# Patient Record
Sex: FEMALE | Race: WHITE | NOT HISPANIC OR LATINO | Employment: OTHER | URBAN - METROPOLITAN AREA
[De-identification: names, ages, dates, MRNs, and addresses within clinical notes are randomized per-mention and may not be internally consistent; named-entity substitution may affect disease eponyms.]

---

## 2017-04-17 ENCOUNTER — GENERIC CONVERSION - ENCOUNTER (OUTPATIENT)
Dept: OTHER | Facility: OTHER | Age: 72
End: 2017-04-17

## 2017-05-04 ENCOUNTER — HOSPITAL ENCOUNTER (OUTPATIENT)
Dept: MAMMOGRAPHY | Facility: CLINIC | Age: 72
Discharge: HOME | End: 2017-05-04
Payer: MEDICARE

## 2017-05-04 ENCOUNTER — ALLSCRIPTS OFFICE VISIT (OUTPATIENT)
Dept: OTHER | Facility: OTHER | Age: 72
End: 2017-05-04

## 2017-05-04 DIAGNOSIS — Z76.89 REFERRAL OF PATIENT WITHOUT EXAMINATION OR TREATMENT: ICD-10-CM

## 2017-05-23 ENCOUNTER — ALLSCRIPTS OFFICE VISIT (OUTPATIENT)
Dept: OTHER | Facility: OTHER | Age: 72
End: 2017-05-23

## 2017-06-26 ENCOUNTER — GENERIC CONVERSION - ENCOUNTER (OUTPATIENT)
Dept: OTHER | Facility: OTHER | Age: 72
End: 2017-06-26

## 2017-06-27 ENCOUNTER — ALLSCRIPTS OFFICE VISIT (OUTPATIENT)
Dept: OTHER | Facility: OTHER | Age: 72
End: 2017-06-27

## 2017-06-27 DIAGNOSIS — L98.9 DISORDER OF SKIN OR SUBCUTANEOUS TISSUE: ICD-10-CM

## 2017-07-23 DIAGNOSIS — I10 ESSENTIAL (PRIMARY) HYPERTENSION: ICD-10-CM

## 2017-07-23 DIAGNOSIS — E03.9 HYPOTHYROIDISM: ICD-10-CM

## 2017-07-23 DIAGNOSIS — C44.319 BASAL CELL CARCINOMA OF SKIN OF OTHER PARTS OF FACE: ICD-10-CM

## 2017-08-21 ENCOUNTER — ALLSCRIPTS OFFICE VISIT (OUTPATIENT)
Dept: OTHER | Facility: OTHER | Age: 72
End: 2017-08-21

## 2017-08-28 ENCOUNTER — ALLSCRIPTS OFFICE VISIT (OUTPATIENT)
Dept: OTHER | Facility: OTHER | Age: 72
End: 2017-08-28

## 2017-10-02 ENCOUNTER — GENERIC CONVERSION - ENCOUNTER (OUTPATIENT)
Dept: OTHER | Facility: OTHER | Age: 72
End: 2017-10-02

## 2017-11-28 ENCOUNTER — ALLSCRIPTS OFFICE VISIT (OUTPATIENT)
Dept: OTHER | Facility: OTHER | Age: 72
End: 2017-11-28

## 2017-11-29 NOTE — PROGRESS NOTES
Assessment    1  Hypertension (401 9) (I10)   2  Encounter for preventive health examination (V70 0) (Z00 00)   3  Hypothyroidism (244 9) (E03 9)   4  Subclinical hypothyroidism (244 8) (E03 9)    Plan  Hypertension    · DilTIAZem HCl  MG Oral Capsule Extended Release 24 Hour; Onecapsule daily as directed   · (1) CBC/PLT/DIFF; Status:Active - Retrospective Authorization; Requested for:28May2018;   · (1) COMPREHENSIVE METABOLIC PANEL; Status:Active - Retrospective Authorization; Requested for:[id number];    · (1) LIPID PANEL, FASTING; Status:Active - Retrospective Authorization; Requestedfor:28May2018;   Hypertension, Hypothyroidism    · (1) TSH; Status:Active - Retrospective Authorization; Requested for:28May2018;   Need for vaccination with 13-polyvalent pneumococcal conjugate vaccine    · Prevnar 13 Intramuscular Suspension  Prevnar  Blood work and recheck 6 months  Discussion/Summary    1  Hypertension-controlledSubclinical hypothyroidism-asymptomatic  Stable  Health maintenance-Prevnar today  1   Hypertension-controlled      History of Present Illness  [facility] Kylee says she is doing well  BP's have been good  saw her GI regarding her constipation  Stopped Miralax  Will be trying milk of magnesia  says her energy level is good  Active Problems  1  Allergic rhinitis (477 9) (J30 9)   2  Alopecia (704 00) (L65 9)   3  Arthritis (716 90) (M19 90)   4  Basal cell carcinoma of right temple region (173 31) (C44 319)   5  Changing skin lesion (709 9) (L98 9)   6  Classic migraine with aura (346 00) (G43 109)   7  Coccydynia (724 79) (M53 3)   8  Constipation (564 00) (K59 00)   9  Dry eyes, bilateral (375 15) (H04 123)   10  Esophageal reflux (530 81) (K21 9)   11  Food intolerance (579 8) (K90 49)   12  Fungal infection (117 9) (B49)   13  Hypertension (401 9) (I10)   14  Hypothyroidism (244 9) (E03 9)   15  Irritable bowel syndrome (564 1) (K58 9)   16  Lactase deficiency (271 3) (E73 9)   17   Mastodynia (611 71) (N64 4)   18  Microscopic hematuria (599 72) (R31 29)   19  Neck pain (723 1) (M54 2)   20  Need for vaccination with 13-polyvalent pneumococcal conjugate vaccine (V03 82) (Z23)   21  Osteoporosis (733 00) (M81 0)   22  Otalgia, unspecified laterality (388 70) (H92 09)   23  Pigmented purpura (709 09) (D69 2)   24  Screening for skin condition (V82 0) (Z13 89)   25  Seborrheic keratosis (702 19) (L82 1)   26  Shoulder joint pain, unspecified laterality   27  Tachycardia (785 0) (R00 0)   28  Vaginal dryness (625 8) (N89 8)   29  Vertigo (780 4) (R42)   30  Weight loss, non-intentional (783 21) (R63 4)    Past Medical History  1  History of Abnormal electrocardiogram (794 31) (R94 31)   2  History of Acute upper respiratory infection (465 9) (J06 9)   3  History of Breast cancer screening (V76 10) (Z12 39)   4  History of Complete tear of right rotator cuff (727 61) (M75 121)   5  History of Dry mouth (527 7) (R68 2)   6  History of Flu vaccine need (V04 81) (Z23)   7  H/O nonmelanoma skin cancer (V10 83) (Z85 828)   8  History of acne (V13 3) (Z87 2)   9  History of acute sinusitis (V12 69) (Z87 09)   10  History of athlete's foot (V12 09) (Z86 19)   11  History of benign neoplasm of skin (V13 3) (Z87 2)   12  History of earache (V12 49) (Z86 69)   13  History of edema (V13 89) (Z87 898)   14  History of gastroenteritis (V12 79) (Z87 19)   15  History of telogen effluvium (V13 89) (Z87 898)   16  History of temporomandibular joint disorder (V13 59) (Z87 39)   17  History of viral warts (V12 09) (Z86 19)   18  History of Inflamed seborrheic keratosis (702 11) (L82 0)   19  History of Lipid screening (V77 91) (Z13 220)   20  History of Malignant Neoplasm Of Skin Of Face (173 30)   21  History of Malignant Skin Neoplasm (V10 83)   22  History of Need for pneumococcal vaccination (V03 82) (Z23)   23  History of Need for pneumococcal vaccine (V03 82) (Z23)   24  History of Need for zoster vaccination (V04 89) (Z23)   25  History of Pre-operative cardiovascular examination (V72 81) (Z01 810)   26  History of Skin condition screening (V82 0) (Z13 89)    Surgical History  1  History of Appendectomy   2  History of Cholecystectomy   3  History of Colonoscopy (Fiberoptic)   4  History of Diagnostic Esophagogastroduodenoscopy   5  History of Excision Of Lesion Face Malignant   6  History of Hysterectomy   7  History of Rotator Cuff Repair    Family History  Mother    1  Family history of Hypertension (V17 49)   2  Family history of Osteoporosis (V17 81)   3  Family history of Skin Cancer (V16 8)  Father    4  Family history of Hypertension (V17 49)   5  Family history of Skin Cancer (V16 8)  Maternal Grandmother    6  Family history of malignant neoplasm of uterus (V16 49) (Z80 49)  Paternal Grandmother    7  Family history of malignant neoplasm of uterus (V16 49) (Z80 49)  Paternal Aunt    6  Family history of malignant neoplasm of uterus (V16 49) (Z80 49)  Family History    9  Family history of Family Health Status Of Mother - Alive   8  Family history of Father  At Age 80    Social History     · Marital History - Currently    · Never a smoker   · Never Drank Alcohol    Current Meds   1  Beano TABS; as needed; Therapy: (Recorded:2017) to Recorded   2  Centrum Silver Oral Tablet; TAKE 1 TABLET DAILY; Therapy: (Recorded:83Yea9065) to Recorded   3  DilTIAZem HCl  MG Oral Capsule Extended Release 24 Hour; One capsule daily as directed; Therapy: 76Qzd7809 to (Yuval Lees)  Requested for: 38WLQ4599; Last Rx:2017 Ordered   4  Fluticasone Propionate 50 MCG/ACT Nasal Suspension; USE 2 SPRAYS IN EACH NOSTRIL ONCE DAILY; Therapy: 00YRY5825 to (Evaluate:99Rus4904)  Requested for: 33Ycn2367; Last Rx:23Eht5582 Ordered   5  Gaviscon CHEW; TAKE AS DIRECTED; Therapy: (Recorded:2017) to Recorded   6  Lactaid TABS; as needed; Therapy: (Recorded:2017) to Recorded   7   Meclizine HCl - 25 MG Oral Tablet; TAKE 1 TABLET 3 TIMES DAILY AS NEEDED; Therapy: 28Apr2016 to (Evaluate:18May2016)  Requested for: 28Apr2016; Last Rx:28Apr2016 Ordered   8  Nizatidine 150 MG Oral Capsule; TAKE 1 CAPSULE EVERY 12 HOURS DAILY; Therapy: (Recorded:07Aug2014) to Recorded   9  Premarin 0 625 MG/GM Vaginal Cream; WEEKLY USE; Therapy: 68OVJ5069 to (Evaluate:06Sep2014) Recorded   10  Prevnar 13 Intramuscular Suspension; INJECT 0 5  ML Intramuscular; Therapy: 79ZGS9332 to (Last Rx:23May2017) Ordered   11  Rogaine Womens FOAM; USE AS DIRECTED; Therapy: (Recorded:04May2017) to Recorded   12  Systane 0 4-0 3 % Ophthalmic Solution; as needed; Therapy: 07Aug2014 to Recorded   13  Vitamin C 500 MG Oral Capsule; Therapy: (Recorded:04May2017) to Recorded   14  Zicam Allergy Relief Nasal Gel; Therapy: (Recorded:04May2017) to Recorded    Allergies  1  Depo-Medrol (PF) SUSP   2  Penicillins   3  Atacand TABS   4  Avapro TABS   5  Azithromycin SUSR   6  Bactrim TABS   7  Benicar TABS   8  Biaxin TABS   9  Bisphosphonates   10  Cardizem TABS   11  Cozaar TABS   12  Erythromycin TABS   13  Inderal TABS   14  Levaquin TABS   15  Lopressor TABS   16  Monopril TABS   17  Norvasc TABS   18  Sectral CAPS   19  Sular TB24   20  Tenormin TABS   21  Timolol Maleate TABS    Vitals  Vital Signs    Recorded: 51SYY1185 11:08AM   Heart Rate 70   Respiration 16   Systolic 671   Diastolic 60   Height 5 ft 4 in   Weight 120 lb 2 oz   BMI Calculated 20 62   BSA Calculated 1 57       Physical Exam   Constitutional  General appearance: No acute distress, well appearing and well nourished  Neck  Neck: Supple, symmetric, trachea midline, no masses  Thyroid: Normal, no thyromegaly  Pulmonary  Auscultation of lungs: Clear to auscultation  Cardiovascular  Auscultation of heart: Normal rate and rhythm, normal S1 and S2, no murmurs  Carotid pulses: 2+ bilaterally  Abdominal aorta: Normal    Femoral pulses: 2+ bilaterally  Pedal pulses: 2+ bilaterally     Peripheral vascular exam: Normal    Lymphatic  Palpation of lymph nodes in neck: No lymphadenopathy  Results/Data  BW 8/17:  TSh 4 89  BMP WNl except creatinine 1 09      Future Appointments    Date/Time Provider Specialty Site   06/06/2018 11:00 AM CORTNEY Seymour  [address] [place] [address]   02/23/2018 10:35 AM CORTNEY Dash  Dermatology [facility]   07/10/2018 09:55 AM CORTNEY Dash   [facility]       Signatures   Electronically signed by : CORTNEY Pereira ; Nov 28 2017  9:55PM EST                       (Author)

## 2017-12-04 ENCOUNTER — GENERIC CONVERSION - ENCOUNTER (OUTPATIENT)
Dept: FAMILY MEDICINE CLINIC | Facility: MEDICAL CENTER | Age: 72
End: 2017-12-04

## 2017-12-11 ENCOUNTER — TRANSCRIBE ORDERS (OUTPATIENT)
Dept: ADMINISTRATIVE | Facility: HOSPITAL | Age: 72
End: 2017-12-11

## 2017-12-11 ENCOUNTER — GENERIC CONVERSION - ENCOUNTER (OUTPATIENT)
Dept: OTHER | Facility: OTHER | Age: 72
End: 2017-12-11

## 2017-12-11 DIAGNOSIS — I10 ESSENTIAL (PRIMARY) HYPERTENSION: ICD-10-CM

## 2017-12-11 DIAGNOSIS — H93.299 OTHER ABNORMAL AUDITORY PERCEPTIONS, UNSPECIFIED EAR: ICD-10-CM

## 2017-12-11 DIAGNOSIS — H93.299 IMPAIRMENT OF AUDITORY DISCRIMINATION, UNSPECIFIED LATERALITY: Primary | ICD-10-CM

## 2017-12-12 ENCOUNTER — LAB (OUTPATIENT)
Dept: LAB | Facility: MEDICAL CENTER | Age: 72
End: 2017-12-12
Payer: MEDICARE

## 2017-12-12 DIAGNOSIS — I10 ESSENTIAL (PRIMARY) HYPERTENSION: ICD-10-CM

## 2017-12-12 LAB
BUN SERPL-MCNC: 20 MG/DL (ref 5–25)
CREAT SERPL-MCNC: 0.92 MG/DL (ref 0.6–1.3)
GFR SERPL CREATININE-BSD FRML MDRD: 62 ML/MIN/1.73SQ M

## 2017-12-12 PROCEDURE — 36415 COLL VENOUS BLD VENIPUNCTURE: CPT

## 2017-12-12 PROCEDURE — 84520 ASSAY OF UREA NITROGEN: CPT

## 2017-12-12 PROCEDURE — 82565 ASSAY OF CREATININE: CPT

## 2017-12-15 ENCOUNTER — GENERIC CONVERSION - ENCOUNTER (OUTPATIENT)
Dept: OTHER | Facility: OTHER | Age: 72
End: 2017-12-15

## 2017-12-15 ENCOUNTER — HOSPITAL ENCOUNTER (OUTPATIENT)
Dept: MRI IMAGING | Facility: HOSPITAL | Age: 72
Discharge: HOME | End: 2017-12-15
Payer: MEDICARE

## 2017-12-15 DIAGNOSIS — H93.299 OTHER ABNORMAL AUDITORY PERCEPTIONS, UNSPECIFIED EAR: ICD-10-CM

## 2017-12-15 PROCEDURE — A9585 GADOBUTROL INJECTION: HCPCS | Performed by: FAMILY MEDICINE

## 2017-12-15 PROCEDURE — 70544 MR ANGIOGRAPHY HEAD W/O DYE: CPT

## 2017-12-15 PROCEDURE — 70553 MRI BRAIN STEM W/O & W/DYE: CPT

## 2017-12-15 RX ADMIN — GADOBUTROL 5 ML: 604.72 INJECTION INTRAVENOUS at 11:26

## 2017-12-18 ENCOUNTER — GENERIC CONVERSION - ENCOUNTER (OUTPATIENT)
Dept: OTHER | Facility: OTHER | Age: 72
End: 2017-12-18

## 2017-12-18 ENCOUNTER — HOSPITAL ENCOUNTER (OUTPATIENT)
Dept: NEUROLOGY | Facility: AMBULATORY SURGERY CENTER | Age: 72
Discharge: HOME | End: 2017-12-21
Payer: MEDICARE

## 2017-12-18 DIAGNOSIS — H93.299 IMPAIRMENT OF AUDITORY DISCRIMINATION, UNSPECIFIED LATERALITY: ICD-10-CM

## 2017-12-18 PROCEDURE — 95816 EEG AWAKE AND DROWSY: CPT

## 2017-12-18 NOTE — PROCEDURES
ELECTROENCEPHALOGRAM (EEG)      Patient Name:  Josué Treviño  MRN: [id number]   :  1945 File #: [id number] - 26   Age: 74 y o  Encounter #: [id number]   Date performed: 2017            Report date: 2017          Study type: Routine EEG    ICD 10 diagnosis: Spells/Fit NOS R56 9    Start time: 14:49 End time: 15:14     -------------------------------------------------------------------------------------------------------------------   Patient History: This recording was observed in a 74 y o  female to determine whether spells of inability to speak are seizures  Medications include: no AEDs    -------------------------------------------------------------------------------------------------------------------   Description of Procedure:  · 32 channel digital recording with electrodes placed according to the International 10-20 system with additional T1/T2 electrodes, EOG, EKG, and simultaneous video  The recording was technically satisfactory  -------------------------------------------------------------------------------------------------------------------   EEG Description:    The recording was performed with the patient awake, drowsy, and asleep  She was fully oriented  During wakefulness, there were long runs of well regulated, low amplitude, posteriorly dominant, symmetric 9 cps alpha rhythm that attenuated with eye opening  There were symmetric low amplitude, frontally dominant beta activities  With drowsiness, alpha activity attenuated and was replaced by diffusely distributed theta activities  With sleep, symmetric vertex sharp waves and sleep spindles were present      -------------------------------------------------------------------------------------------------------------------   Activation Procedures:  Hyperventilation was performed for 3-4 minutes and did not produce any changes       Stepped photic stimulation between 1-20 cps was performed and induced symmetric photic driving  Other findings: The single lead ECG demonstrated regular rhythm    -------------------------------------------------------------------------------------------------------------------   EEG Interpretation:   This Routine EEG recorded during wakefulness, drowsiness, and sleep is normal     Shen Hernandez MD   [address] [facility]

## 2017-12-22 ENCOUNTER — HOSPITAL ENCOUNTER (OUTPATIENT)
Dept: RADIOLOGY | Facility: MEDICAL CENTER | Age: 72
Discharge: HOME | End: 2017-12-22
Payer: MEDICARE

## 2017-12-22 ENCOUNTER — GENERIC CONVERSION - ENCOUNTER (OUTPATIENT)
Dept: OTHER | Facility: OTHER | Age: 72
End: 2017-12-22

## 2017-12-22 DIAGNOSIS — H93.299 OTHER ABNORMAL AUDITORY PERCEPTIONS, UNSPECIFIED EAR: ICD-10-CM

## 2017-12-22 PROCEDURE — 93880 EXTRACRANIAL BILAT STUDY: CPT

## 2017-12-28 ENCOUNTER — GENERIC CONVERSION - ENCOUNTER (OUTPATIENT)
Dept: OTHER | Facility: OTHER | Age: 72
End: 2017-12-28

## 2018-01-12 VITALS
HEIGHT: 64 IN | RESPIRATION RATE: 16 BRPM | WEIGHT: 119.13 LBS | HEART RATE: 64 BPM | SYSTOLIC BLOOD PRESSURE: 146 MMHG | DIASTOLIC BLOOD PRESSURE: 72 MMHG | BODY MASS INDEX: 20.34 KG/M2

## 2018-01-13 VITALS
HEIGHT: 64 IN | DIASTOLIC BLOOD PRESSURE: 60 MMHG | RESPIRATION RATE: 16 BRPM | SYSTOLIC BLOOD PRESSURE: 126 MMHG | HEART RATE: 70 BPM | BODY MASS INDEX: 20.51 KG/M2 | WEIGHT: 120.13 LBS

## 2018-01-14 VITALS
HEART RATE: 63 BPM | DIASTOLIC BLOOD PRESSURE: 80 MMHG | RESPIRATION RATE: 16 BRPM | WEIGHT: 116.38 LBS | HEIGHT: 64 IN | TEMPERATURE: 97.9 F | BODY MASS INDEX: 19.87 KG/M2 | SYSTOLIC BLOOD PRESSURE: 142 MMHG

## 2018-01-23 NOTE — PROCEDURES
Procedures by Chris Lara MD at 2017   5:02 PM      Author:  Chris Lara MD Service:  Neurology Author Type:  Physician    Filed:  2017  5:09 PM Date of Service:  2017  5:02 PM Status:  Signed    :  Chris Lara MD (Physician)        Procedure Orders:       1  EEG Routine and awake [[id number]] ordered by  at 17 1629                  ELECTROENCEPHALOGRAM (EEG)      Patient Name:  Pavithra Barksdale  MRN: [id number]   :  1945 File #: [id number]   Age: 74 y o  Encounter #: [id number]   Date performed: 2017            Report date: 2017          Study type: Routine EEG    ICD 10 diagnosis: Spells/Fit NOS R56 9    Start time: 14:49 End time: 15:14     -------------------------------------------------------------------------------------------------------------------   Patient History: This recording was observed in a 74 y o  female to determine whether spells  of inability to speak are seizures  Medications include: no AEDs    -------------------------------------------------------------------------------------------------------------------   Description of Procedure:  ·  32 channel digital recording with electrodes placed according to the International 10-20 system with additional  T1/T2 electrodes, EOG, EKG, and simultaneous  video  The recording was technically satisfactory  -------------------------------------------------------------------------------------------------------------------   EEG Description:    The recording was performed with the patient awake, drowsy, and asleep  She was fully oriented  During wakefulness, there were long runs of well regulated, low amplitude, posteriorly  dominant, symmetric 9 cps alpha rhythm that attenuated with eye opening  There were symmetric low amplitude, frontally dominant beta activities  With drowsiness, alpha activity attenuated and was replaced by diffusely distributed theta activities   With sleep, symmetric vertex sharp waves and sleep spindles were present      -------------------------------------------------------------------------------------------------------------------   Activation Procedures:  Hyperventilation was performed for 3-4  minutes and did not produce any changes  Stepped photic stimulation between 1-20 cps was performed and induced symmetric  photic driving  Other findings: The single lead ECG demonstrated regular rhythm    -------------------------------------------------------------------------------------------------------------------   EEG Interpretation: This Routine EEG recorded during wakefulness, drowsiness, and sleep is normal     Pina Patel MD   [address] [facility]               Received for:Clif PHAM    Dec 18 2017  5:09PM [facility] Standard Time

## 2018-01-24 VITALS — DIASTOLIC BLOOD PRESSURE: 78 MMHG | SYSTOLIC BLOOD PRESSURE: 138 MMHG

## 2018-01-24 VITALS
BODY MASS INDEX: 21.17 KG/M2 | HEART RATE: 64 BPM | SYSTOLIC BLOOD PRESSURE: 158 MMHG | DIASTOLIC BLOOD PRESSURE: 86 MMHG | WEIGHT: 119.5 LBS | RESPIRATION RATE: 16 BRPM

## 2018-01-24 VITALS
SYSTOLIC BLOOD PRESSURE: 158 MMHG | DIASTOLIC BLOOD PRESSURE: 80 MMHG | HEART RATE: 70 BPM | HEIGHT: 64 IN | WEIGHT: 119.25 LBS | BODY MASS INDEX: 20.36 KG/M2 | RESPIRATION RATE: 16 BRPM

## 2018-02-23 ENCOUNTER — OFFICE VISIT (OUTPATIENT)
Dept: DERMATOLOGY | Facility: CLINIC | Age: 73
End: 2018-02-23
Payer: MEDICARE

## 2018-02-23 DIAGNOSIS — L98.9 CHANGING SKIN LESION: Primary | ICD-10-CM

## 2018-02-23 DIAGNOSIS — Z13.89 SCREENING FOR SKIN CONDITION: ICD-10-CM

## 2018-02-23 DIAGNOSIS — L82.1 SEBORRHEIC KERATOSIS: ICD-10-CM

## 2018-02-23 DIAGNOSIS — Z85.828 HISTORY OF SKIN CANCER: ICD-10-CM

## 2018-02-23 PROCEDURE — 11100 PR BIOPSY OF SKIN LESION: CPT | Performed by: DERMATOLOGY

## 2018-02-23 PROCEDURE — 99213 OFFICE O/P EST LOW 20 MIN: CPT | Performed by: DERMATOLOGY

## 2018-02-23 RX ORDER — CHOLECALCIFEROL (VITAMIN D3) 125 MCG
CAPSULE ORAL
COMMUNITY

## 2018-02-23 RX ORDER — MULTIVIT-MIN/IRON/FOLIC ACID/K 18-600-40
CAPSULE ORAL
COMMUNITY
End: 2019-05-08 | Stop reason: SDUPTHER

## 2018-02-23 RX ORDER — NIZATIDINE 150 MG/1
1 CAPSULE ORAL EVERY 12 HOURS
COMMUNITY
End: 2020-07-27

## 2018-02-23 RX ORDER — DILTIAZEM HYDROCHLORIDE 240 MG/1
CAPSULE, EXTENDED RELEASE ORAL
COMMUNITY
Start: 2014-08-07 | End: 2018-08-17 | Stop reason: ALTCHOICE

## 2018-02-23 RX ORDER — ALPHA-D-GALACTOSIDASE
TABLET ORAL
COMMUNITY

## 2018-02-23 RX ORDER — FLUTICASONE PROPIONATE 50 MCG
2 SPRAY, SUSPENSION (ML) NASAL DAILY
COMMUNITY
Start: 2012-03-05 | End: 2018-08-17 | Stop reason: SDUPTHER

## 2018-02-23 RX ORDER — MECLIZINE HYDROCHLORIDE 25 MG/1
1 TABLET ORAL 3 TIMES DAILY PRN
COMMUNITY
Start: 2016-04-28 | End: 2018-03-12 | Stop reason: DRUGHIGH

## 2018-02-23 NOTE — PATIENT INSTRUCTIONS
Wound care instructions given to patientChanging skin lesion possible basal cell carcinoma await results of biopsy would require excision  Seborrheic keratosis patient reassured these are normal growths we acquire with age no treatment needed  History of skin cancer in no recurrence nothing else atypical sunblock recommended follow-up in 6 months  Screening for dermatologic disorders nothing else of concern noted on complete exam follow-up in 6 months no distinct rash noted at this time a chest wall could be transient acantholytic dermatitis advised use hydrocortisone cream at this time follow-up at the [facility]

## 2018-02-23 NOTE — PROGRESS NOTES
[address] [facility] [address] [facility] DERMATOLOGY  [address] [place] [address]     MRN: [id number] : 1945  Encounter: [id number]  Patient Information: Bridgette Romberg  Chief complaint:6 month check up    History of present illness:  43-year-old female presents for overall skin check previous history of skin cancer complaining of lesion on her right temple that is been irritated for several months also concerned regarding a rash on the chest wall that is been bothersome for the past several months  During the winter  Nothing else of concern  Past Medical History:   Diagnosis Date    Hypertension     Tachycardia     Vertigo      Past Surgical History:   Procedure Laterality Date    APPENDECTOMY      CHOLECYSTECTOMY      HYSTERECTOMY      ROTATOR CUFF REPAIR       Social History   History   Alcohol use Not on file     History   Drug use: Unknown     History   Smoking Status    Never Smoker   Smokeless Tobacco    Never Used     Family History   Problem Relation Age of Onset    Hypertension Mother     Osteoporosis Mother     Skin cancer Mother     Hypertension Father     Skin cancer Father      Meds/Allergies   Allergies   Allergen Reactions    Acebutolol     Amlodipine     Atenolol     Azithromycin     Bisphosphonates      Annotation - [id number]: iriitis    Candesartan     Clarithromycin     Diltiazem     Erythromycin     Fosinopril     Irbesartan     Levofloxacin     Losartan     Methylprednisolone Hypertension and Tachycardia    Metoprolol     Nisoldipine     Olmesartan     Propranolol     Sulfamethoxazole-Trimethoprim     Timolol     Penicillins Rash       Meds:  Prior to Admission medications    Medication Sig Start Date End Date Taking?  Authorizing Provider   diltiazem (DILACOR XR) 240 MG 24 hr capsule Take by mouth 14  Yes Historical Provider, MD   fluticasone (FLONASE) 50 mcg/act nasal spray 2 sprays into each nostril daily 3/5/12  Yes Historical Provider, MD   meclizine (ANTIVERT) 25 mg tablet Take 1 tablet by mouth 3 (three) times a day as needed 4/28/16  Yes Historical Provider, MD   polyethylene glycol-propylene glycol (SYSTANE) 0 4-0 3 % Apply to eye 8/7/14  Yes Historical Provider, MD   Alpha-D-Galactosidase Anthony Lav) TABS Take by mouth    Historical Provider, MD   Alum Hydroxide-Mag Trisilicate (GAVISCON) 83-21 8 MG CHEW Chew    Historical Provider, MD   Ascorbic Acid (VITAMIN C) 500 MG CAPS Take by mouth    Historical Provider, MD   Homeopathic Products ([address] NA) into each nostril    Historical Provider, MD   lactase (LACTAID) 3,000 units tablet Take by mouth    Historical Provider, MD   Minoxidil (ROGAINE WOMENS) 5 % FOAM Apply topically    Historical Provider, MD   Multiple Vitamins-Minerals (CENTRUM SILVER PO) Take 1 tablet by mouth daily    Historical Provider, MD   nizatidine (AXID) 150 MG capsule Take 1 capsule by mouth every 12 (twelve) hours    Historical Provider, MD       Subjective:     Review of Systems:    General: negative for - chills, fatigue, fever,  weight gain or weight loss  Psychological: negative for - anxiety, behavioral disorder, concentration difficulties, decreased libido, depression, irritability, memory difficulties, mood swings, sleep disturbances or suicidal ideation  ENT: negative for - hearing difficulties , nasal congestion, nasal discharge, oral lesions, sinus pain, sneezing, sore throat  Allergy and Immunology: negative for - hives, insect bite sensitivity,  Hematological and Lymphatic: negative for - bleeding problems, blood clots,bruising, swollen lymph nodes  Endocrine: negative for - hair pattern changes, hot flashes, malaise/lethargy, mood swings, palpitations, polydipsia/polyuria, skin changes, temperature intolerance or unexpected weight change  Respiratory: negative for - cough, hemoptysis, orthopnea, shortness of breath, or wheezing  Cardiovascular: negative for - chest pain, dyspnea on exertion, edema,  Gastrointestinal: negative for - abdominal pain, nausea/vomiting  Genito-Urinary: negative for - dysuria, incontinence, irregular/heavy menses or urinary frequency/urgency  Musculoskeletal: negative for - gait disturbance, joint pain, joint stiffness, joint swelling, muscle pain, muscular weakness  Dermatological:  As in HPI  Neurological: negative for confusion, dizziness, headaches, impaired coordination/balance, memory loss, numbness/tingling, seizures, speech problems, tremors or weakness       Objective: There were no vitals taken for this visit  Physical Exam:    General Appearance:    Alert, cooperative, no distress   Head:    Normocephalic, without obvious abnormality, atraumatic           Skin:   A full skin exam was performed including scalp, head scalp, eyes, ears, nose, lips, neck, chest, axilla, abdomen, back, buttocks, bilateral upper extremities, bilateral lower extremities, hands, feet, fingers, toes, fingernails, and toenails 4mm pink crusted area R templePrevious sites of skin cancer well healed without recurrence normal keratotic papules with greasy stuck on appearance no distinct rash really noted on the chest previous sites of skin cancer well healed without recurrence     Assessment:     1  Changing skin lesion     2  Seborrheic keratosis     3  Screening for skin condition     4   History of skin cancer           Plan:    Wound care instructions given to patientChanging skin lesion possible basal cell carcinoma await results of biopsy would require excision  Seborrheic keratosis patient reassured these are normal growths we acquire with age no treatment needed  History of skin cancer in no recurrence nothing else atypical sunblock recommended follow-up in 6 months  Screening for dermatologic disorders nothing else of concern noted on complete exam follow-up in 6 months no distinct rash noted at this time a chest wall could be transient acantholytic dermatitis advised use hydrocortisone cream at this time follow[facility]up at the [facility]    Lianet Orozco MD  2/23/2018,11:19 AM    Portions of the record may have been created with voice recognition software   Occasional wrong word or "sound a like" substitutions may have occurred due to the inherent limitations of voice recognition software   Read the chart carefully and recognize, using context, where substitutions have occurred

## 2018-02-26 PROBLEM — I10 ESSENTIAL HYPERTENSION: Status: ACTIVE | Noted: 2018-02-26

## 2018-02-26 PROBLEM — R55 NEAR SYNCOPE: Status: ACTIVE | Noted: 2018-02-26

## 2018-02-26 NOTE — PROGRESS NOTES
Consultation - Cardiology     Bo Bedolla 74 y o  female MRN: [id number]    Assessment/Plan:    1  Near syncope/episodes of inability to speak  Not truly syncope, as she does not feel like she is going to pass out, but rather has neurologic symptoms that may be related to transient episodes of decreased perfusion to brain  Will order echo to ensure normal LV function, no significant valvular abnormalities, or cardiac source of possible emboli (no prior infarcts seen on MRI brain, however)  Neurology evaluation is pending  Since she has had no further symptoms since 12/17, arrhythmia monitoring will likely be of low yield, but will order screening 24 hour Holter monitor to see if any signs of significant conduction disease or arrhythmia that could possibly be contributing  In the long term, if she continues to have recurrent events, could consider placement of loop recorder if recommended by Neurology  2  HTN  She is on Diltiazem 240 mg daily for HTN  BP controlled  HPI: Bo Bedolla is a 74y o  year old female with a history of HTN who is referred by Dr Nargis Dickson for evaluation of episodes of near syncope/inability to speak  First episode occurred in 7/17, she was making supper, she could not concentrate enough to read directions  She was trying to tell her  she couldn't read directions, but had difficulty speaking words, kept saying, I can't, I can't  She felt warmth starting in belly and radiating up into chest  Difficulty speaking resolved within 15 seconds, but didn't feel back to normal until about 20 minutes in terms of her concentration  Second episode occurred 11/25/17  She was in basement putting clothes in washer  She couldn't talk, felt like she was stuttering, lasted 15-30 seconds, felt funny in head, stomach was throbbing  Third episode was 12/3/17, had a funny sensation, again stuttering when talking, wave of heat started in abdomen  Felt a hiccupping sensation   Lasted 15-30 seconds  Last episode was on December 11, 2017, she was reading paper sitting at table, she felt like she could still concentrate, but still felt warmth starting in stomach, and speech difficulty was brief also, about 5 seconds  Carotid ultrasound, MRI brain with MRA 12/17 was negative for significant carotid or vertebral artery stenosis  EEG 12/17 was normal      Has not had any further episodes since 12/17  Has not felt like she was going to pass out with any of these episodes  She reports she has a history of vasovagal episodes in 1994 with irritable bowel syndrome (would get similar warmth at that time, as well as difficulty speaking)  No chest pain  No shortness of breath  Very mild LE edema  No orthopnea, uses 1 pillow to sleep  No PND  Occasional palpitations  Review of Systems:    Review of Systems   Constitution: Negative for chills, decreased appetite, diaphoresis, fever, weakness, malaise/fatigue, night sweats, weight gain and weight loss  HENT: Negative for ear pain, hearing loss, hoarse voice, nosebleeds, sore throat and tinnitus  Eyes: Negative for blurred vision and pain  Cardiovascular: Positive for leg swelling and palpitations  Negative for chest pain, claudication, cyanosis, dyspnea on exertion, irregular heartbeat, near-syncope, orthopnea, paroxysmal nocturnal dyspnea and syncope  Respiratory: Negative for cough, hemoptysis, shortness of breath, sleep disturbances due to breathing, snoring, sputum production and wheezing  Hematologic/Lymphatic: Negative for adenopathy and bleeding problem  Does not bruise/bleed easily  Skin: Negative for color change, dry skin, flushing, itching, poor wound healing and rash  Musculoskeletal: Positive for arthritis  Negative for back pain, falls, joint pain, muscle cramps, muscle weakness, myalgias and neck pain     Gastrointestinal: Negative for abdominal pain, constipation, diarrhea, dysphagia, heartburn, hematemesis, hematochezia, melena, nausea and vomiting  Genitourinary: Negative for dysuria, frequency, hematuria, hesitancy, non-menstrual bleeding and urgency  Neurological: Negative for excessive daytime sleepiness, dizziness, focal weakness, headaches, light-headedness, loss of balance, numbness, paresthesias, tremors and vertigo  Psychiatric/Behavioral: Negative for altered mental status, depression and memory loss  The patient does not have insomnia and is not nervous/anxious  Allergic/Immunologic: Negative for environmental allergies and persistent infections       Active Problems:     Patient Active Problem List   Diagnosis    Seborrheic keratosis    Changing skin lesion    Screening for skin condition    History of skin cancer    Essential hypertension    Near syncope       Historical Information   Past Medical History:   Diagnosis Date    Hypertension     Tachycardia     Vertigo      Past Surgical History:   Procedure Laterality Date    APPENDECTOMY      CHOLECYSTECTOMY      HYSTERECTOMY      ROTATOR CUFF REPAIR       History   Alcohol use Not on file     History   Drug use: Unknown     History   Smoking Status    Never Smoker   Smokeless Tobacco    Never Used       Family History:   Family History   Problem Relation Age of Onset    Hypertension Mother     Osteoporosis Mother     Skin cancer Mother     Hypertension Father     Skin cancer Father        Allergies   Allergen Reactions    Acebutolol     Amlodipine     Atenolol     Azithromycin     Bisphosphonates      Annotation - [id number]: iriitis    Candesartan     Clarithromycin     Diltiazem     Erythromycin     Fosinopril     Irbesartan     Levofloxacin     Losartan     Methylprednisolone Hypertension and Tachycardia    Metoprolol     Nisoldipine     Olmesartan     Propranolol     Sulfamethoxazole-Trimethoprim     Timolol     Penicillins Rash         Current Outpatient Prescriptions:     Alpha-D-Galactosidase (BEANO) TABS, Take by mouth, Disp: , Rfl:     Alum Hydroxide-Mag Trisilicate (GAVISCON) 30-09 6 MG CHEW, Chew, Disp: , Rfl:     Ascorbic Acid (VITAMIN C) 500 MG CAPS, Take by mouth, Disp: , Rfl:     diltiazem (DILACOR XR) 240 MG 24 hr capsule, Take by mouth, Disp: , Rfl:     fluticasone (FLONASE) 50 mcg/act nasal spray, 2 sprays into each nostril daily, Disp: , Rfl:     Homeopathic Products (ZICAM ALLERGY RELIEF NA), into each nostril, Disp: , Rfl:     lactase (LACTAID) 3,000 units tablet, Take by mouth, Disp: , Rfl:     meclizine (ANTIVERT) 25 mg tablet, Take 1 tablet by mouth 3 (three) times a day as needed, Disp: , Rfl:     Minoxidil (ROGAINE WOMENS) 5 % FOAM, Apply topically, Disp: , Rfl:     Multiple Vitamins-Minerals (CENTRUM SILVER PO), Take 1 tablet by mouth daily, Disp: , Rfl:     nizatidine (AXID) 150 MG capsule, Take 1 capsule by mouth every 12 (twelve) hours, Disp: , Rfl:     polyethylene glycol-propylene glycol (SYSTANE) 0 4-0 3 %, Apply to eye, Disp: , Rfl:     Objective   Vitals:   Vitals:    02/27/18 1308   BP: 132/74   BP Location: Left arm   Patient Position: Sitting   Cuff Size: Adult   Pulse: 66   Weight: 53 9 kg (118 lb 12 8 oz)   Height: 5' 4" (1 626 m)       Physical Exam:     GEN: Alert and oriented x 3, in no acute distress  Well appearing and well nourished  HEENT: Sclera anicteric, conjunctivae pink, mucous membranes moist  Oropharynx clear  NECK: Supple, no carotid bruits, no significant JVD  Trachea midline, no thyromegaly  HEART: Regular rhythm, normal S1 and S2, no murmurs, clicks, gallops or rubs  PMI nondisplaced, no thrills  LUNGS: Clear to auscultation bilaterally; no wheezes, rales, or rhonchi  No increased work of breathing or signs of respiratory distress  ABDOMEN: Soft, nontender, nondistended, normoactive bowel sounds  EXTREMITIES: Skin warm and well perfused, no clubbing, cyanosis, or edema  NEURO: No focal findings  Normal gait  Normal speech   Mood and affect normal    SKIN: Normal without suspicious lesions on exposed skin      Lab Results:     No results found for: HGBA1C  No results found for: CHOL  No results found for: HDL  No results found for: LDLCALC  No results found for: TRIG  No components found for: CHOLHDL

## 2018-02-27 ENCOUNTER — OFFICE VISIT (OUTPATIENT)
Dept: CARDIOLOGY CLINIC | Facility: MEDICAL CENTER | Age: 73
End: 2018-02-27
Payer: MEDICARE

## 2018-02-27 VITALS
BODY MASS INDEX: 20.28 KG/M2 | WEIGHT: 118.8 LBS | HEART RATE: 66 BPM | HEIGHT: 64 IN | DIASTOLIC BLOOD PRESSURE: 74 MMHG | SYSTOLIC BLOOD PRESSURE: 132 MMHG

## 2018-02-27 DIAGNOSIS — I10 ESSENTIAL HYPERTENSION: Primary | ICD-10-CM

## 2018-02-27 DIAGNOSIS — R55 SYNCOPE AND COLLAPSE: Primary | ICD-10-CM

## 2018-02-27 DIAGNOSIS — R55 NEAR SYNCOPE: ICD-10-CM

## 2018-02-27 PROCEDURE — 99204 OFFICE O/P NEW MOD 45 MIN: CPT | Performed by: INTERNAL MEDICINE

## 2018-02-27 PROCEDURE — 93000 ELECTROCARDIOGRAM COMPLETE: CPT | Performed by: INTERNAL MEDICINE

## 2018-03-05 ENCOUNTER — HOSPITAL ENCOUNTER (OUTPATIENT)
Dept: NON INVASIVE DIAGNOSTICS | Facility: CLINIC | Age: 73
Discharge: HOME | End: 2018-03-05
Payer: MEDICARE

## 2018-03-05 ENCOUNTER — PROCEDURE VISIT (OUTPATIENT)
Dept: DERMATOLOGY | Facility: CLINIC | Age: 73
End: 2018-03-05
Payer: MEDICARE

## 2018-03-05 DIAGNOSIS — C44.319 BASAL CELL CARCINOMA OF RIGHT TEMPLE REGION: Primary | ICD-10-CM

## 2018-03-05 DIAGNOSIS — R55 NEAR SYNCOPE: ICD-10-CM

## 2018-03-05 PROCEDURE — 93225 XTRNL ECG REC<48 HRS REC: CPT

## 2018-03-05 PROCEDURE — 93226 XTRNL ECG REC<48 HR SCAN A/R: CPT

## 2018-03-05 PROCEDURE — 11642 EXC F/E/E/N/L MAL+MRG 1.1-2: CPT | Performed by: DERMATOLOGY

## 2018-03-05 PROCEDURE — 93224 XTRNL ECG REC UP TO 48 HRS: CPT | Performed by: INTERNAL MEDICINE

## 2018-03-05 PROCEDURE — 12052 INTMD RPR FACE/MM 2.6-5.0 CM: CPT | Performed by: DERMATOLOGY

## 2018-03-05 NOTE — PROGRESS NOTES
[address] [address] [facility] DERMATOLOGY  [address] [facility] [address]     MRN: [id number] : 1945  Encounter: [id number]  Patient Information: Jak Lombardi    Subjective:      49-year-old female presents for previously biopsied basal cell carcinoma right temple with planned excision     Objective: There were no vitals taken for this visit  Physical Exam:    General Appearance:    Alert, cooperative, no distress   Skin:   Previous noted area noted in the right  [facility]    Procedure name: Excision with intermediate layered closure        Location:  Right temple  Diagnosis: Basal cell carcinoma    Size of lesion: 4 mm    Margins: 4 mm    Size + Margins: 12 mm    I explained the diagnosis to the patient and we recommend an excision of the lesion for diagnosis and/or treatment  Potential complications include, but are not limited to: scarring, bleeding, infection, incomplete removal, recurrence, and nerve damage  The risks, benefits, and alternatives were discussed with the patient in detail  The location of the tumor was identified, circled, and confirmed by the patient  The correct patient, site, and procedure were confirmed  Anesthesia: 1% xylocaine with 1:100,000 epinephrine 5 cc  The patient was brought into the room, prepped and draped sterilely in the usual manner and anesthesia was administered by local infiltration  A fusiform shape was drawn around the lesion, and the margins were incised to the level of the subcutaneous fat with a number 15cc Bard-Jai blade  The tissue was removed with sharp and blunt dissection  The lateral margins of the resulting defect were undermined with sharp and blunt dissection  Hemostasis was achieved with electrocautery  The deeper layers of the defect, including subcutaneous fat and fascia, had to be approximated to reduce tension on the suture line  Layered wound closure was performed    The wound was cleaned with saline, dried off, petroleum applied, and the wound was covered with a pressure dressing  The patient was given detailed verbal and written instructions on postoperative care  Suture for adipose/fascial/dermal layer closure: 5-0  vicryl 3 sutures interrupted    Suture used to approximate epidermis: 6-0  nylon 6 sutures interrupted    Final wound length: 3 2 cm    Follow-up in: 7 days  Patient tolerated procedure well without complications  Assessment:     1  Basal cell carcinoma of right temple region           Plan:   Wound care instructions given to patient        Prior to Admission medications    Medication Sig Start Date End Date Taking?  Authorizing Provider   Alpha-D-Galactosidase Dolphus Christina) TABS Take by mouth   Yes Historical Provider, MD   Alum Hydroxide-Mag Trisilicate (GAVISCON) 39-71 3 MG CHEW Chew   Yes Historical Provider, MD   Ascorbic Acid (VITAMIN C) 500 MG CAPS Take by mouth   Yes Historical Provider, MD   diltiazem (DILACOR XR) 240 MG 24 hr capsule Take by mouth 8/7/14  Yes Historical Provider, MD   fluticasone (FLONASE) 50 mcg/act nasal spray 2 sprays into each nostril daily 3/5/12  Yes Historical Provider, MD   Homeopathic Products (ZICAM ALLERGY RELIEF NA) into each nostril   Yes Historical Provider, MD   lactase (LACTAID) 3,000 units tablet Take by mouth   Yes Historical Provider, MD   meclizine (ANTIVERT) 25 mg tablet Take 1 tablet by mouth 3 (three) times a day as needed 4/28/16  Yes Historical Provider, MD   Minoxidil (ROGAINE WOMENS) 5 % FOAM Apply topically   Yes Historical Provider, MD   Multiple Vitamins-Minerals (CENTRUM SILVER PO) Take 1 tablet by mouth daily   Yes Historical Provider, MD   nizatidine (AXID) 150 MG capsule Take 1 capsule by mouth every 12 (twelve) hours   Yes Historical Provider, MD   polyethylene glycol-propylene glycol (SYSTANE) 0 4-0 3 % Apply to eye 8/7/14  Yes Historical Provider, MD     Allergies   Allergen Reactions    Acebutolol     Amlodipine     Atenolol     Azithromycin     Bisphosphonates      Annotation - [id number]: iriitis    Candesartan     Clarithromycin     Diltiazem     Erythromycin     Fosinopril     Irbesartan     Levofloxacin     Losartan     Methylprednisolone Hypertension and Tachycardia    Metoprolol     Nisoldipine     Olmesartan     Propranolol     Sulfamethoxazole-Trimethoprim     Timolol     Penicillins Rash       Lianet Orozco MD  3/5/2018,2:24 PM    Portions of the record may have been created with voice recognition software   Occasional wrong word or "sound a like" substitutions may have occurred due to the inherent limitations of voice recognition software   Read the chart carefully and recognize, using context, where substitutions have occurred

## 2018-03-12 ENCOUNTER — CLINICAL SUPPORT (OUTPATIENT)
Dept: DERMATOLOGY | Facility: CLINIC | Age: 73
End: 2018-03-12

## 2018-03-12 ENCOUNTER — OFFICE VISIT (OUTPATIENT)
Dept: NEUROLOGY | Facility: CLINIC | Age: 73
End: 2018-03-12
Payer: MEDICARE

## 2018-03-12 VITALS
HEART RATE: 75 BPM | WEIGHT: 118 LBS | RESPIRATION RATE: 14 BRPM | HEIGHT: 64 IN | SYSTOLIC BLOOD PRESSURE: 130 MMHG | DIASTOLIC BLOOD PRESSURE: 80 MMHG | BODY MASS INDEX: 20.14 KG/M2

## 2018-03-12 DIAGNOSIS — R41.0 EPISODIC CONFUSION: Primary | ICD-10-CM

## 2018-03-12 DIAGNOSIS — K58.2 IRRITABLE BOWEL SYNDROME WITH BOTH CONSTIPATION AND DIARRHEA: ICD-10-CM

## 2018-03-12 DIAGNOSIS — C44.319 BASAL CELL CARCINOMA OF RIGHT TEMPLE REGION: Primary | ICD-10-CM

## 2018-03-12 PROCEDURE — 99204 OFFICE O/P NEW MOD 45 MIN: CPT | Performed by: PSYCHIATRY & NEUROLOGY

## 2018-03-12 PROCEDURE — 99024 POSTOP FOLLOW-UP VISIT: CPT | Performed by: DERMATOLOGY

## 2018-03-12 NOTE — PATIENT INSTRUCTIONS
STERI-STRIPS (BUTTERFLY) POST SURGERY        Steri-Strips have been placed over your incision site to give added support  Please continue to bathe the area as usual     Eventually the Steri-Strips will begin to peel off on the ends  Snip the raised ends off with scissors and let the rest fall off on their own  If you have any questions, please call our office   [phone]

## 2018-03-12 NOTE — PROGRESS NOTES
Patient ID: Bridgette Romberg is a 74 y o  female  Assessment/Plan:     Diagnoses and all orders for this visit:    Episodic confusion        - Episodes concerning for complex partial seizures especially with the description of rising warmth from her abdomen which is a typical aura for CPS- however these occur infrequency and her routine EEG is normal along with her MRI brain and patient with a non focal exam overall  - We discussed ambulatory EEG for better sensitivity and she defers this at this time  - She tells me she will call again if it occurs and at that time I can certainly place a 48 hour ambulatory EEG order  - Ddx would be TIA- I discussed with her to take a daily ASA 81mg  Her Holter monitoring recently did not show any significant abnormalities  ECHO is pending  MRA with no significant stenosis  Her HTN is well controlled on medication per her- she is following with cardiology  - She tells me this has never occurred while driving and no LOC with this- discussed with her if she has a rising sensation of warmth in her stomach while driving to pull over and not drive- she agrees (states would have enough time to do this)  - If any focal persistent deficits including facial droop, speech that does not improve in a few seconds, one sided paresis call 911 as these could be signs of stroke  - Will obtain below work up for episodic confusion  -     Vitamin B12; Future  -     Folate; Future  -     Vitamin B1, whole blood; Future  -     Vitamin D 25 hydroxy; Future    Irritable bowel syndrome with both constipation and diarrhea     Follow up with Mundo Hart PA-C in 6 months time  Subjective:    HPI    Ms Elsy Pedersen is a pleasant 73 yo female presenting in consultation for various episodes starting July 2017 with no inciting etiology  States first one July 2017  Second event Nov 25th 2017  States two other events 12/3 and 12/11  States each of these events she had a warm sensation (not pain) rising in her stomach and then could not talk properly- was repeating the same word again and again for 15-20 seconds and was aware of this  She states she could not comprehend simple direction on the supper she was making during the first event, and other events felt "funny" could not think properly  She reports she felt as if she could not move due to the sensation that she may fall over  States mild light headedness but no cardiac awareness or chest pain  States after 20-30 minutes felt back to normal   Denies lethargy afterward  States this occurred last in 1994- states she was very warm in her stomach followed by slowness of speech- associated hypertension- had near syncopal sensation and was subsequently diagnosed with vasovagal near syncope and then was diagnosed with IBS  States in 1994- she had the speech disturbance occur only once but had near syncopal sensations almost daily for a period of few months until her IBS became better controlled  Tells me her IBS recently is better than what it was in the past    Reports no new medications prior to these events starting, or sleep deprivation or increased stress  She states she also has migraine variants with zigzag lines but no headaches  Denies headaches with any of the other events  States BP stable  No hx CA  Allergic to PCN, bisphosphonates  She denies history stroke, TIA, concussions/other head injuries, CNS infection, fevers, blood clots, chills, malaise  Denies family history of neurologic disorders    The following portions of the patient's history were reviewed and updated as appropriate: allergies, current medications, past family history, past medical history, past social history, past surgical history and problem list        Objective:    Blood pressure 130/80, pulse 75, resp  rate 14, height 5' 4" (1 626 m), weight 53 5 kg (118 lb)  Physical Exam   Constitutional: She is oriented to person, place, and time  She appears well-developed and well-nourished  HENT:   Head: Normocephalic and atraumatic  Eyes: EOM are normal  Pupils are equal, round, and reactive to light  Neck: Normal range of motion  Cardiovascular: Normal rate and regular rhythm  Pulmonary/Chest: Effort normal    Abdominal: Soft  Neurological: She is alert and oriented to person, place, and time  She has normal strength and normal reflexes  Gait and coordination normal    Nursing note and vitals reviewed  Neurological Exam    Mental Status  The patient is alert and oriented to person, place, time, and situation  Her recent and remote memory are normal  She has no dysarthria  She is able to name object, read and repeat  She has normal attention span and concentration  She follows multi-step commands  She has a normal fund of knowledge  Cranial Nerves    CN II: The patient's visual acuity and visual fields are normal   CN III, IV, VI: The patient's pupils are equally round and reactive to light and ocular movements are normal   CN V: The patient has normal facial sensation  CN VII:  The patient has symmetric facial movement  CN VIII:  The patient's hearing is normal   CN IX, X: The patient has symmetric palate movement and normal gag reflex  CN XI: The patient's shoulder shrug strength is normal   CN XII: The patient's tongue is midline without atrophy or fasciculations  Motor  The patient has normal muscle bulk throughout  Her overall muscle tone is normal throughout  Her strength is 5/5 throughout all four extremities  Sensory  The patient's sensation is normal in all four extremities to light touch, temperature and vibration  She has no right-sided and no left-sided hemispatial neglect  Reflexes  Deep tendon reflexes are 2+ and symmetric in all four extremities with downgoing toes bilaterally  Gait and Coordination  The patient has normal gait and station  She has normal coordination bilaterally  ROS:    Review of Systems   Constitutional: Positive for fatigue  Hoarseness   HENT:        Ringing in the ears   Eyes: Negative  Dry eyes   Respiratory: Negative  Cardiovascular: Negative  Gastrointestinal: Negative  Endocrine: Negative  Genitourinary:        Hair loss   Musculoskeletal: Negative  Skin: Negative  Allergic/Immunologic: Negative  Neurological: Negative  Hematological: Negative  Psychiatric/Behavioral: Negative

## 2018-03-12 NOTE — PROGRESS NOTES
[address] [address] [facility] DERMATOLOGY  [address] [address]     MRN: [id number] : 1945  Encounter: [id number]  Patient Information: Anshu Diehl    Subjective:     53-year-old female presents for follow-up for previously excised basal cell carcinoma right temple     Objective: There were no vitals taken for this visit  Physical Exam:    General Appearance:    Alert, cooperative, no distress   Skin:    Previous site of excision healing well  Procedure:  Suture removal  Site of excision: right temple  Wound was cleansed with saline  Wound is intact  Sutures removed  Steri-Strips applied  Biopsy revealed  Basal cell carcinoma margins     Assessment:     1  Basal cell carcinoma of right temple region           Plan:    wound care instructions given to patient follow-up in 6 months      Prior to Admission medications    Medication Sig Start Date End Date Taking?  Authorizing Provider   Alpha-D-Galactosidase Lizeth Carp) TABS Take by mouth    Historical Provider, MD   Alum Hydroxide-Mag Trisilicate (GAVISCON) 68-09 8 MG CHEW Chew    Historical Provider, MD   Ascorbic Acid (VITAMIN C) 500 MG CAPS Take by mouth    Historical Provider, MD   diltiazem (DILACOR XR) 240 MG 24 hr capsule Take by mouth 14   Historical Provider, MD   fluticasone (FLONASE) 50 mcg/act nasal spray 2 sprays into each nostril daily 3/5/12   Historical Provider, MD   Homeopathic Products [facility] ALLERGY RELIEF NA) into each nostril    Historical Provider, MD   lactase (LACTAID) 3,000 units tablet Take by mouth    Historical Provider, MD   Minoxidil (ROGAINE WOMENS) 5 % FOAM Apply topically    Historical Provider, MD   Multiple Vitamins-Minerals (CENTRUM SILVER PO) Take 1 tablet by mouth daily    Historical Provider, MD   nizatidine (AXID) 150 MG capsule Take 1 capsule by mouth every 12 (twelve) hours    Historical Provider, MD   polyethylene glycol-propylene glycol (SYSTANE) 0 4-0 3 % Apply to eye 8/7/14   Historical Provider, MD   meclizine (ANTIVERT) 25 mg tablet Take 1 tablet by mouth 3 (three) times a day as needed 4/28/16 3/12/18  Historical Provider, MD     Allergies   Allergen Reactions    Acebutolol     Amlodipine     Atenolol     Azithromycin     Bisphosphonates      Annotation - [id number]: iriitis    Candesartan     Clarithromycin     Diltiazem     Erythromycin     Fosinopril     Irbesartan     Levofloxacin     Losartan     Methylprednisolone Hypertension and Tachycardia    Metoprolol     Nisoldipine     Olmesartan     Propranolol     Sulfamethoxazole-Trimethoprim     Timolol     Penicillins Rash       Bobbi Anand MD  9/24/6644,5:72 PM    Portions of the record may have been created with voice recognition software   Occasional wrong word or "sound a like" substitutions may have occurred due to the inherent limitations of voice recognition software   Read the chart carefully and recognize, using context, where substitutions have occurred

## 2018-03-12 NOTE — PATIENT INSTRUCTIONS
Neurology    Please let me know if there is increased frequency of these events- will obtain an ambulatory EEG  Recommend staying hydrated, not skipping meals, avoiding bright flashing lights, sleep deprivation- obtain at least 6-8 hours of sleep nightly

## 2018-03-15 ENCOUNTER — HOSPITAL ENCOUNTER (OUTPATIENT)
Dept: NON INVASIVE DIAGNOSTICS | Facility: CLINIC | Age: 73
Discharge: HOME | End: 2018-03-15
Payer: MEDICARE

## 2018-03-15 DIAGNOSIS — R55 NEAR SYNCOPE: ICD-10-CM

## 2018-03-15 PROCEDURE — 93306 TTE W/DOPPLER COMPLETE: CPT | Performed by: INTERNAL MEDICINE

## 2018-03-15 PROCEDURE — 93306 TTE W/DOPPLER COMPLETE: CPT

## 2018-04-25 ENCOUNTER — CLINICAL SUPPORT (OUTPATIENT)
Dept: DERMATOLOGY | Facility: CLINIC | Age: 73
End: 2018-04-25

## 2018-04-25 DIAGNOSIS — C44.319 BASAL CELL CARCINOMA OF RIGHT TEMPLE REGION: Primary | ICD-10-CM

## 2018-04-25 PROCEDURE — 99024 POSTOP FOLLOW-UP VISIT: CPT | Performed by: DERMATOLOGY

## 2018-04-25 NOTE — PROGRESS NOTES
[address] [facility] [address] [facility] DERMATOLOGY  [address] [place] [address]     MRN: [id number] : 1945  Encounter: [id number]  Patient Information: Denisa Watt    Subjective:     38-year-old female concerned regarding the previously excised skin cancer right temple     Objective: There were no vitals taken for this visit  Physical Exam:    General Appearance:    Alert, cooperative, no distress   Skin:    Small Vicryl sutures emanating from the previous surgical site with some inflammation which were removed with forceps     Assessment:     1  Basal cell carcinoma of right temple region           Plan:    patient reassured no further treatment      Prior to Admission medications    Medication Sig Start Date End Date Taking?  Authorizing Provider   Alpha-D-Galactosidase Green Michael) TABS Take by mouth   Yes Historical Provider, MD   Alum Hydroxide-Mag Trisilicate (GAVISCON) 84-06 4 MG CHEW Chew   Yes Historical Provider, MD   Ascorbic Acid (VITAMIN C) 500 MG CAPS Take by mouth   Yes Historical Provider, MD   diltiazem (DILACOR XR) 240 MG 24 hr capsule Take by mouth 14  Yes Historical Provider, MD   fluticasone (FLONASE) 50 mcg/act nasal spray 2 sprays into each nostril daily 3/5/12  Yes Historical Provider, MD   Homeopathic Products ([address] NA) into each nostril   Yes Historical Provider, MD   lactase (LACTAID) 3,000 units tablet Take by mouth   Yes Historical Provider, MD   Minoxidil (ROGAINE WOMENS) 5 % FOAM Apply topically   Yes Historical Provider, MD   Multiple Vitamins-Minerals (CENTRUM SILVER PO) Take 1 tablet by mouth daily   Yes Historical Provider, MD   nizatidine (AXID) 150 MG capsule Take 1 capsule by mouth every 12 (twelve) hours   Yes Historical Provider, MD   polyethylene glycol-propylene glycol (SYSTANE) 0 4-0 3 % Apply to eye 14  Yes Historical Provider, MD     Allergies   Allergen Reactions    Acebutolol     Amlodipine     Atenolol     Azithromycin     [facility] - [id number]: iriitis    Candesartan     Clarithromycin     Diltiazem     Erythromycin     Fosinopril     Irbesartan     Levofloxacin     Losartan     Methylprednisolone Hypertension and Tachycardia    Metoprolol     Nisoldipine     Olmesartan     Propranolol     Sulfamethoxazole-Trimethoprim     Timolol     Penicillins Rash       Cheri Mascorro MD  4/25/2018,10:37 AM    Portions of the record may have been created with voice recognition software   Occasional wrong word or "sound a like" substitutions may have occurred due to the inherent limitations of voice recognition software   Read the chart carefully and recognize, using context, where substitutions have occurred

## 2018-05-14 DIAGNOSIS — R42 VERTIGO: Primary | ICD-10-CM

## 2018-05-14 RX ORDER — MECLIZINE HYDROCHLORIDE 25 MG/1
25 TABLET ORAL 3 TIMES DAILY PRN
Qty: 30 TABLET | Refills: 0 | Status: SHIPPED | OUTPATIENT
Start: 2018-05-14 | End: 2019-03-06 | Stop reason: ALTCHOICE

## 2018-05-28 DIAGNOSIS — E03.9 HYPOTHYROIDISM: ICD-10-CM

## 2018-05-28 DIAGNOSIS — I10 ESSENTIAL (PRIMARY) HYPERTENSION: ICD-10-CM

## 2018-06-06 ENCOUNTER — OFFICE VISIT (OUTPATIENT)
Dept: FAMILY MEDICINE CLINIC | Facility: MEDICAL CENTER | Age: 73
End: 2018-06-06
Payer: MEDICARE

## 2018-06-06 VITALS
RESPIRATION RATE: 16 BRPM | DIASTOLIC BLOOD PRESSURE: 68 MMHG | HEART RATE: 76 BPM | WEIGHT: 119.5 LBS | SYSTOLIC BLOOD PRESSURE: 126 MMHG | HEIGHT: 64 IN | BODY MASS INDEX: 20.4 KG/M2

## 2018-06-06 DIAGNOSIS — I10 ESSENTIAL HYPERTENSION: ICD-10-CM

## 2018-06-06 DIAGNOSIS — E03.9 HYPOTHYROIDISM, UNSPECIFIED TYPE: Primary | ICD-10-CM

## 2018-06-06 DIAGNOSIS — Z13.6 SCREENING FOR AAA (ABDOMINAL AORTIC ANEURYSM): ICD-10-CM

## 2018-06-06 DIAGNOSIS — R55 NEAR SYNCOPE: ICD-10-CM

## 2018-06-06 PROCEDURE — 99214 OFFICE O/P EST MOD 30 MIN: CPT | Performed by: FAMILY MEDICINE

## 2018-06-06 RX ORDER — DILTIAZEM HYDROCHLORIDE 240 MG/1
240 CAPSULE, EXTENDED RELEASE ORAL DAILY
Qty: 90 CAPSULE | Refills: 1 | Status: SHIPPED | OUTPATIENT
Start: 2018-06-06 | End: 2018-12-11 | Stop reason: SDUPTHER

## 2018-06-06 NOTE — PROGRESS NOTES
Jennifer Motley says she is doing well overall  She had her evaluation for her near syncopal episodes  She saw Neurology and Cardiology  Neurology thought that she may have partial complex seizures  However her EEG was normal   Should she have recurrent symptoms she is advised to call and she will get a 48 hr ambulatory EEG  She has had no further episodes in the episodes that she does get are very sporadic  She has  nasal congestion and hoarseness , postnasal drip  Has a sore throat  No fever or chills  No facial pressure  Used plain Mucinex  Saw ENT who felt she should treat her subclinical hypothyroidism  He thinks that it might help her throat symptoms  She still does not want to do this  Her TSH has been around 4 for the past 4 years  Her blood pressures been good  O: /68   Pulse 76   Resp 16   Ht 5' 4" (1 626 m)   Wt 54 2 kg (119 lb 8 oz)   BMI 20 51 kg/m²   Physical Exam   Constitutional: She appears well-developed and well-nourished  No distress  Neck: Carotid bruit is not present  No thyromegaly present  Cardiovascular: Normal rate, regular rhythm, normal heart sounds and intact distal pulses  Pulmonary/Chest: Effort normal and breath sounds normal    Abdominal: Soft  Bowel sounds are normal  She exhibits no mass  There is no hepatomegaly  There is no tenderness  Musculoskeletal: She exhibits no edema  Lymphadenopathy:     She has no cervical adenopathy  Assessment  1  Hypertension-controlled with diltiazem  2   Near syncopal episodes-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

## 2018-06-13 ENCOUNTER — TELEPHONE (OUTPATIENT)
Dept: CARDIOLOGY CLINIC | Facility: CLINIC | Age: 73
End: 2018-06-13

## 2018-06-13 DIAGNOSIS — I10 ESSENTIAL HYPERTENSION: Primary | ICD-10-CM

## 2018-06-13 NOTE — TELEPHONE ENCOUNTER
Pt reviewed diagnostic testing w/PCP  It was recommended on Echo to pursue JULISA to evaluate mitral regurgitation  Pt asking for your thoughts   Please advise

## 2018-06-18 NOTE — TELEPHONE ENCOUNTER
Would not recommend JULISA, as she is not having any symptoms to suggest that moderate MR is causing her any symptoms, would just repeat TTE in 1 year from prior echo

## 2018-06-19 ENCOUNTER — HOSPITAL ENCOUNTER (OUTPATIENT)
Dept: RADIOLOGY | Facility: MEDICAL CENTER | Age: 73
Discharge: HOME | End: 2018-06-19
Payer: MEDICARE

## 2018-06-19 DIAGNOSIS — Z13.6 SCREENING FOR AAA (ABDOMINAL AORTIC ANEURYSM): ICD-10-CM

## 2018-06-19 PROCEDURE — 76706 US ABDL AORTA SCREEN AAA: CPT

## 2018-08-15 ENCOUNTER — OFFICE VISIT (OUTPATIENT)
Dept: DERMATOLOGY | Facility: CLINIC | Age: 73
End: 2018-08-15
Payer: MEDICARE

## 2018-08-15 DIAGNOSIS — Z13.89 SCREENING FOR SKIN CONDITION: ICD-10-CM

## 2018-08-15 DIAGNOSIS — L82.1 SEBORRHEIC KERATOSIS: Primary | ICD-10-CM

## 2018-08-15 DIAGNOSIS — Z85.828 HISTORY OF SKIN CANCER: ICD-10-CM

## 2018-08-15 PROCEDURE — 99213 OFFICE O/P EST LOW 20 MIN: CPT | Performed by: DERMATOLOGY

## 2018-08-15 NOTE — PROGRESS NOTES
[address]  [address]  [facility] [address]  [phone]     MRN: [id number] : 1945  Encounter: [id number]  Patient Information: Anirudh Francis  Chief complaint:Six-month checkup    History of present illness:  57-year-old female with previous history of skin cancer presents for overall checkup no specific complaints noted  Past Medical History:   Diagnosis Date    Acne     Benign neoplasm of skin     Hypertension     Inflamed seborrheic keratosis     Malignant neoplasm of skin of face     Nonmelanoma skin cancer     Last Assessed:17    Tachycardia     Telogen effluvium     Temporomandibular joint disorder     Vertigo      Past Surgical History:   Procedure Laterality Date    APPENDECTOMY      CHOLECYSTECTOMY      COLONOSCOPY  2009    ESOPHAGOGASTRODUODENOSCOPY  2009    HYSTERECTOMY      MALIGNANT SKIN LESION EXCISION      Excision of Lesion Face Malignant-2004 BCC Forehead    ROTATOR CUFF REPAIR       Social History   History   Alcohol Use No     History   Drug Use No     History   Smoking Status    Never Smoker   Smokeless Tobacco    Never Used     Family History   Problem Relation Age of Onset    Hypertension Mother     Osteoporosis Mother     Skin cancer Mother     Hypertension Father     Skin cancer Father     Cancer Maternal Grandmother     Uterine cancer Maternal Grandmother     Cancer Paternal Grandmother     Uterine cancer Paternal Grandmother     Cancer Paternal Aunt     Uterine cancer Paternal Aunt      Meds/Allergies   Allergies   Allergen Reactions    Acebutolol     Amlodipine     Atenolol     Azithromycin     Bisphosphonates      Annotation - [id number]: iriitis    Candesartan     Clarithromycin     Diltiazem     Erythromycin     Fosinopril     Irbesartan     Levofloxacin     Losartan     Methylprednisolone Hypertension and Tachycardia    Metoprolol     Nisoldipine  Olmesartan     Propranolol     Sulfamethoxazole-Trimethoprim     Timolol     Penicillins Rash       Meds:  Prior to Admission medications    Medication Sig Start Date End Date Taking?  Authorizing Provider   Alpha-D-Galactosidase Lauralyn Keto) TABS Take by mouth    Historical Provider, MD   Alum Hydroxide-Mag Trisilicate (GAVISCON) 63-00 6 MG CHEW Chew    Historical Provider, MD   Ascorbic Acid (VITAMIN C) 500 MG CAPS Take by mouth    Historical Provider, MD   diltiazem (DILACOR XR) 240 MG 24 hr capsule Take by mouth 8/7/14   Historical Provider, MD   diltiazem (DILACOR XR) 240 MG 24 hr capsule Take 1 capsule (240 mg total) by mouth daily 6/6/18   Donte Blanca MD   fluticasone [facility]) 50 mcg/act nasal spray 2 sprays into each nostril daily 3/5/12   Historical Provider, MD   Homeopathic Products ([address] NA) into each nostril    Historical Provider, MD   lactase (LACTAID) 3,000 units tablet Take by mouth    Historical Provider, MD   meclizine (ANTIVERT) 25 mg tablet Take 1 tablet (25 mg total) by mouth 3 (three) times a day as needed for dizziness 5/14/18   Dnote Blanca MD   Minoxidil (ROGAINE WOMENS) 5 % FOAM Apply topically    Historical Provider, MD   Multiple Vitamins-Minerals (CENTRUM SILVER PO) Take 1 tablet by mouth daily    Historical Provider, MD   nizatidine (AXID) 150 MG capsule Take 1 capsule by mouth every 12 (twelve) hours    Historical Provider, MD   polyethylene glycol-propylene glycol (SYSTANE) 0 4-0 3 % Apply to eye 8/7/14   Historical Provider, MD       Subjective:     Review of Systems:    General: negative for - chills, fatigue, fever,  weight gain or weight loss  Psychological: negative for - anxiety, behavioral disorder, concentration difficulties, decreased libido, depression, irritability, memory difficulties, mood swings, sleep disturbances or suicidal ideation  ENT: negative for - hearing difficulties , nasal congestion, nasal discharge, oral lesions, sinus pain, sneezing, sore throat  Allergy and Immunology: negative for - hives, insect bite sensitivity,  Hematological and Lymphatic: negative for - bleeding problems, blood clots,bruising, swollen lymph nodes  Endocrine: negative for - hair pattern changes, hot flashes, malaise/lethargy, mood swings, palpitations, polydipsia/polyuria, skin changes, temperature intolerance or unexpected weight change  Respiratory: negative for - cough, hemoptysis, orthopnea, shortness of breath, or wheezing  Cardiovascular: negative for - chest pain, dyspnea on exertion, edema,  Gastrointestinal: negative for - abdominal pain, nausea/vomiting  Genito-Urinary: negative for - dysuria, incontinence, irregular/heavy menses or urinary frequency/urgency  Musculoskeletal: negative for - gait disturbance, joint pain, joint stiffness, joint swelling, muscle pain, muscular weakness  Dermatological:  As in HPI  Neurological: negative for confusion, dizziness, headaches, impaired coordination/balance, memory loss, numbness/tingling, seizures, speech problems, tremors or weakness       Objective: There were no vitals taken for this visit  Physical Exam:    General Appearance:    Alert, cooperative, no distress   Head:    Normocephalic, without obvious abnormality, atraumatic           Skin:   A full skin exam was performed including scalp, head scalp, eyes, ears, nose, lips, neck, chest, axilla, abdomen, back, buttocks, bilateral upper extremities, bilateral lower extremities, hands, feet, fingers, toes, fingernails, and toenails  Normal keratotic papules with greasy stuck on appearance previous sites of skin cancer well healed without recurrence nothing else atypical noted on complete exam     Assessment:     1  Seborrheic keratosis     2  Screening for skin condition     3   History of skin cancer           Plan:   Seborrheic keratosis patient reassured these are normal growths we acquire with age no treatment needed  History of skin cancer in no recurrence nothing else atypical sunblock recommended follow-up in 6 months  Screening for dermatologic disorders nothing else of concern noted on complete exam follow-up in 6 months    Kamlesh Kumar MD  8/15/2018,10:53 AM    Portions of the record may have been created with voice recognition software   Occasional wrong word or "sound a like" substitutions may have occurred due to the inherent limitations of voice recognition software   Read the chart carefully and recognize, using context, where substitutions have occurred

## 2018-08-17 ENCOUNTER — OFFICE VISIT (OUTPATIENT)
Dept: FAMILY MEDICINE CLINIC | Facility: MEDICAL CENTER | Age: 73
End: 2018-08-17
Payer: MEDICARE

## 2018-08-17 VITALS
TEMPERATURE: 98.5 F | DIASTOLIC BLOOD PRESSURE: 72 MMHG | SYSTOLIC BLOOD PRESSURE: 138 MMHG | RESPIRATION RATE: 16 BRPM | WEIGHT: 117.2 LBS | BODY MASS INDEX: 20.12 KG/M2

## 2018-08-17 DIAGNOSIS — H10.9 CONJUNCTIVITIS, BACTERIAL: Primary | ICD-10-CM

## 2018-08-17 DIAGNOSIS — J30.9 ALLERGIC RHINITIS, UNSPECIFIED SEASONALITY, UNSPECIFIED TRIGGER: Primary | ICD-10-CM

## 2018-08-17 PROCEDURE — 99213 OFFICE O/P EST LOW 20 MIN: CPT | Performed by: FAMILY MEDICINE

## 2018-08-17 RX ORDER — CONJUGATED ESTROGENS 0.62 MG/G
CREAM VAGINAL
COMMUNITY
Start: 2018-08-15 | End: 2021-04-26

## 2018-08-17 RX ORDER — SULFACETAMIDE SODIUM 100 MG/ML
2 SOLUTION/ DROPS OPHTHALMIC
Qty: 15 ML | Refills: 0 | Status: SHIPPED | OUTPATIENT
Start: 2018-08-17 | End: 2018-09-10

## 2018-08-20 RX ORDER — FLUTICASONE PROPIONATE 50 MCG
2 SPRAY, SUSPENSION (ML) NASAL DAILY
Qty: 16 G | Refills: 3 | Status: SHIPPED | OUTPATIENT
Start: 2018-08-20

## 2018-08-20 NOTE — PROGRESS NOTES
Patient complains of red itchy eye on the right  No photophobia  She has no sensation of a foreign body  There are no visual changes  /72   Temp 98 5 °F (36 9 °C)   Resp 16   Wt 53 2 kg (117 lb 3 2 oz)   BMI 20 12 kg/m²     Pupils are equal and round and responsive to light  Extraocular motions are intact  There is no photophobia  There is some conjunctival injection in the right eye with scant discharge    Will prescribe Bleph 10  Pinkeye precautions    Recheck if no improvement

## 2018-09-10 ENCOUNTER — OFFICE VISIT (OUTPATIENT)
Dept: OBGYN CLINIC | Facility: MEDICAL CENTER | Age: 73
End: 2018-09-10
Payer: MEDICARE

## 2018-09-10 VITALS
BODY MASS INDEX: 20.38 KG/M2 | HEIGHT: 63 IN | SYSTOLIC BLOOD PRESSURE: 123 MMHG | WEIGHT: 115 LBS | DIASTOLIC BLOOD PRESSURE: 78 MMHG | HEART RATE: 62 BPM

## 2018-09-10 DIAGNOSIS — M65.312 TRIGGER FINGER OF LEFT THUMB: Primary | ICD-10-CM

## 2018-09-10 PROCEDURE — 20550 NJX 1 TENDON SHEATH/LIGAMENT: CPT | Performed by: ORTHOPAEDIC SURGERY

## 2018-09-10 PROCEDURE — 99203 OFFICE O/P NEW LOW 30 MIN: CPT | Performed by: ORTHOPAEDIC SURGERY

## 2018-09-10 RX ORDER — LIDOCAINE HYDROCHLORIDE 10 MG/ML
0.5 INJECTION, SOLUTION INFILTRATION; PERINEURAL
Status: COMPLETED | OUTPATIENT
Start: 2018-09-10 | End: 2018-09-10

## 2018-09-10 RX ORDER — TRIAMCINOLONE ACETONIDE 40 MG/ML
40 INJECTION, SUSPENSION INTRA-ARTICULAR; INTRAMUSCULAR
Status: COMPLETED | OUTPATIENT
Start: 2018-09-10 | End: 2018-09-10

## 2018-09-10 RX ADMIN — LIDOCAINE HYDROCHLORIDE 0.5 ML: 10 INJECTION, SOLUTION INFILTRATION; PERINEURAL at 11:54

## 2018-09-10 RX ADMIN — TRIAMCINOLONE ACETONIDE 40 MG: 40 INJECTION, SUSPENSION INTRA-ARTICULAR; INTRAMUSCULAR at 11:54

## 2018-09-10 NOTE — PROGRESS NOTES
CHIEF COMPLAINT:  Chief Complaint   Patient presents with    Left Thumb - Pain       SUBJECTIVE:  Valli Hammans is a 74y o  year old RHD female who presents to the office today for left thumb pain ongoing since may  The patient states that her pain has decreased since the initial injury in may, which is when she was pulling covers over her granddaughter and her thumb got caught in the blanket  The patient states her pain is worse in the morning and notes her thumb being stiff  The stiffness and pain gets better through out the day with use of her left hand  The patient notes a clicking sensation with movement  The patient is not taking anything for pain  The patient denies associated numbness and tingling to her LUE          PAST MEDICAL HISTORY:  Past Medical History:   Diagnosis Date    Acne     Benign neoplasm of skin     Hypertension     Inflamed seborrheic keratosis     Malignant neoplasm of skin of face     Nonmelanoma skin cancer     Last Assessed:6/27/17    Tachycardia     Telogen effluvium     Temporomandibular joint disorder     Vertigo        PAST SURGICAL HISTORY:  Past Surgical History:   Procedure Laterality Date    APPENDECTOMY      CHOLECYSTECTOMY      COLONOSCOPY  2009    ESOPHAGOGASTRODUODENOSCOPY  2009    HYSTERECTOMY      MALIGNANT SKIN LESION EXCISION      Excision of Lesion Face Malignant-9/14/2004 BCC Forehead    ROTATOR CUFF REPAIR         FAMILY HISTORY:  Family History   Problem Relation Age of Onset    Hypertension Mother     Osteoporosis Mother     Skin cancer Mother     Hypertension Father     Skin cancer Father     Cancer Maternal Grandmother     Uterine cancer Maternal Grandmother     Cancer Paternal Grandmother     Uterine cancer Paternal Grandmother     Cancer Paternal Aunt     Uterine cancer Paternal Aunt        SOCIAL HISTORY:  Social History   Substance Use Topics    Smoking status: Never Smoker    Smokeless tobacco: Never Used    Alcohol use No MEDICATIONS:    Current Outpatient Prescriptions:     Alpha-D-Galactosidase (BEANO) TABS, Take by mouth, Disp: , Rfl:     Alum Hydroxide-Mag Trisilicate (GAVISCON) 21-79 9 MG CHEW, Chew, Disp: , Rfl:     Ascorbic Acid (VITAMIN C) 500 MG CAPS, Take by mouth, Disp: , Rfl:     ciclopirox (PENLAC) 8 % solution, , Disp: , Rfl:     diltiazem (DILACOR XR) 240 MG 24 hr capsule, Take 1 capsule (240 mg total) by mouth daily, Disp: 90 capsule, Rfl: 1    fluticasone (FLONASE) 50 mcg/act nasal spray, 2 sprays into each nostril daily, Disp: 16 g, Rfl: 3    Homeopathic Products (ZICAM ALLERGY RELIEF NA), into each nostril, Disp: , Rfl:     lactase (LACTAID) 3,000 units tablet, Take by mouth, Disp: , Rfl:     meclizine (ANTIVERT) 25 mg tablet, Take 1 tablet (25 mg total) by mouth 3 (three) times a day as needed for dizziness, Disp: 30 tablet, Rfl: 0    Minoxidil (ROGAINE WOMENS) 5 % FOAM, Apply topically, Disp: , Rfl:     Multiple Vitamins-Minerals (CENTRUM SILVER PO), Take 1 tablet by mouth daily, Disp: , Rfl:     nizatidine (AXID) 150 MG capsule, Take 1 capsule by mouth every 12 (twelve) hours, Disp: , Rfl:     polyethylene glycol-propylene glycol (SYSTANE) 0 4-0 3 %, Apply to eye, Disp: , Rfl:     PREMARIN vaginal cream, , Disp: , Rfl:     ALLERGIES:  Allergies   Allergen Reactions    Acebutolol     Amlodipine     Atenolol     Azithromycin     Bisphosphonates      Annotation - [id number]: iriitis    Candesartan     Clarithromycin     Erythromycin     Fosinopril     Irbesartan     Levofloxacin     Losartan     Methylprednisolone Hypertension and Tachycardia    Metoprolol     Nisoldipine     Olmesartan     Propranolol     Sulfamethoxazole-Trimethoprim Nausea Only    Timolol     Penicillins Rash       REVIEW OF SYSTEMS:  Review of Systems   Constitutional: Negative for chills, fever and unexpected weight change  HENT: Negative for hearing loss, nosebleeds and sore throat      Eyes: Negative for pain, redness and visual disturbance  Respiratory: Negative for cough, shortness of breath and wheezing  Cardiovascular: Negative for chest pain, palpitations and leg swelling  Gastrointestinal: Negative for abdominal pain, nausea and vomiting  Endocrine: Negative for polydipsia and polyuria  Genitourinary: Negative for difficulty urinating and hematuria  Musculoskeletal: Negative for arthralgias, joint swelling and myalgias  Skin: Negative for rash and wound  Neurological: Negative for dizziness, numbness and headaches  Psychiatric/Behavioral: Negative for decreased concentration, dysphoric mood and suicidal ideas  The patient is not nervous/anxious  VITALS:  Vitals:    09/10/18 1119   BP: 123/78   Pulse: 62       LABS:  HgA1c: No results found for: HGBA1C  BMP:   Lab Results   Component Value Date    BUN 20 12/12/2017    CREATININE 0 92 12/12/2017       _____________________________________________________  PHYSICAL EXAMINATION:  General: well developed and well nourished, alert, oriented times 3 and appears comfortable  Psychiatric: Normal  HEENT: Trachea Midline, No torticollis  Pulmonary: No audible wheezing or respiratory distress   Skin: No masses, erthema, lacerations, fluctation, ulcerations  Neurovascular: Sensation Intact to the Median, Ulnar, Radial Nerve, Motor Intact to the Median, Ulnar, Radial Nerve and Pulses Intact    MUSCULOSKELETAL EXAMINATION:  left Thumb finger:  Positive palpable nodule over the A1 pulley  Positive tenderness to palpation over A1 pulley  Positive catching  Positive clicking       ___________________________________________________  STUDIES REVIEWED:  No studies reviewed         PROCEDURES PERFORMED:  Hand/upper extremity injection  Date/Time: 9/10/2018 11:54 AM  Consent given by: patient  Site marked: site marked  Timeout: Immediately prior to procedure a time out was called to verify the correct patient, procedure, equipment, support staff and site/side marked as required   Supporting Documentation  Indications: pain and tendon swelling   Procedure Details  Condition:trigger finger Location: thumb - L thumb A1   Preparation: Patient was prepped and draped in the usual sterile fashion  Needle size: 25 G  Ultrasound guidance: no  Medications administered: 0 5 mL lidocaine 1 %; 40 mg triamcinolone acetonide 40 mg/mL  Patient tolerance: patient tolerated the procedure well with no immediate complications  Dressing:  Sterile dressing applied            _____________________________________________________  ASSESSMENT/PLAN:    Left thumb trigger finger   * CSI performed today into left thumb, instructed the patient to keep an eye on her heart rate due to having increased heart rate when she had a CSI into her shoulder    * Apply ice as needed for the next 1-2 days   * Take NSAID'S as needed for pain control   * Follow up in 2 weeks       Follow Up:  Return in about 2 weeks (around 9/24/2018) for Recheck  To Do Next Visit:  Re-evaluation of current issue    General Discussions:  Trigger Finger: The anatomy and physiology of trigger finger was discussed with the patient today in the office  Edema and increased contact pressure within the flexor tendons at the A1 pulley can cause pain, crepitation, and triggering or locking of the digit resulting in limitation of function  Symptoms can occur at anytime but are typically worse in the morning or after a brief rest from repetitive activity  Treatment options include resting/nighttime MP blocking splints to decrease edema, oral anti-inflammatory medications, home or formal therapy exercises, up to 2 steroid injections within the tendon sheath, or surgical release  While majority of patients do respond to conservative treatment, up to 20% may require surgical release           Scribe Attestation    I,:   Reynold Mckeon am acting as a scribe while in the presence of the attending physician :        I,:   Frances Wang Syeda Hammond MD personally performed the services described in this documentation    as scribed in my presence :

## 2018-09-12 ENCOUNTER — TELEPHONE (OUTPATIENT)
Dept: FAMILY MEDICINE CLINIC | Facility: MEDICAL CENTER | Age: 73
End: 2018-09-12

## 2018-09-12 DIAGNOSIS — R79.89 ABNORMAL THYROID BLOOD TEST: Primary | ICD-10-CM

## 2018-09-12 NOTE — TELEPHONE ENCOUNTER
----- Message from Michael Priest MD sent at 9/11/2018 10:49 PM EDT -----  Regarding: FW: Non-Urgent Medical Question  Contact: [phone]  Please see me regarding this message  I never got this blood work  ----- Message -----  From: Vicki Callahan MA  Sent: 9/11/2018   7:42 AM  To: Michael Priest MD  Subject: FW: Non-Urgent Medical Question                      ----- Message -----  From: Ekaterina Pham  Sent: 9/10/2018   6:51 PM  To: [facility]  Subject: Non-Urgent Medical Question                      The blood work that I had done at [facility] on 7/11/18 shows 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 We had discussed that I would like to see an endocrinologist   Fabrizio Goel you have any recommendations or should I just go to [facility] page & pick one? Also, on my Ultrasound of abdominal aorta, what does slight proximal abdominal aortic ectasia mean? Thank you

## 2018-09-12 NOTE — TELEPHONE ENCOUNTER
I did let Jaquan Elliott know why we had not called, that we did not get the results  She was understanding  You can view them in [place], let us know the findings so we can inform her

## 2018-09-13 NOTE — TELEPHONE ENCOUNTER
Let Ulises Dao now that the blood work is all good  I am glad that she called since it did not go to my in basket  Her thyroid antibodies are positive which she would be consistent with hypothyroidism  It does not not matter what the level is  I did them to confirm that this is hypothyrodism which she has,  although she has told me she declines treatment  And would like to follow  Her ultrasound not show any aneurysm  Ectasias can indicate a narrowing but it is not a significant finding

## 2018-09-14 NOTE — TELEPHONE ENCOUNTER
S/w Jessy Avers   She does want to see an Endocrinologist now as discussed at the last visit  , her ENT advises it also  Who do you recommend  Ok to wait longer for one who is recommended rather then another one just for a sooner apt

## 2018-09-17 NOTE — TELEPHONE ENCOUNTER
Would recommend [address] [place] [address] endocrinology  If not recommend Dr Wilda Diaz at     If she decides to see Erika Haque let me know so I can send a note

## 2018-09-24 ENCOUNTER — OFFICE VISIT (OUTPATIENT)
Dept: OBGYN CLINIC | Facility: MEDICAL CENTER | Age: 73
End: 2018-09-24
Payer: MEDICARE

## 2018-09-24 VITALS — HEIGHT: 63 IN | BODY MASS INDEX: 20.38 KG/M2 | WEIGHT: 115 LBS

## 2018-09-24 DIAGNOSIS — M65.312 TRIGGER FINGER OF LEFT THUMB: Primary | ICD-10-CM

## 2018-09-24 PROCEDURE — 99213 OFFICE O/P EST LOW 20 MIN: CPT | Performed by: ORTHOPAEDIC SURGERY

## 2018-09-24 NOTE — PROGRESS NOTES
CHIEF COMPLAINT:  No chief complaint on file  SUBJECTIVE:  Darryl Wilson is a 74y o  year old HD female who presents to the office 2 weeks S/P left trigger thumb injection with full relief of clicking locking and pain          PAST MEDICAL HISTORY:  Past Medical History:   Diagnosis Date    Acne     Benign neoplasm of skin     Hypertension     Inflamed seborrheic keratosis     Malignant neoplasm of skin of face     Nonmelanoma skin cancer     Last Assessed:6/27/17    Tachycardia     Telogen effluvium     Temporomandibular joint disorder     Vertigo        PAST SURGICAL HISTORY:  Past Surgical History:   Procedure Laterality Date    APPENDECTOMY      CHOLECYSTECTOMY      COLONOSCOPY  2009    ESOPHAGOGASTRODUODENOSCOPY  2009    HYSTERECTOMY      MALIGNANT SKIN LESION EXCISION      Excision of Lesion Face Malignant-9/14/2004 BCC Forehead    ROTATOR CUFF REPAIR         FAMILY HISTORY:  Family History   Problem Relation Age of Onset    Hypertension Mother     Osteoporosis Mother     Skin cancer Mother     Hypertension Father     Skin cancer Father     Cancer Maternal Grandmother     Uterine cancer Maternal Grandmother     Cancer Paternal Grandmother     Uterine cancer Paternal Grandmother     Cancer Paternal Aunt     Uterine cancer Paternal Aunt        SOCIAL HISTORY:  Social History   Substance Use Topics    Smoking status: Never Smoker    Smokeless tobacco: Never Used    Alcohol use No       MEDICATIONS:    Current Outpatient Prescriptions:     Alpha-D-Galactosidase (BEANO) TABS, Take by mouth, Disp: , Rfl:     Alum Hydroxide-Mag Trisilicate (GAVISCON) 17-34 5 MG CHEW, Chew, Disp: , Rfl:     Ascorbic Acid (VITAMIN C) 500 MG CAPS, Take by mouth, Disp: , Rfl:     ciclopirox (PENLAC) 8 % solution, , Disp: , Rfl:     diltiazem (DILACOR XR) 240 MG 24 hr capsule, Take 1 capsule (240 mg total) by mouth daily, Disp: 90 capsule, Rfl: 1    fluticasone (FLONASE) 50 mcg/act nasal spray, 2 sprays into each nostril daily, Disp: 16 g, Rfl: 3    Homeopathic Products (ZICAM ALLERGY RELIEF NA), into each nostril, Disp: , Rfl:     lactase (LACTAID) 3,000 units tablet, Take by mouth, Disp: , Rfl:     meclizine (ANTIVERT) 25 mg tablet, Take 1 tablet (25 mg total) by mouth 3 (three) times a day as needed for dizziness, Disp: 30 tablet, Rfl: 0    Minoxidil (ROGAINE WOMENS) 5 % FOAM, Apply topically, Disp: , Rfl:     Multiple Vitamins-Minerals (CENTRUM SILVER PO), Take 1 tablet by mouth daily, Disp: , Rfl:     nizatidine (AXID) 150 MG capsule, Take 1 capsule by mouth every 12 (twelve) hours, Disp: , Rfl:     polyethylene glycol-propylene glycol (SYSTANE) 0 4-0 3 %, Apply to eye, Disp: , Rfl:     PREMARIN vaginal cream, , Disp: , Rfl:     ALLERGIES:  Allergies   Allergen Reactions    Cortisone Hypertension, Other (See Comments), Shortness Of Breath and Tachycardia    Acebutolol     Amlodipine     Atenolol     Azithromycin     Bisphosphonates      Annotation - [id number]: iriitis    Candesartan     Clarithromycin     Erythromycin     Fosinopril     Irbesartan     Levofloxacin     Losartan     Methylprednisolone Hypertension and Tachycardia    Metoprolol     Nisoldipine     Olmesartan     Propranolol     Sulfamethoxazole-Trimethoprim Nausea Only    Timolol     Penicillins Rash       REVIEW OF SYSTEMS:  Review of Systems   Constitutional: Negative for chills, fever and unexpected weight change  HENT: Negative for hearing loss, nosebleeds and sore throat  Eyes: Negative for pain, redness and visual disturbance  Respiratory: Negative for cough, shortness of breath and wheezing  Cardiovascular: Negative for chest pain, palpitations and leg swelling  Gastrointestinal: Negative for abdominal pain, nausea and vomiting  Endocrine: Negative for polydipsia and polyuria  Genitourinary: Negative for dysuria and hematuria     Musculoskeletal: Negative for arthralgias, joint swelling and myalgias  Skin: Negative for rash and wound  Neurological: Negative for dizziness, numbness and headaches  Psychiatric/Behavioral: Negative for decreased concentration, dysphoric mood and suicidal ideas  The patient is not nervous/anxious  VITALS:  Vitals:       LABS:  HgA1c: No results found for: HGBA1C  BMP:   Lab Results   Component Value Date    BUN 20 12/12/2017    CREATININE 0 92 12/12/2017       _____________________________________________________  PHYSICAL EXAMINATION:  General: well developed and well nourished, alert, oriented times 3 and appears comfortable  Psychiatric: Normal  HEENT: Trachea Midline, No torticollis  Pulmonary: No audible wheezing or respiratory distress   Skin: No masses, erthema, lacerations, fluctation, ulcerations  Neurovascular: Sensation Intact to the Median, Ulnar, Radial Nerve, Motor Intact to the Median, Ulnar, Radial Nerve and Pulses Intact    MUSCULOSKELETAL EXAMINATION:    left thumb:  Positive palpable nodule over the A1 pulley  Negative tenderness to palpation over A1 pulley  Negative catching  Negative clicking         ___________________________________________________  STUDIES REVIEWED:  No studies reviewed  PROCEDURES PERFORMED:  Procedures  No Procedures performed today    _____________________________________________________  ASSESSMENT/PLAN:    S/P left thumb trigger finger CSI w/ full resolution of symptoms   * Pt will apply heat and stretch if she has any return of pain or stiffness   * Pt will call the office if she has return of symptoms great enough to be reevaluated for CSI      Follow Up:  Return if symptoms worsen or fail to improve        To Do Next Visit:  Re-evaluation of current issue      Scribe Attestation    I,:   Carri Vazquez am acting as a scribe while in the presence of the attending physician :        I,:   Jelani Mcfarlaen MD personally performed the services described in this documentation    as scribed in my presence :

## 2018-10-25 ENCOUNTER — OFFICE VISIT (OUTPATIENT)
Dept: ENDOCRINOLOGY | Facility: CLINIC | Age: 73
End: 2018-10-25
Payer: MEDICARE

## 2018-10-25 VITALS
HEART RATE: 80 BPM | DIASTOLIC BLOOD PRESSURE: 82 MMHG | WEIGHT: 120.8 LBS | BODY MASS INDEX: 21.4 KG/M2 | HEIGHT: 63 IN | SYSTOLIC BLOOD PRESSURE: 130 MMHG

## 2018-10-25 DIAGNOSIS — Z86.39 HISTORY OF VITAMIN D DEFICIENCY: ICD-10-CM

## 2018-10-25 DIAGNOSIS — I10 ESSENTIAL HYPERTENSION: ICD-10-CM

## 2018-10-25 DIAGNOSIS — E06.3 HASHIMOTO'S DISEASE: Primary | ICD-10-CM

## 2018-10-25 PROCEDURE — 99204 OFFICE O/P NEW MOD 45 MIN: CPT | Performed by: INTERNAL MEDICINE

## 2018-10-25 NOTE — PROGRESS NOTES
Consultation - Michael Hearing 74 y o  female MRN: [id number]    Unit/Bed#:  Encounter: [id number]    Assessment and Plan: This is a 74y o -year-old female with Hashimoto's disease  Plan:  1  Recent thyroid function test in July 2018 were within normal limits  She does have TPO antibodies are positive which are consistent with Hashimoto's disease  However she does not require levothyroxine supplement currently  I discussed with her that given her elderly age her TSH can be slightly on the higher side of normal range  We will require frequent monitoring of thyroid function tests and if her TSH is above 8 0 and free T4 is low is when we will start levothyroxine replacement  2  Check vitamin-D level  3  Hypertension-currently controlled plan per primary care  Diagnoses and all orders for this visit:    Hashimoto's disease  -     TSH, 3rd generation- Lab Collect; Future  -     T4, free Lab Collect; Future    History of vitamin D deficiency  -     Vitamin D 25 hydroxy Lab Collect; Future    Essential hypertension  -     Echo complete with contrast if indicated    Other orders  -     Magnesium Hydroxide (MILK OF MAGNESIA PO); Take by mouth 3 (three) times a week        CC: thyroid problem    History of Present Illness  :   Luc Ramirez is a 74y o  year old female with above mentioned   Chief Complaint   Patient presents with    Thyroid Problem    for 12 years  Patient denies any previous exisiting thyroid disorders prior to that  Patient reports associated symptoms of Negative weight gain, Positive cold intolerance, Positive constipation, Positive dry skin, Positive fatigue and Positive decrease in changes in energy level  Patients reports symptoms have gradually worsened  Currently treated with none for this problem   She was treated with levothyroxine (for 3 weeks upon initial diagnosis of Hashimoto's) but she was switched to levoxyl (25mcg) as she is lactose-intolerant but she reports her blood pressure increased and had fluid retention so she stopped taking it in 4846-2260  She denies history of head/neck/chest radiation in the past  She denies any family history of thyroid problems  she denies exposure to iodine, weight loss or herbal supplements        Patient Active Problem List   Diagnosis    Seborrheic keratosis    Changing skin lesion    Screening for skin condition    History of skin cancer    Essential hypertension    Near syncope    Basal cell carcinoma of right temple region    Episodic confusion    Allergic rhinitis    Hypertension    Hypothyroidism      Past Medical History:   Diagnosis Date    Acne     Benign neoplasm of skin     Hypertension     Inflamed seborrheic keratosis     Malignant neoplasm of skin of face     Nonmelanoma skin cancer     Last Assessed:6/27/17    Tachycardia     Telogen effluvium     Temporomandibular joint disorder     Vertigo       Past Surgical History:   Procedure Laterality Date    APPENDECTOMY      CHOLECYSTECTOMY      COLONOSCOPY  2009    ESOPHAGOGASTRODUODENOSCOPY  2009    HYSTERECTOMY      MALIGNANT SKIN LESION EXCISION      Excision of Lesion Face Malignant-9/14/2004 BCC Forehead    ROTATOR CUFF REPAIR        Family History   Problem Relation Age of Onset    Hypertension Mother     Osteoporosis Mother     Skin cancer Mother     Hypertension Father     Skin cancer Father     Cancer Maternal Grandmother     Uterine cancer Maternal Grandmother     Cancer Paternal Grandmother     Uterine cancer Paternal Grandmother     Cancer Paternal Aunt     Uterine cancer Paternal Aunt      Social History   Substance Use Topics    Smoking status: Never Smoker    Smokeless tobacco: Never Used    Alcohol use No     Allergies   Allergen Reactions    Cortisone Hypertension, Other (See Comments), Shortness Of Breath and Tachycardia    Acebutolol     Amlodipine     Atenolol     Azithromycin     Bisphosphonates      Annotation - [id number]: iriitis    Candesartan     Clarithromycin     Erythromycin     Fosinopril     Irbesartan     Levofloxacin     Losartan     Methylprednisolone Hypertension and Tachycardia    Metoprolol     Nisoldipine     Olmesartan     Propranolol     Sulfamethoxazole-Trimethoprim Nausea Only    Timolol     Penicillins Rash       Current Outpatient Prescriptions:     Alpha-D-Galactosidase (BEANO) TABS, Take by mouth, Disp: , Rfl:     Alum Hydroxide-Mag Trisilicate (GAVISCON) 15-27 5 MG CHEW, Chew, Disp: , Rfl:     Ascorbic Acid (VITAMIN C) 500 MG CAPS, Take by mouth, Disp: , Rfl:     ciclopirox (PENLAC) 8 % solution, , Disp: , Rfl:     diltiazem (DILACOR XR) 240 MG 24 hr capsule, Take 1 capsule (240 mg total) by mouth daily, Disp: 90 capsule, Rfl: 1    fluticasone (FLONASE) 50 mcg/act nasal spray, 2 sprays into each nostril daily, Disp: 16 g, Rfl: 3    Homeopathic Products (ZICAM ALLERGY RELIEF NA), into each nostril, Disp: , Rfl:     lactase (LACTAID) 3,000 units tablet, Take by mouth, Disp: , Rfl:     Magnesium Hydroxide (MILK OF MAGNESIA PO), Take by mouth 3 (three) times a week, Disp: , Rfl:     meclizine (ANTIVERT) 25 mg tablet, Take 1 tablet (25 mg total) by mouth 3 (three) times a day as needed for dizziness, Disp: 30 tablet, Rfl: 0    Minoxidil (ROGAINE WOMENS) 5 % FOAM, Apply topically, Disp: , Rfl:     Multiple Vitamins-Minerals (CENTRUM SILVER PO), Take 1 tablet by mouth daily, Disp: , Rfl:     nizatidine (AXID) 150 MG capsule, Take 1 capsule by mouth every 12 (twelve) hours, Disp: , Rfl:     polyethylene glycol-propylene glycol (SYSTANE) 0 4-0 3 %, Apply to eye, Disp: , Rfl:     PREMARIN vaginal cream, , Disp: , Rfl:     Review of Systems   Constitutional: Positive for fatigue  Negative for unexpected weight change  Weight loss 20 lbs over a year   HENT: Negative for trouble swallowing  Eyes:        Dry eyes,    Respiratory: Negative for shortness of breath      Cardiovascular: Negative for chest pain  Gastrointestinal: Positive for constipation  Endocrine: Positive for cold intolerance  Negative for polydipsia and polyuria  Musculoskeletal: Negative for neck pain  Knee pain   Neurological: Negative for headaches  Psychiatric/Behavioral: Negative for confusion  Physical Exam:  Body mass index is 21 4 kg/m²  /82   Pulse 80   Ht 5' 3" (1 6 m)   Wt 54 8 kg (120 lb 12 8 oz)   BMI 21 40 kg/m²    Wt Readings from Last 3 Encounters:   10/25/18 54 8 kg (120 lb 12 8 oz)   09/24/18 52 2 kg (115 lb)   09/10/18 52 2 kg (115 lb)       Physical Exam   Constitutional: She is oriented to person, place, and time  She appears well-developed and well-nourished  HENT:   Head: Normocephalic and atraumatic  Nose: Nose normal    Mouth/Throat: Oropharynx is clear and moist    Eyes: Pupils are equal, round, and reactive to light  EOM are normal     sclera non injected; no lid lag or stare   Neck: Neck supple  No thyromegaly present  moves well with swallow, no nodularity    Cardiovascular: Normal rate, regular rhythm, normal heart sounds and intact distal pulses  No murmur heard  Pulmonary/Chest: Effort normal and breath sounds normal    Abdominal: Soft  Bowel sounds are normal  She exhibits no distension  There is no tenderness  Musculoskeletal: Normal range of motion  She exhibits no edema  no ulcers,    Lymphadenopathy:     She has no cervical adenopathy  Neurological: She is alert and oriented to person, place, and time  normal DTRs UE and LE, no tremor   Skin: Skin is warm  No rash noted  No erythema  Psychiatric: She has a normal mood and affect   Her behavior is normal        Labs:       Lab Results   Component Value Date    CREATININE 0 92 12/12/2017    BUN 20 12/12/2017     eGFR   Date Value Ref Range Status   12/12/2017 62 ml/min/1 73sq m Final       No results found for: CHOL, HDL, TRIG    No results found for: ALT, AST, GGT, ALKPHOS, BILITOT    No results found for: FREET4, TSI      Impression & Plan:    Problem List Items Addressed This Visit     Essential hypertension          No orders of the defined types were placed in this encounter  There are no Patient Instructions on file for this visit  Discussed with the patient and all questioned fully answered  She will call me if any problems arise  Follow-up appointment as needed       Counseled patient on diagnostic results, prognosis, risk and benefit of treatment options, instruction for management, importance of treatment compliance, Risk  factor reduction and impressions      Emma Gilmore MD

## 2018-12-11 ENCOUNTER — OFFICE VISIT (OUTPATIENT)
Dept: FAMILY MEDICINE CLINIC | Facility: MEDICAL CENTER | Age: 73
End: 2018-12-11
Payer: MEDICARE

## 2018-12-11 VITALS
DIASTOLIC BLOOD PRESSURE: 78 MMHG | SYSTOLIC BLOOD PRESSURE: 130 MMHG | HEIGHT: 63 IN | RESPIRATION RATE: 14 BRPM | HEART RATE: 90 BPM | BODY MASS INDEX: 20.58 KG/M2 | WEIGHT: 116.13 LBS

## 2018-12-11 DIAGNOSIS — Z23 NEED FOR SHINGLES VACCINE: Primary | ICD-10-CM

## 2018-12-11 DIAGNOSIS — I83.93 VARICOSE VEINS OF BOTH LOWER EXTREMITIES, UNSPECIFIED WHETHER COMPLICATED: ICD-10-CM

## 2018-12-11 DIAGNOSIS — E03.8 SUBCLINICAL HYPOTHYROIDISM: ICD-10-CM

## 2018-12-11 DIAGNOSIS — I10 ESSENTIAL HYPERTENSION: ICD-10-CM

## 2018-12-11 DIAGNOSIS — L98.9 CHANGING SKIN LESION: ICD-10-CM

## 2018-12-11 PROCEDURE — 99214 OFFICE O/P EST MOD 30 MIN: CPT | Performed by: FAMILY MEDICINE

## 2018-12-11 RX ORDER — DILTIAZEM HYDROCHLORIDE 240 MG/1
240 CAPSULE, EXTENDED RELEASE ORAL DAILY
Qty: 30 CAPSULE | Refills: 5 | Status: SHIPPED | OUTPATIENT
Start: 2018-12-11 | End: 2019-06-25 | Stop reason: SDUPTHER

## 2018-12-11 NOTE — PROGRESS NOTES
Judy Chavarria is here for 6 month followup  She still gets that heavy feeling in her legs  We discussed varicose veins her last visit  Last week she had a few episodes of shortness of breath  Was associated with lower abdominal gas  Gas got better and and symptoms resolved  She saw cardiology  Has mitral regurgitation   Gets f/u ECHO in March  No exertional dyspnea or chest pain  She went to endocrinologist  Dr Pravin Anthony  Has subclinical hypothyrodism  Advised to continue to monitor  She complains of a lump on her left thumb  Discussed with Dermatology in told it might be related to arthritis  Not painful  She sees Jose Carlos Barahona for trigger finger of her thumb    O:/78   Pulse 90   Resp 14   Ht 5' 3" (1 6 m)   Wt 52 7 kg (116 lb 2 oz)   BMI 20 57 kg/m²    Physical Exam   Constitutional: She appears well-developed and well-nourished  No distress  Neck: Carotid bruit is not present  No thyromegaly present  Cardiovascular: Normal rate, regular rhythm and intact distal pulses  Murmur heard  Mid systolic click is noted at the apex  Pulmonary/Chest: Effort normal and breath sounds normal    Abdominal: Soft  Bowel sounds are normal  She exhibits no mass  There is no hepatomegaly  There is no tenderness  Musculoskeletal: She exhibits no edema  Mild varicosities especially spider veins are noted in the lower extremities   Lymphadenopathy:     She has no cervical adenopathy  Blood work 07/11/2018 within normal limits    Assessment     1  Hypertension-controlled  2  Mild venous varicosities-advised compression stockings for comfort  3  Subclinical hypothyroidism-continue to monitor as per Endocrinology  4  Lesion left thumb- papulo pustular appearing lesion  She will follow up with Dermatology  She will consider discussing with her hand orthopedist   5   Health maintenance-immunizations up-to-date  Discussed Shingrix

## 2019-02-07 ENCOUNTER — TELEPHONE (OUTPATIENT)
Dept: ENDOCRINOLOGY | Facility: CLINIC | Age: 74
End: 2019-02-07

## 2019-02-07 NOTE — TELEPHONE ENCOUNTER
----- Message from Terrell Cornelius PA-C sent at 2/7/2019  3:28 PM EST -----  Thyroid function and Vitamin D level normal

## 2019-03-05 ENCOUNTER — OFFICE VISIT (OUTPATIENT)
Dept: DERMATOLOGY | Facility: CLINIC | Age: 74
End: 2019-03-05
Payer: MEDICARE

## 2019-03-05 DIAGNOSIS — Z13.89 SCREENING FOR SKIN CONDITION: ICD-10-CM

## 2019-03-05 DIAGNOSIS — L82.1 SEBORRHEIC KERATOSIS: Primary | ICD-10-CM

## 2019-03-05 DIAGNOSIS — Z85.828 HISTORY OF SKIN CANCER: ICD-10-CM

## 2019-03-05 PROCEDURE — 99213 OFFICE O/P EST LOW 20 MIN: CPT | Performed by: DERMATOLOGY

## 2019-03-05 NOTE — PATIENT INSTRUCTIONS
Seborrheic keratosis patient reassured these are normal growths we acquire with age no treatment needed  History of skin cancer in no recurrence nothing else atypical sunblock recommended follow-up in 6 months  Screening for dermatologic disorders nothing else of concern noted on complete exam follow-up in 6 months Skin lesion left shin appears benign if any changes would consider biopsy

## 2019-03-05 NOTE — PROGRESS NOTES
[address]  [address] [facility] [address]  [phone]  [phone]     MRN: [id number] : 1945  Encounter: [id number]  Patient Information: Jak Lombardi  Chief complaint:  Six-month check up    History of present illness:  38-year-old female presents for overall skin check previous history of skin cancer last skin cancer was a year ago no specific concerns noted  Past Medical History:   Diagnosis Date    Acne     Benign neoplasm of skin     Hypertension     Inflamed seborrheic keratosis     Irritable bowel syndrome     Malignant neoplasm of skin of face     Nonmelanoma skin cancer     Last Assessed:17    Tachycardia     Telogen effluvium     Temporomandibular joint disorder     Vertigo      Past Surgical History:   Procedure Laterality Date    APPENDECTOMY      CHOLECYSTECTOMY      COLONOSCOPY  2009    ESOPHAGOGASTRODUODENOSCOPY  2009    HYSTERECTOMY      MALIGNANT SKIN LESION EXCISION      Excision of Lesion Face Malignant-2004 BCC Forehead    ROTATOR CUFF REPAIR       Social History   Social History     Substance and Sexual Activity   Alcohol Use No     Social History     Substance and Sexual Activity   Drug Use No     Social History     Tobacco Use   Smoking Status Never Smoker   Smokeless Tobacco Never Used     Family History   Problem Relation Age of Onset    Hypertension Mother     Osteoporosis Mother     Skin cancer Mother     Hypertension Father     Skin cancer Father     Cancer Maternal Grandmother     Uterine cancer Maternal Grandmother     Cancer Paternal Grandmother     Uterine cancer Paternal Grandmother     Cancer Paternal Aunt     Uterine cancer Paternal Aunt     Diabetes type II Maternal Grandfather      Meds/Allergies   Allergies   Allergen Reactions    Cortisone Hypertension, Other (See Comments), Shortness Of Breath and Tachycardia    Acebutolol     Amlodipine     Atenolol     Azithromycin     Bisphosphonates      [facility] - [id number]: iriitis    Candesartan     Clarithromycin     Erythromycin     Fosinopril     Irbesartan     Levofloxacin     Losartan     Methylprednisolone Hypertension and Tachycardia    Metoprolol     Nisoldipine     Olmesartan     Propranolol     Sulfamethoxazole-Trimethoprim Nausea Only    Timolol     Penicillins Rash       Meds:  Prior to Admission medications    Medication Sig Start Date End Date Taking?  Authorizing Provider   Alpha-D-Galactosidase Sherry Navas) TABS Take by mouth   Yes Historical Provider, MD   Alum Hydroxide-Mag Trisilicate (GAVISCON) 36-73 9 MG CHEW Chew   Yes Historical Provider, MD   Ascorbic Acid (VITAMIN C) 500 MG CAPS Take by mouth   Yes Historical Provider, MD   ciclopirox (PENLAC) 8 % solution  7/6/18  Yes Historical Provider, MD   diltiazem (DILACOR XR) 240 MG 24 hr capsule Take 1 capsule (240 mg total) by mouth daily 12/11/18  Yes Dimitrios Pradhan MD   fluticasone Timothy Rodolfo) 50 mcg/act nasal spray 2 sprays into each nostril daily 8/20/18  Yes Dimitrios Pradhan MD   Homeopathic Products [facility] ALLERGY RELIEF NA) into each nostril   Yes Historical Provider, MD   lactase (LACTAID) 3,000 units tablet Take by mouth   Yes Historical Provider, MD   Magnesium Hydroxide (MILK OF MAGNESIA PO) Take by mouth 3 (three) times a week   Yes Historical Provider, MD   Minoxidil (ROGAINE WOMENS) 5 % FOAM Apply topically   Yes Historical Provider, MD   Multiple Vitamins-Minerals (CENTRUM SILVER PO) Take 1 tablet by mouth daily   Yes Historical Provider, MD   nizatidine (AXID) 150 MG capsule Take 1 capsule by mouth every 12 (twelve) hours   Yes Historical Provider, MD   polyethylene glycol-propylene glycol (SYSTANE) 0 4-0 3 % Apply to eye 8/7/14  Yes Historical Provider, MD   PREMARIN vaginal cream  8/15/18  Yes Historical Provider, MD   meclizine (ANTIVERT) 25 mg tablet Take 1 tablet (25 mg total) by mouth 3 (three) times a day as needed for dizziness  Patient not taking: Reported on 3/5/2019 5/14/18   Delaney Chen MD       Subjective:     Review of Systems:    General: negative for - chills, fatigue, fever,  weight gain or weight loss  Psychological: negative for - anxiety, behavioral disorder, concentration difficulties, decreased libido, depression, irritability, memory difficulties, mood swings, sleep disturbances or suicidal ideation  ENT: negative for - hearing difficulties , nasal congestion, nasal discharge, oral lesions, sinus pain, sneezing, sore throat  Allergy and Immunology: negative for - hives, insect bite sensitivity,  Hematological and Lymphatic: negative for - bleeding problems, blood clots,bruising, swollen lymph nodes  Endocrine: negative for - hair pattern changes, hot flashes, malaise/lethargy, mood swings, palpitations, polydipsia/polyuria, skin changes, temperature intolerance or unexpected weight change  Respiratory: negative for - cough, hemoptysis, orthopnea, shortness of breath, or wheezing  Cardiovascular: negative for - chest pain, dyspnea on exertion, edema,  Gastrointestinal: negative for - abdominal pain, nausea/vomiting  Genito-Urinary: negative for - dysuria, incontinence, irregular/heavy menses or urinary frequency/urgency  Musculoskeletal: negative for - gait disturbance, joint pain, joint stiffness, joint swelling, muscle pain, muscular weakness  Dermatological:  As in HPI  Neurological: negative for confusion, dizziness, headaches, impaired coordination/balance, memory loss, numbness/tingling, seizures, speech problems, tremors or weakness       Objective: There were no vitals taken for this visit      Physical Exam:    General Appearance:    Alert, cooperative, no distress   Head:    Normocephalic, without obvious abnormality, atraumatic           Skin:   A full skin exam was performed including scalp, head scalp, eyes, ears, nose, lips, neck, chest, axilla, abdomen, back, buttocks, bilateral upper extremities, bilateral lower extremities, hands, feet, fingers, toes, fingernails, and toenails 3 mm pink macule noted on the left shin normal keratotic papules greasy stuck on appearance nothing else remarkable noted on complete exam previous sites of skin cancer well healed without recurrence     Assessment:     1  Seborrheic keratosis     2  Screening for skin condition     3  History of skin cancer           Plan:   Seborrheic keratosis patient reassured these are normal growths we acquire with age no treatment needed  History of skin cancer in no recurrence nothing else atypical sunblock recommended follow-up in 6 months  Screening for dermatologic disorders nothing else of concern noted on complete exam follow-up in 6 months Skin lesion left shin appears benign if any changes would consider biopsy    Gwenette Gosselin, MD  3/5/2019,10:49 AM    Portions of the record may have been created with voice recognition software   Occasional wrong word or "sound a like" substitutions may have occurred due to the inherent limitations of voice recognition software   Read the chart carefully and recognize, using context, where substitutions have occurred

## 2019-03-06 ENCOUNTER — OFFICE VISIT (OUTPATIENT)
Dept: FAMILY MEDICINE CLINIC | Facility: MEDICAL CENTER | Age: 74
End: 2019-03-06
Payer: MEDICARE

## 2019-03-06 VITALS
HEART RATE: 68 BPM | TEMPERATURE: 97.9 F | WEIGHT: 118 LBS | SYSTOLIC BLOOD PRESSURE: 160 MMHG | BODY MASS INDEX: 20.9 KG/M2 | DIASTOLIC BLOOD PRESSURE: 90 MMHG

## 2019-03-06 DIAGNOSIS — J06.9 VIRAL UPPER RESPIRATORY TRACT INFECTION: Primary | ICD-10-CM

## 2019-03-06 PROCEDURE — 99213 OFFICE O/P EST LOW 20 MIN: CPT | Performed by: FAMILY MEDICINE

## 2019-03-06 NOTE — PROGRESS NOTES
Joao Ana Rosa started with nasal congestion for the past few days  Sore throat, Slight ear ache on the right  No headache or facial pain  Slight cough  Mostly  post nasal drainage  No fever or chills  Took Mucinex  O: /90 (BP Location: Left arm, Patient Position: Sitting, Cuff Size: Adult)   Pulse 68   Temp 97 9 °F (36 6 °C)   Wt 53 5 kg (118 lb)   BMI 20 90 kg/m²   HEENT-TMs normal   Pharynx with posterior mucous  Sinuses nontender  Eyes clear  Neck no adenopathy  Chest clear    Assessment  URI    Plan  Afrin nasal spray as needed continue Mucinex  Call if no better

## 2019-04-23 DIAGNOSIS — R55 SYNCOPE AND COLLAPSE: ICD-10-CM

## 2019-04-23 DIAGNOSIS — I10 ESSENTIAL HYPERTENSION: Primary | ICD-10-CM

## 2019-04-23 DIAGNOSIS — I05.9 MITRAL VALVE DISEASE: ICD-10-CM

## 2019-05-08 ENCOUNTER — TELEPHONE (OUTPATIENT)
Dept: OBGYN CLINIC | Facility: CLINIC | Age: 74
End: 2019-05-08

## 2019-05-08 ENCOUNTER — ANNUAL EXAM (OUTPATIENT)
Dept: OBGYN CLINIC | Facility: CLINIC | Age: 74
End: 2019-05-08
Payer: MEDICARE

## 2019-05-08 VITALS
WEIGHT: 110 LBS | HEIGHT: 63 IN | SYSTOLIC BLOOD PRESSURE: 142 MMHG | DIASTOLIC BLOOD PRESSURE: 80 MMHG | BODY MASS INDEX: 19.49 KG/M2

## 2019-05-08 DIAGNOSIS — N95.2 VAGINAL ATROPHY: ICD-10-CM

## 2019-05-08 DIAGNOSIS — Z01.419 ENCOUNTER FOR ANNUAL ROUTINE GYNECOLOGICAL EXAMINATION: ICD-10-CM

## 2019-05-08 DIAGNOSIS — Z12.39 BREAST SCREENING, UNSPECIFIED: Primary | ICD-10-CM

## 2019-05-08 PROCEDURE — G0101 CA SCREEN;PELVIC/BREAST EXAM: HCPCS | Performed by: OBSTETRICS & GYNECOLOGY

## 2019-05-08 RX ORDER — DILTIAZEM HYDROCHLORIDE 240 MG/1
CAPSULE, COATED, EXTENDED RELEASE ORAL
Refills: 0 | COMMUNITY
Start: 2019-04-08 | End: 2019-05-08 | Stop reason: SDUPTHER

## 2019-05-08 RX ORDER — ESTRADIOL 0.1 MG/G
1 CREAM VAGINAL WEEKLY
Qty: 45 G | Refills: 3 | Status: SHIPPED | OUTPATIENT
Start: 2019-05-08 | End: 2021-04-26

## 2019-05-08 RX ORDER — ASCORBIC ACID 500 MG
TABLET ORAL
COMMUNITY

## 2019-06-06 ENCOUNTER — OFFICE VISIT (OUTPATIENT)
Dept: FAMILY MEDICINE CLINIC | Facility: MEDICAL CENTER | Age: 74
End: 2019-06-06
Payer: MEDICARE

## 2019-06-06 ENCOUNTER — TELEPHONE (OUTPATIENT)
Dept: FAMILY MEDICINE CLINIC | Facility: MEDICAL CENTER | Age: 74
End: 2019-06-06

## 2019-06-06 VITALS
SYSTOLIC BLOOD PRESSURE: 140 MMHG | DIASTOLIC BLOOD PRESSURE: 60 MMHG | BODY MASS INDEX: 20.76 KG/M2 | WEIGHT: 117.2 LBS | RESPIRATION RATE: 16 BRPM | TEMPERATURE: 98.8 F

## 2019-06-06 DIAGNOSIS — R10.30 LOWER ABDOMINAL PAIN: Primary | ICD-10-CM

## 2019-06-06 LAB
SL AMB  POCT GLUCOSE, UA: ABNORMAL
SL AMB LEUKOCYTE ESTERASE,UA: ABNORMAL
SL AMB POCT BILIRUBIN,UA: ABNORMAL
SL AMB POCT BLOOD,UA: ABNORMAL
SL AMB POCT KETONES,UA: ABNORMAL
SL AMB POCT NITRITE,UA: ABNORMAL
SL AMB POCT PH,UA: 5
SL AMB POCT SPECIFIC GRAVITY,UA: 1.01
SL AMB POCT URINE PROTEIN: ABNORMAL
SL AMB POCT UROBILINOGEN: ABNORMAL

## 2019-06-06 PROCEDURE — 81002 URINALYSIS NONAUTO W/O SCOPE: CPT | Performed by: FAMILY MEDICINE

## 2019-06-06 PROCEDURE — 99213 OFFICE O/P EST LOW 20 MIN: CPT | Performed by: FAMILY MEDICINE

## 2019-06-07 ENCOUNTER — TELEPHONE (OUTPATIENT)
Dept: FAMILY MEDICINE CLINIC | Facility: MEDICAL CENTER | Age: 74
End: 2019-06-07

## 2019-06-07 PROCEDURE — 87086 URINE CULTURE/COLONY COUNT: CPT | Performed by: FAMILY MEDICINE

## 2019-06-08 LAB — BACTERIA UR CULT: NORMAL

## 2019-06-10 ENCOUNTER — TELEPHONE (OUTPATIENT)
Dept: FAMILY MEDICINE CLINIC | Facility: MEDICAL CENTER | Age: 74
End: 2019-06-10

## 2019-06-13 ENCOUNTER — HOSPITAL ENCOUNTER (OUTPATIENT)
Dept: NON INVASIVE DIAGNOSTICS | Facility: MEDICAL CENTER | Age: 74
Discharge: HOME | End: 2019-06-13
Payer: MEDICARE

## 2019-06-13 DIAGNOSIS — R55 SYNCOPE AND COLLAPSE: ICD-10-CM

## 2019-06-13 DIAGNOSIS — I05.9 MITRAL VALVE DISEASE: ICD-10-CM

## 2019-06-13 DIAGNOSIS — I10 ESSENTIAL HYPERTENSION: ICD-10-CM

## 2019-06-13 PROCEDURE — 93306 TTE W/DOPPLER COMPLETE: CPT

## 2019-06-13 PROCEDURE — 93306 TTE W/DOPPLER COMPLETE: CPT | Performed by: INTERNAL MEDICINE

## 2019-06-21 ENCOUNTER — TRANSCRIBE ORDERS (OUTPATIENT)
Dept: ADMINISTRATIVE | Facility: HOSPITAL | Age: 74
End: 2019-06-21

## 2019-06-21 DIAGNOSIS — R10.30 INGUINAL PAIN, UNSPECIFIED LATERALITY: Primary | ICD-10-CM

## 2019-06-24 PROBLEM — H61.23 BILATERAL IMPACTED CERUMEN: Status: ACTIVE | Noted: 2019-06-24

## 2019-06-24 PROBLEM — H90.3 SENSORINEURAL HEARING LOSS (SNHL) OF BOTH EARS: Status: ACTIVE | Noted: 2019-06-24

## 2019-06-25 DIAGNOSIS — I10 ESSENTIAL HYPERTENSION: ICD-10-CM

## 2019-06-27 RX ORDER — DILTIAZEM HYDROCHLORIDE 240 MG/1
240 CAPSULE, EXTENDED RELEASE ORAL DAILY
Qty: 30 CAPSULE | Refills: 5 | Status: SHIPPED | OUTPATIENT
Start: 2019-06-27 | End: 2019-12-04 | Stop reason: SDUPTHER

## 2019-07-08 ENCOUNTER — OFFICE VISIT (OUTPATIENT)
Dept: FAMILY MEDICINE CLINIC | Facility: MEDICAL CENTER | Age: 74
End: 2019-07-08
Payer: MEDICARE

## 2019-07-08 VITALS
RESPIRATION RATE: 16 BRPM | HEART RATE: 76 BPM | DIASTOLIC BLOOD PRESSURE: 66 MMHG | WEIGHT: 114.6 LBS | SYSTOLIC BLOOD PRESSURE: 114 MMHG | HEIGHT: 62 IN | BODY MASS INDEX: 21.09 KG/M2

## 2019-07-08 DIAGNOSIS — Z00.00 MEDICARE ANNUAL WELLNESS VISIT, SUBSEQUENT: ICD-10-CM

## 2019-07-08 DIAGNOSIS — Z86.39 HISTORY OF VITAMIN D DEFICIENCY: ICD-10-CM

## 2019-07-08 DIAGNOSIS — E03.8 SUBCLINICAL HYPOTHYROIDISM: ICD-10-CM

## 2019-07-08 DIAGNOSIS — I10 ESSENTIAL HYPERTENSION: Primary | ICD-10-CM

## 2019-07-08 DIAGNOSIS — Z88.0 HISTORY OF PENICILLIN ALLERGY: ICD-10-CM

## 2019-07-08 PROCEDURE — G0439 PPPS, SUBSEQ VISIT: HCPCS | Performed by: FAMILY MEDICINE

## 2019-07-08 PROCEDURE — 99214 OFFICE O/P EST MOD 30 MIN: CPT | Performed by: FAMILY MEDICINE

## 2019-07-08 NOTE — PROGRESS NOTES
Assessment and Plan:     Problem List Items Addressed This Visit     None         History of Present Illness:     Patient presents for Medicare Annual Wellness visit    Patient Care Team:  Sukhjinder Almonte MD as PCP - Nazario Abraham MD as PCP - Endocrinology (Endocrinology)  MD Elena Hampton MD Doyce Aas, MD Maggie Earing, MD     Problem List:     Patient Active Problem List   Diagnosis    Seborrheic keratosis    Changing skin lesion    Screening for skin condition    History of skin cancer    Essential hypertension    Near syncope    Basal cell carcinoma of right temple region    Episodic confusion    Allergic rhinitis    Hypertension    Hypothyroidism    Hashimoto's disease    History of vitamin D deficiency    Subclinical hypothyroidism    Sensorineural hearing loss (SNHL) of both ears    Bilateral impacted cerumen      Past Medical and Surgical History:     Past Medical History:   Diagnosis Date    Acne     Benign neoplasm of skin     Hypertension     Inflamed seborrheic keratosis     Irritable bowel syndrome     Malignant neoplasm of skin of face     Nonmelanoma skin cancer     Last Assessed:6/27/17    Tachycardia     Telogen effluvium     Temporomandibular joint disorder     Vertigo      Past Surgical History:   Procedure Laterality Date    APPENDECTOMY      CHOLECYSTECTOMY      COLONOSCOPY  05/03/2019    ESOPHAGOGASTRODUODENOSCOPY  2009    HYSTERECTOMY      MALIGNANT SKIN LESION EXCISION      Excision of Lesion Face Malignant-9/14/2004 BCC Forehead    ROTATOR CUFF REPAIR        Family History:     Family History   Problem Relation Age of Onset    Hypertension Mother     Osteoporosis Mother     Skin cancer Mother     Hypertension Father     Skin cancer Father     Cancer Maternal Grandmother     Uterine cancer Maternal Grandmother     Cancer Paternal Grandmother     Uterine cancer Paternal Grandmother     Cancer Paternal Aunt     Uterine cancer Paternal Aunt     Diabetes type II Maternal Grandfather       Social History:     Social History     Tobacco Use   Smoking Status Never Smoker   Smokeless Tobacco Never Used     Social History     Substance and Sexual Activity   Alcohol Use No     Social History     Substance and Sexual Activity   Drug Use No      Medications and Allergies:     Current Outpatient Medications   Medication Sig Dispense Refill    Alpha-D-Galactosidase (BEANO) TABS Take by mouth      Alum Hydroxide-Mag Trisilicate (GAVISCON) 90-84 0 MG CHEW Chew      ascorbic acid (VITAMIN C) 500 mg tablet Take by mouth      Cholecalciferol 400 units TABS Take by mouth      ciclopirox (PENLAC) 8 % solution Apply topically      diltiazem (DILACOR XR) 240 MG 24 hr capsule Take 1 capsule (240 mg total) by mouth daily 30 capsule 5    estradiol (ESTRACE) 0 1 mg/g vaginal cream Insert 1 g into the vagina once a week 45 g 3    fluticasone (FLONASE) 50 mcg/act nasal spray 2 sprays into each nostril daily 16 g 3    Homeopathic Products (ZICAM ALLERGY RELIEF NA) into each nostril      lactase (LACTAID) 3,000 units tablet Take by mouth      Magnesium Hydroxide (MILK OF MAGNESIA PO) Take by mouth 3 (three) times a week      Minoxidil (ROGAINE WOMENS) 5 % FOAM Apply topically      Multiple Vitamins-Minerals (CENTRUM SILVER ULTRA WOMENS PO) Take by mouth      nizatidine (AXID) 150 MG capsule Take 1 capsule by mouth every 12 (twelve) hours      polyethylene glycol-propylene glycol (SYSTANE) 0 4-0 3 %       PREMARIN vaginal cream        No current facility-administered medications for this visit        Allergies   Allergen Reactions    Cortisone Hypertension, Other (See Comments), Shortness Of Breath and Tachycardia    Acebutolol     Amlodipine     Atenolol     Azithromycin     Banana GI Intolerance    [facility]      Annotation - [id number]: iriitis    Candesartan     Clarithromycin     Erythromycin     Fosinopril     Irbesartan  Levofloxacin     Losartan     Methylprednisolone Hypertension and Tachycardia    Metoprolol     Nisoldipine     Olmesartan     Propranolol     Sulfamethoxazole-Trimethoprim Nausea Only    Timolol     Penicillins Rash      Immunizations:     Immunization History   Administered Date(s) Administered    INFLUENZA 10/12/2018    Influenza Split High Dose Preservative Free IM 10/16/2013, 10/22/2014, 11/03/2015, 10/18/2017    Pneumococcal Conjugate 13-Valent 11/28/2017    Pneumococcal Polysaccharide PPV23 07/27/2015    Zoster 03/18/2016      Medicare Screening Tests and Risk Assessments:     Sonya Kramer is here for her Subsequent Wellness visit  Health Risk Assessment:  Patient rates overall health as good  Patient feels that their physical health rating is Same  Eyesight was rated as Same  Hearing was rated as Same  Patient feels that their emotional and mental health rating is Same  Pain experienced by patient in the last 7 days has been Some  Patient's pain rating has been 4/10  Patient states that she has experienced no weight loss or gain in last 6 months  Emotional/Mental Health:  Patient has not been feeling nervous/anxious  PHQ-9 Depression Screening:    Frequency of the following problems over the past two weeks:      1  Little interest or pleasure in doing things: 0 - not at all      2  Feeling down, depressed, or hopeless: 0 - not at all  PHQ-2 Score: 0          Broken Bones/Falls: Fall Risk Assessment:    In the past year, patient has experienced: No history of falling in past year          Bladder/Bowel:  Patient has not leaked urine accidently in the last six months  Patient reports loss of bowel control  (Additional Comments: Fecal incontinence)    Immunizations:  Patient has had a flu vaccination within the last year  Patient has received a pneumonia shot  Patient has received a shingles shot  Patient has not received tetanus/diphtheria shot       Home Safety:  Patient does not have trouble with stairs inside or outside of their home  Patient currently reports that there are no safety hazards present in home, working smoke alarms, working carbon monoxide detectors  Preventative Screenings:   Breast cancer screening performed, colon cancer screen completed, cholesterol screen completed, glaucoma eye exam completed,     Nutrition:  Current diet: No Added Salt, Regular and Limited junk food with servings of the following:  (Additional Comments: Eats fruits and vegetables  Can't eat beans, corn  Whole grains needs to limit  )    Medications:  Patient is currently taking over-the-counter supplements  List of OTC medications includes: aspirin, vitamins  Patient is able to manage medications  Lifestyle Choices:  Patient reports no tobacco use  Patient has not smoked or used tobacco in the past   Patient reports alcohol use  Patient drives a vehicle  Patient wears seat belt  Current level of exercise of physical activity described by patient as: stretching and strengthening daily   Activities of Daily Living:  Can get out of bed by his or her self, able to dress self, able to make own meals, able to do own shopping, able to bathe self, can do own laundry/housekeeping, can manage own money, pay bills and track expenses    Previous Hospitalizations:  No hospitalization or ED visit in past 12 months        Advanced Directives:  Patient has decided on a power of   Patient has completed advanced directive    Additional Comments:  Kell Adams is POA     Preventative Screening/Counseling:      Cardiovascular:      Counseling: Healthy Diet and Healthy Weight     Due for Labs/Analytes/Optional EKG: Lipid Panel          Diabetes:      General: Risks and Benefits Discussed      Due for labs: Blood Glucose          Colorectal Cancer:      General: Screening Current          Breast Cancer:      General: Screening Current          Cervical Cancer:      General: Screening Not Indicated          Osteoporosis:      General: Screening Not Indicated      Counseling: Calcium and Vitamin D Intake and Regular Weightbearing Exercise          AAA:      General: Screening Not Indicated          Glaucoma:      General: Screening Current          HIV:      General: Screening Not Indicated          Hepatitis C:      General: Screening Not Indicated        Advanced Directives:   Patient has living will for healthcare, has durable POA for healthcare, patient has an advanced directive  Information on ACP and/or AD provided  No 5 wishes given  End of life assessment reviewed with patient  Provider agrees with end of life decisions   Immunizations:      Influenza: Influenza UTD This Year      Pneumococcal: Lifetime Vaccine Completed      Shingrix: Risks & Benefits Discussed      Hepatitis B (Low risk patients): Series Not Indicated      Zostavax: Zostavax Vaccine UTD      TD: Risks & Benefits Discussed      TDAP: Risks & Benefits Discussed        O: /66 (Cuff Size: Standard)   Pulse 76   Resp 16   Ht 5' 2" (1 575 m)   Wt 52 kg (114 lb 9 6 oz)   LMP  (LMP Unknown)   BMI 20 96 kg/m²   Physical Exam   Constitutional: She is oriented to person, place, and time  She appears well-developed and well-nourished  HENT:   Head: Normocephalic  Right Ear: External ear normal    Left Ear: External ear normal    Nose: Nose normal    Mouth/Throat: Oropharynx is clear and moist    Eyes: Pupils are equal, round, and reactive to light  Conjunctivae and EOM are normal  No scleral icterus  Neck: Normal range of motion  Neck supple  Carotid bruit is not present  No thyroid mass and no thyromegaly present  Cardiovascular: Normal rate, regular rhythm, normal heart sounds and intact distal pulses  Pulses:       Femoral pulses are 2+ on the right side, and 2+ on the left side  Popliteal pulses are 2+ on the right side, and 2+ on the left side          Dorsalis pedis pulses are 2+ on the right side, and 2+ on the left side  Posterior tibial pulses are 2+ on the right side, and 2+ on the left side  Pulmonary/Chest: Effort normal and breath sounds normal  No respiratory distress  She has no wheezes  She has no rales  Abdominal: Soft  Normal aorta and bowel sounds are normal  She exhibits no distension and no mass  There is no hepatosplenomegaly  There is no tenderness  Musculoskeletal: Normal range of motion  She exhibits no edema  Compression stockings in place   Lymphadenopathy:        Head (right side): No submandibular and no occipital adenopathy present  Head (left side): No submandibular and no occipital adenopathy present  She has no cervical adenopathy  Right: No inguinal and no supraclavicular adenopathy present  Left: No inguinal and no supraclavicular adenopathy present  Neurological: She is oriented to person, place, and time  Skin: No lesion and no rash noted  Psychiatric: She has a normal mood and affect  Her behavior is normal      Assessment  Annual Medicare Wellness    Plan  Check BW  Recheck 6 months

## 2019-07-09 ENCOUNTER — APPOINTMENT (OUTPATIENT)
Dept: LAB | Facility: MEDICAL CENTER | Age: 74
End: 2019-07-09
Payer: MEDICARE

## 2019-07-09 ENCOUNTER — TRANSCRIBE ORDERS (OUTPATIENT)
Dept: ADMINISTRATIVE | Facility: HOSPITAL | Age: 74
End: 2019-07-09

## 2019-07-09 DIAGNOSIS — R10.30 ABDOMINAL PAIN, LOWER: Primary | ICD-10-CM

## 2019-07-09 LAB
BUN SERPL-MCNC: 23 MG/DL (ref 5–25)
CREAT SERPL-MCNC: 1.03 MG/DL (ref 0.6–1.3)
GFR SERPL CREATININE-BSD FRML MDRD: 53 ML/MIN/1.73SQ M

## 2019-07-09 PROCEDURE — 82565 ASSAY OF CREATININE: CPT | Performed by: NURSE PRACTITIONER

## 2019-07-09 PROCEDURE — 36415 COLL VENOUS BLD VENIPUNCTURE: CPT | Performed by: NURSE PRACTITIONER

## 2019-07-09 PROCEDURE — 84520 ASSAY OF UREA NITROGEN: CPT | Performed by: NURSE PRACTITIONER

## 2019-07-09 NOTE — PROGRESS NOTES
Quoc Mckeon is here for Medicare wellness  See other note  She has seen her gastroenterologist for the abdominal pain she described in the visit several weeks ago  She is getting a CT scan done of the abdomen and pelvis soon  Symptoms really have not changed much  She saw Cardiology for follow-up of her valvular heart disease  She had an echo done  Has not heard results yet  She would like to for her explore her history of penicillin allergy  She has a remote history  She has been wearing her compression stockings although she says are very tight and wonders if she can have less compression  O: /66 (Cuff Size: Standard)   Pulse 76   Resp 16   Ht 5' 2" (1 575 m)   Wt 52 kg (114 lb 9 6 oz)   LMP  (LMP Unknown)   BMI 20 96 kg/m²   Physical Exam   Constitutional: She is oriented to person, place, and time  She appears well-developed and well-nourished  HENT:   Head: Normocephalic  Right Ear: External ear normal    Left Ear: External ear normal    Nose: Nose normal    Mouth/Throat: Oropharynx is clear and moist    Eyes: Pupils are equal, round, and reactive to light  Conjunctivae and EOM are normal  No scleral icterus  Neck: Normal range of motion  Neck supple  Carotid bruit is not present  No thyroid mass and no thyromegaly present  Cardiovascular: Normal rate, regular rhythm, normal heart sounds and intact distal pulses  Pulses:       Femoral pulses are 2+ on the right side, and 2+ on the left side  Popliteal pulses are 2+ on the right side, and 2+ on the left side  Dorsalis pedis pulses are 2+ on the right side, and 2+ on the left side  Posterior tibial pulses are 2+ on the right side, and 2+ on the left side  Pulmonary/Chest: Effort normal and breath sounds normal  No respiratory distress  She has no wheezes  She has no rales  Abdominal: Soft  Normal aorta and bowel sounds are normal  She exhibits no distension and no mass  There is no hepatosplenomegaly   There is no tenderness  Musculoskeletal: Normal range of motion  She exhibits no edema  Lymphadenopathy:        Head (right side): No submandibular and no occipital adenopathy present  Head (left side): No submandibular and no occipital adenopathy present  She has no cervical adenopathy  Right: No inguinal and no supraclavicular adenopathy present  Left: No inguinal and no supraclavicular adenopathy present  Neurological: She is oriented to person, place, and time  Skin: No lesion and no rash noted  Psychiatric: She has a normal mood and affect  Her behavior is normal      Assessment  1  Hypertension-controlled  2  Abdominal pain-workup per Gastroenterology  3  Remote history of penicillin allergy-requests testing  4  Valvular heart disease-advised follow-up with Cardiology  5  Venous stasis-reorder her compression stockings but will lower the pressure    Plan  Referral to allergist   As above  Recheck 6 months

## 2019-07-16 ENCOUNTER — TRANSCRIBE ORDERS (OUTPATIENT)
Dept: ADMINISTRATIVE | Facility: HOSPITAL | Age: 74
End: 2019-07-16

## 2019-07-16 ENCOUNTER — HOSPITAL ENCOUNTER (OUTPATIENT)
Dept: CT IMAGING | Facility: HOSPITAL | Age: 74
Discharge: HOME | End: 2019-07-16
Payer: MEDICARE

## 2019-07-16 DIAGNOSIS — R10.30 INGUINAL PAIN, UNSPECIFIED LATERALITY: ICD-10-CM

## 2019-07-16 PROCEDURE — 74177 CT ABD & PELVIS W/CONTRAST: CPT

## 2019-07-16 RX ADMIN — IOHEXOL 100 ML: 350 INJECTION, SOLUTION INTRAVENOUS at 15:38

## 2019-07-23 ENCOUNTER — TRANSCRIBE ORDERS (OUTPATIENT)
Dept: ADMINISTRATIVE | Facility: HOSPITAL | Age: 74
End: 2019-07-23

## 2019-07-23 DIAGNOSIS — R93.89 ABNORMAL CT SCAN: Primary | ICD-10-CM

## 2019-07-26 ENCOUNTER — HOSPITAL ENCOUNTER (OUTPATIENT)
Dept: RADIOLOGY | Facility: MEDICAL CENTER | Age: 74
Discharge: HOME | End: 2019-07-26
Payer: MEDICARE

## 2019-07-26 DIAGNOSIS — R93.89 ABNORMAL CT SCAN: ICD-10-CM

## 2019-07-26 PROCEDURE — 76705 ECHO EXAM OF ABDOMEN: CPT

## 2019-09-03 ENCOUNTER — OFFICE VISIT (OUTPATIENT)
Dept: OBGYN CLINIC | Facility: CLINIC | Age: 74
End: 2019-09-03
Payer: MEDICARE

## 2019-09-03 ENCOUNTER — APPOINTMENT (OUTPATIENT)
Dept: RADIOLOGY | Facility: AMBULARY SURGERY CENTER | Age: 74
End: 2019-09-03
Attending: ORTHOPAEDIC SURGERY
Payer: MEDICARE

## 2019-09-03 ENCOUNTER — TELEPHONE (OUTPATIENT)
Dept: OBGYN CLINIC | Facility: HOSPITAL | Age: 74
End: 2019-09-03

## 2019-09-03 VITALS
HEIGHT: 62 IN | DIASTOLIC BLOOD PRESSURE: 76 MMHG | HEART RATE: 81 BPM | BODY MASS INDEX: 20.98 KG/M2 | WEIGHT: 114 LBS | SYSTOLIC BLOOD PRESSURE: 178 MMHG

## 2019-09-03 DIAGNOSIS — S93.492A SPRAIN OF ANTERIOR TALOFIBULAR LIGAMENT OF LEFT ANKLE, INITIAL ENCOUNTER: Primary | ICD-10-CM

## 2019-09-03 DIAGNOSIS — S93.491A SPRAIN OF ANTERIOR TALOFIBULAR LIGAMENT OF RIGHT ANKLE, INITIAL ENCOUNTER: ICD-10-CM

## 2019-09-03 DIAGNOSIS — M25.572 PAIN, JOINT, ANKLE AND FOOT, LEFT: ICD-10-CM

## 2019-09-03 PROCEDURE — 73610 X-RAY EXAM OF ANKLE: CPT

## 2019-09-03 PROCEDURE — 99213 OFFICE O/P EST LOW 20 MIN: CPT | Performed by: ORTHOPAEDIC SURGERY

## 2019-09-03 NOTE — TELEPHONE ENCOUNTER
Patient saw Dr Karlos Enamorado today  Patient received a script for PT but the order states its the right ankle and she was seen for the left  She is asking if this can be changed and faxed to [facility]?  Fax #[phone]

## 2019-09-03 NOTE — PROGRESS NOTES
CORTNEY Ramirez  Attending, Orthopaedic Surgery  [facility] and [address] [facility] [address] [facility]      ORTHOPAEDIC FOOT AND ANKLE CLINIC VISIT     Assessment:     Encounter Diagnoses   Name Primary?  Pain, joint, ankle and foot, left     Sprain of anterior talofibular ligament of right ankle, initial encounter Yes    Sprain of anterior talofibular ligament of left ankle, initial encounter             Plan:   · The patient verbalized understanding of exam findings and treatment plan  We engaged in the shared decision-making process and treatment options were discussed at length with the patient  Surgical and conservative management discussed today along with risks and benefits  · She has residual pain after a lateral ligament ankle sprain that occurred 9 months ago  She did not have any therapy after this injury  · Prescribed physical therapy today with focus on proprioception, balance and gait training, ROM, and strengthening  · If she has no relief after 6 weeks of PT, we will see her back and order an MRI of the left ankle to rule out peroneal tendon tear  · Recommend NSAIDs for pain relief as needed   Return in about 7 weeks (around 10/22/2019)  for reevaluation of left lateral ankle pain        History of Present Illness:   Chief Complaint:   Chief Complaint   Patient presents with    Left Ankle - Pain     Darryl Wilson is a 66 y o  female who is being seen for left ankle pain  Patient reports she had an inversion ankle injury 9 months ago  She was following with her podiatrist for lateral ankle pain as well as pain on the ball of her foot  She wore an ankle support sleeve but was not prescribed PT after her ankle sprain  She still has difficulty with stairs and walking on uneven ground  Pain is localized at lateral ankle with minimal radiating and described as sharp and severe  The pain on the ball of her foot is resolved  Patient denies numbness, tingling or radicular pain    Denies history of neuropathy  Patient does not smoke, does not have diabetes and does not take blood thinners  Patient denies family history of anesthesia complications and has not had any complications with anesthesia  Pain/symptom timing:  Worse during the day when active  Pain/symptom context:  Worse with activites and work  Pain/symptom modifying factors:  Rest makes better, activities make worse  Pain/symptom associated signs/symptoms: none    Prior treatment   · NSAIDsYes   · Injections No   · Bracing/Orthotics No    · Physical Therapy No     Orthopedic Surgical History:   See below     Past Medical, Surgical and Social History:  Past Medical History:  has a past medical history of Acne, Benign neoplasm of skin, Hypertension, Inflamed seborrheic keratosis, Irritable bowel syndrome, Malignant neoplasm of skin of face, Nonmelanoma skin cancer, Tachycardia, Telogen effluvium, Temporomandibular joint disorder, and Vertigo  Problem List: does not have any pertinent problems on file  Past Surgical History:  has a past surgical history that includes Appendectomy; Cholecystectomy; Hysterectomy; Rotator cuff repair; Colonoscopy (05/03/2019); Esophagogastroduodenoscopy (2009); and Malignant skin lesion excision  Family History: family history includes Cancer in her maternal grandmother, paternal aunt, and paternal grandmother; Diabetes type II in her maternal grandfather; Hypertension in her father and mother; Osteoporosis in her mother; Skin cancer in her father and mother; Uterine cancer in her maternal grandmother, paternal aunt, and paternal grandmother  Social History:  reports that she has never smoked  She has never used smokeless tobacco  She reports that she does not drink alcohol or use drugs    Current Medications: has a current medication list which includes the following prescription(s): beano, alum hydroxide-mag trisilicate, ascorbic acid, cholecalciferol, ciclopirox, diltiazem, estradiol, fluticasone, homeopathic products, lactase, magnesium hydroxide, minoxidil, multiple vitamins-minerals, nizatidine, polyethylene glycol-propylene glycol, and premarin  Allergies: is allergic to cortisone; acebutolol; amlodipine; atenolol; azithromycin; banana; bisphosphonates; candesartan; clarithromycin; erythromycin; fosinopril; irbesartan; levofloxacin; losartan; methylprednisolone; metoprolol; nisoldipine; olmesartan; propranolol; sulfamethoxazole-trimethoprim; timolol; and penicillins  Review of Systems:  General- denies fever/chills  HEENT- denies hearing loss or sore throat  Eyes- denies eye pain or visual disturbances, denies red eyes  Respiratory- denies cough or SOB  Cardio- denies chest pain or palpitations  GI- denies abdominal pain  Endocrine- denies urinary frequency  Urinary- denies pain with urination  Musculoskeletal- Negative except noted above  Skin- denies rashes or wounds  Neurological- denies dizziness or headache  Psychiatric- denies anxiety or difficulty concentrating    Physical Exam:   BP (!) 178/76 (BP Location: Left arm, Patient Position: Sitting, Cuff Size: Adult)   Pulse 81   Ht 5' 2" (1 575 m)   Wt 51 7 kg (114 lb)   LMP  (LMP Unknown)   BMI 20 85 kg/m²   General/Constitutional: No apparent distress: well-nourished and well developed  Eyes: normal ocular motion  Lymphatic: No appreciable lymphadenopathy  Respiratory: Non-labored breathing  Vascular: No edema, swelling or tenderness, except as noted in detailed exam   Integumentary: No impressive skin lesions present, except as noted in detailed exam   Neuro: No ataxia or tremors noted  Psych: Normal mood and affect, oriented to person, place and time  Appropriate affect  Musculoskeletal: Normal, except as noted in detailed exam and in HPI      Examination    Left    Gait Normal   Musculoskeletal Tender to palpation at ATFL and area along the peroneal tendons    Skin Normal       Nails Normal    Range of Motion  10 degrees dorsiflexion, 50 degrees plantarflexion  Subtalar motion: full    Stability Stable    Muscle Strength 5/5 tibialis anterior  5/5 gastrocnemius-soleus  5/5 posterior tibialis  5/5 peroneal/eversion strength  No pain with resisted eversion of the peroneus brevis and longus  5/5 EHL  5/5 FHL    Neurologic Normal    Sensation Intact to light touch throughout sural, saphenous, superficial peroneal, deep peroneal and medial/lateral plantar nerve distributions  [place] 5 07 filament (10g) testing deferred  Cardiovascular Brisk capillary refill < 2 seconds,intact DP and PT pulses    Special Tests Anterior drawer test is equal to the contralateral side  Definite end point with anterior  30 degrees of plantar flexion (CFL intact)       Imaging Studies:   3 views of the right ankle were taken, reviewed and interpreted independently that demonstrate no evidence of fracture, dislocation, arthritic changes or any other bony abnormalities     Scribe Attestation    I,:   Aayush Guerrero PA-C am acting as a scribe while in the presence of the attending physician :        I,:   Nik Strickland MD personally performed the services described in this documentation    as scribed in my presence :              Melda Fulling Lachman, MD  Foot & Ankle Surgery   Department of [address] [place] [address]      I personally performed the service  Melda Fulling Lachman, MD

## 2019-09-05 DIAGNOSIS — S93.492A SPRAIN OF ANTERIOR TALOFIBULAR LIGAMENT OF LEFT ANKLE, INITIAL ENCOUNTER: Primary | ICD-10-CM

## 2019-09-05 NOTE — TELEPHONE ENCOUNTER
[facility] has not received the corrected physical therapy script for Brigid's left ankle not right  Please fix and fax to [phone]    Thank you

## 2019-09-10 ENCOUNTER — OFFICE VISIT (OUTPATIENT)
Dept: DERMATOLOGY | Facility: CLINIC | Age: 74
End: 2019-09-10
Payer: MEDICARE

## 2019-09-10 DIAGNOSIS — L82.0 INFLAMED SEBORRHEIC KERATOSIS: Primary | ICD-10-CM

## 2019-09-10 DIAGNOSIS — Z85.828 HISTORY OF SKIN CANCER: ICD-10-CM

## 2019-09-10 DIAGNOSIS — L82.1 SEBORRHEIC KERATOSIS: ICD-10-CM

## 2019-09-10 DIAGNOSIS — Z13.89 SCREENING FOR SKIN CONDITION: ICD-10-CM

## 2019-09-10 PROCEDURE — 99213 OFFICE O/P EST LOW 20 MIN: CPT | Performed by: DERMATOLOGY

## 2019-09-10 PROCEDURE — 17110 DESTRUCTION B9 LES UP TO 14: CPT | Performed by: DERMATOLOGY

## 2019-09-10 NOTE — PROGRESS NOTES
[address]  [address] [facility] [address]  [phone]  [phone]     MRN: [id number] : 1945  Encounter: [id number]  Patient Information: Dorothea Ojeda  Chief complaint:  Six-month checkup    History of present illness:  66-year-old female with previous history of skin cancer presents for overall checkup concerned regarding growth that is bothersome on the forehead that is irritated also 1 left temple no other concerns noted  Past Medical History:   Diagnosis Date    Acne     Benign neoplasm of skin     Hypertension     Inflamed seborrheic keratosis     Irritable bowel syndrome     Malignant neoplasm of skin of face     Nonmelanoma skin cancer     Last Assessed:17    Tachycardia     Telogen effluvium     Temporomandibular joint disorder     Vertigo      Past Surgical History:   Procedure Laterality Date    APPENDECTOMY      CHOLECYSTECTOMY      COLONOSCOPY  2019    ESOPHAGOGASTRODUODENOSCOPY  2009    HYSTERECTOMY      MALIGNANT SKIN LESION EXCISION      Excision of Lesion Face Malignant-2004 [facility] Forehead    ROTATOR CUFF REPAIR       Social History   Social History     Substance and Sexual Activity   Alcohol Use No     Social History     Substance and Sexual Activity   Drug Use No     Social History     Tobacco Use   Smoking Status Never Smoker   Smokeless Tobacco Never Used     Family History   Problem Relation Age of Onset    Hypertension Mother     Osteoporosis Mother     Skin cancer Mother     Hypertension Father     Skin cancer Father     Cancer Maternal Grandmother     Uterine cancer Maternal Grandmother     Cancer Paternal Grandmother     Uterine cancer Paternal Grandmother     Cancer Paternal Aunt     Uterine cancer Paternal Aunt     Diabetes type II Maternal Grandfather      Meds/Allergies   Allergies   Allergen Reactions    Cortisone Hypertension, Other (See Comments), Shortness Of Breath and Tachycardia    Acebutolol     Amlodipine     Atenolol     Azithromycin     Banana GI Intolerance    Bisphosphonates      Annotation - [id number]: iriitis    Candesartan     Clarithromycin     Erythromycin     Fosinopril     Irbesartan     Levofloxacin     Losartan     Methylprednisolone Hypertension and Tachycardia    Metoprolol     Nisoldipine     Olmesartan     Propranolol     Sulfamethoxazole-Trimethoprim Nausea Only    Timolol     Penicillins Rash       Meds:  Prior to Admission medications    Medication Sig Start Date End Date Taking?  Authorizing Provider   Alpha-D-Galactosidase Lauralyn Keto) TABS Take by mouth   Yes Historical Provider, MD   Alum Hydroxide-Mag Trisilicate (GAVISCON) 40-00 0 MG CHEW Chew   Yes Historical Provider, MD   ascorbic acid (VITAMIN C) 500 mg tablet Take by mouth   Yes Historical Provider, MD   Cholecalciferol 400 units TABS Take by mouth   Yes Historical Provider, MD   ciclopirox (PENLAC) 8 % solution Apply topically   Yes Historical Provider, MD   diltiazem (DILACOR XR) 240 MG 24 hr capsule Take 1 capsule (240 mg total) by mouth daily 6/27/19  Yes Donte Blanca MD   estradiol (ESTRACE) 0 1 mg/g vaginal cream Insert 1 g into the vagina once a week 5/8/19  Yes Sandy Herr MD   fluticasone Reynaldo Jane) 50 mcg/act nasal spray 2 sprays into each nostril daily 8/20/18  Yes Donte Blanca MD   Homeopathic Products [facility] ALLERGY RELIEF NA) into each nostril   Yes Historical Provider, MD   lactase (LACTAID) 3,000 units tablet Take by mouth   Yes Historical Provider, MD   Magnesium Hydroxide (MILK OF MAGNESIA PO) Take by mouth 3 (three) times a week   Yes Historical Provider, MD   Minoxidil (ROGAINE WOMENS) 5 % FOAM Apply topically   Yes Historical Provider, MD   Multiple Vitamins-Minerals (CENTRUM SILVER ULTRA WOMENS PO) Take by mouth   Yes Historical Provider, MD   nizatidine (AXID) 150 MG capsule Take 1 capsule by mouth every 12 (twelve) hours   Yes Historical Provider, MD polyethylene glycol-propylene glycol (SYSTANE) 0 4-0 3 %    Yes Historical Provider, MD   PREMARIN vaginal cream  8/15/18  Yes Historical Provider, MD       Subjective:     Review of Systems:    General: negative for - chills, fatigue, fever,  weight gain or weight loss  Psychological: negative for - anxiety, behavioral disorder, concentration difficulties, decreased libido, depression, irritability, memory difficulties, mood swings, sleep disturbances or suicidal ideation  ENT: negative for - hearing difficulties , nasal congestion, nasal discharge, oral lesions, sinus pain, sneezing, sore throat  Allergy and Immunology: negative for - hives, insect bite sensitivity,  Hematological and Lymphatic: negative for - bleeding problems, blood clots,bruising, swollen lymph nodes  Endocrine: negative for - hair pattern changes, hot flashes, malaise/lethargy, mood swings, palpitations, polydipsia/polyuria, skin changes, temperature intolerance or unexpected weight change  Respiratory: negative for - cough, hemoptysis, orthopnea, shortness of breath, or wheezing  Cardiovascular: negative for - chest pain, dyspnea on exertion, edema,  Gastrointestinal: negative for - abdominal pain, nausea/vomiting  Genito-Urinary: negative for - dysuria, incontinence, irregular/heavy menses or urinary frequency/urgency  Musculoskeletal: negative for - gait disturbance, joint pain, joint stiffness, joint swelling, muscle pain, muscular weakness  Dermatological:  As in HPI  Neurological: negative for confusion, dizziness, headaches, impaired coordination/balance, memory loss, numbness/tingling, seizures, speech problems, tremors or weakness       Objective:   LMP  (LMP Unknown)     Physical Exam:    General Appearance:    Alert, cooperative, no distress   Head:    Normocephalic, without obvious abnormality, atraumatic           Skin:   A full skin exam was performed including scalp, head scalp, eyes, ears, nose, lips, neck, chest, axilla, abdomen, back, buttocks, bilateral upper extremities, bilateral lower extremities, hands, feet, fingers, toes, fingernails, and toenails inflamed keratotic papule noted on left forehead and left temple normal keratotic papules greasy stuck on appearance previous sites of skin cancer well healed without recurrence nothing else remarkable noted on exam  Cryotherapy Procedure Note    Pre-operative Diagnosis: inflamed seborrheic keratosis    Plan:  1  Instructed to keep the area dry and clean thereafter  Apply petrolatum if area gets crusty  2  Recommended that the patient use acetaminophen  as needed for pain  Locations:  Left forehead left temple  Indications: Destruction of inflamed keratoses x2    Patient informed of risks (permanent scarring, infection, light or dark discoloration, bleeding, infection, weakness, numbness and recurrence of the lesion) and benefits of the procedure and verbal informed consent obtained  The areas are treated with liquid nitrogen therapy, frozen until ice ball extended 2 mm beyond lesion, allowed to thaw, and treated again  The patient tolerated procedure well  The patient was instructed on post-op care, warned that there may be blister formation, redness and pain  Recommend OTC analgesia as needed for pain  Condition:  Stable    Complications:  none  Assessment:     1  Inflamed seborrheic keratosis     2  Seborrheic keratosis     3  Screening for skin condition     4   History of skin cancer           Plan:   Inflamed keratosis lesion treated because the patient concern and irritation  Seborrheic keratosis patient reassured these are normal growths we acquire with age no treatment needed  History of skin cancer in no recurrence nothing else atypical sunblock recommended follow-up in 6 months  Screening for dermatologic disorders nothing else of concern noted on complete exam follow-up in 6 months    Anabelle Saavedar MD  9/10/2019,11:06 AM    Portions of the record may have been created with voice recognition software   Occasional wrong word or "sound a like" substitutions may have occurred due to the inherent limitations of voice recognition software   Read the chart carefully and recognize, using context, where substitutions have occurred

## 2019-09-10 NOTE — PATIENT INSTRUCTIONS
Inflamed keratosis lesion treated because the patient concern and irritation  Seborrheic keratosis patient reassured these are normal growths we acquire with age no treatment needed  History of skin cancer in no recurrence nothing else atypical sunblock recommended follow-up in 6 months  Screening for dermatologic disorders nothing else of concern noted on complete exam follow-up in 6 months  Treatment with Cryotherapy    The doctor has treated your skin with nitrogen, which is 320 degrees Fahrenheit below zero  He has given the treated area "frostbite "    Stinging should subside within a few hours  You can take Tylenol for pain, if needed  Over the next few days, it is normal if the area becomes reddened, a blood blister, or swollen with fluid  If the lesion treated was near the eye - you could get a swollen eye over the next few days  Do not panic! This is all temporary, and will resolve with time  There is no special treatment - just keep the area clean  Makeup and BandAids can be used, if preferred  When the area starts to dry up and peel off, using Vaseline can help healing  It usually takes up to a month for it to heal   Some lesions are recurrent and may require repeat treatments  If a lesion has not healed in one month, please don't hesitate to contact us  If you have any further questions that are not answered here, please call us  [phone]    Thank you for allowing us to care for you

## 2019-09-12 DIAGNOSIS — J30.9 ALLERGIC RHINITIS, UNSPECIFIED SEASONALITY, UNSPECIFIED TRIGGER: ICD-10-CM

## 2019-09-12 RX ORDER — FLUTICASONE PROPIONATE 50 MCG
SPRAY, SUSPENSION (ML) NASAL
Qty: 16 ML | Refills: 3 | OUTPATIENT
Start: 2019-09-12

## 2019-09-16 ENCOUNTER — TRANSCRIBE ORDERS (OUTPATIENT)
Dept: ADMINISTRATIVE | Facility: HOSPITAL | Age: 74
End: 2019-09-16

## 2019-09-16 DIAGNOSIS — Z12.39 BREAST SCREENING: ICD-10-CM

## 2019-09-16 DIAGNOSIS — Z12.39 BREAST SCREENING, UNSPECIFIED: Primary | ICD-10-CM

## 2019-10-23 ENCOUNTER — HOSPITAL ENCOUNTER (OUTPATIENT)
Dept: RADIOLOGY | Facility: MEDICAL CENTER | Age: 74
Discharge: HOME | End: 2019-10-23
Payer: MEDICARE

## 2019-10-23 VITALS — BODY MASS INDEX: 20.98 KG/M2 | WEIGHT: 114 LBS | HEIGHT: 62 IN

## 2019-10-23 DIAGNOSIS — Z12.39 BREAST SCREENING: ICD-10-CM

## 2019-10-23 PROCEDURE — 77067 SCR MAMMO BI INCL CAD: CPT

## 2019-10-23 PROCEDURE — 77063 BREAST TOMOSYNTHESIS BI: CPT

## 2019-10-29 ENCOUNTER — OFFICE VISIT (OUTPATIENT)
Dept: OBGYN CLINIC | Facility: CLINIC | Age: 74
End: 2019-10-29
Payer: MEDICARE

## 2019-10-29 VITALS
SYSTOLIC BLOOD PRESSURE: 162 MMHG | BODY MASS INDEX: 21.53 KG/M2 | DIASTOLIC BLOOD PRESSURE: 73 MMHG | HEIGHT: 62 IN | HEART RATE: 78 BPM | WEIGHT: 117 LBS

## 2019-10-29 DIAGNOSIS — S93.492D SPRAIN OF ANTERIOR TALOFIBULAR LIGAMENT OF LEFT ANKLE, SUBSEQUENT ENCOUNTER: Primary | ICD-10-CM

## 2019-10-29 PROCEDURE — 99213 OFFICE O/P EST LOW 20 MIN: CPT | Performed by: ORTHOPAEDIC SURGERY

## 2019-10-29 RX ORDER — DILTIAZEM HYDROCHLORIDE 240 MG/1
CAPSULE, COATED, EXTENDED RELEASE ORAL
Refills: 0 | COMMUNITY
Start: 2019-10-10 | End: 2020-01-28 | Stop reason: SDUPTHER

## 2019-10-29 NOTE — PROGRESS NOTES
CORTNEY Pan  Attending, Orthopaedic Surgery  [facility] [address] [facility]      ORTHOPAEDIC FOOT AND ANKLE CLINIC VISIT     Assessment:     Encounter Diagnosis   Name Primary?  Sprain of anterior talofibular ligament of left ankle, subsequent encounter Yes            Plan:   · The patient verbalized understanding of exam findings and treatment plan  We engaged in the shared decision-making process and treatment options were discussed at length with the patient  Surgical and conservative management discussed today along with risks and benefits  · She has made a complete recover with PT   · She has returned to all of her activities without issue  · She should continue PT and advance to an HEP  · See back on an as needed basis  History of Present Illness:   Chief Complaint:   Chief Complaint   Patient presents with    Left Ankle - Follow-up     Imer Galeano is a 66 y o  female who is being seen in follow-up for left ankle sprain  When we last saw she we recommended PT and WBAT  Pain has completely improved  No residual pain     Pain/symptom timing:  Worse during the day when active  Pain/symptom context:  Worse with activites and work  Pain/symptom modifying factors:  Rest makes better, activities make worse  Pain/symptom associated signs/symptoms: none    Prior treatment   · NSAIDsYes   · Injections No   · Bracing/Orthotics Yes    · Physical Therapy Yes     Orthopedic Surgical History:   See above    Past Medical, Surgical and Social History:  Past Medical History:  has a past medical history of Acne, Benign neoplasm of skin, Hypertension, Inflamed seborrheic keratosis, Irritable bowel syndrome, Malignant neoplasm of skin of face, Nonmelanoma skin cancer, Tachycardia, Telogen effluvium, Temporomandibular joint disorder, and Vertigo  Problem List: does not have any pertinent problems on file    Past Surgical History:  has a past surgical history that includes Appendectomy; Cholecystectomy; Rotator cuff repair; Colonoscopy (05/03/2019); Esophagogastroduodenoscopy (2009); Malignant skin lesion excision; Hysterectomy (1987); and Oophorectomy (Bilateral, 1987)  Family History: family history includes Cancer in her maternal grandmother, paternal aunt, and paternal grandmother; Diabetes type II in her maternal grandfather; Hypertension in her father and mother; No Known Problems in her daughter, maternal aunt, paternal aunt, and paternal aunt; Osteoporosis in her mother; Skin cancer in her father and mother; Skin cancer (age of onset: 68) in her sister; Uterine cancer (age of onset: 34) in her maternal grandmother; Uterine cancer (age of onset: 54) in her paternal aunt; Uterine cancer (age of onset: 72) in her paternal grandmother  Social History:  reports that she has never smoked  She has never used smokeless tobacco  She reports that she does not drink alcohol or use drugs  Current Medications: has a current medication list which includes the following prescription(s): beano, alum hydroxide-mag trisilicate, ascorbic acid, cholecalciferol, ciclopirox, diltiazem, diltiazem, estradiol, fluticasone, homeopathic products, lactase, magnesium hydroxide, minoxidil, multiple vitamins-minerals, nizatidine, polyethylene glycol-propylene glycol, and premarin  Allergies: is allergic to cortisone; acebutolol; amlodipine; atenolol; azithromycin; banana; bisphosphonates; candesartan; clarithromycin; erythromycin; fosinopril; irbesartan; levofloxacin; losartan; methylprednisolone; metoprolol; nisoldipine; olmesartan; propranolol; sulfamethoxazole-trimethoprim; timolol; and penicillins       Review of Systems:  General- denies fever/chills  HEENT- denies hearing loss or sore throat  Eyes- denies eye pain or visual disturbances, denies red eyes  Respiratory- denies cough or SOB  Cardio- denies chest pain or palpitations  GI- denies abdominal pain  Endocrine- denies urinary frequency  Urinary- denies pain with urination  Musculoskeletal- Negative except noted above  Skin- denies rashes or wounds  Neurological- denies dizziness or headache  Psychiatric- denies anxiety or difficulty concentrating    Physical Exam:   /73 (BP Location: Left arm, Patient Position: Sitting, Cuff Size: Adult)   Pulse 78   Ht 5' 2" (1 575 m)   Wt 53 1 kg (117 lb)   LMP  (LMP Unknown)   BMI 21 40 kg/m²   General/Constitutional: No apparent distress: well-nourished and well developed  Eyes: normal ocular motion  Lymphatic: No appreciable lymphadenopathy  Respiratory: Non-labored breathing  Vascular: No edema, swelling or tenderness, except as noted in detailed exam   Integumentary: No impressive skin lesions present, except as noted in detailed exam   Neuro: No ataxia or tremors noted  Psych: Normal mood and affect, oriented to person, place and time  Appropriate affect  Musculoskeletal: Normal, except as noted in detailed exam and in HPI  Examination    Left    Gait Normal   Musculoskeletal Tender to palpation at ATFL though slight    Skin Normal    Nails Normal    Range of Motion  20 degrees dorsiflexion, 30 degrees plantarflexion  Subtalar motion: normal    Stability Stable    Muscle Strength 5/5 tibialis anterior  5/5 gastrocnemius-soleus  5/5 posterior tibialis  5/5 peroneal/eversion strength  5/5 EHL  5/5 FHL    Neurologic Normal    Sensation Intact to light touch throughout sural, saphenous, superficial peroneal, deep peroneal and medial/lateral plantar nerve distributions  [place] 5 07 filament (10g) testing deferred  Cardiovascular Brisk capillary refill < 2 seconds,intact DP and PT pulses    Special Tests None      Imaging Studies:   No new imaging        James R Lachman, MD  Foot & Ankle Surgery   Department of [address] [place] [address]      I personally performed the service  Wendelyn Punches Lachman, MD

## 2019-11-13 ENCOUNTER — TRANSCRIBE ORDERS (OUTPATIENT)
Dept: ADMINISTRATIVE | Facility: HOSPITAL | Age: 74
End: 2019-11-13

## 2019-11-13 DIAGNOSIS — R93.5 ABNORMAL ABDOMINAL ULTRASOUND: Primary | ICD-10-CM

## 2019-12-04 DIAGNOSIS — I10 ESSENTIAL HYPERTENSION: ICD-10-CM

## 2019-12-05 DIAGNOSIS — I10 ESSENTIAL HYPERTENSION: ICD-10-CM

## 2019-12-05 RX ORDER — DILTIAZEM HYDROCHLORIDE 240 MG/1
240 CAPSULE, EXTENDED RELEASE ORAL DAILY
Qty: 30 CAPSULE | Refills: 5 | Status: SHIPPED | OUTPATIENT
Start: 2019-12-05 | End: 2019-12-17 | Stop reason: SDUPTHER

## 2019-12-05 RX ORDER — DILTIAZEM HYDROCHLORIDE 240 MG/1
240 CAPSULE, EXTENDED RELEASE ORAL DAILY
Qty: 30 CAPSULE | Refills: 5 | Status: SHIPPED | OUTPATIENT
Start: 2019-12-05 | End: 2019-12-05 | Stop reason: SDUPTHER

## 2019-12-05 NOTE — TELEPHONE ENCOUNTER
Dr Naveed Crawley  Can you please reorder it to print   It was thrown away this morning , the task was not read and assumed it was printed in error

## 2019-12-09 ENCOUNTER — HOSPITAL ENCOUNTER (OUTPATIENT)
Dept: MRI IMAGING | Facility: HOSPITAL | Age: 74
Discharge: HOME | End: 2019-12-09
Payer: MEDICARE

## 2019-12-09 DIAGNOSIS — R93.5 ABNORMAL ABDOMINAL ULTRASOUND: ICD-10-CM

## 2019-12-09 PROCEDURE — 74183 MRI ABD W/O CNTR FLWD CNTR: CPT

## 2019-12-09 PROCEDURE — A9585 GADOBUTROL INJECTION: HCPCS | Performed by: RADIOLOGY

## 2019-12-09 RX ADMIN — GADOBUTROL 5 ML: 604.72 INJECTION INTRAVENOUS at 12:51

## 2019-12-17 DIAGNOSIS — I10 ESSENTIAL HYPERTENSION: ICD-10-CM

## 2019-12-17 NOTE — TELEPHONE ENCOUNTER
We printed one for pt and mailed it over a week ago, and pt has not rec'd yet  Can we please send to [facility]

## 2019-12-20 RX ORDER — DILTIAZEM HYDROCHLORIDE 240 MG/1
240 CAPSULE, EXTENDED RELEASE ORAL DAILY
Qty: 30 CAPSULE | Refills: 5 | Status: SHIPPED | OUTPATIENT
Start: 2019-12-20 | End: 2020-05-19 | Stop reason: SDUPTHER

## 2019-12-24 ENCOUNTER — TELEPHONE (OUTPATIENT)
Dept: OTHER | Facility: OTHER | Age: 74
End: 2019-12-24

## 2019-12-27 ENCOUNTER — OFFICE VISIT (OUTPATIENT)
Dept: URGENT CARE | Facility: MEDICAL CENTER | Age: 74
End: 2019-12-27
Payer: MEDICARE

## 2019-12-27 ENCOUNTER — TELEPHONE (OUTPATIENT)
Dept: FAMILY MEDICINE CLINIC | Facility: MEDICAL CENTER | Age: 74
End: 2019-12-27

## 2019-12-27 VITALS
WEIGHT: 113 LBS | RESPIRATION RATE: 16 BRPM | HEART RATE: 96 BPM | HEIGHT: 62 IN | BODY MASS INDEX: 20.8 KG/M2 | SYSTOLIC BLOOD PRESSURE: 138 MMHG | TEMPERATURE: 97.7 F | OXYGEN SATURATION: 100 % | DIASTOLIC BLOOD PRESSURE: 70 MMHG

## 2019-12-27 DIAGNOSIS — J02.0 STREP PHARYNGITIS: Primary | ICD-10-CM

## 2019-12-27 DIAGNOSIS — J02.9 ACUTE VIRAL PHARYNGITIS: ICD-10-CM

## 2019-12-27 LAB — S PYO AG THROAT QL: POSITIVE

## 2019-12-27 PROCEDURE — 99213 OFFICE O/P EST LOW 20 MIN: CPT | Performed by: PHYSICIAN ASSISTANT

## 2019-12-27 PROCEDURE — 87880 STREP A ASSAY W/OPTIC: CPT | Performed by: PHYSICIAN ASSISTANT

## 2019-12-27 PROCEDURE — G0463 HOSPITAL OUTPT CLINIC VISIT: HCPCS | Performed by: PHYSICIAN ASSISTANT

## 2019-12-27 RX ORDER — AZITHROMYCIN 250 MG/1
TABLET, FILM COATED ORAL
Qty: 6 TABLET | Refills: 0 | Status: SHIPPED | OUTPATIENT
Start: 2019-12-27 | End: 2019-12-31

## 2019-12-27 NOTE — PATIENT INSTRUCTIONS
Strep pharyngitis  Patient states she has taken zithromax with no side effects in the past  z pack as directed  Follow up with PCP in 3-5 days  Proceed to  ER if symptoms worsen  Pharyngitis   WHAT YOU NEED TO KNOW:   Pharyngitis, or sore throat, is inflammation of the tissues and structures in your pharynx (throat)  Pharyngitis is most often caused by bacteria  It may also be caused by a cold or flu virus  Other causes include smoking, allergies, or acid reflux  DISCHARGE INSTRUCTIONS:   Call 911 for any of the following:   · You have trouble breathing or swallowing because your throat is swollen or sore  Return to the emergency department if:   · You are drooling because it hurts too much to swallow  · Your fever is higher than 102? F (39?C) or lasts longer than 3 days  · You are confused  · You taste blood in your throat  Contact your healthcare provider if:   · Your throat pain gets worse  · You have a painful lump in your throat that does not go away after 5 days  · Your symptoms do not improve after 5 days  · You have questions or concerns about your condition or care  Medicines:  Viral pharyngitis will go away on its own without treatment  Your sore throat should start to feel better in 3 to 5 days for both viral and bacterial infections  You may need any of the following:  · Antibiotics  treat a bacterial infection  · NSAIDs , such as ibuprofen, help decrease swelling, pain, and fever  NSAIDs can cause stomach bleeding or kidney problems in certain people  If you take blood thinner medicine, always ask your healthcare provider if NSAIDs are safe for you  Always read the medicine label and follow directions  · Acetaminophen  decreases pain and fever  It is available without a doctor's order  Ask how much to take and how often to take it  Follow directions  Acetaminophen can cause liver damage if not taken correctly  · Take your medicine as directed    Contact your healthcare provider if you think your medicine is not helping or if you have side effects  Tell him or her if you are allergic to any medicine  Keep a list of the medicines, vitamins, and herbs you take  Include the amounts, and when and why you take them  Bring the list or the pill bottles to follow-up visits  Carry your medicine list with you in case of an emergency  Manage your symptoms:   · Gargle salt water  Mix ¼ teaspoon salt in an 8 ounce glass of warm water and gargle  This may help decrease swelling in your throat  · Drink liquids as directed  You may need to drink more liquids than usual  Liquids may help soothe your throat and prevent dehydration  Ask how much liquid to drink each day and which liquids are best for you  · Use a cool-steam humidifier  to help moisten the air in your room and calm your cough  · Soothe your throat  with cough drops, ice, soft foods, or popsicles  Prevent the spread of pharyngitis:  Cover your mouth and nose when you cough or sneeze  Do not share food or drinks  Wash your hands often  Use soap and water  If soap and water are unavailable, use an alcohol based hand   Follow up with your healthcare provider as directed:  Write down your questions so you remember to ask them during your visits  © 2017 [employer] Information is for End User's use only and may not be sold, redistributed or otherwise used for commercial purposes  All illustrations and images included in CareNotes® are the copyrighted property of [employer]  or Keshav Samson  The above information is an  only  It is not intended as medical advice for individual conditions or treatments  Talk to your doctor, nurse or pharmacist before following any medical regimen to see if it is safe and effective for you

## 2019-12-27 NOTE — PROGRESS NOTES
[address]        NAME: Arely Guthrie is a 66 y o  female  : 1945    MRN: [id number]  DATE: 2019  TIME: 12:07 PM    Assessment and Plan   Strep pharyngitis [J02 0]  1  Strep pharyngitis  POCT rapid strepA   2  Acute viral pharyngitis           Patient Instructions     Strep pharyngitis  Patient states she has taken zithromax with no side effects in the past  z pack as directed  Follow up with PCP in 3-5 days  Proceed to  ER if symptoms worsen  Chief Complaint     Chief Complaint   Patient presents with    Sore Throat     x 1 week +         History of Present Illness       65 y/o female presents c/o sore throat x 1 week  Denies fever, chills, nausea, vomiting  Patient states that she has taken zithromax in the past with no allergy      Review of Systems   Review of Systems   Constitutional: Negative for activity change, appetite change, chills, diaphoresis, fatigue and fever  HENT: Positive for ear pain, rhinorrhea and sore throat  Negative for congestion, ear discharge, facial swelling, hearing loss, mouth sores, nosebleeds, postnasal drip, sinus pressure, sinus pain, sneezing and voice change  Respiratory: Negative for apnea, cough, choking, chest tightness, shortness of breath, wheezing and stridor  Cardiovascular: Negative            Current Medications       Current Outpatient Medications:     ascorbic acid (VITAMIN C) 500 mg tablet, Take by mouth, Disp: , Rfl:     Cholecalciferol 400 units TABS, Take by mouth, Disp: , Rfl:     ciclopirox (PENLAC) 8 % solution, Apply topically, Disp: , Rfl:     diltiazem (CARDIZEM CD) 240 mg 24 hr capsule, , Disp: , Rfl: 0    estradiol (ESTRACE) 0 1 mg/g vaginal cream, Insert 1 g into the vagina once a week, Disp: 45 g, Rfl: 3    fluticasone (FLONASE) 50 mcg/act nasal spray, 2 sprays into each nostril daily, Disp: 16 g, Rfl: 3    Multiple Vitamins-Minerals (CENTRUM SILVER ULTRA WOMENS PO), Take by mouth, Disp: , Rfl:    nizatidine (AXID) 150 MG capsule, Take 1 capsule by mouth every 12 (twelve) hours, Disp: , Rfl:     PREMARIN vaginal cream, , Disp: , Rfl:     Alpha-D-Galactosidase (BEANO) TABS, Take by mouth, Disp: , Rfl:     Alum Hydroxide-Mag Trisilicate (GAVISCON) 45-60 3 MG CHEW, Chew, Disp: , Rfl:     diltiazem (DILACOR XR) 240 MG 24 hr capsule, Take 1 capsule (240 mg total) by mouth daily, Disp: 30 capsule, Rfl: 5    Homeopathic Products (ZICAM ALLERGY RELIEF NA), into each nostril, Disp: , Rfl:     lactase (LACTAID) 3,000 units tablet, Take by mouth, Disp: , Rfl:     Minoxidil (ROGAINE WOMENS) 5 % FOAM, Apply topically, Disp: , Rfl:     polyethylene glycol-propylene glycol (SYSTANE) 0 4-0 3 %, , Disp: , Rfl:     Current Allergies     Allergies as of 12/27/2019 - Reviewed 12/27/2019   Allergen Reaction Noted    Cortisone Hypertension, Other (See Comments), Shortness Of Breath, and Tachycardia     Acebutolol  01/29/2014    Amlodipine  01/29/2014    Atenolol  01/29/2014    Azithromycin  01/29/2014    Banana GI Intolerance 05/03/2019    Bisphosphonates  05/17/2012    Candesartan  01/29/2014    Clarithromycin  01/29/2014    Erythromycin  01/29/2014    Fosinopril  01/29/2014    Irbesartan  01/29/2014    Levofloxacin  01/29/2014    Losartan  01/29/2014    Methylprednisolone Hypertension and Tachycardia 04/03/2014    Metoprolol  01/29/2014    Nisoldipine  01/29/2014    Olmesartan  01/29/2014    Propranolol  01/29/2014    Sulfamethoxazole-trimethoprim Nausea Only 01/29/2014    Timolol  01/29/2014    Penicillins Rash 05/17/2012            The following portions of the patient's history were reviewed and updated as appropriate: allergies, current medications, past family history, past medical history, past social history, past surgical history and problem list      Past Medical History:   Diagnosis Date    Acne     Benign neoplasm of skin     Hypertension     Inflamed seborrheic keratosis     Irritable bowel syndrome     Malignant neoplasm of skin of face     Nonmelanoma skin cancer     Last Assessed:6/27/17    Tachycardia     Telogen effluvium     Temporomandibular joint disorder     Vertigo        Past Surgical History:   Procedure Laterality Date    APPENDECTOMY      CHOLECYSTECTOMY      COLONOSCOPY  05/03/2019    ESOPHAGOGASTRODUODENOSCOPY  2009    HYSTERECTOMY  1987    MALIGNANT SKIN LESION EXCISION      Excision of Lesion Face Malignant-9/14/2004 BCC Forehead    OOPHORECTOMY Bilateral 1987    ROTATOR CUFF REPAIR         Family History   Problem Relation Age of Onset    Hypertension Mother     Osteoporosis Mother     Skin cancer Mother     Hypertension Father     Skin cancer Father     Cancer Maternal Grandmother     Uterine cancer Maternal Grandmother 34    Cancer Paternal Grandmother     Uterine cancer Paternal Grandmother 72    Cancer Paternal Aunt     Uterine cancer Paternal Aunt 53    Diabetes type II Maternal Grandfather     Skin cancer Sister 68    No Known Problems Daughter     No Known Problems Paternal Aunt     No Known Problems Paternal Aunt     No Known Problems Maternal Aunt          Medications have been verified  Objective   /70   Pulse 96   Temp 97 7 °F (36 5 °C)   Resp 16   Ht 5' 2" (1 575 m)   Wt 51 3 kg (113 lb)   LMP  (LMP Unknown)   SpO2 100%   BMI 20 67 kg/m²        Physical Exam     Physical Exam   Constitutional: She appears well-developed and well-nourished  No distress  HENT:   Head: Normocephalic and atraumatic  Right Ear: Hearing, tympanic membrane, external ear and ear canal normal    Left Ear: Hearing, tympanic membrane, external ear and ear canal normal    Mouth/Throat: Uvula is midline and mucous membranes are normal  No trismus in the jaw  No uvula swelling  Posterior oropharyngeal erythema present  No oropharyngeal exudate or tonsillar abscesses  Neck: Normal range of motion  Neck supple     Cardiovascular: Normal rate, regular rhythm, normal heart sounds and intact distal pulses  Pulmonary/Chest: Effort normal and breath sounds normal  No stridor  No respiratory distress  She has no wheezes  She has no rhonchi  She has no rales  She exhibits no tenderness  Lymphadenopathy:     She has cervical adenopathy  Skin: She is not diaphoretic

## 2019-12-27 NOTE — TELEPHONE ENCOUNTER
C/o sore throat for a week, last couple days getting worse  [address] daughter Dx with strep last week   Advised patient she should be seen however we do not have any appt openings today   We have one physician in the [facility] and the schedule is booked

## 2020-01-28 ENCOUNTER — OFFICE VISIT (OUTPATIENT)
Dept: FAMILY MEDICINE CLINIC | Facility: MEDICAL CENTER | Age: 75
End: 2020-01-28
Payer: MEDICARE

## 2020-01-28 VITALS
HEART RATE: 78 BPM | WEIGHT: 115.13 LBS | BODY MASS INDEX: 21.19 KG/M2 | HEIGHT: 62 IN | SYSTOLIC BLOOD PRESSURE: 168 MMHG | DIASTOLIC BLOOD PRESSURE: 82 MMHG | RESPIRATION RATE: 16 BRPM

## 2020-01-28 DIAGNOSIS — I10 ESSENTIAL HYPERTENSION: Primary | ICD-10-CM

## 2020-01-28 DIAGNOSIS — R63.4 WEIGHT LOSS: ICD-10-CM

## 2020-01-28 DIAGNOSIS — E03.8 SUBCLINICAL HYPOTHYROIDISM: ICD-10-CM

## 2020-01-28 PROCEDURE — 99214 OFFICE O/P EST MOD 30 MIN: CPT | Performed by: FAMILY MEDICINE

## 2020-01-28 NOTE — PROGRESS NOTES
Sergio Hunt is here for 6 month checkup  She is seeing a chiropractor for her neck pain  Thinks it started after curgonzález terence hit her head  Sees Dr Narendra Banerjeetigpatrice better  She had urinary frequency over [place]  Saw urology with no abnormalities  Told related to stress  No further symptoms  Had strep throat  Treated with Zithromax  Taking BP at home  Has been normal   She is concerned about her inability to gain weight  She has discussed this with GI Dr Marc  She had colonoscopy, EGD, CT scan of the abdomen and abdominal MRI  Only abnormality is a he liver hemangioma  She does not have any difficulty swallowing, cough, chest pain shortness of breath or loss of appetite  She has very restricted by her food intolerances  She is consulted to nutritionist in the past   She gets worried because if she gets sick she feels she has no room to spare  She is otherwise doing well    O: /82   Pulse 78   Resp 16   Ht 5' 2" (1 575 m)   Wt 52 2 kg (115 lb 2 oz)   LMP  (LMP Unknown)   BMI 21 06 kg/m²   BP by me 142/72 right arm  Neck no adenopathy thyromegaly bruits  Chest clear with good breath sounds  Cardiac regular rate without murmur  Abdomen benign  Extremities no edema    Assessment  1  Hypertension-controlled with diltiazem  2  Weight loss-her weight is stabilized and is unchanged from 6 months ago  We did discuss addition of high-calorie foods which are nutritious  However I reassured her about her stable weight  3  Subclinical hypothyroidism-continue to follow   4    Health maintenance-discussed Shingrix; had flu shot    Plan  Blood work and recheck 6 months

## 2020-01-29 ENCOUNTER — TELEPHONE (OUTPATIENT)
Dept: FAMILY MEDICINE CLINIC | Facility: MEDICAL CENTER | Age: 75
End: 2020-01-29

## 2020-01-29 DIAGNOSIS — I10 ESSENTIAL HYPERTENSION: ICD-10-CM

## 2020-01-29 DIAGNOSIS — E03.9 ACQUIRED HYPOTHYROIDISM: Primary | ICD-10-CM

## 2020-01-29 NOTE — TELEPHONE ENCOUNTER
----- Message from Jannette Morley MA sent at 1/29/2020  8:17 AM EST -----      ----- Message -----  From: Acosta Lynn MD  Sent: 1/28/2020  10:49 PM EST  [facility]    Need to said blood work order for July  CBC CMP lipid profile TSH    Diagnosis hypertension and hypothyroidism

## 2020-03-09 ENCOUNTER — OFFICE VISIT (OUTPATIENT)
Dept: DERMATOLOGY | Facility: CLINIC | Age: 75
End: 2020-03-09
Payer: MEDICARE

## 2020-03-09 DIAGNOSIS — L98.9 UNKNOWN SKIN LESION: Primary | ICD-10-CM

## 2020-03-09 DIAGNOSIS — Z85.828 HISTORY OF SKIN CANCER: ICD-10-CM

## 2020-03-09 DIAGNOSIS — L82.1 SEBORRHEIC KERATOSIS: ICD-10-CM

## 2020-03-09 DIAGNOSIS — Z13.89 SCREENING FOR SKIN CONDITION: ICD-10-CM

## 2020-03-09 PROCEDURE — 88305 TISSUE EXAM BY PATHOLOGIST: CPT | Performed by: STUDENT IN AN ORGANIZED HEALTH CARE EDUCATION/TRAINING PROGRAM

## 2020-03-09 PROCEDURE — 11102 TANGNTL BX SKIN SINGLE LES: CPT | Performed by: DERMATOLOGY

## 2020-03-09 PROCEDURE — 4040F PNEUMOC VAC/ADMIN/RCVD: CPT | Performed by: DERMATOLOGY

## 2020-03-09 PROCEDURE — 1036F TOBACCO NON-USER: CPT | Performed by: DERMATOLOGY

## 2020-03-09 PROCEDURE — 99213 OFFICE O/P EST LOW 20 MIN: CPT | Performed by: DERMATOLOGY

## 2020-03-09 PROCEDURE — 3079F DIAST BP 80-89 MM HG: CPT | Performed by: DERMATOLOGY

## 2020-03-09 PROCEDURE — 1160F RVW MEDS BY RX/DR IN RCRD: CPT | Performed by: DERMATOLOGY

## 2020-03-09 PROCEDURE — 3077F SYST BP >= 140 MM HG: CPT | Performed by: DERMATOLOGY

## 2020-03-09 RX ORDER — TOBRAMYCIN AND DEXAMETHASONE 3; 1 MG/ML; MG/ML
SUSPENSION/ DROPS OPHTHALMIC
COMMUNITY
Start: 2020-03-03 | End: 2020-11-06 | Stop reason: ALTCHOICE

## 2020-03-09 NOTE — PATIENT INSTRUCTIONS
Skin lesion possible basal cell carcinoma await results of biopsy would require excision  Seborrheic keratosis patient reassured these are normal growths we acquire with age no treatment needed  History of skin cancer in no recurrence nothing else atypical sunblock recommended follow-up in 6 months  Screening for dermatologic disorders nothing else of concern noted on complete exam follow-up in 6 months  Wound care instructions given to patient

## 2020-03-09 NOTE — PROGRESS NOTES
[facility]  [facility]  [address] [id number]  [phone]  [phone]     MRN: [id number] : 1945  Encounter: [id number]  Patient Information: Arely Guthrie  Chief complaint:  Six-month checkup    History of present illness:  75-year-old female with previous history of multiple skin cancers last skin cancer was 2 years ago presents for overall checkup no specific concerns noted  Past Medical History:   Diagnosis Date    Acne     Benign neoplasm of skin     Hypertension     Inflamed seborrheic keratosis     Irritable bowel syndrome     Malignant neoplasm of skin of face     Nonmelanoma skin cancer     Last Assessed:17    Tachycardia     Telogen effluvium     Temporomandibular joint disorder     Vertigo      Past Surgical History:   Procedure Laterality Date    APPENDECTOMY      CHOLECYSTECTOMY      COLONOSCOPY  2019    ESOPHAGOGASTRODUODENOSCOPY  2009    HYSTERECTOMY  1987    MALIGNANT SKIN LESION EXCISION      Excision of Lesion Face Malignant-2004 BCC Forehead    OOPHORECTOMY Bilateral     ROTATOR CUFF REPAIR       Social History   Social History     Substance and Sexual Activity   Alcohol Use No     Social History     Substance and Sexual Activity   Drug Use No     Social History     Tobacco Use   Smoking Status Never Smoker   Smokeless Tobacco Never Used     Family History   Problem Relation Age of Onset    Hypertension Mother     Osteoporosis Mother     Skin cancer Mother     Hypertension Father     Skin cancer Father     Cancer Maternal Grandmother     Uterine cancer Maternal Grandmother 34    Cancer Paternal Grandmother     Uterine cancer Paternal Grandmother 72    Cancer Paternal Aunt     Uterine cancer Paternal Aunt 54    Diabetes type II Maternal Grandfather     Skin cancer Sister 68    No Known Problems Daughter     No Known Problems Paternal Aunt     No Known Problems Paternal Aunt  No Known Problems Maternal Aunt      Meds/Allergies   Allergies   Allergen Reactions    Cortisone Hypertension, Other (See Comments), Shortness Of Breath and Tachycardia    Acebutolol     Amlodipine     Atenolol     Azithromycin     Banana GI Intolerance    Bisphosphonates      Annotation - [id number]: iriitis    Candesartan     Clarithromycin     Erythromycin     Fosinopril     Irbesartan     Levofloxacin     Losartan     Methylprednisolone Hypertension and Tachycardia    Metoprolol     Nisoldipine     Olmesartan     Propranolol     Sulfamethoxazole-Trimethoprim Nausea Only    Timolol     Penicillins Rash       Meds:  Prior to Admission medications    Medication Sig Start Date End Date Taking?  Authorizing Provider   Alpha-D-Galactosidase Tyler Carterot) TABS Take by mouth   Yes Historical Provider, MD   Alum Hydroxide-Mag Trisilicate (GAVISCON) 71-95 1 MG CHEW Chew   Yes Historical Provider, MD   ascorbic acid (VITAMIN C) 500 mg tablet Take by mouth   Yes Historical Provider, MD   Cholecalciferol 400 units TABS Take by mouth   Yes Historical Provider, MD   ciclopirox (PENLAC) 8 % solution Apply topically   Yes Historical Provider, MD   diltiazem (DILACOR XR) 240 MG 24 hr capsule Take 1 capsule (240 mg total) by mouth daily 12/20/19  Yes Nydia Hawk MD   estradiol (ESTRACE) 0 1 mg/g vaginal cream Insert 1 g into the vagina once a week 5/8/19  Yes Lakeisha Osman MD   fluticasone [facility]) 50 mcg/act nasal spray 2 sprays into each nostril daily 8/20/18  Yes Nydia Hawk MD   Homeopathic Products ([address] NA) into each nostril   Yes Historical Provider, MD   lactase (LACTAID) 3,000 units tablet Take by mouth   Yes Historical Provider, MD   Minoxidil (ROGAINE WOMENS) 5 % FOAM Apply topically   Yes Historical Provider, MD   Multiple Vitamins-Minerals (CENTRUM SILVER ULTRA WOMENS PO) Take by mouth   Yes Historical Provider, MD   polyethylene glycol-propylene glycol (SYSTANE) 0 4-0 3 % Yes Historical Provider, MD   PREMARIN vaginal cream  8/15/18  Yes Historical Provider, MD   nizatidine (AXID) 150 MG capsule Take 1 capsule by mouth every 12 (twelve) hours    Historical Provider, MD   tobramycin-dexamethasone Ardath Heidi) ophthalmic suspension  3/3/20   Historical Provider, MD       Subjective:     Review of Systems:    General: negative for - chills, fatigue, fever,  weight gain or weight loss  Psychological: negative for - anxiety, behavioral disorder, concentration difficulties, decreased libido, depression, irritability, memory difficulties, mood swings, sleep disturbances or suicidal ideation  ENT: negative for - hearing difficulties , nasal congestion, nasal discharge, oral lesions, sinus pain, sneezing, sore throat  Allergy and Immunology: negative for - hives, insect bite sensitivity,  Hematological and Lymphatic: negative for - bleeding problems, blood clots,bruising, swollen lymph nodes  Endocrine: negative for - hair pattern changes, hot flashes, malaise/lethargy, mood swings, palpitations, polydipsia/polyuria, skin changes, temperature intolerance or unexpected weight change  Respiratory: negative for - cough, hemoptysis, orthopnea, shortness of breath, or wheezing  Cardiovascular: negative for - chest pain, dyspnea on exertion, edema,  Gastrointestinal: negative for - abdominal pain, nausea/vomiting  Genito-Urinary: negative for - dysuria, incontinence, irregular/heavy menses or urinary frequency/urgency  Musculoskeletal: negative for - gait disturbance, joint pain, joint stiffness, joint swelling, muscle pain, muscular weakness  Dermatological:  As in HPI  Neurological: negative for confusion, dizziness, headaches, impaired coordination/balance, memory loss, numbness/tingling, seizures, speech problems, tremors or weakness       Objective:   LMP  (LMP Unknown)     Physical Exam:    General Appearance:    Alert, cooperative, no distress   Head:    Normocephalic, without obvious abnormality, atraumatic           Skin:   A full skin exam was performed including scalp, head scalp, eyes, ears, nose, lips, neck, chest, axilla, abdomen, back, buttocks, bilateral upper extremities, bilateral lower extremities, hands, feet, fingers, toes, fingernails, and toenails 3 mm erythematous slightly pearly area little bit lateral to the previous excision site on the forehead normal keratotic papules greasy stuck on appearance previous sites of skin cancer well healed without recurrence nothing else atypical noted on exam      Shave Biopsy Procedure Note    Pre-operative Diagnosis:  Rule out basal cell carcinoma    Plan:  1  Instructed to keep the wound dry and covered for 24 and clean thereafter  2  Warning signs of infection were reviewed  3  Recommended that the patient use OTC acetaminophen as needed for pain  4  Return  Pending results of biopsy(ies)    Locations:Mid forehead    Indications:  Suspicious lesion    Anesthesia: Lidocaine 1% with epinephrine without added sodium bicarbonate    Procedure Details     Patient informed of the risks (including bleeding and infection) and benefits of the   procedure and Verbal informed consent obtained  The lesion and surrounding area were given a sterile prep using alcohol and draped in the usual sterile fashion  A Blue blade razor was used to obtain a specimen  Hemostasis achieved with aluminum chloride  Petrolatum and a sterile dressing applied  The specimen was sent for pathologic examination  The patient tolerated the procedure(s) well  Complications:  none  Assessment:     1  Unknown skin lesion     2  Seborrheic keratosis     3  Screening for skin condition     4   History of skin cancer           Plan:   Skin lesion possible basal cell carcinoma await results of biopsy would require excision  Seborrheic keratosis patient reassured these are normal growths we acquire with age no treatment needed  History of skin cancer in no recurrence nothing else atypical sunblock recommended follow-up in 6 months  Screening for dermatologic disorders nothing else of concern noted on complete exam follow-up in 6 months    Scarlett Villa MD  3/9/2020,12:47 PM    Portions of the record may have been created with voice recognition software   Occasional wrong word or "sound a like" substitutions may have occurred due to the inherent limitations of voice recognition software   Read the chart carefully and recognize, using context, where substitutions have occurred

## 2020-05-19 DIAGNOSIS — I10 ESSENTIAL HYPERTENSION: Primary | ICD-10-CM

## 2020-05-19 RX ORDER — DILTIAZEM HYDROCHLORIDE 240 MG/1
240 CAPSULE, COATED, EXTENDED RELEASE ORAL DAILY
COMMUNITY
Start: 2020-05-04 | End: 2020-05-19 | Stop reason: SDUPTHER

## 2020-05-20 RX ORDER — DILTIAZEM HYDROCHLORIDE 240 MG/1
240 CAPSULE, COATED, EXTENDED RELEASE ORAL DAILY
Qty: 90 CAPSULE | Refills: 1 | Status: SHIPPED | OUTPATIENT
Start: 2020-05-20 | End: 2020-10-21 | Stop reason: SDUPTHER

## 2020-05-28 ENCOUNTER — OFFICE VISIT (OUTPATIENT)
Dept: OBGYN CLINIC | Facility: CLINIC | Age: 75
End: 2020-05-28
Payer: MEDICARE

## 2020-05-28 VITALS
DIASTOLIC BLOOD PRESSURE: 80 MMHG | BODY MASS INDEX: 21.16 KG/M2 | HEIGHT: 62 IN | WEIGHT: 115 LBS | SYSTOLIC BLOOD PRESSURE: 150 MMHG

## 2020-05-28 DIAGNOSIS — Z12.31 ENCOUNTER FOR SCREENING MAMMOGRAM FOR BREAST CANCER: Primary | ICD-10-CM

## 2020-05-28 DIAGNOSIS — N95.2 VAGINAL ATROPHY: ICD-10-CM

## 2020-05-28 PROCEDURE — 99213 OFFICE O/P EST LOW 20 MIN: CPT | Performed by: OBSTETRICS & GYNECOLOGY

## 2020-05-28 RX ORDER — CONJUGATED ESTROGENS 0.62 MG/G
CREAM VAGINAL
Status: CANCELLED | OUTPATIENT
Start: 2020-05-28

## 2020-06-05 ENCOUNTER — TELEPHONE (OUTPATIENT)
Dept: DERMATOLOGY | Facility: CLINIC | Age: 75
End: 2020-06-05

## 2020-06-05 PROBLEM — H93.13 TINNITUS OF BOTH EARS: Status: ACTIVE | Noted: 2020-06-05

## 2020-06-08 ENCOUNTER — PROCEDURE VISIT (OUTPATIENT)
Dept: DERMATOLOGY | Facility: CLINIC | Age: 75
End: 2020-06-08
Payer: MEDICARE

## 2020-06-08 VITALS — TEMPERATURE: 97.6 F

## 2020-06-08 DIAGNOSIS — L98.9 UNKNOWN SKIN LESION: ICD-10-CM

## 2020-06-08 DIAGNOSIS — C44.319 BASAL CELL CARCINOMA (BCC) OF FOREHEAD: Primary | ICD-10-CM

## 2020-06-08 PROCEDURE — 88305 TISSUE EXAM BY PATHOLOGIST: CPT | Performed by: STUDENT IN AN ORGANIZED HEALTH CARE EDUCATION/TRAINING PROGRAM

## 2020-06-08 PROCEDURE — 11102 TANGNTL BX SKIN SINGLE LES: CPT | Performed by: DERMATOLOGY

## 2020-06-08 PROCEDURE — 11103 TANGNTL BX SKIN EA SEP/ADDL: CPT | Performed by: DERMATOLOGY

## 2020-06-11 ENCOUNTER — TELEPHONE (OUTPATIENT)
Dept: DERMATOLOGY | Facility: CLINIC | Age: 75
End: 2020-06-11

## 2020-06-12 ENCOUNTER — TELEPHONE (OUTPATIENT)
Dept: DERMATOLOGY | Facility: CLINIC | Age: 75
End: 2020-06-12

## 2020-06-17 ENCOUNTER — TELEPHONE (OUTPATIENT)
Dept: DERMATOLOGY | Facility: CLINIC | Age: 75
End: 2020-06-17

## 2020-06-23 ENCOUNTER — CONSULT (OUTPATIENT)
Dept: PLASTIC SURGERY | Facility: CLINIC | Age: 75
End: 2020-06-23
Payer: MEDICARE

## 2020-06-23 VITALS — TEMPERATURE: 99 F

## 2020-06-23 DIAGNOSIS — C44.329 SQUAMOUS CELL CARCINOMA OF FOREHEAD: ICD-10-CM

## 2020-06-23 DIAGNOSIS — C44.319 BASAL CELL CARCINOMA, FOREHEAD: Primary | ICD-10-CM

## 2020-06-23 PROCEDURE — 99204 OFFICE O/P NEW MOD 45 MIN: CPT | Performed by: PHYSICIAN ASSISTANT

## 2020-06-24 PROCEDURE — 1123F ACP DISCUSS/DSCN MKR DOCD: CPT | Performed by: SURGERY

## 2020-07-15 ENCOUNTER — ANESTHESIA EVENT (OUTPATIENT)
Dept: PERIOP | Facility: AMBULARY SURGERY CENTER | Age: 75
End: 2020-07-15
Payer: MEDICARE

## 2020-07-23 PROCEDURE — 99024 POSTOP FOLLOW-UP VISIT: CPT | Performed by: PHYSICIAN ASSISTANT

## 2020-07-23 NOTE — H&P
Assessment/Plan:  Colletta Minor is a 29-year-old female presents in consultation for reconstruction of a Mohs defect of the mid upper forehead  She is referred to us by Dr Ángel Chan  Please see HPI  These are actually 3 small lesions  She was seen with Dr Bulmaro Hernandez as well  We discussed with her reconstruction of a Mohs defect of the mid upper forehead with possible complex closure, flap and most likely full-thickness skin graft  She understood and agreed  We discussed with the patient the options, benefits, and risks of surgery such as anesthesia, bleeding, infection, scarring and the need for additional procedures  Consent was obtained and all questions answered to her satisfaction  We will plan for surgery at her earliest convenience       Diagnoses and all orders for this visit:     Basal cell carcinoma, forehead     Squamous cell carcinoma of forehead            Subjective:       Patient ID: Sage Álvarez is a 66 y o  female      HPI   She reports a history for basal cell carcinoma  She noticed a lesion on her forehead over the last 6 months  She had this area biopsied and was found to be basal cell carcinoma  This was put off due to COVID  Since then, she developed 2 additional areas in that same region  They were both biopsied and found to be basal cell and squamous cell  She does report a longstanding history sun exposure  She does also have a family history 1st skin cancers      The following portions of the patient's history were reviewed and updated as appropriate: She  has a past medical history of Acne, Benign neoplasm of skin, Hypertension, Inflamed seborrheic keratosis, Irritable bowel syndrome, Malignant neoplasm of skin of face, Nonmelanoma skin cancer, Tachycardia, Telogen effluvium, Temporomandibular joint disorder, and Vertigo  She  has a past surgical history that includes Appendectomy; Cholecystectomy; Rotator cuff repair; Colonoscopy (05/03/2019);  Esophagogastroduodenoscopy (2009); Malignant skin lesion excision; Hysterectomy (1987); and Oophorectomy (Bilateral, 1987)  Her family history includes Cancer in her maternal grandmother, paternal aunt, and paternal grandmother; Diabetes type II in her maternal grandfather; Hypertension in her father and mother; No Known Problems in her daughter, maternal aunt, paternal aunt, and paternal aunt; Osteoporosis in her mother; Skin cancer in her father and mother; Skin cancer (age of onset: 68) in her sister; Uterine cancer (age of onset: 34) in her maternal grandmother; Uterine cancer (age of onset: 54) in her paternal aunt; Uterine cancer (age of onset: 72) in her paternal grandmother  She  reports that she has never smoked  She has never used smokeless tobacco  She reports that she does not drink alcohol or use drugs        Review of Systems   HENT: Negative for hearing loss  Eyes: Negative for visual disturbance  Wears eyeglasses  Respiratory: Negative for shortness of breath  Cardiovascular: Negative for chest pain  Gastrointestinal: Negative for abdominal pain, blood in stool, constipation, diarrhea, nausea and vomiting  Genitourinary: Negative for hematuria  Musculoskeletal: Negative for gait problem  Skin:        As per HPI  Neurological: Negative for seizures and headaches  Hematological: Does not bruise/bleed easily  Psychiatric/Behavioral: The patient is not nervous/anxious            Objective:        Temp 99 °F (37 2 °C)   LMP  (LMP Unknown)             Physical Exam   Constitutional: She is oriented to person, place, and time  She appears well-developed and well-nourished  No distress  HENT:   Head: Normocephalic and atraumatic  Eyes: Pupils are equal, round, and reactive to light  EOM are normal  No scleral icterus  Neck: Neck supple  No tracheal deviation present  No thyromegaly present  Cardiovascular: Normal rate and regular rhythm  Exam reveals no gallop and no friction rub     No murmur heard   Pulmonary/Chest: Effort normal and breath sounds normal  She has no wheezes  She has no rales  Abdominal: Soft  Bowel sounds are normal  She exhibits no distension  There is no tenderness  There is no rebound and no guarding  Musculoskeletal: Normal range of motion  Lymphadenopathy:     She has no cervical adenopathy  Neurological: She is alert and oriented to person, place, and time  No cranial nerve deficit  Skin:   Mid upper forehead biopsy sites noted  There is a vertical scar noted centrally  The squamous cell carcinoma biopsy site is located to the left of the scar and the 2 basal cell carcinomas are located to the right of the scar  These are all approximately 5-6 mm in size  Please see photo  Psychiatric: She has a normal mood and affect

## 2020-07-27 ENCOUNTER — TRANSCRIBE ORDERS (OUTPATIENT)
Dept: LAB | Facility: CLINIC | Age: 75
End: 2020-07-27

## 2020-07-27 ENCOUNTER — PROCEDURE VISIT (OUTPATIENT)
Dept: DERMATOLOGY | Facility: CLINIC | Age: 75
End: 2020-07-27
Payer: MEDICARE

## 2020-07-27 ENCOUNTER — LAB (OUTPATIENT)
Dept: LAB | Facility: CLINIC | Age: 75
End: 2020-07-27
Payer: MEDICARE

## 2020-07-27 ENCOUNTER — APPOINTMENT (OUTPATIENT)
Dept: LAB | Facility: CLINIC | Age: 75
End: 2020-07-27
Payer: MEDICARE

## 2020-07-27 VITALS
RESPIRATION RATE: 18 BRPM | BODY MASS INDEX: 21.16 KG/M2 | HEIGHT: 62 IN | DIASTOLIC BLOOD PRESSURE: 62 MMHG | SYSTOLIC BLOOD PRESSURE: 118 MMHG | TEMPERATURE: 98 F | WEIGHT: 115 LBS | HEART RATE: 72 BPM

## 2020-07-27 DIAGNOSIS — C44.319 BASAL CELL CARCINOMA OF FOREHEAD: ICD-10-CM

## 2020-07-27 DIAGNOSIS — C44.319 BASAL CELL CARCINOMA, FOREHEAD: ICD-10-CM

## 2020-07-27 DIAGNOSIS — D04.39 SQUAMOUS CELL CARCINOMA IN SITU OF SKIN OF FOREHEAD: Primary | ICD-10-CM

## 2020-07-27 DIAGNOSIS — C44.319 BASAL CELL CARCINOMA OF RIGHT FOREHEAD: ICD-10-CM

## 2020-07-27 LAB
ANION GAP SERPL CALCULATED.3IONS-SCNC: 7 MMOL/L (ref 4–13)
BASOPHILS # BLD AUTO: 0.08 THOUSANDS/ΜL (ref 0–0.1)
BASOPHILS NFR BLD AUTO: 1 % (ref 0–1)
BUN SERPL-MCNC: 17 MG/DL (ref 5–25)
CALCIUM SERPL-MCNC: 8.8 MG/DL (ref 8.3–10.1)
CHLORIDE SERPL-SCNC: 105 MMOL/L (ref 100–108)
CO2 SERPL-SCNC: 30 MMOL/L (ref 21–32)
CREAT SERPL-MCNC: 0.91 MG/DL (ref 0.6–1.3)
EOSINOPHIL # BLD AUTO: 0.04 THOUSAND/ΜL (ref 0–0.61)
EOSINOPHIL NFR BLD AUTO: 1 % (ref 0–6)
ERYTHROCYTE [DISTWIDTH] IN BLOOD BY AUTOMATED COUNT: 13.5 % (ref 11.6–15.1)
GFR SERPL CREATININE-BSD FRML MDRD: 61 ML/MIN/1.73SQ M
GLUCOSE SERPL-MCNC: 97 MG/DL (ref 65–140)
HCT VFR BLD AUTO: 41.8 % (ref 34.8–46.1)
HGB BLD-MCNC: 13 G/DL (ref 11.5–15.4)
IMM GRANULOCYTES # BLD AUTO: 0.01 THOUSAND/UL (ref 0–0.2)
IMM GRANULOCYTES NFR BLD AUTO: 0 % (ref 0–2)
LYMPHOCYTES # BLD AUTO: 1.4 THOUSANDS/ΜL (ref 0.6–4.47)
LYMPHOCYTES NFR BLD AUTO: 23 % (ref 14–44)
MCH RBC QN AUTO: 28.4 PG (ref 26.8–34.3)
MCHC RBC AUTO-ENTMCNC: 31.1 G/DL (ref 31.4–37.4)
MCV RBC AUTO: 92 FL (ref 82–98)
MONOCYTES # BLD AUTO: 0.37 THOUSAND/ΜL (ref 0.17–1.22)
MONOCYTES NFR BLD AUTO: 6 % (ref 4–12)
NEUTROPHILS # BLD AUTO: 4.18 THOUSANDS/ΜL (ref 1.85–7.62)
NEUTS SEG NFR BLD AUTO: 69 % (ref 43–75)
NRBC BLD AUTO-RTO: 0 /100 WBCS
PLATELET # BLD AUTO: 247 THOUSANDS/UL (ref 149–390)
PMV BLD AUTO: 9.8 FL (ref 8.9–12.7)
POTASSIUM SERPL-SCNC: 3.7 MMOL/L (ref 3.5–5.3)
RBC # BLD AUTO: 4.57 MILLION/UL (ref 3.81–5.12)
SODIUM SERPL-SCNC: 142 MMOL/L (ref 136–145)
WBC # BLD AUTO: 6.08 THOUSAND/UL (ref 4.31–10.16)

## 2020-07-27 PROCEDURE — 17312 MOHS ADDL STAGE: CPT | Performed by: DERMATOLOGY

## 2020-07-27 PROCEDURE — U0003 INFECTIOUS AGENT DETECTION BY NUCLEIC ACID (DNA OR RNA); SEVERE ACUTE RESPIRATORY SYNDROME CORONAVIRUS 2 (SARS-COV-2) (CORONAVIRUS DISEASE [COVID-19]), AMPLIFIED PROBE TECHNIQUE, MAKING USE OF HIGH THROUGHPUT TECHNOLOGIES AS DESCRIBED BY CMS-2020-01-R: HCPCS

## 2020-07-27 PROCEDURE — 36415 COLL VENOUS BLD VENIPUNCTURE: CPT

## 2020-07-27 PROCEDURE — 17311 MOHS 1 STAGE H/N/HF/G: CPT | Performed by: DERMATOLOGY

## 2020-07-27 PROCEDURE — 17315 MOHS SURG ADDL BLOCK: CPT | Performed by: DERMATOLOGY

## 2020-07-27 PROCEDURE — 80048 BASIC METABOLIC PNL TOTAL CA: CPT

## 2020-07-27 PROCEDURE — 85025 COMPLETE CBC W/AUTO DIFF WBC: CPT

## 2020-07-27 PROCEDURE — 93005 ELECTROCARDIOGRAM TRACING: CPT

## 2020-07-27 NOTE — PROGRESS NOTES
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 lower forehead    Past Medical History:   Diagnosis Date    Acne     Basal cell carcinoma 2020    right lower forehead    BCC (basal cell carcinoma of skin) 2020    mid forehead    Benign neoplasm of skin     Hypertension     Inflamed seborrheic keratosis     Irritable bowel syndrome     Malignant neoplasm of skin of face     Nonmelanoma skin cancer     Last Assessed:17    Squamous cell skin cancer 2020    In situ, left lower forehead    Tachycardia     Telogen effluvium     Temporomandibular joint disorder     Vertigo        Past Surgical History:   Procedure Laterality Date    APPENDECTOMY      CHOLECYSTECTOMY      COLONOSCOPY  2019    ESOPHAGOGASTRODUODENOSCOPY  2009    HYSTERECTOMY  1987    MALIGNANT SKIN LESION EXCISION      Excision of Lesion Face Malignant-2004 BCC Forehead    MOHS SURGERY  2020    Right &left lower forehead, mid forehead    OOPHORECTOMY Bilateral     ROTATOR CUFF REPAIR      SKIN BIOPSY           Current Outpatient Medications:     Alpha-D-Galactosidase (BEANO) TABS, Take by mouth, Disp: , Rfl:     Alum Hydroxide-Mag Trisilicate (GAVISCON) 90-80 4 MG CHEW, Chew, Disp: , Rfl:     ascorbic acid (VITAMIN C) 500 mg tablet, Take by mouth, Disp: , Rfl:     Cholecalciferol 400 units TABS, Take by mouth, Disp: , Rfl:     ciclopirox (PENLAC) 8 % solution, Apply topically, Disp: , Rfl:     cimetidine (TAGAMET) 200 mg tablet, Take 200 mg by mouth daily, Disp: , Rfl:     diltiazem (CARDIZEM CD) 240 mg 24 hr capsule, Take 1 capsule (240 mg total) by mouth daily, Disp: 90 capsule, Rfl: 1    estradiol (ESTRACE) 0 1 mg/g vaginal cream, Insert 1 g into the vagina once a week, Disp: 45 g, Rfl: 3    fluticasone (FLONASE) 50 mcg/act nasal spray, 2 sprays into each nostril daily, Disp: 16 g, Rfl: 3    Homeopathic Products (ZICAM ALLERGY RELIEF NA), into each nostril, Disp: , Rfl:     lactase (LACTAID) 3,000 units tablet, Take by mouth, Disp: , Rfl:     Minoxidil (ROGAINE WOMENS) 5 % FOAM, Apply topically, Disp: , Rfl:     Multiple Vitamins-Minerals (CENTRUM SILVER ULTRA WOMENS PO), Take by mouth, Disp: , Rfl:     polyethylene glycol-propylene glycol (SYSTANE) 0 4-0 3 %, , Disp: , Rfl:     PREMARIN vaginal cream, , Disp: , Rfl:     nizatidine (AXID) 150 MG capsule, Take 1 capsule by mouth every 12 (twelve) hours, Disp: , Rfl:     tobramycin-dexamethasone (TOBRADEX) ophthalmic suspension, , Disp: , Rfl:     Allergies   Allergen Reactions    Cortisone Hypertension, Other (See Comments), Shortness Of Breath and Tachycardia    Acebutolol     Amlodipine     Atenolol     Azithromycin     Banana GI Intolerance    Bisphosphonates      Annotation - [id number]: iriitis    Candesartan     Clarithromycin     Erythromycin     Fosinopril     Irbesartan     Levofloxacin     Losartan     Methylprednisolone Hypertension and Tachycardia    Metoprolol     Nisoldipine     Olmesartan     Propranolol     Sulfamethoxazole-Trimethoprim Nausea Only    Timolol     Penicillins Rash       Physical Exam:   Vitals:    07/27/20 0842   BP: 118/62   Pulse: 72   Resp: 18   Temp: 98 °F (36 7 °C)     General: Awake, Alert, Oriented x 3, Mood and affect appropriate  Respiratory: Respirations even and unlabored  Cardiovascular: Peripheral pulses intact; no edema  Musculoskeletal Exam: N/A    Assessment:  Squamous cell carcinoma in situ     Plan:  Mohs with plastic repair    MOHS Procedure Timeout      Most Recent Value   Timeout:  4066   Patient Identity Verified:  Yes   Correct Site Verified:  Yes   Correct Procedure Verified:  Yes          MOHS Diagnosis/Indication/Location/ID      Most Recent Value   Pathology Type  Basal cell carcinoma   Anatomic Site  right forehead [lower/ mid forehead]   Indications for MOHS  tumor location, porrly defined tumor margins   MOHS ID  [id number]          MOHS Site/Accession/Pre-Post      Most Recent Value   Original Site Identified (as submitted by referring clinician)  Referral   Biopsy Accession/Specimen # (as submitted by referring clincian)  [id number]   Pre-MOHS Size Length (cm)  0 7   Pre-MOHS Size Width (cm)  0 7   Post-MOHS Size-Length (cm)  2 8   Post MOHS Size-Width (cm)  1 5   Repair Type  Patient referred to another physician Petra Magdaleno with Dr Bhavana Ruiz   Anesthetic Used  1% Lidocaine with epinephrine [with Bicarb]          MOHS Tumor Stage 1 Information      Most Recent Value   Tissue Sections (blocks)  2   Microscopic Exam Section 1:  Cords of basophilic cells with peripheral palisading and retraction artifact consistent with basal cell carcinoma were noted on microscopic analysis  Microscopic Exam Section 2:  Cords of basophilic cells with peripheral palisading and retraction artifact consistent with basal cell carcinoma were noted on microscopic analysis  Tumor Clear After Stage I? No          MOHS Tumor Stage 2 Information      Most Recent Value   Tissue Sections (blocks)  2   Microscopic Exam Section 1:  No tumor identified  Microscopic Exam Section 2:  Cords of basophilic cells with peripheral palisading and retraction artifact consistent with basal cell carcinoma were noted on microscopic analysis  Tumor Clear After Stage II? No          MOHS Tumor Stage 3 Information      Most Recent Value   Tissue Sections (blocks)  1   Microscopic Exam Section 1:  Cords of basophilic cells with peripheral palisading and retraction artifact consistent with basal cell carcinoma were noted on microscopic analysis  Tumor Clear After Stage III?   Yes          MOHS Tumor Stage 4 Information      Most Recent Value   Tissue Section (blocks)  1 [0]   Microscopic Exam Section 1:  No tumor identified in section  Tumor Clear After Stage IV? Yes        MOHS Procedure 2 Note    Patient: Fabrizio Rader  : 1945  MRN: [id number]  Date: 2020    History of Present Illness:  The patient is a 66 y o  female who presents with complaints of Basal cell carcinoma x 2    Past Medical History:   Diagnosis Date    Acne     Basal cell carcinoma 2020    right lower forehead    BCC (basal cell carcinoma of skin) 2020    mid forehead    Benign neoplasm of skin     Hypertension     Inflamed seborrheic keratosis     Irritable bowel syndrome     Malignant neoplasm of skin of face     Nonmelanoma skin cancer     Last Assessed:17    Squamous cell skin cancer 2020    In situ, left lower forehead    Tachycardia     Telogen effluvium     Temporomandibular joint disorder     Vertigo        Past Surgical History:   Procedure Laterality Date    APPENDECTOMY      CHOLECYSTECTOMY      COLONOSCOPY  2019    ESOPHAGOGASTRODUODENOSCOPY  2009    HYSTERECTOMY  1987    MALIGNANT SKIN LESION EXCISION      Excision of Lesion Face Malignant-2004 BCC Forehead    MOHS SURGERY  2020    Right &left lower forehead, mid forehead    OOPHORECTOMY Bilateral     ROTATOR CUFF REPAIR      SKIN BIOPSY           Current Outpatient Medications:     Alpha-D-Galactosidase (BEANO) TABS, Take by mouth, Disp: , Rfl:     Alum Hydroxide-Mag Trisilicate (GAVISCON) 22-87 1 MG CHEW, Chew, Disp: , Rfl:     ascorbic acid (VITAMIN C) 500 mg tablet, Take by mouth, Disp: , Rfl:     Cholecalciferol 400 units TABS, Take by mouth, Disp: , Rfl:     ciclopirox (PENLAC) 8 % solution, Apply topically, Disp: , Rfl:     cimetidine (TAGAMET) 200 mg tablet, Take 200 mg by mouth daily, Disp: , Rfl:     diltiazem (CARDIZEM CD) 240 mg 24 hr capsule, Take 1 capsule (240 mg total) by mouth daily, Disp: 90 capsule, Rfl: 1    estradiol (ESTRACE) 0 1 mg/g vaginal cream, Insert 1 g into the vagina once a week, Disp: 45 g, Rfl: 3    fluticasone (FLONASE) 50 mcg/act nasal spray, 2 sprays into each nostril daily, Disp: 16 g, Rfl: 3    Homeopathic Products (ZICAM ALLERGY RELIEF NA), into each nostril, Disp: , Rfl:     lactase (LACTAID) 3,000 units tablet, Take by mouth, Disp: , Rfl:     Minoxidil (ROGAINE WOMENS) 5 % FOAM, Apply topically, Disp: , Rfl:     Multiple Vitamins-Minerals (CENTRUM SILVER ULTRA WOMENS PO), Take by mouth, Disp: , Rfl:     polyethylene glycol-propylene glycol (SYSTANE) 0 4-0 3 %, , Disp: , Rfl:     PREMARIN vaginal cream, , Disp: , Rfl:     nizatidine (AXID) 150 MG capsule, Take 1 capsule by mouth every 12 (twelve) hours, Disp: , Rfl:     tobramycin-dexamethasone (TOBRADEX) ophthalmic suspension, , Disp: , Rfl:     Allergies   Allergen Reactions    Cortisone Hypertension, Other (See Comments), Shortness Of Breath and Tachycardia    Acebutolol     Amlodipine     Atenolol     Azithromycin     Banana GI Intolerance    Bisphosphonates      Annotation - [id number]: iriitis    Candesartan     Clarithromycin     Erythromycin     Fosinopril     Irbesartan     Levofloxacin     Losartan     Methylprednisolone Hypertension and Tachycardia    Metoprolol     Nisoldipine     Olmesartan     Propranolol     Sulfamethoxazole-Trimethoprim Nausea Only    Timolol     Penicillins Rash       Physical Exam:   Vitals:    07/27/20 0842   BP: 118/62   Pulse: 72   Resp: 18   Temp: 98 °F (36 7 °C)     General: Awake, Alert, Oriented x 3, Mood and affect appropriate  Respiratory: Respirations even and unlabored  Cardiovascular: Peripheral pulses intact; no edema  Musculoskeletal Exam: N/A    Assessment: Basal cell carcinoma    Plan: Mohs    MOHS Procedure Timeout      Most Recent Value   Timeout:  0476   Patient Identity Verified:  Yes   Correct Site Verified:  Yes   Correct Procedure Verified:   Yes MOHS Diagnosis/Indication/Location/ID      Most Recent Value   Pathology Type  Squamous cell carcinoma   Anatomic Site  -- [Right lower forehead/Mid forehead]   Indications for MOHS  tumor location, porrly defined tumor margins   MOHS ID  [id number]          MOHS Site/Accession/Pre-Post      Most Recent Value   Original Site Identified (as submitted by referring clinician)  Referral   Biopsy Accession/Specimen # (as submitted by referring clincian)  [id number]   Pre-MOHS Size Length (cm)  1 9   Pre-MOHS Size Width (cm)  1   Anesthetic Used  1% Lidocaine with epinephrine [with Bicarb]          MOHS Tumor Stage 1 Information      Most Recent Value   Tissue Sections (blocks)  2   Microscopic Exam Section 2:  Areas of full thickness epidermal keratinocyte atypia consistent with squamous cell carcinoma in situ were noted on frozen section analysis  Tumor Clear After Stage I? No          MOHS Tumor Stage 2 Information      Most Recent Value   Tissue Sections (blocks)  1   Microscopic Exam Section 1:  Areas of full thickness epidermal keratinocyte atypia consistent with squamous cell carcinoma in situ were noted on frozen section analysis  Tumor Clear After Stage II? No          MOHS Tumor Stage 3 Information      Most Recent Value   Tissue Sections (blocks)  1   Microscopic Exam Section 1:  No tumor identified in section  Mohs Surgery     The rationale for Mohs was explained to the patient and consent was obtained  The risks, benefits and alternative to therapy was discussed in detail  Specifically, the risks of infection, scarring, bleeding, prolonged wound healing, incomplete removal, allergy to anesthesia, nerve injury and recurrence were addressed   Prior to the procedure, the treatment site was clearly identified and confirmed by the patient  The tumor qualifies for Mohs based on AUC criteria   Dr Tameka Barron served as the surgeon and pathologist during the procedure  Patient identified, procedure verified, site identified and verified  Time out completed  Surgical removal of the lesion discussed with the patient (risks and benefits, including possibility of scarring, infection, recurrence or potential for further treatment)  I have specifically identified the site with the patient  I have discussed the fact that the patient will have a scar after the procedure regardless of granulation or repair with sutures  I have discussed that the repair options can range from granulation in some cases to linear or curvilinear closures to larger flaps or grafts  There are sometimes flaps or grafts used that require multiples stages of surgery and will not be completed today, rather be completed over a series of appointments  I have discussed that occasionally due to location, size or depth of the lesion I may recommend consultation with and transfer of care for further removal or the reconstruction to another provider such as ophthalmology surgery, plastic surgery, ENT surgery, or surgical oncology  There are cases in which other testing such as imaging with MRI or CT scan or testing of lymph nodes is recommended because of the nature/depth/location of tumor seen during the removal  There is a risk of injury to nerves causing temporary or permanent numbness or the inability to move muscles full such as the inability to lift eyebrows  Questions answered and verbal and written consent was obtained  BCC on mid forehead/right lower forehead cleared with 4 stages of mohs and SCCIS on left lower forehead cleared with 3 stages of mohs  Well tolerated  COVID precautions taken  Given two sites and patient preference  Plastics closure scheduled  Patient identified, procedure verified, site identified and verified  Time out completed   Surgical removal of the lesion discussed with the patient (risks and benefits, including possibility of scarring, infection, recurrence or potential for further treatment)  I have specifically identified the site with the patient  I have discussed the fact that the patient will have a scar after the procedure regardless of granulation or repair with sutures  I have discussed that the repair options can range from granulation in some cases to linear or curvilinear closures to larger flaps or grafts  There are sometimes flaps or grafts used that require multiples stages of surgery and will not be completed today, rather be completed over a series of appointments  I have discussed that occasionally due to location, size or depth of the lesion I may recommend consultation with and transfer of care for further removal or the reconstruction to another provider such as ophthalmology surgery, plastic surgery, ENT surgery, or surgical oncology  There are cases in which other testing such as imaging with MRI or CT scan or testing of lymph nodes is recommended because of the nature/depth/location of tumor seen during the removal  There is a risk of injury to nerves causing temporary or permanent numbness or the inability to move muscles full such as the inability to lift eyebrows  Questions answered and verbal and written consent was obtained  Postoperative care: No would care should be necessary prior to the next visit but I urged the patient to call us if any problems or question should arise prior to that time  If circumstances should change, we will contact the patient to make other arrangements       Scribe Attestation    I,:   Edith Corea MA am acting as a scribe while in the presence of the attending physician :        I,:   Mazin Mandujano MD personally performed the services described in this documentation    as scribed in my presence :

## 2020-07-27 NOTE — PRE-PROCEDURE INSTRUCTIONS
Pre-Surgery Instructions:   Medication Instructions    Alpha-D-Galactosidase (BEANO) TABS Instructed patient per Anesthesia Guidelines   Alum Hydroxide-Mag Trisilicate (GAVISCON) 55-89 4 MG CHEW Instructed patient per Anesthesia Guidelines   ascorbic acid (VITAMIN C) 500 mg tablet Instructed patient per Anesthesia Guidelines   Cholecalciferol 400 units TABS Instructed patient per Anesthesia Guidelines   cimetidine (TAGAMET) 200 mg tablet Instructed patient per Anesthesia Guidelines   diltiazem (CARDIZEM CD) 240 mg 24 hr capsule Instructed patient per Anesthesia Guidelines   estradiol (ESTRACE) 0 1 mg/g vaginal cream Instructed patient per Anesthesia Guidelines   fluticasone (FLONASE) 50 mcg/act nasal spray Instructed patient per Anesthesia Guidelines   Homeopathic Products (ZICAM ALLERGY RELIEF NA) Instructed patient per Anesthesia Guidelines   lactase (LACTAID) 3,000 units tablet Instructed patient per Anesthesia Guidelines   Minoxidil (ROGAINE WOMENS) 5 % FOAM Instructed patient per Anesthesia Guidelines   Multiple Vitamins-Minerals (CENTRUM SILVER ULTRA WOMENS PO) Instructed patient per Anesthesia Guidelines   polyethylene glycol-propylene glycol (SYSTANE) 0 4-0 3 % Instructed patient per Anesthesia Guidelines   PREMARIN vaginal cream Instructed patient per Anesthesia Guidelines   tobramycin-dexamethasone (TOBRADEX) ophthalmic suspension Instructed patient per Anesthesia Guidelines      Pre-op medication, and showering instructions with antibacteral soap reviewed

## 2020-07-27 NOTE — LETTER
July 29, 2020     Kaitlyn Eubanks MD  [address] [facility] [address]    Patient: Cameron Cabrera   YOB: 1945   Date of Visit: 7/27/2020       Dear Dr Pedraza Screen: Thank you for referring Yobani Mcnair to me for evaluation  Below are the relevant portions of my assessment and plan of care  If you have questions, please do not hesitate to call me  I look forward to following Akankshatammi Suarez along with you           Sincerely,        Derek Emery MD        CC: Cheli Mejia MD

## 2020-07-27 NOTE — ANESTHESIA PREPROCEDURE EVALUATION
Review of Systems/Medical History  Patient summary reviewed    No history of anesthetic complications     Cardiovascular  Exercise tolerance (METS): >4,  Hypertension , Valvular heart disease , mitral regurgitation, No angina ,    Pulmonary  Negative pulmonary ROS Not a smoker , No asthma , No shortness of breath,        GI/Hepatic    GERD well controlled,        Negative  ROS        Endo/Other  Negative endo/other ROS      GYN       Hematology   Musculoskeletal  Negative musculoskeletal ROS        Neurology  No seizures ,  No CVA ,    Psychology       TTE 6/2019:  LEFT VENTRICLE:  Systolic function was normal  Ejection fraction was estimated to be 60 %  There were no regional wall motion abnormalities      LEFT ATRIUM:  The atrium was mildly dilated      MITRAL VALVE:  There was moderate regurgitation      AORTIC VALVE:  There was mild regurgitation      TRICUSPID VALVE:  There was mild regurgitation  Pulmonary artery systolic pressure was within the normal range  Physical Exam    Airway    Mallampati score: II  TM Distance: <3 FB  Neck ROM: full     Dental   upper dentures,     Cardiovascular      Pulmonary      Other Findings        Anesthesia Plan  ASA Score- 2     Anesthesia Type- general with ASA Monitors  Additional Monitors:   Airway Plan: LMA  Plan Factors-    Induction- intravenous  Postoperative Plan-     Informed Consent- Anesthetic plan and risks discussed with patient  I personally reviewed this patient with the CRNA  Discussed and agreed on the Anesthesia Plan with the CRNA Gerhardt Sep

## 2020-07-27 NOTE — PATIENT INSTRUCTIONS
Mohs Microscopic Surgery After Care    What You Will Need to Do After the Procedure  1  Keep the area clean and dry the first day  Try NOT to remove the bandage for the first day  2  Gently clean the area with soap and water and apply Vaseline ointment (this is over the counter and not a prescription) to the excision site for up to 2 weeks  3  Apply a clean appropriately sized bandage to area  Gauze and paper tape are recommended for sensitive skin  4  Return for suture removal as instructed (generally 1 week for the face, 2 weeks for the body)  5  Take Acetaminophen (Tylenol) for discomfort, if no contraindications  Do NOT take Ibuprofen or aspirin unless specifically told to do so by your Dermatologist because these medications can make bleeding worse  6  Call our office immediately for signs of infection: fever, chills, increased redness, warmth, tenderness, discomfort/pain, or pus or foul smell coming from the wound  If bleeding is noticed, place a clean cloth over the area and apply firm pressure for thirty minutes  Check the wound ONLY after 30 minutes of direct pressure; do not cheat and sneak a peak, as that does not count  If bleeding persists after 30 minutes of legitimate direct pressure, then try one more round of direct pressure for an additional 10 minutes to the area  Should the bleeding become heavier or not stop after the second attempt, call [facility] directly at [phone] (SKIN) or, if after hours, go to your nearest Emergency Room or Urgent Care

## 2020-07-28 ENCOUNTER — ANESTHESIA (OUTPATIENT)
Dept: PERIOP | Facility: AMBULARY SURGERY CENTER | Age: 75
End: 2020-07-28
Payer: MEDICARE

## 2020-07-28 ENCOUNTER — HOSPITAL ENCOUNTER (OUTPATIENT)
Facility: AMBULARY SURGERY CENTER | Age: 75
Setting detail: OUTPATIENT SURGERY
Discharge: HOME | End: 2020-07-28
Attending: SURGERY | Admitting: SURGERY
Payer: MEDICARE

## 2020-07-28 VITALS
SYSTOLIC BLOOD PRESSURE: 132 MMHG | HEIGHT: 62 IN | BODY MASS INDEX: 21.16 KG/M2 | TEMPERATURE: 98 F | RESPIRATION RATE: 16 BRPM | WEIGHT: 115 LBS | HEART RATE: 70 BPM | DIASTOLIC BLOOD PRESSURE: 46 MMHG | OXYGEN SATURATION: 98 %

## 2020-07-28 DIAGNOSIS — C44.329 SQUAMOUS CELL CARCINOMA OF FOREHEAD: Primary | ICD-10-CM

## 2020-07-28 LAB
ATRIAL RATE: 67 BPM
P AXIS: 78 DEGREES
PR INTERVAL: 150 MS
QRS AXIS: -52 DEGREES
QRSD INTERVAL: 92 MS
QT INTERVAL: 442 MS
QTC INTERVAL: 467 MS
SARS-COV-2 RNA SPEC QL NAA+PROBE: NOT DETECTED
T WAVE AXIS: 50 DEGREES
VENTRICULAR RATE: 67 BPM

## 2020-07-28 PROCEDURE — 93010 ELECTROCARDIOGRAM REPORT: CPT | Performed by: INTERNAL MEDICINE

## 2020-07-28 PROCEDURE — 14041 TIS TRNFR F/C/C/M/N/A/G/H/F: CPT | Performed by: SURGERY

## 2020-07-28 PROCEDURE — 14040 TIS TRNFR F/C/C/M/N/A/G/H/F: CPT | Performed by: SURGERY

## 2020-07-28 RX ORDER — ONDANSETRON 2 MG/ML
INJECTION INTRAMUSCULAR; INTRAVENOUS AS NEEDED
Status: DISCONTINUED | OUTPATIENT
Start: 2020-07-28 | End: 2020-07-28 | Stop reason: SURG

## 2020-07-28 RX ORDER — PROPOFOL 10 MG/ML
INJECTION, EMULSION INTRAVENOUS AS NEEDED
Status: DISCONTINUED | OUTPATIENT
Start: 2020-07-28 | End: 2020-07-28 | Stop reason: SURG

## 2020-07-28 RX ORDER — BACITRACIN 500 [USP'U]/G
OINTMENT OPHTHALMIC AS NEEDED
Status: DISCONTINUED | OUTPATIENT
Start: 2020-07-28 | End: 2020-07-28 | Stop reason: HOSPADM

## 2020-07-28 RX ORDER — CLINDAMYCIN PHOSPHATE 900 MG/50ML
900 INJECTION INTRAVENOUS ONCE
Status: COMPLETED | OUTPATIENT
Start: 2020-07-28 | End: 2020-07-28

## 2020-07-28 RX ORDER — ACETAMINOPHEN AND CODEINE PHOSPHATE 300; 30 MG/1; MG/1
1 TABLET ORAL EVERY 4 HOURS PRN
Qty: 6 TABLET | Refills: 0 | Status: SHIPPED | OUTPATIENT
Start: 2020-07-28 | End: 2020-10-21 | Stop reason: ALTCHOICE

## 2020-07-28 RX ORDER — SODIUM CHLORIDE, SODIUM LACTATE, POTASSIUM CHLORIDE, CALCIUM CHLORIDE 600; 310; 30; 20 MG/100ML; MG/100ML; MG/100ML; MG/100ML
125 INJECTION, SOLUTION INTRAVENOUS CONTINUOUS
Status: DISCONTINUED | OUTPATIENT
Start: 2020-07-28 | End: 2020-07-28 | Stop reason: HOSPADM

## 2020-07-28 RX ORDER — FENTANYL CITRATE/PF 50 MCG/ML
25 SYRINGE (ML) INJECTION
Status: DISCONTINUED | OUTPATIENT
Start: 2020-07-28 | End: 2020-07-28 | Stop reason: HOSPADM

## 2020-07-28 RX ORDER — LIDOCAINE HYDROCHLORIDE AND EPINEPHRINE 10; 10 MG/ML; UG/ML
INJECTION, SOLUTION INFILTRATION; PERINEURAL AS NEEDED
Status: DISCONTINUED | OUTPATIENT
Start: 2020-07-28 | End: 2020-07-28 | Stop reason: HOSPADM

## 2020-07-28 RX ORDER — ONDANSETRON 2 MG/ML
4 INJECTION INTRAMUSCULAR; INTRAVENOUS ONCE AS NEEDED
Status: DISCONTINUED | OUTPATIENT
Start: 2020-07-28 | End: 2020-07-28 | Stop reason: HOSPADM

## 2020-07-28 RX ORDER — MAGNESIUM HYDROXIDE 1200 MG/15ML
LIQUID ORAL AS NEEDED
Status: DISCONTINUED | OUTPATIENT
Start: 2020-07-28 | End: 2020-07-28 | Stop reason: HOSPADM

## 2020-07-28 RX ORDER — SODIUM CHLORIDE, SODIUM LACTATE, POTASSIUM CHLORIDE, CALCIUM CHLORIDE 600; 310; 30; 20 MG/100ML; MG/100ML; MG/100ML; MG/100ML
125 INJECTION, SOLUTION INTRAVENOUS CONTINUOUS
Status: DISCONTINUED | OUTPATIENT
Start: 2020-07-28 | End: 2020-07-28 | Stop reason: SDUPTHER

## 2020-07-28 RX ORDER — LIDOCAINE HYDROCHLORIDE 10 MG/ML
INJECTION, SOLUTION EPIDURAL; INFILTRATION; INTRACAUDAL; PERINEURAL AS NEEDED
Status: DISCONTINUED | OUTPATIENT
Start: 2020-07-28 | End: 2020-07-28 | Stop reason: SURG

## 2020-07-28 RX ADMIN — PHENYLEPHRINE HYDROCHLORIDE 100 MCG: 10 INJECTION INTRAVENOUS at 12:46

## 2020-07-28 RX ADMIN — SODIUM CHLORIDE, SODIUM LACTATE, POTASSIUM CHLORIDE, AND CALCIUM CHLORIDE: .6; .31; .03; .02 INJECTION, SOLUTION INTRAVENOUS at 12:21

## 2020-07-28 RX ADMIN — PROPOFOL 120 MG: 10 INJECTION, EMULSION INTRAVENOUS at 12:24

## 2020-07-28 RX ADMIN — LIDOCAINE HYDROCHLORIDE 50 MG: 10 INJECTION, SOLUTION EPIDURAL; INFILTRATION; INTRACAUDAL; PERINEURAL at 12:24

## 2020-07-28 RX ADMIN — ONDANSETRON 4 MG: 2 INJECTION INTRAMUSCULAR; INTRAVENOUS at 12:30

## 2020-07-28 RX ADMIN — PHENYLEPHRINE HYDROCHLORIDE 30 MCG/MIN: 10 INJECTION INTRAVENOUS at 12:47

## 2020-07-28 RX ADMIN — CLINDAMYCIN PHOSPHATE 900 MG: 18 INJECTION, SOLUTION INTRAMUSCULAR; INTRAVENOUS at 12:15

## 2020-07-28 NOTE — DISCHARGE INSTRUCTIONS
[facility]  Dr Johanne Corcoran   [address] [facility], [address]  Phone: [phone]     Postoperative Instructions for Outpatient Surgery     These instructions are being provided by your doctor to give you basic guidelines during your post-op recovery  Please let our office know if your contact information has changed       Please call the office today for an appointment in 6-8 days for suture removal      Dressings: Remove yellow xeroform gauze in 24 hours  Then apply bacitracin ointment 3 times daily for 3 days       Activity Restrictions: Nothing strenuous for 48 hours       Bathing:  May shower tomorrow after dressing removal  Pat incision dry afterwards       Medications:    Resume pre-op medications  You may take tylenol, aleve, or ibuprofen for pain control             Tylenol #3 as needed for pain  Other:  Elevate head of bed at night to sleep over the next 48 hours  May apply a cold compress at 10 minutes intervals over the next 48 hours while awake

## 2020-07-28 NOTE — OP NOTE
OPERATIVE REPORT  PATIENT NAME: Dionicio Meckel    :  1945  MRN: [id number]  Pt Location: [facility]    SURGERY DATE: 2020    Surgeon(s) and Role:     * Shannan Lama MD - Primary     * Fco Gamboa PA-C - Assisting     * Truman Rivera MD - Assisting    Preop Diagnosis:  Basal cell carcinoma, forehead [C44 319]    Post-Op Diagnosis Codes: * Basal cell carcinoma, forehead [C44 319]    Procedure:  1  Preparation Mohs defect/recipient site right superior forehead to prepare for reconstruction by excision of wound margins () 2  Adjacent tissue transfer/forehead, scalp flap reconstruction Mohs defect right superior forehead 6 cm x 4 cm 3  Preparation Mohs defect central forehead by excision of wound margins to prepare for reconstruction () 2  Adjacent tissue transfer/local flap reconstruction Mohs defect of central forehead 2 8 cm x 2 cm   Specimen(s):  * No specimens in log *    Estimated Blood Loss:   Minimal    Drains:  * No LDAs found *    Anesthesia Type:   General/LMA    Operative Indications  Mohs defects of forehead x2    Operative Findings: Mohs defect of forehead/2, right superior forehead and central forehead    Complications:   None    Procedure and Technique:  Haylee Jacobs seen preoperatively in the holding area, the surgical sites were marked with her participation and we reviewed the planned procedure, the risks, complications, and limitations  She was taken to the operating room and underwent induction of anesthesia by the anesthesia personnel  The operative field was prepped and draped in sterile fashion a proper time-out was performed 2 5 loupe magnification was used to aid in visualization  Circumferential defect were infiltrated with lidocaine epinephrine margins incised sharply with a 15 blade in order to remove the cautery artifact and to prepare wounds for reconstruction    The larger wound, right superior forehead, then planned for reconstruction with a scalp/forehead flap  Flap margins were marked out sterile surgical marking pen the margin infiltrated with lidocaine epinephrine  The skin incision was then created, carried out parallel to the hair follicles and through the dermis  Dissection then proceeded through the subcutaneous tissue utilizing a fine tip Bovie cautery  Flap was undermined deep to the galea utilizing a spreading technique with tenotomy scissors  This allowed adequate rotation of the flap to fill the defect  The leading edge of the flap was minimally trimmed to fit, hemostasis assured with the Bovie and the wound was irrigated  The flap was then inset into the defect utilizing 4-0 and 5 0 PDS buried at the level of the deep dermis  This was followed by a few 5 Vicryl used tidy up the wound margins  The skin edges were then reapproximated with 5 O Prolene suture  The remaining defect, the central forehead, was reconstructed with adjacent tissue transfer techniques utilizing local planned flap  The skin margins were incised with 15 blade, undermining performed deep to the galea and the flap advanced to fill wound was thoroughly irrigated hemostasis assured with the Bovie cautery  Closure was then a 5 O Monocryl buried at the level of dermis and this was followed by interrupted 6 0 Prolene suture  The flaps appeared to be fully viable and bacitracin and Xeroform were applied  Patient was transferred to the recovery room     I was present for the entire procedure    Patient Disposition:  PACU     SIGNATURE: Donavan Vargas MD  DATE: July 28, 2020  TIME: 1:29 PM

## 2020-07-28 NOTE — ANESTHESIA POSTPROCEDURE EVALUATION
Post-Op Assessment Note    CV Status:  Stable  Pain Score: 0    Pain management: adequate     Mental Status:  Alert and awake   Hydration Status:  Euvolemic   PONV Controlled:  Controlled   Airway Patency:  Patent   Post Op Vitals Reviewed: Yes      Staff: Anesthesiologist, CRNA           BP  138/63   Temp   96 9   Pulse  78   Resp   16   SpO2   98

## 2020-07-28 NOTE — INTERVAL H&P NOTE
H&P reviewed  After examining the patient I find no changes in the patients condition since the H&P had been written      Vitals:    07/28/20 1157   BP: (!) 185/79   Pulse: 86   Resp: 20   Temp: 98 5 °F (36 9 °C)   SpO2: 99%

## 2020-08-03 ENCOUNTER — OFFICE VISIT (OUTPATIENT)
Dept: PLASTIC SURGERY | Facility: CLINIC | Age: 75
End: 2020-08-03

## 2020-08-03 VITALS — TEMPERATURE: 98.4 F

## 2020-08-03 DIAGNOSIS — C44.329 SQUAMOUS CELL CARCINOMA OF FOREHEAD: ICD-10-CM

## 2020-08-03 DIAGNOSIS — C44.319 BASAL CELL CARCINOMA, FOREHEAD: Primary | ICD-10-CM

## 2020-08-03 PROCEDURE — 3075F SYST BP GE 130 - 139MM HG: CPT | Performed by: PHYSICIAN ASSISTANT

## 2020-08-03 PROCEDURE — 1160F RVW MEDS BY RX/DR IN RCRD: CPT | Performed by: PHYSICIAN ASSISTANT

## 2020-08-03 PROCEDURE — 4040F PNEUMOC VAC/ADMIN/RCVD: CPT | Performed by: PHYSICIAN ASSISTANT

## 2020-08-03 PROCEDURE — 99024 POSTOP FOLLOW-UP VISIT: CPT | Performed by: PHYSICIAN ASSISTANT

## 2020-08-03 PROCEDURE — 3078F DIAST BP <80 MM HG: CPT | Performed by: PHYSICIAN ASSISTANT

## 2020-08-03 NOTE — LETTER
August 3, 2020     Daniela Cadena MD  [facility] [address]    Patient: Kem Austin   YOB: 1945   Date of Visit: 8/3/2020       Dear Dr Yanelis Cruz: Thank you for referring Dianatrevor Occitan to me for evaluation  Below are my notes for this consultation  If you have questions, please do not hesitate to call me  I look forward to following your patient along with you  Sincerely,        Dayton Green PA-C        CC: MD Dayton Lew PA-C  8/3/2020  2:42 PM  Sign when Signing Visit  Assessment/Plan:   Alyssia Reese is a 58-year-old female who is 1 week status post reconstruction of a Mohs defect of the forehead x2 with adjacent tissue transfer/flap  Please see HPI  I have given her instructions on how to use silicone scar gel and to avoid excessive sun exposure  We will see her back in 4-6 weeks or sooner with any concerns  Diagnoses and all orders for this visit:    Basal cell carcinoma, forehead    Squamous cell carcinoma of forehead          Subjective:     Patient ID: Kem Austin is a 66 y o  female  HPI   She denies any complaints regarding her incision areas  Review of Systems   Skin:        As per HPI  Objective:     Physical Exam   Skin:   Incisions are all clean, dry and intact  Flaps are viable  Please see photo

## 2020-08-03 NOTE — PROGRESS NOTES
Assessment/Plan:   Mariella Jacome is a 68-year-old female who is 1 week status post reconstruction of a Mohs defect of the forehead x2 with adjacent tissue transfer/flap  Please see HPI  I have given her instructions on how to use silicone scar gel and to avoid excessive sun exposure  We will see her back in 4-6 weeks or sooner with any concerns  Diagnoses and all orders for this visit:    Basal cell carcinoma, forehead    Squamous cell carcinoma of forehead          Subjective:     Patient ID: Candie Lazo is a 66 y o  female  HPI   She denies any complaints regarding her incision areas  Review of Systems   Skin:        As per HPI  Objective:     Physical Exam   Skin:   Incisions are all clean, dry and intact  Flaps are viable  Please see photo

## 2020-08-17 ENCOUNTER — TELEPHONE (OUTPATIENT)
Dept: FAMILY MEDICINE CLINIC | Facility: MEDICAL CENTER | Age: 75
End: 2020-08-17

## 2020-08-17 NOTE — TELEPHONE ENCOUNTER
----- Message from Sanju Rodriguez sent at 8/15/2020  7:25 PM EDT -----  Regarding: Non-Urgent Medical Question  Contact: [phone]  I had to get pre-surgery blood work on 7/27  I had MOHS surgery on 7/26 & plastic surgery on 7/28/20  Some of the bloodwork you ordered for me was the same & insurance won't pay for it  I got CBC with differential and basic metabolic panel  Your order was for comprehensive metabolic panel, didn't know if that was the same  Please let me know about that one  I will get the TSH, 3rd generation and the lipid panel only, unless the metabolic panel was different than what I had gotten     Thank you,  Morris Leventhal

## 2020-08-20 NOTE — TELEPHONE ENCOUNTER
Pt aware, she does want to have some type of bw that will cover her liver functions  Just let pt know if we are ordering any additional bw

## 2020-08-24 ENCOUNTER — TELEPHONE (OUTPATIENT)
Dept: FAMILY MEDICINE CLINIC | Facility: MEDICAL CENTER | Age: 75
End: 2020-08-24

## 2020-08-24 NOTE — TELEPHONE ENCOUNTER
Pt called stating she injured her ribs when she leaned over the arm rest on her outdoor chairs and the arm rest jabbed her in the ribs/stomach area  Pt has also had diarrhea since 8/14/2020 and has been going 2-3 times daily and it's very yellow  appt in office or virtual? Pt has iphone

## 2020-08-24 NOTE — TELEPHONE ENCOUNTER
Called Adrian Height- No she didn't    She will reach out to the office in the morning and call us back- depending on what he says will determine appt here or VV for the rib strain

## 2020-08-25 ENCOUNTER — TELEPHONE (OUTPATIENT)
Dept: FAMILY MEDICINE CLINIC | Facility: MEDICAL CENTER | Age: 75
End: 2020-08-25

## 2020-08-25 NOTE — TELEPHONE ENCOUNTER
Pt called to let Monae Mendez know that she saw her gastro doctor yesterday  They put her on Questrin  Also wanted her to know that her ribs are still bothering her      Routed to [facility] and [facility]

## 2020-08-26 ENCOUNTER — APPOINTMENT (OUTPATIENT)
Dept: RADIOLOGY | Facility: MEDICAL CENTER | Age: 75
End: 2020-08-26
Payer: MEDICARE

## 2020-08-26 ENCOUNTER — OFFICE VISIT (OUTPATIENT)
Dept: FAMILY MEDICINE CLINIC | Facility: MEDICAL CENTER | Age: 75
End: 2020-08-26
Payer: MEDICARE

## 2020-08-26 VITALS
DIASTOLIC BLOOD PRESSURE: 80 MMHG | HEART RATE: 76 BPM | WEIGHT: 111.2 LBS | RESPIRATION RATE: 16 BRPM | HEIGHT: 62 IN | SYSTOLIC BLOOD PRESSURE: 140 MMHG | BODY MASS INDEX: 20.46 KG/M2 | TEMPERATURE: 97.5 F

## 2020-08-26 DIAGNOSIS — R07.81 RIB PAIN ON LEFT SIDE: ICD-10-CM

## 2020-08-26 DIAGNOSIS — R07.81 RIB PAIN ON RIGHT SIDE: Primary | ICD-10-CM

## 2020-08-26 PROCEDURE — 1036F TOBACCO NON-USER: CPT | Performed by: FAMILY MEDICINE

## 2020-08-26 PROCEDURE — 4040F PNEUMOC VAC/ADMIN/RCVD: CPT | Performed by: FAMILY MEDICINE

## 2020-08-26 PROCEDURE — 99213 OFFICE O/P EST LOW 20 MIN: CPT | Performed by: FAMILY MEDICINE

## 2020-08-26 PROCEDURE — 3077F SYST BP >= 140 MM HG: CPT | Performed by: FAMILY MEDICINE

## 2020-08-26 PROCEDURE — 71101 X-RAY EXAM UNILAT RIBS/CHEST: CPT

## 2020-08-26 PROCEDURE — 3079F DIAST BP 80-89 MM HG: CPT | Performed by: FAMILY MEDICINE

## 2020-08-26 PROCEDURE — 1160F RVW MEDS BY RX/DR IN RCRD: CPT | Performed by: FAMILY MEDICINE

## 2020-08-26 PROCEDURE — 3008F BODY MASS INDEX DOCD: CPT | Performed by: FAMILY MEDICINE

## 2020-08-26 RX ORDER — CHOLESTYRAMINE 4 G/9G
1 POWDER, FOR SUSPENSION ORAL
COMMUNITY
End: 2020-10-21 | Stop reason: ALTCHOICE

## 2020-08-26 NOTE — PROGRESS NOTES
Julissa Hatfield is here for rib pain  Pain started after gettign out of a chair quickly and hit the arm of her cahair  FNot pluritic  [id number] by activity  Not from sleep  Neda Benito the first day but did not help so she did not try again  She has had a few episodes this week where she suddenly felt weak  Was very brief  Lasted 10 minutes or so  There was no associated headache palpitations chest pain shortness of breath dizziness  No precipitating factors  O: /80 (Cuff Size: Standard)   Pulse 76   Temp 97 5 °F (36 4 °C)   Resp 16   Ht 5' 2" (1 575 m)   Wt 50 4 kg (111 lb 3 2 oz)   LMP  (LMP Unknown)   BMI 20 34 kg/m²    Neck no adenopathy thyromegaly bruits  Chest clear with good breath sounds  Cardiac regular rate without murmur  Abdomen benign  Lower left costal margin shows tenderness and possible swelling  Assessment  1  Rib injury-suspect costal injury  2  Episodes of weakness-no obvious etiology  I advised her to discontinue to stay hydrated  She will call me if persists  Plan  Check left rib films  As above

## 2020-08-28 ENCOUNTER — TELEPHONE (OUTPATIENT)
Dept: FAMILY MEDICINE CLINIC | Facility: MEDICAL CENTER | Age: 75
End: 2020-08-28

## 2020-08-28 NOTE — TELEPHONE ENCOUNTER
Let her know her x-ray shows no fractures  It is the costochondral junction as we discussed    I do not see any problem with her taking the Questran

## 2020-08-28 NOTE — TELEPHONE ENCOUNTER
Calling for x ray results and wants to know if she should continue her Questran since she has subclinical hypothyroidism

## 2020-09-11 ENCOUNTER — APPOINTMENT (OUTPATIENT)
Dept: LAB | Facility: MEDICAL CENTER | Age: 75
End: 2020-09-11
Payer: MEDICARE

## 2020-09-11 DIAGNOSIS — I10 ESSENTIAL HYPERTENSION: ICD-10-CM

## 2020-09-11 DIAGNOSIS — E03.9 ACQUIRED HYPOTHYROIDISM: ICD-10-CM

## 2020-09-11 LAB
ALBUMIN SERPL BCP-MCNC: 3.6 G/DL (ref 3.5–5)
ALP SERPL-CCNC: 70 U/L (ref 46–116)
ALT SERPL W P-5'-P-CCNC: 21 U/L (ref 12–78)
ANION GAP SERPL CALCULATED.3IONS-SCNC: 7 MMOL/L (ref 4–13)
AST SERPL W P-5'-P-CCNC: 18 U/L (ref 5–45)
BASOPHILS # BLD AUTO: 0.08 THOUSANDS/ΜL (ref 0–0.1)
BASOPHILS NFR BLD AUTO: 2 % (ref 0–1)
BILIRUB SERPL-MCNC: 0.38 MG/DL (ref 0.2–1)
BUN SERPL-MCNC: 17 MG/DL (ref 5–25)
CALCIUM SERPL-MCNC: 8.9 MG/DL (ref 8.3–10.1)
CHLORIDE SERPL-SCNC: 107 MMOL/L (ref 100–108)
CHOLEST SERPL-MCNC: 174 MG/DL (ref 50–200)
CO2 SERPL-SCNC: 28 MMOL/L (ref 21–32)
CREAT SERPL-MCNC: 0.96 MG/DL (ref 0.6–1.3)
EOSINOPHIL # BLD AUTO: 0.38 THOUSAND/ΜL (ref 0–0.61)
EOSINOPHIL NFR BLD AUTO: 7 % (ref 0–6)
ERYTHROCYTE [DISTWIDTH] IN BLOOD BY AUTOMATED COUNT: 13.7 % (ref 11.6–15.1)
GFR SERPL CREATININE-BSD FRML MDRD: 57 ML/MIN/1.73SQ M
GLUCOSE P FAST SERPL-MCNC: 85 MG/DL (ref 65–99)
HCT VFR BLD AUTO: 40.7 % (ref 34.8–46.1)
HDLC SERPL-MCNC: 101 MG/DL
HGB BLD-MCNC: 12.7 G/DL (ref 11.5–15.4)
IMM GRANULOCYTES # BLD AUTO: 0.01 THOUSAND/UL (ref 0–0.2)
IMM GRANULOCYTES NFR BLD AUTO: 0 % (ref 0–2)
LDLC SERPL CALC-MCNC: 63 MG/DL (ref 0–100)
LYMPHOCYTES # BLD AUTO: 1.49 THOUSANDS/ΜL (ref 0.6–4.47)
LYMPHOCYTES NFR BLD AUTO: 29 % (ref 14–44)
MCH RBC QN AUTO: 28.9 PG (ref 26.8–34.3)
MCHC RBC AUTO-ENTMCNC: 31.2 G/DL (ref 31.4–37.4)
MCV RBC AUTO: 93 FL (ref 82–98)
MONOCYTES # BLD AUTO: 0.55 THOUSAND/ΜL (ref 0.17–1.22)
MONOCYTES NFR BLD AUTO: 11 % (ref 4–12)
NEUTROPHILS # BLD AUTO: 2.63 THOUSANDS/ΜL (ref 1.85–7.62)
NEUTS SEG NFR BLD AUTO: 51 % (ref 43–75)
NONHDLC SERPL-MCNC: 73 MG/DL
NRBC BLD AUTO-RTO: 0 /100 WBCS
PLATELET # BLD AUTO: 260 THOUSANDS/UL (ref 149–390)
PMV BLD AUTO: 10.3 FL (ref 8.9–12.7)
POTASSIUM SERPL-SCNC: 4.3 MMOL/L (ref 3.5–5.3)
PROT SERPL-MCNC: 7.3 G/DL (ref 6.4–8.2)
RBC # BLD AUTO: 4.4 MILLION/UL (ref 3.81–5.12)
SODIUM SERPL-SCNC: 142 MMOL/L (ref 136–145)
TRIGL SERPL-MCNC: 51 MG/DL
TSH SERPL DL<=0.05 MIU/L-ACNC: 3.65 UIU/ML (ref 0.36–3.74)
WBC # BLD AUTO: 5.14 THOUSAND/UL (ref 4.31–10.16)

## 2020-09-11 PROCEDURE — 85025 COMPLETE CBC W/AUTO DIFF WBC: CPT

## 2020-09-11 PROCEDURE — 36415 COLL VENOUS BLD VENIPUNCTURE: CPT

## 2020-09-11 PROCEDURE — 80061 LIPID PANEL: CPT

## 2020-09-11 PROCEDURE — 84443 ASSAY THYROID STIM HORMONE: CPT

## 2020-09-11 PROCEDURE — 80053 COMPREHEN METABOLIC PANEL: CPT

## 2020-09-14 ENCOUNTER — OFFICE VISIT (OUTPATIENT)
Dept: DERMATOLOGY | Facility: CLINIC | Age: 75
End: 2020-09-14
Payer: MEDICARE

## 2020-09-14 ENCOUNTER — TELEPHONE (OUTPATIENT)
Dept: FAMILY MEDICINE CLINIC | Facility: MEDICAL CENTER | Age: 75
End: 2020-09-14

## 2020-09-14 VITALS — TEMPERATURE: 96.3 F

## 2020-09-14 DIAGNOSIS — L82.1 SEBORRHEIC KERATOSIS: ICD-10-CM

## 2020-09-14 DIAGNOSIS — Z85.828 HISTORY OF SKIN CANCER: ICD-10-CM

## 2020-09-14 DIAGNOSIS — Z13.89 SCREENING FOR SKIN CONDITION: ICD-10-CM

## 2020-09-14 DIAGNOSIS — L98.9 UNKNOWN SKIN LESION: Primary | ICD-10-CM

## 2020-09-14 PROCEDURE — 88305 TISSUE EXAM BY PATHOLOGIST: CPT | Performed by: STUDENT IN AN ORGANIZED HEALTH CARE EDUCATION/TRAINING PROGRAM

## 2020-09-14 PROCEDURE — 11102 TANGNTL BX SKIN SINGLE LES: CPT | Performed by: DERMATOLOGY

## 2020-09-14 PROCEDURE — 99213 OFFICE O/P EST LOW 20 MIN: CPT | Performed by: DERMATOLOGY

## 2020-09-14 PROCEDURE — 11103 TANGNTL BX SKIN EA SEP/ADDL: CPT | Performed by: DERMATOLOGY

## 2020-09-14 NOTE — PATIENT INSTRUCTIONS
Skin lesions await results of biopsy both lesions would be amenable to curettage  Seborrheic keratosis patient reassured these are normal growths we acquire with age no treatment needed  History of skin cancer in no recurrence nothing else atypical sunblock recommended follow-up in 6 months  Screening for dermatologic disorders nothing else of concern noted on complete exam follow-up in 6 months  Wound care instructions given to patient

## 2020-09-14 NOTE — PROGRESS NOTES
[facility]  [address] [place] [id number]  [phone]  [phone]     MRN: [id number] : 1945  Encounter: [id number]  Patient Information: Rosemary Lora  Chief complaint:  Six-month checkup    History of present illness:  78-year-old female with previous history of Mohs surgery and multiple skin cancers on her forehead presents for her checkup patient with both basal cell carcinoma and squamous cell carcinoma in situ on the forehead no specific concerns noted  Past Medical History:   Diagnosis Date    Acne     Basal cell carcinoma 2020    right lower forehead    BCC (basal cell carcinoma of skin) 2020    mid forehead    Benign neoplasm of skin     GERD (gastroesophageal reflux disease)     Hypertension     Inflamed seborrheic keratosis     Irritable bowel syndrome     Malignant neoplasm of skin of face     Nonmelanoma skin cancer     Last Assessed:17    Squamous cell skin cancer 2020    In situ, left lower forehead    Tachycardia     Telogen effluvium     Temporomandibular joint disorder     Vertigo      Past Surgical History:   Procedure Laterality Date    APPENDECTOMY      CHOLECYSTECTOMY      COLONOSCOPY  2019    COMPLEX WOUND CLOSURE TO EXTREMITY N/A 2020    Procedure: COMPLEX CLOSURE MID FOREHEAD;  Surgeon: Luisa Briones MD;  [facility];  Service: Plastics    ESOPHAGOGASTRODUODENOSCOPY  2009    FLAP LOCAL HEAD / NECK N/A 2020    Procedure: FLAP MID FOREHEAD;  Surgeon: Luisa Briones MD;  [facility];  Service: Plastics    HYSTERECTOMY  1987    MALIGNANT SKIN LESION EXCISION      Excision of Lesion Face Malignant-[facility] Forehead    MOHS RECONSTRUCTION N/A 2020    Procedure: RECONSTRUCTION MOHS DEFECT MID FOREHEAD;  Surgeon: Luisa Briones MD;  [facility];  Service: Plastics    MOHS SURGERY  2020    Right &left lower forehead, mid forehead    OOPHORECTOMY Bilateral 1987    ROTATOR CUFF REPAIR      SKIN BIOPSY       Social History   Social History     Substance and Sexual Activity   Alcohol Use No     Social History     Substance and Sexual Activity   Drug Use No     Social History     Tobacco Use   Smoking Status Never Smoker   Smokeless Tobacco Never Used     Family History   Problem Relation Age of Onset    Hypertension Mother     Osteoporosis Mother     Skin cancer Mother     Hypertension Father     Skin cancer Father     Cancer Maternal Grandmother     Uterine cancer Maternal Grandmother 34    Cancer Paternal Grandmother     Uterine cancer Paternal Grandmother 72    Cancer Paternal Aunt     Uterine cancer Paternal Aunt 54    Diabetes type II Maternal Grandfather     Skin cancer Sister 68    No Known Problems Daughter     No Known Problems Paternal Aunt     No Known Problems Paternal Aunt     No Known Problems Maternal Aunt      Meds/Allergies   Allergies   Allergen Reactions    Cortisone Hypertension, Other (See Comments), Shortness Of Breath and Tachycardia    Acebutolol     Amlodipine     Atenolol     Azithromycin     Banana GI Intolerance    Bisphosphonates      [facility] - [id number]: iriitis    Candesartan     Clarithromycin     Erythromycin     Fosinopril     Irbesartan     Levofloxacin     Losartan     Methylprednisolone Hypertension and Tachycardia    Metoprolol     Nisoldipine     Olmesartan     Propranolol     Sulfamethoxazole-Trimethoprim Nausea Only    Timolol     Penicillins Rash       Meds:  Prior to Admission medications    Medication Sig Start Date End Date Taking?  Authorizing Provider   Alpha-D-Galactosidase Nafisa Simpson) TABS Take by mouth   Yes Historical Provider, MD   Alum Hydroxide-Mag Trisilicate (GAVISCON) 33-78 0 MG CHEW Chew   Yes Historical Provider, MD   ascorbic acid (VITAMIN C) 500 mg tablet Take by mouth   Yes Historical Provider, MD   Cholecalciferol 400 units TABS Take by mouth   Yes Historical Provider, MD   ciclopirox (PENLAC) 8 % solution Apply topically   Yes Historical Provider, MD   cimetidine (TAGAMET) 200 mg tablet Take 200 mg by mouth daily   Yes Historical Provider, MD   diltiazem (CARDIZEM CD) 240 mg 24 hr capsule Take 1 capsule (240 mg total) by mouth daily 5/20/20  Yes Alyssa Medley MD   estradiol (ESTRACE) 0 1 mg/g vaginal cream Insert 1 g into the vagina once a week 5/8/19  Yes Hank Kirby MD   fluticasone Roetta Henao) 50 mcg/act nasal spray 2 sprays into each nostril daily 8/20/18  Yes Alyssa Medley MD   Homeopathic Products ([address] NA) into each nostril   Yes Historical Provider, MD   lactase (LACTAID) 3,000 units tablet Take by mouth   Yes Historical Provider, MD   Minoxidil (ROGAINE WOMENS) 5 % FOAM Apply topically   Yes Historical Provider, MD   Multiple Vitamins-Minerals (CENTRUM SILVER ULTRA WOMENS PO) Take by mouth   Yes Historical Provider, MD   polyethylene glycol-propylene glycol (SYSTANE) 0 4-0 3 %    Yes Historical Provider, MD   PREMARIN vaginal cream  8/15/18  Yes Historical Provider, MD   acetaminophen-codeine (TYLENOL #3) 300-30 mg per tablet Take 1 tablet by mouth every 4 (four) hours as needed for moderate pain for up to 6 doses  Patient not taking: Reported on 9/14/2020 7/28/20   Lissy eMra PA-C   cholestyramine Abbie Number) 4 g packet Take 1 packet by mouth 3 (three) times a day with meals    Historical Provider, MD   tobramycin-dexamethasone Wilhemena Poag) ophthalmic suspension  3/3/20   Historical Provider, MD       Subjective:     Review of Systems:    General: negative for - chills, fatigue, fever,  weight gain or weight loss  Psychological: negative for - anxiety, behavioral disorder, concentration difficulties, decreased libido, depression, irritability, memory difficulties, mood swings, sleep disturbances or suicidal ideation  ENT: negative for - hearing difficulties , nasal congestion, nasal discharge, oral lesions, sinus pain, sneezing, sore throat  Allergy and Immunology: negative for - hives, insect bite sensitivity,  Hematological and Lymphatic: negative for - bleeding problems, blood clots,bruising, swollen lymph nodes  Endocrine: negative for - hair pattern changes, hot flashes, malaise/lethargy, mood swings, palpitations, polydipsia/polyuria, skin changes, temperature intolerance or unexpected weight change  Respiratory: negative for - cough, hemoptysis, orthopnea, shortness of breath, or wheezing  Cardiovascular: negative for - chest pain, dyspnea on exertion, edema,  Gastrointestinal: negative for - abdominal pain, nausea/vomiting  Genito-Urinary: negative for - dysuria, incontinence, irregular/heavy menses or urinary frequency/urgency  Musculoskeletal: negative for - gait disturbance, joint pain, joint stiffness, joint swelling, muscle pain, muscular weakness  Dermatological:  As in HPI  Neurological: negative for confusion, dizziness, headaches, impaired coordination/balance, memory loss, numbness/tingling, seizures, speech problems, tremors or weakness       Objective:   Temp (!) 96 3 °F (35 7 °C)   LMP  (LMP Unknown)     Physical Exam:    General Appearance:    Alert, cooperative, no distress   Head:    Normocephalic, without obvious abnormality, atraumatic           Skin:   A full skin exam was performed including scalp, head scalp, eyes, ears, nose, lips, neck, chest, axilla, abdomen, back, buttocks, bilateral upper extremities, bilateral lower extremities, hands, feet, fingers, toes, fingernails, and toenails previous site of excision and repair healing well there is a 3 mm pearly macule noted on the left upper back as well as on the left shin previous sites of skin skin cancers otherwise well-healed without recurrence normal keratotic papules with greasy stuck on appearance          Shave Biopsy Procedure Note    Pre-operative Diagnosis:  Rule out basal cell carcinoma    Plan:  1   Instructed to keep the wound dry and covered for 24 and clean thereafter  2  Warning signs of infection were reviewed  3  Recommended that the patient use OTC acetaminophen as needed for pain  4  Return  Pending results of biopsy(ies)    Locations:  Left upper back and left shin    Indications:  Suspicious lesions    Anesthesia: Lidocaine 1% with epinephrine without added sodium bicarbonate    Procedure Details     Patient informed of the risks (including bleeding and infection) and benefits of the   procedure and Verbal informed consent obtained  The lesion and surrounding area were given a sterile prep using alcohol and draped in the usual sterile fashion  A Blue blade razor was used to obtain a specimen  Hemostasis achieved with aluminum chloride  Petrolatum and a sterile dressing applied  The specimen was sent for pathologic examination  The patient tolerated the procedure(s) well  Complications:  none  Assessment:     1  Unknown skin lesion     2  Seborrheic keratosis     3  Screening for skin condition     4  History of skin cancer           Plan:   Skin lesions await results of biopsy both lesions would be amenable to curettage  Seborrheic keratosis patient reassured these are normal growths we acquire with age no treatment needed  History of skin cancer in no recurrence nothing else atypical sunblock recommended follow-up in 6 months  Screening for dermatologic disorders nothing else of concern noted on complete exam follow-up in 6 months    Joanna Graham MD  9/14/2020,11:23 AM    Portions of the record may have been created with voice recognition software   Occasional wrong word or "sound a like" substitutions may have occurred due to the inherent limitations of voice recognition software   Read the chart carefully and recognize, using context, where substitutions have occurred

## 2020-09-14 NOTE — TELEPHONE ENCOUNTER
----- Message from Guzman Romero MD sent at 9/12/2020  9:05 PM EDT -----  Notify blood work okay; will review at  appointment

## 2020-09-22 ENCOUNTER — OFFICE VISIT (OUTPATIENT)
Dept: DERMATOLOGY | Facility: CLINIC | Age: 75
End: 2020-09-22
Payer: MEDICARE

## 2020-09-22 VITALS — TEMPERATURE: 97.9 F

## 2020-09-22 DIAGNOSIS — C44.719 BASAL CELL CARCINOMA (BCC) OF LEFT LOWER EXTREMITY: ICD-10-CM

## 2020-09-22 DIAGNOSIS — C44.519 BASAL CELL CARCINOMA (BCC) OF UPPER BACK: Primary | ICD-10-CM

## 2020-09-22 PROCEDURE — 17260 DSTRJ MAL LES T/A/L 0.5 CM/<: CPT | Performed by: DERMATOLOGY

## 2020-09-22 NOTE — PROGRESS NOTES
[facility] DERMATOLOGY  [address] [place], [place] [address]marty Mckoy [facility] [address]  [phone]  [phone]     MRN: [id number] : 1945  Encounter: [id number]  Patient Information: Paige Padilla    Subjective:     64-year-old female presents for planned removal of previously biopsied basal cell carcinoma left lower leg and left upper     Objective:   Temp 97 9 °F (36 6 °C)   LMP  (LMP Unknown)     Physical Exam:    General Appearance:    Alert, cooperative, no distress   Skin:   Previous sites of biopsy noted  Procedure: Curettage & Electrodessication basal cell carcinoma  The reasons for the procedure were explained to the patient  The benefits and risks of the procedure were explained to the patient, including bleeding, infection, incomplete removal, prolonged anesthesia (weeks to months) and rarely nerve damage  The patient is aware that a scar will result from the procedure  The consent for the procedure was obtained verbally and in writing  Lesion Site:  Left upper back Curetted Area Size (mm):3                        Left lower                                                   3    After alcohol prep 1% lidocaine with epinephrine anesthesia  Using a sterile curette, the appropriate area was curetted  The area was curetted and electrodesiccated x3  Final defect size: 4mm/4mm respectively    The wound was left to heal by secondary intention  The wound was cleansed then covered with a dressing  Wound care instructions were verbally given and in writing  I performed the entire procedure  Patient tolerated procedure well  Assessment:     1  Basal cell carcinoma (BCC) of upper back     2  Basal cell carcinoma (BCC) of left lower extremity           Plan:   Wound care instructions given to patient        Prior to Admission medications    Medication Sig Start Date End Date Taking?  Authorizing Provider   acetaminophen-codeine (TYLENOL #3) 300-30 mg per tablet Take 1 tablet by mouth every 4 (four) hours as needed for moderate pain for up to 6 doses  Patient not taking: Reported on 9/14/2020 7/28/20   Johnny Chester PA-C   Alpha-D-Galactosidase [facility]) TABS Take by mouth    Historical Provider, MD   Alum Hydroxide-Mag Trisilicate (GAVISCON) 47-42 6 MG CHEW Chew    Historical Provider, MD   ascorbic acid (VITAMIN C) 500 mg tablet Take by mouth    Historical Provider, MD   Cholecalciferol 400 units TABS Take by mouth    Historical Provider, MD   cholestyramine (QUESTRAN) 4 g packet Take 1 packet by mouth 3 (three) times a day with meals    Historical Provider, MD   ciclopirox (PENLAC) 8 % solution Apply topically    Historical Provider, MD   cimetidine (TAGAMET) 200 mg tablet Take 200 mg by mouth daily    Historical Provider, MD   diltiazem (CARDIZEM CD) 240 mg 24 hr capsule Take 1 capsule (240 mg total) by mouth daily 5/20/20   Aly Carney MD   estradiol (ESTRACE) 0 1 mg/g vaginal cream Insert 1 g into the vagina once a week 5/8/19   David Peters MD   fluticasone Dendionicio Schwalbe) 50 mcg/act nasal spray 2 sprays into each nostril daily 8/20/18   Aly Carney MD   Homeopathic Products ([address] NA) into each nostril    Historical Provider, MD   lactase (LACTAID) 3,000 units tablet Take by mouth    Historical Provider, MD   Minoxidil (ROGAINE WOMENS) 5 % FOAM Apply topically    Historical Provider, MD   Multiple Vitamins-Minerals (CENTRUM SILVER ULTRA WOMENS PO) Take by mouth    Historical Provider, MD   polyethylene glycol-propylene glycol (SYSTANE) 0 4-0 3 %     Historical Provider, MD   PREMARIN vaginal cream  8/15/18   Historical Provider, MD   tobramycin-dexamethasone Golden Bevels) ophthalmic suspension  3/3/20   Historical Provider, MD     Allergies   Allergen Reactions    Cortisone Hypertension, Other (See Comments), Shortness Of Breath and Tachycardia    Acebutolol     Amlodipine     Atenolol     Azithromycin     Banana GI Intolerance    Bisphosphonates Annotation - [id number]: iriitis    Candesartan     Clarithromycin     Erythromycin     Fosinopril     Irbesartan     Levofloxacin     Losartan     Methylprednisolone Hypertension and Tachycardia    Metoprolol     Nisoldipine     Olmesartan     Propranolol     Sulfamethoxazole-Trimethoprim Nausea Only    Timolol     Penicillins Rash       Zuhair Cheney MD  9/22/2020,1:19 PM    Portions of the record may have been created with voice recognition software   Occasional wrong word or "sound a like" substitutions may have occurred due to the inherent limitations of voice recognition software   Read the chart carefully and recognize, using context, where substitutions have occurred

## 2020-09-25 ENCOUNTER — OFFICE VISIT (OUTPATIENT)
Dept: PLASTIC SURGERY | Facility: CLINIC | Age: 75
End: 2020-09-25

## 2020-09-25 VITALS — TEMPERATURE: 97.5 F

## 2020-09-25 DIAGNOSIS — C44.319 BASAL CELL CARCINOMA, FOREHEAD: Primary | ICD-10-CM

## 2020-09-25 DIAGNOSIS — C44.329 SQUAMOUS CELL CARCINOMA OF FOREHEAD: ICD-10-CM

## 2020-09-25 PROCEDURE — 99024 POSTOP FOLLOW-UP VISIT: CPT | Performed by: PHYSICIAN ASSISTANT

## 2020-09-25 NOTE — PROGRESS NOTES
Assessment/Plan:   Christianne Mccarthy is a 68-year-old female who is 2 months status post reconstruction of a Mohs defect of the forehead x2 with adjacent tissue transfer/flap done in conjunction with Dr Alessandra Mccormick  Please see HPI    she had 2 spitting sutures which were trimmed  She will continue with silicone scar gel and avoid excessive sun exposure  We will see her back in 2-3 months or sooner with any concerns  Diagnoses and all orders for this visit:    Basal cell carcinoma, forehead    Squamous cell carcinoma of forehead          Subjective:     Patient ID: Anshu Diehl is a 66 y o  female  HPI   She reports couple sore areas over her scar  She thinks maybe there sutures working their way out  Review of Systems   Skin:        As per HPI  Objective:     Physical Exam  Skin:     Comments: Forehead scar is healing well  No hypertrophy or pigment changes noted  There are 2 spitting sutures which were trimmed  Please see photo

## 2020-09-25 NOTE — LETTER
September 25, 2020     Guzman Romero MD  [facility] [address]    Patient: Rosemary Lora   YOB: 1945   Date of Visit: 9/25/2020       Dear Dr Lilia Hernandez: Thank you for referring Eva Houston to me for evaluation  Below are my notes for this consultation  If you have questions, please do not hesitate to call me  I look forward to following your patient along with you  Sincerely,        Derek Cortes PA-C        CC: MD Derek Hayes PA-C  9/25/2020 10:37 AM  Sign when Signing Visit  Assessment/Plan:   Ahsan Friday is a 75-year-old female who is 2 months status post reconstruction of a Mohs defect of the forehead x2 with adjacent tissue transfer/flap done in conjunction with Dr Dwaine Aldana  Please see HPI    she had 2 spitting sutures which were trimmed  She will continue with silicone scar gel and avoid excessive sun exposure  We will see her back in 2-3 months or sooner with any concerns  Diagnoses and all orders for this visit:    Basal cell carcinoma, forehead    Squamous cell carcinoma of forehead          Subjective:     Patient ID: Rosemary Lora is a 66 y o  female  HPI   She reports couple sore areas over her scar  She thinks maybe there sutures working their way out  Review of Systems   Skin:        As per HPI  Objective:     Physical Exam  Skin:     Comments: Forehead scar is healing well  No hypertrophy or pigment changes noted  There are 2 spitting sutures which were trimmed  Please see photo

## 2020-10-19 ENCOUNTER — APPOINTMENT (OUTPATIENT)
Dept: LAB | Facility: MEDICAL CENTER | Age: 75
End: 2020-10-19
Payer: MEDICARE

## 2020-10-19 ENCOUNTER — TRANSCRIBE ORDERS (OUTPATIENT)
Dept: ADMINISTRATIVE | Facility: HOSPITAL | Age: 75
End: 2020-10-19

## 2020-10-19 DIAGNOSIS — K58.9 IRRITABLE BOWEL SYNDROME, UNSPECIFIED TYPE: ICD-10-CM

## 2020-10-19 DIAGNOSIS — K58.9 IRRITABLE BOWEL SYNDROME, UNSPECIFIED TYPE: Primary | ICD-10-CM

## 2020-10-19 PROCEDURE — 82656 EL-1 FECAL QUAL/SEMIQ: CPT

## 2020-10-21 ENCOUNTER — OFFICE VISIT (OUTPATIENT)
Dept: FAMILY MEDICINE CLINIC | Facility: MEDICAL CENTER | Age: 75
End: 2020-10-21
Payer: MEDICARE

## 2020-10-21 VITALS
DIASTOLIC BLOOD PRESSURE: 78 MMHG | HEART RATE: 60 BPM | RESPIRATION RATE: 16 BRPM | SYSTOLIC BLOOD PRESSURE: 146 MMHG | HEIGHT: 62 IN | TEMPERATURE: 97.4 F | WEIGHT: 112.8 LBS | BODY MASS INDEX: 20.76 KG/M2

## 2020-10-21 DIAGNOSIS — Z23 NEED FOR SHINGLES VACCINE: ICD-10-CM

## 2020-10-21 DIAGNOSIS — I10 ESSENTIAL HYPERTENSION: ICD-10-CM

## 2020-10-21 DIAGNOSIS — E03.8 SUBCLINICAL HYPOTHYROIDISM: ICD-10-CM

## 2020-10-21 DIAGNOSIS — R10.9 FLANK PAIN: Primary | ICD-10-CM

## 2020-10-21 LAB
SL AMB  POCT GLUCOSE, UA: NORMAL
SL AMB LEUKOCYTE ESTERASE,UA: NORMAL
SL AMB POCT BILIRUBIN,UA: NORMAL
SL AMB POCT BLOOD,UA: NORMAL
SL AMB POCT KETONES,UA: NORMAL
SL AMB POCT NITRITE,UA: NORMAL
SL AMB POCT PH,UA: 6
SL AMB POCT SPECIFIC GRAVITY,UA: 1.01
SL AMB POCT URINE PROTEIN: NORMAL
SL AMB POCT UROBILINOGEN: 3.5

## 2020-10-21 PROCEDURE — 81002 URINALYSIS NONAUTO W/O SCOPE: CPT | Performed by: FAMILY MEDICINE

## 2020-10-21 PROCEDURE — 99214 OFFICE O/P EST MOD 30 MIN: CPT | Performed by: FAMILY MEDICINE

## 2020-10-21 RX ORDER — DILTIAZEM HYDROCHLORIDE 240 MG/1
240 CAPSULE, COATED, EXTENDED RELEASE ORAL DAILY
Qty: 90 CAPSULE | Refills: 1 | Status: SHIPPED | OUTPATIENT
Start: 2020-10-21 | End: 2020-12-02 | Stop reason: SDUPTHER

## 2020-10-21 RX ORDER — A/SINGAPORE/GP1908/2015 IVR-180 (AN A/MICHIGAN/45/2015 (H1N1)PDM09-LIKE VIRUS, A/HONG KONG/4801/2014, NYMC X-263B (H3N2) (AN A/HONG KONG/4801/2014-LIKE VIRUS), AND B/BRISBANE/60/2008, WILD TYPE (A B/BRISBANE/60/2008-LIKE VIRUS) 15; 15; 15 UG/.5ML; UG/.5ML; UG/.5ML
INJECTION, SUSPENSION INTRAMUSCULAR
COMMUNITY
Start: 2020-10-13 | End: 2020-11-11 | Stop reason: ALTCHOICE

## 2020-10-22 DIAGNOSIS — I10 ESSENTIAL HYPERTENSION: ICD-10-CM

## 2020-10-23 RX ORDER — DILTIAZEM HYDROCHLORIDE 240 MG/1
240 CAPSULE, COATED, EXTENDED RELEASE ORAL DAILY
Qty: 90 CAPSULE | Refills: 1 | OUTPATIENT
Start: 2020-10-23

## 2020-10-26 ENCOUNTER — HOSPITAL ENCOUNTER (OUTPATIENT)
Dept: RADIOLOGY | Facility: MEDICAL CENTER | Age: 75
Discharge: HOME | End: 2020-10-26
Payer: MEDICARE

## 2020-10-26 VITALS — HEIGHT: 62 IN | BODY MASS INDEX: 20.61 KG/M2 | WEIGHT: 112 LBS

## 2020-10-26 DIAGNOSIS — Z12.31 VISIT FOR SCREENING MAMMOGRAM: ICD-10-CM

## 2020-10-26 DIAGNOSIS — Z12.31 ENCOUNTER FOR SCREENING MAMMOGRAM FOR BREAST CANCER: ICD-10-CM

## 2020-10-26 PROCEDURE — 77067 SCR MAMMO BI INCL CAD: CPT

## 2020-10-26 PROCEDURE — 77063 BREAST TOMOSYNTHESIS BI: CPT

## 2020-10-27 LAB — ELASTASE PANC STL-MCNT: 274 UG ELAST./G

## 2020-10-28 ENCOUNTER — OFFICE VISIT (OUTPATIENT)
Dept: DERMATOLOGY | Facility: CLINIC | Age: 75
End: 2020-10-28
Payer: MEDICARE

## 2020-10-28 VITALS — TEMPERATURE: 97.5 F

## 2020-10-28 DIAGNOSIS — C44.519 BASAL CELL CARCINOMA (BCC) OF UPPER BACK: Primary | ICD-10-CM

## 2020-10-28 DIAGNOSIS — C44.719 BASAL CELL CARCINOMA (BCC) OF LEFT LOWER EXTREMITY: ICD-10-CM

## 2020-10-28 PROCEDURE — 99212 OFFICE O/P EST SF 10 MIN: CPT | Performed by: DERMATOLOGY

## 2020-11-04 ENCOUNTER — TELEPHONE (OUTPATIENT)
Dept: FAMILY MEDICINE CLINIC | Facility: MEDICAL CENTER | Age: 75
End: 2020-11-04

## 2020-11-05 ENCOUNTER — TELEPHONE (OUTPATIENT)
Dept: FAMILY MEDICINE CLINIC | Facility: MEDICAL CENTER | Age: 75
End: 2020-11-05

## 2020-11-06 ENCOUNTER — OFFICE VISIT (OUTPATIENT)
Dept: FAMILY MEDICINE CLINIC | Facility: MEDICAL CENTER | Age: 75
End: 2020-11-06
Payer: MEDICARE

## 2020-11-06 VITALS
OXYGEN SATURATION: 98 % | BODY MASS INDEX: 20.65 KG/M2 | HEART RATE: 79 BPM | WEIGHT: 112.2 LBS | TEMPERATURE: 97.8 F | DIASTOLIC BLOOD PRESSURE: 80 MMHG | SYSTOLIC BLOOD PRESSURE: 150 MMHG | HEIGHT: 62 IN

## 2020-11-06 DIAGNOSIS — N39.0 URINARY TRACT INFECTION WITHOUT HEMATURIA, SITE UNSPECIFIED: ICD-10-CM

## 2020-11-06 DIAGNOSIS — R39.9 URINARY SYMPTOM OR SIGN: Primary | ICD-10-CM

## 2020-11-06 DIAGNOSIS — N39.0 URINARY TRACT INFECTION IN FEMALE: ICD-10-CM

## 2020-11-06 LAB
BACTERIA UR QL AUTO: NORMAL /HPF
BILIRUB UR QL STRIP: NEGATIVE
CLARITY UR: CLEAR
COLOR UR: YELLOW
GLUCOSE UR STRIP-MCNC: NEGATIVE MG/DL
HGB UR QL STRIP.AUTO: NEGATIVE
HYALINE CASTS #/AREA URNS LPF: NORMAL /LPF
KETONES UR STRIP-MCNC: NEGATIVE MG/DL
LEUKOCYTE ESTERASE UR QL STRIP: ABNORMAL
NITRITE UR QL STRIP: NEGATIVE
NON-SQ EPI CELLS URNS QL MICRO: NORMAL /HPF
PH UR STRIP.AUTO: 6 [PH]
PROT UR STRIP-MCNC: NEGATIVE MG/DL
RBC #/AREA URNS AUTO: NORMAL /HPF
SL AMB  POCT GLUCOSE, UA: ABNORMAL
SL AMB LEUKOCYTE ESTERASE,UA: ABNORMAL
SL AMB POCT BILIRUBIN,UA: ABNORMAL
SL AMB POCT BLOOD,UA: ABNORMAL
SL AMB POCT KETONES,UA: ABNORMAL
SL AMB POCT NITRITE,UA: ABNORMAL
SL AMB POCT PH,UA: 6
SL AMB POCT SPECIFIC GRAVITY,UA: 1
SL AMB POCT URINE PROTEIN: ABNORMAL
SL AMB POCT UROBILINOGEN: 3.5
SP GR UR STRIP.AUTO: 1.01 (ref 1–1.03)
UROBILINOGEN UR QL STRIP.AUTO: 0.2 E.U./DL
WBC #/AREA URNS AUTO: NORMAL /HPF

## 2020-11-06 PROCEDURE — 81001 URINALYSIS AUTO W/SCOPE: CPT | Performed by: NURSE PRACTITIONER

## 2020-11-06 PROCEDURE — 99213 OFFICE O/P EST LOW 20 MIN: CPT | Performed by: NURSE PRACTITIONER

## 2020-11-06 PROCEDURE — 81002 URINALYSIS NONAUTO W/O SCOPE: CPT | Performed by: NURSE PRACTITIONER

## 2020-11-06 RX ORDER — NITROFURANTOIN 25; 75 MG/1; MG/1
100 CAPSULE ORAL 2 TIMES DAILY
Qty: 10 CAPSULE | Refills: 0 | Status: SHIPPED | OUTPATIENT
Start: 2020-11-06 | End: 2021-08-11

## 2020-11-10 ENCOUNTER — TELEPHONE (OUTPATIENT)
Dept: FAMILY MEDICINE CLINIC | Facility: MEDICAL CENTER | Age: 75
End: 2020-11-10

## 2020-11-11 ENCOUNTER — OFFICE VISIT (OUTPATIENT)
Dept: FAMILY MEDICINE CLINIC | Facility: MEDICAL CENTER | Age: 75
End: 2020-11-11
Payer: MEDICARE

## 2020-11-11 VITALS
HEIGHT: 62 IN | BODY MASS INDEX: 20.5 KG/M2 | SYSTOLIC BLOOD PRESSURE: 122 MMHG | WEIGHT: 111.4 LBS | TEMPERATURE: 96.9 F | HEART RATE: 60 BPM | DIASTOLIC BLOOD PRESSURE: 80 MMHG

## 2020-11-11 DIAGNOSIS — R39.9 URINARY SYMPTOM OR SIGN: Primary | ICD-10-CM

## 2020-11-11 LAB
SL AMB  POCT GLUCOSE, UA: ABNORMAL
SL AMB LEUKOCYTE ESTERASE,UA: ABNORMAL
SL AMB POCT BILIRUBIN,UA: ABNORMAL
SL AMB POCT BLOOD,UA: ABNORMAL
SL AMB POCT KETONES,UA: ABNORMAL
SL AMB POCT NITRITE,UA: ABNORMAL
SL AMB POCT PH,UA: 6
SL AMB POCT SPECIFIC GRAVITY,UA: 1.01
SL AMB POCT URINE PROTEIN: ABNORMAL
SL AMB POCT UROBILINOGEN: 3.5

## 2020-11-11 PROCEDURE — 87086 URINE CULTURE/COLONY COUNT: CPT | Performed by: FAMILY MEDICINE

## 2020-11-11 PROCEDURE — 99213 OFFICE O/P EST LOW 20 MIN: CPT | Performed by: FAMILY MEDICINE

## 2020-11-11 PROCEDURE — 81002 URINALYSIS NONAUTO W/O SCOPE: CPT | Performed by: FAMILY MEDICINE

## 2020-11-11 RX ORDER — CIPROFLOXACIN 250 MG/1
250 TABLET, FILM COATED ORAL EVERY 12 HOURS SCHEDULED
Qty: 10 TABLET | Refills: 0 | Status: SHIPPED | OUTPATIENT
Start: 2020-11-11 | End: 2020-11-16

## 2020-11-13 LAB — BACTERIA UR CULT: NORMAL

## 2020-11-16 ENCOUNTER — TELEPHONE (OUTPATIENT)
Dept: FAMILY MEDICINE CLINIC | Facility: MEDICAL CENTER | Age: 75
End: 2020-11-16

## 2020-12-02 DIAGNOSIS — I10 ESSENTIAL HYPERTENSION: ICD-10-CM

## 2020-12-02 RX ORDER — DILTIAZEM HYDROCHLORIDE 240 MG/1
240 CAPSULE, COATED, EXTENDED RELEASE ORAL DAILY
Qty: 90 CAPSULE | Refills: 1 | Status: SHIPPED | OUTPATIENT
Start: 2020-12-02 | End: 2021-05-19 | Stop reason: SDUPTHER

## 2020-12-02 RX ORDER — DILTIAZEM HYDROCHLORIDE 240 MG/1
240 CAPSULE, COATED, EXTENDED RELEASE ORAL DAILY
Qty: 90 CAPSULE | Refills: 1 | Status: SHIPPED | OUTPATIENT
Start: 2020-12-02 | End: 2020-12-02 | Stop reason: SDUPTHER

## 2020-12-07 ENCOUNTER — OFFICE VISIT (OUTPATIENT)
Dept: PLASTIC SURGERY | Facility: CLINIC | Age: 75
End: 2020-12-07
Payer: MEDICARE

## 2020-12-07 DIAGNOSIS — C44.319 BASAL CELL CARCINOMA, FOREHEAD: Primary | ICD-10-CM

## 2020-12-07 PROCEDURE — 99212 OFFICE O/P EST SF 10 MIN: CPT | Performed by: PHYSICIAN ASSISTANT

## 2020-12-09 DIAGNOSIS — N95.2 VAGINAL ATROPHY: Primary | ICD-10-CM

## 2020-12-09 RX ORDER — CONJUGATED ESTROGENS 0.62 MG/G
1 CREAM VAGINAL WEEKLY
Qty: 30 G | Refills: 1 | Status: SHIPPED | OUTPATIENT
Start: 2020-12-09 | End: 2022-01-12 | Stop reason: SDUPTHER

## 2021-01-16 ENCOUNTER — APPOINTMENT (EMERGENCY)
Dept: CT IMAGING | Facility: HOSPITAL | Age: 76
End: 2021-01-16
Payer: MEDICARE

## 2021-01-16 ENCOUNTER — APPOINTMENT (EMERGENCY)
Dept: RADIOLOGY | Facility: HOSPITAL | Age: 76
End: 2021-01-16
Payer: MEDICARE

## 2021-01-16 ENCOUNTER — HOSPITAL ENCOUNTER (OUTPATIENT)
Facility: HOSPITAL | Age: 76
Setting detail: OBSERVATION
Discharge: HOME | End: 2021-01-17
Attending: EMERGENCY MEDICINE | Admitting: INTERNAL MEDICINE
Payer: MEDICARE

## 2021-01-16 DIAGNOSIS — G45.9 TIA (TRANSIENT ISCHEMIC ATTACK): Primary | ICD-10-CM

## 2021-01-16 DIAGNOSIS — R09.89 SYMPTOMS OF CEREBROVASCULAR ACCIDENT (CVA): ICD-10-CM

## 2021-01-16 DIAGNOSIS — I10 HTN (HYPERTENSION): ICD-10-CM

## 2021-01-16 PROBLEM — K58.9 IBS (IRRITABLE BOWEL SYNDROME): Status: ACTIVE | Noted: 2021-01-16

## 2021-01-16 PROBLEM — F43.21 GRIEF: Status: ACTIVE | Noted: 2021-01-16

## 2021-01-16 LAB
ANION GAP SERPL CALCULATED.3IONS-SCNC: 7 MMOL/L (ref 4–13)
APTT PPP: 38 SECONDS (ref 23–37)
BACTERIA UR QL AUTO: NORMAL /HPF
BASOPHILS # BLD AUTO: 0.05 THOUSANDS/ΜL (ref 0–0.1)
BASOPHILS NFR BLD AUTO: 1 % (ref 0–1)
BILIRUB UR QL STRIP: NEGATIVE
BUN SERPL-MCNC: 16 MG/DL (ref 5–25)
CALCIUM SERPL-MCNC: 8.9 MG/DL (ref 8.3–10.1)
CHLORIDE SERPL-SCNC: 105 MMOL/L (ref 100–108)
CLARITY UR: CLEAR
CO2 SERPL-SCNC: 31 MMOL/L (ref 21–32)
COLOR UR: YELLOW
CREAT SERPL-MCNC: 0.92 MG/DL (ref 0.6–1.3)
EOSINOPHIL # BLD AUTO: 0.05 THOUSAND/ΜL (ref 0–0.61)
EOSINOPHIL NFR BLD AUTO: 1 % (ref 0–6)
ERYTHROCYTE [DISTWIDTH] IN BLOOD BY AUTOMATED COUNT: 13.5 % (ref 11.6–15.1)
FLUAV RNA RESP QL NAA+PROBE: NEGATIVE
FLUBV RNA RESP QL NAA+PROBE: NEGATIVE
GFR SERPL CREATININE-BSD FRML MDRD: 60 ML/MIN/1.73SQ M
GLUCOSE SERPL-MCNC: 110 MG/DL (ref 65–140)
GLUCOSE UR STRIP-MCNC: NEGATIVE MG/DL
HCT VFR BLD AUTO: 41.2 % (ref 34.8–46.1)
HGB BLD-MCNC: 12.8 G/DL (ref 11.5–15.4)
HGB UR QL STRIP.AUTO: ABNORMAL
IMM GRANULOCYTES # BLD AUTO: 0.01 THOUSAND/UL (ref 0–0.2)
IMM GRANULOCYTES NFR BLD AUTO: 0 % (ref 0–2)
INR PPP: 1.05 (ref 0.84–1.19)
KETONES UR STRIP-MCNC: NEGATIVE MG/DL
LEUKOCYTE ESTERASE UR QL STRIP: NEGATIVE
LYMPHOCYTES # BLD AUTO: 0.78 THOUSANDS/ΜL (ref 0.6–4.47)
LYMPHOCYTES NFR BLD AUTO: 14 % (ref 14–44)
MAGNESIUM SERPL-MCNC: 2.2 MG/DL (ref 1.6–2.6)
MCH RBC QN AUTO: 28.5 PG (ref 26.8–34.3)
MCHC RBC AUTO-ENTMCNC: 31.1 G/DL (ref 31.4–37.4)
MCV RBC AUTO: 92 FL (ref 82–98)
MONOCYTES # BLD AUTO: 0.41 THOUSAND/ΜL (ref 0.17–1.22)
MONOCYTES NFR BLD AUTO: 7 % (ref 4–12)
NEUTROPHILS # BLD AUTO: 4.22 THOUSANDS/ΜL (ref 1.85–7.62)
NEUTS SEG NFR BLD AUTO: 77 % (ref 43–75)
NITRITE UR QL STRIP: NEGATIVE
NON-SQ EPI CELLS URNS QL MICRO: NORMAL /HPF
NRBC BLD AUTO-RTO: 0 /100 WBCS
PH UR STRIP.AUTO: 6.5 [PH] (ref 4.5–8)
PHOSPHATE SERPL-MCNC: 2.6 MG/DL (ref 2.3–4.1)
PLATELET # BLD AUTO: 240 THOUSANDS/UL (ref 149–390)
PMV BLD AUTO: 9.7 FL (ref 8.9–12.7)
POTASSIUM SERPL-SCNC: 3.8 MMOL/L (ref 3.5–5.3)
PROLACTIN SERPL-MCNC: 8.3 NG/ML
PROT UR STRIP-MCNC: NEGATIVE MG/DL
PROTHROMBIN TIME: 13.8 SECONDS (ref 11.6–14.5)
RBC # BLD AUTO: 4.49 MILLION/UL (ref 3.81–5.12)
RBC #/AREA URNS AUTO: NORMAL /HPF
RSV RNA RESP QL NAA+PROBE: NEGATIVE
SARS-COV-2 RNA RESP QL NAA+PROBE: NEGATIVE
SODIUM SERPL-SCNC: 143 MMOL/L (ref 136–145)
SP GR UR STRIP.AUTO: 1.01 (ref 1–1.03)
TROPONIN I SERPL-MCNC: <0.02 NG/ML
TSH SERPL DL<=0.05 MIU/L-ACNC: 3.03 UIU/ML (ref 0.36–3.74)
UROBILINOGEN UR QL STRIP.AUTO: 0.2 E.U./DL
WBC # BLD AUTO: 5.52 THOUSAND/UL (ref 4.31–10.16)
WBC #/AREA URNS AUTO: NORMAL /HPF

## 2021-01-16 PROCEDURE — 99285 EMERGENCY DEPT VISIT HI MDM: CPT

## 2021-01-16 PROCEDURE — 93005 ELECTROCARDIOGRAM TRACING: CPT

## 2021-01-16 PROCEDURE — 96360 HYDRATION IV INFUSION INIT: CPT

## 2021-01-16 PROCEDURE — 85610 PROTHROMBIN TIME: CPT | Performed by: PHYSICIAN ASSISTANT

## 2021-01-16 PROCEDURE — 70498 CT ANGIOGRAPHY NECK: CPT

## 2021-01-16 PROCEDURE — 1123F ACP DISCUSS/DSCN MKR DOCD: CPT | Performed by: PHYSICIAN ASSISTANT

## 2021-01-16 PROCEDURE — 81001 URINALYSIS AUTO W/SCOPE: CPT

## 2021-01-16 PROCEDURE — 96361 HYDRATE IV INFUSION ADD-ON: CPT

## 2021-01-16 PROCEDURE — 84146 ASSAY OF PROLACTIN: CPT | Performed by: PHYSICIAN ASSISTANT

## 2021-01-16 PROCEDURE — 36415 COLL VENOUS BLD VENIPUNCTURE: CPT | Performed by: PHYSICIAN ASSISTANT

## 2021-01-16 PROCEDURE — 80048 BASIC METABOLIC PNL TOTAL CA: CPT | Performed by: PHYSICIAN ASSISTANT

## 2021-01-16 PROCEDURE — 83735 ASSAY OF MAGNESIUM: CPT | Performed by: PHYSICIAN ASSISTANT

## 2021-01-16 PROCEDURE — 70496 CT ANGIOGRAPHY HEAD: CPT

## 2021-01-16 PROCEDURE — 85730 THROMBOPLASTIN TIME PARTIAL: CPT | Performed by: PHYSICIAN ASSISTANT

## 2021-01-16 PROCEDURE — 71045 X-RAY EXAM CHEST 1 VIEW: CPT

## 2021-01-16 PROCEDURE — 84100 ASSAY OF PHOSPHORUS: CPT | Performed by: PHYSICIAN ASSISTANT

## 2021-01-16 PROCEDURE — 84443 ASSAY THYROID STIM HORMONE: CPT | Performed by: PHYSICIAN ASSISTANT

## 2021-01-16 PROCEDURE — 0241U HB NFCT DS VIR RESP RNA 4 TRGT: CPT | Performed by: PHYSICIAN ASSISTANT

## 2021-01-16 PROCEDURE — 99285 EMERGENCY DEPT VISIT HI MDM: CPT | Performed by: PHYSICIAN ASSISTANT

## 2021-01-16 PROCEDURE — 84484 ASSAY OF TROPONIN QUANT: CPT | Performed by: PHYSICIAN ASSISTANT

## 2021-01-16 PROCEDURE — 99220 PR INITIAL OBSERVATION CARE/DAY 70 MINUTES: CPT | Performed by: INTERNAL MEDICINE

## 2021-01-16 PROCEDURE — 85025 COMPLETE CBC W/AUTO DIFF WBC: CPT | Performed by: PHYSICIAN ASSISTANT

## 2021-01-16 RX ORDER — DILTIAZEM HYDROCHLORIDE 240 MG/1
240 CAPSULE, COATED, EXTENDED RELEASE ORAL DAILY
Status: DISCONTINUED | OUTPATIENT
Start: 2021-01-17 | End: 2021-01-17 | Stop reason: HOSPADM

## 2021-01-16 RX ORDER — ASCORBIC ACID 500 MG
500 TABLET ORAL DAILY
Status: DISCONTINUED | OUTPATIENT
Start: 2021-01-17 | End: 2021-01-17 | Stop reason: HOSPADM

## 2021-01-16 RX ORDER — ACETAMINOPHEN 325 MG/1
650 TABLET ORAL EVERY 4 HOURS PRN
Status: DISCONTINUED | OUTPATIENT
Start: 2021-01-16 | End: 2021-01-17 | Stop reason: HOSPADM

## 2021-01-16 RX ORDER — ASPIRIN 81 MG/1
81 TABLET, CHEWABLE ORAL DAILY
Status: DISCONTINUED | OUTPATIENT
Start: 2021-01-17 | End: 2021-01-17 | Stop reason: HOSPADM

## 2021-01-16 RX ORDER — MAGNESIUM HYDROXIDE/ALUMINUM HYDROXICE/SIMETHICONE 120; 1200; 1200 MG/30ML; MG/30ML; MG/30ML
30 SUSPENSION ORAL EVERY 4 HOURS PRN
Status: DISCONTINUED | OUTPATIENT
Start: 2021-01-16 | End: 2021-01-17 | Stop reason: HOSPADM

## 2021-01-16 RX ORDER — SODIUM CHLORIDE 9 MG/ML
125 INJECTION, SOLUTION INTRAVENOUS CONTINUOUS
Status: DISCONTINUED | OUTPATIENT
Start: 2021-01-16 | End: 2021-01-17 | Stop reason: HOSPADM

## 2021-01-16 RX ORDER — ATORVASTATIN CALCIUM 40 MG/1
40 TABLET, FILM COATED ORAL EVERY EVENING
Status: DISCONTINUED | OUTPATIENT
Start: 2021-01-16 | End: 2021-01-17 | Stop reason: HOSPADM

## 2021-01-16 RX ORDER — FAMOTIDINE 20 MG/1
20 TABLET, FILM COATED ORAL
Status: DISCONTINUED | OUTPATIENT
Start: 2021-01-16 | End: 2021-01-17 | Stop reason: HOSPADM

## 2021-01-16 RX ORDER — ASPIRIN 325 MG
325 TABLET ORAL ONCE
Status: DISCONTINUED | OUTPATIENT
Start: 2021-01-16 | End: 2021-01-17 | Stop reason: HOSPADM

## 2021-01-16 RX ORDER — ONDANSETRON 2 MG/ML
4 INJECTION INTRAMUSCULAR; INTRAVENOUS EVERY 6 HOURS PRN
Status: DISCONTINUED | OUTPATIENT
Start: 2021-01-16 | End: 2021-01-17 | Stop reason: HOSPADM

## 2021-01-16 RX ORDER — SIMETHICONE 80 MG
80 TABLET,CHEWABLE ORAL EVERY 6 HOURS PRN
Status: DISCONTINUED | OUTPATIENT
Start: 2021-01-16 | End: 2021-01-17 | Stop reason: HOSPADM

## 2021-01-16 RX ORDER — FLUTICASONE PROPIONATE 50 MCG
2 SPRAY, SUSPENSION (ML) NASAL DAILY
Status: DISCONTINUED | OUTPATIENT
Start: 2021-01-17 | End: 2021-01-17 | Stop reason: HOSPADM

## 2021-01-16 RX ORDER — CHOLECALCIFEROL (VITAMIN D3) 125 MCG
3000 CAPSULE ORAL
Status: DISCONTINUED | OUTPATIENT
Start: 2021-01-16 | End: 2021-01-17 | Stop reason: HOSPADM

## 2021-01-16 RX ADMIN — SODIUM CHLORIDE 125 ML/HR: 0.9 INJECTION, SOLUTION INTRAVENOUS at 12:44

## 2021-01-16 RX ADMIN — IOHEXOL 85 ML: 350 INJECTION, SOLUTION INTRAVENOUS at 12:57

## 2021-01-16 RX ADMIN — Medication 3000 UNITS: at 18:34

## 2021-01-16 RX ADMIN — SODIUM CHLORIDE 125 ML/HR: 0.9 INJECTION, SOLUTION INTRAVENOUS at 17:36

## 2021-01-16 NOTE — H&P
[address] Internal Medicine  H&P- Niurkanancy Godwin 1945, 66 y o  female MRN: [id number]    Unit/Bed#: ED 18 Encounter: [id number]    Primary Care Provider: Genesis Singletary MD   Date and time admitted to hospital: 1/16/2021 10:51 AM        * Symptoms of cerebrovascular accident (CVA)  Assessment & Plan  · Prior to admission, patient had a 2-3 minute episode of inability to speak or move  She was able to remember her symptoms and event and motioned to her  to get her something to write with so she could write "ask me to smile" and her smile was normal  She had no post-ictal symptoms, no urination, no loss of conscious, no unilateral numbness or tingling  Neuro exam non-focal here  CTA negative  She had prior symptoms and was seen by neurology and ordered further work up, but her symptoms never returned so this was cancelled  I suspect that her symptoms are due to stress as she recently lost a friend and her IBS is acting up   · Admit patient to med/surg under observation status  · Stroke pathway  · Check MRI   · FLP, A1c  · ASA, Statin     Grief  Assessment & Plan  · Patient recently suddenly lost friend to [place]  · Supportive care and condolences offered     Essential hypertension  Assessment & Plan  · BP acceptable at this time   · Continue home medical management   · Multiple allergies noted     IBS (irritable bowel syndrome)  Assessment & Plan  · Reports increased symptoms   · Continue regimen       VTE Prophylaxis: Enoxaparin (Lovenox)  / sequential compression device   Code Status: Full Code   POLST: POLST form is not discussed and not completed at this time  Discussion with family: None at bedside     Anticipated Length of Stay:  Patient will be admitted on an Observation basis with an anticipated length of stay of  Less than 2 midnights  Justification for Hospital Stay: As per above assessment and plan     Total Time for Visit, including Counseling / Coordination of Care: 1 hour    Greater than 50% of this total time spent on direct patient counseling and coordination of care  Chief Complaint:   Altered Mental Status     History of Present Illness:    Alison Bhakta is a 66 y o  female with a history of HTN, IBS who presents with altered mental status  She reports that episode happened yesterday  She reports that she was going downstairs to get a load of laundry out of the dryer  She states that she suddenly felt weird from head to toe  She reports that she was unable to move or speak  She states that her  came to her aid and she motioned for him to get something for her to write with so she could write "ask me if I can smile"  He did this and he told her her smile was normal  She states that this episode lasted for 2-3 minutes  During the episode she did not lose consciousness, urinate, or have any convulsions  She denied unilateral symptoms  She denied headache or change in vision  She reports that she did not have any symptoms after  She reports that she had a similar occurrence in 2018 and was seen by a neurologist but no EEGs were done due to her symptoms resolving  She reports a family history of TIA in her mother  Denies smoking or drinking  She denies fevers or chills  Denies urinary symptoms  She denies chest pain or shortness of breath  She does report that she has had some recent stress in her life as she lost a friend to [facility] this week  She also reports that her IBS symptoms have been acting up as they took one of her medications off the market  Review of Systems:    Review of Systems   Constitutional: Negative for appetite change, chills, diaphoresis, fatigue and fever  HENT: Negative for congestion, rhinorrhea and sore throat  Eyes: Negative for visual disturbance  Respiratory: Negative for cough, chest tightness, shortness of breath and wheezing  Cardiovascular: Negative for chest pain, palpitations and leg swelling  Gastrointestinal: Positive for abdominal pain  Negative for constipation, diarrhea, nausea and vomiting  Genitourinary: Negative for dysuria  Musculoskeletal: Negative for arthralgias and myalgias  Neurological: Positive for speech difficulty and weakness  Negative for dizziness, syncope, light-headedness, numbness and headaches  All other systems reviewed and are negative        Past Medical and Surgical History:     Past Medical History:   Diagnosis Date    Acne     Basal cell carcinoma 06/08/2020    right lower forehead    BCC (basal cell carcinoma of skin) 03/09/2020    mid forehead    Benign neoplasm of skin     GERD (gastroesophageal reflux disease)     Hypertension     Inflamed seborrheic keratosis     Irritable bowel syndrome     Malignant neoplasm of skin of face     Nonmelanoma skin cancer     Last Assessed:6/27/17    Squamous cell skin cancer 06/08/2020    In situ, left lower forehead    Tachycardia     Telogen effluvium     Temporomandibular joint disorder     Vertigo        Past Surgical History:   Procedure Laterality Date    APPENDECTOMY      CHOLECYSTECTOMY      COLONOSCOPY  05/03/2019    COMPLEX WOUND CLOSURE TO EXTREMITY N/A 7/28/2020    Procedure: COMPLEX CLOSURE MID FOREHEAD;  Surgeon: Chiki Valladares MD;  [facility];  Service: Plastics    ESOPHAGOGASTRODUODENOSCOPY  2009    FLAP LOCAL HEAD / NECK N/A 7/28/2020    Procedure: FLAP MID FOREHEAD;  Surgeon: Chiki Valladares MD;  [facility];  Service: Plastics    HYSTERECTOMY  1987    MALIGNANT SKIN LESION EXCISION      Excision of Lesion Face Malignant-9/14/2004 [address] Forehead    MOHS RECONSTRUCTION N/A 7/28/2020    Procedure: RECONSTRUCTION MOHS DEFECT MID FOREHEAD;  Surgeon: Chiki Valladares MD;  [facility];  Service: Plastics    MOHS SURGERY  07/27/2020    Right &left lower forehead, mid forehead    OOPHORECTOMY Bilateral 1987    ROTATOR CUFF REPAIR      SKIN BIOPSY         Meds/Allergies:    Prior to Admission medications    Medication Sig Start Date End Date Taking? Authorizing Provider   Alpha-D-Galactosidase Sanjuana Prado) TABS Take by mouth    Historical Provider, MD   Alum Hydroxide-Mag Trisilicate (GAVISCON) 74-36 1 MG CHEW Chew    Historical Provider, MD   ascorbic acid (VITAMIN C) 500 mg tablet Take by mouth    Historical Provider, MD   Cholecalciferol 400 units TABS Take by mouth    Historical Provider, MD   ciclopirox (PENLAC) 8 % solution Apply topically    Historical Provider, MD   cimetidine (TAGAMET) 200 mg tablet Take 200 mg by mouth daily    Historical Provider, MD   diltiazem (CARDIZEM CD) 240 mg 24 hr capsule Take 1 capsule (240 mg total) by mouth daily May give patient  the stock bottle 12/2/20   Tierra Richards MD   estradiol (ESTRACE) 0 1 mg/g vaginal cream Insert 1 g into the vagina once a week 5/8/19   Nasima Begum MD   fluticasone Margreta Hearing) 50 mcg/act nasal spray 2 sprays into each nostril daily 8/20/18   Tierra Richards MD   Homeopathic Products [facility] ALLERGY RELIEF NA) into each nostril    Historical Provider, MD   lactase (LACTAID) 3,000 units tablet Take by mouth    Historical Provider, MD   Minoxidil (ROGAINE WOMENS) 5 % FOAM Apply topically    Historical Provider, MD   Multiple Vitamins-Minerals (CENTRUM SILVER ULTRA WOMENS PO) Take by mouth    Historical Provider, MD   nitrofurantoin (MACROBID) 100 mg capsule Take 1 capsule (100 mg total) by mouth 2 (two) times a day 11/6/20   YULISSA Fernández   polyethylene glycol-propylene glycol (SYSTANE) 0 4-0 3 %     Historical Provider, MD   PREMARIN vaginal cream  8/15/18   Historical Provider, MD   Premarin vaginal cream Insert 1 g into the vagina once a week Brand Necessary 12/9/20   Nasima Begum MD     I have reviewed home medications with patient personally  Allergies:    Allergies   Allergen Reactions    Cortisone Hypertension, Other (See Comments), Shortness Of Breath and Tachycardia    Acebutolol     Amlodipine     Atenolol     Azithromycin  Banana GI Intolerance    [facility]      Annotation - [id number]: iriitis    Candesartan     Clarithromycin     Erythromycin     Fosinopril     Irbesartan     Levofloxacin     Losartan     Methylprednisolone Hypertension and Tachycardia    Metoprolol     Nisoldipine     Olmesartan     Propranolol     Sulfamethoxazole-Trimethoprim Nausea Only    Timolol     Penicillins Rash       Social History:     Marital Status: /Civil Union   Occupation: Noncontributory   Patient Pre-hospital Living Situation: home  Patient Pre-hospital Level of Mobility: Full  Patient Pre-hospital Diet Restrictions: None  Substance Use History:   Social History     Substance and Sexual Activity   Alcohol Use No     Social History     Tobacco Use   Smoking Status Never Smoker   Smokeless Tobacco Never Used     Social History     Substance and Sexual Activity   Drug Use No       Family History:    Family History   Problem Relation Age of Onset    Hypertension Mother     Osteoporosis Mother     Skin cancer Mother     Hypertension Father     Skin cancer Father     Cancer Maternal Grandmother     Uterine cancer Maternal Grandmother 34    Cancer Paternal Grandmother     Uterine cancer Paternal Grandmother 72    Cancer Paternal Aunt     Uterine cancer Paternal Aunt 54    Diabetes type II Maternal Grandfather     Skin cancer Sister 68    No Known Problems Daughter     No Known Problems Paternal Aunt     No Known Problems Paternal Aunt     No Known Problems Maternal Aunt        Physical Exam:     Vitals:   Blood Pressure: 144/66 (01/16/21 1500)  Pulse: 72 (01/16/21 1500)  Temperature: 98 5 °F (36 9 °C) (01/16/21 1033)  Temp Source: Oral (01/16/21 1033)  Respirations: 18 (01/16/21 1500)  Height: 5' 2" (157 5 cm) (01/16/21 1033)  Weight - Scale: 48 5 kg (107 lb) (01/16/21 1033)  SpO2: 95 % (01/16/21 1500)    Physical Exam  Constitutional:       General: She is not in acute distress       Appearance: Normal appearance  She is normal weight  She is not ill-appearing or diaphoretic  HENT:      Head: Normocephalic and atraumatic  Mouth/Throat:      Mouth: Mucous membranes are moist    Eyes:      General: Lids are normal  No scleral icterus  Extraocular Movements:      Right eye: Normal extraocular motion and no nystagmus  Left eye: Normal extraocular motion and no nystagmus  Pupils: Pupils are equal, round, and reactive to light  Pupils are equal       Right eye: Pupil is round and reactive  Left eye: Pupil is round and reactive  Cardiovascular:      Rate and Rhythm: Normal rate and regular rhythm  Pulses: Normal pulses  Heart sounds: Normal heart sounds, S1 normal and S2 normal  No murmur  No systolic murmur  No diastolic murmur  No gallop  No S3 or S4 sounds  Pulmonary:      Effort: Pulmonary effort is normal  No accessory muscle usage or respiratory distress  Breath sounds: Normal breath sounds  No stridor  No decreased breath sounds, wheezing, rhonchi or rales  Chest:      Chest wall: No tenderness  Abdominal:      General: Bowel sounds are normal  There is no distension  Palpations: Abdomen is soft  Tenderness: There is no abdominal tenderness  There is no guarding  Musculoskeletal:      Right lower leg: No edema  Left lower leg: No edema  Skin:     General: Skin is warm and dry  Coloration: Skin is not jaundiced  Neurological:      General: No focal deficit present  Mental Status: She is alert  Mental status is at baseline  Cranial Nerves: Cranial nerves are intact  No cranial nerve deficit, dysarthria or facial asymmetry  Sensory: Sensation is intact  No sensory deficit  Motor: Motor function is intact  No weakness, tremor, abnormal muscle tone, seizure activity or pronator drift  Coordination: Coordination normal    Psychiatric:         Behavior: Behavior is cooperative               Additional Data:     Lab Results: I have personally reviewed pertinent reports  Results from last 7 days   Lab Units 01/16/21  1142   WBC Thousand/uL 5 52   HEMOGLOBIN g/dL 12 8   HEMATOCRIT % 41 2   PLATELETS Thousands/uL 240   NEUTROS PCT % 77*   LYMPHS PCT % 14   MONOS PCT % 7   EOS PCT % 1     Results from last 7 days   Lab Units 01/16/21  1142   SODIUM mmol/L 143   POTASSIUM mmol/L 3 8   CHLORIDE mmol/L 105   CO2 mmol/L 31   BUN mg/dL 16   CREATININE mg/dL 0 92   ANION GAP mmol/L 7   CALCIUM mg/dL 8 9   GLUCOSE RANDOM mg/dL 110     Results from last 7 days   Lab Units 01/16/21  1142   INR  1 05                   Imaging: I have personally reviewed pertinent reports  CTA head and neck with and without contrast   ED Interpretation by Madi Gould PA-C (01/16 1415)   1  No evidence of acute infarct, intracranial  Hemorrhage or mass effect  2  No stenosis, dissection or occlusion of the carotid or vertebral arteries or major vessels of the [place]  Final Result by Georgiana Gilford, MD (01/16 9012)         1  No evidence of acute infarct, intracranial hemorrhage or mass effect  2   No stenosis, dissection or occlusion of the carotid or vertebral arteries or major vessels of the [place]  Workstation performed: [id number]         XR chest 1 view portable   ED Interpretation by Madi Gould PA-C (01/16 1224)   No acute cardiopulmonary disease      Final Result by Brent Benitez DO (01/16 1443)      No acute cardiopulmonary disease  Workstation performed: [id number]             EKG, Pathology, and Other Studies Reviewed on Admission:   · EKG: NSR, 84 BPM  · CXR: No acute pulmonary disease   · CTA Head and Neck: No evidence of acute infarct, intracranial hemorrhage or mass effect  No stenosis, dissection, or occlusion in the carotid, vertebral arteries, or major vessels of [place]       Allscripts / Epic Records Reviewed: Yes     ** Please Note: This note has been constructed using a voice recognition system   **

## 2021-01-16 NOTE — PLAN OF CARE
Problem: PAIN - ADULT  Goal: Verbalizes/displays adequate comfort level or baseline comfort level  Description: Interventions:  - Encourage patient to monitor pain and request assistance  - Assess pain using appropriate pain scale  - Administer analgesics based on type and severity of pain and evaluate response  - Implement non-pharmacological measures as appropriate and evaluate response  - Consider cultural and social influences on pain and pain management  - Notify physician/advanced practitioner if interventions unsuccessful or patient reports new pain  Outcome: Progressing     Problem: INFECTION - ADULT  Goal: Absence or prevention of progression during hospitalization  Description: INTERVENTIONS:  - Assess and monitor for signs and symptoms of infection  - Monitor lab/diagnostic results  - Monitor all insertion sites, i e  indwelling lines, tubes, and drains  - Monitor endotracheal if appropriate and nasal secretions for changes in amount and color  - [place] appropriate cooling/warming therapies per order  - Administer medications as ordered  - Instruct and encourage patient and family to use good hand hygiene technique  - Identify and instruct in appropriate isolation precautions for identified infection/condition  Outcome: Progressing  Goal: Absence of fever/infection during neutropenic period  Description: INTERVENTIONS:  - Monitor WBC    Outcome: Progressing     Problem: SAFETY ADULT  Goal: Patient will remain free of falls  Description: INTERVENTIONS:  - Assess patient frequently for physical needs  -  Identify cognitive and physical deficits and behaviors that affect risk of falls    -  [place] fall precautions as indicated by assessment   - Educate patient/family on patient safety including physical limitations  - Instruct patient to call for assistance with activity based on assessment  - Modify environment to reduce risk of injury  - Consider OT/PT consult to assist with strengthening/mobility  Outcome: Progressing  Goal: Maintain or return to baseline ADL function  Description: INTERVENTIONS:  -  Assess patient's ability to carry out ADLs; assess patient's baseline for ADL function and identify physical deficits which impact ability to perform ADLs (bathing, care of mouth/teeth, toileting, grooming, dressing, etc )  - Assess/evaluate cause of self-care deficits   - Assess range of motion  - Assess patient's mobility; develop plan if impaired  - Assess patient's need for assistive devices and provide as appropriate  - Encourage maximum independence but intervene and supervise when necessary  - Involve family in performance of ADLs  - Assess for home care needs following discharge   - Consider OT consult to assist with ADL evaluation and planning for discharge  - Provide patient education as appropriate  Outcome: Progressing  Goal: Maintain or return mobility status to optimal level  Description: INTERVENTIONS:  - Assess patient's baseline mobility status (ambulation, transfers, stairs, etc )    - Identify cognitive and physical deficits and behaviors that affect mobility  - Identify mobility aids required to assist with transfers and/or ambulation (gait belt, sit-to-stand, lift, walker, cane, etc )  - [place] fall precautions as indicated by assessment  - Record patient progress and toleration of activity level on Mobility SBAR; progress patient to next Phase/Stage  - Instruct patient to call for assistance with activity based on assessment  - Consider rehabilitation consult to assist with strengthening/weightbearing, etc   Outcome: Progressing     Problem: DISCHARGE PLANNING  Goal: Discharge to home or other facility with appropriate resources  Description: INTERVENTIONS:  - Identify barriers to discharge w/patient and caregiver  - Arrange for needed discharge resources and transportation as appropriate  - Identify discharge learning needs (meds, wound care, etc )  - Arrange for interpretive services to assist at discharge as needed  - Refer to Case Management Department for coordinating discharge planning if the patient needs post-hospital services based on physician/advanced practitioner order or complex needs related to functional status, cognitive ability, or social support system  Outcome: Progressing     Problem: Knowledge Deficit  Goal: Patient/family/caregiver demonstrates understanding of disease process, treatment plan, medications, and discharge instructions  Description: Complete learning assessment and assess knowledge base    Interventions:  - Provide teaching at level of understanding  - Provide teaching via preferred learning methods  Outcome: Progressing

## 2021-01-16 NOTE — ED PROVIDER NOTES
History  Chief Complaint   Patient presents with    CVA/TIA-like Symptoms     states she could not move entire body or speak for 2-3 minutes yesterday afternoon     Patient presents emergency room with her  1 day after having an episode while standing at the laundry machine when suddenly her home body was paralyzed and she could move  She states she could not speak for approximately 3 minutes  She states that her  saw her and lowered her to a chair  She had no symptoms prior to the onset or during the episode  She denies any headaches, chest pain, palpitations, double or blurred vision  She had no ringing in her ears  She did not feel like she was going to pass out  She denies any dizziness or lightheadedness  She states she could just not move her arms or legs for 3 minutes     She could move her lips but no noise came oiut  when she spoke  She was not incontinent of urine or stool  She denies any abdominal pain, black tarry stools, bright red blood per rectum  She denies any urinary frequency, urgency, hematuria, dysuria  She denies any upper respiratory symptoms such as coughing, nasal congestion, postnasal drip, sore throat  She has not been exposed anybody with known COVID  States she has had a similar episode 3 years ago where she had 4 episodes over that year and was seen by a neurologist who found nothing to be room  She states that the neurologist told her that if she did it 1 more time she was going to do a test but she is unaware of the test to be ordered  She has a history of hypertension and did not take her high blood pressure medications diltiazem this morning  She has a past medical history of acne, basal cell carcinoma, squamous cell carcinoma, GERD, hypertension, seborrheic dermatitis, irritable bowel syndrome, tachycardia, telogen effluvian, TMJ disorder, vertigo        History provided by:  Patient   used: No    CVA/TIA-like Symptoms  Presenting symptoms: incoordination and language symptoms    Presenting symptoms: no change in level of consciousness, no confusion, no headaches, no loss of balance, no focal sensory loss, no visual change and no weakness    Onset quality:  Sudden  Duration:  3 minutes  Timing:  Intermittent  Similar to previous episodes: yes    Associated symptoms: no chest pain, no trouble swallowing, no dizziness, no facial pain, no fall, no fever, no hearing loss, no bladder incontinence, no nausea, no neck pain, no paresthesias, no seizures, no tinnitus, no vertigo and no vomiting        Prior to Admission Medications   Prescriptions Last Dose Informant Patient Reported? Taking?    Alpha-D-Galactosidase (BEANO) TABS  Self Yes No   Sig: Take by mouth   Alum Hydroxide-Mag Trisilicate (GAVISCON) 64-79 8 MG CHEW  Self Yes No   Sig: Chew   Cholecalciferol 400 units TABS  Self Yes No   Sig: Take by mouth   Homeopathic Products (ZICAM ALLERGY RELIEF NA)  Self Yes No   Sig: into each nostril   Minoxidil (ROGAINE WOMENS) 5 % FOAM  Self Yes No   Sig: Apply topically   Multiple Vitamins-Minerals (CENTRUM SILVER ULTRA WOMENS PO)  Self Yes No   Sig: Take by mouth   PREMARIN vaginal cream  Self Yes No   Premarin vaginal cream   No No   Sig: Insert 1 g into the vagina once a week Brand Necessary   ascorbic acid (VITAMIN C) 500 mg tablet  Self Yes No   Sig: Take by mouth   ciclopirox (PENLAC) 8 % solution  Self Yes No   Sig: Apply topically   cimetidine (TAGAMET) 200 mg tablet  Self Yes No   Sig: Take 200 mg by mouth daily   diltiazem (CARDIZEM CD) 240 mg 24 hr capsule   No No   Sig: Take 1 capsule (240 mg total) by mouth daily May give patient  the stock bottle   estradiol (ESTRACE) 0 1 mg/g vaginal cream  Self No No   Sig: Insert 1 g into the vagina once a week   fluticasone (FLONASE) 50 mcg/act nasal spray  Self No No   Si sprays into each nostril daily   lactase (LACTAID) 3,000 units tablet  Self Yes No   Sig: Take by mouth   nitrofurantoin (MACROBID) 100 mg capsule   No No   Sig: Take 1 capsule (100 mg total) by mouth 2 (two) times a day   polyethylene glycol-propylene glycol (SYSTANE) 0 4-0 3 %  Self Yes No      Facility-Administered Medications: None       Past Medical History:   Diagnosis Date    Acne     Basal cell carcinoma 06/08/2020    right lower forehead    BCC (basal cell carcinoma of skin) 03/09/2020    mid forehead    Benign neoplasm of skin     GERD (gastroesophageal reflux disease)     Hypertension     Inflamed seborrheic keratosis     Irritable bowel syndrome     Malignant neoplasm of skin of face     Nonmelanoma skin cancer     Last Assessed:6/27/17    Squamous cell skin cancer 06/08/2020    In situ, left lower forehead    Tachycardia     Telogen effluvium     Temporomandibular joint disorder     Vertigo        Past Surgical History:   Procedure Laterality Date    APPENDECTOMY      CHOLECYSTECTOMY      COLONOSCOPY  05/03/2019    COMPLEX WOUND CLOSURE TO EXTREMITY N/A 7/28/2020    Procedure: COMPLEX CLOSURE MID FOREHEAD;  Surgeon: London Waldron MD;  Location: [facility];  Service: Plastics    ESOPHAGOGASTRODUODENOSCOPY  2009    FLAP LOCAL HEAD / NECK N/A 7/28/2020    Procedure: FLAP MID FOREHEAD;  Surgeon: London Waldron MD;  [facility]: [facility];  Service: Plastics    HYSTERECTOMY  1987    MALIGNANT SKIN LESION EXCISION      Excision of Lesion Face Malignant-9/14/2004 BCC Forehead    MOHS RECONSTRUCTION N/A 7/28/2020    Procedure: RECONSTRUCTION MOHS DEFECT MID FOREHEAD;  Surgeon: London Waldron MD;  [facility];  Service: Plastics    MOHS SURGERY  07/27/2020    Right &left lower forehead, mid forehead    OOPHORECTOMY Bilateral 1987    ROTATOR CUFF REPAIR      SKIN BIOPSY         Family History   Problem Relation Age of Onset    Hypertension Mother     Osteoporosis Mother     Skin cancer Mother     Hypertension Father     Skin cancer Father     Cancer Maternal Grandmother  Uterine cancer Maternal Grandmother 34    Cancer Paternal Grandmother     Uterine cancer Paternal Grandmother 72    Cancer Paternal Aunt     Uterine cancer Paternal Aunt 53    Diabetes type II Maternal Grandfather     Skin cancer Sister 68    No Known Problems Daughter     No Known Problems Paternal Aunt     No Known Problems Paternal Aunt     No Known Problems Maternal Aunt      I have reviewed and agree with the history as documented  E-Cigarette/Vaping    E-Cigarette Use Never User      E-Cigarette/Vaping Substances     Social History     Tobacco Use    Smoking status: Never Smoker    Smokeless tobacco: Never Used   Substance Use Topics    Alcohol use: No    Drug use: No       Review of Systems   Constitutional: Positive for activity change  Negative for appetite change, chills, diaphoresis, fatigue and fever  HENT: Negative for congestion, drooling, ear pain, hearing loss, mouth sores, postnasal drip, rhinorrhea, sore throat, tinnitus and trouble swallowing  Eyes: Negative for pain, discharge, redness and itching  Respiratory: Negative for chest tightness, shortness of breath and wheezing  Cardiovascular: Negative for chest pain and palpitations  Gastrointestinal: Negative for abdominal pain, diarrhea, nausea and vomiting  Endocrine: Negative for cold intolerance, heat intolerance, polydipsia, polyphagia and polyuria  Genitourinary: Negative for bladder incontinence, dysuria, frequency, hematuria and urgency  Musculoskeletal: Positive for gait problem  Negative for neck pain  Skin: Negative for color change, pallor and rash  Neurological: Positive for speech difficulty and disturbances in coordination  Negative for dizziness, vertigo, seizures, weakness, headaches, paresthesias and loss of balance  Psychiatric/Behavioral: Negative for confusion  All other systems reviewed and are negative  Physical Exam  Physical Exam  Vitals signs and nursing note reviewed  Constitutional:       General: She is not in acute distress  Appearance: Normal appearance  She is normal weight  She is not ill-appearing, toxic-appearing or diaphoretic  HENT:      Head: Normocephalic and atraumatic  Right Ear: Tympanic membrane, ear canal and external ear normal       Left Ear: Tympanic membrane, ear canal and external ear normal       Nose: No congestion or rhinorrhea  Mouth/Throat:      Mouth: Mucous membranes are moist       Pharynx: No oropharyngeal exudate or posterior oropharyngeal erythema  Eyes:      General:         Right eye: No discharge  Left eye: No discharge  Conjunctiva/sclera: Conjunctivae normal    Neck:      Musculoskeletal: Neck supple  No neck rigidity or muscular tenderness  Vascular: No carotid bruit  Cardiovascular:      Rate and Rhythm: Normal rate and regular rhythm  Heart sounds: No murmur  Pulmonary:      Effort: Pulmonary effort is normal       Breath sounds: Normal breath sounds  Abdominal:      General: Abdomen is flat  There is no distension  Tenderness: There is no abdominal tenderness  There is no guarding or rebound  Musculoskeletal: Normal range of motion  Lymphadenopathy:      Cervical: No cervical adenopathy  Skin:     General: Skin is warm  Capillary Refill: Capillary refill takes less than 2 seconds  Findings: No rash  Neurological:      General: No focal deficit present  Mental Status: She is alert and oriented to person, place, and time  Cranial Nerves: No cranial nerve deficit  Sensory: No sensory deficit  Motor: No weakness  Coordination: Coordination normal       Gait: Gait normal       Deep Tendon Reflexes: Reflexes normal       Comments: Heel-to-shin is normal bilaterally  Finger to nose is normal bilaterally  Psychiatric:         Mood and Affect: Mood normal          Behavior: Behavior normal          Thought Content:  Thought content normal  Judgment: Judgment normal          Vital Signs  ED Triage Vitals   Temperature Pulse Respirations Blood Pressure SpO2   01/16/21 1033 01/16/21 1033 01/16/21 1033 01/16/21 1033 01/16/21 1033   98 5 °F (36 9 °C) 92 18 (!) 190/82 99 %      Temp Source Heart Rate Source Patient Position - Orthostatic VS BP Location FiO2 (%)   01/16/21 1033 01/16/21 1033 01/16/21 1709 01/16/21 1709 --   Oral Monitor Sitting Left arm       Pain Score       01/16/21 1730       No Pain           Vitals:    01/16/21 1500 01/16/21 1709 01/16/21 1800 01/16/21 2000   BP: 144/66 152/71 156/74 140/72   Pulse: 72 85 81 88   Patient Position - Orthostatic VS:  Sitting Sitting Sitting         Visual Acuity  Visual Acuity      Most Recent Value   L Pupil Size (mm)  3   R Pupil Size (mm)  3          ED Medications  Medications   sodium chloride 0 9 % infusion (125 mL/hr Intravenous New Bag 1/16/21 1736)   aspirin tablet 325 mg (0 mg Oral Hold 1/16/21 1733)   simethicone (MYLICON) chewable tablet 80 mg (has no administration in time range)   aluminum-magnesium hydroxide-simethicone (MYLANTA) oral suspension 30 mL (has no administration in time range)   ascorbic acid (VITAMIN C) tablet 500 mg (has no administration in time range)   famotidine (PEPCID) tablet 20 mg (has no administration in time range)   diltiazem (CARDIZEM CD) 24 hr capsule 240 mg (has no administration in time range)   fluticasone (FLONASE) 50 mcg/act nasal spray 2 spray (has no administration in time range)   lactase (LACTAID) tablet 3,000 Units (3,000 Units Oral Given 1/16/21 1834)   glycerin-hypromellose- (ARTIFICIAL TEARS) ophthalmic solution 1 drop (has no administration in time range)   acetaminophen (TYLENOL) tablet 650 mg (has no administration in time range)   atorvastatin (LIPITOR) tablet 40 mg (0 mg Oral Hold 1/16/21 1733)   ondansetron (ZOFRAN) injection 4 mg (has no administration in time range)   aspirin chewable tablet 81 mg (has no administration in time range) enoxaparin (LOVENOX) subcutaneous injection 40 mg (has no administration in time range)   iohexol (OMNIPAQUE) 350 MG/ML injection (MULTI-DOSE) 100 mL (85 mL Intravenous Given 1/16/21 1257)       Diagnostic Studies  Results Reviewed     Procedure Component Value Units Date/Time    Prolactin [[id number]]  (Normal) Collected: 01/16/21 1142    Lab Status: Final result Specimen: Blood from Arm, Right Updated: 01/16/21 1537     Prolactin 8 3 ng/mL     Urine Microscopic [[id number]]  (Normal) Collected: 01/16/21 1200    Lab Status: Final result Specimen: Urine, Clean Catch Updated: 01/16/21 1300     RBC, UA 0-1 /hpf      WBC, UA None Seen /hpf      Epithelial Cells Occasional /hpf      Bacteria, UA None Seen /hpf     COVID19, Influenza A/B, RSV PCR, [facility] [[id number]]  (Normal) Collected: 01/16/21 1144    Lab Status: Final result Specimen: Nares from Nasopharyngeal Swab Updated: 01/16/21 1239     SARS-CoV-2 Negative     INFLUENZA A PCR Negative     INFLUENZA B PCR Negative     RSV PCR Negative    Narrative: This test has been authorized by FDA under an EUA (Emergency Use Assay) for use by authorized laboratories  Clinical caution and judgement should be used with the interpretation of these results with consideration of the clinical impression and other laboratory testing  Testing reported as "Positive" or "Negative" has been proven to be accurate according to standard laboratory validation requirements  All testing is performed with control materials showing appropriate reactivity at standard intervals      Basic metabolic panel [[id number]] Collected: 01/16/21 1142    Lab Status: Final result Specimen: Blood from Arm, Right Updated: 01/16/21 1218     Sodium 143 mmol/L      Potassium 3 8 mmol/L      Chloride 105 mmol/L      CO2 31 mmol/L      ANION GAP 7 mmol/L      BUN 16 mg/dL      Creatinine 0 92 mg/dL      Glucose 110 mg/dL      Calcium 8 9 mg/dL      eGFR 60 ml/min/1 73sq m     Narrative:      National Kidney Disease Foundation guidelines for Chronic Kidney Disease (CKD):     Stage 1 with normal or high GFR (GFR > 90 mL/min/1 73 square meters)    Stage 2 Mild CKD (GFR = 60-89 mL/min/1 73 square meters)    Stage 3A Moderate CKD (GFR = 45-59 mL/min/1 73 square meters)    Stage 3B Moderate CKD (GFR = 30-44 mL/min/1 73 square meters)    Stage 4 Severe CKD (GFR = 15-29 mL/min/1 73 square meters)    Stage 5 End Stage CKD (GFR <15 mL/min/1 73 square meters)  Note: GFR calculation is accurate only with a steady state creatinine    TSH [[id number]]  (Normal) Collected: 01/16/21 1142    Lab Status: Final result Specimen: Blood from Arm, Right Updated: 01/16/21 1218     TSH 3RD GENERATON 3 027 uIU/mL     Narrative:      Patients undergoing fluorescein dye angiography may retain small amounts of fluorescein in the body for 48-72 hours post procedure  Samples containing fluorescein can produce falsely depressed TSH values  If the patient had this procedure,a specimen should be resubmitted post fluorescein clearance        Magnesium [[id number]]  (Normal) Collected: 01/16/21 1142    Lab Status: Final result Specimen: Blood from Arm, Right Updated: 01/16/21 1218     Magnesium 2 2 mg/dL     Phosphorus [[id number]]  (Normal) Collected: 01/16/21 1142    Lab Status: Final result Specimen: Blood from Arm, Right Updated: 01/16/21 1218     Phosphorus 2 6 mg/dL     Troponin I [[id number]]  (Normal) Collected: 01/16/21 1142    Lab Status: Final result Specimen: Blood from Arm, Right Updated: 01/16/21 1211     Troponin I <0 02 ng/mL     Protime-INR [[id number]]  (Normal) Collected: 01/16/21 1142    Lab Status: Final result Specimen: Blood from Arm, Right Updated: 01/16/21 1204     Protime 13 8 seconds      INR 1 05    APTT [[id number]]  (Abnormal) Collected: 01/16/21 1142    Lab Status: Final result Specimen: Blood from Arm, Right Updated: 01/16/21 1204     PTT 38 seconds     Urine Macroscopic, POC [[id number]]  (Abnormal) Collected: 01/16/21 1200 Lab Status: Final result Specimen: Urine Updated: 01/16/21 1201     Color, UA Yellow     Clarity, UA Clear     pH, UA 6 5     Leukocytes, UA Negative     Nitrite, UA Negative     Protein, UA Negative mg/dl      Glucose, UA Negative mg/dl      Ketones, UA Negative mg/dl      Urobilinogen, UA 0 2 E U /dl      Bilirubin, UA Negative     Blood, UA Trace     Specific [place], UA 1 010    Narrative:      CLINITEK RESULT    CBC and differential [[id number]]  (Abnormal) Collected: 01/16/21 1142    Lab Status: Final result Specimen: Blood from Arm, Right Updated: 01/16/21 1149     WBC 5 52 Thousand/uL      RBC 4 49 Million/uL      Hemoglobin 12 8 g/dL      Hematocrit 41 2 %      MCV 92 fL      MCH 28 5 pg      MCHC 31 1 g/dL      RDW 13 5 %      MPV 9 7 fL      Platelets 488 Thousands/uL      nRBC 0 /100 WBCs      Neutrophils Relative 77 %      Immat GRANS % 0 %      Lymphocytes Relative 14 %      Monocytes Relative 7 %      Eosinophils Relative 1 %      Basophils Relative 1 %      Neutrophils Absolute 4 22 Thousands/µL      Immature Grans Absolute 0 01 Thousand/uL      Lymphocytes Absolute 0 78 Thousands/µL      Monocytes Absolute 0 41 Thousand/µL      Eosinophils Absolute 0 05 Thousand/µL      Basophils Absolute 0 05 Thousands/µL                  CTA head and neck with and without contrast   ED Interpretation by Paulette Olmedo PA-C (01/16 9278)   1  No evidence of acute infarct, intracranial  Hemorrhage or mass effect  2  No stenosis, dissection or occlusion of the carotid or vertebral arteries or major vessels of the [place]  Final Result by Claribel Harry MD (01/16 7723)         1  No evidence of acute infarct, intracranial hemorrhage or mass effect  2   No stenosis, dissection or occlusion of the carotid or vertebral arteries or major vessels of the [place]                    Workstation performed: [id number]         XR chest 1 view portable   ED Interpretation by Jasen Forte Gutzweiler, PA-C (01/16 122)   No acute cardiopulmonary disease      Final Result by Beatrice Hensley DO (01/16 7539)      No acute cardiopulmonary disease  Workstation performed: [id number]         MRI Inpatient Order    (Results Pending)              Procedures  ECG 12 Lead Documentation Only    Date/Time: 1/16/2021 11:37 AM  Performed by: Ricardo Lerner PA-C  Authorized by: Ricardo Lerner PA-C     Indications / Diagnosis:  Tia  ECG reviewed by me, the ED Provider: yes    Patient location:  ED  Previous ECG:     Previous ECG:  Unavailable    Comparison to cardiac monitor: Yes    Interpretation:     Interpretation: non-specific    Rate:     ECG rate:  89    ECG rate assessment: normal    Rhythm:     Rhythm: sinus rhythm    Ectopy:     Ectopy: none    QRS:     QRS axis:  Left    QRS intervals:  Normal  Conduction:     Conduction: normal    ST segments:     ST segments:  Normal  T waves:     T waves: normal    Comments: There is a lot of artifact on this tracing  Will attempt to repeat  Poor R-wave progression  Independently interpreted by me             ED Course                                           MDM  Number of Diagnoses or Management Options  HTN (hypertension): new and requires workup  TIA (transient ischemic attack): new and requires workup     Amount and/or Complexity of Data Reviewed  Clinical lab tests: ordered and reviewed  Tests in the radiology section of CPT®: ordered and reviewed  Tests in the medicine section of CPT®: ordered and reviewed    Risk of Complications, Morbidity, and/or Mortality  Presenting problems: high  Diagnostic procedures: high  Management options: high  General comments: Patient presents emergency room with complaint hands of stay healing of the washing machine episode where she could move her body and could not speak  Her  lowered her to the chair of the symptoms resolved so she did come in    She then thought about it and thought maybe she should be checked  She has a history of having similar symptoms approximately 3-4 years ago where she was seen by a neurologist with an unknown diagnosis  She was seen and evaluated today in the emergency room  Her EKG did not show any signs of acute ischemia  Laboratory studies were taken and were reviewed  She had a CTA of the head and neck which was negative for any acute process  She was admitted to the [facility] service for 23 hour stay to further evaluate her possible TIA  Patient Progress  Patient progress: stable      Disposition  Final diagnoses:   TIA (transient ischemic attack)   HTN (hypertension)     Time reflects when diagnosis was documented in both MDM as applicable and the Disposition within this note     Time User Action Codes Description Comment    1/16/2021  2:31 PM Noam Chetan Add [G45 9] TIA (transient ischemic attack)     1/16/2021  2:31 PM Mark Kearney Add [I10] HTN (hypertension)     1/16/2021  4:00 PM Dillon Sadler Add [R29 90] Symptoms of cerebrovascular accident (CVA)       ED Disposition     ED Disposition Condition Date/Time Comment    Admit Stable Sat Jan 16, 2021  2:30 PM Case was discussed with Cope Else and the patient's admission status was agreed to be Admission Status: observation status to the service of Dr Calli Benitez           Follow-up Information    None         Current Discharge Medication List      CONTINUE these medications which have NOT CHANGED    Details   Alpha-D-Galactosidase (BEANO) TABS Take by mouth      Alum Hydroxide-Mag Trisilicate (GAVISCON) 24-23 3 MG CHEW Chew      ascorbic acid (VITAMIN C) 500 mg tablet Take by mouth      Cholecalciferol 400 units TABS Take by mouth      ciclopirox (PENLAC) 8 % solution Apply topically      cimetidine (TAGAMET) 200 mg tablet Take 200 mg by mouth daily      diltiazem (CARDIZEM CD) 240 mg 24 hr capsule Take 1 capsule (240 mg total) by mouth daily May give patient  the stock bottle  Qty: 90 capsule, Refills: 1    Associated Diagnoses: Essential hypertension      estradiol (ESTRACE) 0 1 mg/g vaginal cream Insert 1 g into the vagina once a week  Qty: 45 g, Refills: 3    Associated Diagnoses: Vaginal atrophy      fluticasone (FLONASE) 50 mcg/act nasal spray 2 sprays into each nostril daily  Qty: 16 g, Refills: 3    Associated Diagnoses: Allergic rhinitis, unspecified seasonality, unspecified trigger      Homeopathic Products (ZICAM ALLERGY RELIEF NA) into each nostril      lactase (LACTAID) 3,000 units tablet Take by mouth      Minoxidil (ROGAINE WOMENS) 5 % FOAM Apply topically      Multiple Vitamins-Minerals (CENTRUM SILVER ULTRA WOMENS PO) Take by mouth      nitrofurantoin (MACROBID) 100 mg capsule Take 1 capsule (100 mg total) by mouth 2 (two) times a day  Qty: 10 capsule, Refills: 0    Associated Diagnoses: Urinary symptom or sign; Urinary tract infection in female      polyethylene glycol-propylene glycol (SYSTANE) 0 4-0 3 %       !! PREMARIN vaginal cream       !! Premarin vaginal cream Insert 1 g into the vagina once a week Brand Necessary  Qty: 30 g, Refills: 1    Associated Diagnoses: Vaginal atrophy       !! - Potential duplicate medications found  Please discuss with provider  No discharge procedures on file      PDMP Review     None          ED Provider  Electronically Signed by           Francisca Villalba PA-C  01/16/21 2100

## 2021-01-16 NOTE — ASSESSMENT & PLAN NOTE
· Prior to admission, patient had a 2-3 minute episode of inability to speak or move  She was able to remember her symptoms and event and motioned to her  to get her something to write with so she could write "ask me to smile" and her smile was normal  She had no post-ictal symptoms, no urination, no loss of conscious, no unilateral numbness or tingling  Neuro exam non-focal here  CTA negative  She had prior symptoms and was seen by neurology and ordered further work up, but her symptoms never returned so this was cancelled   I suspect that her symptoms are due to stress as she recently lost a friend and her IBS is acting up   · Admit patient to med/surg under observation status  · Stroke pathway  · Check MRI   · FLP, A1c  · ASA, Statin

## 2021-01-17 ENCOUNTER — APPOINTMENT (OUTPATIENT)
Dept: MRI IMAGING | Facility: HOSPITAL | Age: 76
End: 2021-01-17
Payer: MEDICARE

## 2021-01-17 VITALS
HEIGHT: 62 IN | OXYGEN SATURATION: 98 % | RESPIRATION RATE: 16 BRPM | SYSTOLIC BLOOD PRESSURE: 160 MMHG | HEART RATE: 69 BPM | BODY MASS INDEX: 19.69 KG/M2 | DIASTOLIC BLOOD PRESSURE: 72 MMHG | TEMPERATURE: 97.8 F | WEIGHT: 107 LBS

## 2021-01-17 PROBLEM — R40.4 TRANSIENT ALTERATION OF AWARENESS: Status: ACTIVE | Noted: 2021-01-17

## 2021-01-17 LAB
ANION GAP SERPL CALCULATED.3IONS-SCNC: 8 MMOL/L (ref 4–13)
BUN SERPL-MCNC: 12 MG/DL (ref 5–25)
CALCIUM SERPL-MCNC: 8.6 MG/DL (ref 8.3–10.1)
CHLORIDE SERPL-SCNC: 106 MMOL/L (ref 100–108)
CHOLEST SERPL-MCNC: 183 MG/DL (ref 50–200)
CO2 SERPL-SCNC: 28 MMOL/L (ref 21–32)
CREAT SERPL-MCNC: 0.81 MG/DL (ref 0.6–1.3)
ERYTHROCYTE [DISTWIDTH] IN BLOOD BY AUTOMATED COUNT: 13.5 % (ref 11.6–15.1)
EST. AVERAGE GLUCOSE BLD GHB EST-MCNC: 105 MG/DL
GFR SERPL CREATININE-BSD FRML MDRD: 70 ML/MIN/1.73SQ M
GLUCOSE SERPL-MCNC: 86 MG/DL (ref 65–140)
HBA1C MFR BLD: 5.3 %
HCT VFR BLD AUTO: 36 % (ref 34.8–46.1)
HDLC SERPL-MCNC: 111 MG/DL
HGB BLD-MCNC: 11.1 G/DL (ref 11.5–15.4)
LDLC SERPL CALC-MCNC: 62 MG/DL (ref 0–100)
MCH RBC QN AUTO: 28.2 PG (ref 26.8–34.3)
MCHC RBC AUTO-ENTMCNC: 30.8 G/DL (ref 31.4–37.4)
MCV RBC AUTO: 91 FL (ref 82–98)
PLATELET # BLD AUTO: 225 THOUSANDS/UL (ref 149–390)
PMV BLD AUTO: 9.6 FL (ref 8.9–12.7)
POTASSIUM SERPL-SCNC: 3.4 MMOL/L (ref 3.5–5.3)
RBC # BLD AUTO: 3.94 MILLION/UL (ref 3.81–5.12)
SODIUM SERPL-SCNC: 142 MMOL/L (ref 136–145)
TRIGL SERPL-MCNC: 52 MG/DL
WBC # BLD AUTO: 4.74 THOUSAND/UL (ref 4.31–10.16)

## 2021-01-17 PROCEDURE — 80048 BASIC METABOLIC PNL TOTAL CA: CPT | Performed by: PHYSICIAN ASSISTANT

## 2021-01-17 PROCEDURE — 85027 COMPLETE CBC AUTOMATED: CPT | Performed by: PHYSICIAN ASSISTANT

## 2021-01-17 PROCEDURE — 70551 MRI BRAIN STEM W/O DYE: CPT

## 2021-01-17 PROCEDURE — 83036 HEMOGLOBIN GLYCOSYLATED A1C: CPT | Performed by: PHYSICIAN ASSISTANT

## 2021-01-17 PROCEDURE — 80061 LIPID PANEL: CPT | Performed by: PHYSICIAN ASSISTANT

## 2021-01-17 PROCEDURE — 92523 SPEECH SOUND LANG COMPREHEN: CPT

## 2021-01-17 PROCEDURE — G1004 CDSM NDSC: HCPCS

## 2021-01-17 PROCEDURE — 99215 OFFICE O/P EST HI 40 MIN: CPT | Performed by: PSYCHIATRY & NEUROLOGY

## 2021-01-17 RX ADMIN — DILTIAZEM HYDROCHLORIDE 240 MG: 240 CAPSULE, COATED, EXTENDED RELEASE ORAL at 09:15

## 2021-01-17 RX ADMIN — Medication 3000 UNITS: at 09:16

## 2021-01-17 RX ADMIN — OXYCODONE HYDROCHLORIDE AND ACETAMINOPHEN 500 MG: 500 TABLET ORAL at 09:15

## 2021-01-17 RX ADMIN — ASPIRIN 81 MG: 81 TABLET, CHEWABLE ORAL at 09:15

## 2021-01-17 NOTE — UTILIZATION REVIEW
Initial Clinical Review    Admission: Date/Time/Statement:   Admission Orders (From admission, onward)     Ordered        01/16/21 1431  Place in Observation  Once                   Orders Placed This Encounter   Procedures    Place in Observation     Standing Status:   Standing     Number of Occurrences:   1     Order Specific Question:   Level of Care     Answer:   Med Surg [16]     ED Arrival Information     Expected Arrival Acuity Means of Arrival Escorted By Service Admission Type    - 1/16/2021 10:26 Urgent Walk-In Family Member General Medicine Urgent    Arrival Complaint    cva symptoms yesterday/none today        Chief Complaint   Patient presents with    CVA/TIA-like Symptoms     states she could not move entire body or speak for 2-3 minutes yesterday afternoon     Assessment/Plan: Symptoms of cerebrovascular accident (CVA)  Assessment & Plan  · Prior to admission, patient had a 2-3 minute episode of inability to speak or move  She was able to remember her symptoms and event and motioned to her  to get her something to write with so she could write "ask me to smile" and her smile was normal  She had no post-ictal symptoms, no urination, no loss of conscious, no unilateral numbness or tingling  Neuro exam non-focal here  CTA negative  She had prior symptoms and was seen by neurology and ordered further work up, but her symptoms never returned so this was cancelled  I suspect that her symptoms are due to stress as she recently lost a friend and her IBS is acting up   ? Admit patient to med/surg under observation status  ? Stroke pathway  ? Check MRI   ? FLP, A1c  ? ASA, Statin      Grief  Assessment & Plan  · Patient recently suddenly lost friend to [place]  ? Supportive care and condolences offered      Essential hypertension  Assessment & Plan  · BP acceptable at this time   ?  Continue home medical management   § Multiple allergies noted      IBS (irritable bowel syndrome)  Assessment & Plan  · Reports increased symptoms   ? Continue regimen        ED Triage Vitals   Temperature Pulse Respirations Blood Pressure SpO2   01/16/21 1033 01/16/21 1033 01/16/21 1033 01/16/21 1033 01/16/21 1033   98 5 °F (36 9 °C) 92 18 (!) 190/82 99 %      Temp Source Heart Rate Source Patient Position - Orthostatic VS BP Location FiO2 (%)   01/16/21 1033 01/16/21 1033 01/16/21 1709 01/16/21 1709 --   Oral Monitor Sitting Left arm       Pain Score       01/16/21 1730       No Pain          Wt Readings from Last 1 Encounters:   01/16/21 48 5 kg (107 lb)     Additional Vital Signs:   01/17/21 0500  98 1 °F (36 7 °C)  68  18  138/63    97 %  None (Room air)     01/17/21 0248  98 3 °F (36 8 °C)  75  18  135/61    96 %  None (Room air)  Lying   01/17/21 0046  98 6 °F (37 °C)  68  18  146/67    97 %  None (Room air)  Lying   01/16/21 2200  98 9 °F (37 2 °C)  73  18  147/66  95  96 %  None (Room air)  Lying   01/16/21 2000  98 1 °F (36 7 °C)  88  18  140/72  99  96 %  None (Room air)  Sitting   01/16/21 1800  97 9 °F (36 6 °C)  81  18  156/74    95 %  None (Room air)  Sitting   01/16/21 1709  98 6 °F (37 °C)  85  18  152/71  102  95 %  None (Room air)       Date and Time Eye Opening Best Verbal Response Best Motor Response Pollock Coma Scale Score   01/16/21 2000 4 5 6 15   01/16/21 1901 4 5 6 15   01/16/21 1730 4 5 6 15   01/16/21 1529 4 5 6 15   01/16/21 1441 4 5 6 15       Pertinent Labs/Diagnostic Test Results:   1/16/2021 CxR - No acute cardiopulmonary disease    1/16/2021 cta head and neck No evidence of acute infarct, intracranial hemorrhage or mass effect  2   No stenosis, dissection or occlusion of the carotid or vertebral arteries or major vessels of the [place]  1/17/2021 MRI brain No mass effect, acute intracranial hemorrhage or evidence of recent infarction  Mild to moderate scattered chronic microvascular ischemic change, progressed since 12/15/2017    Results from last 7 days   Lab Units 01/16/21  1144   SARS-COV-2  Negative     Results from last 7 days   Lab Units 01/17/21  0525 01/16/21  1142   WBC Thousand/uL 4 74 5 52   HEMOGLOBIN g/dL 11 1* 12 8   HEMATOCRIT % 36 0 41 2   PLATELETS Thousands/uL 225 240   NEUTROS ABS Thousands/µL  --  4 22     Results from last 7 days   Lab Units 01/16/21  1142   SODIUM mmol/L 143   POTASSIUM mmol/L 3 8   CHLORIDE mmol/L 105   CO2 mmol/L 31   ANION GAP mmol/L 7   BUN mg/dL 16   CREATININE mg/dL 0 92   EGFR ml/min/1 73sq m 60   CALCIUM mg/dL 8 9   MAGNESIUM mg/dL 2 2   PHOSPHORUS mg/dL 2 6     Results from last 7 days   Lab Units 01/16/21  1142   GLUCOSE RANDOM mg/dL 110     Results from last 7 days   Lab Units 01/16/21  1142   TROPONIN I ng/mL <0 02     Results from last 7 days   Lab Units 01/16/21  1142   PROTIME seconds 13 8   INR  1 05   PTT seconds 38*     Results from last 7 days   Lab Units 01/16/21  1142   TSH 3RD GENERATON uIU/mL 3 027     Results from last 7 days   Lab Units 01/16/21  1142   PROLACTIN ng/mL 8 3     Results from last 7 days   Lab Units 01/16/21  1200   CLARITY UA  Clear   COLOR UA  Yellow   SPEC GRAV UA  1 010   PH UA  6 5   GLUCOSE UA mg/dl Negative   KETONES UA mg/dl Negative   BLOOD UA  Trace*   PROTEIN UA mg/dl Negative   NITRITE UA  Negative   BILIRUBIN UA  Negative   UROBILINOGEN UA E U /dl 0 2   LEUKOCYTES UA  Negative   WBC UA /hpf None Seen   RBC UA /hpf 0-1   BACTERIA UA /hpf None Seen   EPITHELIAL CELLS WET PREP /hpf Occasional     Results from last 7 days   Lab Units 01/16/21  1144   INFLUENZA A PCR  Negative   INFLUENZA B PCR  Negative   RSV PCR  Negative       ED Treatment:   Medication Administration from 01/16/2021 1026 to 01/16/2021 1707       Date/Time Order Dose Route Action Comments     01/16/2021 1244 sodium chloride 0 9 % infusion 125 mL/hr Intravenous New Bag         Past Medical History:   Diagnosis Date    Acne     Basal cell carcinoma 06/08/2020    right lower forehead    BCC (basal cell carcinoma of skin) 03/09/2020    mid forehead    Benign neoplasm of skin     GERD (gastroesophageal reflux disease)     Hypertension     Inflamed seborrheic keratosis     Irritable bowel syndrome     Malignant neoplasm of skin of face     Nonmelanoma skin cancer     Last Assessed:6/27/17    Squamous cell skin cancer 06/08/2020    In situ, left lower forehead    Tachycardia     Telogen effluvium     Temporomandibular joint disorder     Vertigo      Present on Admission:   Essential hypertension   IBS (irritable bowel syndrome)   Grief      Admitting Diagnosis: TIA (transient ischemic attack) [G45 9]  HTN (hypertension) [I10]  Symptoms of cerebrovascular accident (CVA) [R29 90]  Age/Sex: 66 y o  female  Admission Orders:  Scheduled Medications:  ascorbic acid, 500 mg, Oral, Daily  aspirin, 81 mg, Oral, Daily  aspirin, 325 mg, Oral, Once  atorvastatin, 40 mg, Oral, QPM  diltiazem, 240 mg, Oral, Daily  enoxaparin, 40 mg, Subcutaneous, Daily  famotidine, 20 mg, Oral, HS  fluticasone, 2 spray, Nasal, Daily  lactase, 3,000 Units, Oral, TID With Meals      Continuous IV Infusions:  sodium chloride, 125 mL/hr, Intravenous, Continuous      PRN Meds: not used   acetaminophen, 650 mg, Oral, Q4H PRN  aluminum-magnesium hydroxide-simethicone, 30 mL, Oral, Q4H PRN  glycerin-hypromellose-, 1 drop, Both Eyes, Q3H PRN  ondansetron, 4 mg, Intravenous, Q6H PRN  simethicone, 80 mg, Oral, Q6H PRN    telemetry      Network Utilization Review Department  ATTENTION: Please call with any questions or concerns to [phone] and carefully listen to the prompts so that you are directed to the right person  All voicemails are confidential   Shravan Malloy all requests for admission clinical reviews, approved or denied determinations and any other requests to dedicated fax number below belonging to the campus where the patient is receiving treatment   List of dedicated fax numbers for the Facilities:  FACILITY NAME UR FAX NUMBER   ADMISSION DENIALS (Administrative/Medical Necessity) [phone]   [address] (Maternity/NICU/Pediatrics) 261 [facility],7Th Floor [facility] [address] [place] [address]  [phone]   Alley Mathew 6477 (  Denis Prince "Lacey" 103) [address] [address] [facility] [phone]   [place] [address] [phone]

## 2021-01-17 NOTE — CONSULTS
Consultation - Neurology   Tobias Sun 66 y o  female MRN: [id number]  Unit/Bed#: S -01 Encounter: [id number]      Assessment/Plan     * Transient alteration of awareness  Assessment & Plan  64-year-old female with IBS presents with a transient episode of difficulty speaking or moving  The episode lasted for 2-3 minutes and the patient felt like her normal self afterwards  She had at least 4 similar episodes in the past   Previously seizure was considered, but lower suspicion for this at this time  It would be unusual for the patient to have gone several years without any episodes, while not on AEDs  She did recently lose a close friend due to [place] and has been feeling more isolated due to the precautions regarding seen family and friends  She also reports that her IBS has been bothersome due to not having access to her prior medications, as it has been taken off the market  Her IBS with uncontrolled when her 1st episode occurred, so question if this is contributing  Hypertension may also be contributing  Very low suspicion for vascular event  - Discontinue stroke pathway   - Patient does not need to remain on aspirin or statin from a neurologic standpoint, unless she was taking these prior to admission    - Can consider ambulatory EEG  - Goal normotension  - An outpatient hospital follow up appointment has been requested with the neurology team  The office will call the patient to help set up an appointment  - Medical management and supportive care per primary team  Correction of any metabolic or infectious disturbances  Tobias Sun will need follow up in in 4 weeks with general attending or advance practitioner  She will not require outpatient neurological testing      History of Present Illness     Reason for Consult / Principal Problem:     HPI: Tobias Sun is a 66 y o  female with irritable bowel syndrome, hypertension, and basal cell carcinoma, who presented to the ED for abnormal neurologic symptoms  The patient reports that over the past week, she feels that her IBS has been worse than normal   The medication that she typically takes has been taken off the market, so she has only been using over-the-counter medications as of early January  In addition, the patient has been slightly more stress lately due to isolation from her friends and family during the pandemic  She does live with her   A few days ago, the patient spoke to a close friend, who unfortunately passed away the following day from [facility]  The patient has been very upset regarding this  While the patient was at home, she went downstairs to do her laundry  She denies feeling significantly upset at that time regarding the current circumstances  Her  was downstairs at the time  She reports that she felt of strain sensation come over her that went from head to toe  She cannot specifically describe the sensation  She denies paresthesias, but she reports she felt like something was wrong  She grabbed onto the chair in the laundry room  Her  noted that something was wrong  The patient reports that she went to open her mouth, but could not get words out  She felt like she could not move  She denies any presyncopal symptoms  She denied any focal neurologic symptoms other than the language difficulty  The episode lasted for about 2-3 minutes  She felt like her normal self after the episode  She does report that she did not take her antihypertensives that morning  The patient has had similar episodes in the past   Her 1st episode occurred in 1994, while she was sitting at lunch with her work friends  She states that while they were talking, she suddenly could not speak, and was brought to the hospital   Prior to not being able to speak, the patient noted a warm sensation radiates throughout her body  She does not recall feeling stressed at that time    The etiology of the episode was unclear, but they thought that it may have been related to presyncope in regards to IBS symptoms  She had recurrent episodes in 2017  Each episode was preceded by a warm sensation rising in her stomach and then difficulty talking  With some of the episodes, she was repetitive in the words that she was speaking  With 1 episode, she was trying to cook dinner and could not comprehend the steps of the recipe  The episode was short lived, and within about 20 minutes, the patient was her normal self  She had a routine EEG which was normal in December 2017  She was seen by Dr Sam Moreno in March 2018  She ordered lab work for the episodic confusion  The patient also had a Holter monitor which was unremarkable  An ambulatory EEG was considered, but the patient deferred this at the time due to resolution of the episodes  The patient has not had any episodes since 2017  Inpatient consult to Neurology  Consult performed by: Sanya Musa PA-C  Consult ordered by: Tushar Farr MD        Review of Systems   Gastrointestinal:        Reports that her IBS is acting up- feels more bloated, cramping, gas  All other systems reviewed and are negative        Historical Information   Past Medical History:   Diagnosis Date    Acne     Basal cell carcinoma 06/08/2020    right lower forehead    BCC (basal cell carcinoma of skin) 03/09/2020    mid forehead    Benign neoplasm of skin     GERD (gastroesophageal reflux disease)     Hypertension     Inflamed seborrheic keratosis     Irritable bowel syndrome     Malignant neoplasm of skin of face     Nonmelanoma skin cancer     Last Assessed:6/27/17    Squamous cell skin cancer 06/08/2020    In situ, left lower forehead    Tachycardia     Telogen effluvium     Temporomandibular joint disorder     Vertigo      Past Surgical History:   Procedure Laterality Date    APPENDECTOMY      CHOLECYSTECTOMY      COLONOSCOPY  05/03/2019    COMPLEX WOUND CLOSURE TO EXTREMITY N/A 7/28/2020    Procedure: COMPLEX CLOSURE MID FOREHEAD;  Surgeon: Marta Gilbert MD;  [facility];  Service: Plastics    ESOPHAGOGASTRODUODENOSCOPY  2009    FLAP LOCAL HEAD / NECK N/A 7/28/2020    Procedure: FLAP MID FOREHEAD;  Surgeon: Marta Gilbert MD;  [facility];  Service: Plastics    HYSTERECTOMY  1987    MALIGNANT SKIN LESION EXCISION      Excision of Lesion Face Malignant-9/14/2004 BCC Forehead    MOHS RECONSTRUCTION N/A 7/28/2020    Procedure: RECONSTRUCTION MOHS DEFECT MID FOREHEAD;  Surgeon: Marta Gilbert MD;  [facility];  Service: Plastics    MOHS SURGERY  07/27/2020    Right &left lower forehead, mid forehead    OOPHORECTOMY Bilateral 1987    ROTATOR CUFF REPAIR      SKIN BIOPSY       Social History   Social History     Substance and Sexual Activity   Alcohol Use No     Social History     Substance and Sexual Activity   Drug Use No     E-Cigarette/Vaping    E-Cigarette Use Never User      E-Cigarette/Vaping Substances     Social History     Tobacco Use   Smoking Status Never Smoker   Smokeless Tobacco Never Used     Family History:   Family History   Problem Relation Age of Onset    Hypertension Mother     Osteoporosis Mother     Skin cancer Mother     Hypertension Father     Skin cancer Father     Cancer Maternal Grandmother     Uterine cancer Maternal Grandmother 34    Cancer Paternal Grandmother     Uterine cancer Paternal Grandmother 72    Cancer Paternal Aunt     Uterine cancer Paternal Aunt 54    Diabetes type II Maternal Grandfather     Skin cancer Sister 68    No Known Problems Daughter     No Known Problems Paternal Aunt     No Known Problems Paternal Aunt     No Known Problems Maternal Aunt        Review of previous medical records was completed       Meds/Allergies   all current active meds have been reviewed, current meds:   Current Facility-Administered Medications   Medication Dose Route Frequency    acetaminophen (TYLENOL) tablet 650 mg  650 mg Oral Q4H PRN    aluminum-magnesium hydroxide-simethicone (MYLANTA) oral suspension 30 mL  30 mL Oral Q4H PRN    ascorbic acid (VITAMIN C) tablet 500 mg  500 mg Oral Daily    aspirin chewable tablet 81 mg  81 mg Oral Daily    aspirin tablet 325 mg  325 mg Oral Once    atorvastatin (LIPITOR) tablet 40 mg  40 mg Oral QPM    diltiazem (CARDIZEM CD) 24 hr capsule 240 mg  240 mg Oral Daily    enoxaparin (LOVENOX) subcutaneous injection 40 mg  40 mg Subcutaneous Daily    famotidine (PEPCID) tablet 20 mg  20 mg Oral HS    fluticasone (FLONASE) 50 mcg/act nasal spray 2 spray  2 spray Nasal Daily    glycerin-hypromellose- (ARTIFICIAL TEARS) ophthalmic solution 1 drop  1 drop Both Eyes Q3H PRN    lactase (LACTAID) tablet 3,000 Units  3,000 Units Oral TID With Meals    ondansetron (ZOFRAN) injection 4 mg  4 mg Intravenous Q6H PRN    simethicone (MYLICON) chewable tablet 80 mg  80 mg Oral Q6H PRN    sodium chloride 0 9 % infusion  125 mL/hr Intravenous Continuous    and PTA meds:   Prior to Admission Medications   Prescriptions Last Dose Informant Patient Reported? Taking?    Alpha-D-Galactosidase (BEANO) TABS  Self Yes No   Sig: Take by mouth   Alum Hydroxide-Mag Trisilicate (GAVISCON) 20-41 8 MG CHEW  Self Yes No   Sig: Chew   Cholecalciferol 400 units TABS  Self Yes No   Sig: Take by mouth   Homeopathic Products (ZICAM ALLERGY RELIEF NA)  Self Yes No   Sig: into each nostril   Minoxidil (ROGAINE WOMENS) 5 % FOAM  Self Yes No   Sig: Apply topically   Multiple Vitamins-Minerals (CENTRUM SILVER ULTRA WOMENS PO)  Self Yes No   Sig: Take by mouth   PREMARIN vaginal cream  Self Yes No   Premarin vaginal cream   No No   Sig: Insert 1 g into the vagina once a week Brand Necessary   ascorbic acid (VITAMIN C) 500 mg tablet  Self Yes No   Sig: Take by mouth   ciclopirox (PENLAC) 8 % solution  Self Yes No   Sig: Apply topically   cimetidine (TAGAMET) 200 mg tablet  Self Yes No   Sig: Take 200 mg by mouth daily   diltiazem (CARDIZEM CD) 240 mg 24 hr capsule   No No   Sig: Take 1 capsule (240 mg total) by mouth daily May give patient  the stock bottle   estradiol (ESTRACE) 0 1 mg/g vaginal cream  Self No No   Sig: Insert 1 g into the vagina once a week   fluticasone (FLONASE) 50 mcg/act nasal spray  Self No No   Si sprays into each nostril daily   lactase (LACTAID) 3,000 units tablet  Self Yes No   Sig: Take by mouth   nitrofurantoin (MACROBID) 100 mg capsule   No No   Sig: Take 1 capsule (100 mg total) by mouth 2 (two) times a day   polyethylene glycol-propylene glycol (SYSTANE) 0 4-0 3 %  Self Yes No      Facility-Administered Medications: None       Allergies   Allergen Reactions    Cortisone Hypertension, Other (See Comments), Shortness Of Breath and Tachycardia    Acebutolol     Amlodipine     Atenolol     Azithromycin     Banana GI Intolerance    [facility]      Annotation - [id number]: iriitis    Candesartan     Clarithromycin     Erythromycin     Fosinopril     Irbesartan     Levofloxacin     Losartan     Methylprednisolone Hypertension and Tachycardia    Metoprolol     Nisoldipine     Olmesartan     Propranolol     Sulfamethoxazole-Trimethoprim Nausea Only    Timolol     Penicillins Rash       Objective   Vitals:Blood pressure 161/70, pulse 78, temperature 97 8 °F (36 6 °C), temperature source Oral, resp  rate 18, height 5' 2" (1 575 m), weight 48 5 kg (107 lb), SpO2 98 %, not currently breastfeeding  ,Body mass index is 19 57 kg/m²  Intake/Output Summary (Last 24 hours) at 2021 1453  Last data filed at 2021 0900  Gross per 24 hour   Intake 480 ml   Output    Net 480 ml       Invasive Devices: Invasive Devices     Peripheral Intravenous Line            Peripheral IV 21 Left;Ventral (anterior) Forearm less than 1 day                Physical Exam  Vitals signs and nursing note reviewed     Constitutional: General: She is not in acute distress  Appearance: She is well-developed  She is not ill-appearing or diaphoretic  HENT:      Head: Normocephalic and atraumatic  Right Ear: External ear normal       Left Ear: External ear normal       Nose: Nose normal       Mouth/Throat:      Mouth: Mucous membranes are moist       Pharynx: Oropharynx is clear  Eyes:      General: No scleral icterus  Right eye: No discharge  Left eye: No discharge  Extraocular Movements: EOM normal       Conjunctiva/sclera: Conjunctivae normal       Pupils: Pupils are equal, round, and reactive to light  Comments: No nystagmus  Neck:      Musculoskeletal: Normal range of motion and neck supple  Cardiovascular:      Rate and Rhythm: Normal rate  Heart sounds: Normal heart sounds  No gallop  Pulmonary:      Effort: Pulmonary effort is normal  No respiratory distress  Musculoskeletal: Normal range of motion  General: No tenderness or deformity  Right lower leg: No edema  Left lower leg: No edema  Skin:     General: Skin is warm and dry  Coloration: Skin is not jaundiced or pale  Findings: No erythema or rash  Neurological:      Mental Status: She is alert and oriented to person, place, and time  Cranial Nerves: No cranial nerve deficit  Sensory: No sensory deficit  Motor: No weakness  Coordination: Coordination normal  Finger-Nose-Finger Test and Heel to [facility] Test normal       Deep Tendon Reflexes: Strength normal       Reflex Scores:       Bicep reflexes are 2+ on the right side and 2+ on the left side  Brachioradialis reflexes are 2+ on the right side and 2+ on the left side  Patellar reflexes are 2+ on the right side and 2+ on the left side  Achilles reflexes are 2+ on the right side and 2+ on the left side    Psychiatric:         Mood and Affect: Mood normal          Speech: Speech normal          Behavior: Behavior normal  Thought Content: Thought content normal          Judgment: Judgment normal        Neurologic Exam     Mental Status   Oriented to person, place, and time  Attention: normal  Concentration: normal    Speech: speech is normal   Level of consciousness: alert  Knowledge: good  Able to name object  Able to repeat  Follows all commands  Cranial Nerves     CN II   Visual fields full to confrontation  Visual acuity: (Grossly intact )    CN III, IV, VI   Pupils are equal, round, and reactive to light  Extraocular motions are normal    Right pupil: Shape: regular  Left pupil: Shape: regular  Nystagmus: none   Conjugate gaze: present    CN V   Facial sensation intact  CN VII   Facial expression full, symmetric  CN VIII   Hearing: intact    CN XI   Right sternocleidomastoid strength: normal  Left sternocleidomastoid strength: normal  Right trapezius strength: normal  Left trapezius strength: normal    CN XII   Tongue: not atrophic  Fasciculations: absent  Tongue deviation: none    Motor Exam   Muscle bulk: normal  Overall muscle tone: normal  Right arm pronator drift: absent  Left arm pronator drift: absent    Strength   Strength 5/5 throughout  Sensory Exam   Light touch normal      Gait, Coordination, and Reflexes     Coordination   Finger to nose coordination: normal  Heel to shin coordination: normal    Tremor   Resting tremor: absent  Intention tremor: absent  Action tremor: absent    Reflexes   Right brachioradialis: 2+  Left brachioradialis: 2+  Right biceps: 2+  Left biceps: 2+  Right patellar: 2+  Left patellar: 2+  Right achilles: 2+  Left achilles: 2+      Lab Results: I have personally reviewed pertinent reports  Imaging Studies: I have personally reviewed pertinent reports  and I have personally reviewed pertinent films in PACS [facility], MRI)  EKG, Pathology, and Other Studies: I have personally reviewed pertinent reports      VTE Prophylaxis: Enoxaparin (Lovenox)    Code Status: Level 1 - Full Code

## 2021-01-17 NOTE — OCCUPATIONAL THERAPY NOTE
Occupational Therapy Screen     01/17/21 1100   OT Last Visit   OT Visit Date 01/17/21   Note Type   Note type Screen   Assessment   Assessment orders for OT evaluation received  Pt  in the hospital for observation to r/o CVA as she was c/o transient episode of  inability to speak  Pt reports all symptoms resolved and that she is back to being fully independent  At this time, skilled OT is not indicated   Anticipate DC to home with no OT needs

## 2021-01-17 NOTE — PLAN OF CARE
Problem: PAIN - ADULT  Goal: Verbalizes/displays adequate comfort level or baseline comfort level  Description: Interventions:  - Encourage patient to monitor pain and request assistance  - Assess pain using appropriate pain scale  - Administer analgesics based on type and severity of pain and evaluate response  - Implement non-pharmacological measures as appropriate and evaluate response  - Consider cultural and social influences on pain and pain management  - Notify physician/advanced practitioner if interventions unsuccessful or patient reports new pain  Outcome: Progressing     Problem: INFECTION - ADULT  Goal: Absence or prevention of progression during hospitalization  Description: INTERVENTIONS:  - Assess and monitor for signs and symptoms of infection  - Monitor lab/diagnostic results  - Monitor all insertion sites, i e  indwelling lines, tubes, and drains  - Monitor endotracheal if appropriate and nasal secretions for changes in amount and color  - Helen appropriate cooling/warming therapies per order  - Administer medications as ordered  - Instruct and encourage patient and family to use good hand hygiene technique  - Identify and instruct in appropriate isolation precautions for identified infection/condition  Outcome: Progressing  Goal: Absence of fever/infection during neutropenic period  Description: INTERVENTIONS:  - Monitor WBC    Outcome: Progressing     Problem: SAFETY ADULT  Goal: Patient will remain free of falls  Description: INTERVENTIONS:  - Assess patient frequently for physical needs  -  Identify cognitive and physical deficits and behaviors that affect risk of falls    -  Helen fall precautions as indicated by assessment   - Educate patient/family on patient safety including physical limitations  - Instruct patient to call for assistance with activity based on assessment  - Modify environment to reduce risk of injury  - Consider OT/PT consult to assist with strengthening/mobility  Outcome: Progressing  Goal: Maintain or return to baseline ADL function  Description: INTERVENTIONS:  -  Assess patient's ability to carry out ADLs; assess patient's baseline for ADL function and identify physical deficits which impact ability to perform ADLs (bathing, care of mouth/teeth, toileting, grooming, dressing, etc )  - Assess/evaluate cause of self-care deficits   - Assess range of motion  - Assess patient's mobility; develop plan if impaired  - Assess patient's need for assistive devices and provide as appropriate  - Encourage maximum independence but intervene and supervise when necessary  - Involve family in performance of ADLs  - Assess for home care needs following discharge   - Consider OT consult to assist with ADL evaluation and planning for discharge  - Provide patient education as appropriate  Outcome: Progressing  Goal: Maintain or return mobility status to optimal level  Description: INTERVENTIONS:  - Assess patient's baseline mobility status (ambulation, transfers, stairs, etc )    - Identify cognitive and physical deficits and behaviors that affect mobility  - Identify mobility aids required to assist with transfers and/or ambulation (gait belt, sit-to-stand, lift, walker, cane, etc )  - Tyler fall precautions as indicated by assessment  - Record patient progress and toleration of activity level on Mobility SBAR; progress patient to next Phase/Stage  - Instruct patient to call for assistance with activity based on assessment  - Consider rehabilitation consult to assist with strengthening/weightbearing, etc   Outcome: Progressing     Problem: DISCHARGE PLANNING  Goal: Discharge to home or other facility with appropriate resources  Description: INTERVENTIONS:  - Identify barriers to discharge w/patient and caregiver  - Arrange for needed discharge resources and transportation as appropriate  - Identify discharge learning needs (meds, wound care, etc )  - Arrange for interpretive services to assist at discharge as needed  - Refer to Case Management Department for coordinating discharge planning if the patient needs post-hospital services based on physician/advanced practitioner order or complex needs related to functional status, cognitive ability, or social support system  Outcome: Progressing     Problem: Knowledge Deficit  Goal: Patient/family/caregiver demonstrates understanding of disease process, treatment plan, medications, and discharge instructions  Description: Complete learning assessment and assess knowledge base    Interventions:  - Provide teaching at level of understanding  - Provide teaching via preferred learning methods  Outcome: Progressing

## 2021-01-17 NOTE — SPEECH THERAPY NOTE
Speech Language/Pathology    Speech-Language Evaluation    Impression:  Pt  Reports that all symptoms she was admitted with have resolved  She was cooperative in this assessment  All speech, language, and swallowing skills were judged to be grossly [facility] and age appropriate  Recommendation:  No speech services warranted at this time  Current Medical Status:  Arely Guthrie is a 66 y o  female with a history of HTN, IBS who presents with altered mental status  She reports that episode happened yesterday  She reports that she was going downstairs to get a load of laundry out of the dryer  She states that she suddenly felt weird from head to toe  She reports that she was unable to move or speak  She states that her  came to her aid and she motioned for him to get something for her to write with so she could write "ask me if I can smile"  He did this and he told her her smile was normal  She states that this episode lasted for 2-3 minutes  During the episode she did not lose consciousness, urinate, or have any convulsions  She denied unilateral symptoms  She denied headache or change in vision  She reports that she did not have any symptoms after  She reports that she had a similar occurrence in 2018 and was seen by a neurologist but no EEGs were done due to her symptoms resolving  She reports a family history of TIA in her mother  Denies smoking or drinking  She denies fevers or chills  Denies urinary symptoms  She denies chest pain or shortness of breath       She does report that she has had some recent stress in her life as she lost a friend to [facility] this week  She also reports that her IBS symptoms have been acting up as they took one of her medications off the market  Past Medical History:  See H&P for details      Special Studies:   1/16/21: Chest Xray: No acute cardiopulmonary disease  1/16/21: CTA head neck w wo contrast: 1    No evidence of acute infarct, intracranial hemorrhage or mass effect  2   No stenosis, dissection or occlusion of the carotid or vertebral arteries or major vessels of the [place]  1/17/21: MRI brain wo contrast: No mass effect, acute intracranial hemorrhage or evidence of recent infarction  Mild to moderate scattered chronic microvascular ischemic change, progressed since 12/15/2017  Social/Educational/vocational Hx:   Pt  Reportedly lives with her   She is a   Reported that she is the primary for cooking, cleaning, and laundry  They grocery shop together  Pt  Wears glasses  Denies any dysphagia at home with the exception of taking some larger pills, which she stated that she just "drinks" extra water and they are "fine"  Language Evaluation:    Auditory Comprehension:  R/L discrim: [facility]  Body part ID: [facility]  One step commands:[facility]  Complex or 3 step commands: [facility]  Picture ID: [facility]  Word ID: [facility]  Personal y/n ?'s:L  Basic y/n ?'s:[facility]  Complex y/n ?'s:[facility]      Reading Comprehension:[facility]    Verbal Expression:  Auto Sequences:   MARY:[facility]   Counting to 21:[facility]  Word Repetition:[facility]  Phrase Completion:[facility]  Responsive Naming:[facility]  Picture Naming:[facility]  Object Naming:[facility]  Divergent Naming:[facility]  Picture Description:[facility]  Conversation:[facility]  Able to make basic needs known?  Yes    Written Expression: DNT, denies deficits      Motor Speech:  Dysarthria: none  OM skills: [facility]  Apraxia: none       Cognitive -linguistic skills:  Orientation: [facility]     Problem solving:[facility]    Organizational tasks:[facility]    Memory:[facility]      Radha Burns MS CCC-SLP  Speech Language Pathologist  Available via [facility] License # [id number]  [address] [place] [address] # [id number]

## 2021-01-17 NOTE — PHYSICAL THERAPY NOTE
Physical Therapy Screen Note      Referral received for PT eval and tx  Chart check performed  Gomez Velasquez reports pt is ambulating independently  Pt reports walking w/o difficulty and has no concerns regarding discharge from mobility perspective  Inpatient PT is not indicated due to pt's independent mobility status  Discontinue PT      Tiesha Dao, PT

## 2021-01-17 NOTE — ASSESSMENT & PLAN NOTE
43-year-old female with IBS presents with a transient episode of difficulty speaking or moving  The episode lasted for 2-3 minutes and the patient felt like her normal self afterwards  She had at least 4 similar episodes in the past   Previously seizure was considered, but lower suspicion for this at this time  It would be unusual for the patient to have gone several years without any episodes, while not on AEDs  She did recently lose a close friend due to [place] and has been feeling more isolated due to the precautions regarding seen family and friends  She also reports that her IBS has been bothersome due to not having access to her prior medications, as it has been taken off the market  Her IBS with uncontrolled when her 1st episode occurred, so question if this is contributing  Hypertension may also be contributing  Very low suspicion for vascular event  - Discontinue stroke pathway   - Patient does not need to remain on aspirin or statin from a neurologic standpoint, unless she was taking these prior to admission    - Can consider ambulatory EEG  - Goal normotension  - An outpatient hospital follow up appointment has been requested with the neurology team  The office will call the patient to help set up an appointment  - Medical management and supportive care per primary team  Correction of any metabolic or infectious disturbances

## 2021-01-17 NOTE — DISCHARGE INSTR - AVS FIRST PAGE
Dear Amando Velazco,     It was our pleasure to care for you here at [facility]  It is our hope that we were always able to exceed the expected standards for your care during your stay  You were hospitalized due to transient confusional state  You were cared for on the 2nd floor under the service of Ora Bryant MD with the [facility] who covers for your primary care physician (PCP), Diane Roy MD, while you were hospitalized  If you have any questions or concerns related to this hospitalization, you may contact us at [phone]  For follow up as well as medication refills, we recommend that you follow up with your primary care physician  A registered nurse will reach out to you by phone within a few days after your discharge to answer any additional questions that you may have after going home  However, at this time we provide for you here, the most important instructions / recommendations at discharge:     · Notable Medication Adjustments -   · None  · Testing Required after Discharge -   · None  · Could consider EEG in the outpatient setting which your primary care physician or Neurology can order  · Important follow up information -   · Appear for the symptoms similar to the 1 to had when coming into hospital this time please urgently contact your PCP or come into the emergency department  · Other Instructions -   · Please follow-up closely with Neurology with using the phone number that we have given you You discharge paperwork  · Please follow-up with your PCP or early next week  · Please review this entire after visit summary as additional general instructions including medication list, appointments, activity, diet, any pertinent wound care, and other additional recommendations from your care team that may be provided for you        Sincerely,     Ora Bryant MD

## 2021-01-19 ENCOUNTER — TRANSITIONAL CARE MANAGEMENT (OUTPATIENT)
Dept: FAMILY MEDICINE CLINIC | Facility: MEDICAL CENTER | Age: 76
End: 2021-01-19

## 2021-01-19 LAB
ATRIAL RATE: 87 BPM
P AXIS: 80 DEGREES
PR INTERVAL: 120 MS
QRS AXIS: -70 DEGREES
QRSD INTERVAL: 88 MS
QT INTERVAL: 398 MS
QTC INTERVAL: 484 MS
T WAVE AXIS: 48 DEGREES
VENTRICULAR RATE: 89 BPM

## 2021-01-19 PROCEDURE — 93010 ELECTROCARDIOGRAM REPORT: CPT | Performed by: INTERNAL MEDICINE

## 2021-01-20 ENCOUNTER — OFFICE VISIT (OUTPATIENT)
Dept: FAMILY MEDICINE CLINIC | Facility: MEDICAL CENTER | Age: 76
End: 2021-01-20
Payer: MEDICARE

## 2021-01-20 VITALS
TEMPERATURE: 97.6 F | RESPIRATION RATE: 16 BRPM | SYSTOLIC BLOOD PRESSURE: 152 MMHG | BODY MASS INDEX: 20.56 KG/M2 | DIASTOLIC BLOOD PRESSURE: 80 MMHG | WEIGHT: 112.4 LBS | HEART RATE: 80 BPM

## 2021-01-20 DIAGNOSIS — Z23 ENCOUNTER FOR IMMUNIZATION: ICD-10-CM

## 2021-01-20 DIAGNOSIS — R41.0 EPISODIC CONFUSION: Primary | ICD-10-CM

## 2021-01-20 PROCEDURE — 1111F DSCHRG MED/CURRENT MED MERGE: CPT | Performed by: FAMILY MEDICINE

## 2021-01-20 PROCEDURE — 99496 TRANSJ CARE MGMT HIGH F2F 7D: CPT | Performed by: FAMILY MEDICINE

## 2021-01-21 NOTE — PROGRESS NOTES
Francoise Hunt is here for OPAL  She was hospitalized at [facility] from 1/17 to 1/18 for CVA/TIA like symptoms  She presented with sudden onset of inability to move or speak  Lasted several minutes  No associated neurologic symptoms  Imaging all negative  Patient had similar episodes in the last 3-4 years  She was referred to Neurology by myself in 2017 with a negative workup including EEG  She was seen by Neurology and this was not felt to be a vascular event  She was advised  follow-up with Neurology  She had just received the news of her friends sudden death when this occurred  O: /80 (Cuff Size: Standard)   Pulse 80   Temp 97 6 °F (36 4 °C)   Resp 16   Wt 51 kg (112 lb 6 4 oz)   LMP  (LMP Unknown)   BMI 20 56 kg/m²   She is alert and oriented  neck no adenopathy thyromegaly bruits  Chest clear with good breath sounds  Cardiac regular rate without murmur    Assessment  Episodic confusion-etiology unknown    Plan  Follow-up with Neurology

## 2021-01-22 ENCOUNTER — TELEPHONE (OUTPATIENT)
Dept: NEUROLOGY | Facility: CLINIC | Age: 76
End: 2021-01-22

## 2021-01-22 NOTE — TELEPHONE ENCOUNTER
Sent in basket message to Dr Rick Ceron to see if it would be ok to do a OPAL from Dr Rick Ceron to one of the [facility] dr's  As soon as I hear back from the dr I will reach out to her with the ok  Pt needs HFU appt  SLRA/Transient alteration of awareness/Medicare/AARP    NOTE FROM CHART:  Transient alteration of awareness  Era Floras will need follow up in in 4 weeks with general attending or advance practitioner  She will not require outpatient neurological testing

## 2021-01-24 ENCOUNTER — IMMUNIZATIONS (OUTPATIENT)
Dept: FAMILY MEDICINE CLINIC | Facility: HOSPITAL | Age: 76
End: 2021-01-24

## 2021-01-24 DIAGNOSIS — Z23 ENCOUNTER FOR IMMUNIZATION: Primary | ICD-10-CM

## 2021-01-24 PROCEDURE — 0011A SARS-COV-2 / COVID-19 MRNA VACCINE (MODERNA) 100 MCG: CPT

## 2021-01-24 PROCEDURE — 91301 SARS-COV-2 / COVID-19 MRNA VACCINE (MODERNA) 100 MCG: CPT

## 2021-02-05 NOTE — TELEPHONE ENCOUNTER
[address] Jogg Sprague, DO [place]             Yes totally okay by me    Previous Messages    ----- Message -----   From: Christopher   Sent: 1/22/2021  11:02 AM EST   To: 1000 Jogg Sprague, DO     Good morning Dr Braulio Bond,     This pt has seen you back in 2018, she is requesting to do a OPAL to the [facility] office  She states this will be a better commute for her  Is this OPAL ok to do? Please advise  Thank you  Message sent to Dr. Costa for LAISHAI.

## 2021-02-05 NOTE — TELEPHONE ENCOUNTER
Sched HFU appt 2/15/2021 with Dr Moo Silveira in [place]  Mailed reminder card and directions to pt's home

## 2021-02-15 ENCOUNTER — OFFICE VISIT (OUTPATIENT)
Dept: NEUROLOGY | Facility: CLINIC | Age: 76
End: 2021-02-15
Payer: MEDICARE

## 2021-02-15 VITALS
HEART RATE: 76 BPM | DIASTOLIC BLOOD PRESSURE: 68 MMHG | WEIGHT: 110 LBS | HEIGHT: 63 IN | SYSTOLIC BLOOD PRESSURE: 146 MMHG | BODY MASS INDEX: 19.49 KG/M2

## 2021-02-15 DIAGNOSIS — R41.0 EPISODIC CONFUSION: Primary | ICD-10-CM

## 2021-02-15 PROCEDURE — 99215 OFFICE O/P EST HI 40 MIN: CPT | Performed by: PSYCHIATRY & NEUROLOGY

## 2021-02-15 RX ORDER — CHOLECALCIFEROL (VITAMIN D3) 10(400)/ML
DROPS ORAL
COMMUNITY

## 2021-02-15 NOTE — PROGRESS NOTES
Soham Bertrand is a 66 y o  female   Presents with an episode of speech difficulty    Assessment:  1  Episodic confusion        Plan:  EEG   Follow-up afterwards    Discussion:   Isacc Multani had an unusual episode last month of an odd feeling associated with inability to move her talk  It lasted for a couple of minutes and then resolved  There was no loss of consciousness associated with it  She had a normal CTA of the head and neck and a normal MRI of the brain  She had other unusual symptoms a few years ago associated with an odd sensation in her stomach again with difficulty speaking or reading and at that time had a normal EEG  This episode was different and it was felt possibly related to a stress reaction as opposed to a true physiologic etiology  For completeness take EEG was recommended and will order this  I will see her back in follow-up afterwards  Subjective:    HPI    Isacc Multani is a right-handed woman who presents today accompanied by her  with the above complaints  She states that about a month ago she was in her basement doing some laundry  She states her  was also in the basement at the same time  She states that she had an odd sensation come over her which caused her to put her hand on the drier  She states at that point she was not able to move  Her  saw that there was an issue and came over to assist her  He reports that her mouth was moving but no sound was coming out  She states she was able to shuffle her feet and move and sit down in a chair  There was no focal weakness  There was no loss of consciousness  She states that within a couple of minutes the symptoms passed and she felt back to normal   She states that later that evening after thinking about things they decided to go to the hospital   She was evaluated and had a CTA of the head and neck done as well as an MRI of the brain which were normal   She was seen by Neurology who found a normal examination  With further questioning she had reported that she had just lost a close friend to eventuosity suddenly and it was felt that this might have been just a stress reaction to this  She states she has not had any further symptoms since that time  She was evaluated by Neurology in 2018 for different types of spells  She states that on 3 or 4 occasions she would have an odd sensation of warmth in her abdomen followed by inability to speak or read  This would last for a couple of minutes and then resolved    She had an EEG done at that time which was normal and not had any further symptoms between 2018 in last month      Past Medical History:   Diagnosis Date    Acne     Basal cell carcinoma 06/08/2020    right lower forehead    BCC (basal cell carcinoma of skin) 03/09/2020    mid forehead    Benign neoplasm of skin     GERD (gastroesophageal reflux disease)     Hypertension     Inflamed seborrheic keratosis     Irritable bowel syndrome     Malignant neoplasm of skin of face     Migraines     Nonmelanoma skin cancer     Last Assessed:6/27/17    Squamous cell skin cancer 06/08/2020    In situ, left lower forehead    Tachycardia     Telogen effluvium     Temporomandibular joint disorder     Vertigo        Family History:  Family History   Problem Relation Age of Onset    Hypertension Mother     Osteoporosis Mother     Skin cancer Mother    Bleckley Alex Migraines Mother     Dementia Mother     Hypertension Father     Skin cancer Father     Dementia Father     Uterine cancer Maternal Grandmother 34    Cancer Paternal Grandmother     Uterine cancer Paternal Grandmother 72    Cancer Paternal Aunt     Uterine cancer Paternal Aunt 53    Diabetes type II Maternal Grandfather     Skin cancer Sister 68    Migraines Sister     No Known Problems Daughter     No Known Problems Paternal Aunt     No Known Problems Paternal Aunt     No Known Problems Maternal Aunt        Past Surgical History:  Past Surgical History: Procedure Laterality Date    APPENDECTOMY      CHOLECYSTECTOMY      COLONOSCOPY  05/03/2019    COMPLEX WOUND CLOSURE TO EXTREMITY N/A 7/28/2020    Procedure: COMPLEX CLOSURE MID FOREHEAD;  Surgeon: Chiki Valladares MD;  [facility];  Service: Plastics    ESOPHAGOGASTRODUODENOSCOPY  2009    FLAP LOCAL HEAD / NECK N/A 7/28/2020    Procedure: FLAP MID FOREHEAD;  Surgeon: Chiki Valladares MD;  [facility];  Service: Plastics    HYSTERECTOMY  1987    MALIGNANT SKIN LESION EXCISION      Excision of Lesion Face Malignant-9/14/2004 BCC Forehead    MOHS RECONSTRUCTION N/A 7/28/2020    Procedure: RECONSTRUCTION MOHS DEFECT MID FOREHEAD;  Surgeon: Chiki Valladares MD;  [facility];  Service: Plastics    MOHS SURGERY  07/27/2020    Right &left lower forehead, mid forehead    OOPHORECTOMY Bilateral 1987    ROTATOR CUFF REPAIR Right     SKIN BIOPSY         Social History:   reports that she has never smoked  She has never used smokeless tobacco  She reports that she does not drink alcohol or use drugs      Allergies:  Cortisone, Acebutolol, Amlodipine, Atenolol, Azithromycin, Banana, Bisphosphonates, Candesartan, Clarithromycin, Erythromycin, Fosinopril, Irbesartan, Levofloxacin, Losartan, Methylprednisolone, Metoprolol, Nisoldipine, Olmesartan, Propranolol, Sulfamethoxazole-trimethoprim, Timolol, and Penicillins      Current Outpatient Medications:     Alpha-D-Galactosidase (BEANO) TABS, Take by mouth, Disp: , Rfl:     Alum Hydroxide-Mag Trisilicate (GAVISCON) 92-55 6 MG CHEW, Chew, Disp: , Rfl:     ascorbic acid (VITAMIN C) 500 mg tablet, Take by mouth, Disp: , Rfl:     Cholecalciferol 400 units TABS, Take by mouth, Disp: , Rfl:     ciclopirox (PENLAC) 8 % solution, Apply topically, Disp: , Rfl:     cimetidine (TAGAMET) 200 mg tablet, Take 1 tablet (200 mg total) by mouth 2 (two) times a day Before breakfast and bedtime, Disp: 60 tablet, Rfl: 6    Digestive Enzyme CAPS, Take by mouth, Disp: , Rfl:     diltiazem (CARDIZEM CD) 240 mg 24 hr capsule, Take 1 capsule (240 mg total) by mouth daily May give patient  the stock bottle, Disp: 90 capsule, Rfl: 1    fluticasone (FLONASE) 50 mcg/act nasal spray, 2 sprays into each nostril daily, Disp: 16 g, Rfl: 3    Homeopathic Products (ZICAM ALLERGY RELIEF NA), into each nostril, Disp: , Rfl:     lactase (LACTAID) 3,000 units tablet, Take by mouth, Disp: , Rfl:     Minoxidil (ROGAINE WOMENS) 5 % FOAM, Apply topically, Disp: , Rfl:     Multiple Vitamins-Minerals (CENTRUM SILVER ULTRA WOMENS PO), Take by mouth, Disp: , Rfl:     nitrofurantoin (MACROBID) 100 mg capsule, Take 1 capsule (100 mg total) by mouth 2 (two) times a day, Disp: 10 capsule, Rfl: 0    polyethylene glycol-propylene glycol (SYSTANE) 0 4-0 3 %, , Disp: , Rfl:     Premarin vaginal cream, Insert 1 g into the vagina once a week Brand Necessary, Disp: 30 g, Rfl: 1    estradiol (ESTRACE) 0 1 mg/g vaginal cream, Insert 1 g into the vagina once a week, Disp: 45 g, Rfl: 3    PREMARIN vaginal cream, , Disp: , Rfl:      I have reviewed the past medical, social and family history, current medications, allergies, vitals, review of systems and updated this information as appropriate today     Objective:    Vitals:  Blood pressure 146/68, pulse 76, height 5' 3" (1 6 m), weight 49 9 kg (110 lb), not currently breastfeeding  Physical Exam    Neurological Exam    GENERAL:  Cooperative in no acute distress  Well-developed and well-nourished    HEAD and NECK   Head is atraumatic normocephalic with no lesions or masses  Neck is supple with full range of motion    CARDIOVASCULAR  Carotid Arteries-no carotid bruits  NEUROLOGIC:  Mental Status-the patient is awake alert and oriented without aphasia or apraxia  Cranial Nerves: Visual fields are full to confrontation  Extraocular movements are full without nystagmus  Pupils are 2-1/2 mm and reactive   Face is symmetrical to light touch  Movements of facial expression move symmetrically  Hearing is normal to finger rub bilaterally  Soft palate lifts symmetrically  Shoulder shrug is symmetrical  Tongue is midline without atrophy  Motor: No drift is noted on arm extension  Strength is full in the upper and lower extremities with normal bulk and tone  Sensory: Intact to temperature and vibratory sensation in the upper and lower extremities bilaterally  Cortical function is intact  Coordination: Finger to nose testing is performed accurately  Romberg is negative  Gait reveals a normal base with symmetrical arm swing  Tandem walk is normal   Reflexes: 1+ in the biceps, triceps, brachioradialis and knee jerk regions, 1 at the ankles  Toes are downgoing                  ROS:    Review of Systems   Constitutional: Negative  Negative for appetite change and fever  HENT: Negative  Negative for hearing loss, tinnitus, trouble swallowing and voice change  Eyes: Negative  Negative for photophobia and pain  Respiratory: Negative  Negative for shortness of breath  Cardiovascular: Negative  Negative for palpitations  Gastrointestinal: Positive for diarrhea  Negative for constipation, nausea and vomiting  Fecal incontinence       Endocrine: Negative  Negative for cold intolerance  Genitourinary: Negative  Negative for dysuria, frequency and urgency  Musculoskeletal: Positive for back pain  Negative for gait problem, myalgias and neck pain  Skin: Negative  Negative for rash  Neurological: Negative  Negative for dizziness, tremors, seizures, syncope, facial asymmetry, speech difficulty, weakness, light-headedness, numbness and headaches  Hematological: Negative  Does not bruise/bleed easily  Psychiatric/Behavioral: Negative  Negative for confusion, hallucinations and sleep disturbance  The patient is not nervous/anxious

## 2021-02-21 ENCOUNTER — IMMUNIZATIONS (OUTPATIENT)
Dept: FAMILY MEDICINE CLINIC | Facility: HOSPITAL | Age: 76
End: 2021-02-21

## 2021-02-21 DIAGNOSIS — Z23 ENCOUNTER FOR IMMUNIZATION: Primary | ICD-10-CM

## 2021-02-21 PROCEDURE — 0012A SARS-COV-2 / COVID-19 MRNA VACCINE (MODERNA) 100 MCG: CPT

## 2021-02-21 PROCEDURE — 91301 SARS-COV-2 / COVID-19 MRNA VACCINE (MODERNA) 100 MCG: CPT

## 2021-02-26 PROBLEM — K21.9 GASTROESOPHAGEAL REFLUX DISEASE: Status: ACTIVE | Noted: 2021-02-26

## 2021-02-26 PROBLEM — R49.0 DYSPHONIA: Status: ACTIVE | Noted: 2021-02-26

## 2021-03-16 ENCOUNTER — OFFICE VISIT (OUTPATIENT)
Dept: DERMATOLOGY | Facility: CLINIC | Age: 76
End: 2021-03-16
Payer: MEDICARE

## 2021-03-16 VITALS — TEMPERATURE: 97.8 F

## 2021-03-16 DIAGNOSIS — Z85.828 HISTORY OF SKIN CANCER: ICD-10-CM

## 2021-03-16 DIAGNOSIS — L82.1 SEBORRHEIC KERATOSIS: ICD-10-CM

## 2021-03-16 DIAGNOSIS — Z13.89 SCREENING FOR SKIN CONDITION: ICD-10-CM

## 2021-03-16 DIAGNOSIS — L73.8 SEBACEOUS HYPERPLASIA OF FACE: Primary | ICD-10-CM

## 2021-03-16 PROCEDURE — 99213 OFFICE O/P EST LOW 20 MIN: CPT | Performed by: DERMATOLOGY

## 2021-03-16 RX ORDER — FAMOTIDINE 10 MG
10 TABLET ORAL 2 TIMES DAILY
COMMUNITY

## 2021-03-16 NOTE — PATIENT INSTRUCTIONS
Sebaceous hyperplasia patient advise that this is enlarged oil gland no treatment indicated and removal is really considered cosmetic  Seborrheic keratosis patient reassured these are normal growths we acquire with age no treatment needed  History of skin cancer in no recurrence nothing else atypical sunblock recommended follow-up in 6 months  Screening for dermatologic disorders nothing else of concern noted on complete exam follow-up in 6 months

## 2021-03-16 NOTE — PROGRESS NOTES
[facility] DERMATOLOGY  [address] [place] [id number]  [phone]  [phone]     MRN: [id number] : 1945  Encounter: [id number]  Patient Information: Pilar Kelly  Chief complaint: 6 Month checkup    History of present illness:  39-year-old female with history of multiple skin cancers presents for overall checkup concerned regarding lesion on the forehead no other concerns noted  Past Medical History:   Diagnosis Date    Acne     Basal cell carcinoma 2020    right lower forehead    BCC (basal cell carcinoma of skin) 2020    mid forehead    Benign neoplasm of skin     GERD (gastroesophageal reflux disease)     Hypertension     Inflamed seborrheic keratosis     Irritable bowel syndrome     Malignant neoplasm of skin of face     Migraines     Nonmelanoma skin cancer     Last Assessed:17    Squamous cell skin cancer 2020    In situ, left lower forehead    Tachycardia     Telogen effluvium     Temporomandibular joint disorder     Vertigo      Past Surgical History:   Procedure Laterality Date    APPENDECTOMY      CHOLECYSTECTOMY      COLONOSCOPY  2019    COMPLEX WOUND CLOSURE TO EXTREMITY N/A 2020    Procedure: COMPLEX CLOSURE MID FOREHEAD;  Surgeon: Brionna Edwards MD;  [facility];  Service: Plastics    ESOPHAGOGASTRODUODENOSCOPY  2009    FLAP LOCAL HEAD / NECK N/A 2020    Procedure: FLAP MID FOREHEAD;  Surgeon: Brionna Edwards MD;  [facility];  Service: Plastics    HYSTERECTOMY  1987    MALIGNANT SKIN LESION EXCISION      Excision of Lesion Face Malignant-2004 [address]  [facility] Forehead    MOHS RECONSTRUCTION N/A 2020    Procedure: RECONSTRUCTION MOHS DEFECT MID FOREHEAD;  Surgeon: Brionna Edwards MD;  [facility];  Service: Plastics    MOHS SURGERY  2020    Right &left lower forehead, mid forehead    OOPHORECTOMY Bilateral     ROTATOR CUFF REPAIR Right     SKIN BIOPSY       Social History   Social History     Substance and Sexual Activity   Alcohol Use No     Social History     Substance and Sexual Activity   Drug Use Never     Social History     Tobacco Use   Smoking Status Never Smoker   Smokeless Tobacco Never Used     Family History   Problem Relation Age of Onset    Hypertension Mother     Osteoporosis Mother     Skin cancer Mother    Bertha Mendez Migraines Mother    Bertha Sang Dementia Mother     Hypertension Father     Skin cancer Father     Dementia Father     Uterine cancer Maternal Grandmother 34    Cancer Paternal Grandmother     Uterine cancer Paternal Grandmother 72    Cancer Paternal Aunt     Uterine cancer Paternal Aunt 53    Diabetes type II Maternal Grandfather     Skin cancer Sister 68    Migraines Sister     No Known Problems Daughter     No Known Problems Paternal Aunt     No Known Problems Paternal Aunt     No Known Problems Maternal Aunt      Meds/Allergies   Allergies   Allergen Reactions    Cortisone Hypertension, Other (See Comments), Shortness Of Breath and Tachycardia    Acebutolol     Amlodipine     Atenolol     Azithromycin     Banana GI Intolerance    Bisphosphonates      Annotation - [id number]: iriitis    Candesartan     Clarithromycin     Erythromycin     Fosinopril     Irbesartan     Levofloxacin     Losartan     Methylprednisolone Hypertension and Tachycardia    Metoprolol     Nisoldipine     Olmesartan     Propranolol     Sulfamethoxazole-Trimethoprim Nausea Only    Timolol     Penicillins Rash       Meds:  Prior to Admission medications    Medication Sig Start Date End Date Taking?  Authorizing Provider   Alpha-D-Galactosidase Heydi Zavala) TABS Take by mouth   Yes Historical Provider, MD   Alum Hydroxide-Mag Trisilicate (GAVISCON) 79-55 8 MG CHEW Chew   Yes Historical Provider, MD   ascorbic acid (VITAMIN C) 500 mg tablet Take by mouth   Yes Historical Provider, MD   Cholecalciferol 400 units TABS Take by mouth   Yes Historical Provider, MD   ciclopirox (PENLAC) 8 % solution Apply topically   Yes Historical Provider, MD   Digestive Enzyme CAPS Take by mouth   Yes Historical Provider, MD   diltiazem (CARDIZEM CD) 240 mg 24 hr capsule Take 1 capsule (240 mg total) by mouth daily May give patient  the stock bottle 12/2/20  Yes Matheus Akers MD   famotidine (PEPCID) 10 mg tablet Take 10 mg by mouth 2 (two) times a day   Yes Historical Provider, MD   fluticasone (FLONASE) 50 mcg/act nasal spray 2 sprays into each nostril daily 8/20/18  Yes Matheus Akers MD   Homeopathic Products ([address] NA) into each nostril   Yes Historical Provider, MD   lactase (LACTAID) 3,000 units tablet Take by mouth   Yes Historical Provider, MD   Minoxidil (ROGAINE WOMENS) 5 % FOAM Apply topically   Yes Historical Provider, MD   Multiple Vitamins-Minerals (CENTRUM SILVER ULTRA WOMENS PO) Take by mouth   Yes Historical Provider, MD   polyethylene glycol-propylene glycol (SYSTANE) 0 4-0 3 %    Yes Historical Provider, MD   Premarin vaginal cream Insert 1 g into the vagina once a week Brand Necessary 12/9/20  Yes Marilynn Justice MD   cimetidine (TAGAMET) 200 mg tablet Take 1 tablet (200 mg total) by mouth 2 (two) times a day Before breakfast and bedtime  Patient not taking: Reported on 3/16/2021 1/26/21   Sudeep Clancy MD   estradiol (ESTRACE) 0 1 mg/g vaginal cream Insert 1 g into the vagina once a week 5/8/19   Marilynn Justice MD   nitrofurantoin (MACROBID) 100 mg capsule Take 1 capsule (100 mg total) by mouth 2 (two) times a day  Patient not taking: Reported on 3/16/2021 11/6/20   YULISSA Hutchinson   PREMARIN vaginal cream  8/15/18   Historical Provider, MD       Subjective:     Review of Systems:    General: negative for - chills, fatigue, fever,  weight gain or weight loss  Psychological: negative for - anxiety, behavioral disorder, concentration difficulties, decreased libido, depression, irritability, memory difficulties, mood swings, sleep disturbances or suicidal ideation  ENT: negative for - hearing difficulties , nasal congestion, nasal discharge, oral lesions, sinus pain, sneezing, sore throat  Allergy and Immunology: negative for - hives, insect bite sensitivity,  Hematological and Lymphatic: negative for - bleeding problems, blood clots,bruising, swollen lymph nodes  Endocrine: negative for - hair pattern changes, hot flashes, malaise/lethargy, mood swings, palpitations, polydipsia/polyuria, skin changes, temperature intolerance or unexpected weight change  Respiratory: negative for - cough, hemoptysis, orthopnea, shortness of breath, or wheezing  Cardiovascular: negative for - chest pain, dyspnea on exertion, edema,  Gastrointestinal: negative for - abdominal pain, nausea/vomiting  Genito-Urinary: negative for - dysuria, incontinence, irregular/heavy menses or urinary frequency/urgency  Musculoskeletal: negative for - gait disturbance, joint pain, joint stiffness, joint swelling, muscle pain, muscular weakness  Dermatological:  As in HPI  Neurological: negative for confusion, dizziness, headaches, impaired coordination/balance, memory loss, numbness/tingling, seizures, speech problems, tremors or weakness       Objective:   Temp 97 8 °F (36 6 °C)   LMP  (LMP Unknown)     Physical Exam:    General Appearance:    Alert, cooperative, no distress   Head:    Normocephalic, without obvious abnormality, atraumatic           Skin:   A full skin exam was performed including scalp, head scalp, eyes, ears, nose, lips, neck, chest, axilla, abdomen, back, buttocks, bilateral upper extremities, bilateral lower extremities, hands, feet, fingers, toes, fingernails, and toenails slightly umbilicated yellow papule noted on the mid forehead normal keratotic papules greasy stuck on appearance previous sites skin cancer well healed without recurrence nothing else atypical noted on exam     Assessment:     1  Sebaceous hyperplasia of face     2  Seborrheic keratosis     3  History of skin cancer     4  Screening for skin condition           Plan:   Sebaceous hyperplasia patient advise that this is enlarged oil gland no treatment indicated and removal is really considered cosmetic  Seborrheic keratosis patient reassured these are normal growths we acquire with age no treatment needed  History of skin cancer in no recurrence nothing else atypical sunblock recommended follow-up in 6 months  Screening for dermatologic disorders nothing else of concern noted on complete exam follow-up in 6 months    Dot Glass MD  3/16/2021,11:17 AM    Portions of the record may have been created with voice recognition software   Occasional wrong word or "sound a like" substitutions may have occurred due to the inherent limitations of voice recognition software   Read the chart carefully and recognize, using context, where substitutions have occurred

## 2021-04-26 ENCOUNTER — OFFICE VISIT (OUTPATIENT)
Dept: FAMILY MEDICINE CLINIC | Facility: MEDICAL CENTER | Age: 76
End: 2021-04-26
Payer: MEDICARE

## 2021-04-26 VITALS
RESPIRATION RATE: 16 BRPM | TEMPERATURE: 98.1 F | SYSTOLIC BLOOD PRESSURE: 130 MMHG | WEIGHT: 112 LBS | HEIGHT: 63 IN | DIASTOLIC BLOOD PRESSURE: 62 MMHG | BODY MASS INDEX: 19.84 KG/M2 | HEART RATE: 68 BPM

## 2021-04-26 DIAGNOSIS — F43.9 STRESS: ICD-10-CM

## 2021-04-26 DIAGNOSIS — I10 ESSENTIAL HYPERTENSION: Primary | ICD-10-CM

## 2021-04-26 DIAGNOSIS — R41.0 EPISODIC CONFUSION: ICD-10-CM

## 2021-04-26 PROCEDURE — 99214 OFFICE O/P EST MOD 30 MIN: CPT | Performed by: FAMILY MEDICINE

## 2021-04-26 NOTE — PROGRESS NOTES
Yessi Oates is here for 6 month checkup  See notes a OPAL last visit in January  She was referred to Neurology during her hospitalization for her episodes of  Episodic confusion  She saw Georgia Carson who advised an EEG  She has declined to do so  She has not had any further episodes  seh says she has been under a lot of stress  She feels tense  Aggravated by certain situations  Takes BP which is ok at the time  No associated  shakiness, nausea  She asks about support stockings  She got a compressed nerve in her toe   Will be checking in to total is sore foot with stockings  Otherwise feels well    O: /90 (BP Location: Left arm, Patient Position: Sitting, Cuff Size: Adult)   Pulse 68   Temp 98 1 °F (36 7 °C)   Resp 16   Ht 5' 3" (1 6 m)   Wt 50 8 kg (112 lb)   LMP  (LMP Unknown)   BMI 19 84 kg/m²      BP by me 130/62  Physical Exam  Constitutional:       General: She is not in acute distress  Appearance: She is well-developed  Neck:      Thyroid: No thyromegaly  Vascular: No carotid bruit  Cardiovascular:      Rate and Rhythm: Normal rate and regular rhythm  Heart sounds: Normal heart sounds  Pulmonary:      Effort: Pulmonary effort is normal       Breath sounds: Normal breath sounds  Abdominal:      General: Bowel sounds are normal       Palpations: Abdomen is soft  There is no hepatomegaly or mass  Tenderness: There is no abdominal tenderness  Lymphadenopathy:      Cervical: No cervical adenopathy  Assessment   1  Hypertension -controlled   2  Episodes of confusion -encouraged to get EEG done  Follow-up neurology  3  Stress -discussed relaxation  Techniques  4    Venous insufficiency- she will let me know if she needs prescription for stockings    Plan   Recheck 6 months

## 2021-05-19 DIAGNOSIS — I10 ESSENTIAL HYPERTENSION: ICD-10-CM

## 2021-05-19 NOTE — TELEPHONE ENCOUNTER
----- Message from Christina Wilcox  [facility] sent at 5/19/2021  2:11 PM EDT -----  Regarding: RE: Visit Follow-Up Question  Contact: [phone]  [facility] said they just need 20 - 30 support stockings in the fax  Also, I need a refill from [facility], [place] for my Diltiazem  24 HR ER(CD) 240 MG  Please put on the order to give me the stock bottle and the  is Arabi  They always give me the same one that I need when it is on the order  Thank you

## 2021-05-20 DIAGNOSIS — R60.0 BILATERAL LOWER EXTREMITY EDEMA: Primary | ICD-10-CM

## 2021-05-20 DIAGNOSIS — I83.893 VARICOSE VEINS OF BOTH LEGS WITH EDEMA: ICD-10-CM

## 2021-05-21 RX ORDER — DILTIAZEM HYDROCHLORIDE 240 MG/1
240 CAPSULE, COATED, EXTENDED RELEASE ORAL DAILY
Qty: 90 CAPSULE | Refills: 1 | Status: SHIPPED | OUTPATIENT
Start: 2021-05-21 | End: 2021-12-02 | Stop reason: SDUPTHER

## 2021-06-02 ENCOUNTER — ANNUAL EXAM (OUTPATIENT)
Dept: OBGYN CLINIC | Facility: CLINIC | Age: 76
End: 2021-06-02
Payer: MEDICARE

## 2021-06-02 VITALS
SYSTOLIC BLOOD PRESSURE: 120 MMHG | HEIGHT: 65 IN | WEIGHT: 110 LBS | DIASTOLIC BLOOD PRESSURE: 80 MMHG | BODY MASS INDEX: 18.33 KG/M2

## 2021-06-02 DIAGNOSIS — Z12.31 ENCOUNTER FOR SCREENING MAMMOGRAM FOR BREAST CANCER: Primary | ICD-10-CM

## 2021-06-02 PROCEDURE — 99213 OFFICE O/P EST LOW 20 MIN: CPT | Performed by: OBSTETRICS & GYNECOLOGY

## 2021-06-02 NOTE — PROGRESS NOTES
The patient is here for a yearly  She had a hysterectomy (1987)  No bleeding or cramping  The patient has trouble urinating for six- seven months  She can go but sometimes she has squeeze in order to urinate  No pelvic pressure, cramping or burning  No breast or bowel issues

## 2021-06-02 NOTE — PROGRESS NOTES
Assessment/Plan:  Vaginal atrophy  Normal breast exam   Colonoscopy May of 2019, diverticulosis  History of squamous and basal cell carcinoma  Received COVID-19 vaccine  Occasional ELSIE  Hysterectomy    Plan:  Continue healthy diet exercise  Recommend vitamin-C vitamin-D and zinc   Recommend increasing Premarin vaginal cream to once a week  Continue Kegel exercises  Subjective:      Patient ID: Arlet Cavanaugh is a 64 y o  female presents to the office with no complaints except occasional ELSIE  This occurs once every month or so  Patient has been using her Premarin vaginal cream once every 3 weeks  Patient denies any pelvic pain vaginal bleeding,  breast bowel or bladder issues  No change in family history  Medications reviewed  Review of Systems   Constitutional: Negative  Negative for fatigue, fever and unexpected weight change  HENT: Negative  Eyes: Negative  Respiratory: Negative  Negative for chest tightness, shortness of breath, wheezing and stridor  Cardiovascular: Negative  Negative for chest pain, palpitations and leg swelling  Gastrointestinal: Negative  Negative for abdominal pain, blood in stool, diarrhea, nausea, rectal pain and vomiting  Endocrine: Negative  Genitourinary: Negative for dysuria, frequency, vaginal bleeding, vaginal discharge and vaginal pain  Occasional ELSIE   Musculoskeletal: Negative  Skin: Negative  Allergic/Immunologic: Negative  Neurological: Negative  Hematological: Negative  Psychiatric/Behavioral: Negative  All other systems reviewed and are negative  Objective:      /80   Ht 5' 4 57" (1 64 m)   Wt 49 9 kg (110 lb)   LMP  (LMP Unknown)   BMI 18 55 kg/m²          Physical Exam  Constitutional:       Appearance: She is well-developed  Cardiovascular:      Rate and Rhythm: Normal rate and regular rhythm  Heart sounds: Normal heart sounds     Pulmonary:      Effort: Pulmonary effort is normal  No respiratory distress  Breath sounds: No stridor  No wheezing or rales  Chest:      Chest wall: No tenderness  Breasts: Breasts are symmetrical          Right: No inverted nipple, mass, nipple discharge, skin change or tenderness  Left: No inverted nipple, mass, nipple discharge, skin change or tenderness  Abdominal:      General: Bowel sounds are normal  There is no distension  Palpations: Abdomen is soft  There is no mass  Tenderness: There is no abdominal tenderness  There is no guarding or rebound  Hernia: No hernia is present  There is no hernia in the left inguinal area  Genitourinary:     Labia:         Right: No rash, tenderness, lesion or injury  Left: No rash, tenderness, lesion or injury  Vagina: Normal  No signs of injury and foreign body  No vaginal discharge, erythema, tenderness or bleeding  Adnexa:         Right: No mass, tenderness or fullness  Left: No mass, tenderness or fullness  Rectum: No mass, tenderness, anal fissure, external hemorrhoid or internal hemorrhoid  Normal anal tone  Comments: Urethral meatus normal   Normal Hansford's glands  Vaginal atrophy  Vaginal cuff well supported  No cystocele rectocele  Excellent muscle tone  Cervix and uterus absent  Lymphadenopathy:      Lower Body: No right inguinal adenopathy  No left inguinal adenopathy  Psychiatric:         Behavior: Behavior normal          Thought Content:  Thought content normal          Judgment: Judgment normal

## 2021-06-08 ENCOUNTER — OFFICE VISIT (OUTPATIENT)
Dept: FAMILY MEDICINE CLINIC | Facility: MEDICAL CENTER | Age: 76
End: 2021-06-08
Payer: MEDICARE

## 2021-06-08 ENCOUNTER — APPOINTMENT (OUTPATIENT)
Dept: RADIOLOGY | Facility: MEDICAL CENTER | Age: 76
End: 2021-06-08
Payer: MEDICARE

## 2021-06-08 ENCOUNTER — APPOINTMENT (OUTPATIENT)
Dept: LAB | Facility: MEDICAL CENTER | Age: 76
End: 2021-06-08
Payer: MEDICARE

## 2021-06-08 VITALS
HEART RATE: 77 BPM | TEMPERATURE: 98.8 F | DIASTOLIC BLOOD PRESSURE: 70 MMHG | WEIGHT: 110.4 LBS | OXYGEN SATURATION: 97 % | BODY MASS INDEX: 18.85 KG/M2 | SYSTOLIC BLOOD PRESSURE: 130 MMHG | HEIGHT: 64 IN

## 2021-06-08 DIAGNOSIS — R39.9 URINARY SYMPTOM OR SIGN: ICD-10-CM

## 2021-06-08 DIAGNOSIS — I10 ESSENTIAL HYPERTENSION: ICD-10-CM

## 2021-06-08 DIAGNOSIS — Z86.39 HISTORY OF VITAMIN D DEFICIENCY: ICD-10-CM

## 2021-06-08 DIAGNOSIS — R10.9 RIGHT FLANK PAIN: ICD-10-CM

## 2021-06-08 DIAGNOSIS — R53.83 FATIGUE, UNSPECIFIED TYPE: ICD-10-CM

## 2021-06-08 DIAGNOSIS — Z13.89 SCREENING FOR BLOOD OR PROTEIN IN URINE: ICD-10-CM

## 2021-06-08 DIAGNOSIS — M54.50 PAIN IN RIGHT LUMBAR REGION OF BACK: ICD-10-CM

## 2021-06-08 DIAGNOSIS — M54.50 PAIN IN RIGHT LUMBAR REGION OF BACK: Primary | ICD-10-CM

## 2021-06-08 DIAGNOSIS — M25.551 RIGHT HIP PAIN: ICD-10-CM

## 2021-06-08 LAB
ALBUMIN SERPL BCP-MCNC: 3.9 G/DL (ref 3.5–5)
ALP SERPL-CCNC: 77 U/L (ref 46–116)
ALT SERPL W P-5'-P-CCNC: 28 U/L (ref 12–78)
ANION GAP SERPL CALCULATED.3IONS-SCNC: 3 MMOL/L (ref 4–13)
AST SERPL W P-5'-P-CCNC: 20 U/L (ref 5–45)
BASOPHILS # BLD AUTO: 0.07 THOUSANDS/ΜL (ref 0–0.1)
BASOPHILS NFR BLD AUTO: 1 % (ref 0–1)
BILIRUB SERPL-MCNC: 0.33 MG/DL (ref 0.2–1)
BUN SERPL-MCNC: 20 MG/DL (ref 5–25)
CALCIUM SERPL-MCNC: 9.3 MG/DL (ref 8.3–10.1)
CHLORIDE SERPL-SCNC: 106 MMOL/L (ref 100–108)
CO2 SERPL-SCNC: 31 MMOL/L (ref 21–32)
CREAT SERPL-MCNC: 0.97 MG/DL (ref 0.6–1.3)
EOSINOPHIL # BLD AUTO: 0.08 THOUSAND/ΜL (ref 0–0.61)
EOSINOPHIL NFR BLD AUTO: 1 % (ref 0–6)
ERYTHROCYTE [DISTWIDTH] IN BLOOD BY AUTOMATED COUNT: 13.5 % (ref 11.6–15.1)
GFR SERPL CREATININE-BSD FRML MDRD: 56 ML/MIN/1.73SQ M
GLUCOSE P FAST SERPL-MCNC: 105 MG/DL (ref 65–99)
HCT VFR BLD AUTO: 40.4 % (ref 34.8–46.1)
HGB BLD-MCNC: 12.4 G/DL (ref 11.5–15.4)
IMM GRANULOCYTES # BLD AUTO: 0.02 THOUSAND/UL (ref 0–0.2)
IMM GRANULOCYTES NFR BLD AUTO: 0 % (ref 0–2)
LYMPHOCYTES # BLD AUTO: 1.17 THOUSANDS/ΜL (ref 0.6–4.47)
LYMPHOCYTES NFR BLD AUTO: 20 % (ref 14–44)
MCH RBC QN AUTO: 28.8 PG (ref 26.8–34.3)
MCHC RBC AUTO-ENTMCNC: 30.7 G/DL (ref 31.4–37.4)
MCV RBC AUTO: 94 FL (ref 82–98)
MONOCYTES # BLD AUTO: 0.57 THOUSAND/ΜL (ref 0.17–1.22)
MONOCYTES NFR BLD AUTO: 10 % (ref 4–12)
NEUTROPHILS # BLD AUTO: 3.86 THOUSANDS/ΜL (ref 1.85–7.62)
NEUTS SEG NFR BLD AUTO: 68 % (ref 43–75)
NRBC BLD AUTO-RTO: 0 /100 WBCS
PLATELET # BLD AUTO: 260 THOUSANDS/UL (ref 149–390)
PMV BLD AUTO: 10.2 FL (ref 8.9–12.7)
POTASSIUM SERPL-SCNC: 4.3 MMOL/L (ref 3.5–5.3)
PROT SERPL-MCNC: 7.4 G/DL (ref 6.4–8.2)
RBC # BLD AUTO: 4.31 MILLION/UL (ref 3.81–5.12)
SL AMB  POCT GLUCOSE, UA: NORMAL
SL AMB LEUKOCYTE ESTERASE,UA: NORMAL
SL AMB POCT BILIRUBIN,UA: NORMAL
SL AMB POCT BLOOD,UA: NORMAL
SL AMB POCT CLARITY,UA: NORMAL
SL AMB POCT COLOR,UA: YELLOW
SL AMB POCT KETONES,UA: NORMAL
SL AMB POCT NITRITE,UA: NORMAL
SL AMB POCT PH,UA: 6
SL AMB POCT SPECIFIC GRAVITY,UA: 1.01
SL AMB POCT URINE PROTEIN: NORMAL
SL AMB POCT UROBILINOGEN: NORMAL
SODIUM SERPL-SCNC: 140 MMOL/L (ref 136–145)
WBC # BLD AUTO: 5.77 THOUSAND/UL (ref 4.31–10.16)

## 2021-06-08 PROCEDURE — 73502 X-RAY EXAM HIP UNI 2-3 VIEWS: CPT

## 2021-06-08 PROCEDURE — 99214 OFFICE O/P EST MOD 30 MIN: CPT | Performed by: NURSE PRACTITIONER

## 2021-06-08 PROCEDURE — 36415 COLL VENOUS BLD VENIPUNCTURE: CPT

## 2021-06-08 PROCEDURE — 81002 URINALYSIS NONAUTO W/O SCOPE: CPT | Performed by: NURSE PRACTITIONER

## 2021-06-08 PROCEDURE — 85025 COMPLETE CBC W/AUTO DIFF WBC: CPT

## 2021-06-08 PROCEDURE — 80053 COMPREHEN METABOLIC PANEL: CPT

## 2021-06-08 PROCEDURE — 72110 X-RAY EXAM L-2 SPINE 4/>VWS: CPT

## 2021-06-08 RX ORDER — CYCLOBENZAPRINE HCL 5 MG
5 TABLET ORAL 2 TIMES DAILY PRN
Qty: 6 TABLET | Refills: 0 | Status: SHIPPED | OUTPATIENT
Start: 2021-06-08 | End: 2021-08-11

## 2021-06-08 NOTE — PROGRESS NOTES
BMI Counseling: Body mass index is 18 95 kg/m²  The BMI is above normal  Nutrition recommendations include decreasing portion sizes, encouraging healthy choices of fruits and vegetables, decreasing fast food intake, consuming healthier snacks, limiting drinks that contain sugar, moderation in carbohydrate intake, increasing intake of lean protein, reducing intake of saturated and trans fat and reducing intake of cholesterol  Exercise recommendations include vigorous physical activity 75 minutes/week, exercising 3-5 times per week, obtaining a gym membership and strength training exercises  No pharmacotherapy was ordered  Depression Screening and Follow-up Plan: Clincally patient does not have depression  No treatment is required  Falls Plan of Care: balance, strength, and gait training instructions were provided  Medications that increase falls were reviewed  Vitamin D supplementation was recommended  Home safety education provided  Assessment/Plan:         Problem List Items Addressed This Visit        Cardiovascular and Mediastinum    Essential hypertension    Relevant Orders    Comprehensive metabolic panel    CBC and differential       Other    Screening for blood or protein in urine    Relevant Orders    POCT urine dip    History of vitamin D deficiency     Patient instructed to take vitamin-D supplementation as well as get sunlight  Pain in right lumbar region of back - Primary     Currently patient ordered for lumbar spine x-ray films for evaluation  Point tenderness to right lower portion of her back on the lateral side  Relevant Medications    cyclobenzaprine (FLEXERIL) 5 mg tablet    Other Relevant Orders    XR spine lumbar minimum 4 views non injury    Right flank pain     Mild flank tenderness noted  Current urine dip indicates no hematuria  Urinary symptom or sign     Urine dip within normal limits    No urinary tract infection noted nor hematuria or proteinuria  Relevant Orders    POCT urine dip (Completed)    Right hip pain    Relevant Orders    XR hip/pelv 2-3 vws right if performed    Fatigue            Subjective:      Patient ID: Akhil Guardado is a 64 y o  female  Patient is a 64year old female present for evalaution of lower back pain and fatigue for the last 6 days  No fever, temeprature or chills  Patient reports no particular incident or injury  She indicates she may have twisted the wrong way but is unsure  She just feels very tired recently  Lab work ordered at this time as well as x-ray of the right hip and lumbar spine  A follow-up with her once we receive the results  The following portions of the patient's history were reviewed and updated as appropriate:   Past Medical History:  She has a past medical history of Acne, Basal cell carcinoma (06/08/2020), BCC (basal cell carcinoma of skin) (03/09/2020), Benign neoplasm of skin, Disease of thyroid gland (2006), GERD (gastroesophageal reflux disease), Hypertension, Inflamed seborrheic keratosis, Irritable bowel syndrome, Malignant neoplasm of skin of face, Migraines, Nonmelanoma skin cancer, Squamous cell skin cancer (06/08/2020), Tachycardia, Telogen effluvium, Temporomandibular joint disorder, and Vertigo  ,  _______________________________________________________________________  Medical Problems:  does not have any pertinent problems on file ,  _______________________________________________________________________  Past Surgical History:   has a past surgical history that includes Appendectomy; Cholecystectomy; Rotator cuff repair (Right); Colonoscopy (05/03/2019); Esophagogastroduodenoscopy (2009); Malignant skin lesion excision; Hysterectomy (1987); Oophorectomy (Bilateral, 1987); Skin biopsy; Mohs surgery (07/27/2020); MOHS RECONSTRUCTION (N/A, 7/28/2020);  FLAP LOCAL HEAD / NECK (N/A, 7/28/2020); COMPLEX WOUND CLOSURE TO EXTREMITY (N/A, 7/28/2020); and Tubal ligation (1976? ) ,  _______________________________________________________________________  Family History:  family history includes Cancer in her paternal aunt and paternal grandmother; Dementia in her father and mother; Diabetes type II in her maternal grandfather; Hypertension in her father and mother; Migraines in her mother and sister; No Known Problems in her daughter, maternal aunt, paternal aunt, and paternal aunt; Osteoporosis in her mother; Skin cancer in her father and mother; Skin cancer (age of onset: 68) in her sister; Uterine cancer (age of onset: 34) in her maternal grandmother; Uterine cancer (age of onset: 54) in her paternal aunt; Uterine cancer (age of onset: 72) in her paternal grandmother ,  _______________________________________________________________________  Social History:   reports that she has never smoked  She has never used smokeless tobacco  She reports that she does not drink alcohol or use drugs  ,  _______________________________________________________________________  Allergies:  is allergic to cortisone; acebutolol; amlodipine; atenolol; azithromycin; banana - food allergy; bisphosphonates; candesartan; clarithromycin; erythromycin; fosinopril; irbesartan; levofloxacin; losartan; methylprednisolone; metoprolol; nisoldipine; olmesartan; propranolol; sulfamethoxazole-trimethoprim; timolol; and penicillins     _______________________________________________________________________  Current Outpatient Medications   Medication Sig Dispense Refill    Alpha-D-Galactosidase (BEANO) TABS Take by mouth      Alum Hydroxide-Mag Trisilicate (GAVISCON) 44-30 6 MG CHEW Chew      ascorbic acid (VITAMIN C) 500 mg tablet Take by mouth      Cholecalciferol 400 units TABS Take by mouth      ciclopirox (PENLAC) 8 % solution Apply topically      Digestive Enzyme CAPS Take by mouth      diltiazem (CARDIZEM CD) 240 mg 24 hr capsule Take 1 capsule (240 mg total) by mouth daily May give patient  the stock bottle 90 capsule 1    famotidine (PEPCID) 10 mg tablet Take 10 mg by mouth 2 (two) times a day      fluticasone (FLONASE) 50 mcg/act nasal spray 2 sprays into each nostril daily 16 g 3    Homeopathic Products (ZICAM ALLERGY RELIEF NA) into each nostril      lactase (LACTAID) 3,000 units tablet Take by mouth      Minoxidil (ROGAINE WOMENS) 5 % FOAM Apply topically      Multiple Vitamins-Minerals (CENTRUM SILVER ULTRA WOMENS PO) Take by mouth      polyethylene glycol-propylene glycol (SYSTANE) 0 4-0 3 %       Premarin vaginal cream Insert 1 g into the vagina once a week Brand Necessary 30 g 1    cyclobenzaprine (FLEXERIL) 5 mg tablet Take 1 tablet (5 mg total) by mouth 2 (two) times a day as needed for muscle spasms for up to 3 days 6 tablet 0    nitrofurantoin (MACROBID) 100 mg capsule Take 1 capsule (100 mg total) by mouth 2 (two) times a day (Patient not taking: Reported on 6/2/2021) 10 capsule 0     No current facility-administered medications for this visit       _______________________________________________________________________  Review of Systems   Constitutional: Positive for fatigue  Negative for activity change, appetite change, chills, fever and unexpected weight change  Eats to maintain weight, can not gain weight normally  HENT: Negative for congestion, ear discharge, ear pain, nosebleeds, postnasal drip, rhinorrhea, sinus pressure, sinus pain, sneezing, sore throat and voice change  Eyes: Negative for pain, redness and visual disturbance  Respiratory: Negative for cough and shortness of breath  Cardiovascular: Negative for chest pain and palpitations  Gastrointestinal: Negative for abdominal distention, abdominal pain, constipation, diarrhea, nausea and vomiting  Endocrine: Negative  Genitourinary: Positive for flank pain   Negative for decreased urine volume, difficulty urinating, dysuria, frequency, genital sores, hematuria, menstrual problem, pelvic pain and urgency  Musculoskeletal: Positive for arthralgias, back pain and myalgias  Negative for gait problem, joint swelling, neck pain and neck stiffness  Pain above her hip in the lower portion of her back that started 6 days prior  Does not recall specific incident of injury  Right hip pain that radiates to the right  Mild right lower lumbar pain  Pain scale 4/10  Skin: Negative  Allergic/Immunologic: Negative  Neurological: Negative  Hematological: Negative  Psychiatric/Behavioral: Negative  Objective:  Vitals:    06/08/21 1252   BP: 130/70   BP Location: Left arm   Patient Position: Sitting   Cuff Size: Adult   Pulse: 77   Temp: 98 8 °F (37 1 °C)   SpO2: 97%   Weight: 50 1 kg (110 lb 6 4 oz)   Height: 5' 4" (1 626 m)     Body mass index is 18 95 kg/m²  Physical Exam  Vitals signs and nursing note reviewed  Constitutional:       Appearance: Normal appearance  She is well-developed and normal weight  HENT:      Head: Normocephalic and atraumatic  Right Ear: Tympanic membrane, ear canal and external ear normal       Left Ear: Tympanic membrane, ear canal and external ear normal       Nose: Nose normal       Mouth/Throat:      Mouth: Mucous membranes are moist    Eyes:      Extraocular Movements: Extraocular movements intact  Conjunctiva/sclera: Conjunctivae normal       Pupils: Pupils are equal, round, and reactive to light  Neck:      Musculoskeletal: Normal range of motion and neck supple  Cardiovascular:      Rate and Rhythm: Normal rate and regular rhythm  Pulses: Normal pulses  Heart sounds: Normal heart sounds  No murmur  Pulmonary:      Effort: Pulmonary effort is normal       Breath sounds: Normal breath sounds  Abdominal:      General: Bowel sounds are normal       Palpations: Abdomen is soft  Tenderness: There is right CVA tenderness  There is no left CVA tenderness  Musculoskeletal: Normal range of motion           General: Tenderness present  Comments: Right lower back with mild flank tenderness  Urine dip negative for blood  Right hip pain with palpation above iliac crest that radiates to the left side  Skin:     General: Skin is warm  Neurological:      General: No focal deficit present  Mental Status: She is alert and oriented to person, place, and time     Psychiatric:         Mood and Affect: Mood normal          Behavior: Behavior normal

## 2021-06-08 NOTE — ASSESSMENT & PLAN NOTE
Currently patient ordered for lumbar spine x-ray films for evaluation  Point tenderness to right lower portion of her back on the lateral side

## 2021-06-08 NOTE — PATIENT INSTRUCTIONS
Fatigue   WHAT YOU NEED TO KNOW:   What is fatigue? Fatigue is mental and physical exhaustion that does not get better with rest  Fatigue may make daily activities difficult or cause extreme sleepiness  It is normal to feel tired sometimes, but long-term fatigue may be a sign of serious illness  What increases my risk for fatigue? · Health conditions such as anemia, thyroid problems, diabetes, heart disease, or cancer     · Chemotherapy or radiation therapy    · An infection or a sleep disorder    · Depression, anxiety, or stress     · Medicines such as beta-blockers, antihistamines, or antidepressants    · Lack of proper nutrition    How is the cause of fatigue diagnosed? Your healthcare provider will ask about your sleep habits, appetite, activities, stress level, and exercise  He or she will ask about your medical history and what medicines you take  You may also need any of the following:  · Blood tests  are used to check for infection, diabetes, and other illnesses  These tests also show how well your thyroid, liver, kidneys, and other organs are working  · Urine tests  will show if you are pregnant, or have other health problems  · Sleep studies  may be used to find out if your fatigue is caused by a sleep disorder  How is fatigue treated? Your symptoms may get better without treatment  You will receive treatment for any health conditions that may be causing your fatigue  The following can help you manage fatigue:  · Keep a fatigue diary  Include anything that makes you feel more tired or less tired  Bring the diary with you to follow-up visits with your provider  · Exercise as directed  Exercise can help you feel more alert  Exercise can also help you manage stress or relieve depression  Try to get at least 30 minutes of exercise most days of the week  · Keep a regular sleep schedule  Go to bed and wake up at the same times every day  Limit naps to 1 hour each day   A nap can improve fatigue, but a long nap may make it harder to go to sleep at night  · Plan and limit your activities  Limit the number of activities such as shopping and cleaning you do each day  If possible, try to spread out your trips throughout the week  Plan ahead so you are not rushing to get something done  Only do activities that you have the energy to complete  Take breaks between activities  Ask for help if you need it  Another person may be able to drive you or help with daily activities  · Eat a variety of healthy foods  Healthy foods include fruits, vegetables, whole-grain breads, low-fat dairy products, beans, lean meats, and fish  Good nutrition can help manage fatigue  · Limit caffeine and alcohol  These can make it difficult to fall or stay asleep  Women should limit alcohol to 1 drink a day  Men should limit alcohol to 2 drinks a day  A drink of alcohol is 12 ounces of beer, 5 ounces of wine, or 1½ ounces of liquor  Ask our healthcare provider how much caffeine is safe for you  · Do not smoke  Nicotine and other chemicals in cigarettes and cigars can cause lung damage and increase fatigue  Ask your healthcare provider for information if you currently smoke and need help to quit  E-cigarettes or smokeless tobacco still contain nicotine  Talk to your healthcare provider before you use these products  When should I seek immediate care? · You have chest pain  · You have difficulty breathing  When should I contact my healthcare provider? · You have a cough that gets worse, or does not go away  · You see blood in your urine or bowel movement  · You have numbness or tingling around your mouth or in an arm or leg  · You faint, feel dizzy, or have vision changes  · You have swelling in your lymph nodes  · You are a woman and have vaginal bleeding that is not normal for you, or is not expected  · You lose weight without trying, or you have trouble eating       · You feel weak or have muscle pain  · You have pain or swelling in your joints  · You have questions or concerns about your condition or care  CARE AGREEMENT:   You have the right to help plan your care  Learn about your health condition and how it may be treated  Discuss treatment options with your healthcare providers to decide what care you want to receive  You always have the right to refuse treatment  The above information is an  only  It is not intended as medical advice for individual conditions or treatments  Talk to your doctor, nurse or pharmacist before following any medical regimen to see if it is safe and effective for you  © Copyright [address] Information is for End User's use only and may not be sold, redistributed or otherwise used for commercial purposes  All illustrations and images included in CareNotes® are the copyrighted property of [employer]  or [address] [place] [address]  Flank Pain   AMBULATORY CARE:   Flank pain  is felt in the area below your ribcage and above your hip bones, often in the lower back  Your pain may be dull or so severe that you cannot get comfortable  The pain may stay in one area or radiate to another area  It may worsen and lighten in waves  Flank pain is often a sign of problems with your urinary tract, such as a kidney stone or infection  Seek care immediately if:   · You have a fever  · Your heart is fluttering or jumping  · You see blood in your urine  · Your pain radiates into your lower abdomen and genital area  · You have intense pain in your low back next to your spine  · You are much more tired than usual and have no desire to eat  · You have a headache and your muscles jerk  Contact your healthcare provider if:   · You have an upset stomach and are vomiting  · You have to urinate more often, and with urgency  · Your pain worsens or does not improve, and you cannot get comfortable       · You pass a stone when you urinate  · You have questions or concerns about your condition or care  Treatment for flank pain  may include medicine to decrease pain or treat a bacterial infection  Follow up with your healthcare provider as directed:  Write down your questions so you remember to ask them during your visits  © Copyright [address] Information is for End User's use only and may not be sold, redistributed or otherwise used for commercial purposes  All illustrations and images included in CareNotes® are the copyrighted property of [employer]  or [facility] Armando Goodman   The above information is an  only  It is not intended as medical advice for individual conditions or treatments  Talk to your doctor, nurse or pharmacist before following any medical regimen to see if it is safe and effective for you  Lumbar Radiculopathy   WHAT YOU NEED TO KNOW:   What is lumbar radiculopathy? Lumbar radiculopathy is a painful condition that happens when a nerve in your lumbar spine (lower back) is pinched or irritated  Nerves control feeling and movement in your body  What causes lumbar radiculopathy? You may get a pinched nerve in your lumbar spine if you have disc damage  Discs are natural, spongy cushions between your vertebrae (back bones) that allow your spine to move  Your discs may move out of place and pinch the nerve in your spine  Your risk for a pinched nerve and lumbar radiculopathy increases if:  · You smoke  · You have diabetes, a spinal infection, or a growth in your spine  · You are overweight  · You are male  · You are elderly  What are the signs and symptoms of lumbar radiculopathy? You may have any of the following:  · Pain that moves from your lower back to your buttocks, groin, and the back of your leg  The pain is often felt below your knee  Your pain may worsen when you cough, sneeze, stand, or sit  · Numbness, weakness, or tingling in your back or legs      How is lumbar radiculopathy diagnosed? Your healthcare provider will examine you and ask about your family history of back and leg pain  He may also test you for weakness, numbness, or tingling in your back, buttocks, and legs  Your healthcare provider may ask you to lie on your back and lift your leg to locate your pain  You may have any of the following:  · Blood tests: You may need blood taken to give healthcare providers information about how your body is working  The blood may be taken from your hand, arm, or IV  · Magnetic resonance imaging (MRI): An MRI machine is used to take a picture of your lower back  Your healthcare provider will use this picture to check for problems and changes in your back bones, nerves, and discs  You will need to lie still during this test  The MRI machine contains a very powerful magnet  Never enter the MRI room with any metal objects  This can cause serious injury  Tell your healthcare provider if you have any metal implants in your body  · X-ray: An x-ray is a picture of your lower back  A lumbar x-ray may show signs of infection or other problems with your spine  · An electromyography (EMG)  test measures the electrical activity of your muscles at rest and with movement  · Computed tomography (CT) scan: A special x-ray machine uses a computer to take pictures of your lower back  It may be used to look at your bones, discs, and nerves  You may be given dye in your IV to help improve the pictures  Tell your healthcare provider if you are allergic to shellfish, or have other allergies or medical conditions  People who are allergic to iodine or shellfish (lobster, crab, or shrimp) may be allergic to some dyes  How is lumbar radiculopathy treated? Treatment of lumbar radiculopathy may reduce pain and swelling, and improve your ability to walk and do your normal activities   Ask your healthcare provider for more information about these and other treatments for lumbar radiculopathy:  · Medicines:     ? NSAIDs , such as ibuprofen, help decrease swelling, pain, and fever  This medicine is available with or without a doctor's order  NSAIDs can cause stomach bleeding or kidney problems in certain people  If you take blood thinner medicine, always ask your healthcare provider if NSAIDs are safe for you  Always read the medicine label and follow directions  ? Muscle relaxers  help decrease pain and muscle spasms  ? Opioids: This is a strong medicine given to reduce severe pain  It is also called narcotic pain medicine  Take this medicine exactly as directed by your healthcare provider  ? Oral steroids: Steroids may be given to reduce swelling and pain  ? Steroid injections: Healthcare providers may give you steroid medicine through a needle (shot) into your lumbar spine  This may help decrease your nerve pain and swelling  You may need more than 1 injection if your symptoms do not improve after the first treatment  · Physical therapy: Your healthcare provider may suggest physical therapy  Your physical therapist may teach you certain exercises to improve posture (the way you stand and sit), flexibility, and strength in your lower back  He may also teach you how to remain safely active and avoid further injury  Follow the exercise plan given to you by your physical therapist     · Transcutaneous electrical nerve stimulation: This treatment, called TENS, stimulates your nerves and may decrease your pain  Wires are attached to pads  The pads are attached to your skin  The wires send a mild current through your nerves  · Surgery: You may need surgery to relieve your pinched nerve if your condition has not improved within 4 to 6 weeks  You may also need it if you have lumbar radiculopathy more than once  What are the risks of treatment for lumbar radiculopathy? · Without treatment, your pain may worsen   The pinched and swollen nerve may lead to problems when you walk or move  In severe cases, you may lose control of your urine or bowel movements  Bedrest can make your symptoms worse  · Pain medicines can cause vomiting, upset stomach, constipation (large, hard bowel movements that are difficult to pass), or kidney or liver problems  Opioid medicine may be addictive (hard to quit taking once you start)  Oral steroids and steroid injections may have side effects, such as facial redness, fluid retention (water weight gain), and mood changes  Steroid injections may be painful and can cause severe headaches, infections, allergic reactions, or nerve damage  Surgery risks include delayed problems with healing, spinal or bladder infection, damage to the spinal cord or other nerves, and ongoing pain  In rare cases, you could become paralyzed (not able to move your arms or legs)  How can I care for myself when I have lumbar radiculopathy? · Stay active: It is best to be active when you have lumbar radiculopathy  Your healthcare provider may tell you to take walks to ease yourself back into your daily routine  Avoid long periods of bed rest  Bed rest could worsen your symptoms  Do not move in ways that increase your pain  Ask your healthcare provider for more information about the best ways to stay active  · Use ice or heat packs:  Use ice or heat packs on the sore area of your body to decrease the pain and swelling  Put ice in a plastic bag covered with a towel on your low back  Cover heated items with a towel to avoid burns  Use ice and heat as directed  · Avoid heavy lifting: Your condition may worsen if you lift heavy things  Avoid lifting if possible  · Maintain a healthy weight:  Excess body weight may strain your back  Talk with your healthcare provider about ways to lose excess weight if you are overweight  When should I contact my healthcare provider? · Your pain does not improve within 1 to 3 weeks after treatment       · Your pain and weakness keep you from your normal activities at work, home, or school  · You lose more than 10 pounds in 6 months without trying  · You become depressed or sad because of the pain  · You have questions or concerns about your condition or care  When should I seek immediate care or call 911? · You have a fever greater than 100 4°F for longer than 2 days  · You have new, severe back or leg pain, or your pain spreads to both legs  · You have any new signs of numbness or weakness, especially in your lower back, legs, arms, or genital area  · You have new trouble controlling your urine and bowel movements  · You do not feel like your bladder empties when you urinate  CARE AGREEMENT:   You have the right to help plan your care  Learn about your health condition and how it may be treated  Discuss treatment options with your healthcare providers to decide what care you want to receive  You always have the right to refuse treatment  The above information is an  only  It is not intended as medical advice for individual conditions or treatments  Talk to your doctor, nurse or pharmacist before following any medical regimen to see if it is safe and effective for you  © Copyright [address] [place] [address] Information is for End User's use only and may not be sold, redistributed or otherwise used for commercial purposes  All illustrations and images included in CareNotes® are the copyrighted property of A "[employer]  or [facility] Armando Goodman   R I C E  Treatment   WHAT YOU NEED TO KNOW:   What is R I C E  treatment?  R I C E  treatment is a 4-step process used to decrease swelling and pain caused by an injury  R I C E  stands for rest, ice, compression, and elevation  R I C E  should be done within 24 to 48 hours after an injury  How do I use R I C E  treatment? · Rest  your injured area as directed  You may need to stop using, or keep weight off, the injury for 48 hours or longer   Your healthcare provider may recommend crutches or another device  Return to your usual activities as directed  · Apply ice  on your injured area for 15 to 20 minutes every 4 hours or as directed  Use an ice pack, or put crushed ice in a plastic bag  Cover it with a towel  Ice helps prevent tissue damage and decreases swelling and pain  · Compress , or keep pressure on, the injured area  Compression will help decrease swelling and support the injured area  Use an elastic bandage, air stirrup, splint, or sling as directed  If you use an elastic bandage to wrap your injured area, make sure the bandage is not too tight  · Elevate  the injured area above the level of your heart as often as you can  This will help decrease swelling and pain  Prop the injured area on pillows or blankets to keep it elevated comfortably  When should I seek immediate care? · Your pain is severe  · You have severe swelling or deformity  · You have numbness in the injured area  When should I contact my healthcare provider? · Your pain and swelling does not go away after a few days  · You have questions or concerns about your condition or care  CARE AGREEMENT:   You have the right to help plan your care  Learn about your health condition and how it may be treated  Discuss treatment options with your healthcare providers to decide what care you want to receive  You always have the right to refuse treatment  The above information is an  only  It is not intended as medical advice for individual conditions or treatments  Talk to your doctor, nurse or pharmacist before following any medical regimen to see if it is safe and effective for you  © Copyright [address] Information is for End User's use only and may not be sold, redistributed or otherwise used for commercial purposes   All illustrations and images included in CareNotes® are the copyrighted property of [facility] [employer]  or [address] [place], [place] [address]

## 2021-08-09 ENCOUNTER — TELEPHONE (OUTPATIENT)
Dept: FAMILY MEDICINE CLINIC | Facility: MEDICAL CENTER | Age: 76
End: 2021-08-09

## 2021-08-09 NOTE — TELEPHONE ENCOUNTER
Patient called with complaints of a "scratchy throat" for 2 weeks  She said that it is very mild but she has no other symptoms and it is not getting any better  Ok to put her in for an appt? She has no known exposure and she is fully vaccinated  She denies fever, cough, or cold sx

## 2021-08-11 ENCOUNTER — OFFICE VISIT (OUTPATIENT)
Dept: FAMILY MEDICINE CLINIC | Facility: MEDICAL CENTER | Age: 76
End: 2021-08-11
Payer: MEDICARE

## 2021-08-11 VITALS
OXYGEN SATURATION: 98 % | RESPIRATION RATE: 16 BRPM | DIASTOLIC BLOOD PRESSURE: 76 MMHG | SYSTOLIC BLOOD PRESSURE: 134 MMHG | WEIGHT: 111.4 LBS | BODY MASS INDEX: 19.02 KG/M2 | TEMPERATURE: 98.5 F | HEART RATE: 68 BPM | HEIGHT: 64 IN

## 2021-08-11 DIAGNOSIS — J02.9 SORE THROAT: Primary | ICD-10-CM

## 2021-08-11 PROCEDURE — 99213 OFFICE O/P EST LOW 20 MIN: CPT | Performed by: FAMILY MEDICINE

## 2021-08-11 NOTE — PROGRESS NOTES
Kyrie Zamarripa had sore right jaw and right earache started the end of July  that got better  Now Back of her tongue feels sore  Feels scratchy throat   No nasal congestion or cough  No swollen glands  Elias ehistory fo allergies  No fever or chillls  No dental work  O: /76 (BP Location: Left arm, Patient Position: Sitting, Cuff Size: Adult)   Pulse 68   Temp 98 5 °F (36 9 °C)   Resp 16   Ht 5' 4" (1 626 m)   Wt 50 5 kg (111 lb 6 4 oz)   LMP  (LMP Unknown)   SpO2 98%   BMI 19 12 kg/m²    HEENT-both TM's and canals normal  Pharynx with mild posterior pharyngeal hyperplasia  No abnormalities seen  No jaw tenderness  Neck no adenopathy    Assessment  Sore throat-likely due to postnasal drip; will give trial of Flonase  If no improvement will refer to ENT for better visualization  Plan  Trial Flonase  Call with progress    As above

## 2021-08-26 ENCOUNTER — TELEPHONE (OUTPATIENT)
Dept: FAMILY MEDICINE CLINIC | Facility: MEDICAL CENTER | Age: 76
End: 2021-08-26

## 2021-08-26 DIAGNOSIS — R09.89 SINUS COMPLAINT: Primary | ICD-10-CM

## 2021-08-26 NOTE — TELEPHONE ENCOUNTER
----- Message from Parveen Domingo MD sent at 8/26/2021 10:41 AM EDT -----  Regarding: FW: Visit Follow-Up Question  Contact: [phone]   Would refer her to ENT who she said she has seen previously  ----- Message -----  From: Bernice De León MA  Sent: 8/23/2021   8:38 AM EDT  To: Parveen Domingo MD  Subject: FW: Visit Follow-Up Question                       ----- Message -----  From: Darryl Wilson  Sent: 8/22/2021   2:16 PM EDT  To: [facility]  Subject: Visit Follow-Up Question                         Dr Zara Koyanagi,   You wanted me to let you know if there was improvement of my symptoms after being on Flonase for 7 days  There has been no change  You said you would refer me to an ENT across the road from your building  I am a patient of Dr Lesley Hernández in that practice  I think he is there Monday & Thursday  If he doesn't have an opening, I can see someone else    Thank you,  Gio Cedeno

## 2021-09-01 ENCOUNTER — TELEPHONE (OUTPATIENT)
Dept: FAMILY MEDICINE CLINIC | Facility: MEDICAL CENTER | Age: 76
End: 2021-09-01

## 2021-09-01 NOTE — TELEPHONE ENCOUNTER
----- Message from Jorge Mendosa sent at 9/1/2021  2:38 PM EDT -----  Regarding: Non-Urgent Medical Question  Contact: [phone]  Today I had a Penicillin challenge at Dr Navi Beck office in [facility]  I did not have any reaction so he said I should let you know that I now can take Penicillin  He said he would send you a message, too  This test was set up for last year, but because of the pandemic it was postponed  I was given 750 mg in divided doses at his office

## 2021-09-02 PROBLEM — K14.6 TONGUE PAIN: Status: ACTIVE | Noted: 2021-09-02

## 2021-09-02 PROBLEM — M26.609 TMJ DYSFUNCTION: Status: ACTIVE | Noted: 2021-09-02

## 2021-09-02 PROBLEM — M79.18 MYOFASCIAL PAIN: Status: ACTIVE | Noted: 2021-09-02

## 2021-09-22 ENCOUNTER — OFFICE VISIT (OUTPATIENT)
Dept: DERMATOLOGY | Facility: CLINIC | Age: 76
End: 2021-09-22
Payer: MEDICARE

## 2021-09-22 DIAGNOSIS — L82.1 SEBORRHEIC KERATOSIS: Primary | ICD-10-CM

## 2021-09-22 DIAGNOSIS — Z85.828 HISTORY OF SKIN CANCER: ICD-10-CM

## 2021-09-22 DIAGNOSIS — Z13.89 SCREENING FOR SKIN CONDITION: ICD-10-CM

## 2021-09-22 PROCEDURE — 99213 OFFICE O/P EST LOW 20 MIN: CPT | Performed by: DERMATOLOGY

## 2021-09-22 NOTE — PROGRESS NOTES
[facility] DERMATOLOGY  [address] [place] [id number]  [phone]  [phone]     MRN: [id number] : 1945  Encounter: [id number]  Patient Information: Valli Hammans  Chief complaint:  Six-month checkup    History of present illness:  77-year-old female with history of multiple skin cancers presents for overall checkup no specific concerns or changes except for lesion on right forehead  Past Medical History:   Diagnosis Date    Acne     Basal cell carcinoma 2020    right lower forehead    BCC (basal cell carcinoma of skin) 2020    mid forehead    Benign neoplasm of skin     Disease of thyroid gland 2006    GERD (gastroesophageal reflux disease)     Hypertension     Inflamed seborrheic keratosis     Irritable bowel syndrome     Malignant neoplasm of skin of face     Migraines     Nonmelanoma skin cancer     Last Assessed:17    Squamous cell skin cancer 2020    In situ, left lower forehead    Tachycardia     Telogen effluvium     Temporomandibular joint disorder     Vertigo      Past Surgical History:   Procedure Laterality Date    APPENDECTOMY      CHOLECYSTECTOMY      COLONOSCOPY  2019    COMPLEX WOUND CLOSURE TO EXTREMITY N/A 2020    Procedure: COMPLEX CLOSURE MID FOREHEAD;  Surgeon: Sharon Caldwell MD;  [facility];  Service: Plastics    ESOPHAGOGASTRODUODENOSCOPY  2009    FLAP LOCAL HEAD / NECK N/A 2020    Procedure: FLAP MID FOREHEAD;  Surgeon: Sharon Caldwell MD;  [facility];  Service: Plastics    HYSTERECTOMY  1987    MALIGNANT SKIN LESION EXCISION      Excision of Lesion Face Malignant-2004 BCC Forehead    MOHS RECONSTRUCTION N/A 2020    Procedure: RECONSTRUCTION MOHS DEFECT MID FOREHEAD;  Surgeon: Sharon Caldwell MD;  [facility];  Service: Plastics    MOHS SURGERY  2020    Right &left lower forehead, mid forehead    OOPHORECTOMY Bilateral 1987    ROTATOR CUFF REPAIR Right     SKIN BIOPSY      TUBAL LIGATION  1976? Social History   Social History     Substance and Sexual Activity   Alcohol Use No     Social History     Substance and Sexual Activity   Drug Use No     Social History     Tobacco Use   Smoking Status Never Smoker   Smokeless Tobacco Never Used     Family History   Problem Relation Age of Onset    Hypertension Mother     Osteoporosis Mother     Skin cancer Mother    Shabnam Pardo Migraines Mother    Shabnam Pardo Dementia Mother     Hypertension Father     Skin cancer Father     Dementia Father     Uterine cancer Maternal Grandmother 34    Cancer Paternal Grandmother     Uterine cancer Paternal Grandmother 72    Cancer Paternal Aunt     Uterine cancer Paternal Aunt 53    Diabetes type II Maternal Grandfather     Skin cancer Sister 68    Migraines Sister     No Known Problems Daughter     No Known Problems Paternal Aunt     No Known Problems Paternal Aunt     No Known Problems Maternal Aunt      Meds/Allergies   Allergies   Allergen Reactions    Cortisone Hypertension, Other (See Comments), Shortness Of Breath and Tachycardia    Acebutolol     Amlodipine     Atenolol     Azithromycin     Banana - Food Allergy GI Intolerance    Bisphosphonates      Annotation - [id number]: iriitis    Candesartan     Clarithromycin     Erythromycin     Fosinopril     Irbesartan     Levofloxacin     Losartan     Methylprednisolone Hypertension and Tachycardia    Metoprolol     Nisoldipine     Olmesartan     Propranolol     Sulfamethoxazole-Trimethoprim Nausea Only    Timolol        Meds:  Prior to Admission medications    Medication Sig Start Date End Date Taking?  Authorizing Provider   Alpha-D-Galactosidase Juan Antonio Schrader) TABS Take by mouth   Yes Historical Provider, MD   Alum Hydroxide-Mag Trisilicate (GAVISCON) 75-86 6 MG CHEW Chew   Yes Historical Provider, MD   ascorbic acid (VITAMIN C) 500 mg tablet Take by mouth Yes Historical Provider, MD   Cholecalciferol 400 units TABS Take by mouth   Yes Historical Provider, MD   ciclopirox (PENLAC) 8 % solution Apply topically   Yes Historical Provider, MD   Digestive Enzyme CAPS Take by mouth   Yes Historical Provider, MD   diltiazem (CARDIZEM CD) 240 mg 24 hr capsule Take 1 capsule (240 mg total) by mouth daily May give patient  the stock bottle 5/21/21  Yes Shanita Cortez MD   famotidine (PEPCID) 10 mg tablet Take 10 mg by mouth 2 (two) times a day   Yes Historical Provider, MD   fluticasone (FLONASE) 50 mcg/act nasal spray 2 sprays into each nostril daily 8/20/18  Yes Shanita Cortez MD   Homeopathic Products ([address] NA) into each nostril   Yes Historical Provider, MD   lactase (LACTAID) 3,000 units tablet Take by mouth   Yes Historical Provider, MD   Minoxidil (ROGAINE WOMENS) 5 % FOAM Apply topically   Yes Historical Provider, MD   Multiple Vitamins-Minerals (CENTRUM SILVER ULTRA WOMENS PO) Take by mouth   Yes Historical Provider, MD   polyethylene glycol-propylene glycol (SYSTANE) 0 4-0 3 %    Yes Historical Provider, MD   Premarin vaginal cream Insert 1 g into the vagina once a week Brand Necessary 12/9/20  Yes Kalee Bowman MD       Subjective:     Review of Systems:    General: negative for - chills, fatigue, fever,  weight gain or weight loss  Psychological: negative for - anxiety, behavioral disorder, concentration difficulties, decreased libido, depression, irritability, memory difficulties, mood swings, sleep disturbances or suicidal ideation  ENT: negative for - hearing difficulties , nasal congestion, nasal discharge, oral lesions, sinus pain, sneezing, sore throat  Allergy and Immunology: negative for - hives, insect bite sensitivity,  Hematological and Lymphatic: negative for - bleeding problems, blood clots,bruising, swollen lymph nodes  Endocrine: negative for - hair pattern changes, hot flashes, malaise/lethargy, mood swings, palpitations, polydipsia/polyuria, skin changes, temperature intolerance or unexpected weight change  Respiratory: negative for - cough, hemoptysis, orthopnea, shortness of breath, or wheezing  Cardiovascular: negative for - chest pain, dyspnea on exertion, edema,  Gastrointestinal: negative for - abdominal pain, nausea/vomiting  Genito-Urinary: negative for - dysuria, incontinence, irregular/heavy menses or urinary frequency/urgency  Musculoskeletal: negative for - gait disturbance, joint pain, joint stiffness, joint swelling, muscle pain, muscular weakness  Dermatological:  As in HPI  Neurological: negative for confusion, dizziness, headaches, impaired coordination/balance, memory loss, numbness/tingling, seizures, speech problems, tremors or weakness       Objective:   LMP  (LMP Unknown)     Physical Exam:    General Appearance:    Alert, cooperative, no distress   Head:    Normocephalic, without obvious abnormality, atraumatic           Skin:   A full skin exam was performed including scalp, head scalp, eyes, ears, nose, lips, neck, chest, axilla, abdomen, back, buttocks, bilateral upper extremities, bilateral lower extremities, hands, feet, fingers, toes, fingernails, and toenails normal keratotic papules greasy stuck on appearance previous sites skin cancer well healed without recurrence nothing else remarkable noted on complete     Assessment:     1  Seborrheic keratosis     2  Screening for skin condition     3   History of skin cancer           Plan:   Seborrheic keratosis patient reassured these are normal growths we acquire with age no treatment needed  History of skin cancer in no recurrence nothing else atypical sunblock recommended follow-up in 6 months  Screening for dermatologic disorders nothing else of concern noted on complete exam follow-up in 6 months    Anabelle Saavedra MD  9/22/2021,11:04 AM    Portions of the record may have been created with voice recognition software   Occasional wrong word or "sound a like" substitutions may have occurred due to the inherent limitations of voice recognition software   Read the chart carefully and recognize, using context, where substitutions have occurred

## 2021-10-11 ENCOUNTER — OFFICE VISIT (OUTPATIENT)
Dept: FAMILY MEDICINE CLINIC | Facility: MEDICAL CENTER | Age: 76
End: 2021-10-11
Payer: MEDICARE

## 2021-10-11 VITALS
DIASTOLIC BLOOD PRESSURE: 78 MMHG | HEART RATE: 74 BPM | BODY MASS INDEX: 18.95 KG/M2 | TEMPERATURE: 98.6 F | OXYGEN SATURATION: 98 % | SYSTOLIC BLOOD PRESSURE: 124 MMHG | WEIGHT: 111 LBS | HEIGHT: 64 IN

## 2021-10-11 DIAGNOSIS — Z78.0 POSTMENOPAUSAL: Primary | ICD-10-CM

## 2021-10-11 DIAGNOSIS — K58.9 IRRITABLE BOWEL SYNDROME, UNSPECIFIED TYPE: ICD-10-CM

## 2021-10-11 DIAGNOSIS — I10 ESSENTIAL HYPERTENSION: ICD-10-CM

## 2021-10-11 DIAGNOSIS — Z00.00 MEDICARE ANNUAL WELLNESS VISIT, SUBSEQUENT: ICD-10-CM

## 2021-10-11 DIAGNOSIS — E03.9 ACQUIRED HYPOTHYROIDISM: ICD-10-CM

## 2021-10-11 PROCEDURE — G0438 PPPS, INITIAL VISIT: HCPCS | Performed by: FAMILY MEDICINE

## 2021-10-11 PROCEDURE — 99214 OFFICE O/P EST MOD 30 MIN: CPT | Performed by: FAMILY MEDICINE

## 2021-10-12 ENCOUNTER — TELEPHONE (OUTPATIENT)
Dept: FAMILY MEDICINE CLINIC | Facility: MEDICAL CENTER | Age: 76
End: 2021-10-12

## 2021-11-04 ENCOUNTER — IMMUNIZATIONS (OUTPATIENT)
Dept: FAMILY MEDICINE CLINIC | Facility: MEDICAL CENTER | Age: 76
End: 2021-11-04

## 2021-11-04 DIAGNOSIS — Z23 ENCOUNTER FOR IMMUNIZATION: Primary | ICD-10-CM

## 2021-11-04 PROCEDURE — 91306 PR SARSCOV2 VAC 50MCG/0.25ML IM: CPT

## 2021-11-09 ENCOUNTER — HOSPITAL ENCOUNTER (OUTPATIENT)
Dept: RADIOLOGY | Facility: MEDICAL CENTER | Age: 76
Discharge: HOME | End: 2021-11-09
Payer: MEDICARE

## 2021-11-09 VITALS — HEIGHT: 64 IN | WEIGHT: 111 LBS | BODY MASS INDEX: 18.95 KG/M2

## 2021-11-09 DIAGNOSIS — Z12.31 ENCOUNTER FOR SCREENING MAMMOGRAM FOR BREAST CANCER: ICD-10-CM

## 2021-11-09 PROCEDURE — 77067 SCR MAMMO BI INCL CAD: CPT

## 2021-11-09 PROCEDURE — 77063 BREAST TOMOSYNTHESIS BI: CPT

## 2021-12-02 ENCOUNTER — TELEPHONE (OUTPATIENT)
Dept: FAMILY MEDICINE CLINIC | Facility: MEDICAL CENTER | Age: 76
End: 2021-12-02

## 2021-12-02 DIAGNOSIS — I10 ESSENTIAL HYPERTENSION: ICD-10-CM

## 2021-12-02 RX ORDER — DILTIAZEM HYDROCHLORIDE 240 MG/1
240 CAPSULE, COATED, EXTENDED RELEASE ORAL DAILY
Qty: 90 CAPSULE | Refills: 1 | Status: SHIPPED | OUTPATIENT
Start: 2021-12-02 | End: 2022-04-14 | Stop reason: SDUPTHER

## 2021-12-27 PROBLEM — H61.23 BILATERAL IMPACTED CERUMEN: Status: RESOLVED | Noted: 2019-06-24 | Resolved: 2021-12-27

## 2021-12-27 PROBLEM — K21.9 REFLUX LARYNGITIS: Status: ACTIVE | Noted: 2021-12-27

## 2021-12-27 PROBLEM — J04.0 REFLUX LARYNGITIS: Status: ACTIVE | Noted: 2021-12-27

## 2022-01-12 DIAGNOSIS — N95.2 VAGINAL ATROPHY: ICD-10-CM

## 2022-01-12 RX ORDER — CONJUGATED ESTROGENS 0.62 MG/G
1 CREAM VAGINAL WEEKLY
Qty: 30 G | Refills: 1 | Status: SHIPPED | OUTPATIENT
Start: 2022-01-12

## 2022-02-12 ENCOUNTER — OFFICE VISIT (OUTPATIENT)
Dept: URGENT CARE | Facility: MEDICAL CENTER | Age: 77
End: 2022-02-12
Payer: MEDICARE

## 2022-02-12 VITALS
TEMPERATURE: 97.6 F | DIASTOLIC BLOOD PRESSURE: 68 MMHG | RESPIRATION RATE: 18 BRPM | HEIGHT: 63 IN | OXYGEN SATURATION: 96 % | BODY MASS INDEX: 19.49 KG/M2 | WEIGHT: 110 LBS | SYSTOLIC BLOOD PRESSURE: 152 MMHG | HEART RATE: 86 BPM

## 2022-02-12 DIAGNOSIS — R42 DIZZINESS: Primary | ICD-10-CM

## 2022-02-12 LAB
ATRIAL RATE: 68 BPM
P AXIS: 64 DEGREES
PR INTERVAL: 116 MS
QRS AXIS: -53 DEGREES
QRSD INTERVAL: 88 MS
QT INTERVAL: 426 MS
QTC INTERVAL: 452 MS
T WAVE AXIS: 56 DEGREES
VENTRICULAR RATE: 68 BPM

## 2022-02-12 PROCEDURE — 99213 OFFICE O/P EST LOW 20 MIN: CPT | Performed by: PHYSICIAN ASSISTANT

## 2022-02-12 PROCEDURE — 93010 ELECTROCARDIOGRAM REPORT: CPT | Performed by: INTERNAL MEDICINE

## 2022-02-12 PROCEDURE — 93005 ELECTROCARDIOGRAM TRACING: CPT | Performed by: PHYSICIAN ASSISTANT

## 2022-02-12 PROCEDURE — G0463 HOSPITAL OUTPT CLINIC VISIT: HCPCS | Performed by: PHYSICIAN ASSISTANT

## 2022-02-12 RX ORDER — MECLIZINE HYDROCHLORIDE 25 MG/1
25 TABLET ORAL EVERY 8 HOURS PRN
Qty: 30 TABLET | Refills: 0 | Status: SHIPPED | OUTPATIENT
Start: 2022-02-12

## 2022-02-12 NOTE — PROGRESS NOTES
[facility]        NAME: Shira Davila is a 64 y o  female  : 1945    MRN: [id number]  DATE: 2022  TIME: 11:48 AM    Assessment and Plan   Dizziness [R42]  1  Dizziness           Patient Instructions     Dizziness  Referred to ER  Follow up with PCP in 3-5 days  Proceed to  ER if symptoms worsen  Chief Complaint     Chief Complaint   Patient presents with    Dizziness     Pt  with dizziness at night for the past three nights   Wound Check     Pt  with a cut on her left index finger about a month ago that has not healed properly  History of Present Illness       65 y/o female presents c/o having been dizzy on/ off x 3 days  Patient states the symptoms have been occurring during the night and wake her up  Patient states she had vertigo in the past but this feels different  Denies chest pain, SOB, palpitations, nausea, vomiting, diaphoresis      Review of Systems   Review of Systems   Constitutional: Negative  HENT: Negative  Eyes: Negative  Respiratory: Negative  Negative for cough, chest tightness, shortness of breath, wheezing and stridor  Cardiovascular: Negative  Negative for chest pain, palpitations and leg swelling  Neurological: Positive for dizziness           Current Medications       Current Outpatient Medications:     Alpha-D-Galactosidase (BEANO) TABS, Take by mouth, Disp: , Rfl:     Alum Hydroxide-Mag Trisilicate (GAVISCON) 58-55 1 MG CHEW, Chew, Disp: , Rfl:     ascorbic acid (VITAMIN C) 500 mg tablet, Take by mouth, Disp: , Rfl:     Cholecalciferol 400 units TABS, Take by mouth, Disp: , Rfl:     ciclopirox (PENLAC) 8 % solution, Apply topically, Disp: , Rfl:     Digestive Enzyme CAPS, Take by mouth, Disp: , Rfl:     diltiazem (CARDIZEM CD) 240 mg 24 hr capsule, Take 1 capsule (240 mg total) by mouth daily May give patient  the stock bottle, Disp: 90 capsule, Rfl: 1    famotidine (PEPCID) 10 mg tablet, Take 10 mg by mouth 2 (two) times a day, Disp: , Rfl:     fluticasone (FLONASE) 50 mcg/act nasal spray, 2 sprays into each nostril daily, Disp: 16 g, Rfl: 3    Homeopathic Products (ZICAM ALLERGY RELIEF NA), into each nostril, Disp: , Rfl:     lactase (LACTAID) 3,000 units tablet, Take by mouth, Disp: , Rfl:     Minoxidil (ROGAINE WOMENS) 5 % FOAM, Apply topically, Disp: , Rfl:     Multiple Vitamins-Minerals (CENTRUM SILVER ULTRA WOMENS PO), Take by mouth, Disp: , Rfl:     polyethylene glycol-propylene glycol (SYSTANE) 0 4-0 3 %, , Disp: , Rfl:     Premarin vaginal cream, Insert 1 g into the vagina once a week Brand Necessary, Disp: 30 g, Rfl: 1    Current Allergies     Allergies as of 02/12/2022 - Reviewed 02/12/2022   Allergen Reaction Noted    Cortisone Hypertension, Other (See Comments), Shortness Of Breath, and Tachycardia     Acebutolol  01/29/2014    Amlodipine  01/29/2014    Atenolol  01/29/2014    Azithromycin  01/29/2014    Banana - food allergy GI Intolerance 05/03/2019    Bisphosphonates  05/17/2012    Candesartan  01/29/2014    Clarithromycin  01/29/2014    Erythromycin  01/29/2014    Fosinopril  01/29/2014    Irbesartan  01/29/2014    Levofloxacin  01/29/2014    Losartan  01/29/2014    Methylprednisolone Hypertension and Tachycardia 04/03/2014    Metoprolol  01/29/2014    Nisoldipine  01/29/2014    Olmesartan  01/29/2014    Propranolol  01/29/2014    Sulfamethoxazole-trimethoprim Nausea Only 01/29/2014    Timolol  01/29/2014            The following portions of the patient's history were reviewed and updated as appropriate: allergies, current medications, past family history, past medical history, past social history, past surgical history and problem list      Past Medical History:   Diagnosis Date    Acne     Basal cell carcinoma 06/08/2020    right lower forehead    BCC (basal cell carcinoma of skin) 03/09/2020    mid forehead    Benign neoplasm of skin     Disease of thyroid gland 2006    GERD (gastroesophageal reflux disease)     Hypertension     Inflamed seborrheic keratosis     Irritable bowel syndrome     Malignant neoplasm of skin of face     Migraines     Nonmelanoma skin cancer     Last Assessed:6/27/17    Squamous cell skin cancer 06/08/2020    In situ, left lower forehead    Tachycardia     Telogen effluvium     Temporomandibular joint disorder     Vertigo        Past Surgical History:   Procedure Laterality Date    APPENDECTOMY      CHOLECYSTECTOMY      COLONOSCOPY  05/03/2019    COMPLEX WOUND CLOSURE TO EXTREMITY N/A 7/28/2020    Procedure: COMPLEX CLOSURE MID FOREHEAD;  Surgeon: Edy Germain MD;  [facility];  Service: Plastics    ESOPHAGOGASTRODUODENOSCOPY  2009    FLAP LOCAL HEAD / NECK N/A 7/28/2020    Procedure: FLAP MID FOREHEAD;  Surgeon: Edy Germain MD;  [facility];  Service: Plastics    HYSTERECTOMY  1987    MALIGNANT SKIN LESION EXCISION      Excision of Lesion Face Malignant-9/14/2004 [address] Forehead    MOHS RECONSTRUCTION N/A 7/28/2020    Procedure: RECONSTRUCTION MOHS DEFECT MID FOREHEAD;  Surgeon: Edy Germain MD;  [facility];  Service: Plastics    MOHS SURGERY  07/27/2020    Right &left lower forehead, mid forehead    OOPHORECTOMY Bilateral 1987    ROTATOR CUFF REPAIR Right     SKIN BIOPSY      TUBAL LIGATION  1976?        Family History   Problem Relation Age of Onset    Hypertension Mother     Osteoporosis Mother     Skin cancer Mother    Guero Nilsa Migraines Mother     Dementia Mother     Hypertension Father     Skin cancer Father     Dementia Father     Uterine cancer Maternal Grandmother 34    Cancer Paternal Grandmother     Uterine cancer Paternal Grandmother 72    Cancer Paternal Aunt     Uterine cancer Paternal Aunt 53    Diabetes type II Maternal Grandfather     Skin cancer Sister 68    Migraines Sister     No Known Problems Daughter     No Known Problems Paternal Aunt     No Known Problems Paternal Aunt     No Known Problems Maternal Aunt          Medications have been verified  Objective   /68   Pulse 86   Temp 97 6 °F (36 4 °C)   Resp 18   Ht 5' 3" (1 6 m)   Wt 49 9 kg (110 lb)   LMP  (LMP Unknown)   SpO2 96%   BMI 19 49 kg/m²        Physical Exam     Physical Exam  Constitutional:       Appearance: She is well-developed  HENT:      Head: Normocephalic and atraumatic  Right Ear: External ear normal       Left Ear: External ear normal       Nose: Nose normal       Mouth/Throat:      Pharynx: No oropharyngeal exudate  Cardiovascular:      Rate and Rhythm: Normal rate and regular rhythm  Heart sounds: Normal heart sounds  Pulmonary:      Effort: Pulmonary effort is normal  No respiratory distress  Breath sounds: Normal breath sounds  No wheezing or rales  Chest:      Chest wall: No tenderness  Abdominal:      General: Bowel sounds are normal  There is no distension  Palpations: Abdomen is soft  There is no mass  Tenderness: There is no abdominal tenderness  There is no guarding or rebound  Musculoskeletal:      Cervical back: Normal range of motion and neck supple  Lymphadenopathy:      Cervical: No cervical adenopathy  Neurological:      General: No focal deficit present  Mental Status: She is alert and oriented to person, place, and time  GCS: GCS eye subscore is 4  GCS verbal subscore is 5  GCS motor subscore is 6  Cranial Nerves: Cranial nerves are intact  Sensory: Sensation is intact  Coordination: Coordination is intact  Gait: Gait is intact  Patient Alert and oriented x3  Patient advised of risks of not going to the ER including death   Verbalized understanding and signed AMA

## 2022-02-16 ENCOUNTER — HOSPITAL ENCOUNTER (OUTPATIENT)
Dept: RADIOLOGY | Facility: MEDICAL CENTER | Age: 77
Discharge: HOME | End: 2022-02-16
Payer: MEDICARE

## 2022-02-16 DIAGNOSIS — Z13.820 SCREENING FOR OSTEOPOROSIS: ICD-10-CM

## 2022-02-16 DIAGNOSIS — Z78.0 POSTMENOPAUSAL: ICD-10-CM

## 2022-02-16 PROCEDURE — 77080 DXA BONE DENSITY AXIAL: CPT

## 2022-02-28 ENCOUNTER — APPOINTMENT (EMERGENCY)
Dept: RADIOLOGY | Facility: HOSPITAL | Age: 77
End: 2022-02-28
Payer: MEDICARE

## 2022-02-28 ENCOUNTER — HOSPITAL ENCOUNTER (EMERGENCY)
Facility: HOSPITAL | Age: 77
Discharge: HOME | End: 2022-02-28
Attending: EMERGENCY MEDICINE | Admitting: EMERGENCY MEDICINE
Payer: MEDICARE

## 2022-02-28 VITALS
SYSTOLIC BLOOD PRESSURE: 185 MMHG | RESPIRATION RATE: 20 BRPM | HEART RATE: 99 BPM | TEMPERATURE: 97.7 F | OXYGEN SATURATION: 99 % | DIASTOLIC BLOOD PRESSURE: 81 MMHG

## 2022-02-28 DIAGNOSIS — S83.92XA LEFT KNEE SPRAIN: Primary | ICD-10-CM

## 2022-02-28 PROCEDURE — 73564 X-RAY EXAM KNEE 4 OR MORE: CPT

## 2022-02-28 PROCEDURE — 99283 EMERGENCY DEPT VISIT LOW MDM: CPT

## 2022-02-28 PROCEDURE — 99284 EMERGENCY DEPT VISIT MOD MDM: CPT | Performed by: PHYSICIAN ASSISTANT

## 2022-03-09 ENCOUNTER — APPOINTMENT (OUTPATIENT)
Dept: LAB | Facility: MEDICAL CENTER | Age: 77
End: 2022-03-09
Payer: MEDICARE

## 2022-03-09 ENCOUNTER — TELEPHONE (OUTPATIENT)
Dept: FAMILY MEDICINE CLINIC | Facility: MEDICAL CENTER | Age: 77
End: 2022-03-09

## 2022-03-09 DIAGNOSIS — R35.0 URINARY FREQUENCY: ICD-10-CM

## 2022-03-09 DIAGNOSIS — R35.0 URINARY FREQUENCY: Primary | ICD-10-CM

## 2022-03-09 LAB
BACTERIA UR QL AUTO: ABNORMAL /HPF
BILIRUB UR QL STRIP: NEGATIVE
CLARITY UR: ABNORMAL
COLOR UR: YELLOW
GLUCOSE UR STRIP-MCNC: NEGATIVE MG/DL
HGB UR QL STRIP.AUTO: NEGATIVE
HYALINE CASTS #/AREA URNS LPF: ABNORMAL /LPF
KETONES UR STRIP-MCNC: ABNORMAL MG/DL
LEUKOCYTE ESTERASE UR QL STRIP: ABNORMAL
MUCOUS THREADS UR QL AUTO: ABNORMAL
NITRITE UR QL STRIP: NEGATIVE
NON-SQ EPI CELLS URNS QL MICRO: ABNORMAL /HPF
PH UR STRIP.AUTO: 6 [PH]
PROT UR STRIP-MCNC: ABNORMAL MG/DL
RBC #/AREA URNS AUTO: ABNORMAL /HPF
SP GR UR STRIP.AUTO: 1.02 (ref 1–1.03)
UROBILINOGEN UR STRIP-ACNC: <2 MG/DL
WBC #/AREA URNS AUTO: ABNORMAL /HPF

## 2022-03-09 PROCEDURE — 87086 URINE CULTURE/COLONY COUNT: CPT

## 2022-03-09 PROCEDURE — 81001 URINALYSIS AUTO W/SCOPE: CPT

## 2022-03-09 NOTE — TELEPHONE ENCOUNTER
Tanya toro with [facility]  Please send to [facility] in [place]

## 2022-03-09 NOTE — TELEPHONE ENCOUNTER
Pt thinks she has a UTI- frequency, slight odor, no burning  She wanted to schedule an appt but then mentioned she is having sinus pressure, cough  Pt is vaccinated, no covid infection in last 90 days

## 2022-03-09 NOTE — TELEPHONE ENCOUNTER
Notify urinalysis does look like a urinary tract infection  See if she can take Macrobid; not seen on the allergy list   If okay let me know and will send in Rx  Await UC results

## 2022-03-09 NOTE — TELEPHONE ENCOUNTER
Please advise- would you consider just ordering a Ua and culture? Otherwise we will need to swab her first and then bring her in, may be 24-48 hrs before getting appt  Symptoms are sinus pressure and slight cough , no fever , chills or body aches, no known exposure, vaccinated x 3   Home test negative    Urine symptoms are frequency and slight odor, no burning

## 2022-03-11 LAB — BACTERIA UR CULT: NORMAL

## 2022-03-14 ENCOUNTER — TELEPHONE (OUTPATIENT)
Dept: FAMILY MEDICINE CLINIC | Facility: MEDICAL CENTER | Age: 77
End: 2022-03-14

## 2022-03-14 NOTE — TELEPHONE ENCOUNTER
----- Message from Courtney Gibbs MD sent at 3/13/2022  9:08 PM EDT -----  Urine culture just showed contaminant  How are her symptoms?

## 2022-03-16 NOTE — TELEPHONE ENCOUNTER
If symptoms still persist despite finishing antibiotic will need a re-culture however prefer to see her in the office as well

## 2022-03-21 ENCOUNTER — OFFICE VISIT (OUTPATIENT)
Dept: DERMATOLOGY | Facility: CLINIC | Age: 77
End: 2022-03-21
Payer: MEDICARE

## 2022-03-21 VITALS — HEIGHT: 63 IN | WEIGHT: 110 LBS | TEMPERATURE: 97.8 F | BODY MASS INDEX: 19.49 KG/M2

## 2022-03-21 DIAGNOSIS — Z13.89 SCREENING FOR SKIN CONDITION: ICD-10-CM

## 2022-03-21 DIAGNOSIS — L82.1 SEBORRHEIC KERATOSIS: Primary | ICD-10-CM

## 2022-03-21 DIAGNOSIS — Z85.828 HISTORY OF SKIN CANCER: ICD-10-CM

## 2022-03-21 PROCEDURE — 99213 OFFICE O/P EST LOW 20 MIN: CPT | Performed by: DERMATOLOGY

## 2022-03-21 NOTE — PROGRESS NOTES
[facility] DERMATOLOGY  [address]  [facility] [address]  [phone]  [phone]     MRN: [id number] : 1945  Encounter: [id number]  Patient Information: Jami Santamaria  Chief complaint: 6 month check up  History of present illness:  64-year-old female presents for overall skin check previous history of nonmelanoma skin cancer no specific concerns noted  Past Medical History:   Diagnosis Date    Acne     Basal cell carcinoma 2020    right lower forehead    BCC (basal cell carcinoma of skin) 2020    mid forehead    Benign neoplasm of skin     Disease of thyroid gland 2006    GERD (gastroesophageal reflux disease)     Hypertension     Inflamed seborrheic keratosis     Irritable bowel syndrome     Malignant neoplasm of skin of face     Migraines     Nonmelanoma skin cancer     Last Assessed:17    Squamous cell skin cancer 2020    In situ, left lower forehead    Tachycardia     Telogen effluvium     Temporomandibular joint disorder     Vertigo      Past Surgical History:   Procedure Laterality Date    APPENDECTOMY      CHOLECYSTECTOMY      COLONOSCOPY  2019    COMPLEX WOUND CLOSURE TO EXTREMITY N/A 2020    Procedure: COMPLEX CLOSURE MID FOREHEAD;  Surgeon: Daphne Ramos MD;  [facility];  Service: Plastics    ESOPHAGOGASTRODUODENOSCOPY  2009    FLAP LOCAL HEAD / NECK N/A 2020    Procedure: FLAP MID FOREHEAD;  Surgeon: Daphne Ramos MD;  [facility];  Service: Plastics    HYSTERECTOMY  1987    MALIGNANT SKIN LESION EXCISION      Excision of Lesion Face Malignant-2004 [address] Forehead    MOHS RECONSTRUCTION N/A 2020    Procedure: RECONSTRUCTION MOHS DEFECT MID FOREHEAD;  Surgeon: Daphne Ramos MD;  [facility];  Service: Plastics    MOHS SURGERY  2020    Right &left lower forehead, mid forehead    OOPHORECTOMY Bilateral     ROTATOR CUFF REPAIR Right     SKIN BIOPSY      TUBAL LIGATION  1976? Social History   Social History     Substance and Sexual Activity   Alcohol Use No     Social History     Substance and Sexual Activity   Drug Use No     Social History     Tobacco Use   Smoking Status Never Smoker   Smokeless Tobacco Never Used     Family History   Problem Relation Age of Onset    Hypertension Mother     Osteoporosis Mother     Skin cancer Mother    Gwendolyn Teague Migraines Mother    Gwendolyn Teague Dementia Mother     Hypertension Father     Skin cancer Father     Dementia Father     Uterine cancer Maternal Grandmother 34    Cancer Paternal Grandmother     Uterine cancer Paternal Grandmother 72    Cancer Paternal Aunt     Uterine cancer Paternal Aunt 53    Diabetes type II Maternal Grandfather     Skin cancer Sister 68    Migraines Sister     No Known Problems Daughter     No Known Problems Paternal Aunt     No Known Problems Paternal Aunt     No Known Problems Maternal Aunt      Meds/Allergies   Allergies   Allergen Reactions    Cortisone Hypertension, Other (See Comments), Shortness Of Breath and Tachycardia    Acebutolol     Amlodipine     Atenolol     Azithromycin     Banana - Food Allergy GI Intolerance    Bisphosphonates      Annotation - [id number]: iriitis    Candesartan     Clarithromycin     Erythromycin     Fosinopril     Irbesartan     Levofloxacin     Losartan     Methylprednisolone Hypertension and Tachycardia    Metoprolol     Nisoldipine     Olmesartan     Propranolol     Sulfamethoxazole-Trimethoprim Nausea Only    Timolol        Meds:  Prior to Admission medications    Medication Sig Start Date End Date Taking?  Authorizing Provider   Alpha-D-Galactosidase Travis Honeycutt) TABS Take by mouth   Yes Historical Provider, MD   Alum Hydroxide-Mag Trisilicate (GAVISCON) 30-31 7 MG CHEW Chew   Yes Historical Provider, MD   ascorbic acid (VITAMIN C) 500 mg tablet Take by mouth   Yes Historical Provider, MD Cholecalciferol 400 units TABS Take by mouth   Yes Historical Provider, MD   ciclopirox (PENLAC) 8 % solution Apply topically   Yes Historical Provider, MD   Digestive Enzyme CAPS Take by mouth   Yes Historical Provider, MD   diltiazem (CARDIZEM CD) 240 mg 24 hr capsule Take 1 capsule (240 mg total) by mouth daily May give patient  the stock bottle 12/2/21  Yes Courtney Gibbs MD   famotidine (PEPCID) 10 mg tablet Take 10 mg by mouth 2 (two) times a day   Yes Historical Provider, MD   fluticasone (FLONASE) 50 mcg/act nasal spray 2 sprays into each nostril daily 8/20/18  Yes Courtney Gibbs MD   Homeopathic Products ([address] NA) into each nostril   Yes Historical Provider, MD   lactase (LACTAID) 3,000 units tablet Take by mouth   Yes Historical Provider, MD   meclizine (ANTIVERT) 25 mg tablet Take 1 tablet (25 mg total) by mouth every 8 (eight) hours as needed for dizziness 2/12/22  Yes Shlomo Barakat PA-C   Minoxidil (Josh Fails) 5 % FOAM Apply topically   Yes Historical Provider, MD   Multiple Vitamins-Minerals (CENTRUM SILVER ULTRA WOMENS PO) Take by mouth   Yes Historical Provider, MD   polyethylene glycol-propylene glycol (SYSTANE) 0 4-0 3 %    Yes Historical Provider, MD   Premarin vaginal cream Insert 1 g into the vagina once a week Brand Necessary 1/12/22  Yes Vahid Marmolejo PA-C       Subjective:     Review of Systems:    General: negative for - chills, fatigue, fever,  weight gain or weight loss  Psychological: negative for - anxiety, behavioral disorder, concentration difficulties, decreased libido, depression, irritability, memory difficulties, mood swings, sleep disturbances or suicidal ideation  ENT: negative for - hearing difficulties , nasal congestion, nasal discharge, oral lesions, sinus pain, sneezing, sore throat  Allergy and Immunology: negative for - hives, insect bite sensitivity,  Hematological and Lymphatic: negative for - bleeding problems, blood clots,bruising, swollen lymph nodes  Endocrine: negative for - hair pattern changes, hot flashes, malaise/lethargy, mood swings, palpitations, polydipsia/polyuria, skin changes, temperature intolerance or unexpected weight change  Respiratory: negative for - cough, hemoptysis, orthopnea, shortness of breath, or wheezing  Cardiovascular: negative for - chest pain, dyspnea on exertion, edema,  Gastrointestinal: negative for - abdominal pain, nausea/vomiting  Genito-Urinary: negative for - dysuria, incontinence, irregular/heavy menses or urinary frequency/urgency  Musculoskeletal: negative for - gait disturbance, joint pain, joint stiffness, joint swelling, muscle pain, muscular weakness  Dermatological:  As in HPI  Neurological: negative for confusion, dizziness, headaches, impaired coordination/balance, memory loss, numbness/tingling, seizures, speech problems, tremors or weakness       Objective:   Temp 97 8 °F (36 6 °C) (Temporal)   Ht 5' 3" (1 6 m)   Wt 49 9 kg (110 lb)   LMP  (LMP Unknown)   BMI 19 49 kg/m²     Physical Exam:    General Appearance:    Alert, cooperative, no distress   Head:    Normocephalic, without obvious abnormality, atraumatic           Skin:   A full skin exam was performed including scalp, head scalp, eyes, ears, nose, lips, neck, chest, axilla, abdomen, back, buttocks, bilateral upper extremities, bilateral lower extremities, hands, feet, fingers, toes, fingernails, and toenails excoriated 2 mm papule noted on the left mid forehead normal keratotic papules with greasy stuck on appearance previous sites skin cancer well healed without recurrence nothing else atypical noted on complete exam       Assessment:     1  Seborrheic keratosis     2  History of skin cancer     3   Screening for skin condition           Plan:   Seborrheic keratosis patient reassured these are normal growths we acquire with age no treatment needed  History of skin cancer in no recurrence nothing else atypical sunblock recommended follow-up in 6 months  Screening for dermatologic disorders nothing else of concern noted on complete exam follow-up in 6 months    Arley Pham MD  3/21/2022,10:49 AM    Portions of the record may have been created with voice recognition software   Occasional wrong word or "sound a like" substitutions may have occurred due to the inherent limitations of voice recognition software   Read the chart carefully and recognize, using context, where substitutions have occurred

## 2022-03-24 ENCOUNTER — TELEPHONE (OUTPATIENT)
Dept: FAMILY MEDICINE CLINIC | Facility: MEDICAL CENTER | Age: 77
End: 2022-03-24

## 2022-03-24 DIAGNOSIS — I10 ESSENTIAL HYPERTENSION: ICD-10-CM

## 2022-03-24 DIAGNOSIS — E03.9 ACQUIRED HYPOTHYROIDISM: Primary | ICD-10-CM

## 2022-03-24 DIAGNOSIS — I10 PRIMARY HYPERTENSION: ICD-10-CM

## 2022-03-24 NOTE — TELEPHONE ENCOUNTER
----- Message from Marcial Bradford sent at 3/24/2022  1:42 PM EDT -----  Regarding: Bloodwork  I have my 6 month appointment with Sukhdeep Ny on April 13th  I think I'm due for regular bloodwork and thyroid  If I am, I would need an order for bloodwork    Thank you,  Dorian Chavez

## 2022-03-29 ENCOUNTER — APPOINTMENT (OUTPATIENT)
Dept: LAB | Facility: MEDICAL CENTER | Age: 77
End: 2022-03-29
Payer: MEDICARE

## 2022-03-29 DIAGNOSIS — E03.9 ACQUIRED HYPOTHYROIDISM: ICD-10-CM

## 2022-03-29 DIAGNOSIS — I10 PRIMARY HYPERTENSION: ICD-10-CM

## 2022-03-29 LAB
ALBUMIN SERPL BCP-MCNC: 3.5 G/DL (ref 3.5–5)
ALP SERPL-CCNC: 75 U/L (ref 46–116)
ALT SERPL W P-5'-P-CCNC: 22 U/L (ref 12–78)
ANION GAP SERPL CALCULATED.3IONS-SCNC: 6 MMOL/L (ref 4–13)
AST SERPL W P-5'-P-CCNC: 23 U/L (ref 5–45)
BASOPHILS # BLD AUTO: 0.06 THOUSANDS/ΜL (ref 0–0.1)
BASOPHILS NFR BLD AUTO: 1 % (ref 0–1)
BILIRUB SERPL-MCNC: 0.44 MG/DL (ref 0.2–1)
BUN SERPL-MCNC: 16 MG/DL (ref 5–25)
CALCIUM SERPL-MCNC: 9.1 MG/DL (ref 8.3–10.1)
CHLORIDE SERPL-SCNC: 104 MMOL/L (ref 100–108)
CHOLEST SERPL-MCNC: 165 MG/DL
CO2 SERPL-SCNC: 29 MMOL/L (ref 21–32)
CREAT SERPL-MCNC: 0.92 MG/DL (ref 0.6–1.3)
EOSINOPHIL # BLD AUTO: 0.2 THOUSAND/ΜL (ref 0–0.61)
EOSINOPHIL NFR BLD AUTO: 4 % (ref 0–6)
ERYTHROCYTE [DISTWIDTH] IN BLOOD BY AUTOMATED COUNT: 13.9 % (ref 11.6–15.1)
GFR SERPL CREATININE-BSD FRML MDRD: 59 ML/MIN/1.73SQ M
GLUCOSE P FAST SERPL-MCNC: 84 MG/DL (ref 65–99)
HCT VFR BLD AUTO: 39.4 % (ref 34.8–46.1)
HDLC SERPL-MCNC: 81 MG/DL
HGB BLD-MCNC: 12.2 G/DL (ref 11.5–15.4)
IMM GRANULOCYTES # BLD AUTO: 0.02 THOUSAND/UL (ref 0–0.2)
IMM GRANULOCYTES NFR BLD AUTO: 0 % (ref 0–2)
LDLC SERPL CALC-MCNC: 76 MG/DL (ref 0–100)
LYMPHOCYTES # BLD AUTO: 1.25 THOUSANDS/ΜL (ref 0.6–4.47)
LYMPHOCYTES NFR BLD AUTO: 22 % (ref 14–44)
MCH RBC QN AUTO: 28.3 PG (ref 26.8–34.3)
MCHC RBC AUTO-ENTMCNC: 31 G/DL (ref 31.4–37.4)
MCV RBC AUTO: 91 FL (ref 82–98)
MONOCYTES # BLD AUTO: 0.74 THOUSAND/ΜL (ref 0.17–1.22)
MONOCYTES NFR BLD AUTO: 13 % (ref 4–12)
NEUTROPHILS # BLD AUTO: 3.39 THOUSANDS/ΜL (ref 1.85–7.62)
NEUTS SEG NFR BLD AUTO: 60 % (ref 43–75)
NONHDLC SERPL-MCNC: 84 MG/DL
NRBC BLD AUTO-RTO: 0 /100 WBCS
PLATELET # BLD AUTO: 315 THOUSANDS/UL (ref 149–390)
PMV BLD AUTO: 10.3 FL (ref 8.9–12.7)
POTASSIUM SERPL-SCNC: 3.7 MMOL/L (ref 3.5–5.3)
PROT SERPL-MCNC: 7.4 G/DL (ref 6.4–8.2)
RBC # BLD AUTO: 4.31 MILLION/UL (ref 3.81–5.12)
SODIUM SERPL-SCNC: 139 MMOL/L (ref 136–145)
T4 FREE SERPL-MCNC: 0.97 NG/DL (ref 0.76–1.46)
TRIGL SERPL-MCNC: 39 MG/DL
TSH SERPL DL<=0.05 MIU/L-ACNC: 3.98 UIU/ML (ref 0.36–3.74)
WBC # BLD AUTO: 5.66 THOUSAND/UL (ref 4.31–10.16)

## 2022-03-29 PROCEDURE — 36415 COLL VENOUS BLD VENIPUNCTURE: CPT

## 2022-03-29 PROCEDURE — 84439 ASSAY OF FREE THYROXINE: CPT

## 2022-03-29 PROCEDURE — 84443 ASSAY THYROID STIM HORMONE: CPT

## 2022-03-29 PROCEDURE — 85025 COMPLETE CBC W/AUTO DIFF WBC: CPT

## 2022-03-29 PROCEDURE — 80053 COMPREHEN METABOLIC PANEL: CPT

## 2022-03-29 PROCEDURE — 80061 LIPID PANEL: CPT

## 2022-04-07 PROBLEM — R42 VERTIGO: Status: ACTIVE | Noted: 2022-04-07

## 2022-04-13 ENCOUNTER — OFFICE VISIT (OUTPATIENT)
Dept: FAMILY MEDICINE CLINIC | Facility: MEDICAL CENTER | Age: 77
End: 2022-04-13
Payer: MEDICARE

## 2022-04-13 DIAGNOSIS — I10 ESSENTIAL HYPERTENSION: Primary | ICD-10-CM

## 2022-04-13 DIAGNOSIS — M81.0 AGE-RELATED OSTEOPOROSIS WITHOUT CURRENT PATHOLOGICAL FRACTURE: ICD-10-CM

## 2022-04-13 PROCEDURE — 99214 OFFICE O/P EST MOD 30 MIN: CPT | Performed by: FAMILY MEDICINE

## 2022-04-13 NOTE — PROGRESS NOTES
Zohaib Chauhan is here for 6 month followup  She saw her dentist for her oral pain  Tooth pulled and symptoms resolved  Dr Claudia Kulkarni  She says her right hand is achey  Aggravated by writing orusing her hand  Doesn't wake from sleep  Top of her wrist     She had her DEXA scan done is here to review that as well as her BW     O: /84 (BP Location: Left arm, Patient Position: Sitting, Cuff Size: Adult)   Pulse 70   Resp 16   Ht 5' 3" (1 6 m)   Wt 49 kg (108 lb)   LMP  (LMP Unknown)   BMI 19 13 kg/m²    BP by me 140/72    Blood work 03/29/2022  T4 normal TSH 3 9  Lipid pro CBC CMP within normal limits    Dexa scan  Osteoporosis of the spine -4 5  Left femoral neck -2 7    Assessment  1  Osteoporosiship and spine-will refer Prolia injections  Did have side effects with Reclast    2  Hypertension-controlled with diltiazem  3  Health maintenance-immunizations are up-to-date  Discussed COVID booster and Shingrix  Plan  Endocrinology referral   Recheck 6 months

## 2022-04-14 ENCOUNTER — TELEPHONE (OUTPATIENT)
Dept: FAMILY MEDICINE CLINIC | Facility: MEDICAL CENTER | Age: 77
End: 2022-04-14

## 2022-04-14 DIAGNOSIS — M81.0 OSTEOPOROSIS, UNSPECIFIED OSTEOPOROSIS TYPE, UNSPECIFIED PATHOLOGICAL FRACTURE PRESENCE: Primary | ICD-10-CM

## 2022-04-14 RX ORDER — DILTIAZEM HYDROCHLORIDE 240 MG/1
240 CAPSULE, COATED, EXTENDED RELEASE ORAL DAILY
Qty: 90 CAPSULE | Refills: 1 | Status: SHIPPED | OUTPATIENT
Start: 2022-04-14

## 2022-04-14 NOTE — TELEPHONE ENCOUNTER
----- Message from Michael Hennessy sent at 4/14/2022 10:06 AM EDT -----  Regarding: After visit follow-up question  I saw Dr Marcella Juan yesterday morning  We discussed that I should see either an endocrinologist or rheumatologist after we reviewed my Dexascan  I thought the doctor's names would be on my summary, but they weren't  Can you please send me their names? Also, Dr Marcella Juan usually puts in my blood pressure reading that she gets      Thank you, Claudette Lora

## 2022-04-15 VITALS
RESPIRATION RATE: 16 BRPM | DIASTOLIC BLOOD PRESSURE: 72 MMHG | HEART RATE: 70 BPM | WEIGHT: 108 LBS | SYSTOLIC BLOOD PRESSURE: 140 MMHG | HEIGHT: 63 IN | BODY MASS INDEX: 19.14 KG/M2

## 2022-04-15 NOTE — TELEPHONE ENCOUNTER
Give her referrals for endocrinology and Rheumatology depending on where she wants to go for her osteoporosis  Does not need specific names  Let her know the blood pressure that I took is already in my office visit note  However I added it to the nursing notes

## 2022-04-15 NOTE — TELEPHONE ENCOUNTER
Zohaib Chauhan aware  Orders placed  She is aware any provider is ok but that I linked one just so the phone # would populate   Mailed at patients request

## 2022-05-17 ENCOUNTER — TELEPHONE (OUTPATIENT)
Dept: FAMILY MEDICINE CLINIC | Facility: MEDICAL CENTER | Age: 77
End: 2022-05-17

## 2022-05-17 ENCOUNTER — TELEMEDICINE (OUTPATIENT)
Dept: FAMILY MEDICINE CLINIC | Facility: MEDICAL CENTER | Age: 77
End: 2022-05-17
Payer: MEDICARE

## 2022-05-17 VITALS — WEIGHT: 109 LBS | BODY MASS INDEX: 19.31 KG/M2 | HEIGHT: 63 IN | HEART RATE: 100 BPM

## 2022-05-17 DIAGNOSIS — U07.1 COVID-19: Primary | ICD-10-CM

## 2022-05-17 PROCEDURE — 99213 OFFICE O/P EST LOW 20 MIN: CPT | Performed by: STUDENT IN AN ORGANIZED HEALTH CARE EDUCATION/TRAINING PROGRAM

## 2022-05-17 NOTE — PROGRESS NOTES
COVID-19 Outpatient Progress Note    Assessment/Plan:    Problem List Items Addressed This Visit    None     Visit Diagnoses     COVID-19    -  Primary         Disposition:     Discussed vitamin D, vitamin C, and/or zinc supplementation with patient  Patient in no acute distress over video visit, no conversational dyspnea  Patient appropriate for continued outpatient management, fluids, rest, supportive care  Advised to continue vitamin supplementation with vitamin-D, zinc, and vitamin-C  Advised in future to take supplement holiday from zinc as potential zinc toxicity  Patient advised to not hesitate to reach out if she has any further questions or concerns or needs any medications sent for symptomatic relief, at this time declines  I have spent 15 minutes directly with the patient  Greater than 50% of this time was spent in counseling/coordination of care regarding: risks and benefits of treatment options and impressions  Discussed options for antiviral therapy, Luvox, patient declines at this time due to potential side effects  Encounter provider Ean Fernando DO    Provider located at [address] [place] [id number]    Recent Visits  No visits were found meeting these conditions  Showing recent visits within past 7 days and meeting all other requirements  Today's Visits  Date Type Provider Dept   05/17/22 Telemedicine Ean Fernando DO [facility]   05/17/22 Telephone Red Daniela [facility]   Showing today's visits and meeting all other requirements  Future Appointments  No visits were found meeting these conditions  Showing future appointments within next 150 days and meeting all other requirements     This virtual check-in was done via [employer] and patient was informed that this is a secure, HIPAA-compliant platform  She agrees to proceed      Patient agrees to participate in a virtual check in via telephone or video visit instead of presenting to the office to address urgent/immediate medical needs  Patient is aware this is a billable service  After connecting through [employer], the patient was identified by name and date of birth  Pilar Fletcher was informed that this was a telemedicine visit and that the exam was being conducted confidentially over secure lines  My office door was closed  No one else was in the room  Pilar Fletcher acknowledged consent and understanding of privacy and security of the telemedicine visit  I informed the patient that I have reviewed her record in Epic and presented the opportunity for her to ask any questions regarding the visit today  The patient agreed to participate  Verification of patient location:  Patient is located in the following state in which I hold an active license: [place]    Subjective:   Pilar Fletcher is a 76 y o  female who is concerned about COVID-19  Patient's symptoms include chills ("only once"), fatigue, malaise, rhinorrhea ("occasionally"), cough ("occasionally") and chest tightness ("slight this morning")  Patient denies fever, congestion, sore throat, anosmia, loss of taste, shortness of breath, nausea, vomiting, myalgias and headaches   Abdominal pain: "I have irritable bowel anyhow" Diarrhea: "I have irritable bowel anyhow"     - Date of symptom onset: 5/14/2022      COVID-19 vaccination status: Fully vaccinated with booster    Exposure:   Contact with a person who is under investigation (PUI) for or who is positive for COVID-19 within the last 14 days?: No    Hospitalized recently for fever and/or lower respiratory symptoms?: No      Currently a healthcare worker that is involved in direct patient care?: No      Works in a special setting where the risk of COVID-19 transmission may be high? (this may include long-term care, correctional and skilled nursing facilities; homeless shelters; assisted-living facilities and group homes ): No      Resident in a special setting where the risk of COVID-19 transmission may be high? (this may include long-term care, correctional and alf facilities; homeless shelters; assisted-living facilities and group homes ): No      Lab Results   Component Value Date    SARSCOV2 Negative 04/18/2021     Past Medical History:   Diagnosis Date    Acne     Basal cell carcinoma 06/08/2020    right lower forehead    BCC (basal cell carcinoma of skin) 03/09/2020    mid forehead    Benign neoplasm of skin     Disease of thyroid gland 2006    GERD (gastroesophageal reflux disease)     Hypertension     Inflamed seborrheic keratosis     Irritable bowel syndrome     Malignant neoplasm of skin of face     Migraines     Nonmelanoma skin cancer     Last Assessed:6/27/17    Squamous cell skin cancer 06/08/2020    In situ, left lower forehead    Tachycardia     Telogen effluvium     Temporomandibular joint disorder     Vertigo      Past Surgical History:   Procedure Laterality Date    ADENOIDECTOMY      APPENDECTOMY      CHOLECYSTECTOMY      COLONOSCOPY  05/03/2019    COMPLEX WOUND CLOSURE TO EXTREMITY N/A 7/28/2020    Procedure: COMPLEX CLOSURE MID FOREHEAD;  Surgeon: Junior Salazar MD;  [facility];  Service: Plastics    ESOPHAGOGASTRODUODENOSCOPY  2009    FLAP LOCAL HEAD / NECK N/A 7/28/2020    Procedure: FLAP MID FOREHEAD;  Surgeon: Junior Salazar MD;  [facility];  Service: Plastics    HYSTERECTOMY  1987    MALIGNANT SKIN LESION EXCISION      Excision of Lesion Face Malignant-9/14/2004 [address] Forehead    MOHS RECONSTRUCTION N/A 7/28/2020    Procedure: RECONSTRUCTION MOHS DEFECT MID FOREHEAD;  Surgeon: Junior Salazar MD;  [facility];  Service: Plastics    MOHS SURGERY  07/27/2020    Right &left lower forehead, mid forehead    OOPHORECTOMY Bilateral 1987    ROTATOR CUFF REPAIR Right     SKIN BIOPSY      TONSILLECTOMY      TUBAL LIGATION  1976?      Current Outpatient Medications Medication Sig Dispense Refill    Alpha-D-Galactosidase (BEANO) TABS Take by mouth      Alum Hydroxide-Mag Trisilicate 29-66 0 MG CHEW Chew      ascorbic acid (VITAMIN C) 500 mg tablet Take by mouth      Cholecalciferol 400 units TABS Take by mouth      ciclopirox (PENLAC) 8 % solution Apply topically      Digestive Enzyme CAPS Take by mouth      diltiazem (CARDIZEM CD) 240 mg 24 hr capsule Take 1 capsule (240 mg total) by mouth daily May give patient  the stock bottle 90 capsule 1    famotidine (PEPCID) 10 mg tablet Take 10 mg by mouth 2 (two) times a day      fluticasone (FLONASE) 50 mcg/act nasal spray 2 sprays into each nostril daily 16 g 3    Homeopathic Products (ZICAM ALLERGY RELIEF NA) into each nostril      lactase (LACTAID) 3,000 units tablet Take by mouth      meclizine (ANTIVERT) 25 mg tablet Take 1 tablet (25 mg total) by mouth every 8 (eight) hours as needed for dizziness 30 tablet 0    Minoxidil 5 % FOAM Apply topically      Multiple Vitamins-Minerals (CENTRUM SILVER ULTRA WOMENS PO) Take by mouth      polyethylene glycol-propylene glycol (SYSTANE) 0 4-0 3 %       Premarin vaginal cream Insert 1 g into the vagina once a week Brand Necessary 30 g 1     No current facility-administered medications for this visit  Allergies   Allergen Reactions    Cortisone Hypertension, Other (See Comments), Shortness Of Breath and Tachycardia    Acebutolol     Amlodipine     Atenolol     Azithromycin     Banana - Food Allergy GI Intolerance    Bisphosphonates      Annotation - [id number]: iriitis    Candesartan     Clarithromycin     Erythromycin     Fosinopril     Irbesartan     Levofloxacin     Losartan     Methylprednisolone Hypertension and Tachycardia    Metoprolol     Nisoldipine     Olmesartan     Propranolol     Sulfamethoxazole-Trimethoprim Nausea Only    Timolol        Review of Systems   Constitutional: Positive for chills ("only once") and fatigue   Negative for fever    HENT: Positive for rhinorrhea ("occasionally")  Negative for congestion and sore throat  Respiratory: Positive for cough ("occasionally") and chest tightness ("slight this morning")  Negative for shortness of breath  Gastrointestinal: Negative for nausea and vomiting  Abdominal pain: "I have irritable bowel anyhow" Diarrhea: "I have irritable bowel anyhow"   Musculoskeletal: Negative for myalgias  Neurological: Negative for headaches  Objective:    Vitals:    05/17/22 1027   Pulse: 100   Weight: 49 4 kg (109 lb)   Height: 5' 3" (1 6 m)       Physical Exam  Constitutional:       General: She is not in acute distress  Appearance: She is not toxic-appearing  HENT:      Head: Normocephalic and atraumatic  Mouth/Throat:      Mouth: Mucous membranes are moist       Pharynx: Oropharynx is clear  Eyes:      Conjunctiva/sclera: Conjunctivae normal    Pulmonary:      Effort: Pulmonary effort is normal  No respiratory distress  Neurological:      Mental Status: She is alert and oriented to person, place, and time  Psychiatric:         Mood and Affect: Mood normal          Behavior: Behavior normal          Thought Content: Thought content normal          Judgment: Judgment normal          VIRTUAL VISIT DISCLAIMER    Krystina Sotelo verbally agrees to participate in [employer]  Pt is aware that [employer] could be limited without vital signs or the ability to perform a full hands-on physical Gregorio Reasons understands she or the provider may request at any time to terminate the video visit and request the patient to seek care or treatment in person

## 2022-05-17 NOTE — TELEPHONE ENCOUNTER
Pt c/o heartbeat of 100bpm since Sat since having Covid  Pt said she does not have a fever  Please, triage

## 2022-05-17 NOTE — TELEPHONE ENCOUNTER
Triaged- c/o congestion, cough and fatigue, some dyspnea of exertion  Denies fever, managing symptoms at home  Patient aware she is eligible for MAB or anti virals  Declines at this time   symptoms started Saturday   If she changes her mind she will call by tomorrow knowing there is a time frame for treatment

## 2022-06-08 ENCOUNTER — ANNUAL EXAM (OUTPATIENT)
Dept: OBGYN CLINIC | Facility: CLINIC | Age: 77
End: 2022-06-08
Payer: MEDICARE

## 2022-06-08 VITALS
SYSTOLIC BLOOD PRESSURE: 120 MMHG | HEIGHT: 63 IN | BODY MASS INDEX: 19.31 KG/M2 | DIASTOLIC BLOOD PRESSURE: 80 MMHG | WEIGHT: 109 LBS

## 2022-06-08 DIAGNOSIS — Z12.31 ENCOUNTER FOR SCREENING MAMMOGRAM FOR BREAST CANCER: Primary | ICD-10-CM

## 2022-06-08 DIAGNOSIS — N95.2 VAGINAL ATROPHY: ICD-10-CM

## 2022-06-08 DIAGNOSIS — Z01.419 ENCOUNTER FOR ANNUAL ROUTINE GYNECOLOGICAL EXAMINATION: ICD-10-CM

## 2022-06-08 PROCEDURE — G0101 CA SCREEN;PELVIC/BREAST EXAM: HCPCS | Performed by: OBSTETRICS & GYNECOLOGY

## 2022-06-08 NOTE — PROGRESS NOTES
Assessment/Plan:    Normal breast and gyn exam except for vaginal atrophy  Status post hysterectomy    Normal mammogram 2021  Normal colonoscopy May 2019  COVID infection May 2022  DEXA scan showing osteoporosis  and     Plan:  Continue healthy diet weight-bearing exercise  Rx mammogram     Subjective:      Patient ID: Tanvir Alvarez is a 76 y o  female presents to the office for annual checkup with no complaints  Patient is status post hysterectomy denies any pelvic pain or vaginal bleeding  Denies any breast bowel or bladder issues  Staying extremely active and doing weight-bearing exercises  No change in family history  Medications reviewed  Recent TSH was slightly elevated  Has follow-up blood work from PMD      Review of Systems   Constitutional: Negative  Negative for fatigue, fever and unexpected weight change  HENT: Negative  Eyes: Negative  Respiratory: Negative  Negative for chest tightness, shortness of breath, wheezing and stridor  Cardiovascular: Negative  Negative for chest pain, palpitations and leg swelling  Gastrointestinal: Negative  Negative for abdominal pain, blood in stool, diarrhea, nausea, rectal pain and vomiting  Endocrine: Negative  Genitourinary: Negative for dysuria, frequency, vaginal bleeding, vaginal discharge and vaginal pain  Musculoskeletal: Negative  Skin: Negative  Allergic/Immunologic: Negative  Neurological: Negative  Hematological: Negative  Psychiatric/Behavioral: Negative  All other systems reviewed and are negative  Objective:      /80   Ht 5' 2 5" (1 588 m)   Wt 49 4 kg (109 lb)   LMP  (LMP Unknown)   BMI 19 62 kg/m²          Physical Exam  Constitutional:       Appearance: She is well-developed  Cardiovascular:      Rate and Rhythm: Normal rate and regular rhythm  Heart sounds: Normal heart sounds  Pulmonary:      Effort: Pulmonary effort is normal  No respiratory distress  Breath sounds: No stridor  No wheezing or rales  Chest:      Chest wall: No tenderness  Breasts: Breasts are symmetrical       Right: No inverted nipple, mass, nipple discharge, skin change or tenderness  Left: No inverted nipple, mass, nipple discharge, skin change or tenderness  Abdominal:      General: Bowel sounds are normal  There is no distension  Palpations: Abdomen is soft  There is no mass  Tenderness: There is no abdominal tenderness  There is no guarding or rebound  Hernia: No hernia is present  There is no hernia in the left inguinal area  Genitourinary:     Labia:         Right: No rash, tenderness, lesion or injury  Left: No rash, tenderness, lesion or injury  Vagina: Normal  No signs of injury and foreign body  No vaginal discharge, erythema, tenderness or bleeding  Adnexa:         Right: No mass, tenderness or fullness  Left: No mass, tenderness or fullness  Rectum: No mass, tenderness, anal fissure, external hemorrhoid or internal hemorrhoid  Normal anal tone  Comments: Urethral meatus normal   Cervix and uterus absent  Vaginal cuff well supported  No cystocele or rectocele  Normal [place] glands  Lymphadenopathy:      Lower Body: No right inguinal adenopathy  No left inguinal adenopathy  Psychiatric:         Behavior: Behavior normal          Thought Content:  Thought content normal          Judgment: Judgment normal

## 2022-09-15 ENCOUNTER — OFFICE VISIT (OUTPATIENT)
Dept: FAMILY MEDICINE CLINIC | Facility: MEDICAL CENTER | Age: 77
End: 2022-09-15
Payer: MEDICARE

## 2022-09-15 VITALS
SYSTOLIC BLOOD PRESSURE: 148 MMHG | HEART RATE: 76 BPM | OXYGEN SATURATION: 99 % | DIASTOLIC BLOOD PRESSURE: 70 MMHG | WEIGHT: 109 LBS | HEIGHT: 63 IN | BODY MASS INDEX: 19.31 KG/M2

## 2022-09-15 DIAGNOSIS — N30.00 ACUTE CYSTITIS WITHOUT HEMATURIA: Primary | ICD-10-CM

## 2022-09-15 DIAGNOSIS — R35.0 URINARY FREQUENCY: ICD-10-CM

## 2022-09-15 LAB
SL AMB  POCT GLUCOSE, UA: NORMAL
SL AMB LEUKOCYTE ESTERASE,UA: NORMAL
SL AMB POCT BILIRUBIN,UA: NORMAL
SL AMB POCT BLOOD,UA: NORMAL
SL AMB POCT KETONES,UA: NORMAL
SL AMB POCT NITRITE,UA: NORMAL
SL AMB POCT PH,UA: 6
SL AMB POCT SPECIFIC GRAVITY,UA: 1.01
SL AMB POCT URINE PROTEIN: NORMAL
SL AMB POCT UROBILINOGEN: NORMAL

## 2022-09-15 PROCEDURE — 81002 URINALYSIS NONAUTO W/O SCOPE: CPT

## 2022-09-15 PROCEDURE — 99213 OFFICE O/P EST LOW 20 MIN: CPT

## 2022-09-15 PROCEDURE — 87086 URINE CULTURE/COLONY COUNT: CPT

## 2022-09-15 RX ORDER — CIPROFLOXACIN 250 MG/1
250 TABLET, FILM COATED ORAL EVERY 12 HOURS SCHEDULED
Qty: 6 TABLET | Refills: 0 | Status: SHIPPED | OUTPATIENT
Start: 2022-09-15 | End: 2022-09-18

## 2022-09-15 NOTE — PATIENT INSTRUCTIONS
Stay well hydrated  Take antibiotic until complete  Add probiotic daily  Call if your symptoms persist or worsen  Urine culture will be sent to the lab

## 2022-09-15 NOTE — PROGRESS NOTES
Assessment/Plan:    Ciprofloxacin prescribed  Patient reports she tolerates Ciprofloxin well despite her allergy to levofloxacin  Advised to add daily probiotic  Stay well hydrated  Call with worsening or persistent symptoms  1  Acute cystitis without hematuria  Take antibiotic until course is complete  Add probiotic daily while on antibiotic and 1-2 days after completion of antibiotic  Stay well hydrated  Call with worsening or persistent symptoms  - ciprofloxacin (CIPRO) 250 mg tablet; Take 1 tablet (250 mg total) by mouth every 12 (twelve) hours for 3 days Patient reports she is able to take Cipro, has taken it in the past without reaction  Dispense: 6 tablet; Refill: 0    2  Urinary frequency  POCT urine dip unremarkable  Will treat patient based on symptoms  Send urine for culture  - POCT urine dip  Component      Latest Ref Rng & Units 9/15/2022             Leukocytes, UA       neg   Nitrite, UA       neg   SL AMB POCT UROBILINOGEN       neg   POCT URINE PROTEIN       neg   pH, UA       6 0   Blood, UA       neg   SL AMB SPECIFIC GRAVITY-URINE       1 010   Ketones, UA       neg   BILIRUBIN,UA       neg   Glucose, UA       neg   - Urine culture; Future  - Urine culture      Subjective:      Patient ID: Fabrizio Rader is a 76 y o  female  49-year-old female presents with complaints of urinary frequency, urgency, and burning x 1 week  She was last treated for a UTI in March with [facility] [place]  She does report increased HR with use of Macrobid  She reports Cipro works well for her  She does have an allergy to levofloxacin in her chart however she does report tolerating Cipro well without reaction  She has an allergy to Bactrim  Denies fever, chills, abdominal pain, flank pain, hematuria         The following portions of the patient's history were reviewed and updated as appropriate: allergies, current medications, past medical history, past social history, past surgical history and problem list     Review of Systems   Constitutional: Negative  HENT: Negative  Eyes: Negative  Respiratory: Negative  Cardiovascular: Negative  Gastrointestinal: Negative  Endocrine: Negative  Genitourinary: Positive for dysuria, frequency and urgency  Musculoskeletal: Negative  Skin: Negative  Allergic/Immunologic: Negative  Neurological: Negative  Hematological: Negative  Psychiatric/Behavioral: Negative  Objective:      /70 (BP Location: Left arm, Patient Position: Sitting, Cuff Size: Adult)   Pulse 76   Ht 5' 2 5" (1 588 m)   Wt 49 4 kg (109 lb)   LMP  (LMP Unknown)   SpO2 99%   BMI 19 62 kg/m²          Physical Exam  Vitals and nursing note reviewed  Constitutional:       General: She is not in acute distress  Appearance: Normal appearance  She is normal weight  She is not ill-appearing  HENT:      Head: Normocephalic and atraumatic  Eyes:      Conjunctiva/sclera: Conjunctivae normal       Pupils: Pupils are equal, round, and reactive to light  Cardiovascular:      Rate and Rhythm: Normal rate and regular rhythm  Pulses: Normal pulses  Heart sounds: Normal heart sounds  Pulmonary:      Effort: Pulmonary effort is normal  No respiratory distress  Breath sounds: Normal breath sounds  Abdominal:      General: Abdomen is flat  Bowel sounds are normal       Palpations: Abdomen is soft  Tenderness: There is no abdominal tenderness  There is no right CVA tenderness or left CVA tenderness  Musculoskeletal:         General: Normal range of motion  Cervical back: Normal range of motion and neck supple  Skin:     General: Skin is warm and dry  Neurological:      General: No focal deficit present  Mental Status: She is alert and oriented to person, place, and time  Mental status is at baseline  Psychiatric:         Mood and Affect: Mood normal          Behavior: Behavior normal          Thought Content:  Thought content normal                     YULISSA Aviles

## 2022-09-17 LAB
BACTERIA UR CULT: ABNORMAL
BACTERIA UR CULT: ABNORMAL

## 2022-10-06 ENCOUNTER — OFFICE VISIT (OUTPATIENT)
Dept: DERMATOLOGY | Facility: CLINIC | Age: 77
End: 2022-10-06
Payer: MEDICARE

## 2022-10-06 VITALS — WEIGHT: 106 LBS | HEIGHT: 63 IN | BODY MASS INDEX: 18.78 KG/M2

## 2022-10-06 DIAGNOSIS — Z85.828 HISTORY OF SKIN CANCER: ICD-10-CM

## 2022-10-06 DIAGNOSIS — Z13.89 SCREENING FOR SKIN CONDITION: ICD-10-CM

## 2022-10-06 DIAGNOSIS — L82.1 SEBORRHEIC KERATOSIS: Primary | ICD-10-CM

## 2022-10-06 PROCEDURE — 99213 OFFICE O/P EST LOW 20 MIN: CPT | Performed by: DERMATOLOGY

## 2022-10-06 NOTE — PROGRESS NOTES
[facility] DERMATOLOGY  [address] [place] [id number]  [phone]  [phone]     MRN: [id number] : 1945  Encounter: [id number]  Patient Information: Saima Tavarez  Chief complaint: 6 Month checkup    History of present illness:  75-year-old female presents for overall skin check previous history of nonmelanoma skin cancer concerned regarding several growths on his skin significantly changed  Past Medical History:   Diagnosis Date    Acne     Basal cell carcinoma 2020    right lower forehead    BCC (basal cell carcinoma of skin) 2020    mid forehead    Benign neoplasm of skin     Disease of thyroid gland 2006    GERD (gastroesophageal reflux disease)     Hypertension     Inflamed seborrheic keratosis     Irritable bowel syndrome     Malignant neoplasm of skin of face     Migraines     Nonmelanoma skin cancer     Last Assessed:17    Squamous cell skin cancer 2020    In situ, left lower forehead    Tachycardia     Telogen effluvium     Temporomandibular joint disorder     Vertigo      Past Surgical History:   Procedure Laterality Date    ADENOIDECTOMY      APPENDECTOMY      CHOLECYSTECTOMY      COLONOSCOPY  2019    COMPLEX WOUND CLOSURE TO EXTREMITY N/A 2020    Procedure: COMPLEX CLOSURE MID FOREHEAD;  Surgeon: Kathy Carr MD;  [facility];  Service: Plastics    ESOPHAGOGASTRODUODENOSCOPY  2009    FLAP LOCAL HEAD / NECK N/A 2020    Procedure: FLAP MID FOREHEAD;  Surgeon: Kathy Carr MD;  [facility];  Service: Plastics    HYSTERECTOMY  1987    MALIGNANT SKIN LESION EXCISION      Excision of Lesion Face Malignant-2004 [address]  [facility] Forehead    MOHS RECONSTRUCTION N/A 2020    Procedure: RECONSTRUCTION MOHS DEFECT MID FOREHEAD;  Surgeon: Kathy Carr MD;  [facility];  Service: Plastics    MOHS SURGERY  2020    Right &left lower forehead, mid forehead    OOPHORECTOMY Bilateral 1987    ROTATOR CUFF REPAIR Right     SKIN BIOPSY      TONSILLECTOMY      TUBAL LIGATION  1976? Social History   Social History     Substance and Sexual Activity   Alcohol Use No     Social History     Substance and Sexual Activity   Drug Use No     Social History     Tobacco Use   Smoking Status Never Smoker   Smokeless Tobacco Never Used     Family History   Problem Relation Age of Onset    Hypertension Mother     Osteoporosis Mother     Skin cancer Mother    Wash Caller Migraines Mother    Wash Caller Dementia Mother     Hypertension Father     Skin cancer Father     Dementia Father     Skin cancer Sister 68    Migraines Sister     No Known Problems Daughter     Uterine cancer Maternal Grandmother 34    Diabetes type II Maternal Grandfather     Cancer Paternal Grandmother     Uterine cancer Paternal Grandmother 72    No Known Problems Maternal Aunt     Cancer Paternal Aunt         Breast    Uterine cancer Paternal Aunt 54    No Known Problems Paternal Aunt     No Known Problems Paternal Aunt      Meds/Allergies   Allergies   Allergen Reactions    Cortisone Hypertension, Other (See Comments), Shortness Of Breath and Tachycardia    Acebutolol     Acetaminophen Other (See Comments)    Amlodipine     Atenolol     Azithromycin     Banana - Food Allergy GI Intolerance    Bisphosphonates      Annotation - [id number]: iriitis    Candesartan     Clarithromycin     Erythromycin     Fosinopril     Irbesartan     Levofloxacin     Losartan     Methylprednisolone Hypertension and Tachycardia    Metoprolol     Nisoldipine     Olmesartan     Propranolol     Sulfamethoxazole-Trimethoprim Nausea Only    Timolol        Meds:  Prior to Admission medications    Medication Sig Start Date End Date Taking?  Authorizing Provider   Alpha-D-Galactosidase Sherry Navas) TABS Take by mouth   Yes Historical Provider, MD   Alum Hydroxide-Mag Trisilicate 71-96 1 MG CHEW Chew Yes Historical Provider, MD   ascorbic acid (VITAMIN C) 500 mg tablet Take by mouth   Yes Historical Provider, MD   Cholecalciferol 400 units TABS Take by mouth   Yes Historical Provider, MD   ciclopirox (PENLAC) 8 % solution Apply topically   Yes Historical Provider, MD   Digestive Enzyme CAPS Take by mouth   Yes Historical Provider, MD   diltiazem (CARDIZEM CD) 240 mg 24 hr capsule Take 1 capsule (240 mg total) by mouth daily May give patient  the stock bottle 4/14/22  Yes Joao Haro MD   famotidine (PEPCID) 10 mg tablet Take 10 mg by mouth 2 (two) times a day   Yes Historical Provider, MD   lactase (LACTAID) 3,000 units tablet Take by mouth   Yes Historical Provider, MD   Minoxidil 5 % FOAM Apply topically   Yes Historical Provider, MD   Multiple Vitamins-Minerals (CENTRUM SILVER ULTRA WOMENS PO) Take by mouth   Yes Historical Provider, MD   polyethylene glycol-propylene glycol (SYSTANE) 0 4-0 3 %    Yes Historical Provider, MD   Premarin vaginal cream Insert 1 g into the vagina once a week Brand Necessary 1/12/22  Yes Alyssa Dandy, PA-C   fluticasone [facility]) 50 mcg/act nasal spray 2 sprays into each nostril daily 8/20/18   Joao Haro MD   Homeopathic Products ([address] NA) into each nostril    Historical Provider, MD   meclizine (ANTIVERT) 25 mg tablet Take 1 tablet (25 mg total) by mouth every 8 (eight) hours as needed for dizziness 2/12/22   Shlomo Barakat PA-C       Subjective:     Review of Systems:    General: negative for - chills, fatigue, fever,  weight gain or weight loss  Psychological: negative for - anxiety, behavioral disorder, concentration difficulties, decreased libido, depression, irritability, memory difficulties, mood swings, sleep disturbances or suicidal ideation  ENT: negative for - hearing difficulties , nasal congestion, nasal discharge, oral lesions, sinus pain, sneezing, sore throat  Allergy and Immunology: negative for - hives, insect bite sensitivity,  Hematological and Lymphatic: negative for - bleeding problems, blood clots,bruising, swollen lymph nodes  Endocrine: negative for - hair pattern changes, hot flashes, malaise/lethargy, mood swings, palpitations, polydipsia/polyuria, skin changes, temperature intolerance or unexpected weight change  Respiratory: negative for - cough, hemoptysis, orthopnea, shortness of breath, or wheezing  Cardiovascular: negative for - chest pain, dyspnea on exertion, edema,  Gastrointestinal: negative for - abdominal pain, nausea/vomiting  Genito-Urinary: negative for - dysuria, incontinence, irregular/heavy menses or urinary frequency/urgency  Musculoskeletal: negative for - gait disturbance, joint pain, joint stiffness, joint swelling, muscle pain, muscular weakness  Dermatological:  As in HPI  Neurological: negative for confusion, dizziness, headaches, impaired coordination/balance, memory loss, numbness/tingling, seizures, speech problems, tremors or weakness       Objective:   Ht 5' 2 5" (1 588 m)   Wt 48 1 kg (106 lb)   LMP  (LMP Unknown)   BMI 19 08 kg/m²     Physical Exam:    General Appearance:    Alert, cooperative, no distress   Head:    Normocephalic, without obvious abnormality, atraumatic           Skin:   A full skin exam was performed including scalp, head scalp, eyes, ears, nose, lips, neck, chest, axilla, abdomen, back, buttocks, bilateral upper extremities, bilateral lower extremities, hands, feet, fingers, toes, fingernails, and toenails normal keratotic papules greasy stuck on appearance previous sites skin cancers well healed recurrence nothing else remarkable noted on complete     Assessment:     1  Seborrheic keratosis     2  Screening for skin condition     3   History of skin cancer           Plan:   Seborrheic keratosis patient reassured these are normal growths we acquire with age no treatment needed  History of skin cancer in no recurrence nothing else atypical sunblock recommended follow-up in 6 months  Screening for dermatologic disorders nothing else of concern noted on complete exam follow-up in 6 months    Steven Collier  10/6/2022,10:35 AM    Portions of the record may have been created with voice recognition software   Occasional wrong word or "sound a like" substitutions may have occurred due to the inherent limitations of voice recognition software   Read the chart carefully and recognize, using context, where substitutions have occurred

## 2022-11-03 ENCOUNTER — OFFICE VISIT (OUTPATIENT)
Dept: FAMILY MEDICINE CLINIC | Facility: MEDICAL CENTER | Age: 77
End: 2022-11-03

## 2022-11-03 VITALS
BODY MASS INDEX: 19.42 KG/M2 | OXYGEN SATURATION: 98 % | WEIGHT: 109.6 LBS | SYSTOLIC BLOOD PRESSURE: 138 MMHG | HEART RATE: 70 BPM | TEMPERATURE: 98.7 F | HEIGHT: 63 IN | DIASTOLIC BLOOD PRESSURE: 70 MMHG

## 2022-11-03 DIAGNOSIS — K58.9 IRRITABLE BOWEL SYNDROME, UNSPECIFIED TYPE: ICD-10-CM

## 2022-11-03 DIAGNOSIS — E44.1 MILD PROTEIN-CALORIE MALNUTRITION (HCC): ICD-10-CM

## 2022-11-03 DIAGNOSIS — K21.9 REFLUX LARYNGITIS: ICD-10-CM

## 2022-11-03 DIAGNOSIS — N18.30 STAGE 3 CHRONIC KIDNEY DISEASE, UNSPECIFIED WHETHER STAGE 3A OR 3B CKD (HCC): ICD-10-CM

## 2022-11-03 DIAGNOSIS — E73.9 LACTOSE INTOLERANCE: ICD-10-CM

## 2022-11-03 DIAGNOSIS — E03.8 SUBCLINICAL HYPOTHYROIDISM: ICD-10-CM

## 2022-11-03 DIAGNOSIS — J04.0 REFLUX LARYNGITIS: ICD-10-CM

## 2022-11-03 DIAGNOSIS — Z76.89 ENCOUNTER TO ESTABLISH CARE WITH NEW DOCTOR: Primary | ICD-10-CM

## 2022-11-03 DIAGNOSIS — G45.3 AMAUROSIS FUGAX: ICD-10-CM

## 2022-11-03 DIAGNOSIS — N95.2 VAGINAL ATROPHY: ICD-10-CM

## 2022-11-03 DIAGNOSIS — Z85.828 HISTORY OF SKIN CANCER: ICD-10-CM

## 2022-11-03 DIAGNOSIS — I10 PRIMARY HYPERTENSION: ICD-10-CM

## 2022-11-03 DIAGNOSIS — Z13.6 ENCOUNTER FOR SCREENING FOR STENOSIS OF CAROTID ARTERY: ICD-10-CM

## 2022-11-03 DIAGNOSIS — K21.9 GASTROESOPHAGEAL REFLUX DISEASE, UNSPECIFIED WHETHER ESOPHAGITIS PRESENT: ICD-10-CM

## 2022-11-03 DIAGNOSIS — L98.9 FINGER LESION: ICD-10-CM

## 2022-11-03 PROBLEM — D69.2 PIGMENTED PURPURA (HCC): Status: ACTIVE | Noted: 2022-11-03

## 2022-11-03 NOTE — PROGRESS NOTES
[facility] - Clinic Note  Yane Marmolejo, 20     Jamel Adamson MRN: [id number] : 1945 Age: 76 y o  Assessment/Plan     1  Encounter to establish care with new doctor    - patient presents to establish care with new provider  - she will return in 6 months and she will also return for Medicare annual wellness visit  - sooner as needed  - patient advised she may return to office for PCV 20 vaccine  - advised about Shingrix vaccination series    2  Primary hypertension    - stable on diltiazem 250 mg p o  daily  - Comprehensive metabolic panel; Future    3  Stage 3 chronic kidney disease, unspecified whether stage 3a or 3b CKD ([facility] [address] )    - follow-up kidney function on CMP    4  Gastroesophageal reflux disease, unspecified whether esophagitis present    - established with Gastroenterology  - currently on Pepcid    5  Reflux laryngitis    - established with ENT  - currently on Pepcid    6  Vaginal atrophy    - establish with gynecology  - Premarin vaginal cream    7  History of skin cancer    - follows with Dermatology    8  Lactose intolerance    - Lactaid p r n     9  Irritable bowel syndrome, unspecified type    - established with Gastroenterology    10  Subclinical hypothyroidism    - TSH, 3rd generation with Free T4 reflex; Future    11  Encounter for screening for stenosis of carotid artery    - VAS carotid complete study; Future    12  Mild protein-calorie malnutrition ([facility] [address] )    - nutritional intake reviewed today with patient, appropriate  - Body mass index is 19 73 kg/m²  13  Finger lesion    - follow up with Dermatology     Jamel Adamson acknowledged understanding of treatment plan, all questions answered  Subjective      Jamel Adamson is a 76 y o  female who presents for 6-month follow-up and to formally establish with new provider    Patient has past medical history including but not limited to hypertension, chronic kidney disease, GERD, history of skin cancer, irritable bowel syndrome, lactose intolerance, subclinical hypothyroidism  Patient states her optometrist recently advised about pursuing carotid ultrasound due to her transient monocular visual complaint  Patient also mentions that lately she wonders if she is dehydrated  She states she has about 2 glasses of water a day  Patient states that she has had longstanding "struggle with weight loss"  Patient states she used to be 138 lb  She states she does follow with Gastroenterology  Last colonoscopy reviewed  Patient also mentions finger lesion that has been present for about 8 years      Usual nutritional intake for a day:  3 meals/day  Breakfast:  Peanut butter and jam sandwich  Lunch: pancakes with peanut butter  Afternoon snack:  cheese crackers  Dinner: meat, potatoes, carrots for example    The following portions of the patient's history were reviewed and updated as appropriate: allergies, current medications, past family history, past medical history, past social history, past surgical history and problem list      Past Medical History:   Diagnosis Date   • Acne    • Basal cell carcinoma 06/08/2020    right lower forehead   • BCC (basal cell carcinoma of skin) 03/09/2020    mid forehead   • Benign neoplasm of skin    • Disease of thyroid gland 2006   • GERD (gastroesophageal reflux disease)    • Hypertension    • Inflamed seborrheic keratosis    • Irritable bowel syndrome    • Malignant neoplasm of skin of face    • Migraines    • Nonmelanoma skin cancer     Last Assessed:6/27/17   • Squamous cell skin cancer 06/08/2020    In situ, left lower forehead   • Tachycardia    • Telogen effluvium    • Temporomandibular joint disorder    • Vertigo        Allergies   Allergen Reactions   • Cortisone Hypertension, Other (See Comments), Shortness Of Breath and Tachycardia   • Acebutolol    • Acetaminophen Other (See Comments)   • Amlodipine    • Atenolol    • Azithromycin    • Banana - Food Allergy GI Intolerance   • [facility] - [id number]: iriitis   • Candesartan    • Clarithromycin    • Erythromycin    • Fosinopril    • Irbesartan    • Levofloxacin    • Losartan    • Methylprednisolone Hypertension and Tachycardia   • Metoprolol    • Nisoldipine    • Olmesartan    • Propranolol    • Sulfamethoxazole-Trimethoprim Nausea Only   • Timolol        Past Surgical History:   Procedure Laterality Date   • ADENOIDECTOMY     • APPENDECTOMY     • CHOLECYSTECTOMY     • COLONOSCOPY  05/03/2019   • COMPLEX WOUND CLOSURE TO EXTREMITY N/A 7/28/2020    Procedure: COMPLEX CLOSURE MID FOREHEAD;  Surgeon: Maryagnes Gaucher, MD;  [facility];  Service: Plastics   • ESOPHAGOGASTRODUODENOSCOPY  2009   • FLAP LOCAL HEAD / NECK N/A 7/28/2020    Procedure: FLAP MID FOREHEAD;  Surgeon: Maryagnes Gaucher, MD;  [facility];  Service: Plastics   • HYSTERECTOMY  1987   • MALIGNANT SKIN LESION EXCISION      Excision of Lesion Face Malignant-9/14/2004 [address] Forehead   • MOHS RECONSTRUCTION N/A 7/28/2020    Procedure: RECONSTRUCTION MOHS DEFECT MID FOREHEAD;  Surgeon: Maryagnes Gaucher, MD;  [facility];  Service: Plastics   • MOHS SURGERY  07/27/2020    Right &left lower forehead, mid forehead   • OOPHORECTOMY Bilateral 1987   • ROTATOR CUFF REPAIR Right    • SKIN BIOPSY     • TONSILLECTOMY     • [address]?        Family History   Problem Relation Age of Onset   • Hypertension Mother    • Osteoporosis Mother    • Skin cancer Mother    • Migraines Mother    • Dementia Mother    • Hypertension Father    • Skin cancer Father    • Dementia Father    • Skin cancer Sister 68   • Migraines Sister    • No Known Problems Daughter    • Uterine cancer Maternal Grandmother 34   • Diabetes type II Maternal Grandfather    • Cancer Paternal Grandmother    • Uterine cancer Paternal Grandmother 72   • No Known Problems Maternal Aunt    • Cancer Paternal Aunt         Breast   • Uterine cancer Paternal Aunt 53   • No Known Problems Paternal Aunt    • No Known Problems Paternal Aunt        Social History     Socioeconomic History   • Marital status: /Civil Union     Spouse name: Not on file   • Number of children: Not on file   • Years of education: Not on file   • Highest education level: Not on file   Occupational History   • Not on file   Tobacco Use   • Smoking status: Never Smoker   • Smokeless tobacco: Never Used   Vaping Use   • Vaping Use: Never used   Substance and Sexual Activity   • Alcohol use: No   • Drug use: No   • Sexual activity: Yes     Partners: Male     Birth control/protection: Surgical   Other Topics Concern   • Not on file   Social History Narrative   • Not on file     Social Determinants of Health     Financial Resource Strain: Not on file   Food Insecurity: Not on file   Transportation Needs: Not on file   Physical Activity: Not on file   Stress: Not on file   Social Connections: Not on file   Intimate Partner Violence: Not on file   Housing Stability: Not on file       Current Outpatient Medications   Medication Sig Dispense Refill   • Alpha-D-Galactosidase (BEANO) TABS Take by mouth     • Alum Hydroxide-Mag Trisilicate 27-13 8 MG CHEW Chew     • ascorbic acid (VITAMIN C) 500 mg tablet Take by mouth     • Cholecalciferol 400 units TABS Take by mouth     • ciclopirox (PENLAC) 8 % solution Apply topically     • Digestive Enzyme CAPS Take by mouth     • diltiazem (CARDIZEM CD) 240 mg 24 hr capsule Take 1 capsule (240 mg total) by mouth daily May give patient  the stock bottle 90 capsule 1   • famotidine (PEPCID) 10 mg tablet Take 10 mg by mouth 2 (two) times a day     • lactase (LACTAID) 3,000 units tablet Take by mouth     • Minoxidil 5 % FOAM Apply topically     • Multiple Vitamins-Minerals (CENTRUM SILVER ULTRA WOMENS PO) Take by mouth     • polyethylene glycol-propylene glycol (SYSTANE) 0 4-0 3 %      • Premarin vaginal cream Insert 1 g into the vagina once a week Brand Necessary 30 g 1     No current facility-administered medications for this visit  Review of Systems     As noted in HPI    Objective      LMP  (LMP Unknown)     Physical Exam  Vitals reviewed  Constitutional:       General: She is not in acute distress  Appearance: Normal appearance  HENT:      Head: Normocephalic and atraumatic  Right Ear: External ear normal       Left Ear: External ear normal       Nose: Nose normal       Mouth/Throat:      Mouth: Mucous membranes are moist       Pharynx: Oropharynx is clear  Eyes:      Extraocular Movements: Extraocular movements intact  Conjunctiva/sclera: Conjunctivae normal       Pupils: Pupils are equal, round, and reactive to light  Cardiovascular:      Rate and Rhythm: Normal rate and regular rhythm  Pulses: Normal pulses  Heart sounds: Normal heart sounds  Pulmonary:      Effort: Pulmonary effort is normal       Breath sounds: Normal breath sounds  Abdominal:      General: Abdomen is flat  Bowel sounds are normal       Palpations: Abdomen is soft  Tenderness: There is no abdominal tenderness  Musculoskeletal:         General: Normal range of motion  Cervical back: Neck supple  Right lower leg: No edema  Left lower leg: No edema  Comments: Bilateral compression stockings in place   Skin:     General: Skin is warm and dry  Findings: Lesion (purplish lesion 2nd digit as pictured) present  Neurological:      Mental Status: She is alert and oriented to person, place, and time  Psychiatric:         Mood and Affect: Mood normal          Behavior: Behavior normal          Thought Content: Thought content normal          Judgment: Judgment normal                  Some portions of this record may have been generated with voice recognition software  There may be translation, syntax, or grammatical errors   Occasional wrong word or "sound-a-like" substitutions may have occurred due to the inherent limitations of the voice recognition software  Read the chart carefully and recognize, using context, where substations may have occurred  If you have any questions, please contact the dictating provider for clarification or correction, as needed

## 2022-11-04 ENCOUNTER — APPOINTMENT (OUTPATIENT)
Dept: LAB | Facility: MEDICAL CENTER | Age: 77
End: 2022-11-04

## 2022-11-04 ENCOUNTER — TELEPHONE (OUTPATIENT)
Dept: FAMILY MEDICINE CLINIC | Facility: MEDICAL CENTER | Age: 77
End: 2022-11-04

## 2022-11-04 DIAGNOSIS — E03.8 SUBCLINICAL HYPOTHYROIDISM: ICD-10-CM

## 2022-11-04 DIAGNOSIS — I10 PRIMARY HYPERTENSION: ICD-10-CM

## 2022-11-04 LAB
ALBUMIN SERPL BCP-MCNC: 3.9 G/DL (ref 3.5–5)
ALP SERPL-CCNC: 72 U/L (ref 46–116)
ALT SERPL W P-5'-P-CCNC: 30 U/L (ref 12–78)
ANION GAP SERPL CALCULATED.3IONS-SCNC: 3 MMOL/L (ref 4–13)
AST SERPL W P-5'-P-CCNC: 25 U/L (ref 5–45)
BILIRUB SERPL-MCNC: 0.54 MG/DL (ref 0.2–1)
BUN SERPL-MCNC: 17 MG/DL (ref 5–25)
CALCIUM SERPL-MCNC: 9.4 MG/DL (ref 8.3–10.1)
CHLORIDE SERPL-SCNC: 105 MMOL/L (ref 96–108)
CO2 SERPL-SCNC: 31 MMOL/L (ref 21–32)
CREAT SERPL-MCNC: 1.1 MG/DL (ref 0.6–1.3)
GFR SERPL CREATININE-BSD FRML MDRD: 47 ML/MIN/1.73SQ M
GLUCOSE P FAST SERPL-MCNC: 100 MG/DL (ref 65–99)
POTASSIUM SERPL-SCNC: 3.8 MMOL/L (ref 3.5–5.3)
PROT SERPL-MCNC: 8 G/DL (ref 6.4–8.4)
SODIUM SERPL-SCNC: 139 MMOL/L (ref 135–147)
T4 FREE SERPL-MCNC: 0.97 NG/DL (ref 0.76–1.46)
TSH SERPL DL<=0.05 MIU/L-ACNC: 4.53 UIU/ML (ref 0.45–4.5)

## 2022-11-04 NOTE — TELEPHONE ENCOUNTER
Called patient in response, all questions answered  Patient does have a Medicare annual wellness visit scheduled but, may we reach out to patient also to schedule her for follow-up in 3-6 months? Thanks

## 2022-11-11 ENCOUNTER — HOSPITAL ENCOUNTER (OUTPATIENT)
Dept: RADIOLOGY | Facility: MEDICAL CENTER | Age: 77
Discharge: HOME | End: 2022-11-11

## 2022-11-11 VITALS — BODY MASS INDEX: 19.42 KG/M2 | WEIGHT: 109.6 LBS | HEIGHT: 63 IN

## 2022-11-11 DIAGNOSIS — Z12.31 ENCOUNTER FOR SCREENING MAMMOGRAM FOR MALIGNANT NEOPLASM OF BREAST: ICD-10-CM

## 2022-11-11 DIAGNOSIS — Z12.31 ENCOUNTER FOR SCREENING MAMMOGRAM FOR BREAST CANCER: ICD-10-CM

## 2022-11-17 ENCOUNTER — HOSPITAL ENCOUNTER (OUTPATIENT)
Dept: RADIOLOGY | Facility: MEDICAL CENTER | Age: 77
Discharge: HOME | End: 2022-11-17

## 2022-11-17 DIAGNOSIS — G45.3 AMAUROSIS FUGAX: ICD-10-CM

## 2022-11-17 DIAGNOSIS — Z13.6 ENCOUNTER FOR SCREENING FOR STENOSIS OF CAROTID ARTERY: ICD-10-CM

## 2023-01-06 DIAGNOSIS — M79.89 LEG SWELLING: Primary | ICD-10-CM

## 2023-01-24 ENCOUNTER — CLINICAL SUPPORT (OUTPATIENT)
Dept: NUTRITION | Facility: HOSPITAL | Age: 78
End: 2023-01-24

## 2023-01-24 VITALS — HEIGHT: 63 IN | BODY MASS INDEX: 19.18 KG/M2 | WEIGHT: 108.25 LBS

## 2023-01-24 DIAGNOSIS — N18.30 STAGE 3 CHRONIC KIDNEY DISEASE, UNSPECIFIED WHETHER STAGE 3A OR 3B CKD (HCC): Primary | ICD-10-CM

## 2023-01-24 NOTE — PATIENT INSTRUCTIONS
Nutrition Goals:  1  Gem Cohen will aim to consume 3 meals and 2 snacks per day (eating something every 3 hours)  2  Gem Cohen will aim to consume water between meals instead of during to help increase nutrition intake at meal/snack times  3  Gem Cohen will aim to read food labels to monitor kidney nutrients (sodium, potassium, phosphorus)  - Try [employer] Core Power nutrition drinks (Lactose Free) or RackupTORO, Ensure Max Protein  - Try adding protein powder to smoothies, pancakes, etc     Michelle Martinez RD, LDN  Office #: [phone]  Email: [email or web address]

## 2023-01-24 NOTE — PROGRESS NOTES
Initial Nutrition Assessment Form    Patient Name: Bro Pollack    YOB: 1945    Sex: Female     Assessment Date: 1/24/2023  Start Time: 10:42 Stop Time: 11:28 Total Minutes: 55     Data:  Present at session: self   Parent Concerns: "Can't gain weight, on [employer] diet, CKD3"   Medical Dx/Reason for Referral: N18 30 - CKD3    Past Medical History:   Diagnosis Date   • Acne    • Basal cell carcinoma 06/08/2020    right lower forehead   • BCC (basal cell carcinoma of skin) 03/09/2020    mid forehead   • Benign neoplasm of skin    • Disease of thyroid gland 2006   • GERD (gastroesophageal reflux disease)    • Hypertension    • Inflamed seborrheic keratosis    • Irritable bowel syndrome    • Malignant neoplasm of skin of face    • Migraines    • Nonmelanoma skin cancer     Last Assessed:6/27/17   • Squamous cell skin cancer 06/08/2020    In situ, left lower forehead   • Tachycardia    • Telogen effluvium    • Temporomandibular joint disorder    • Vertigo        Current Outpatient Medications   Medication Sig Dispense Refill   • Alpha-D-Galactosidase (BEANO) TABS Take by mouth     • Alum Hydroxide-Mag Trisilicate 04-94 2 MG CHEW Chew (Patient not taking: Reported on 1/12/2023)     • ascorbic acid (VITAMIN C) 500 mg tablet Take by mouth     • Cholecalciferol 400 units TABS Take by mouth     • ciclopirox (PENLAC) 8 % solution Apply topically     • Digestive Enzyme CAPS Take by mouth     • diltiazem (CARDIZEM CD) 240 mg 24 hr capsule Take 1 capsule (240 mg total) by mouth daily May give patient  the stock bottle 90 capsule 0   • famotidine (PEPCID) 10 mg tablet Take 10 mg by mouth 2 (two) times a day     • lactase (LACTAID) 3,000 units tablet Take by mouth     • Minoxidil 5 % FOAM Apply topically     • Multiple Vitamins-Minerals (CENTRUM SILVER ULTRA WOMENS PO) Take by mouth     • polyethylene glycol-propylene glycol (SYSTANE) 0 4-0 3 %      • Premarin vaginal cream Insert 1 g into the vagina once a week Brand Necessary 30 g 1     No current facility-administered medications for this visit  Additional Meds/Supplements: Centrum Silver, Vitamin C, Vitamin D3   Special Learning Needs: Memory loss noted per patient   Height: HC Readings from Last 3 Encounters:   No data found for [facility]       Weight: Wt Readings from Last 3 Encounters:   01/24/23 49 1 kg (108 lb 3 9 oz)   01/12/23 49 4 kg (109 lb)   11/11/22 49 7 kg (109 lb 9 6 oz)     Body mass index is 19 48 kg/m²  Recent Weight Change: []Yes     [x]No  Amount: No significant weight changes noted x 2 months  Energy Needs: No calculations performed for this visit   Allergies   Allergen Reactions   • Cortisone Hypertension, Other (See Comments), Shortness Of Breath and Tachycardia   • Acebutolol    • Acetaminophen Other (See Comments)   • Amlodipine    • Atenolol    • Azithromycin    • Banana - Food Allergy GI Intolerance   • Bisphosphonates      Annotation - [id number]: iriitis   • Candesartan    • Clarithromycin    • Erythromycin    • Fosinopril    • Irbesartan    • Levofloxacin    • Losartan    • Methylprednisolone Hypertension and Tachycardia   • Metoprolol    • Nisoldipine    • Olmesartan    • Propranolol    • Sulfamethoxazole-Trimethoprim Nausea Only   • Timolol     Lactose Intolerance   Social History     Substance and Sexual Activity   Alcohol Use No       Social History     Tobacco Use   Smoking Status Never   Smokeless Tobacco Never       Who shops? patient   Who cooks? patient   Cooking Methods? Baking, Boiling, sauteing in butter or oil  Exercise: Always going; Always moving around throughout the day  Prior Counseling? []Yes     [x]No  When: N/A     Why: N/A        Diet Hx:  Breakfast:    PB with blueberry preserves (sugar free) with 2 pieces of hearty white bread  WITH  Water   6:30 a m     Lunch:    Godwin's chicken rice soup with 2 pieces of buttered Pako bread  OR  3 Eggo pancakes with PB  OR  [place] (turkey, butter, bread)  OR  Tuna or egg salad with markham   12 p m  Dinner:    Pastie (potatoes and meat)  OR  Ham with scalloped potatoes with carrots  OR  Spaghetti with meatballs  OR  [place] strip steak or Chicken with potato with carrots   5 p m  Snacks: 2:30-3pm: CATHI Dong on ritz crackers, Kind bar, red grapes   Fluids: Water   Takeout/Dineout: Sometimes ([employer], [employer])  Nutrition Diagnosis:   Food and nutrition related knowledge deficit related to unintended weight loss, CKD, IBS as evidenced by per patient interview  Medical Nutrition Therapy Intervention:  []Individualized Meal Plan [x]Understanding Lab Values    Reviewed renal labs     []Basic Pathophysiology of Disease []Food/Medication Interactions   []Food Diary []Exercise   [x]Lifestyle/Behavior Modification Techniques     Plan to eat 3 meals and 2 snacks daily  Carry snacks with you so you are prepared to eat every 2 to 3 hours  Eat a small meal or snack even if you don't feel hungry  Set a timer to remind you when it is time to eat  Appetite may be greatest in the morning after not eating all night so you may prefer to eat your larger meals and snacks in the morning and at lunch  Breakfast-type foods are often better tolerated so eat foods such as eggs, pancakes, waffles and cereal for any meal or snack  Include your favorite foods at meal and snack times  Save your beverage for the end of the meal so that you have more room for food before you get full  Try adding calorie-dense foods so that each bite provides more nutrition  Choose at least one protein food at each meal and snack to increase your daily intake  Try adding fats to your meals and snacks  Fat provides more calories in fewer bites than carbohydrate or protein and adds flavors to your foods  Reviewed key nutrients of concern with CKD (Potassium, Phosphorus, Protein, and Sodium)   Reviewed foods high in potassium and phosphorus and discussed how these nutrients may need to be restricted if indicated through lab tests and working with nephrologist      Monitor sodium intake: Discussed following 2000 mg sodium per day  Trying to consume more meals at home  Reducing processed food consumption  Choose low sodium/no salt added food products  Rinse canned foods to remove excess sodium  Use salt free seasonings to add flavors to foods  Read food labels  Reduce the amount of gas-producing foods in your diet which can include legumes (beans), cabbage, brussels sprouts, cauliflower, broccoli, onions and garlic  Avoid additional trigger foods that you know cause bloating or constipation  Some common triggers include caffeine (in coffee, tea, and soda), chocolate, and alcohol  Reduce/Avoid gluten containing foods in diet as gluten may be a trigger for IBS symptoms  []Medication, Mechanism of Action   [x]Label Reading    General label reading for K, Phosphorus, and Na   []Self Blood Glucose Monitoring   []Weight/BMI Goals []Other -    Other Notes: Patient reported following low fod map diet to help with IBS  Patient reported staying away from trigger foods such as lactose, gas producing vegetables, etc but was she also noted that she was struggling to keep weight on and was unsure how to follow the CKD diet while also following the low fod map diet  RD discussed ways to increase kcal/pro intake, discussed key renal nutrients and when she would need to monitor those nutrients, and reviewed low fod map/IBS information  Handouts provided on K, Phosphorus, Na, and high kcal/pro intake  Sleeping: Sleeping well  Will wake up to use the restroom      Dislikes:  Seafood    Unable to tolerate:  Beans   Corn  Gas producing vegetables  Onion/Garlic  Peaches  Citrus fruits  Apples  Chocolate         Comprehension: []Excellent  []Very Good  [x]Good  []Fair   []Poor    Receptivity: []Excellent  []Very Good  [x]Good  []Fair   []Poor    Expected Compliance: []Excellent  []Very Good  [x]Good  []Fair   []Poor Goals: 1  Rachel Landry will aim to consume 3 meals and 2 snacks per day (eating something every 3 hours)  2  Rachel Landry will aim to consume water between meals instead of during to help increase nutrition intake at meal/snack times  3  Rachel Landry will aim to read food labels to monitor kidney nutrients (sodium, potassium, phosphorus)  No follow-ups on file    Labs:  CMP  Lab Results   Component Value Date    K 3 8 11/04/2022     11/04/2022    CO2 31 11/04/2022    BUN 17 11/04/2022    CREATININE 1 10 11/04/2022    GLUF 100 (H) 11/04/2022    CALCIUM 9 4 11/04/2022    AST 25 11/04/2022    ALT 30 11/04/2022    ALKPHOS 72 11/04/2022    EGFR 47 11/04/2022       BMP  Lab Results   Component Value Date    CALCIUM 9 4 11/04/2022    K 3 8 11/04/2022    CO2 31 11/04/2022     11/04/2022    BUN 17 11/04/2022    CREATININE 1 10 11/04/2022       Lipids  No results found for: CHOL  Lab Results   Component Value Date    HDL 81 03/29/2022     01/17/2021     09/11/2020     Lab Results   Component Value Date    LDLCALC 76 03/29/2022    LDLCALC 62 01/17/2021    LDLCALC 63 09/11/2020     Lab Results   Component Value Date    TRIG 39 03/29/2022    TRIG 52 01/17/2021    TRIG 51 09/11/2020     No results found for: CHOLHDL    Hemoglobin A1C  Lab Results   Component Value Date    HGBA1C 5 3 01/17/2021       Fasting Glucose  Lab Results   Component Value Date    GLUF 100 (H) 11/04/2022       Insulin     Thyroid  No results found for: TSH, F4WPBCZ, L5CFXXL, THYROIDAB    Hepatic Function Panel  Lab Results   Component Value Date    ALT 30 11/04/2022    AST 25 11/04/2022    ALKPHOS 72 11/04/2022       Celiac Disease Antibody Panel  No results found for: ENDOMYSIAL IGA, GLIADIN IGA, GLIADIN IGG, IGA, TISSUE TRANSGLUT AB, TTG IGA   Iron  No results found for: IRON, TIBC, FERRITIN    Vitamins  No results found for: VITAMIN B2   No results found for: NICOTINAMIDE, NICOTINIC ACID   No results found for: VITAMINB6  No results found for: Lenita Meckel  No results found for: VITB5  No results found for: T1DLKAOX  No results found for: THYROGLB  No results found for: VITAMIN K   No results found for: 25-HYDROXY VIT D   No components found for: [address], [address] [id number]

## 2023-01-25 ENCOUNTER — PATIENT MESSAGE (OUTPATIENT)
Dept: FAMILY MEDICINE CLINIC | Facility: MEDICAL CENTER | Age: 78
End: 2023-01-25

## 2023-01-25 DIAGNOSIS — N18.30 STAGE 3 CHRONIC KIDNEY DISEASE, UNSPECIFIED WHETHER STAGE 3A OR 3B CKD (HCC): Primary | ICD-10-CM

## 2023-01-25 NOTE — TELEPHONE ENCOUNTER
The nutritionist sent a staff message to [facility] for referral   That order was adjusted  Perhaps this is what she is referring to  She may continue to see her nutritionist that she is established with, she may disregard

## 2023-01-31 ENCOUNTER — RA CDI HCC (OUTPATIENT)
Dept: OTHER | Facility: HOSPITAL | Age: 78
End: 2023-01-31

## 2023-01-31 NOTE — PROGRESS NOTES
[address]  coding opportunities       Chart reviewed, no opportunity found: CHART REVIEWED, NO OPPORTUNITY FOUND        Patients Insurance     Medicare Insurance: Medicare

## 2023-02-01 DIAGNOSIS — I10 ESSENTIAL HYPERTENSION: ICD-10-CM

## 2023-02-01 RX ORDER — DILTIAZEM HYDROCHLORIDE 240 MG/1
240 CAPSULE, COATED, EXTENDED RELEASE ORAL DAILY
Qty: 90 CAPSULE | Refills: 1 | Status: SHIPPED | OUTPATIENT
Start: 2023-02-01

## 2023-02-07 ENCOUNTER — OFFICE VISIT (OUTPATIENT)
Dept: FAMILY MEDICINE CLINIC | Facility: MEDICAL CENTER | Age: 78
End: 2023-02-07

## 2023-02-07 VITALS
WEIGHT: 102 LBS | SYSTOLIC BLOOD PRESSURE: 142 MMHG | DIASTOLIC BLOOD PRESSURE: 74 MMHG | BODY MASS INDEX: 18.07 KG/M2 | OXYGEN SATURATION: 99 % | HEIGHT: 63 IN | RESPIRATION RATE: 16 BRPM | HEART RATE: 74 BPM

## 2023-02-07 DIAGNOSIS — N18.30 STAGE 3 CHRONIC KIDNEY DISEASE, UNSPECIFIED WHETHER STAGE 3A OR 3B CKD (HCC): ICD-10-CM

## 2023-02-07 DIAGNOSIS — E03.8 SUBCLINICAL HYPOTHYROIDISM: ICD-10-CM

## 2023-02-07 DIAGNOSIS — Z00.00 HEALTHCARE MAINTENANCE: Primary | ICD-10-CM

## 2023-02-07 DIAGNOSIS — R19.00 ABDOMINAL WALL BULGE: ICD-10-CM

## 2023-02-07 NOTE — PROGRESS NOTES
[facility]  Anam Flynn, 23     Bro Pollack MRN: [id number] : 1945 Age: 76 y o  Assessment/Plan     1  Healthcare maintenance    -Counseled about PCV 20 vaccine, patient agreeable to complete in future  -Advised about tetanus booster to complete at [address] about Shingrix vaccination series to complete at pharmacy  -She will return for Medicare annual wellness visit    2  Stage 3 chronic kidney disease, unspecified whether stage 3a or 3b CKD ([facility] [address] )    - I have reviewed pertinent labs:  CMP:   Lab Results   Component Value Date    SODIUM 139 2022    K 3 8 2022     2022    CO2 31 2022    AGAP 3 (L) 2022    BUN 17 2022    CREATININE 1 10 2022    GLUC 86 2021    GLUF 100 (H) 2022    CALCIUM 9 4 2022    AST 25 2022    ALT 30 2022    ALKPHOS 72 2022    TP 8 0 2022    ALB 3 9 2022    TBILI 0 54 2022    EGFR 47 2022   - Comprehensive metabolic panel; Future  - Microalbumin / creatinine urine ratio    3  Subclinical hypothyroidism    -We will continue to monitor thyroid function testing  - TSH, 3rd generation with Free T4 reflex; Future    4  Trunk bulge    -Patient has noticed left sided bulge of trunk, nontender  -lateral left-sided lower rib cage region  -Discussed with patient this potentially could be an intercostal muscle  -We will follow-up ultrasound    Bro Pollack acknowledged understanding of treatment plan, all questions answered  Subjective      Bro Pollack is a 76 y o  female who presents today with some updates about her health  Patient states for the past few months she has noticed "when I dry my hair bulge on left side"  The area is nontender    Patient offers she did have rib injury in the past   Patient also mentions that she has been contemplating about proceeding with consultation with Endocrinology for management of osteoporosis as she has reviewed side effects of therapies  Patient also offers that she continues with nutritionist which has certainly been beneficial   Finally, patient states she has ongoing follow-up with ophthalmologist for complaint of seeing "zig zag lines"       The following portions of the patient's history were reviewed and updated as appropriate: allergies, current medications, past family history, past medical history, past social history, past surgical history and problem list      Past Medical History:   Diagnosis Date   • Acne    • Basal cell carcinoma 06/08/2020    right lower forehead   • BCC (basal cell carcinoma of skin) 03/09/2020    mid forehead   • Benign neoplasm of skin    • Disease of thyroid gland 2006   • GERD (gastroesophageal reflux disease)    • Hypertension    • Inflamed seborrheic keratosis    • Irritable bowel syndrome    • Malignant neoplasm of skin of face    • Migraines    • Nonmelanoma skin cancer     Last Assessed:6/27/17   • Squamous cell skin cancer 06/08/2020    In situ, left lower forehead   • Tachycardia    • Telogen effluvium    • Temporomandibular joint disorder    • Vertigo        Allergies   Allergen Reactions   • Cortisone Hypertension, Other (See Comments), Shortness Of Breath and Tachycardia   • Acebutolol    • Acetaminophen Other (See Comments)   • Amlodipine    • Atenolol    • Azithromycin    • Banana - Food Allergy GI Intolerance   • Bisphosphonates      Annotation - [id number]: iriitis   • Candesartan    • Clarithromycin    • Erythromycin    • Fosinopril    • Irbesartan    • Levofloxacin    • Losartan    • Methylprednisolone Hypertension and Tachycardia   • Metoprolol    • Nisoldipine    • Olmesartan    • Propranolol    • Sulfamethoxazole-Trimethoprim Nausea Only   • Timolol        Past Surgical History:   Procedure Laterality Date   • ADENOIDECTOMY     • APPENDECTOMY     • CHOLECYSTECTOMY     • COLONOSCOPY  05/03/2019   • COMPLEX WOUND CLOSURE TO EXTREMITY N/A 7/28/2020 Procedure: COMPLEX CLOSURE MID FOREHEAD;  Surgeon: Sharon Johnson MD;  Location: [facility];  Service: Plastics   • ESOPHAGOGASTRODUODENOSCOPY  2009   • FLAP LOCAL HEAD / NECK N/A 7/28/2020    Procedure: FLAP MID FOREHEAD;  Surgeon: Sharon Johnson MD;  Location: [facility];  Service: Plastics   • HYSTERECTOMY  1987   • MALIGNANT SKIN LESION EXCISION      Excision of Lesion Face Malignant-9/14/2004 [address] Forehead   • MOHS RECONSTRUCTION N/A 7/28/2020    Procedure: RECONSTRUCTION MOHS DEFECT MID FOREHEAD;  Surgeon: Sharon Johnson MD;  Location: [facility];  Service: Plastics   • MOHS SURGERY  07/27/2020    Right &left lower forehead, mid forehead   • OOPHORECTOMY Bilateral 1987   • ROTATOR CUFF REPAIR Right    • SKIN BIOPSY     • TONSILLECTOMY     • [address],3M?        Family History   Problem Relation Age of Onset   • Hypertension Mother    • Osteoporosis Mother    • Skin cancer Mother    • Migraines Mother    • Dementia Mother    • Hypertension Father    • Skin cancer Father    • Dementia Father    • Skin cancer Sister 68   • Migraines Sister    • No Known Problems Daughter    • Uterine cancer Maternal Grandmother 34   • Diabetes type II Maternal Grandfather    • Cancer Paternal Grandmother    • Uterine cancer Paternal Grandmother 72   • No Known Problems Maternal Aunt    • Cancer Paternal Aunt         Breast   • Uterine cancer Paternal Aunt 54   • No Known Problems Paternal Aunt    • No Known Problems Paternal Aunt        Social History     Socioeconomic History   • Marital status: /Civil Union     Spouse name: None   • Number of children: None   • Years of education: None   • Highest education level: None   Occupational History   • None   Tobacco Use   • Smoking status: Never   • Smokeless tobacco: Never   Vaping Use   • Vaping Use: Never used   Substance and Sexual Activity   • Alcohol use: No   • Drug use: No   • Sexual activity: Yes     Partners: Male     Birth control/protection: Surgical   Other Topics Concern   • None   Social History Narrative   • None     Social Determinants of Health     Financial Resource Strain: Not on file   Food Insecurity: Not on file   Transportation Needs: Not on file   Physical Activity: Not on file   Stress: Not on file   Social Connections: Not on file   Intimate Partner Violence: Not on file   Housing Stability: Not on file       Current Outpatient Medications   Medication Sig Dispense Refill   • Alpha-D-Galactosidase (BEANO) TABS Take by mouth     • Alum Hydroxide-Mag Trisilicate 93-64 2 MG CHEW Chew     • ascorbic acid (VITAMIN C) 500 mg tablet Take by mouth     • Cholecalciferol 400 units TABS Take by mouth     • ciclopirox (PENLAC) 8 % solution Apply topically     • Digestive Enzyme CAPS Take by mouth     • diltiazem (CARDIZEM CD) 240 mg 24 hr capsule Take 1 capsule (240 mg total) by mouth daily May give patient  the stock bottle 90 capsule 1   • famotidine (PEPCID) 10 mg tablet Take 10 mg by mouth 2 (two) times a day     • lactase (LACTAID) 3,000 units tablet Take by mouth     • Minoxidil 5 % FOAM Apply topically     • Multiple Vitamins-Minerals (CENTRUM SILVER ULTRA WOMENS PO) Take by mouth     • polyethylene glycol-propylene glycol (SYSTANE) 0 4-0 3 %      • Premarin vaginal cream Insert 1 g into the vagina once a week Brand Necessary 30 g 1     No current facility-administered medications for this visit  Review of Systems     As noted in HPI    Objective      /74 (BP Location: Left arm, Patient Position: Sitting, Cuff Size: Adult)   Pulse 74   Resp 16   Ht 5' 2 5" (1 588 m)   Wt 46 3 kg (102 lb)   LMP  (LMP Unknown)   SpO2 99%   BMI 18 36 kg/m²     Physical Exam  Vitals reviewed  Constitutional:       General: She is not in acute distress  Appearance: Normal appearance  HENT:      Head: Normocephalic and atraumatic        Nose: Nose normal       Mouth/Throat:      Mouth: Mucous membranes are moist  Pharynx: Oropharynx is clear  Eyes:      Extraocular Movements: Extraocular movements intact  Conjunctiva/sclera: Conjunctivae normal       Pupils: Pupils are equal, round, and reactive to light  Neck:      Thyroid: No thyroid tenderness  Cardiovascular:      Rate and Rhythm: Normal rate and regular rhythm  Pulses: Normal pulses  Heart sounds: Normal heart sounds  Pulmonary:      Effort: Pulmonary effort is normal       Breath sounds: Normal breath sounds  Abdominal:      General: Abdomen is flat  Bowel sounds are normal       Palpations: Abdomen is soft  Tenderness: There is abdominal tenderness in the right lower quadrant and left lower quadrant  There is no guarding or rebound  Comments: Attributes abdominal discomfort to IBS    Musculoskeletal:      Cervical back: Neck supple  Right lower leg: No edema  Left lower leg: No edema  Comments: There is a protuberance left-sided lateral lower intercostal space noticeable upon rising from supine position   Skin:     General: Skin is warm and dry  Neurological:      Mental Status: She is alert and oriented to person, place, and time  Psychiatric:         Thought Content: Thought content normal              Some portions of this record may have been generated with voice recognition software  There may be translation, syntax, or grammatical errors  Occasional wrong word or "sound-a-like" substitutions may have occurred due to the inherent limitations of the voice recognition software  Read the chart carefully and recognize, using context, where substations may have occurred  If you have any questions, please contact the dictating provider for clarification or correction, as needed

## 2023-02-15 ENCOUNTER — CONSULT (OUTPATIENT)
Dept: ENDOCRINOLOGY | Facility: CLINIC | Age: 78
End: 2023-02-15

## 2023-02-15 VITALS
HEART RATE: 86 BPM | BODY MASS INDEX: 19.29 KG/M2 | DIASTOLIC BLOOD PRESSURE: 88 MMHG | SYSTOLIC BLOOD PRESSURE: 180 MMHG | WEIGHT: 107.2 LBS

## 2023-02-15 DIAGNOSIS — E55.9 VITAMIN D DEFICIENCY: Primary | ICD-10-CM

## 2023-02-15 DIAGNOSIS — K90.9 INTESTINAL MALABSORPTION, UNSPECIFIED TYPE: ICD-10-CM

## 2023-02-15 DIAGNOSIS — M81.0 OSTEOPOROSIS, UNSPECIFIED OSTEOPOROSIS TYPE, UNSPECIFIED PATHOLOGICAL FRACTURE PRESENCE: ICD-10-CM

## 2023-02-15 NOTE — PROGRESS NOTES
2/15/2023    Assessment/Plan     # Severe Osteoporosis  Check PTH, Vit D levels  Will benefit from anabolic agents given her severe osteoporosis with increased risk of fractures  Discussed starting on Romosuzumab (Evenity) monthly  Also discussed option of Denosumab (Prolia) as an alternative which is a 6-monthly injection  Discussed benefits and side effects of both medications and has been provided with information for her to go through  She will think about both options and get back when she makes a decision  She has an upcoming dental work and would recommend completing that prior to starting on osteoporosis medications  Continue on calcium- at least 1200mg calcium in diet and supplements and Vit D supplementation  Advised to continue Strength training exercises  Ensure Fall precautions    Office follow up in 6 months  CC: Osteoporosis    History of Present Illness     HPI: Chaparro Dumont is a 76y o  year old female with history of osteoporosis previously on IV Reclast, Hashimoto's hypothyroidism seen in initial consultation at the request of her PCP for management of osteoporosis  Presents to office accompanied by her   Recalls being diagnosed with Osteoporosis on DEXA scan in 2008, she received a dose of Reclast and reported side effect of Iritis after which Reclast was stopped  She has not been on any other Osteoporosis medications since then  She had a repeat DEXA scan done in 02/2022 which showed T score of -4 5 in lumbar apine and - 27 in left femoral neck with a FRAX score of 6% for hip fracture and 17% for major osteoporotic fractures  Denies any history of falls or bone fractures  She reports taking daily calcium and vit D supplements (unsure of dose)  Reports history of osteoporosis in her mother who had a fracture in her [de-identified]  Denied any history of chronic steroid use, hormonal replacement therapy  Other PMH of IBS, HTN, GERD  Denies any history of M  I or strokes  ROS  Constitutional: Negative for appetite change  Negative for activity change, fatigue and unexpected weight change  Respiratory: Negative for shortness of breath, wheezing, cough  Cardiovascular: Negative for chest pain and palpitations  Gastrointestinal: Negative for abdominal pain, nausea and vomiting  Negative for diarrhea or constipation  Negative for blood in stool  Musculoskeletal: Negative for arthralgias  Neurological: Negative for dizziness, light-headedness and headaches       Historical Information   Past Medical History:   Diagnosis Date   • Acne    • Basal cell carcinoma 06/08/2020    right lower forehead   • BCC (basal cell carcinoma of skin) 03/09/2020    mid forehead   • Benign neoplasm of skin    • Disease of thyroid gland 2006   • GERD (gastroesophageal reflux disease)    • Hypertension    • Inflamed seborrheic keratosis    • Irritable bowel syndrome    • Malignant neoplasm of skin of face    • Migraines    • Nonmelanoma skin cancer     Last Assessed:6/27/17   • Squamous cell skin cancer 06/08/2020    In situ, left lower forehead   • Tachycardia    • Telogen effluvium    • Temporomandibular joint disorder    • Vertigo      Past Surgical History:   Procedure Laterality Date   • ADENOIDECTOMY     • APPENDECTOMY     • CHOLECYSTECTOMY     • COLONOSCOPY  05/03/2019   • COMPLEX WOUND CLOSURE TO EXTREMITY N/A 7/28/2020    Procedure: COMPLEX CLOSURE MID FOREHEAD;  Surgeon: Selam Guaman MD;  [facility];  Service: Plastics   • ESOPHAGOGASTRODUODENOSCOPY  2009   • FLAP LOCAL HEAD / NECK N/A 7/28/2020    Procedure: FLAP MID FOREHEAD;  Surgeon: Selam Guaman MD;  Location[facility];  Service: Plastics   • HYSTERECTOMY  1987   • MALIGNANT SKIN LESION EXCISION      Excision of Lesion Face Malignant-9/14/2004 [address] Forehead   • MOHS RECONSTRUCTION N/A 7/28/2020    Procedure: RECONSTRUCTION MOHS DEFECT MID FOREHEAD;  Surgeon: Selam Guaman MD;  [facility];  Service: Plastics   • MOHS SURGERY  07/27/2020    Right &left lower forehead, mid forehead   • OOPHORECTOMY Bilateral 1987   • ROTATOR CUFF REPAIR Right    • SKIN BIOPSY     • TONSILLECTOMY     • TUBAL LIGATION  1976?      Social History   Social History     Substance and Sexual Activity   Alcohol Use No     Social History     Substance and Sexual Activity   Drug Use No     Social History     Tobacco Use   Smoking Status Never   Smokeless Tobacco Never     Family History:   Family History   Problem Relation Age of Onset   • Hypertension Mother    • Osteoporosis Mother    • Skin cancer Mother    • Migraines Mother    • Dementia Mother    • Hypertension Father    • Skin cancer Father    • Dementia Father    • Skin cancer Sister 68   • Migraines Sister    • No Known Problems Daughter    • Uterine cancer Maternal Grandmother 34   • Diabetes type II Maternal Grandfather    • Cancer Paternal Grandmother    • Uterine cancer Paternal Grandmother 72   • No Known Problems Maternal Aunt    • Cancer Paternal Aunt         Breast   • Uterine cancer Paternal Aunt 53   • No Known Problems Paternal Aunt    • No Known Problems Paternal Aunt        Meds/Allergies   Current Outpatient Medications   Medication Sig Dispense Refill   • Alpha-D-Galactosidase (BEANO) TABS Take by mouth     • Alum Hydroxide-Mag Trisilicate 25-61 0 MG CHEW Chew     • ascorbic acid (VITAMIN C) 500 mg tablet Take by mouth     • Cholecalciferol 400 units TABS Take by mouth     • ciclopirox (PENLAC) 8 % solution Apply topically     • Digestive Enzyme CAPS Take by mouth     • diltiazem (CARDIZEM CD) 240 mg 24 hr capsule Take 1 capsule (240 mg total) by mouth daily May give patient  the stock bottle 90 capsule 1   • famotidine (PEPCID) 10 mg tablet Take 10 mg by mouth 2 (two) times a day     • lactase (LACTAID) 3,000 units tablet Take by mouth     • Minoxidil 5 % FOAM Apply topically     • Multiple Vitamins-Minerals (CENTRUM SILVER ULTRA WOMENS PO) Take by mouth     • polyethylene glycol-propylene glycol (SYSTANE) 0 4-0 3 %      • Premarin vaginal cream Insert 1 g into the vagina once a week Brand Necessary 30 g 1     No current facility-administered medications for this visit  Allergies   Allergen Reactions   • Cortisone Hypertension, Other (See Comments), Shortness Of Breath and Tachycardia   • Acebutolol    • Acetaminophen Other (See Comments)   • Amlodipine    • Atenolol    • Azithromycin    • Banana - Food Allergy GI Intolerance   • Bisphosphonates      Annotation - [id number]: iriitis   • Candesartan    • Clarithromycin    • Erythromycin    • Fosinopril    • Irbesartan    • Levofloxacin    • Losartan    • Methylprednisolone Hypertension and Tachycardia   • Metoprolol    • Nisoldipine    • Olmesartan    • Propranolol    • Sulfamethoxazole-Trimethoprim Nausea Only   • Timolol        Objective   Vitals: Blood pressure (!) 180/88, pulse 86, weight 48 6 kg (107 lb 3 2 oz), not currently breastfeeding  Invasive Devices     Peripheral Intravenous Line  Duration           Peripheral IV 01/16/21 Left;Ventral (anterior) Forearm 759 days                Physical Exam   Physical exam:   Constitutional:Oriented to person, place, and time  Appears well-developed and well-nourished  Not in any acute distress  HENT:   Head: Normocephalic and atraumatic  Neck: Normal range of motion  Supple, No thyromegaly  Pulmonary/Chest: Effort normal/ breathing comfortably on room air  CTAB   CVS:  Regular rate and rhythm, S1-S2 +  Abdomen: soft, nondistended, nontender  Musculoskeletal: Normal range of motion  Neurological: Alert and oriented to person, place, and time  Skin:  Warm, no rash  Extremities:  No pedal edema  Psychiatric: Normal mood and affect  Behavior is normal        The history was obtained from the review of the chart and from the patient      Lab Results:      Recent Results (from the past 50673 hour(s))   ECG 12 lead    Collection Time: 02/12/22 11:41 AM Result Value Ref Range    Ventricular Rate 68 BPM    Atrial Rate 68 BPM    IL Interval 116 ms    QRSD Interval 88 ms    QT Interval 426 ms    QTC Interval 452 ms    P [place] 64 degrees    QRS Axis -53 degrees    T Wave Axis 56 degrees   Urinalysis with microscopic    Collection Time: 03/09/22 10:35 AM   Result Value Ref Range    Color, UA Yellow     Clarity, UA Turbid     Specific [place], UA 1 019 1 003 - 1 030    pH, UA 6 0 4 5, 5 0, 5 5, 6 0, 6 5, 7 0, 7 5, 8 0    Leukocytes, UA Large (A) Negative    Nitrite, UA Negative Negative    Protein, UA 50 (1+) (A) Negative mg/dl    Glucose, UA Negative Negative mg/dl    Ketones, UA Trace (A) Negative mg/dl    Urobilinogen, UA <2 0 <2 0 mg/dl mg/dl    Bilirubin, UA Negative Negative    Occult Blood, UA Negative Negative    RBC, UA 1-2 None Seen, 1-2 /hpf    WBC, UA 4-10 (A) None Seen, 1-2 /hpf    Epithelial Cells Moderate (A) None Seen, Occasional /hpf    Bacteria, UA Occasional None Seen, Occasional /hpf    MUCUS THREADS Occasional (A) None Seen    Hyaline Casts, UA 3-5 (A) None Seen /lpf   Urine culture    Collection Time: 03/09/22 10:35 AM    Specimen: Urine, Clean Catch   Result Value Ref Range    Urine Culture >100,000 cfu/ml    Comprehensive metabolic panel    Collection Time: 03/29/22  6:45 AM   Result Value Ref Range    Sodium 139 136 - 145 mmol/L    Potassium 3 7 3 5 - 5 3 mmol/L    Chloride 104 100 - 108 mmol/L    CO2 29 21 - 32 mmol/L    ANION GAP 6 4 - 13 mmol/L    BUN 16 5 - 25 mg/dL    Creatinine 0 92 0 60 - 1 30 mg/dL    Glucose, Fasting 84 65 - 99 mg/dL    Calcium 9 1 8 3 - 10 1 mg/dL    AST 23 5 - 45 U/L    ALT 22 12 - 78 U/L    Alkaline Phosphatase 75 46 - 116 U/L    Total Protein 7 4 6 4 - 8 2 g/dL    Albumin 3 5 3 5 - 5 0 g/dL    Total Bilirubin 0 44 0 20 - 1 00 mg/dL    eGFR 59 ml/min/1 73sq m   CBC and differential    Collection Time: 03/29/22  6:45 AM   Result Value Ref Range    WBC 5 66 4 31 - 10 16 Thousand/uL    RBC 4 31 3 81 - 5 12 Million/uL Hemoglobin 12 2 11 5 - 15 4 g/dL    Hematocrit 39 4 34 8 - 46 1 %    MCV 91 82 - 98 fL    MCH 28 3 26 8 - 34 3 pg    MCHC 31 0 (L) 31 4 - 37 4 g/dL    RDW 13 9 11 6 - 15 1 %    MPV 10 3 8 9 - 12 7 fL    Platelets 150 061 - 241 Thousands/uL    nRBC 0 /100 WBCs    Neutrophils Relative 60 43 - 75 %    Immat GRANS % 0 0 - 2 %    Lymphocytes Relative 22 14 - 44 %    Monocytes Relative 13 (H) 4 - 12 %    Eosinophils Relative 4 0 - 6 %    Basophils Relative 1 0 - 1 %    Neutrophils Absolute 3 39 1 85 - 7 62 Thousands/µL    Immature Grans Absolute 0 02 0 00 - 0 20 Thousand/uL    Lymphocytes Absolute 1 25 0 60 - 4 47 Thousands/µL    Monocytes Absolute 0 74 0 17 - 1 22 Thousand/µL    Eosinophils Absolute 0 20 0 00 - 0 61 Thousand/µL    Basophils Absolute 0 06 0 00 - 0 10 Thousands/µL   Lipid panel    Collection Time: 03/29/22  6:45 AM   Result Value Ref Range    Cholesterol 165 See Comment mg/dL    Triglycerides 39 See Comment mg/dL    HDL, Direct 81 >=50 mg/dL    LDL Calculated 76 0 - 100 mg/dL    Non-HDL-Chol (CHOL-HDL) 84 mg/dl   TSH, 3rd generation with Free T4 reflex    Collection Time: 03/29/22  6:45 AM   Result Value Ref Range    TSH 3RD GENERATON 3 980 (H) 0 358 - 3 740 uIU/mL   T4, free    Collection Time: 03/29/22  6:45 AM   Result Value Ref Range    Free T4 0 97 0 76 - 1 46 ng/dL   POCT urine dip    Collection Time: 09/15/22  2:38 PM   Result Value Ref Range    LEUKOCYTE ESTERASE,UA neg     NITRITE,UA neg     SL AMB POCT UROBILINOGEN neg     POCT URINE PROTEIN neg      PH,UA 6 0     BLOOD,UA neg     SPECIFIC GRAVITY,UA 1 010     KETONES,UA neg     BILIRUBIN,UA neg     GLUCOSE, UA neg    Urine culture    Collection Time: 09/15/22  2:40 PM    Specimen: Urine, Clean Catch   Result Value Ref Range    Urine Culture 80,000-89,000 cfu/ml Lactobacillus species (A)     Urine Culture <10,000 cfu/ml    TSH, 3rd generation with Free T4 reflex    Collection Time: 11/04/22  6:54 AM   Result Value Ref Range    TSH 3RD GENERATON 4 530 (H) 0 450 - 4 500 uIU/mL   Comprehensive metabolic panel    Collection Time: 11/04/22  6:54 AM   Result Value Ref Range    Sodium 139 135 - 147 mmol/L    Potassium 3 8 3 5 - 5 3 mmol/L    Chloride 105 96 - 108 mmol/L    CO2 31 21 - 32 mmol/L    ANION GAP 3 (L) 4 - 13 mmol/L    BUN 17 5 - 25 mg/dL    Creatinine 1 10 0 60 - 1 30 mg/dL    Glucose, Fasting 100 (H) 65 - 99 mg/dL    Calcium 9 4 8 3 - 10 1 mg/dL    AST 25 5 - 45 U/L    ALT 30 12 - 78 U/L    Alkaline Phosphatase 72 46 - 116 U/L    Total Protein 8 0 6 4 - 8 4 g/dL    Albumin 3 9 3 5 - 5 0 g/dL    Total Bilirubin 0 54 0 20 - 1 00 mg/dL    eGFR 47 ml/min/1 73sq m   T4, free    Collection Time: 11/04/22  6:54 AM   Result Value Ref Range    Free T4 0 97 0 76 - 1 46 ng/dL     Radiology (02/2022)  Narrative & Impression   CENTRAL DXA SCAN     CLINICAL HISTORY:  75-year-old postmenopausal  female with no significant past medical history  Osteoporosis screening  OTHER RISK FACTORS:  None      PHARMACOLOGIC THERAPY FOR OSTEOPOROSIS:  None      TECHNIQUE: Bone densitometry was performed using a Hologic Horizon A  bone densitometer  Regions of interest appear properly placed        COMPARISON: There are no prior DXA studies performed on this unit for comparison            RESULTS:   LUMBAR SPINE L1-L4 :   BMD  0 551  gm/cm2   T-score -4 5    These values are artifactually elevated due to degenerative sclerosis and osteophytosis      LEFT  TOTAL HIP:   BMD:  0 678  gm/cm2   T-score:  -2 2     LEFT  FEMORAL NECK:   BMD:  0 546  gm/cm2   T score: -2 7                  IMPRESSION:  1   OSTEOPOROSIS  [Based on the lumbar spine]     2  The 10 year risk of hip fracture is 6 with the 10 year risk of major osteoporotic fracture being 17 as calculated by the [facility]/WHO fracture risk assessment tool (FRAX)     3    The current NOF guidelines recommend treating patients with a T-score of -2 5 or less in the lumbar spine or hips, or in post-menopausal women and men over the age of 48 with low bone mass (osteopenia) and a FRAX 10 year risk score of >3% for hip   fracture and/or >20% for major osteoporotic fracture      4  The NOF recommends follow-up DXA in 1-2 years after initiating therapy for osteoporosis and every 2 years thereafter  More frequent evaluation is appropriate for patients with conditions associated with rapid bone loss, such as glucocorticoid   therapy  The interval between DXA screenings may be longer for individuals without major risk factors and initial T-score in the normal or upper low bone mass range               The FRAX algorithm has certain limitations:  -FRAX has not been validated in patients currently or previously treated with pharmacotherapy for osteoporosis  In such patients, clinical judgment must be exercised in interpreting FRAX scores  -Prior hip, vertebral and humeral fragility fractures appear to confer greater risk of subsequent fracture than fractures at other sites (this is especially true for individuals with severe vertebral fractures), but quantification of this incremental   risk is not possible with FRAX  -FRAX underestimates fracture risk in patients with history of multiple fragility fractures    -FRAX may underestimate fracture risk in patients with history of frequent falls   -It is not appropriate to use FRAX to monitor treatment response               WHO CLASSIFICATION:  Normal (a T-score of -1 0 or higher)  Low bone mineral density (a T-score of less than -1 0 but higher than -2 5)  Osteoporosis (a T-score of -2 5 or less)  Severe osteoporosis (a T-score of -2 5 or less with a fragility fracture)     Workstation performed: [id number]            Future Appointments   Date Time Provider Alley Orosco   2/22/2023  8:00 AM [facility] US/VASC WG 1 [facility] WG US [facility]   3/28/2023 11:15 AM Jim Mcgrath MD [facility] DERM [facility]   4/11/2023 10:30 AM MO DIETITIAN [facility]   5/5/2023 11:00 AM DO GA[facility]   5/25/2023 10:30 AM Chanda Hall MD [facility] ENT  SPA   8/22/2023 11:30 AM Deysi Brown PA-C [facility]       Portions of the record may have been created with voice recognition software  Occasional wrong word or "sound a like" substitutions may have occurred due to the inherent limitations of voice recognition software  Read the chart carefully and recognize, using context, where substitutions have occurred

## 2023-02-15 NOTE — PATIENT INSTRUCTIONS
Romosozumab-aqqg (By injection)   Romosozumab-aqqg (txs-kra-DBE-ue-mab - aqqg)  Treats osteoporosis in postmenopausal women  Brand Name(s): Evenity   There may be other brand names for this medicine  When This Medicine Should Not Be Used: This medicine is not right for everyone  You should not receive it if you had an allergic reaction to romosozumab-aqqg, or if you have low calcium levels in the blood (hypocalcemia)  How to Use This Medicine:   Injectable  Your doctor will prescribe your exact dose and tell you how often it should be given  This medicine is given as a shot under your skin  A nurse or other health provider will give you this medicine  Your doctor may give you calcium and vitamin D supplements while you are receiving this medicine to prevent unwanted effects  This medicine should come with a Medication Guide  Ask your pharmacist for a copy if you do not have one  Drugs and Foods to Avoid:   Ask your doctor or pharmacist before using any other medicine, including over-the-counter medicines, vitamins, and herbal products  Some medicines can affect how romosozumab-aqqg works  Tell your doctor if you are using denosumab, bisphosphonate medicine, cancer medicine, or steroids  Warnings While Using This Medicine:   Tell your doctor if you are pregnant or breastfeeding, or if you have kidney disease, cancer, anemia, blood clotting problems, dental problems, or if you had a heart attack or stroke within the last year  This medicine may cause the following problems:  Increased risk of heart attack or stroke  Increased risk for a thigh bone fracture  Tell any doctor or dentist who treats you that you are using this medicine  This medicine may cause jaw problems, especially if you have a tooth pulled or have other dental work  Your doctor will do lab tests at regular visits to check on the effects of this medicine  Keep all appointments    Possible Side Effects While Using This Medicine:   Call your doctor right away if you notice any of these side effects: Allergic reaction: Itching or hives, swelling in your face or hands, swelling or tingling in your mouth or throat, chest tightness, trouble breathing  Chest pain that may spread to your arms, jaw, back, or neck, trouble breathing, nausea, unusual sweating, fainting  Confusion, seizures, uneven heartbeat, muscle cramps or spasms, numbness and tingling around the mouth, fingertips, or feet  New or unusual thigh, hip, or groin pain  Numbness or weakness in your arm or leg, or on one side of your body  Pain in your lower leg (calf)  Sudden or severe headache, problems with vision, speech, or walking  If you notice these less serious side effects, talk with your doctor:   Headache  Joint or muscle pain  Pain, itching, burning, swelling, or a lump under your skin where the shot was given  If you notice other side effects that you think are caused by this medicine, tell your doctor  Call your doctor for medical advice about side effects  You may report side effects to FDA at 6-339-FDA-8991    © Copyright [address] 2022 Information is for End User's use only and may not be sold, redistributed or otherwise used for commercial purposes  The above information is an  only  It is not intended as medical advice for individual conditions or treatments  Talk to your doctor, nurse or pharmacist before following any medical regimen to see if it is safe and effective for you  Denosumab (By injection)   Denosumab (ddo-UMP-ul-mab)  Treats osteoporosis, bone cancer, hypercalcemia, and other bone problems in patients who have cancer  Brand Name(s): Prolia, Xgeva   There may be other brand names for this medicine  When This Medicine Should Not Be Used: This medicine is not right for everyone  You should not receive it if you had an allergic reaction to denosumab, or if you are pregnant    How to Use This Medicine:   Injectable  A doctor or other health professional will give you this medicine  It is usually given as a shot under the skin of your upper arm, upper thigh, or stomach  You may receive other medicines (including calcium supplements, Vitamin D) to prevent unwanted effects  This medicine should come with a Medication Guide  Ask your pharmacist for a copy if you do not have one  Missed dose: Call your doctor or pharmacist for instructions  Drugs and Foods to Avoid:   Ask your doctor or pharmacist before using any other medicine, including over-the-counter medicines, vitamins, and herbal products  Do not use Prolia® and Xgeva® together  They contain the same medicine  Some medicines can affect how denosumab works  Tell your doctor if you are also using medicine that weakens your immune system, including a steroid or cancer medicine  Warnings While Using This Medicine: This medicine may cause birth defects if either partner is using it during conception or pregnancy  Tell your doctor right away if you or your partner becomes pregnant  Use an effective form of birth control during treatment with this medicine and for at least 5 months after the last dose  Tell your doctor if you are breastfeeding, or if you have kidney disease, diabetes, gum disease, allergy to latex, or a history of fractures  Tell your doctor if you have problems with your thyroid, parathyroid, or digestive system  This medicine may cause the following problems:  Increased risk of broken thigh bone  Increased risk of infections  Serious skin reactions  Severe bone, joint, or muscle pain  This medicine can cause jaw problems  You must have regular dental exams while you are being treated with this medicine  Tell your dentist that you are receiving this medicine  Practice good oral hygiene  Do not suddenly stop using this medicine without checking first with your doctor  Doing so may increase your risk for more fractures   Talk to your doctor about other medicines that you can take   Your doctor will do lab tests at regular visits to check on the effects of this medicine  Keep all appointments  Possible Side Effects While Using This Medicine:   Call your doctor right away if you notice any of these side effects: Allergic reaction: Itching or hives, swelling in your face or hands, swelling or tingling in your mouth or throat, chest tightness, trouble breathing  Blistering, peeling, red skin rash  Chest pain, fast or uneven heartbeat, trouble breathing  Fever, chills, cough, sore throat, body aches  Lightheadedness, dizziness, fainting  Muscle spasms or twitching, numbness or tingling in your fingers, toes, or lips  Pain or burning during urination, change in how much or how often you urinate  Pain, swelling, heavy feeling, or numbness in your mouth or jaw, loose teeth or other teeth problems  Severe bone, joint, or muscle pain  Unusual pain in your thigh, groin, or hip  If you notice these less serious side effects, talk with your doctor:   Diarrhea, nausea  Redness, pain, itching, burning, swelling, or a lump under your skin where the shot was given  Tiredness or weakness  If you notice other side effects that you think are caused by this medicine, tell your doctor  Call your doctor for medical advice about side effects  You may report side effects to FDA at 2-363-FDA-8061    © Copyright [employer] 2022 Information is for End User's use only and may not be sold, redistributed or otherwise used for commercial purposes  The above information is an  only  It is not intended as medical advice for individual conditions or treatments  Talk to your doctor, nurse or pharmacist before following any medical regimen to see if it is safe and effective for you

## 2023-02-22 ENCOUNTER — HOSPITAL ENCOUNTER (OUTPATIENT)
Dept: RADIOLOGY | Facility: MEDICAL CENTER | Age: 78
Discharge: HOME | End: 2023-02-22

## 2023-02-22 DIAGNOSIS — R19.00 ABDOMINAL WALL BULGE: ICD-10-CM

## 2023-02-28 ENCOUNTER — TELEPHONE (OUTPATIENT)
Dept: FAMILY MEDICINE CLINIC | Facility: MEDICAL CENTER | Age: 78
End: 2023-02-28

## 2023-02-28 NOTE — TELEPHONE ENCOUNTER
----- Message from Zuhair Gorman DO sent at 2/28/2023  4:18 PM EST -----  Please inform patient that there are no concerning findings on ultrasound  The area of concern is consistent with tip of liver

## 2023-02-28 NOTE — TELEPHONE ENCOUNTER
Please advise   pt aware  She is having positional discomfort in the area now  Intermittent   Suggest she continue to monitor, may be MSK   Will let Dr Vicki Francisco know  Is to call if persists

## 2023-03-01 ENCOUNTER — APPOINTMENT (OUTPATIENT)
Dept: LAB | Facility: MEDICAL CENTER | Age: 78
End: 2023-03-01

## 2023-03-01 DIAGNOSIS — E55.9 VITAMIN D DEFICIENCY: ICD-10-CM

## 2023-03-01 DIAGNOSIS — K90.9 INTESTINAL MALABSORPTION, UNSPECIFIED TYPE: ICD-10-CM

## 2023-03-01 DIAGNOSIS — M81.0 OSTEOPOROSIS, UNSPECIFIED OSTEOPOROSIS TYPE, UNSPECIFIED PATHOLOGICAL FRACTURE PRESENCE: ICD-10-CM

## 2023-03-01 LAB
25(OH)D3 SERPL-MCNC: 43.7 NG/ML (ref 30–100)
PTH-INTACT SERPL-MCNC: 64.6 PG/ML (ref 18.4–80.1)

## 2023-03-02 LAB — TTG IGA SER-ACNC: <2 U/ML (ref 0–3)

## 2023-03-10 ENCOUNTER — TELEPHONE (OUTPATIENT)
Dept: ENDOCRINOLOGY | Facility: CLINIC | Age: 78
End: 2023-03-10

## 2023-03-10 NOTE — TELEPHONE ENCOUNTER
----- Message from Portia Wen MD sent at 3/9/2023  8:24 PM EST -----  Kindly notify patient of results of bloodwork: PTH and Vit D levels, both came back normal  She should notify the office once dental work is done and ready to proceed with treatment for her osteoporosis

## 2023-03-16 ENCOUNTER — TELEPHONE (OUTPATIENT)
Dept: GENETICS | Facility: CLINIC | Age: 78
End: 2023-03-16

## 2023-03-16 NOTE — TELEPHONE ENCOUNTER
Patient called and left a message requesting to schedule an appointment with the genetic counselor  Jd Rossi stated in her voicemail that she is a relative of ANKIT Rosado, tested through our program

## 2023-03-17 ENCOUNTER — TELEPHONE (OUTPATIENT)
Dept: GENETICS | Facility: CLINIC | Age: 78
End: 2023-03-17

## 2023-03-17 NOTE — TELEPHONE ENCOUNTER
I called Ghassan Hutchison regarding her inquiry about her cousin's genetic testing results  I informed her that we are able to discuss these results with the family members that were on the communication consent  I made sure that Ghassan Hutchison had the number of the genetics team at [phone] so that they are able to contact us

## 2023-04-25 ENCOUNTER — APPOINTMENT (OUTPATIENT)
Dept: RADIOLOGY | Facility: MEDICAL CENTER | Age: 78
End: 2023-04-25

## 2023-04-25 ENCOUNTER — OFFICE VISIT (OUTPATIENT)
Dept: URGENT CARE | Facility: MEDICAL CENTER | Age: 78
End: 2023-04-25

## 2023-04-25 VITALS
HEIGHT: 63 IN | OXYGEN SATURATION: 98 % | WEIGHT: 108 LBS | HEART RATE: 80 BPM | BODY MASS INDEX: 19.14 KG/M2 | SYSTOLIC BLOOD PRESSURE: 166 MMHG | DIASTOLIC BLOOD PRESSURE: 68 MMHG | TEMPERATURE: 97.9 F | RESPIRATION RATE: 18 BRPM

## 2023-04-25 DIAGNOSIS — S83.92XA SPRAIN OF LEFT KNEE, UNSPECIFIED LIGAMENT, INITIAL ENCOUNTER: ICD-10-CM

## 2023-04-25 DIAGNOSIS — S83.92XA SPRAIN OF LEFT KNEE, UNSPECIFIED LIGAMENT, INITIAL ENCOUNTER: Primary | ICD-10-CM

## 2023-04-25 NOTE — PROGRESS NOTES
[address] [facility]        NAME: Summer Asencio is a [de-identified] y o  female  : 1945    MRN: [id number]  DATE: 2023  TIME: 11:27 AM    Assessment and Plan   Sprain of left knee, unspecified ligament, initial encounter [S83  92XA]  1  Sprain of left knee, unspecified ligament, initial encounter  XR knee 3 vw left non injury            Patient Instructions     Left knee sprain  Rest, ice, elevate, over-the-counter Tylenol as needed for pain  Follow-up with orthopedics  Follow up with PCP in 3-5 days  Proceed to  ER if symptoms worsen  Chief Complaint     Chief Complaint   Patient presents with   • Knee Pain     Pt  With left knee pain that began 4 days ago when she woke up  States that she began to wear new shoes with arch support 4 days prior to the pain  She wore them according to the directions from the staff at [facility], starting at one hour a day and increase by one hour each day  On the 4th day, the pain began, and she stopped wearing the shoes  She denies any trauma to the knee  History of Present Illness       24-year-old female who presents complaining of left knee pain  Patient states that she recently both orthopedic foot issues and was instructed to wear them 1 hour on day 1, 2 hours on day 2, 3 hours on day 3 and so want  Patient states that she wore them for the first 3 days and then developed left knee pain  Denies fall, redness, fevers  Review of Systems   Review of Systems   Constitutional: Negative  HENT: Negative  Eyes: Negative  Respiratory: Negative  Negative for cough, chest tightness, shortness of breath, wheezing and stridor  Cardiovascular: Negative  Negative for chest pain, palpitations and leg swelling  Musculoskeletal: Positive for arthralgias           Current Medications       Current Outpatient Medications:   •  Alpha-D-Galactosidase (BEANO) TABS, Take by mouth, Disp: , Rfl:   •  Alum Hydroxide-Mag Trisilicate 81-33 8 MG CHEW, Chew, Disp: , Rfl:   •  ascorbic acid (VITAMIN C) 500 mg tablet, Take by mouth, Disp: , Rfl:   •  Cholecalciferol 400 units TABS, Take by mouth, Disp: , Rfl:   •  ciclopirox (PENLAC) 8 % solution, Apply topically, Disp: , Rfl:   •  Digestive Enzyme CAPS, Take by mouth, Disp: , Rfl:   •  diltiazem (CARDIZEM CD) 240 mg 24 hr capsule, Take 1 capsule (240 mg total) by mouth daily May give patient  the stock bottle, Disp: 90 capsule, Rfl: 1  •  famotidine (PEPCID) 10 mg tablet, Take 10 mg by mouth 2 (two) times a day, Disp: , Rfl:   •  lactase (LACTAID) 3,000 units tablet, Take by mouth, Disp: , Rfl:   •  Minoxidil 5 % FOAM, Apply topically, Disp: , Rfl:   •  Multiple Vitamins-Minerals (CENTRUM SILVER ULTRA WOMENS PO), Take by mouth, Disp: , Rfl:   •  polyethylene glycol-propylene glycol (SYSTANE) 0 4-0 3 %, , Disp: , Rfl:   •  Premarin vaginal cream, Insert 1 g into the vagina once a week Brand Necessary, Disp: 30 g, Rfl: 1    Current Allergies     Allergies as of 04/25/2023 - Reviewed 04/25/2023   Allergen Reaction Noted   • Cortisone Hypertension, Other (See Comments), Shortness Of Breath, and Tachycardia    • Acebutolol  01/29/2014   • Acetaminophen Other (See Comments) 08/11/2022   • Amlodipine  01/29/2014   • Atenolol  01/29/2014   • Azithromycin  01/29/2014   • Banana - food allergy GI Intolerance 05/03/2019   • Bisphosphonates  05/17/2012   • Candesartan  01/29/2014   • Clarithromycin  01/29/2014   • Erythromycin  01/29/2014   • Fosinopril  01/29/2014   • Irbesartan  01/29/2014   • Levofloxacin  01/29/2014   • Losartan  01/29/2014   • Methylprednisolone Hypertension and Tachycardia 04/03/2014   • Metoprolol  01/29/2014   • Nisoldipine  01/29/2014   • Olmesartan  01/29/2014   • Propranolol  01/29/2014   • Sulfamethoxazole-trimethoprim Nausea Only 01/29/2014   • Timolol  01/29/2014            The following portions of the patient's history were reviewed and updated as appropriate: allergies, current medications, past family history, past medical history, past social history, past surgical history and problem list      Past Medical History:   Diagnosis Date   • Acne    • Basal cell carcinoma 06/08/2020    right lower forehead   • BCC (basal cell carcinoma of skin) 03/09/2020    mid forehead   • Benign neoplasm of skin    • Disease of thyroid gland 2006   • GERD (gastroesophageal reflux disease)    • Hypertension    • Inflamed seborrheic keratosis    • Irritable bowel syndrome    • Malignant neoplasm of skin of face    • Migraines    • Nonmelanoma skin cancer     Last Assessed:6/27/17   • Squamous cell skin cancer 06/08/2020    In situ, left lower forehead   • Tachycardia    • Telogen effluvium    • Temporomandibular joint disorder    • Vertigo        Past Surgical History:   Procedure Laterality Date   • ADENOIDECTOMY     • APPENDECTOMY     • CHOLECYSTECTOMY     • COLONOSCOPY  05/03/2019   • COMPLEX WOUND CLOSURE TO EXTREMITY N/A 7/28/2020    Procedure: COMPLEX CLOSURE MID FOREHEAD;  Surgeon: Leticia Palomino MD;  [facility];  Service: Plastics   • ESOPHAGOGASTRODUODENOSCOPY  2009   • FLAP LOCAL HEAD / NECK N/A 7/28/2020    Procedure: FLAP MID FOREHEAD;  Surgeon: Leticia Palomino MD;  [facility];  Service: Plastics   • HYSTERECTOMY  1987   • MALIGNANT SKIN LESION EXCISION      Excision of Lesion Face Malignant-9/14/2004 [address]  [employer] Forehead   • MOHS RECONSTRUCTION N/A 7/28/2020    Procedure: RECONSTRUCTION MOHS DEFECT MID FOREHEAD;  Surgeon: Leticia Palomino MD;  [facility];  Service: Plastics   • MOHS SURGERY  07/27/2020    Right &left lower forehead, mid forehead   • OOPHORECTOMY Bilateral 1987   • ROTATOR CUFF REPAIR Right    • SKIN BIOPSY     • TONSILLECTOMY     • [address] [facility],?        Family History   Problem Relation Age of Onset   • Hypertension Mother    • Osteoporosis Mother    • Skin cancer Mother    • Migraines Mother    • Dementia Mother    • Hypertension Father    • Skin cancer "Father    • Dementia Father    • Skin cancer Sister 68   • Migraines Sister    • No Known Problems Daughter    • Uterine cancer Maternal Grandmother 34   • Diabetes type II Maternal Grandfather    • Cancer Paternal Grandmother    • Uterine cancer Paternal Grandmother 72   • No Known Problems Maternal Aunt    • Cancer Paternal Aunt         Breast   • Uterine cancer Paternal Aunt 53   • No Known Problems Paternal Aunt    • No Known Problems Paternal Aunt          Medications have been verified  Objective   /68   Pulse 80   Temp 97 9 °F (36 6 °C)   Resp 18   Ht 5' 2 5\" (1 588 m)   Wt 49 kg (108 lb)   LMP  (LMP Unknown)   SpO2 98%   BMI 19 44 kg/m²        Physical Exam     Physical Exam  Constitutional:       Appearance: Normal appearance  She is well-developed  HENT:      Head: Normocephalic and atraumatic  Right Ear: External ear normal       Left Ear: External ear normal       Nose: Nose normal       Mouth/Throat:      Pharynx: No oropharyngeal exudate  Cardiovascular:      Rate and Rhythm: Normal rate and regular rhythm  Heart sounds: Normal heart sounds  Pulmonary:      Effort: Pulmonary effort is normal  No respiratory distress  Breath sounds: Normal breath sounds  No wheezing or rales  Chest:      Chest wall: No tenderness  Musculoskeletal:      Cervical back: Normal range of motion and neck supple  Right knee: Normal       Left knee: No swelling, deformity, effusion, erythema, ecchymosis, lacerations, bony tenderness or crepitus  Normal range of motion  Tenderness present over the medial joint line  No lateral joint line, MCL, LCL, ACL, PCL or patellar tendon tenderness  Legs:    Lymphadenopathy:      Cervical: No cervical adenopathy  Neurological:      Mental Status: She is alert                     "

## 2023-04-28 ENCOUNTER — RA CDI HCC (OUTPATIENT)
Dept: OTHER | Facility: HOSPITAL | Age: 78
End: 2023-04-28

## 2023-05-08 ENCOUNTER — HOSPITAL ENCOUNTER (OUTPATIENT)
Dept: RADIOLOGY | Age: 78
Discharge: HOME | End: 2023-05-08

## 2023-05-08 DIAGNOSIS — R10.9 UNSPECIFIED ABDOMINAL PAIN: ICD-10-CM

## 2023-05-17 ENCOUNTER — OFFICE VISIT (OUTPATIENT)
Age: 78
End: 2023-05-17

## 2023-05-17 VITALS — WEIGHT: 110 LBS | BODY MASS INDEX: 19.49 KG/M2 | HEIGHT: 63 IN

## 2023-05-17 DIAGNOSIS — L82.1 SEBORRHEIC KERATOSIS: ICD-10-CM

## 2023-05-17 DIAGNOSIS — Z13.89 SCREENING FOR SKIN CONDITION: ICD-10-CM

## 2023-05-17 DIAGNOSIS — I78.1 TELANGIECTASIA: Primary | ICD-10-CM

## 2023-05-17 DIAGNOSIS — Z85.828 HISTORY OF SKIN CANCER: ICD-10-CM

## 2023-05-17 NOTE — PATIENT INSTRUCTIONS
teiangectasia or nevoid type process appears definitely benign since this has been present for many years probably just gets more bruised if she has some trauma to the area patient reassured no treatment or intervention necessary  Seborrheic keratosis patient reassured these are normal growths we acquire with age no treatment needed  History of skin cancer in no recurrence nothing else atypical sunblock recommended follow-up in 1 year  Screening for dermatologic disorders nothing else of concern noted on complete exam follow-up in 1 year

## 2023-05-17 NOTE — PROGRESS NOTES
"[facility] Note     Patient Name: Sebastian Silva  Encounter Date: May 17, 2023     Have you been cared for by a [place] [address] Dermatologist in the last 3 years and, if so, which description applies to you? Yes  I have been here within the last 3 years, and my medical history has NOT changed since that time  I am FEMALE/of child-bearing potential     REVIEW OF SYSTEMS:  Have you recently had or currently have any of the following? · No changes in my recent health  PAST MEDICAL HISTORY:  Have you personally ever had or currently have any of the following? If \"YES,\" then please provide more detail  · No changes in my medical history  FAMILY HISTORY:  Any \"first degree relatives\" (parent, brother, sister, or child) with the following? • No changes in my family's known health  PATIENT EXPERIENCE:    • Do you want the Dermatologist to perform a COMPLETE skin exam today including a clinical examination under the \"bra and underwear\" areas? Yes  • If necessary, do we have your permission to call and leave a detailed message on your Preferred Phone number that includes your specific medical information?   Yes      Allergies   Allergen Reactions   • Cortisone Hypertension, Other (See Comments), Shortness Of Breath and Tachycardia   • Acebutolol    • Acetaminophen Other (See Comments)   • Amlodipine    • Atenolol    • Azithromycin    • Banana - Food Allergy GI Intolerance   • Bisphosphonates      Annotation - [id number]: iriitis   • Candesartan    • Clarithromycin    • Erythromycin    • Fosinopril    • Irbesartan    • Levofloxacin    • Losartan    • Methylprednisolone Hypertension and Tachycardia   • Metoprolol    • Nisoldipine    • Olmesartan    • Propranolol    • Sulfamethoxazole-Trimethoprim Nausea Only   • Timolol       Current Outpatient Medications:   •  Alpha-D-Galactosidase (BEANO) TABS, Take by mouth, Disp: , Rfl:   •  Alum Hydroxide-Mag Trisilicate 21-53 6 MG CHEW, Chew, Disp: , Rfl:   •  " ascorbic acid (VITAMIN C) 500 mg tablet, Take by mouth, Disp: , Rfl:   •  Cholecalciferol 400 units TABS, Take by mouth, Disp: , Rfl:   •  ciclopirox (PENLAC) 8 % solution, Apply topically, Disp: , Rfl:   •  Digestive Enzyme CAPS, Take by mouth, Disp: , Rfl:   •  diltiazem (CARDIZEM CD) 240 mg 24 hr capsule, Take 1 capsule (240 mg total) by mouth daily May give patient  the stock bottle, Disp: 90 capsule, Rfl: 1  •  famotidine (PEPCID) 10 mg tablet, Take 10 mg by mouth 2 (two) times a day, Disp: , Rfl:   •  lactase (LACTAID) 3,000 units tablet, Take by mouth, Disp: , Rfl:   •  Minoxidil 5 % FOAM, Apply topically, Disp: , Rfl:   •  Multiple Vitamins-Minerals (CENTRUM SILVER ULTRA WOMENS PO), Take by mouth, Disp: , Rfl:   •  polyethylene glycol-propylene glycol (SYSTANE) 0 4-0 3 %, , Disp: , Rfl:   •  Premarin vaginal cream, Insert 1 g into the vagina once a week Brand Necessary, Disp: 30 g, Rfl: 1          • Whom besides the patient is providing clinical information about today's encounter?   o NO ADDITIONAL HISTORIAN (patient alone provided history)    Physical Exam and Assessment/Plan by Diagnosis:

## 2023-05-17 NOTE — PROGRESS NOTES
[address] [facility] [address]  [phone]  [phone]     MRN: [id number] : 1945  Encounter: [id number]  Patient Information: Angely Whitaker  Chief complaint: 6 month check up    History of present illness: 78-year-old female presents for overall skin check concern regarding her previous history of skin cancer its been almost 3 years since her last basal cell carcinomas concerned regarding area on her left index finger that is been present for quite a while her new family physician was concerned regarding this process  Past Medical History:   Diagnosis Date   • Acne    • Basal cell carcinoma 2020    right lower forehead   • BCC (basal cell carcinoma of skin) 2020    mid forehead   • Benign neoplasm of skin    • Disease of thyroid gland    • GERD (gastroesophageal reflux disease)    • Hypertension    • Inflamed seborrheic keratosis    • Irritable bowel syndrome    • Malignant neoplasm of skin of face    • Migraines    • Nonmelanoma skin cancer     Last Assessed:17   • Squamous cell skin cancer 2020    In situ, left lower forehead   • Tachycardia    • Telogen effluvium    • Temporomandibular joint disorder    • Vertigo      Past Surgical History:   Procedure Laterality Date   • ADENOIDECTOMY     • APPENDECTOMY     • CHOLECYSTECTOMY     • COLONOSCOPY  2019   • COMPLEX WOUND CLOSURE TO EXTREMITY N/A 2020    Procedure: COMPLEX CLOSURE MID FOREHEAD;  Surgeon: Jerome Pineda MD;  [facility];  Service: Plastics   • ESOPHAGOGASTRODUODENOSCOPY     • FLAP LOCAL HEAD / NECK N/A 2020    Procedure: FLAP MID FOREHEAD;  Surgeon: Jerome Pineda MD;  [facility];  Service: Plastics   • HYSTERECTOMY     • MALIGNANT SKIN LESION EXCISION      Excision of Lesion Face Malignant-2004 [address]  [employer] Forehead   • MOHS RECONSTRUCTION N/A 2020    Procedure: RECONSTRUCTION MOHS DEFECT MID FOREHEAD; Surgeon: Celi Younger MD;  [facility];  Service: Plastics   • MOHS SURGERY  07/27/2020    Right &left lower forehead, mid forehead   • OOPHORECTOMY Bilateral 1987   • ROTATOR CUFF REPAIR Right    • SKIN BIOPSY     • TONSILLECTOMY     • TUBAL LIGATION  1976? Social History   Social History     Substance and Sexual Activity   Alcohol Use No     Social History     Substance and Sexual Activity   Drug Use No     Social History     Tobacco Use   Smoking Status Never   Smokeless Tobacco Never     Family History   Problem Relation Age of Onset   • Hypertension Mother    • Osteoporosis Mother    • Skin cancer Mother    • Migraines Mother    • Dementia Mother    • Hypertension Father    • Skin cancer Father    • Dementia Father    • Skin cancer Sister 68   • Migraines Sister    • No Known Problems Daughter    • Uterine cancer Maternal Grandmother 34   • Diabetes type II Maternal Grandfather    • Cancer Paternal Grandmother    • Uterine cancer Paternal Grandmother 72   • No Known Problems Maternal Aunt    • Cancer Paternal Aunt         Breast   • Uterine cancer Paternal Aunt 53   • No Known Problems Paternal Aunt    • No Known Problems Paternal Aunt      Meds/Allergies   Allergies   Allergen Reactions   • Cortisone Hypertension, Other (See Comments), Shortness Of Breath and Tachycardia   • Acebutolol    • Acetaminophen Other (See Comments)   • Amlodipine    • Atenolol    • Azithromycin    • Banana - Food Allergy GI Intolerance   • Bisphosphonates      Annotation - [id number]: iriitis   • Candesartan    • Clarithromycin    • Erythromycin    • Fosinopril    • Irbesartan    • Levofloxacin    • Losartan    • Methylprednisolone Hypertension and Tachycardia   • Metoprolol    • Nisoldipine    • Olmesartan    • Propranolol    • Sulfamethoxazole-Trimethoprim Nausea Only   • Timolol        Meds:  Prior to Admission medications    Medication Sig Start Date End Date Taking?  Authorizing Provider Alpha-D-Galactosidase (BEANO) TABS Take by mouth   Yes Historical Provider, MD   Alum Hydroxide-Mag Trisilicate 88-31 4 MG CHEW Chew   Yes Historical Provider, MD   ascorbic acid (VITAMIN C) 500 mg tablet Take by mouth   Yes Historical Provider, MD   Cholecalciferol 400 units TABS Take by mouth   Yes Historical Provider, MD   ciclopirox (PENLAC) 8 % solution Apply topically   Yes Historical Provider, MD   Digestive Enzyme CAPS Take by mouth   Yes Historical Provider, MD   diltiazem (CARDIZEM CD) 240 mg 24 hr capsule Take 1 capsule (240 mg total) by mouth daily May give patient  the stock bottle 2/1/23  Yes Delicia Cintron,    famotidine (PEPCID) 10 mg tablet Take 10 mg by mouth 2 (two) times a day   Yes Historical Provider, MD   lactase (LACTAID) 3,000 units tablet Take by mouth   Yes Historical Provider, MD   Minoxidil 5 % FOAM Apply topically   Yes Historical Provider, MD   Multiple Vitamins-Minerals (CENTRUM SILVER ULTRA WOMENS PO) Take by mouth   Yes Historical Provider, MD   polyethylene glycol-propylene glycol (SYSTANE) 0 4-0 3 %    Yes Historical Provider, MD   Premarin vaginal cream Insert 1 g into the vagina once a week Brand Necessary 1/12/22  Yes Jeronimo Aase, PA-C       Subjective:     Review of Systems:    General: negative for - chills, fatigue, fever,  weight gain or weight loss  Psychological: negative for - anxiety, behavioral disorder, concentration difficulties, decreased libido, depression, irritability, memory difficulties, mood swings, sleep disturbances or suicidal ideation  ENT: negative for - hearing difficulties , nasal congestion, nasal discharge, oral lesions, sinus pain, sneezing, sore throat  Allergy and Immunology: negative for - hives, insect bite sensitivity,  Hematological and Lymphatic: negative for - bleeding problems, blood clots,bruising, swollen lymph nodes  Endocrine: negative for - hair pattern changes, hot flashes, malaise/lethargy, mood swings, palpitations, "polydipsia/polyuria, skin changes, temperature intolerance or unexpected weight change  Respiratory: negative for - cough, hemoptysis, orthopnea, shortness of breath, or wheezing  Cardiovascular: negative for - chest pain, dyspnea on exertion, edema,  Gastrointestinal: negative for - abdominal pain, nausea/vomiting  Genito-Urinary: negative for - dysuria, incontinence, irregular/heavy menses or urinary frequency/urgency  Musculoskeletal: negative for - gait disturbance, joint pain, joint stiffness, joint swelling, muscle pain, muscular weakness  Dermatological:  As in HPI  Neurological: negative for confusion, dizziness, headaches, impaired coordination/balance, memory loss, numbness/tingling, seizures, speech problems, tremors or weakness       Objective:   Ht 5' 2 5\" (1 588 m)   Wt 49 9 kg (110 lb)   LMP  (LMP Unknown)   BMI 19 80 kg/m²     Physical Exam:    General Appearance:    Alert, cooperative, no distress   Head:    Normocephalic, without obvious abnormality, atraumatic           Skin:   A full skin exam was performed including scalp, head scalp, eyes, ears, nose, lips, neck, chest, axilla, abdomen, back, buttocks, bilateral upper extremities, bilateral lower extremities, hands, feet, fingers, toes, fingernails, and toenails matted telangiectasia noted on the left index finger normal keratotic papules greasy stuck on appearance previous site of skin cancers well-healed without recurrence nothing else atypical noted on exam     Assessment:     1  Telangiectasia        2  Seborrheic keratosis        3  Screening for skin condition        4   History of skin cancer              Plan:   telangectasia or nevoid type process appears definitely benign since this has been present for many years probably just gets more bruised if she has some trauma to the area patient reassured no treatment or intervention necessary  Seborrheic keratosis patient reassured these are normal growths we acquire with age no treatment " "needed  History of skin cancer in no recurrence nothing else atypical sunblock recommended follow-up in 1 year  Screening for dermatologic disorders nothing else of concern noted on complete exam follow-up in 1 year      Kelin Nguyen MD  0/44/7175,5:90 PM    Portions of the record may have been created with voice recognition software   Occasional wrong word or \"sound a like\" substitutions may have occurred due to the inherent limitations of voice recognition software   Read the chart carefully and recognize, using context, where substitutions have occurred    "

## 2023-05-25 NOTE — PROGRESS NOTES
Assessment and Plan:     Problem List Items Addressed This Visit        Other    Medicare annual wellness visit, subsequent - Primary     · Immunizations reviewed  · Discussed tetanus booster and Shingrix vaccination series she will plan for future  · Counseled about PCV 20 vaccine, agreeable to receiving today        Other Visit Diagnoses     Immunization due        Relevant Orders    Pneumococcal Conjugate Vaccine 20-valent (Pcv20) (Completed)    Need for hepatitis C screening test        Relevant Orders    Hepatitis C antibody        Preventive health issues were discussed with patient, and age appropriate screening tests were ordered as noted in patient's After Visit Summary  Personalized health advice and appropriate referrals for health education or preventive services given if needed, as noted in patient's After Visit Summary  Follow-up in August when lab work due and sooner as needed     History of Present Illness:     Patient presents for a Medicare Wellness Visit    HPI   Patient Care Team:  Archana Pedro DO as PCP - General (Family Medicine)  Kam Copeland MD as PCP - Endocrinology (Endocrinology)  MD Ajith Olmos MD Ardell Hatch, MD Pennie Cools, MD     Review of Systems:     Review of Systems     As noted in HPI      Problem List:     Patient Active Problem List   Diagnosis   • Seborrheic keratosis   • Changing skin lesion   • Screening for blood or protein in urine   • History of skin cancer   • Essential hypertension   • Near syncope   • Basal cell carcinoma of right temple region   • Episodic confusion   • Allergic rhinitis   • Hypertension   • Hypothyroidism   • Hashimoto's disease   • History of vitamin D deficiency   • Subclinical hypothyroidism   • Sensorineural hearing loss (SNHL) of both ears   • Sprain of anterior talofibular ligament of left ankle   • Tinnitus of both ears   • Symptoms of cerebrovascular accident (CVA)   • IBS (irritable bowel syndrome)   • Grief   • Transient alteration of awareness   • Gastroesophageal reflux disease   • Dysphonia   • Pain in right lumbar region of back   • Right flank pain   • Urinary symptom or sign   • Right hip pain   • Fatigue   • TMJ dysfunction   • Myofascial pain   • Tongue pain   • Reflux laryngitis   • Vertigo   • Vaginal atrophy   • Lactose intolerance   • Mild protein-calorie malnutrition (HCC)   • Pigmented purpura (HCC)   • Stage 3 chronic kidney disease, unspecified whether stage 3a or 3b CKD ([facility] [address] )   • Medicare annual wellness visit, subsequent      Past Medical and Surgical History:     Past Medical History:   Diagnosis Date   • Acne    • Basal cell carcinoma 06/08/2020    right lower forehead   • BCC (basal cell carcinoma of skin) 03/09/2020    mid forehead   • Benign neoplasm of skin    • Disease of thyroid gland 2006   • GERD (gastroesophageal reflux disease)    • Hypertension    • Inflamed seborrheic keratosis    • Irritable bowel syndrome    • Malignant neoplasm of skin of face    • Migraines    • Nonmelanoma skin cancer     Last Assessed:6/27/17   • Squamous cell skin cancer 06/08/2020    In situ, left lower forehead   • Tachycardia    • Telogen effluvium    • Temporomandibular joint disorder    • Vertigo      Past Surgical History:   Procedure Laterality Date   • ADENOIDECTOMY     • APPENDECTOMY     • CHOLECYSTECTOMY     • COLONOSCOPY  05/03/2019   • COMPLEX WOUND CLOSURE TO EXTREMITY N/A 7/28/2020    Procedure: COMPLEX CLOSURE MID FOREHEAD;  Surgeon: Sierra Iyer MD;  [facility];  Service: Plastics   • ESOPHAGOGASTRODUODENOSCOPY  2009   • FLAP LOCAL HEAD / NECK N/A 7/28/2020    Procedure: FLAP MID FOREHEAD;  Surgeon: Sierra Iyer MD;  [facility];  Service: Plastics   • HYSTERECTOMY  1987   • MALIGNANT SKIN LESION EXCISION      Excision of Lesion Face Malignant-9/14/2004 BCC Forehead   • MOHS RECONSTRUCTION N/A 7/28/2020    Procedure: RECONSTRUCTION MOHS DEFECT MID FOREHEAD; Surgeon: Gurvinder Golden MD;  [facility];  Service: Plastics   • MOHS SURGERY  07/27/2020    Right &left lower forehead, mid forehead   • OOPHORECTOMY Bilateral 1987   • ROTATOR CUFF REPAIR Right    • SKIN BIOPSY     • TONSILLECTOMY     • TUBAL LIGATION  1976? Family History:     Family History   Problem Relation Age of Onset   • Hypertension Mother    • Osteoporosis Mother    • Skin cancer Mother    • Migraines Mother    • Dementia Mother    • Hypertension Father    • Skin cancer Father    • Dementia Father    • Skin cancer Sister 68   • Migraines Sister    • No Known Problems Daughter    • Uterine cancer Maternal Grandmother 34   • Diabetes type II Maternal Grandfather    • Cancer Paternal Grandmother    • Uterine cancer Paternal Grandmother 72   • No Known Problems Maternal Aunt    • Cancer Paternal Aunt         Breast   • Uterine cancer Paternal Aunt 54   • No Known Problems Paternal Aunt    • No Known Problems Paternal Aunt       Social History:     Social History     Socioeconomic History   • Marital status: /Civil Union     Spouse name: None   • Number of children: None   • Years of education: None   • Highest education level: None   Occupational History   • None   Tobacco Use   • Smoking status: Never   • Smokeless tobacco: Never   Vaping Use   • Vaping Use: Never used   Substance and Sexual Activity   • Alcohol use: No   • Drug use: No   • Sexual activity: Yes     Partners: Male     Birth control/protection: Surgical   Other Topics Concern   • None   Social History Narrative   • None     Social Determinants of Health     Financial Resource Strain: Not on file   Food Insecurity: Not on file   Transportation Needs: No Transportation Needs (5/20/2023)    PRAPARE - Transportation    • Lack of Transportation (Medical): No    • Lack of Transportation (Non-Medical):  No   Physical Activity: Not on file   Stress: Not on file   Social Connections: Not on file   Intimate Partner Violence: Not on file   Housing Stability: Not on file      Medications and Allergies:     Current Outpatient Medications   Medication Sig Dispense Refill   • Alpha-D-Galactosidase (BEANO) TABS Take by mouth     • Alum Hydroxide-Mag Trisilicate 06-80 7 MG CHEW Chew     • ascorbic acid (VITAMIN C) 500 mg tablet Take by mouth     • Cholecalciferol 400 units TABS Take by mouth     • cholestyramine (QUESTRAN) 4 g packet Take 1 packet by mouth 3 (three) times a day with meals     • ciclopirox (PENLAC) 8 % solution Apply topically     • Digestive Enzyme CAPS Take by mouth     • diltiazem (CARDIZEM CD) 240 mg 24 hr capsule Take 1 capsule (240 mg total) by mouth daily May give patient  the stock bottle 90 capsule 1   • famotidine (PEPCID) 10 mg tablet Take 10 mg by mouth 2 (two) times a day     • lactase (LACTAID) 3,000 units tablet Take by mouth     • Minoxidil 5 % FOAM Apply topically     • Multiple Vitamins-Minerals (CENTRUM SILVER ULTRA WOMENS PO) Take by mouth     • polyethylene glycol-propylene glycol (SYSTANE) 0 4-0 3 %      • Premarin vaginal cream Insert 1 g into the vagina once a week Brand Necessary 30 g 1     No current facility-administered medications for this visit       Allergies   Allergen Reactions   • Cortisone Hypertension, Other (See Comments), Shortness Of Breath and Tachycardia   • Acebutolol    • Acetaminophen Other (See Comments)   • Amlodipine    • Atenolol    • Azithromycin    • Banana - Food Allergy GI Intolerance   • Bisphosphonates      Annotation - [id number]: iriitis   • Candesartan    • Clarithromycin    • Erythromycin    • Fosinopril    • Irbesartan    • Levofloxacin    • Losartan    • Methylprednisolone Hypertension and Tachycardia   • Metoprolol    • Nisoldipine    • Olmesartan    • Propranolol    • Sulfamethoxazole-Trimethoprim Nausea Only   • Timolol       Immunizations:     Immunization History   Administered Date(s) Administered   • COVID-19 MODERNA VACC 0 25 ML IM BOOSTER 11/04/2021   • COVID-19 MODERNA VACC 0 5 ML IM 01/24/2021, 02/21/2021, 04/13/2022, 09/21/2022   • INFLUENZA 10/12/2018, 10/13/2020, 10/14/2021, 10/14/2022   • Influenza Split High Dose Preservative Free IM 10/16/2013, 10/22/2014, 11/03/2015, 10/18/2017   • Pneumococcal Conjugate 13-Valent 11/28/2017   • Pneumococcal Conjugate Vaccine 20-valent (Pcv20), Polysace 05/26/2023   • Pneumococcal Polysaccharide PPV23 07/27/2015   • Zoster 03/18/2016   • influenza, trivalent, adjuvanted 10/02/2019      Health Maintenance: There are no preventive care reminders to display for this patient  Topic Date Due   • COVID-19 Vaccine (6 - Moderna series) 11/16/2022      Medicare Screening Tests and Risk Assessments:     Elizabeth Jaramillo is here for her Subsequent Wellness visit  Health Risk Assessment:   Patient rates overall health as good  Patient feels that their physical health rating is same  Patient is very satisfied with their life  Eyesight was rated as same  Hearing was rated as same  Patient feels that their emotional and mental health rating is same  Patients states they are never, rarely angry  Patient states they are never, rarely unusually tired/fatigued  Pain experienced in the last 7 days has been none  Patient states that she has experienced no weight loss or gain in last 6 months  Fall Risk Screening: In the past year, patient has experienced: no history of falling in past year      Urinary Incontinence Screening:   Patient has not leaked urine accidently in the last six months  Home Safety:  Patient does not have trouble with stairs inside or outside of their home  Patient has working smoke alarms and has working carbon monoxide detector  Home safety hazards include: none  Nutrition:   Current diet is Regular and No Added Salt  Medications:   Patient is currently taking over-the-counter supplements  OTC medications include: Vitamin C & Vit D3 & Centrum Silver  Patient is able to manage medications       Activities of Daily Living (ADLs)/Instrumental Activities of Daily Living (IADLs):   Walk and transfer into and out of bed and chair?: Yes  Dress and groom yourself?: Yes    Bathe or shower yourself?: Yes    Feed yourself? Yes  Do your laundry/housekeeping?: Yes  Manage your money, pay your bills and track your expenses?: Yes  Make your own meals?: Yes    Do your own shopping?: Yes    Previous Hospitalizations:   Any hospitalizations or ED visits within the last 12 months?: No      Advance Care Planning:   Living will: Yes    Durable POA for healthcare: Yes    Advanced directive: Yes      Cognitive Screening:   Provider or family/friend/caregiver concerned regarding cognition?: No    PREVENTIVE SCREENINGS      Cardiovascular Screening:    General: Screening Current      Diabetes Screening:     General: Screening Current      Colorectal Cancer Screening:     General: Screening Current      Breast Cancer Screening:     General: Screening Current      Cervical Cancer Screening:    General: Screening Not Indicated      Osteoporosis Screening:    General: History Osteoporosis      Abdominal Aortic Aneurysm (AAA) Screening:        General: Screening Current      Lung Cancer Screening:     General: Screening Not Indicated      Hepatitis C Screening:    General: Risks and Benefits Discussed    Hep C Screening Accepted: Yes      Screening, Brief Intervention, and Referral to Treatment (SBIRT)    Screening  Typical number of drinks in a day: 0  Typical number of drinks in a week: 0  Interpretation: Low risk drinking behavior      AUDIT-C Screenin) How often did you have a drink containing alcohol in the past year? never  2) How many drinks did you have on a typical day when you were drinking in the past year? 0  3) How often did you have 6 or more drinks on one occasion in the past year? never    AUDIT-C Score: 0  Interpretation: Score 0-2 (female): Negative screen for alcohol misuse    Single Item Drug Screening:  How often have you "used an illegal drug (including marijuana) or a prescription medication for non-medical reasons in the past year? never    Single Item Drug Screen Score: 0  Interpretation: Negative screen for possible drug use disorder    Other Counseling Topics:   Car/seat belt/driving safety, skin self-exam, sunscreen and regular weightbearing exercise and calcium and vitamin D intake   Sees Dermatology for skin checks       /74 (BP Location: Left arm, Patient Position: Sitting, Cuff Size: Adult)   Pulse 70   Temp 97 8 °F (36 6 °C)   Resp 16   Ht 5' 2 5\" (1 588 m)   Wt 49 kg (108 lb)   LMP  (LMP Unknown)   SpO2 97%   BMI 19 44 kg/m²         Kimmy Billing, DO  "

## 2023-05-26 ENCOUNTER — OFFICE VISIT (OUTPATIENT)
Dept: FAMILY MEDICINE CLINIC | Facility: MEDICAL CENTER | Age: 78
End: 2023-05-26

## 2023-05-26 VITALS
WEIGHT: 108 LBS | DIASTOLIC BLOOD PRESSURE: 74 MMHG | TEMPERATURE: 97.8 F | HEIGHT: 63 IN | OXYGEN SATURATION: 97 % | RESPIRATION RATE: 16 BRPM | SYSTOLIC BLOOD PRESSURE: 142 MMHG | HEART RATE: 70 BPM | BODY MASS INDEX: 19.14 KG/M2

## 2023-05-26 DIAGNOSIS — Z11.59 NEED FOR HEPATITIS C SCREENING TEST: ICD-10-CM

## 2023-05-26 DIAGNOSIS — Z00.00 MEDICARE ANNUAL WELLNESS VISIT, SUBSEQUENT: Primary | ICD-10-CM

## 2023-05-26 DIAGNOSIS — Z23 IMMUNIZATION DUE: ICD-10-CM

## 2023-05-26 RX ORDER — CHOLESTYRAMINE 4 G/9G
1 POWDER, FOR SUSPENSION ORAL
COMMUNITY

## 2023-05-26 NOTE — ASSESSMENT & PLAN NOTE
· Immunizations reviewed  · Discussed tetanus booster and Shingrix vaccination series she will plan for future  · Counseled about PCV 20 vaccine, agreeable to receiving today

## 2023-06-14 ENCOUNTER — OFFICE VISIT (OUTPATIENT)
Dept: GYNECOLOGY | Facility: CLINIC | Age: 78
End: 2023-06-14
Payer: MEDICARE

## 2023-06-14 VITALS
SYSTOLIC BLOOD PRESSURE: 130 MMHG | DIASTOLIC BLOOD PRESSURE: 82 MMHG | BODY MASS INDEX: 18.61 KG/M2 | HEIGHT: 63 IN | WEIGHT: 105 LBS

## 2023-06-14 DIAGNOSIS — D69.2 PIGMENTED PURPURA (HCC): ICD-10-CM

## 2023-06-14 DIAGNOSIS — N95.2 VAGINAL ATROPHY: ICD-10-CM

## 2023-06-14 DIAGNOSIS — Z12.31 SCREENING MAMMOGRAM FOR BREAST CANCER: Primary | ICD-10-CM

## 2023-06-14 PROCEDURE — 99212 OFFICE O/P EST SF 10 MIN: CPT | Performed by: OBSTETRICS & GYNECOLOGY

## 2023-06-14 NOTE — PROGRESS NOTES
"Assessment/Plan:    Normal breast  Vaginal atrophy  Normal mammogram 2022  Hysterectomy  DEXA scan 2022 showing osteoporosis  History of melanoma    Plan: Continue vaginal estrogen 2 times a week  Rx mammogram   Continue healthy diet and daily exercise  Subjective:       Patient ID: Rebeka Handley is a [de-identified] y o  female presents to the office for valuation of vaginal atrophy  Using vaginal estrogen cream twice a week and has noticed improvement  Sexually active and denies any dyspareunia  Denies any vaginal bleeding  Status post hysterectomy  Denies any breast bowel or bladder issues  Eating extremely healthy and exercising at home daily  This includes balance toning and weightbearing  This with her grandchildren on a regular basis  Review of Systems   Constitutional: Negative  Negative for fatigue, fever and unexpected weight change  HENT: Negative  Eyes: Negative  Respiratory: Negative  Negative for chest tightness, shortness of breath, wheezing and stridor  Cardiovascular: Negative  Negative for chest pain, palpitations and leg swelling  Gastrointestinal: Negative  Negative for abdominal pain, blood in stool, diarrhea, nausea, rectal pain and vomiting  Endocrine: Negative  Genitourinary: Negative for dysuria, frequency, vaginal bleeding, vaginal discharge and vaginal pain  Musculoskeletal: Negative  Skin: Negative  Allergic/Immunologic: Negative  Neurological: Negative  Hematological: Negative  Psychiatric/Behavioral: Negative  All other systems reviewed and are negative  Objective:      /82   Ht 5' 2 5\" (1 588 m)   Wt 47 6 kg (105 lb)   LMP  (LMP Unknown)   BMI 18 90 kg/m²          Physical Exam  Constitutional:       Appearance: She is well-developed  Cardiovascular:      Rate and Rhythm: Normal rate and regular rhythm  Heart sounds: Normal heart sounds     Pulmonary:      Effort: Pulmonary effort is normal  " No respiratory distress  Breath sounds: No stridor  No wheezing or rales  Chest:      Chest wall: No tenderness  Breasts:     Breasts are symmetrical       Right: No inverted nipple, mass, nipple discharge, skin change or tenderness  Left: No inverted nipple, mass, nipple discharge, skin change or tenderness  Abdominal:      General: Bowel sounds are normal  There is no distension  Palpations: Abdomen is soft  There is no mass  Tenderness: There is no abdominal tenderness  There is no guarding or rebound  Hernia: No hernia is present  There is no hernia in the left inguinal area  Genitourinary:     Labia:         Right: No rash, tenderness, lesion or injury  Left: No rash, tenderness, lesion or injury  Vagina: Normal  No signs of injury and foreign body  No vaginal discharge, erythema, tenderness or bleeding  Adnexa:         Right: No mass, tenderness or fullness  Left: No mass, tenderness or fullness  Rectum: No mass, tenderness, anal fissure, external hemorrhoid or internal hemorrhoid  Normal anal tone  Comments: Urethral meatus normal   Vaginal atrophy  Normal Rohrsburg's glands  No vaginal vault prolapse  No cystocele or rectocele  Lymphadenopathy:      Lower Body: No right inguinal adenopathy  No left inguinal adenopathy  Psychiatric:         Behavior: Behavior normal          Thought Content:  Thought content normal          Judgment: Judgment normal

## 2023-07-25 PROBLEM — Z00.00 MEDICARE ANNUAL WELLNESS VISIT, SUBSEQUENT: Status: RESOLVED | Noted: 2023-05-26 | Resolved: 2023-07-25

## 2023-08-03 ENCOUNTER — TELEPHONE (OUTPATIENT)
Dept: ADMINISTRATIVE | Facility: OTHER | Age: 78
End: 2023-08-03

## 2023-08-03 DIAGNOSIS — I10 ESSENTIAL HYPERTENSION: ICD-10-CM

## 2023-08-03 RX ORDER — DILTIAZEM HYDROCHLORIDE 240 MG/1
240 CAPSULE, COATED, EXTENDED RELEASE ORAL DAILY
Qty: 90 CAPSULE | Refills: 0 | Status: SHIPPED | OUTPATIENT
Start: 2023-08-03

## 2023-08-03 NOTE — TELEPHONE ENCOUNTER
08/03/23 4:08 PM    Patient contacted (left message) to bring ACP document to next scheduled pcp visit. Thank you.   To Drew  [facility]

## 2023-08-07 ENCOUNTER — APPOINTMENT (OUTPATIENT)
Dept: LAB | Facility: MEDICAL CENTER | Age: 78
End: 2023-08-07
Payer: MEDICARE

## 2023-08-07 DIAGNOSIS — E03.8 SUBCLINICAL HYPOTHYROIDISM: ICD-10-CM

## 2023-08-07 DIAGNOSIS — N18.30 STAGE 3 CHRONIC KIDNEY DISEASE, UNSPECIFIED WHETHER STAGE 3A OR 3B CKD (HCC): ICD-10-CM

## 2023-08-07 DIAGNOSIS — Z11.59 NEED FOR HEPATITIS C SCREENING TEST: ICD-10-CM

## 2023-08-07 LAB
ALBUMIN SERPL BCP-MCNC: 3.8 G/DL (ref 3.5–5)
ALP SERPL-CCNC: 74 U/L (ref 46–116)
ALT SERPL W P-5'-P-CCNC: 31 U/L (ref 12–78)
ANION GAP SERPL CALCULATED.3IONS-SCNC: 3 MMOL/L
AST SERPL W P-5'-P-CCNC: 21 U/L (ref 5–45)
BILIRUB SERPL-MCNC: 0.51 MG/DL (ref 0.2–1)
BUN SERPL-MCNC: 21 MG/DL (ref 5–25)
CALCIUM SERPL-MCNC: 9.3 MG/DL (ref 8.3–10.1)
CHLORIDE SERPL-SCNC: 105 MMOL/L (ref 96–108)
CO2 SERPL-SCNC: 32 MMOL/L (ref 21–32)
CREAT SERPL-MCNC: 1.1 MG/DL (ref 0.6–1.3)
GFR SERPL CREATININE-BSD FRML MDRD: 47 ML/MIN/1.73SQ M
GLUCOSE P FAST SERPL-MCNC: 93 MG/DL (ref 65–99)
HCV AB SER QL: NORMAL
POTASSIUM SERPL-SCNC: 3.9 MMOL/L (ref 3.5–5.3)
PROT SERPL-MCNC: 7.5 G/DL (ref 6.4–8.4)
SODIUM SERPL-SCNC: 140 MMOL/L (ref 135–147)
TSH SERPL DL<=0.05 MIU/L-ACNC: 4.38 UIU/ML (ref 0.45–4.5)

## 2023-08-07 PROCEDURE — 36415 COLL VENOUS BLD VENIPUNCTURE: CPT

## 2023-08-07 PROCEDURE — 84443 ASSAY THYROID STIM HORMONE: CPT

## 2023-08-07 PROCEDURE — 86803 HEPATITIS C AB TEST: CPT

## 2023-08-07 PROCEDURE — 80053 COMPREHEN METABOLIC PANEL: CPT

## 2023-08-10 ENCOUNTER — TELEPHONE (OUTPATIENT)
Dept: FAMILY MEDICINE CLINIC | Facility: MEDICAL CENTER | Age: 78
End: 2023-08-10

## 2023-08-10 ENCOUNTER — OFFICE VISIT (OUTPATIENT)
Dept: FAMILY MEDICINE CLINIC | Facility: MEDICAL CENTER | Age: 78
End: 2023-08-10
Payer: MEDICARE

## 2023-08-10 ENCOUNTER — APPOINTMENT (OUTPATIENT)
Dept: LAB | Facility: MEDICAL CENTER | Age: 78
End: 2023-08-10
Payer: MEDICARE

## 2023-08-10 VITALS — TEMPERATURE: 98 F | HEIGHT: 63 IN | RESPIRATION RATE: 18 BRPM | BODY MASS INDEX: 18.78 KG/M2 | WEIGHT: 106 LBS

## 2023-08-10 DIAGNOSIS — R53.82 CHRONIC FATIGUE: ICD-10-CM

## 2023-08-10 DIAGNOSIS — Z00.00 HEALTH CARE MAINTENANCE: ICD-10-CM

## 2023-08-10 DIAGNOSIS — R33.9 INCOMPLETE BLADDER EMPTYING: ICD-10-CM

## 2023-08-10 DIAGNOSIS — R19.8 ABDOMINAL COMPLAINTS: ICD-10-CM

## 2023-08-10 DIAGNOSIS — R53.82 CHRONIC FATIGUE: Primary | ICD-10-CM

## 2023-08-10 LAB
BASOPHILS # BLD AUTO: 0.08 THOUSANDS/ÂΜL (ref 0–0.1)
BASOPHILS NFR BLD AUTO: 1 % (ref 0–1)
BILIRUB UR QL STRIP: NEGATIVE
CLARITY UR: CLEAR
COLOR UR: NORMAL
EOSINOPHIL # BLD AUTO: 0.21 THOUSAND/ÂΜL (ref 0–0.61)
EOSINOPHIL NFR BLD AUTO: 4 % (ref 0–6)
ERYTHROCYTE [DISTWIDTH] IN BLOOD BY AUTOMATED COUNT: 13.7 % (ref 11.6–15.1)
GLUCOSE UR STRIP-MCNC: NEGATIVE MG/DL
HCT VFR BLD AUTO: 42.2 % (ref 34.8–46.1)
HGB BLD-MCNC: 12.9 G/DL (ref 11.5–15.4)
HGB UR QL STRIP.AUTO: NEGATIVE
IMM GRANULOCYTES # BLD AUTO: 0 THOUSAND/UL (ref 0–0.2)
IMM GRANULOCYTES NFR BLD AUTO: 0 % (ref 0–2)
KETONES UR STRIP-MCNC: NEGATIVE MG/DL
LEUKOCYTE ESTERASE UR QL STRIP: NEGATIVE
LYMPHOCYTES # BLD AUTO: 1.53 THOUSANDS/ÂΜL (ref 0.6–4.47)
LYMPHOCYTES NFR BLD AUTO: 28 % (ref 14–44)
MCH RBC QN AUTO: 28.2 PG (ref 26.8–34.3)
MCHC RBC AUTO-ENTMCNC: 30.6 G/DL (ref 31.4–37.4)
MCV RBC AUTO: 92 FL (ref 82–98)
MONOCYTES # BLD AUTO: 0.57 THOUSAND/ÂΜL (ref 0.17–1.22)
MONOCYTES NFR BLD AUTO: 10 % (ref 4–12)
NEUTROPHILS # BLD AUTO: 3.15 THOUSANDS/ÂΜL (ref 1.85–7.62)
NEUTS SEG NFR BLD AUTO: 57 % (ref 43–75)
NITRITE UR QL STRIP: NEGATIVE
NRBC BLD AUTO-RTO: 0 /100 WBCS
PH UR STRIP.AUTO: 6.5 [PH]
PLATELET # BLD AUTO: 276 THOUSANDS/UL (ref 149–390)
PMV BLD AUTO: 10.6 FL (ref 8.9–12.7)
PROT UR STRIP-MCNC: NEGATIVE MG/DL
RBC # BLD AUTO: 4.58 MILLION/UL (ref 3.81–5.12)
SP GR UR STRIP.AUTO: 1.01 (ref 1–1.03)
UROBILINOGEN UR STRIP-ACNC: <2 MG/DL
WBC # BLD AUTO: 5.54 THOUSAND/UL (ref 4.31–10.16)

## 2023-08-10 PROCEDURE — 99214 OFFICE O/P EST MOD 30 MIN: CPT | Performed by: STUDENT IN AN ORGANIZED HEALTH CARE EDUCATION/TRAINING PROGRAM

## 2023-08-10 PROCEDURE — 85025 COMPLETE CBC W/AUTO DIFF WBC: CPT

## 2023-08-10 PROCEDURE — 36415 COLL VENOUS BLD VENIPUNCTURE: CPT

## 2023-08-10 PROCEDURE — 81003 URINALYSIS AUTO W/O SCOPE: CPT | Performed by: STUDENT IN AN ORGANIZED HEALTH CARE EDUCATION/TRAINING PROGRAM

## 2023-08-10 NOTE — TELEPHONE ENCOUNTER
Called pt to ask if she could explain what issues she would like to discuss with Dr Tip Das today. She said she had a list.  In order of importance:    1) Fatigue  Has had X 3-4 months. She tries to drink water every day, but when she drinks more water, the fatigue lessens. 2) She has had a bule in her left side X 6 mos. It is above her hip, below her waist.  It is painful at times and about the size of an orange. 3) She has had a slight right earache X 2 wks. It is intermittent, no hearing loss, no discharge. 4) She has questions about her "injections" and when she is supposed to get them. She mentioned hepatitis injection and the osteoporosis injection, which she said she was not sure if she was supposed to get here or from endo.     Routed to Dr Tip Das

## 2023-08-10 NOTE — PROGRESS NOTES
[facility] - Clinic Note  [facility] [place]     Narcisa Lanza MRN: [id number] : 1945 Age: 78 y.o. Assessment/Plan     1. Chronic fatigue    - I have reviewed pertinent labs:    CMP:   Lab Results   Component Value Date    SODIUM 140 2023    K 3.9 2023     2023    CO2 32 2023    AGAP 3 2023    BUN 21 2023    CREATININE 1.10 2023    GLUC 86 2021    GLUF 93 2023    CALCIUM 9.3 2023    AST 21 2023    ALT 31 2023    ALKPHOS 74 2023    TP 7.5 2023    ALB 3.8 2023    TBILI 0.51 2023    EGFR 47 2023     Vitamin D Level   Lab Results   Component Value Date    [id number] 43.7 2023     -Screen for anemia  - CBC and differential; Future    2. Incomplete bladder emptying    - UA w Reflex to Microscopic w Reflex to Culture     3. Abdominal complaints    -Patient had some tenderness right upper quadrant today, she is status post cholecystectomy, she did bring this up with her gastroenterologist in the past as well, abdominal ultrasound was obtained, result was reviewed today 23: IMPRESSION:  1. Post cholecystectomy. No biliary ductal dilation.   2.  There is an ill-defined slightly hyperechoic lesion in the inferior right hepatic lobe measuring 2.0 x 1.5 x 1.9 cm. This appears to correlate with the lesion seen on prior MRI 2019 which was characterized as a hemangioma. No significant   interval change in size.  -Patient also complaining of left-sided bulge, no hernia appreciated on exam today and nonacute abdomen  -Will monitor, patient advised to contact if symptoms persist or worsening     4.  Health care maintenance    -Patient completed first Shingrix vaccination, she will complete second 2 to 6 months thereafter  -He is aware to complete tetanus booster  -Advised about eligibility for second COVID-19 bivalent vaccine  -She will be proceeding with Prolia injection on August 22     Hoda Sawyer acknowledged understanding of treatment plan, all questions answered. Subjective      Hoda Sawyer is a 78 y.o. female who presents to discuss some concerns. She endorses fatigue "when I drink water I feel better after". She denies any tick bite recent. She does not snore. "Going up steps sometimes my legs feel very heavy, other times I can go up" quickly. Patient states that she also has also noticed a bulge left-sided when she turns, noted about 3 to 4 months ago. Bulge has not changed in size, tender "at times". "I had ear pain off-and-on about 3 weeks ago, about 4-5 times that day and then that was it". No tinnitus and no hearing loss complaint. No otorrhea. "And when I pee it feels like I am not getting everything out". No fever and no chills. No dysuria. No hematuria. No flank pain. No history of pyelonephritis.     The following portions of the patient's history were reviewed and updated as appropriate: allergies, current medications, past family history, past medical history, past social history, past surgical history and problem list.    Past Medical History:   Diagnosis Date   • Acne    • Basal cell carcinoma 06/08/2020    right lower forehead   • BCC (basal cell carcinoma of skin) 03/09/2020    mid forehead   • Benign neoplasm of skin    • Disease of thyroid gland 2006   • GERD (gastroesophageal reflux disease)    • Hypertension    • Inflamed seborrheic keratosis    • Irritable bowel syndrome    • Malignant neoplasm of skin of face    • Migraines    • Nonmelanoma skin cancer     Last Assessed:6/27/17   • Squamous cell skin cancer 06/08/2020    In situ, left lower forehead   • Tachycardia    • Telogen effluvium    • Temporomandibular joint disorder    • Vertigo        Allergies   Allergen Reactions   • Cortisone Hypertension, Other (See Comments), Shortness Of Breath and Tachycardia   • Acebutolol    • Acetaminophen Other (See Comments)   • Amlodipine    • Atenolol    • Azithromycin    • Bisphosphonates      Annotation - [id number]: iriitis   • Candesartan    • Clarithromycin    • Erythromycin    • Fosinopril    • Irbesartan    • Levofloxacin    • Losartan    • Methylprednisolone Hypertension and Tachycardia   • Metoprolol    • Nisoldipine    • Olmesartan    • Propranolol    • Sulfamethoxazole-Trimethoprim Nausea Only   • Timolol        Past Surgical History:   Procedure Laterality Date   • ADENOIDECTOMY     • APPENDECTOMY     • CHOLECYSTECTOMY     • COLONOSCOPY  05/03/2019   • COMPLEX WOUND CLOSURE TO EXTREMITY N/A 7/28/2020    Procedure: COMPLEX CLOSURE MID FOREHEAD;  Surgeon: Lizeth Ortiz MD;  Location: [facility];  Service: Plastics   • ESOPHAGOGASTRODUODENOSCOPY  2009   • FLAP LOCAL HEAD / NECK N/A 7/28/2020    Procedure: FLAP MID FOREHEAD;  Surgeon: Lizeth Ortiz MD;  Location: [facility];  Service: Plastics   • HYSTERECTOMY  1987   • MALIGNANT SKIN LESION EXCISION      Excision of Lesion Face Malignant-9/14/2004 [facility] Forehead   • MOHS RECONSTRUCTION N/A 7/28/2020    Procedure: RECONSTRUCTION MOHS DEFECT MID FOREHEAD;  Surgeon: Lizeth Ortiz MD;  Location: [facility];  Service: Plastics   • MOHS SURGERY  07/27/2020    Right &left lower forehead, mid forehead   • OOPHORECTOMY Bilateral 1987   • ROTATOR CUFF REPAIR Right    • SKIN BIOPSY     • TONSILLECTOMY     • [address]?        Family History   Problem Relation Age of Onset   • Hypertension Mother    • Osteoporosis Mother    • Skin cancer Mother    • Migraines Mother    • Dementia Mother    • Hypertension Father    • Skin cancer Father    • Dementia Father    • Skin cancer Sister 68   • Migraines Sister    • No Known Problems Daughter    • Uterine cancer Maternal Grandmother 34   • Diabetes type II Maternal Grandfather    • Cancer Paternal Grandmother    • Uterine cancer Paternal Grandmother 66   • No Known Problems Maternal Aunt    • Cancer Paternal Aunt         Breast   • Uterine cancer Paternal Aunt 54   • No Known Problems Paternal Aunt    • No Known Problems Paternal Aunt        Social History     Socioeconomic History   • Marital status: /Civil Union     Spouse name: None   • Number of children: None   • Years of education: None   • Highest education level: None   Occupational History   • None   Tobacco Use   • Smoking status: Never   • Smokeless tobacco: Never   Vaping Use   • Vaping Use: Never used   Substance and Sexual Activity   • Alcohol use: No   • Drug use: No   • Sexual activity: Yes     Partners: Male     Birth control/protection: Surgical   Other Topics Concern   • None   Social History Narrative   • None     Social Determinants of Health     Financial Resource Strain: Not on file   Food Insecurity: Not on file   Transportation Needs: No Transportation Needs (5/20/2023)    PRAPARE - Transportation    • Lack of Transportation (Medical): No    • Lack of Transportation (Non-Medical):  No   Physical Activity: Not on file   Stress: Not on file   Social Connections: Not on file   Intimate Partner Violence: Not on file   Housing Stability: Not on file       Current Outpatient Medications   Medication Sig Dispense Refill   • Alpha-D-Galactosidase (BEANO) TABS Take by mouth     • ascorbic acid (VITAMIN C) 500 mg tablet Take by mouth     • cholestyramine (QUESTRAN) 4 g packet Take 1 packet by mouth 3 (three) times a day with meals     • Digestive Enzyme CAPS Take by mouth     • diltiazem (CARDIZEM CD) 240 mg 24 hr capsule Take 1 capsule (240 mg total) by mouth daily May give patient  the stock bottle 90 capsule 0   • famotidine (PEPCID) 10 mg tablet Take 10 mg by mouth 2 (two) times a day     • lactase (LACTAID) 3,000 units tablet Take by mouth     • Minoxidil 5 % FOAM Apply topically     • Multiple Vitamins-Minerals (CENTRUM SILVER ULTRA WOMENS PO) Take by mouth     • polyethylene glycol-propylene glycol (SYSTANE) 0.4-0.3 %      • Premarin vaginal cream Insert 1 g into the vagina once a week Brand Necessary 30 g 1     No current facility-administered medications for this visit. Review of Systems     As noted in HPI    Objective      Temp 98 °F (36.7 °C)   Resp 18   Ht 5' 2.5" (1.588 m)   Wt 48.1 kg (106 lb)   LMP  (LMP Unknown)   BMI 19.08 kg/m²     Physical Exam  Vitals reviewed. Constitutional:       General: She is not in acute distress. Appearance: She is well-developed. She is not ill-appearing, toxic-appearing or diaphoretic. HENT:      Head: Normocephalic and atraumatic. Right Ear: Tympanic membrane normal.      Left Ear: Tympanic membrane, ear canal and external ear normal.      Ears:      Comments: Some wax right EAC but not obstructing view of tympanic membrane which appears normal     Mouth/Throat:      Mouth: Mucous membranes are moist.      Pharynx: Oropharynx is clear. Eyes:      Extraocular Movements: Extraocular movements intact. Pupils: Pupils are equal, round, and reactive to light. Cardiovascular:      Rate and Rhythm: Normal rate and regular rhythm. Heart sounds: Normal heart sounds. Pulmonary:      Effort: Pulmonary effort is normal.      Breath sounds: Normal breath sounds. Abdominal:      General: Abdomen is flat. Bowel sounds are normal. There is no distension. Palpations: Abdomen is soft. Tenderness: There is abdominal tenderness in the right upper quadrant. There is no right CVA tenderness, left CVA tenderness, guarding or rebound. Hernia: No hernia is present. Musculoskeletal:      Cervical back: Neck supple. Lymphadenopathy:      Cervical: No cervical adenopathy. Skin:     General: Skin is warm and dry. Neurological:      Mental Status: She is alert and oriented to person, place, and time. Psychiatric:         Mood and Affect: Mood normal.         Behavior: Behavior normal.             Some portions of this record may have been generated with voice recognition software.  There may be translation, syntax, or grammatical errors. Occasional wrong word or "sound-a-like" substitutions may have occurred due to the inherent limitations of the voice recognition software. Read the chart carefully and recognize, using context, where substations may have occurred. If you have any questions, please contact the dictating provider for clarification or correction, as needed.

## 2023-09-27 DIAGNOSIS — N95.2 VAGINAL ATROPHY: ICD-10-CM

## 2023-09-27 RX ORDER — CONJUGATED ESTROGENS 0.62 MG/G
1 CREAM VAGINAL WEEKLY
Qty: 30 G | Refills: 1 | Status: SHIPPED | OUTPATIENT
Start: 2023-09-27

## 2023-11-15 ENCOUNTER — HOSPITAL ENCOUNTER (OUTPATIENT)
Dept: RADIOLOGY | Facility: MEDICAL CENTER | Age: 78
Discharge: HOME | End: 2023-11-15
Payer: MEDICARE

## 2023-11-15 VITALS — WEIGHT: 103 LBS | HEIGHT: 63 IN | BODY MASS INDEX: 18.25 KG/M2

## 2023-11-15 DIAGNOSIS — Z12.31 SCREENING MAMMOGRAM FOR BREAST CANCER: ICD-10-CM

## 2023-11-15 DIAGNOSIS — Z12.31 ENCOUNTER FOR SCREENING MAMMOGRAM FOR MALIGNANT NEOPLASM OF BREAST: ICD-10-CM

## 2023-11-15 PROCEDURE — 77067 SCR MAMMO BI INCL CAD: CPT

## 2023-11-15 PROCEDURE — 77063 BREAST TOMOSYNTHESIS BI: CPT

## 2023-11-20 DIAGNOSIS — I10 ESSENTIAL HYPERTENSION: ICD-10-CM

## 2023-11-20 NOTE — TELEPHONE ENCOUNTER
Requested medication(s) are due for refill today: Yes  Patient has already received a courtesy refill: No  Other reason request has been forwarded to provider: pts gfr is at a 45

## 2023-11-21 RX ORDER — DILTIAZEM HYDROCHLORIDE 240 MG/1
240 CAPSULE, COATED, EXTENDED RELEASE ORAL DAILY
Qty: 90 CAPSULE | Refills: 0 | Status: SHIPPED | OUTPATIENT
Start: 2023-11-21

## 2024-01-01 ENCOUNTER — HOME CARE VISIT (OUTPATIENT)
Dept: HOME HEALTH SERVICES | Facility: HOME HEALTHCARE | Age: 79
End: 2024-01-01
Payer: MEDICARE

## 2024-01-01 ENCOUNTER — HOME CARE VISIT (OUTPATIENT)
Dept: HOME HOSPICE | Facility: HOSPICE | Age: 79
End: 2024-01-01
Payer: MEDICARE

## 2024-01-01 ENCOUNTER — HOSPICE ADMISSION (OUTPATIENT)
Dept: HOME HOSPICE | Facility: HOSPICE | Age: 79
End: 2024-01-01
Payer: MEDICARE

## 2024-01-01 PROCEDURE — 10330064 CUSHION, COMPRESS COCCYX (4/CS)

## 2024-01-01 PROCEDURE — 10330057 MEDICATION, GENERAL

## 2024-01-01 PROCEDURE — 10330064 PHD EQUIPMENT HANDLING CHARGE PHD EQUIPM

## 2024-01-01 PROCEDURE — G0155 HHCP-SVS OF CSW,EA 15 MIN: HCPCS

## 2024-01-01 PROCEDURE — G0299 HHS/HOSPICE OF RN EA 15 MIN: HCPCS

## 2024-01-01 PROCEDURE — 10330064 BRIEF, TAB CLSR ULTRA LG 45-58LG (18/BG

## 2024-01-01 PROCEDURE — 10330064 DRESSING, HYDROCOLLOID FILM-BCK STR SACR

## 2024-01-01 PROCEDURE — 10330064 BRIEF, TAB CLSR ULTRA SM 22-36(24/BG 4BG

## 2024-01-01 PROCEDURE — 10330064 CUSHION, EAR TU FM F/NASAL CANNULA (50PR

## 2024-01-01 PROCEDURE — 10330087 HSPC SERVICE INTENSITY ADD-ON

## 2024-01-01 PROCEDURE — 10330064 UNDERPAD, REUSE 36X36 1DZ     BECKCL

## 2024-01-01 PROCEDURE — 10330064 BEDPAN, PONTOON GRAPHITE 55OZ (20/CS)

## 2024-01-04 ENCOUNTER — OFFICE VISIT (OUTPATIENT)
Dept: DERMATOLOGY | Facility: CLINIC | Age: 79
End: 2024-01-04
Payer: MEDICARE

## 2024-01-04 VITALS — TEMPERATURE: 97.3 F | BODY MASS INDEX: 18.61 KG/M2 | WEIGHT: 105 LBS | HEIGHT: 63 IN

## 2024-01-04 DIAGNOSIS — D18.01 CHERRY ANGIOMA: ICD-10-CM

## 2024-01-04 DIAGNOSIS — L73.8 SEBACEOUS HYPERPLASIA: ICD-10-CM

## 2024-01-04 DIAGNOSIS — L72.0 MILIUM: ICD-10-CM

## 2024-01-04 DIAGNOSIS — L81.4 LENTIGO: ICD-10-CM

## 2024-01-04 DIAGNOSIS — L85.3 XEROSIS OF SKIN: ICD-10-CM

## 2024-01-04 DIAGNOSIS — Z85.828 HISTORY OF BASAL CELL CARCINOMA: ICD-10-CM

## 2024-01-04 DIAGNOSIS — L82.1 SEBORRHEIC KERATOSIS: ICD-10-CM

## 2024-01-04 DIAGNOSIS — L64.9 ANDROGENIC ALOPECIA: ICD-10-CM

## 2024-01-04 DIAGNOSIS — D22.9 MULTIPLE MELANOCYTIC NEVI: Primary | ICD-10-CM

## 2024-01-04 DIAGNOSIS — Z85.828 HISTORY OF SCC (SQUAMOUS CELL CARCINOMA) OF SKIN: ICD-10-CM

## 2024-01-04 PROCEDURE — 99213 OFFICE O/P EST LOW 20 MIN: CPT | Performed by: DERMATOLOGY

## 2024-01-04 NOTE — PROGRESS NOTES
"[facility] Dermatology Clinic Note     Patient Name: Brigid Brown  Encounter Date: 1/4/2024    Have you been cared for by a [facility] Dermatologist in the last 3 years and, if so, which description applies to you?    Yes.  I have been here within the last 3 years, and my medical history has NOT changed since that time.  I am FEMALE/of child-bearing potential.    REVIEW OF SYSTEMS:  Have you recently had or currently have any of the following? No changes in my recent health.   PAST MEDICAL HISTORY:  Have you personally ever had or currently have any of the following?  If \"YES,\" then please provide more detail. No changes in my medical history.   HISTORY OF IMMUNOSUPPRESSION: Do you have a history of any of the following:  Systemic Immunosuppression such as Diabetes, Biologic or Immunotherapy, Chemotherapy, Organ Transplantation, Bone Marrow Transplantation?  No     Answering \"YES\" requires the addition of the dotphrase \"IMMUNOSUPPRESSED\" as the first diagnosis of the patient's visit.   FAMILY HISTORY:  Any \"first degree relatives\" (parent, brother, sister, or child) with the following?    No changes in my family's known health.   PATIENT EXPERIENCE:    Do you want the Dermatologist to perform a COMPLETE skin exam today including a clinical examination under the \"bra and underwear\" areas?  Yes  If necessary, do we have your permission to call and leave a detailed message on your Preferred Phone number that includes your specific medical information?  Yes      Allergies   Allergen Reactions    Cortisone Hypertension, Other (See Comments), Shortness Of Breath and Tachycardia    Acebutolol     Acetaminophen Other (See Comments)    Amlodipine     Atenolol     Azithromycin     Bisphosphonates      Annotation - [id number]: iriitis    Candesartan     Clarithromycin     Erythromycin     Fosinopril     Irbesartan     Levofloxacin     Losartan     Methylprednisolone Hypertension and Tachycardia    Metoprolol     Nisoldipine  " "   Olmesartan     Propranolol     Sulfamethoxazole-Trimethoprim Nausea Only    Timolol       Current Outpatient Medications:     Alpha-D-Galactosidase (BEANO) TABS, Take by mouth, Disp: , Rfl:     ascorbic acid (VITAMIN C) 500 mg tablet, Take by mouth, Disp: , Rfl:     cholestyramine (QUESTRAN) 4 g packet, Take 1 packet by mouth 3 (three) times a day with meals, Disp: , Rfl:     Digestive Enzyme CAPS, Take by mouth, Disp: , Rfl:     diltiazem (CARDIZEM CD) 240 mg 24 hr capsule, Take 1 capsule (240 mg total) by mouth daily May give patient  the stock bottle, Disp: 90 capsule, Rfl: 0    famotidine (PEPCID) 10 mg tablet, Take 10 mg by mouth 2 (two) times a day, Disp: , Rfl:     lactase (LACTAID) 3,000 units tablet, Take by mouth, Disp: , Rfl:     Minoxidil 5 % FOAM, Apply topically, Disp: , Rfl:     Multiple Vitamins-Minerals (CENTRUM SILVER ULTRA WOMENS PO), Take by mouth, Disp: , Rfl:     polyethylene glycol-propylene glycol (SYSTANE) 0.4-0.3 %, , Disp: , Rfl:     Premarin vaginal cream, Insert 1 g into the vagina once a week Brand Necessary, Disp: 30 g, Rfl: 1          Whom besides the patient is providing clinical information about today's encounter?   NO ADDITIONAL HISTORIAN (patient alone provided history)    Physical Exam and Assessment/Plan by Diagnosis:    Patient here for skin exam, patient has a couple of concerns.     MELANOCYTIC NEVI (\"Moles\")    Physical Exam:  Anatomic Location Affected:   Mostly on sun-exposed areas of the trunk and extremities  Morphological Description:  Scattered, 1-4mm round to ovoid, symmetrical-appearing, even bordered, skin colored to dark brown macules/papules, mostly in sun-exposed areas  Pertinent Positives:  Pertinent Negatives:    Additional History of Present Condition:      Assessment and Plan:  Based on a thorough discussion of this condition and the management approach to it (including a comprehensive discussion of the known risks, side effects and potential benefits of " "treatment), the patient (family) agrees to implement the following specific plan:  When outside we recommend using a wide brim hat, sunglasses, long sleeve and pants, sunscreen with SPF 30+ with reapplication every 2 hours, or SPF specific clothing   Benign, reassured  Annual skin check     Melanocytic Nevi  Melanocytic nevi (\"moles\") are tan or brown, raised or flat areas of the skin which have an increased number of melanocytes. Melanocytes are the cells in our body which make pigment and account for skin color.    Some moles are present at birth (I.e., \"congenital nevi\"), while others come up later in life (i.e., \"acquired nevi\").  The sun can stimulate the body to make more moles.  Sunburns are not the only thing that triggers more moles.  Chronic sun exposure can do it too.     Clinically distinguishing a healthy mole from melanoma may be difficult, even for experienced dermatologists. The \"ABCDE's\" of moles have been suggested as a means of helping to alert a person to a suspicious mole and the possible increased risk of melanoma.  The suggestions for raising alert are as follows:    Asymmetry: Healthy moles tend to be symmetric, while melanomas are often asymmetric.  Asymmetry means if you draw a line through the mole, the two halves do not match in color, size, shape, or surface texture. Asymmetry can be a result of rapid enlargement of a mole, the development of a raised area on a previously flat lesion, scaling, ulceration, bleeding or scabbing within the mole.  Any mole that starts to demonstrate \"asymmetry\" should be examined promptly by a board certified dermatologist.     Border: Healthy moles tend to have discrete, even borders.  The border of a melanoma often blends into the normal skin and does not sharply delineate the mole from normal skin.  Any mole that starts to demonstrate \"uneven borders\" should be examined promptly by a board certified dermatologist.     Color: Healthy moles tend to be one " "color throughout.  Melanomas tend to be made up of different colors ranging from dark black, blue, white, or red.  Any mole that demonstrates a color change should be examined promptly by a board certified dermatologist.     Diameter: Healthy moles tend to be smaller than 0.6 cm in size; an exception are \"congenital nevi\" that can be larger.  Melanomas tend to grow and can often be greater than 0.6 cm (1/4 of an inch, or the size of a pencil eraser). This is only a guideline, and many normal moles may be larger than 0.6 cm without being unhealthy.  Any mole that starts to change in size (small to bigger or bigger to smaller) should be examined promptly by a board certified dermatologist.     Evolving: Healthy moles tend to \"stay the same.\"  Melanomas may often show signs of change or evolution such as a change in size, shape, color, or elevation.  Any mole that starts to itch, bleed, crust, burn, hurt, or ulcerate or demonstrate a change or evolution should be examined promptly by a board certified dermatologist.        LENTIGO    Physical Exam:  Anatomic Location Affected:  trunk, arms  Morphological Description:  Light brown macules  Pertinent Positives:  Pertinent Negatives:    Additional History of Present Condition:      Assessment and Plan:  Based on a thorough discussion of this condition and the management approach to it (including a comprehensive discussion of the known risks, side effects and potential benefits of treatment), the patient (family) agrees to implement the following specific plan:  When outside we recommend using a wide brim hat, sunglasses, long sleeve and pants, sunscreen with SPF 30+ with reapplication every 2 hours, or SPF specific clothing       What is a lentigo?  A lentigo is a pigmented flat or slightly raised lesion with a clearly defined edge. Unlike an ephelis (freckle), it does not fade in the winter months. There are several kinds of lentigo.  The name lentigo originally referred " to its appearance resembling a small lentil. The plural of lentigo is lentigines, although “lentigos” is also in common use.    Who gets lentigines?  Lentigines can affect males and females of all ages and races. Solar lentigines are especially prevalent in fair skinned adults. Lentigines associated with syndromes are present at birth or arise during childhood.    What causes lentigines?  Common forms of lentigo are due to exposure to ultraviolet radiation:  Sun damage including sunburn   Indoor tanning   Phototherapy, especially photochemotherapy (PUVA)    Ionizing radiation, eg radiation therapy, can also cause lentigines.  Several familial syndromes associated with widespread lentigines originate from mutations in Aram-MAP kinase, mTOR signaling and PTEN pathways.    What is the treatment for lentigines?  Most lentigines are left alone. Attempts to lighten them may not be successful. The following approaches are used:  SPF 50+ broad-spectrum sunscreen   Hydroquinone bleaching cream   Alpha hydroxy acids   Vitamin C   Retinoids   Azelaic acid   Chemical peels  Individual lesions can be permanently removed using:  Cryotherapy   Intense pulsed light   Pigment lasers    How can lentigines be prevented?  Lentigines associated with exposure ultraviolet radiation can be prevented by very careful sun protection. Clothing is more successful at preventing new lentigines than are sunscreens.    What is the outlook for lentigines?  Lentigines usually persist. They may increase in number with age and sun exposure. Some in sun-protected sites may fade and disappear.    GAMBINO ANGIOMAS    Physical Exam:  Anatomic Location Affected:  trunk  Morphological Description:  Scattered cherry red, 1-4 mm papules.  Pertinent Positives:  Pertinent Negatives:    Additional History of Present Condition:      Assessment and Plan:  Based on a thorough discussion of this condition and the management approach to it (including a comprehensive  "discussion of the known risks, side effects and potential benefits of treatment), the patient (family) agrees to implement the following specific plan:  Monitor for changes  Benign, reassured      Assessment and Plan:    Cherry angioma, also known as Godwin de Isaac spots, are benign vascular skin lesions. A \"cherry angioma\" is a firm red, blue or purple papule, 0.1-1 cm in diameter. When thrombosed, they can appear black in colour until evaluated with a dermatoscope when the red or purple colour is more easily seen. Cherry angioma may develop on any part of the body but most often appear on the scalp, face, lips and trunk.  An angioma is due to proliferating endothelial cells; these are the cells that line the inside of a blood vessel.    Angiomas can arise in early life or later in life; the most common type of angioma is a cherry angioma.  Cherry angiomas are very common in males and females of any age or race. They are more noticeable in white skin than in skin of colour. They markedly increase in number from about the age of 40. There may be a family history of similar lesions. Eruptive cherry angiomas have been rarely reported to be associated with internal malignancy. The cause of angiomas is unknown. Genetic analysis of cherry angiomas has shown that they frequently carry specific somatic missense mutations in the GNAQ and GNA11 (Q209H) genes, which are involved in other vascular and melanocytic proliferations.      SEBORRHEIC KERATOSIS; NON-INFLAMED    Physical Exam:  Anatomic Location Affected:  trunk  Morphological Description:  Flat and raised, waxy, smooth to warty textured, yellow to brownish-grey to dark brown to blackish, discrete, \"stuck-on\" appearing papules.  Pertinent Positives:  Pertinent Negatives:    Additional History of Present Condition:      Assessment and Plan:  Based on a thorough discussion of this condition and the management approach to it (including a comprehensive discussion of " "the known risks, side effects and potential benefits of treatment), the patient (family) agrees to implement the following specific plan:  Monitor for changes  Benign, reassured      Seborrheic Keratosis  A seborrheic keratosis is a harmless warty spot that appears during adult life as a common sign of skin aging.  Seborrheic keratoses can arise on any area of skin, covered or uncovered, with the usual exception of the palms and soles. They do not arise from mucous membranes. Seborrheic keratoses can have highly variable appearance.      Seborrheic keratoses are extremely common. It has been estimated that over 90% of adults over the age of 60 years have one or more of them. They occur in males and females of all races, typically beginning to erupt in the 30s or 40s. They are uncommon under the age of 20 years.  The precise cause of seborrhoeic keratoses is not known.  Seborrhoeic keratoses are considered degenerative in nature. As time goes by, seborrheic keratoses tend to become more numerous. Some people inherit a tendency to develop a very large number of them; some people may have hundreds of them.      There is no easy way to remove multiple lesions on a single occasion.  Unless a specific lesion is \"inflamed\" and is causing pain or stinging/burning or is bleeding, most insurance companies do not authorize treatment.    XEROSIS (\"DRY SKIN\")    Physical Exam:  Anatomic Location Affected:  diffuse  Morphological Description:  xerosis  Pertinent Positives:  Pertinent Negatives:    Additional History of Present Condition:      Assessment and Plan:  Based on a thorough discussion of this condition and the management approach to it (including a comprehensive discussion of the known risks, side effects and potential benefits of treatment), the patient (family) agrees to implement the following specific plan:  Use moisturizer like Eucerin,Cerave or Aveeno Cream 3 times a day for the dry skin            Dry skin refers " to skin that feels dry to touch. Dry skin has a dull surface with a rough, scaly quality. The skin is less pliable and cracked. When dryness is severe, the skin may become inflamed and fissured.  Although any body site can be dry, dry skin tends to affect the shins more than any other site.    Dry skin is lacking moisture in the outer horny cell layer (stratum corneum) and this results in cracks in the skin surface.  Dry skin is also called xerosis, xeroderma or asteatosis (lack of fat).  It can affect males and females of all ages. There is some racial variability in water and lipid content of the skin.  Dry skin that starts in early childhood may be one of about 20 types of ichthyosis (fish-scale skin). There is often a family history of dry skin.   Dry skin is commonly seen in people with atopic dermatitis.  Nearly everyone > 60 years has dry skin.    Dry skin that begins later may be seen in people with certain diseases and conditions.  Postmenopausal women  Hypothyroidism  Chronic renal disease   Malnutrition and weight loss   Subclinical dermatitis   Treatment with certain drugs such as oral retinoids, diuretics and epidermal growth factor receptor inhibitors      What is the treatment for dry skin?  The mainstay of treatment of dry skin and ichthyosis is moisturisers/emollients. They should be applied liberally and often enough to:  Reduce itch   Improve the barrier function   Prevent entry of irritants, bacteria   Reduce transepidermal water loss.      How can dry skin be prevented?  Eliminate aggravating factors:  Reduce the frequency of bathing.   A humidifier in winter and air conditioner in summer   Compare having a short shower with a prolonged soak in a bath.   Use lukewarm, not hot, water.   Replace standard soap with a substitute such as a synthetic detergent cleanser, water-miscible emollient, bath oil, anti-pruritic tar oil, colloidal oatmeal etc.   Apply an emollient liberally and often,  particularly shortly after bathing, and when itchy. The drier the skin, the thicker this should be, especially on the hands.    What is the outlook for dry skin?  A tendency to dry skin may persist life-long, or it may improve once contributing factors are controlled.     HISTORY OF BASAL CELL CARCINOMA    Physical Exam:  Anatomic Location Affected:  Left shin, Left upper back   Morphological Description of scar:  well healed   Suspected Recurrence: No  Pertinent Positives:  Pertinent Negatives:      Additional History of Present Condition:  Patient had Curettage & Electrodessication of both sites with Dr Ruano  in 2020    Assessment and Plan:  Based on a thorough discussion of this condition and the management approach to it (including a comprehensive discussion of the known risks, side effects and potential benefits of treatment), the patient (family) agrees to implement the following specific plan:  Skin checks yearly   When outside we recommend using a wide brim hat, sunglasses, long sleeve and pants, sunscreen with SPF 30+ with reapplication every 2 hours, or SPF specific clothing      How can basal cell carcinoma be prevented?  The most important way to prevent BCC is to avoid sunburn. This is especially important in childhood and early life. Fair skinned individuals and those with a personal or family history of BCC should protect their skin from sun exposure daily, year-round and lifelong.  Stay indoors or under the shade in the middle of the day   Wear covering clothing   Apply high protection factor SPF50+ broad-spectrum sunscreens generously to exposed skin if outdoors   Avoid indoor tanning (sun beds, solaria)  Oral nicotinamide (vitamin B3) in a dose of 500 mg twice daily may reduce the number and severity of BCCs.    What is the outlook for basal cell carcinoma?  Most BCCs are cured by treatment. Cure is most likely if treatment is undertaken when the lesion is small.  About 50% of people with BCC  develop a second one within 3 years of the first. They are also at increased risk of other skin cancers, especially melanoma. Regular self-skin examinations and long-term annual skin checks by an experienced health professional are recommended.     HISTORY OF SQUAMOUS CELL CARCINOMA     Physical Exam:  Anatomic Location Affected:  Mid forehead   Morphological Description of Scar:  well healed   Suspected Recurrence: no  Regional adenopathy: no    Additional History of Present Condition:  Patient has treated with Mohs in 2020 with Dr Quinn     Assessment and Plan:  Based on a thorough discussion of this condition and the management approach to it (including a comprehensive discussion of the known risks, side effects and potential benefits of treatment), the patient (family) agrees to implement the following specific plan:  Yearly skin check   When outside we recommend using a wide brim hat, sunglasses, long sleeve and pants, sunscreen with SPF 30+ with reapplication every 2 hours, or SPF specific clothing      How can SCC be prevented?  The most important way to prevent SCC is to avoid sunburn. This is especially important in childhood and early life. Fair skinned individuals and those with a personal or family history of BCC should protect their skin from sun exposure daily, year-round and lifelong.  Stay indoors or under the shade in the middle of the day   Wear covering clothing   Apply high protection factor SPF50+ broad-spectrum sunscreens generously to exposed skin if outdoors   Avoid indoor tanning (sun beds, solaria)      What is the outlook for SCC?  Most SCC are cured by treatment. Cure is most likely if treatment is undertaken when the lesion is small. A small percent of SCC's can spread to lymph  nodes and long term monitoring is indicated.   They are also at increased risk of other skin cancers, especially melanoma. Regular self-skin examinations and long-term annual skin checks by an experienced  health professional are recommended.     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     Assessment and Plan:  Based on a thorough discussion of this condition and the management approach to it (including a comprehensive discussion of the known risks, side effects and potential benefits of treatment), the patient (family) agrees to implement the following specific plan:  Discussed treatment options, such as:   Continue over the counter rogaine (minoxidil) 5% foam. Use one cap full a day on dry hair, part hair and apply directly to scalp. Do not do too close to bedtime. Need to do this daily. If you stop abruptly, you will lose hair. Can taper off if you don't like it.   Could do laser therapy like laser caps or morales (examples: capillus, laser hair max). Buy over the counter, online.  Cosmetic treatments like platelet rich plasma (PRP) do it monthly for 3 months. Dr. Reynoso in our practice does this.  Hair transplant surgery   Hair fibers, put on topically so it looks more full (example topix)      SEBACEOUS HYPERPLASIA    Physical Exam:  Anatomic Location Affected:  mid forehead   Morphological Description:  Yellow umbilicated papules with crown vessels   Pertinent Positives:  Pertinent Negatives:    Additional History of Present Condition:      Assessment and Plan:  Based on a thorough discussion of this condition and the management approach to it (including a comprehensive discussion of the known risks, side effects and potential benefits of treatment), the patient (family) agrees to implement the following specific plan:  Reassured benign   $150 to treat up to 10 lesions, and if over 10 lesions, then additional $10/lesion thereafter.     Sebaceous Hyperplasia  Sebaceous  hyperplasia is a common, benign condition of enlarged oil secreting (sebaceous) glands commonly found on the forehead and cheeks of middle-aged and elderly patients. They normally appear as small yellow bumps up to 3mm in diameter that can be single or multiple. The bumps on the face often display a centrall dell. Occasionally, these bumps can occur on the chest, areola, mouth, and genitals. Rarely, they can grow to take a giant form, or be arranged linearly.     Causes of sebaceous hyperplasia  Sebaceous hyperplasic is a form of benign hair follicle tumor and can often be confused with basal cell carcinoma. It can be more prevalent in immunosuppressed patients such as those undergoing organ transplantation. In the rare Daniel-Alleene syndrome, sebaceous hyperplasia occurs in association with internal cancers.  Lesions of sebaceous hyperplasia are benign, with no known potential for malignant transformation, but they may be associated with nonmelanoma skin cancer in transplantation patients.     How we do diagnose sebaceous hyperplasia?  Your dermatologist may take a closer look at the bumps with a device called a dermatoscope. Common features include a central hair follicle surrounded by yellowish lobules with prominent blood vessels.     What is the treatment of sebaceous hyperplasia?   Since sebaceous hyperplasia is benign with no known potential for transformation into cancer, treatment is mostly for cosmetic reasons or if the lesions become irritated. Options include   Light electrocautery or laser vaporization  Oral isotrentinoin is effective for extensive or disfiguring spots, but do not prevent recurrence   Antiandrogens may be used in females to decrease the size and improve overall appearance of bumps      MILIUM     Physical Exam:  Anatomic Location Affected:  face   Morphological Description:  white papules   Pertinent Positives:  Pertinent Negatives:    Additional History of Present Condition:       Assessment and Plan:  Based on a thorough discussion of this condition and the management approach to it (including a comprehensive discussion of the known risks, side effects and potential benefits of treatment), the patient (family) agrees to implement the following specific plan:  Reassured benign   $150 to treat up to 10 lesions, and if over 10 lesions, then additional $10/lesion thereafter.     Assessment and Plan  A milium is a small cyst containing keratin (the skin protein); they are usually multiple and are then known as milia. These harmless cysts present as tiny pearly-white bumps just under the surface of the skin.  Milia are common in all ages and both sexes. They most often arise on the face and are particularly prominent on the eyelids and cheeks, but they may occur elsewhere.  There are various kinds of milia.   milia: Affect 40-50% of  babies, few to numerous lesions, often seen on the nose, but may also arise inside the mouth on the mucosa (Paula pearls) or palate (Marina nodules) or more widely on the scalp, face and upper trunk, Heal spontaneously within a few weeks of birth.  Primary milia in children and adults: found around eyelids, cheeks, forehead and genitalia, in young children, a row of milia may appear along the nasal crease, may clear in a few weeks or persist for months or longer.    Juvenile milia: associated with Rombo syndrome, basal cell naevus syndrome, Awhck-Mlmkt-Nnkvrxnm syndrome, pachyonychia congenita, Vegas syndrome and other genetic disorders, may be congenital (present at birth) or appear later in life.  Milia en plaque: multiple milia appear on within an inflamed plaque up to several centimeters in diameter, usually found on an eyelid, behind the ear, on a cheek or jaw.  3. Affect children and adults, especially middle-aged women.  4. Sometimes associated with another skin disease including pseudoxanthoma elasticum, discoid lupus erythematosus, lichen  planus.  Multiple eruptive milia: crops of numerous milia appear over a few weeks to months, lesions may be asymptomatic or itchy, most often affect the face, upper arms and upper trunk.  Traumatic milia: occur at the site of injury as skin heals, arise from eccrine sweat ducts, examples include thermal burns, dermabrasion, blistering rashes such as bullous pemphigoid, often seen on the back of hands and fingers in porphyria cutanea tarda, a milia-like calcified nodule may develop after  heel stick blood test.   Milia associated with drugs: may rarely follow the use of topical medication, such as phenols, hydroquinone, 5-fluorouracil cream, and a corticosteroid.    Milia have a characteristic appearance. However, on occasion, a skin biopsy may be performed. This shows a small epidermoid cyst coming from a vellus hair follicle.  Milia should be distinguished from other types of cyst, comedones, xanthelasma and syringomas. Colloid milia are penny coloured bumps on cheeks and temples associated with excessive exposure to sunlight.  They should also be distinguished from milia-like cysts noted on dermoscopy in seborrhoeic keratoses, papillomatous moles and some basal cell carcinomas.  Milia do not need to be treated unless they are a cause for concern for the patient. They often clear up by themselves within a few months. Where possible, further trauma should be minimised to reduce the development of new lesions.  The lesion may be de-roofed using a sterile needle or blade and the contents squeezed or pricked out.  They may be destroyed using diathermy and curettage, or cryotherapy.  For widespread lesions, topical retinoids may be helpful.  Chemical peels, dermabrasion and laser ablation have been reported to be effective when used for very extensive milia.  Milia en plaque may improve with minocycline (a tetracycline antibiotic).       Scribe Attestation      I,:  Shaista Funez MA am acting as a scribe  while in the presence of the attending physician.:       I,:  Lucy Barroso MD personally performed the services described in this documentation    as scribed in my presence.:

## 2024-01-04 NOTE — PATIENT INSTRUCTIONS
"MELANOCYTIC NEVI (\"Moles\")      Assessment and Plan:  Based on a thorough discussion of this condition and the management approach to it (including a comprehensive discussion of the known risks, side effects and potential benefits of treatment), the patient (family) agrees to implement the following specific plan:  When outside we recommend using a wide brim hat, sunglasses, long sleeve and pants, sunscreen with SPF 30+ with reapplication every 2 hours, or SPF specific clothing   Benign, reassured  Annual skin check     Melanocytic Nevi  Melanocytic nevi (\"moles\") are tan or brown, raised or flat areas of the skin which have an increased number of melanocytes. Melanocytes are the cells in our body which make pigment and account for skin color.    Some moles are present at birth (I.e., \"congenital nevi\"), while others come up later in life (i.e., \"acquired nevi\").  The sun can stimulate the body to make more moles.  Sunburns are not the only thing that triggers more moles.  Chronic sun exposure can do it too.     Clinically distinguishing a healthy mole from melanoma may be difficult, even for experienced dermatologists. The \"ABCDE's\" of moles have been suggested as a means of helping to alert a person to a suspicious mole and the possible increased risk of melanoma.  The suggestions for raising alert are as follows:    Asymmetry: Healthy moles tend to be symmetric, while melanomas are often asymmetric.  Asymmetry means if you draw a line through the mole, the two halves do not match in color, size, shape, or surface texture. Asymmetry can be a result of rapid enlargement of a mole, the development of a raised area on a previously flat lesion, scaling, ulceration, bleeding or scabbing within the mole.  Any mole that starts to demonstrate \"asymmetry\" should be examined promptly by a board certified dermatologist.     Border: Healthy moles tend to have discrete, even borders.  The border of a melanoma often blends into " "the normal skin and does not sharply delineate the mole from normal skin.  Any mole that starts to demonstrate \"uneven borders\" should be examined promptly by a board certified dermatologist.     Color: Healthy moles tend to be one color throughout.  Melanomas tend to be made up of different colors ranging from dark black, blue, white, or red.  Any mole that demonstrates a color change should be examined promptly by a board certified dermatologist.     Diameter: Healthy moles tend to be smaller than 0.6 cm in size; an exception are \"congenital nevi\" that can be larger.  Melanomas tend to grow and can often be greater than 0.6 cm (1/4 of an inch, or the size of a pencil eraser). This is only a guideline, and many normal moles may be larger than 0.6 cm without being unhealthy.  Any mole that starts to change in size (small to bigger or bigger to smaller) should be examined promptly by a board certified dermatologist.     Evolving: Healthy moles tend to \"stay the same.\"  Melanomas may often show signs of change or evolution such as a change in size, shape, color, or elevation.  Any mole that starts to itch, bleed, crust, burn, hurt, or ulcerate or demonstrate a change or evolution should be examined promptly by a board certified dermatologist.        LENTIGO        Assessment and Plan:  Based on a thorough discussion of this condition and the management approach to it (including a comprehensive discussion of the known risks, side effects and potential benefits of treatment), the patient (family) agrees to implement the following specific plan:  When outside we recommend using a wide brim hat, sunglasses, long sleeve and pants, sunscreen with SPF 30+ with reapplication every 2 hours, or SPF specific clothing       What is a lentigo?  A lentigo is a pigmented flat or slightly raised lesion with a clearly defined edge. Unlike an ephelis (freckle), it does not fade in the winter months. There are several kinds of " lentigo.  The name lentigo originally referred to its appearance resembling a small lentil. The plural of lentigo is lentigines, although “lentigos” is also in common use.    Who gets lentigines?  Lentigines can affect males and females of all ages and races. Solar lentigines are especially prevalent in fair skinned adults. Lentigines associated with syndromes are present at birth or arise during childhood.    What causes lentigines?  Common forms of lentigo are due to exposure to ultraviolet radiation:  Sun damage including sunburn   Indoor tanning   Phototherapy, especially photochemotherapy (PUVA)    Ionizing radiation, eg radiation therapy, can also cause lentigines.  Several familial syndromes associated with widespread lentigines originate from mutations in Aram-MAP kinase, mTOR signaling and PTEN pathways.    What is the treatment for lentigines?  Most lentigines are left alone. Attempts to lighten them may not be successful. The following approaches are used:  SPF 50+ broad-spectrum sunscreen   Hydroquinone bleaching cream   Alpha hydroxy acids   Vitamin C   Retinoids   Azelaic acid   Chemical peels  Individual lesions can be permanently removed using:  Cryotherapy   Intense pulsed light   Pigment lasers    How can lentigines be prevented?  Lentigines associated with exposure ultraviolet radiation can be prevented by very careful sun protection. Clothing is more successful at preventing new lentigines than are sunscreens.    What is the outlook for lentigines?  Lentigines usually persist. They may increase in number with age and sun exposure. Some in sun-protected sites may fade and disappear.    GAMBINO ANGIOMAS        Assessment and Plan:  Based on a thorough discussion of this condition and the management approach to it (including a comprehensive discussion of the known risks, side effects and potential benefits of treatment), the patient (family) agrees to implement the following specific plan:  Monitor  "for changes  Benign, reassured      Assessment and Plan:    Cherry angioma, also known as Godwin de Isaac spots, are benign vascular skin lesions. A \"cherry angioma\" is a firm red, blue or purple papule, 0.1-1 cm in diameter. When thrombosed, they can appear black in colour until evaluated with a dermatoscope when the red or purple colour is more easily seen. Cherry angioma may develop on any part of the body but most often appear on the scalp, face, lips and trunk.  An angioma is due to proliferating endothelial cells; these are the cells that line the inside of a blood vessel.    Angiomas can arise in early life or later in life; the most common type of angioma is a cherry angioma.  Cherry angiomas are very common in males and females of any age or race. They are more noticeable in white skin than in skin of colour. They markedly increase in number from about the age of 40. There may be a family history of similar lesions. Eruptive cherry angiomas have been rarely reported to be associated with internal malignancy. The cause of angiomas is unknown. Genetic analysis of cherry angiomas has shown that they frequently carry specific somatic missense mutations in the GNAQ and GNA11 (Q209H) genes, which are involved in other vascular and melanocytic proliferations.      SEBORRHEIC KERATOSIS; NON-INFLAMED        Assessment and Plan:  Based on a thorough discussion of this condition and the management approach to it (including a comprehensive discussion of the known risks, side effects and potential benefits of treatment), the patient (family) agrees to implement the following specific plan:  Monitor for changes  Benign, reassured      Seborrheic Keratosis  A seborrheic keratosis is a harmless warty spot that appears during adult life as a common sign of skin aging.  Seborrheic keratoses can arise on any area of skin, covered or uncovered, with the usual exception of the palms and soles. They do not arise from mucous " "membranes. Seborrheic keratoses can have highly variable appearance.      Seborrheic keratoses are extremely common. It has been estimated that over 90% of adults over the age of 60 years have one or more of them. They occur in males and females of all races, typically beginning to erupt in the 30s or 40s. They are uncommon under the age of 20 years.  The precise cause of seborrhoeic keratoses is not known.  Seborrhoeic keratoses are considered degenerative in nature. As time goes by, seborrheic keratoses tend to become more numerous. Some people inherit a tendency to develop a very large number of them; some people may have hundreds of them.      There is no easy way to remove multiple lesions on a single occasion.  Unless a specific lesion is \"inflamed\" and is causing pain or stinging/burning or is bleeding, most insurance companies do not authorize treatment.    XEROSIS (\"DRY SKIN\")        Assessment and Plan:  Based on a thorough discussion of this condition and the management approach to it (including a comprehensive discussion of the known risks, side effects and potential benefits of treatment), the patient (family) agrees to implement the following specific plan:  Use moisturizer like Eucerin,Cerave or Aveeno Cream 3 times a day for the dry skin            Dry skin refers to skin that feels dry to touch. Dry skin has a dull surface with a rough, scaly quality. The skin is less pliable and cracked. When dryness is severe, the skin may become inflamed and fissured.  Although any body site can be dry, dry skin tends to affect the shins more than any other site.    Dry skin is lacking moisture in the outer horny cell layer (stratum corneum) and this results in cracks in the skin surface.  Dry skin is also called xerosis, xeroderma or asteatosis (lack of fat).  It can affect males and females of all ages. There is some racial variability in water and lipid content of the skin.  Dry skin that starts in early " childhood may be one of about 20 types of ichthyosis (fish-scale skin). There is often a family history of dry skin.   Dry skin is commonly seen in people with atopic dermatitis.  Nearly everyone > 60 years has dry skin.    Dry skin that begins later may be seen in people with certain diseases and conditions.  Postmenopausal women  Hypothyroidism  Chronic renal disease   Malnutrition and weight loss   Subclinical dermatitis   Treatment with certain drugs such as oral retinoids, diuretics and epidermal growth factor receptor inhibitors      What is the treatment for dry skin?  The mainstay of treatment of dry skin and ichthyosis is moisturisers/emollients. They should be applied liberally and often enough to:  Reduce itch   Improve the barrier function   Prevent entry of irritants, bacteria   Reduce transepidermal water loss.      How can dry skin be prevented?  Eliminate aggravating factors:  Reduce the frequency of bathing.   A humidifier in winter and air conditioner in summer   Compare having a short shower with a prolonged soak in a bath.   Use lukewarm, not hot, water.   Replace standard soap with a substitute such as a synthetic detergent cleanser, water-miscible emollient, bath oil, anti-pruritic tar oil, colloidal oatmeal etc.   Apply an emollient liberally and often, particularly shortly after bathing, and when itchy. The drier the skin, the thicker this should be, especially on the hands.    What is the outlook for dry skin?  A tendency to dry skin may persist life-long, or it may improve once contributing factors are controlled.     HISTORY OF BASAL CELL CARCINOMA        Assessment and Plan:  Based on a thorough discussion of this condition and the management approach to it (including a comprehensive discussion of the known risks, side effects and potential benefits of treatment), the patient (family) agrees to implement the following specific plan:  Skin checks yearly   When outside we recommend using a  wide brim hat, sunglasses, long sleeve and pants, sunscreen with SPF 30+ with reapplication every 2 hours, or SPF specific clothing      How can basal cell carcinoma be prevented?  The most important way to prevent BCC is to avoid sunburn. This is especially important in childhood and early life. Fair skinned individuals and those with a personal or family history of BCC should protect their skin from sun exposure daily, year-round and lifelong.  Stay indoors or under the shade in the middle of the day   Wear covering clothing   Apply high protection factor SPF50+ broad-spectrum sunscreens generously to exposed skin if outdoors   Avoid indoor tanning (sun beds, solaria)  Oral nicotinamide (vitamin B3) in a dose of 500 mg twice daily may reduce the number and severity of BCCs.    What is the outlook for basal cell carcinoma?  Most BCCs are cured by treatment. Cure is most likely if treatment is undertaken when the lesion is small.  About 50% of people with BCC develop a second one within 3 years of the first. They are also at increased risk of other skin cancers, especially melanoma. Regular self-skin examinations and long-term annual skin checks by an experienced health professional are recommended.     HISTORY OF SQUAMOUS CELL CARCINOMA         Assessment and Plan:  Based on a thorough discussion of this condition and the management approach to it (including a comprehensive discussion of the known risks, side effects and potential benefits of treatment), the patient (family) agrees to implement the following specific plan:  Yearly skin check   When outside we recommend using a wide brim hat, sunglasses, long sleeve and pants, sunscreen with SPF 30+ with reapplication every 2 hours, or SPF specific clothing      How can SCC be prevented?  The most important way to prevent SCC is to avoid sunburn. This is especially important in childhood and early life. Fair skinned individuals and those with a personal or family  history of BCC should protect their skin from sun exposure daily, year-round and lifelong.  Stay indoors or under the shade in the middle of the day   Wear covering clothing   Apply high protection factor SPF50+ broad-spectrum sunscreens generously to exposed skin if outdoors   Avoid indoor tanning (sun beds, solaria)      What is the outlook for SCC?  Most SCC are cured by treatment. Cure is most likely if treatment is undertaken when the lesion is small. A small percent of SCC's can spread to lymph  nodes and long term monitoring is indicated.   They are also at increased risk of other skin cancers, especially melanoma. Regular self-skin examinations and long-term annual skin checks by an experienced health professional are recommended.     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 Use one cap full a day on dry hair, part hair and apply directly to scalp. Do not do too close to bedtime. Need to do this daily. If you stop abruptly, you will lose hair. Can taper off if you don't like it.   Could do laser therapy like laser caps or morales (examples: capillus, laser hair max). Buy over the counter, online.  Cosmetic treatments like platelet rich plasma (PRP) do it monthly for 3 months. Dr. Reynoso in our practice does this.  Hair transplant surgery   Hair fibers, put on topically so it looks more full (example topix)      SEBACEOUS HYPERPLASIA        Assessment and Plan:  Based on a thorough discussion of this condition and the management approach to it (including a comprehensive discussion of the known risks, side effects and  potential benefits of treatment), the patient (family) agrees to implement the following specific plan:  Reassured benign   $150 to treat up to 10 lesions, and if over 10 lesions, then additional $10/lesion thereafter.     Sebaceous Hyperplasia  Sebaceous hyperplasia is a common, benign condition of enlarged oil secreting (sebaceous) glands commonly found on the forehead and cheeks of middle-aged and elderly patients. They normally appear as small yellow bumps up to 3mm in diameter that can be single or multiple. The bumps on the face often display a centrall dell. Occasionally, these bumps can occur on the chest, areola, mouth, and genitals. Rarely, they can grow to take a giant form, or be arranged linearly.     Causes of sebaceous hyperplasia  Sebaceous hyperplasic is a form of benign hair follicle tumor and can often be confused with basal cell carcinoma. It can be more prevalent in immunosuppressed patients such as those undergoing organ transplantation. In the rare Daniel-Yuriy syndrome, sebaceous hyperplasia occurs in association with internal cancers.  Lesions of sebaceous hyperplasia are benign, with no known potential for malignant transformation, but they may be associated with nonmelanoma skin cancer in transplantation patients.     How we do diagnose sebaceous hyperplasia?  Your dermatologist may take a closer look at the bumps with a device called a dermatoscope. Common features include a central hair follicle surrounded by yellowish lobules with prominent blood vessels.     What is the treatment of sebaceous hyperplasia?   Since sebaceous hyperplasia is benign with no known potential for transformation into cancer, treatment is mostly for cosmetic reasons or if the lesions become irritated. Options include   Light electrocautery or laser vaporization  Oral isotrentinoin is effective for extensive or disfiguring spots, but do not prevent recurrence   Antiandrogens may be used in females to decrease the  size and improve overall appearance of bumps      MILIUM         Assessment and Plan:  Based on a thorough discussion of this condition and the management approach to it (including a comprehensive discussion of the known risks, side effects and potential benefits of treatment), the patient (family) agrees to implement the following specific plan:  Reassured benign   $150 to treat up to 10 lesions, and if over 10 lesions, then additional $10/lesion thereafter.     Assessment and Plan  A milium is a small cyst containing keratin (the skin protein); they are usually multiple and are then known as milia. These harmless cysts present as tiny pearly-white bumps just under the surface of the skin.  Milia are common in all ages and both sexes. They most often arise on the face and are particularly prominent on the eyelids and cheeks, but they may occur elsewhere.  There are various kinds of milia.   milia: Affect 40-50% of  babies, few to numerous lesions, often seen on the nose, but may also arise inside the mouth on the mucosa (Paula pearls) or palate (Marina nodules) or more widely on the scalp, face and upper trunk, Heal spontaneously within a few weeks of birth.  Primary milia in children and adults: found around eyelids, cheeks, forehead and genitalia, in young children, a row of milia may appear along the nasal crease, may clear in a few weeks or persist for months or longer.    Juvenile milia: associated with Rombo syndrome, basal cell naevus syndrome, Yodra-Ixsui-Wgvfaeax syndrome, pachyonychia congenita, Vegas syndrome and other genetic disorders, may be congenital (present at birth) or appear later in life.  Milia en plaque: multiple milia appear on within an inflamed plaque up to several centimeters in diameter, usually found on an eyelid, behind the ear, on a cheek or jaw.  3. Affect children and adults, especially middle-aged women.  4. Sometimes associated with another skin disease including  pseudoxanthoma elasticum, discoid lupus erythematosus, lichen planus.  Multiple eruptive milia: crops of numerous milia appear over a few weeks to months, lesions may be asymptomatic or itchy, most often affect the face, upper arms and upper trunk.  Traumatic milia: occur at the site of injury as skin heals, arise from eccrine sweat ducts, examples include thermal burns, dermabrasion, blistering rashes such as bullous pemphigoid, often seen on the back of hands and fingers in porphyria cutanea tarda, a milia-like calcified nodule may develop after  heel stick blood test.   Milia associated with drugs: may rarely follow the use of topical medication, such as phenols, hydroquinone, 5-fluorouracil cream, and a corticosteroid.    Milia have a characteristic appearance. However, on occasion, a skin biopsy may be performed. This shows a small epidermoid cyst coming from a vellus hair follicle.  Milia should be distinguished from other types of cyst, comedones, xanthelasma and syringomas. Colloid milia are penny coloured bumps on cheeks and temples associated with excessive exposure to sunlight.  They should also be distinguished from milia-like cysts noted on dermoscopy in seborrhoeic keratoses, papillomatous moles and some basal cell carcinomas.  Milia do not need to be treated unless they are a cause for concern for the patient. They often clear up by themselves within a few months. Where possible, further trauma should be minimised to reduce the development of new lesions.  The lesion may be de-roofed using a sterile needle or blade and the contents squeezed or pricked out.  They may be destroyed using diathermy and curettage, or cryotherapy.  For widespread lesions, topical retinoids may be helpful.  Chemical peels, dermabrasion and laser ablation have been reported to be effective when used for very extensive milia.  Milia en plaque may improve with minocycline (a tetracycline antibiotic).

## 2024-01-12 ENCOUNTER — OFFICE VISIT (OUTPATIENT)
Dept: URGENT CARE | Facility: MEDICAL CENTER | Age: 79
End: 2024-01-12
Payer: MEDICARE

## 2024-01-12 VITALS
DIASTOLIC BLOOD PRESSURE: 72 MMHG | HEART RATE: 89 BPM | HEIGHT: 63 IN | TEMPERATURE: 97.7 F | RESPIRATION RATE: 18 BRPM | OXYGEN SATURATION: 97 % | WEIGHT: 106.6 LBS | SYSTOLIC BLOOD PRESSURE: 166 MMHG | BODY MASS INDEX: 18.89 KG/M2

## 2024-01-12 DIAGNOSIS — R10.13 EPIGASTRIC PAIN: Primary | ICD-10-CM

## 2024-01-12 PROCEDURE — 99213 OFFICE O/P EST LOW 20 MIN: CPT | Performed by: NURSE PRACTITIONER

## 2024-01-12 PROCEDURE — G0463 HOSPITAL OUTPT CLINIC VISIT: HCPCS | Performed by: NURSE PRACTITIONER

## 2024-01-12 RX ORDER — PANTOPRAZOLE SODIUM 40 MG/1
40 TABLET, DELAYED RELEASE ORAL DAILY
Qty: 7 TABLET | Refills: 1 | Status: SHIPPED | OUTPATIENT
Start: 2024-01-12

## 2024-01-12 NOTE — PATIENT INSTRUCTIONS
--Avoid potentially exacerbating foods including lactose.    --Avoid alcohol.   --Trial of Rx acid blocker taking daily 30 minutes before largest meal.    --Consider also OTC Pepto-bismol.    --Follow-up with GI for ongoing symptoms over the next week.  Go to ER if worsening.

## 2024-01-12 NOTE — PROGRESS NOTES
"  [facility]        NAME: Brigid Brown is a 78 y.o. female  : 1945    MRN: [id number]  DATE: 2024  TIME: 12:45 PM    Assessment and Plan   Epigastric pain [R10.13]  1. Epigastric pain  pantoprazole (PROTONIX) 40 mg tablet        No red flags noted at this time.  Consider gastritis, IBS, GERD, hiatal hernia, other.  Address per below.      Patient Instructions     --Avoid potentially exacerbating foods including lactose.    --Avoid alcohol.   --Trial of Rx acid blocker taking daily 30 minutes before largest meal.    --Consider also OTC Pepto-bismol.    --Follow-up with GI for ongoing symptoms over the next week.  Go to ER if worsening.        Chief Complaint     Chief Complaint   Patient presents with    Gas     Pt reports having excessive burping x2 weeks. Pt has hx of IBS and acid reflex, states she has throbbing and gas discomfort in her abdomen, pt states the pain is different than ibs/gerd describes it as a \"gnawing pain.\" Pt bowel sounds hyperactive on auscultation, pt has tenderness at RUQ that was pre-existing, no other tenderness noted.     Pt states she woke up today with vertigo but it went away this morning.          History of Present Illness       Here with complaints of epigastric discomfort x 2 weeks. Constant.  No variation with food, position, defecation.  No radiation of pain elsewhere.    Mild relief from OTC Gaviscon.  Nothing else tried.   Some burping. No bloating, N/V, bowel changes including C/D, melena, hematochezia.    No fever/chills.    Notes past history of IBS, GERD, lactose intolerance, but this feels different.  Denies known dietary triggers including lactose, alcohol.   Has gastroenterologist but did not try contacting them yet.    PMH/PSH notable for cholecystectomy. Denies history of hiatal hernia.           Review of Systems   Review of Systems   Constitutional:  Negative for fever.   Gastrointestinal:  Positive for abdominal pain. Negative for " abdominal distention, anal bleeding, blood in stool, constipation, diarrhea, nausea and vomiting.         Current Medications       Current Outpatient Medications:     Alpha-D-Galactosidase (BEANO) TABS, Take by mouth, Disp: , Rfl:     ascorbic acid (VITAMIN C) 500 mg tablet, Take by mouth, Disp: , Rfl:     cholestyramine (QUESTRAN) 4 g packet, Take 1 packet by mouth 3 (three) times a day with meals, Disp: , Rfl:     Digestive Enzyme CAPS, Take by mouth, Disp: , Rfl:     diltiazem (CARDIZEM CD) 240 mg 24 hr capsule, Take 1 capsule (240 mg total) by mouth daily May give patient  the stock bottle, Disp: 90 capsule, Rfl: 0    famotidine (PEPCID) 10 mg tablet, Take 10 mg by mouth 2 (two) times a day, Disp: , Rfl:     lactase (LACTAID) 3,000 units tablet, Take by mouth, Disp: , Rfl:     Minoxidil 5 % FOAM, Apply topically, Disp: , Rfl:     Multiple Vitamins-Minerals (CENTRUM SILVER ULTRA WOMENS PO), Take by mouth, Disp: , Rfl:     pantoprazole (PROTONIX) 40 mg tablet, Take 1 tablet (40 mg total) by mouth daily, Disp: 7 tablet, Rfl: 1    polyethylene glycol-propylene glycol (SYSTANE) 0.4-0.3 %, , Disp: , Rfl:     Premarin vaginal cream, Insert 1 g into the vagina once a week Brand Necessary, Disp: 30 g, Rfl: 1    Current Allergies     Allergies as of 01/12/2024 - Reviewed 01/12/2024   Allergen Reaction Noted    Cortisone Hypertension, Other (See Comments), Shortness Of Breath, and Tachycardia     Acebutolol  01/29/2014    Acetaminophen GI Intolerance 08/11/2022    Amlodipine  01/29/2014    Atenolol  01/29/2014    Azithromycin  01/29/2014    Bisphosphonates  05/17/2012    Candesartan  01/29/2014    Clarithromycin  01/29/2014    Erythromycin  01/29/2014    Fosinopril  01/29/2014    Irbesartan  01/29/2014    Levofloxacin  01/29/2014    Losartan  01/29/2014    Methylprednisolone Hypertension and Tachycardia 04/03/2014    Metoprolol  01/29/2014    Nisoldipine  01/29/2014    Olmesartan  01/29/2014    Propranolol  01/29/2014     Sulfamethoxazole-trimethoprim Nausea Only 01/29/2014    Timolol  01/29/2014            The following portions of the patient's history were reviewed and updated as appropriate: allergies, current medications, past family history, past medical history, past social history, past surgical history and problem list.     Past Medical History:   Diagnosis Date    Acne     Basal cell carcinoma 06/08/2020    right lower forehead    BCC (basal cell carcinoma of skin) 03/09/2020    mid forehead    Benign neoplasm of skin     Disease of thyroid gland 2006    GERD (gastroesophageal reflux disease)     Hypertension     Inflamed seborrheic keratosis     Irritable bowel syndrome     Malignant neoplasm of skin of face     Migraines     Nonmelanoma skin cancer     Last Assessed:6/27/17    Squamous cell skin cancer 06/08/2020    In situ, left lower forehead    Tachycardia     Telogen effluvium     Temporomandibular joint disorder     Vertigo        Past Surgical History:   Procedure Laterality Date    ADENOIDECTOMY      APPENDECTOMY      CHOLECYSTECTOMY      COLONOSCOPY  05/03/2019    COMPLEX WOUND CLOSURE TO EXTREMITY N/A 7/28/2020    Procedure: COMPLEX CLOSURE MID FOREHEAD;  Surgeon: Randy Frank MD;  Location: [facility];  Service: Plastics    ESOPHAGOGASTRODUODENOSCOPY  2009    FLAP LOCAL HEAD / NECK N/A 7/28/2020    Procedure: FLAP MID FOREHEAD;  Surgeon: Randy Frank MD;  Location: [facility];  Service: Plastics    HYSTERECTOMY  1987    MALIGNANT SKIN LESION EXCISION      Excision of Lesion Face Malignant-9/14/2004 BCC Forehead    MOHS RECONSTRUCTION N/A 7/28/2020    Procedure: RECONSTRUCTION MOHS DEFECT MID FOREHEAD;  Surgeon: Randy Frank MD;  Location: [facility];  Service: Plastics    MOHS SURGERY  07/27/2020    Right &left lower forehead, mid forehead    OOPHORECTOMY Bilateral 1987    ROTATOR CUFF REPAIR Right     SKIN BIOPSY      TONSILLECTOMY      TUBAL LIGATION  1976?       Family History  "  Problem Relation Age of Onset    Hypertension Mother     Osteoporosis Mother     Skin cancer Mother     Migraines Mother     Dementia Mother     Hypertension Father     Skin cancer Father     Dementia Father     Skin cancer Sister 73    Migraines Sister     No Known Problems Daughter     Uterine cancer Maternal Grandmother 29    Diabetes type II Maternal Grandfather     Cancer Paternal Grandmother     Uterine cancer Paternal Grandmother 65    No Known Problems Maternal Aunt     Cancer Paternal Aunt         Breast    Uterine cancer Paternal Aunt 55    No Known Problems Paternal Aunt     No Known Problems Paternal Aunt          Medications have been verified.        Objective   /72 Comment: manual bp  Pulse 89   Temp 97.7 °F (36.5 °C) (Temporal)   Resp 18   Ht 5' 2.5\" (1.588 m)   Wt 48.4 kg (106 lb 9.6 oz)   LMP  (LMP Unknown)   SpO2 97%   BMI 19.19 kg/m²   No LMP recorded (lmp unknown). Patient has had a hysterectomy.       Physical Exam     Physical Exam  Pulmonary:      Effort: Pulmonary effort is normal.   Abdominal:      General: Abdomen is flat. There is no distension.      Palpations: Abdomen is soft. There is no mass.      Tenderness: There is abdominal tenderness. There is no guarding or rebound.      Hernia: No hernia is present.      Comments: Mild epigastric tenderness with no visualized or palpable abnormalities including mass, hernia.    Remainder of abdomen, nontender with normal appearance including upper and lower quadrants.  Negative Saxena.     Neurological:      Mental Status: She is alert.   Psychiatric:         Mood and Affect: Mood normal.                   "

## 2024-01-17 ENCOUNTER — TELEPHONE (OUTPATIENT)
Dept: ADMINISTRATIVE | Facility: OTHER | Age: 79
End: 2024-01-17

## 2024-01-17 NOTE — TELEPHONE ENCOUNTER
01/17/24 10:12 AM    Patient contacted (left message) to bring Advance Directive, POLST, or Living Will document to next scheduled pcp visit.    Thank you.  Ann Preciado  [facility]

## 2024-01-17 NOTE — TELEPHONE ENCOUNTER
Patient called in wanting to know what forms she needed to bring. Patient was made aware to bring in advance directive and POLST paper work. NFA needed at this time.

## 2024-01-18 ENCOUNTER — OFFICE VISIT (OUTPATIENT)
Dept: FAMILY MEDICINE CLINIC | Facility: MEDICAL CENTER | Age: 79
End: 2024-01-18
Payer: MEDICARE

## 2024-01-18 VITALS
HEART RATE: 80 BPM | WEIGHT: 106 LBS | SYSTOLIC BLOOD PRESSURE: 138 MMHG | DIASTOLIC BLOOD PRESSURE: 64 MMHG | BODY MASS INDEX: 18.78 KG/M2 | HEIGHT: 63 IN | OXYGEN SATURATION: 98 % | RESPIRATION RATE: 16 BRPM | TEMPERATURE: 99.2 F

## 2024-01-18 DIAGNOSIS — I10 ESSENTIAL HYPERTENSION: Primary | ICD-10-CM

## 2024-01-18 PROCEDURE — 99213 OFFICE O/P EST LOW 20 MIN: CPT | Performed by: STUDENT IN AN ORGANIZED HEALTH CARE EDUCATION/TRAINING PROGRAM

## 2024-01-18 NOTE — PROGRESS NOTES
"  [facility] - Clinic Note  Delicia Cintron DO, 24     Brigid Brown MRN: [id number] : 1945 Age: 78 y.o.     Assessment/Plan     1. Essential hypertension    -Blood pressure reading 138/64 today, 142/82 upon my recheck  -Will continue diltiazem 240 mg p.o. daily at this time  -Patient will return to office in 1 to 2 weeks for nurse visit for blood pressure recheck, she was instructed to bring her home BP cuff with her at that visit to compare  - BP goal <150/<90      Brigid Brown acknowledged understanding of treatment plan, all questions answered.    Subjective      Brigid Brown is a 78 y.o. female who presents with concern about elevated blood pressure readings.  Patient has kept a blood pressure log for the past 2 weeks, has been checking more frequently since visit to urgent care last Friday at which her BP reading was 166/72.  She states she \"feels fine\".  Patient brings in blood pressure log along with list of several other medications that she has tried in the , she experienced side effects with those.    The following portions of the patient's history were reviewed and updated as appropriate: allergies, current medications, past family history, past medical history, past social history, past surgical history and problem list.     Past Medical History:   Diagnosis Date    Acne     Basal cell carcinoma 2020    right lower forehead    BCC (basal cell carcinoma of skin) 2020    mid forehead    Benign neoplasm of skin     Disease of thyroid gland 2006    GERD (gastroesophageal reflux disease)     Hypertension     Inflamed seborrheic keratosis     Irritable bowel syndrome     Malignant neoplasm of skin of face     Migraines     Nonmelanoma skin cancer     Last Assessed:17    Squamous cell skin cancer 2020    In situ, left lower forehead    Tachycardia     Telogen effluvium     Temporomandibular joint disorder     Vertigo        Allergies   Allergen Reactions    " Cortisone Hypertension, Other (See Comments), Shortness Of Breath and Tachycardia    Acebutolol     Acetaminophen GI Intolerance     diarrhea    Amlodipine     Atenolol     Azithromycin     Bisphosphonates      Annotation - 03Apr2014: iriitis    Candesartan     Clarithromycin     Erythromycin     Fosinopril     Irbesartan     Levofloxacin     Losartan     Methylprednisolone Hypertension and Tachycardia    Metoprolol     Nisoldipine     Olmesartan     Propranolol     Sulfamethoxazole-Trimethoprim Nausea Only    Timolol        Past Surgical History:   Procedure Laterality Date    ADENOIDECTOMY      APPENDECTOMY      CHOLECYSTECTOMY      COLONOSCOPY  05/03/2019    COMPLEX WOUND CLOSURE TO EXTREMITY N/A 7/28/2020    Procedure: COMPLEX CLOSURE MID FOREHEAD;  Surgeon: Randy Frank MD;  Location: [facility];  Service: Plastics    ESOPHAGOGASTRODUODENOSCOPY  2009    FLAP LOCAL HEAD / NECK N/A 7/28/2020    Procedure: FLAP MID FOREHEAD;  Surgeon: Randy Frank MD;  Location: [facility];  Service: Plastics    HYSTERECTOMY  1987    MALIGNANT SKIN LESION EXCISION      Excision of Lesion Face Malignant-9/14/2004 BCC Forehead    MOHS RECONSTRUCTION N/A 7/28/2020    Procedure: RECONSTRUCTION MOHS DEFECT MID FOREHEAD;  Surgeon: Randy Frank MD;  Location: [facility];  Service: Plastics    MOHS SURGERY  07/27/2020    Right &left lower forehead, mid forehead    OOPHORECTOMY Bilateral 1987    ROTATOR CUFF REPAIR Right     SKIN BIOPSY      TONSILLECTOMY      TUBAL LIGATION  1976?       Family History   Problem Relation Age of Onset    Hypertension Mother     Osteoporosis Mother     Skin cancer Mother     Migraines Mother     Dementia Mother     Hypertension Father     Skin cancer Father     Dementia Father     Skin cancer Sister 73    Migraines Sister     No Known Problems Daughter     Uterine cancer Maternal Grandmother 29    Diabetes type II Maternal Grandfather     Cancer Paternal Grandmother     Uterine  cancer Paternal Grandmother 65    No Known Problems Maternal Aunt     Cancer Paternal Aunt         Breast    Uterine cancer Paternal Aunt 55    No Known Problems Paternal Aunt     No Known Problems Paternal Aunt        Social History     Socioeconomic History    Marital status: /Civil Union     Spouse name: None    Number of children: None    Years of education: None    Highest education level: None   Occupational History    None   Tobacco Use    Smoking status: Never    Smokeless tobacco: Never   Vaping Use    Vaping status: Never Used   Substance and Sexual Activity    Alcohol use: No    Drug use: No    Sexual activity: Yes     Partners: Male     Birth control/protection: Surgical   Other Topics Concern    None   Social History Narrative    None     Social Determinants of Health     Financial Resource Strain: Not on file   Food Insecurity: Not on file   Transportation Needs: No Transportation Needs (5/20/2023)    PRAPARE - Transportation     Lack of Transportation (Medical): No     Lack of Transportation (Non-Medical): No   Physical Activity: Not on file   Stress: Not on file   Social Connections: Not on file   Intimate Partner Violence: Not on file   Housing Stability: Not on file       Current Outpatient Medications   Medication Sig Dispense Refill    Alpha-D-Galactosidase (BEANO) TABS Take by mouth      ascorbic acid (VITAMIN C) 500 mg tablet Take by mouth      cholestyramine (QUESTRAN) 4 g packet Take 1 packet by mouth 3 (three) times a day with meals      Digestive Enzyme CAPS Take by mouth      diltiazem (CARDIZEM CD) 240 mg 24 hr capsule Take 1 capsule (240 mg total) by mouth daily May give patient  the stock bottle 90 capsule 0    famotidine (PEPCID) 10 mg tablet Take 10 mg by mouth 2 (two) times a day      lactase (LACTAID) 3,000 units tablet Take by mouth      Minoxidil 5 % FOAM Apply topically      Multiple Vitamins-Minerals (CENTRUM SILVER ULTRA WOMENS PO) Take by mouth      pantoprazole  "(PROTONIX) 40 mg tablet Take 1 tablet (40 mg total) by mouth daily 7 tablet 1    polyethylene glycol-propylene glycol (SYSTANE) 0.4-0.3 %       Premarin vaginal cream Insert 1 g into the vagina once a week Brand Necessary 30 g 1     No current facility-administered medications for this visit.       Review of Systems     As noted in HPI    Objective      /64 (BP Location: Left arm, Patient Position: Sitting, Cuff Size: Adult)   Pulse 80   Temp 99.2 °F (37.3 °C)   Resp 16   Ht 5' 2.5\" (1.588 m)   Wt 48.1 kg (106 lb)   LMP  (LMP Unknown)   SpO2 98%   BMI 19.08 kg/m²     Physical Exam  Vitals reviewed.   Constitutional:       General: She is not in acute distress.     Appearance: Normal appearance.   HENT:      Head: Normocephalic and atraumatic.   Eyes:      Conjunctiva/sclera: Conjunctivae normal.   Cardiovascular:      Rate and Rhythm: Normal rate and regular rhythm.      Pulses: Normal pulses.      Heart sounds: Normal heart sounds.   Pulmonary:      Effort: Pulmonary effort is normal.      Breath sounds: Normal breath sounds.   Musculoskeletal:      Right lower leg: No edema.      Left lower leg: No edema.   Skin:     General: Skin is warm and dry.   Neurological:      Mental Status: She is alert and oriented to person, place, and time.   Psychiatric:         Mood and Affect: Mood normal.         Behavior: Behavior normal.         Thought Content: Thought content normal.             Some portions of this record may have been generated with voice recognition software. There may be translation, syntax, or grammatical errors. Occasional wrong word or \"sound-a-like\" substitutions may have occurred due to the inherent limitations of the voice recognition software. Read the chart carefully and recognize, using context, where substations may have occurred. If you have any questions, please contact the dictating provider for clarification or correction, as needed.  "

## 2024-01-25 ENCOUNTER — CLINICAL SUPPORT (OUTPATIENT)
Dept: FAMILY MEDICINE CLINIC | Facility: MEDICAL CENTER | Age: 79
End: 2024-01-25

## 2024-01-25 VITALS — SYSTOLIC BLOOD PRESSURE: 148 MMHG | DIASTOLIC BLOOD PRESSURE: 84 MMHG

## 2024-01-25 DIAGNOSIS — I10 HYPERTENSION, UNSPECIFIED TYPE: Primary | ICD-10-CM

## 2024-02-14 ENCOUNTER — COSMETIC (OUTPATIENT)
Dept: DERMATOLOGY | Facility: CLINIC | Age: 79
End: 2024-02-14

## 2024-02-14 DIAGNOSIS — L72.0 MILIUM: Primary | ICD-10-CM

## 2024-02-14 DIAGNOSIS — L82.1 SEBORRHEIC KERATOSIS: ICD-10-CM

## 2024-02-14 DIAGNOSIS — L73.8 SEBACEOUS HYPERPLASIA: ICD-10-CM

## 2024-02-14 PROCEDURE — LESIONRML10 LESION REMOVAL FIRST 10: Performed by: DERMATOLOGY

## 2024-02-14 NOTE — PROGRESS NOTES
THIS IS A COSMETIC PATIENT WHO PAID OUT OF POCKET AT TIME OF VISIT. DO NOT BILL.   $150  THE PATIENT ALREADY PAID IN FULL FOR SERVICES      PROCEDURE:  DESTRUCTION OF BENIGN LESIONS  After a thorough discussion of treatment options and risk/benefits/alternatives (including but not limited to local pain, scarring, dyspigmentation, blistering, and possible superinfection), verbal and written consent were obtained and the aforementioned lesions were treated.    Area cleansed with alcohol  Lesions punctured with 11 blade  Comedone extractor used to extract milia    TOTAL NUMBER of  3 lesions were treated today on the ANATOMIC LOCATION: nose .     The patient tolerated the procedure well, and after-care instructions were provided.       PROCEDURE:  DESTRUCTION OF BENIGN LESIONS  After a thorough discussion of treatment options and risk/benefits/alternatives (including but not limited to local pain, scarring, dyspigmentation, blistering, and possible superinfection), verbal and written consent were obtained and the aforementioned lesions were treated on with hyfrecation.Lidocaine with Epi    Topical numbing applied prior to procedure Lidocaine with epi    Area cleansed with alcohol and allowed to completely dry   Hyfrecator on setting of 4    TOTAL NUMBER of 3 lesions were treated today on the ANATOMIC LOCATION: Forehead, right chin.     The patient tolerated the procedure well, and after-care instructions were provided.       PROCEDURE:  DESTRUCTION OF BENIGN LESIONS WITH CRYOTHERAPY  After a thorough discussion of treatment options and risk/benefits/alternatives (including but not limited to local pain, scarring, dyspigmentation, blistering, and possible superinfection), verbal and written consent were obtained and the aforementioned lesions were treated on with cryotherapy using liquid nitrogen x 1 cycle for 5-10 seconds.    TOTAL NUMBER of 1 lesions were treated today on the ANATOMIC LOCATION: Left forehead.    The  patient tolerated the procedure well, and after-care instructions were provided.    Scribe Attestation      I,:  Shaista Funez MA am acting as a scribe while in the presence of the attending physician.:       I,:  Lucy Barroso MD personally performed the services described in this documentation    as scribed in my presence.:

## 2024-02-16 ENCOUNTER — OFFICE VISIT (OUTPATIENT)
Dept: FAMILY MEDICINE CLINIC | Facility: MEDICAL CENTER | Age: 79
End: 2024-02-16
Payer: MEDICARE

## 2024-02-16 VITALS
HEART RATE: 74 BPM | WEIGHT: 108 LBS | OXYGEN SATURATION: 98 % | TEMPERATURE: 98.1 F | BODY MASS INDEX: 19.44 KG/M2 | RESPIRATION RATE: 16 BRPM | SYSTOLIC BLOOD PRESSURE: 140 MMHG | DIASTOLIC BLOOD PRESSURE: 70 MMHG

## 2024-02-16 DIAGNOSIS — Z09 FOLLOW-UP EXAM: Primary | ICD-10-CM

## 2024-02-16 DIAGNOSIS — K58.9 IRRITABLE BOWEL SYNDROME, UNSPECIFIED TYPE: ICD-10-CM

## 2024-02-16 DIAGNOSIS — Z13.29 THYROID DISORDER SCREEN: ICD-10-CM

## 2024-02-16 DIAGNOSIS — Z85.828 HISTORY OF SKIN CANCER: ICD-10-CM

## 2024-02-16 DIAGNOSIS — L65.9 HAIR LOSS: ICD-10-CM

## 2024-02-16 DIAGNOSIS — M81.0 AGE-RELATED OSTEOPOROSIS WITHOUT CURRENT PATHOLOGICAL FRACTURE: ICD-10-CM

## 2024-02-16 DIAGNOSIS — N18.30 STAGE 3 CHRONIC KIDNEY DISEASE, UNSPECIFIED WHETHER STAGE 3A OR 3B CKD (HCC): ICD-10-CM

## 2024-02-16 DIAGNOSIS — I10 ESSENTIAL HYPERTENSION: ICD-10-CM

## 2024-02-16 DIAGNOSIS — K21.9 GASTROESOPHAGEAL REFLUX DISEASE, UNSPECIFIED WHETHER ESOPHAGITIS PRESENT: ICD-10-CM

## 2024-02-16 PROCEDURE — 99214 OFFICE O/P EST MOD 30 MIN: CPT | Performed by: STUDENT IN AN ORGANIZED HEALTH CARE EDUCATION/TRAINING PROGRAM

## 2024-02-16 RX ORDER — DILTIAZEM HYDROCHLORIDE 240 MG/1
240 CAPSULE, COATED, EXTENDED RELEASE ORAL DAILY
Qty: 90 CAPSULE | Refills: 1 | Status: SHIPPED | OUTPATIENT
Start: 2024-02-16 | End: 2024-08-14

## 2024-02-16 NOTE — PROGRESS NOTES
[facility] - Clinic Note  Delicia Cintron DO, 24     Brigid Brown MRN: [id number] : 1945 Age: 78 y.o.     Assessment/Plan     1. Follow-up exam    -Patient presents for medical follow-up  -Counseled about tetanus booster, immunizations otherwise up-to-date  -Return to office in 6 months and sooner as needed    2. Essential hypertension    -Stable current management, continue diltiazem 240 mg p.o. daily  - diltiazem (CARDIZEM CD) 240 mg 24 hr capsule; Take 1 capsule (240 mg total) by mouth daily May give patient  the stock bottle  Dispense: 90 capsule; Refill: 1    3. Gastroesophageal reflux disease, unspecified whether esophagitis present    -Symptoms managed with Pepcid    4. Stage 3 chronic kidney disease, unspecified whether stage 3a or 3b CKD (HCC)    - Basic metabolic panel; Future    5. Irritable bowel syndrome, unspecified type    -Managed by GI  -GI not with [facility], will obtain record of most recent consultation    6. History of skin cancer    -Established with Dermatology for skin checks, recently had lesions addressed on face    7. Thyroid disorder screen    - TSH, 3rd generation with Free T4 reflex; Future    8. Age-related osteoporosis without current pathological fracture    -Declines DXA scan  -Continue calcium and vitamin D supplementation  -Continue weightbearing exercises  -Patient does not smoke or consume alcohol  -Patient not interested in other pharmacotherapy    9. Hair loss    -Minoxidil 5% foam    Brigid Brown acknowledged understanding of treatment plan, all questions answered.    Subjective      Brigid Brown is a 78 y.o. female who presents for medical follow-up.  She has a past medical history including but not limited to hypertension, stage IIIa chronic kidney disease, irritable bowel syndrome, history of skin cancer, osteoporosis.  Patient mentions today not a good day for her IBS symptoms.  Patient is keeping up with vision screenings and dental  cleanings.  Patient states her appetite is good.  Her bowel movements are regular.    The following portions of the patient's history were reviewed and updated as appropriate: allergies, current medications, past family history, past medical history, past social history, past surgical history and problem list.     Past Medical History:   Diagnosis Date    Acne     Basal cell carcinoma 06/08/2020    right lower forehead    BCC (basal cell carcinoma of skin) 03/09/2020    mid forehead    Benign neoplasm of skin     Disease of thyroid gland 2006    GERD (gastroesophageal reflux disease)     Hypertension     Inflamed seborrheic keratosis     Irritable bowel syndrome     Malignant neoplasm of skin of face     Migraines     Nonmelanoma skin cancer     Last Assessed:6/27/17    Squamous cell skin cancer 06/08/2020    In situ, left lower forehead    Tachycardia     Telogen effluvium     Temporomandibular joint disorder     Vertigo        Allergies   Allergen Reactions    Cortisone Hypertension, Other (See Comments), Shortness Of Breath and Tachycardia    Acebutolol     Acetaminophen GI Intolerance     diarrhea    Amlodipine     Atenolol     Azithromycin     Bisphosphonates      Annotation - [id number]: iriitis    Candesartan     Clarithromycin     Erythromycin     Fosinopril     Irbesartan     Levofloxacin     Losartan     Methylprednisolone Hypertension and Tachycardia    Metoprolol     Nisoldipine     Olmesartan     Propranolol     Sulfamethoxazole-Trimethoprim Nausea Only    Timolol        Past Surgical History:   Procedure Laterality Date    ADENOIDECTOMY      APPENDECTOMY      CHOLECYSTECTOMY      COLONOSCOPY  05/03/2019    COMPLEX WOUND CLOSURE TO EXTREMITY N/A 7/28/2020    Procedure: COMPLEX CLOSURE MID FOREHEAD;  Surgeon: Randy Frank MD;  [facility]: [facility];  Service: Plastics    ESOPHAGOGASTRODUODENOSCOPY  2009    FLAP LOCAL HEAD / NECK N/A 7/28/2020    Procedure: FLAP MID FOREHEAD;  Surgeon: Randy  MD Zac;  [facility];  Service: Plastics    HYSTERECTOMY  1987    MALIGNANT SKIN LESION EXCISION      Excision of Lesion Face Malignant-9/14/2004 BCC Forehead    MOHS RECONSTRUCTION N/A 7/28/2020    Procedure: RECONSTRUCTION MOHS DEFECT MID FOREHEAD;  Surgeon: Randy Frank MD;  [facility];  Service: Plastics    MOHS SURGERY  07/27/2020    Right &left lower forehead, mid forehead    OOPHORECTOMY Bilateral 1987    ROTATOR CUFF REPAIR Right     SKIN BIOPSY      TONSILLECTOMY      TUBAL LIGATION  1976?       Family History   Problem Relation Age of Onset    Hypertension Mother     Osteoporosis Mother     Skin cancer Mother     Migraines Mother     Dementia Mother     Hypertension Father     Skin cancer Father     Dementia Father     Skin cancer Sister 73    Migraines Sister     No Known Problems Daughter     Uterine cancer Maternal Grandmother 29    Diabetes type II Maternal Grandfather     Cancer Paternal Grandmother     Uterine cancer Paternal Grandmother 65    No Known Problems Maternal Aunt     Cancer Paternal Aunt         Breast    Uterine cancer Paternal Aunt 55    No Known Problems Paternal Aunt     No Known Problems Paternal Aunt        Social History     Socioeconomic History    Marital status: /Civil Union     Spouse name: None    Number of children: None    Years of education: None    Highest education level: None   Occupational History    None   Tobacco Use    Smoking status: Never    Smokeless tobacco: Never   Vaping Use    Vaping status: Never Used   Substance and Sexual Activity    Alcohol use: No    Drug use: No    Sexual activity: Yes     Partners: Male     Birth control/protection: Surgical   Other Topics Concern    None   Social History Narrative    None     Social Determinants of Health     Financial Resource Strain: Not on file   Food Insecurity: Not on file   Transportation Needs: No Transportation Needs (5/20/2023)    PRAPARE - Transportation     Lack  of Transportation (Medical): No     Lack of Transportation (Non-Medical): No   Physical Activity: Not on file   Stress: Not on file   Social Connections: Not on file   Intimate Partner Violence: Not on file   Housing Stability: Not on file       Current Outpatient Medications   Medication Sig Dispense Refill    Alpha-D-Galactosidase (BEANO) TABS Take by mouth      ascorbic acid (VITAMIN C) 500 mg tablet Take by mouth      cholestyramine (QUESTRAN) 4 g packet Take 1 packet by mouth 3 (three) times a day with meals      Digestive Enzyme CAPS Take by mouth      diltiazem (CARDIZEM CD) 240 mg 24 hr capsule Take 1 capsule (240 mg total) by mouth daily May give patient  the stock bottle 90 capsule 0    famotidine (PEPCID) 10 mg tablet Take 10 mg by mouth 2 (two) times a day      lactase (LACTAID) 3,000 units tablet Take by mouth      Minoxidil 5 % FOAM Apply topically      Multiple Vitamins-Minerals (CENTRUM SILVER ULTRA WOMENS PO) Take by mouth      polyethylene glycol-propylene glycol (SYSTANE) 0.4-0.3 %       Premarin vaginal cream Insert 1 g into the vagina once a week Brand Necessary 30 g 1    pantoprazole (PROTONIX) 40 mg tablet Take 1 tablet (40 mg total) by mouth daily (Patient not taking: Reported on 2/16/2024) 7 tablet 1     No current facility-administered medications for this visit.       Review of Systems     As noted in HPI    Objective      /70 (BP Location: Left arm, Patient Position: Sitting, Cuff Size: Standard)   Pulse 74   Temp 98.1 °F (36.7 °C) (Temporal)   Resp 16   Wt 49 kg (108 lb)   LMP  (LMP Unknown)   SpO2 98%   BMI 19.44 kg/m²     Physical Exam  Vitals reviewed.   Constitutional:       General: She is not in acute distress.     Appearance: Normal appearance.   HENT:      Head: Normocephalic.      Right Ear: External ear normal.      Left Ear: External ear normal.      Nose: Nose normal.      Mouth/Throat:      Mouth: Mucous membranes are moist.      Pharynx: Oropharynx is clear.  "  Eyes:      Extraocular Movements: Extraocular movements intact.      Conjunctiva/sclera: Conjunctivae normal.      Pupils: Pupils are equal, round, and reactive to light.   Cardiovascular:      Rate and Rhythm: Normal rate and regular rhythm.      Pulses: Normal pulses.      Heart sounds: Normal heart sounds.   Pulmonary:      Effort: Pulmonary effort is normal.      Breath sounds: Normal breath sounds.   Abdominal:      General: Abdomen is flat. Bowel sounds are increased.      Palpations: Abdomen is soft.      Tenderness: There is generalized abdominal tenderness. There is no guarding or rebound.   Musculoskeletal:      Cervical back: Neck supple.      Right lower leg: No edema.      Left lower leg: No edema.   Lymphadenopathy:      Cervical: No cervical adenopathy.   Skin:     General: Skin is warm and dry.      Capillary Refill: Capillary refill takes less than 2 seconds.   Neurological:      Mental Status: She is alert and oriented to person, place, and time.   Psychiatric:         Mood and Affect: Mood normal.         Behavior: Behavior normal.         Thought Content: Thought content normal.         Judgment: Judgment normal.             Some portions of this record may have been generated with voice recognition software. There may be translation, syntax, or grammatical errors. Occasional wrong word or \"sound-a-like\" substitutions may have occurred due to the inherent limitations of the voice recognition software. Read the chart carefully and recognize, using context, where substations may have occurred. If you have any questions, please contact the dictating provider for clarification or correction, as needed.  "

## 2024-02-20 ENCOUNTER — LAB (OUTPATIENT)
Dept: LAB | Facility: MEDICAL CENTER | Age: 79
End: 2024-02-20
Payer: MEDICARE

## 2024-02-20 DIAGNOSIS — Z13.29 THYROID DISORDER SCREEN: ICD-10-CM

## 2024-02-20 DIAGNOSIS — N18.30 STAGE 3 CHRONIC KIDNEY DISEASE, UNSPECIFIED WHETHER STAGE 3A OR 3B CKD (HCC): ICD-10-CM

## 2024-02-20 LAB
ANION GAP SERPL CALCULATED.3IONS-SCNC: 10 MMOL/L
BUN SERPL-MCNC: 17 MG/DL (ref 5–25)
CALCIUM SERPL-MCNC: 9.3 MG/DL (ref 8.4–10.2)
CHLORIDE SERPL-SCNC: 100 MMOL/L (ref 96–108)
CO2 SERPL-SCNC: 32 MMOL/L (ref 21–32)
CREAT SERPL-MCNC: 0.99 MG/DL (ref 0.6–1.3)
GFR SERPL CREATININE-BSD FRML MDRD: 53 ML/MIN/1.73SQ M
GLUCOSE P FAST SERPL-MCNC: 86 MG/DL (ref 65–99)
POTASSIUM SERPL-SCNC: 3.7 MMOL/L (ref 3.5–5.3)
SODIUM SERPL-SCNC: 142 MMOL/L (ref 135–147)
T4 FREE SERPL-MCNC: 0.78 NG/DL (ref 0.61–1.12)
TSH SERPL DL<=0.05 MIU/L-ACNC: 5.26 UIU/ML (ref 0.45–4.5)

## 2024-02-20 PROCEDURE — 36415 COLL VENOUS BLD VENIPUNCTURE: CPT

## 2024-02-20 PROCEDURE — 84439 ASSAY OF FREE THYROXINE: CPT

## 2024-02-20 PROCEDURE — 80048 BASIC METABOLIC PNL TOTAL CA: CPT

## 2024-02-20 PROCEDURE — 84443 ASSAY THYROID STIM HORMONE: CPT

## 2024-02-21 DIAGNOSIS — E03.8 SUBCLINICAL HYPOTHYROIDISM: Primary | ICD-10-CM

## 2024-02-22 ENCOUNTER — APPOINTMENT (OUTPATIENT)
Dept: LAB | Facility: MEDICAL CENTER | Age: 79
End: 2024-02-22
Payer: MEDICARE

## 2024-02-22 ENCOUNTER — HOSPITAL ENCOUNTER (OUTPATIENT)
Dept: RADIOLOGY | Facility: MEDICAL CENTER | Age: 79
Discharge: HOME | End: 2024-02-22
Payer: MEDICARE

## 2024-02-22 ENCOUNTER — OFFICE VISIT (OUTPATIENT)
Dept: FAMILY MEDICINE CLINIC | Facility: MEDICAL CENTER | Age: 79
End: 2024-02-22
Payer: MEDICARE

## 2024-02-22 VITALS
HEIGHT: 63 IN | OXYGEN SATURATION: 98 % | SYSTOLIC BLOOD PRESSURE: 152 MMHG | DIASTOLIC BLOOD PRESSURE: 76 MMHG | TEMPERATURE: 98.4 F | WEIGHT: 106.8 LBS | HEART RATE: 88 BPM | BODY MASS INDEX: 18.92 KG/M2

## 2024-02-22 DIAGNOSIS — R10.13 EPIGASTRIC PAIN: ICD-10-CM

## 2024-02-22 DIAGNOSIS — R35.0 URINARY FREQUENCY: ICD-10-CM

## 2024-02-22 DIAGNOSIS — R10.13 EPIGASTRIC PAIN: Primary | ICD-10-CM

## 2024-02-22 LAB
ALBUMIN SERPL BCP-MCNC: 4.7 G/DL (ref 3.5–5)
ALP SERPL-CCNC: 73 U/L (ref 34–104)
ALT SERPL W P-5'-P-CCNC: 23 U/L (ref 7–52)
ANION GAP SERPL CALCULATED.3IONS-SCNC: 8 MMOL/L
AST SERPL W P-5'-P-CCNC: 28 U/L (ref 13–39)
BASOPHILS # BLD AUTO: 0.09 THOUSANDS/ÂΜL (ref 0–0.1)
BASOPHILS NFR BLD AUTO: 1 % (ref 0–1)
BILIRUB SERPL-MCNC: 0.71 MG/DL (ref 0.2–1)
BUN SERPL-MCNC: 16 MG/DL (ref 5–25)
CALCIUM SERPL-MCNC: 9.5 MG/DL (ref 8.4–10.2)
CHLORIDE SERPL-SCNC: 97 MMOL/L (ref 96–108)
CO2 SERPL-SCNC: 33 MMOL/L (ref 21–32)
CREAT SERPL-MCNC: 0.93 MG/DL (ref 0.6–1.3)
EOSINOPHIL # BLD AUTO: 0.1 THOUSAND/ÂΜL (ref 0–0.61)
EOSINOPHIL NFR BLD AUTO: 1 % (ref 0–6)
ERYTHROCYTE [DISTWIDTH] IN BLOOD BY AUTOMATED COUNT: 13.7 % (ref 11.6–15.1)
GFR SERPL CREATININE-BSD FRML MDRD: 58 ML/MIN/1.73SQ M
GLUCOSE P FAST SERPL-MCNC: 92 MG/DL (ref 65–99)
HCT VFR BLD AUTO: 45.6 % (ref 34.8–46.1)
HGB BLD-MCNC: 13.9 G/DL (ref 11.5–15.4)
IMM GRANULOCYTES # BLD AUTO: 0.02 THOUSAND/UL (ref 0–0.2)
IMM GRANULOCYTES NFR BLD AUTO: 0 % (ref 0–2)
LYMPHOCYTES # BLD AUTO: 1.79 THOUSANDS/ÂΜL (ref 0.6–4.47)
LYMPHOCYTES NFR BLD AUTO: 26 % (ref 14–44)
MCH RBC QN AUTO: 28.4 PG (ref 26.8–34.3)
MCHC RBC AUTO-ENTMCNC: 30.5 G/DL (ref 31.4–37.4)
MCV RBC AUTO: 93 FL (ref 82–98)
MONOCYTES # BLD AUTO: 0.69 THOUSAND/ÂΜL (ref 0.17–1.22)
MONOCYTES NFR BLD AUTO: 10 % (ref 4–12)
NEUTROPHILS # BLD AUTO: 4.29 THOUSANDS/ÂΜL (ref 1.85–7.62)
NEUTS SEG NFR BLD AUTO: 62 % (ref 43–75)
NRBC BLD AUTO-RTO: 0 /100 WBCS
PLATELET # BLD AUTO: 260 THOUSANDS/UL (ref 149–390)
PMV BLD AUTO: 10.5 FL (ref 8.9–12.7)
POTASSIUM SERPL-SCNC: 3.8 MMOL/L (ref 3.5–5.3)
PROT SERPL-MCNC: 7.6 G/DL (ref 6.4–8.4)
RBC # BLD AUTO: 4.89 MILLION/UL (ref 3.81–5.12)
SL AMB  POCT GLUCOSE, UA: NORMAL
SL AMB LEUKOCYTE ESTERASE,UA: NORMAL
SL AMB POCT BILIRUBIN,UA: NORMAL
SL AMB POCT BLOOD,UA: NORMAL
SL AMB POCT CLARITY,UA: CLEAR
SL AMB POCT COLOR,UA: NORMAL
SL AMB POCT KETONES,UA: NORMAL
SL AMB POCT NITRITE,UA: NORMAL
SL AMB POCT PH,UA: 6
SL AMB POCT SPECIFIC GRAVITY,UA: 1.01
SL AMB POCT URINE PROTEIN: NORMAL
SL AMB POCT UROBILINOGEN: 3.5
SODIUM SERPL-SCNC: 138 MMOL/L (ref 135–147)
WBC # BLD AUTO: 6.98 THOUSAND/UL (ref 4.31–10.16)

## 2024-02-22 PROCEDURE — 80053 COMPREHEN METABOLIC PANEL: CPT

## 2024-02-22 PROCEDURE — 36415 COLL VENOUS BLD VENIPUNCTURE: CPT

## 2024-02-22 PROCEDURE — 81003 URINALYSIS AUTO W/O SCOPE: CPT | Performed by: STUDENT IN AN ORGANIZED HEALTH CARE EDUCATION/TRAINING PROGRAM

## 2024-02-22 PROCEDURE — 99213 OFFICE O/P EST LOW 20 MIN: CPT | Performed by: STUDENT IN AN ORGANIZED HEALTH CARE EDUCATION/TRAINING PROGRAM

## 2024-02-22 PROCEDURE — 74177 CT ABD & PELVIS W/CONTRAST: CPT

## 2024-02-22 PROCEDURE — 85025 COMPLETE CBC W/AUTO DIFF WBC: CPT

## 2024-02-22 RX ORDER — PANTOPRAZOLE SODIUM 20 MG/1
20 TABLET, DELAYED RELEASE ORAL
Qty: 30 TABLET | Refills: 0 | Status: SHIPPED | OUTPATIENT
Start: 2024-02-22 | End: 2024-08-20

## 2024-02-22 RX ORDER — DICYCLOMINE HYDROCHLORIDE 10 MG/1
10 CAPSULE ORAL
Qty: 90 CAPSULE | Refills: 0 | Status: SHIPPED | OUTPATIENT
Start: 2024-02-22

## 2024-02-22 RX ADMIN — IOHEXOL 50 ML: 240 INJECTION, SOLUTION INTRATHECAL; INTRAVASCULAR; INTRAVENOUS; ORAL at 15:13

## 2024-02-22 RX ADMIN — IOHEXOL 85 ML: 350 INJECTION, SOLUTION INTRAVENOUS at 15:13

## 2024-02-22 NOTE — PROGRESS NOTES
"  [facility] - Clinic Note  Delicia Cintron DO, 24     Brigid Brown MRN: [id number] : 1945 Age: 78 y.o.     Assessment/Plan     1. Epigastric pain    -Patient with known irritable bowel syndrome and GERD presenting with epigastric pain and increased belching  -Advised to resume PPI, start Bentyl as needed  -Follow-up stat imaging, guarding on exam  -Advised to make prompt follow-up appointment with her gastroenterologist at [facility]  - CT abdomen pelvis w contrast; STAT  - Comprehensive metabolic panel; Future  - CBC (Includes Diff/Plt) (Refl); Future  - pantoprazole (PROTONIX) 20 mg tablet; Take 1 tablet (20 mg total) by mouth daily before breakfast  Dispense: 30 tablet; Refill: 0  - dicyclomine (BENTYL) 10 mg capsule; Take 1 capsule (10 mg total) by mouth 3 (three) times a day before meals  Dispense: 90 capsule; Refill: 0  -Patient advised if any worsening signs or symptoms to proceed to ER, she expressed understanding    2. Urinary frequency    -Urine dip unremarkable  - POCT urine dip auto non-scope:   2024   Leukocytes, UA NEG    Nitrite, UA NEG    SL AMB POCT UROBILINOGEN 3.5    POCT URINE PROTEIN NEG    pH, UA 6.0    Blood, UA NEG    SL AMB SPECIFIC GRAVITY_URINE 1.010    Ketones, UA NEG    BILIRUBIN,UA NEG    Glucose, UA NEG    Color, UA Lt YELLOW    Clarity, UA CLEAR      Brigid Brown acknowledged understanding of treatment plan, all questions answered.    Subjective     Brigid Brown is a 78 y.o. female who presents for evaluation of abdominal upset, \"very jump and shaky, just comes and goes\".  She presents with her .  Onset was 1 day ago.  She mentions she was seen at urgent care for similar complaint on 2024.  In addition to H2 blocker she was started on PPI.  States she took PPI for 5 days. Symptoms since yesterday \"not as horrible as they have been\", worse in the afternoon\", \"I also burp a lot\". The pain is described as \"pressure, aching, not actually painful\". " "Pain is located in the epigastric region without radiation.  Aggravating factors: none.  Alleviating factors: recumbency \"press and if I have a lot of gas that will move that around\". Associated symptoms: belching and urinary frequency. The patient denies anorexia, arthralagias, chills, constipation, diarrhea, dysuria, fever, flatus, headache, hematochezia, hematuria, melena, myalgias, nausea, sweats, and vomiting.  Patient mentions sometimes looser bowel movement but, no consistent diarrhea.    The following portions of the patient's history were reviewed and updated as appropriate: allergies, current medications, past family history, past medical history, past social history, past surgical history and problem list.     Past Medical History:   Diagnosis Date    Acne     Basal cell carcinoma 06/08/2020    right lower forehead    BCC (basal cell carcinoma of skin) 03/09/2020    mid forehead    Benign neoplasm of skin     Disease of thyroid gland 2006    GERD (gastroesophageal reflux disease)     Hypertension     Inflamed seborrheic keratosis     Irritable bowel syndrome     Malignant neoplasm of skin of face     Migraines     Nonmelanoma skin cancer     Last Assessed:6/27/17    Squamous cell skin cancer 06/08/2020    In situ, left lower forehead    Tachycardia     Telogen effluvium     Temporomandibular joint disorder     Vertigo        Allergies   Allergen Reactions    Cortisone Hypertension, Other (See Comments), Shortness Of Breath and Tachycardia    Acebutolol     Acetaminophen GI Intolerance     diarrhea    Amlodipine     Atenolol     Azithromycin     Bisphosphonates      Annotation - [id number]: iriitis    Candesartan     Clarithromycin     Erythromycin     Fosinopril     Irbesartan     Levofloxacin     Losartan     Methylprednisolone Hypertension and Tachycardia    Metoprolol     Nisoldipine     Olmesartan     Propranolol     Sulfamethoxazole-Trimethoprim Nausea Only    Timolol        Past Surgical History: "   Procedure Laterality Date    ADENOIDECTOMY      APPENDECTOMY      CHOLECYSTECTOMY      COLONOSCOPY  05/03/2019    COMPLEX WOUND CLOSURE TO EXTREMITY N/A 7/28/2020    Procedure: COMPLEX CLOSURE MID FOREHEAD;  Surgeon: Randy Frank MD;  [facility];  Service: Plastics    ESOPHAGOGASTRODUODENOSCOPY  2009    FLAP LOCAL HEAD / NECK N/A 7/28/2020    Procedure: FLAP MID FOREHEAD;  Surgeon: Randy Frank MD;  [facility];  Service: Plastics    HYSTERECTOMY  1987    MALIGNANT SKIN LESION EXCISION      Excision of Lesion Face Malignant-9/14/2004 BCC Forehead    MOHS RECONSTRUCTION N/A 7/28/2020    Procedure: RECONSTRUCTION MOHS DEFECT MID FOREHEAD;  Surgeon: Randy Frank MD;  [facility];  Service: Plastics    MOHS SURGERY  07/27/2020    Right &left lower forehead, mid forehead    OOPHORECTOMY Bilateral 1987    ROTATOR CUFF REPAIR Right     SKIN BIOPSY      TONSILLECTOMY      TUBAL LIGATION  1976?       Family History   Problem Relation Age of Onset    Hypertension Mother     Osteoporosis Mother     Skin cancer Mother     Migraines Mother     Dementia Mother     Hypertension Father     Skin cancer Father     Dementia Father     Skin cancer Sister 73    Migraines Sister     No Known Problems Daughter     Uterine cancer Maternal Grandmother 29    Diabetes type II Maternal Grandfather     Cancer Paternal Grandmother     Uterine cancer Paternal Grandmother 65    No Known Problems Maternal Aunt     Cancer Paternal Aunt         Breast    Uterine cancer Paternal Aunt 55    No Known Problems Paternal Aunt     No Known Problems Paternal Aunt        Social History     Socioeconomic History    Marital status: /Civil Union     Spouse name: None    Number of children: None    Years of education: None    Highest education level: None   Occupational History    None   Tobacco Use    Smoking status: Never    Smokeless tobacco: Never   Vaping Use    Vaping status: Never Used  "  Substance and Sexual Activity    Alcohol use: No    Drug use: No    Sexual activity: Yes     Partners: Male     Birth control/protection: Surgical   Other Topics Concern    None   Social History Narrative    None     Social Determinants of Health     Financial Resource Strain: Not on file   Food Insecurity: Not on file   Transportation Needs: No Transportation Needs (5/20/2023)    PRAPARE - Transportation     Lack of Transportation (Medical): No     Lack of Transportation (Non-Medical): No   Physical Activity: Not on file   Stress: Not on file   Social Connections: Not on file   Intimate Partner Violence: Not on file   Housing Stability: Not on file       Current Outpatient Medications   Medication Sig Dispense Refill    Alpha-D-Galactosidase (BEANO) TABS Take by mouth      ascorbic acid (VITAMIN C) 500 mg tablet Take by mouth      cholestyramine (QUESTRAN) 4 g packet Take 1 packet by mouth 3 (three) times a day with meals      Digestive Enzyme CAPS Take by mouth      diltiazem (CARDIZEM CD) 240 mg 24 hr capsule Take 1 capsule (240 mg total) by mouth daily May give patient  the stock bottle 90 capsule 1    famotidine (PEPCID) 10 mg tablet Take 10 mg by mouth 2 (two) times a day      lactase (LACTAID) 3,000 units tablet Take by mouth      Minoxidil 5 % FOAM Apply topically      Multiple Vitamins-Minerals (CENTRUM SILVER ULTRA WOMENS PO) Take by mouth      Premarin vaginal cream Insert 1 g into the vagina once a week Brand Necessary 30 g 1    polyethylene glycol-propylene glycol (SYSTANE) 0.4-0.3 %  (Patient not taking: Reported on 2/22/2024)       No current facility-administered medications for this visit.       Review of Systems     As noted in HPI    Objective      /76 (BP Location: Right arm, Patient Position: Sitting, Cuff Size: Standard)   Pulse 88   Temp 98.4 °F (36.9 °C)   Ht 5' 2.5\" (1.588 m)   Wt 48.4 kg (106 lb 12.8 oz)   LMP  (LMP Unknown)   SpO2 98%   BMI 19.22 kg/m²     Physical " "Exam  Vitals reviewed.   Constitutional:       General: She is not in acute distress.     Appearance: She is not ill-appearing, toxic-appearing or diaphoretic.   HENT:      Head: Normocephalic and atraumatic.      Mouth/Throat:      Mouth: Mucous membranes are moist.      Pharynx: Oropharynx is clear.   Eyes:      Conjunctiva/sclera: Conjunctivae normal.   Cardiovascular:      Rate and Rhythm: Normal rate and regular rhythm.      Pulses: Normal pulses.      Heart sounds: Normal heart sounds.   Pulmonary:      Effort: Pulmonary effort is normal.      Breath sounds: Normal breath sounds.   Abdominal:      General: Abdomen is flat. Bowel sounds are increased. There is no distension.      Palpations: Abdomen is soft.      Tenderness: There is abdominal tenderness in the epigastric area. There is guarding. There is no rebound.   Musculoskeletal:      Cervical back: Neck supple.      Right lower leg: No edema.      Left lower leg: No edema.   Lymphadenopathy:      Cervical: No cervical adenopathy.   Skin:     General: Skin is warm and dry.      Capillary Refill: Capillary refill takes less than 2 seconds.   Neurological:      Mental Status: She is alert and oriented to person, place, and time.   Psychiatric:         Mood and Affect: Mood normal.         Behavior: Behavior normal.         Thought Content: Thought content normal.         Judgment: Judgment normal.             Some portions of this record may have been generated with voice recognition software. There may be translation, syntax, or grammatical errors. Occasional wrong word or \"sound-a-like\" substitutions may have occurred due to the inherent limitations of the voice recognition software. Read the chart carefully and recognize, using context, where substations may have occurred. If you have any questions, please contact the dictating provider for clarification or correction, as needed.  "

## 2024-03-04 ENCOUNTER — HOSPITAL ENCOUNTER (EMERGENCY)
Facility: HOSPITAL | Age: 79
Discharge: HOME | End: 2024-03-04
Attending: EMERGENCY MEDICINE | Admitting: EMERGENCY MEDICINE
Payer: MEDICARE

## 2024-03-04 ENCOUNTER — TELEPHONE (OUTPATIENT)
Age: 79
End: 2024-03-04

## 2024-03-04 ENCOUNTER — APPOINTMENT (EMERGENCY)
Dept: RADIOLOGY | Facility: HOSPITAL | Age: 79
End: 2024-03-04
Payer: MEDICARE

## 2024-03-04 VITALS
OXYGEN SATURATION: 99 % | TEMPERATURE: 97.5 F | DIASTOLIC BLOOD PRESSURE: 77 MMHG | HEART RATE: 87 BPM | SYSTOLIC BLOOD PRESSURE: 187 MMHG | RESPIRATION RATE: 16 BRPM

## 2024-03-04 DIAGNOSIS — M25.462 SWELLING OF JOINT OF LEFT KNEE: ICD-10-CM

## 2024-03-04 DIAGNOSIS — S83.92XA LEFT KNEE SPRAIN: Primary | ICD-10-CM

## 2024-03-04 PROCEDURE — 73564 X-RAY EXAM KNEE 4 OR MORE: CPT

## 2024-03-04 PROCEDURE — 99283 EMERGENCY DEPT VISIT LOW MDM: CPT

## 2024-03-04 PROCEDURE — 99284 EMERGENCY DEPT VISIT MOD MDM: CPT | Performed by: EMERGENCY MEDICINE

## 2024-03-04 RX ORDER — LIDOCAINE 50 MG/G
1 PATCH TOPICAL ONCE
Status: DISCONTINUED | OUTPATIENT
Start: 2024-03-04 | End: 2024-03-04 | Stop reason: HOSPADM

## 2024-03-04 NOTE — DISCHARGE INSTRUCTIONS
Please take Tylenol for pain every 4 hours as needed not to exceed 4000 mg or 4 g per day. For example you may take 500 mg every 4 hours or 1000 mg every 8 hours for pain.    Please ice your knee for 20 minutes on then 20 minutes off throughout the day.    Please follow up with orthopedic surgery in about 1 week if you continue to have pain, swelling and difficulty walking due to persistent L knee pain.

## 2024-03-04 NOTE — TELEPHONE ENCOUNTER
Caller: patient     Doctor: na    Reason for call: NP l knee xray@[facility] no sx Medicare     Call back#: [phone]    Asking for knee to be drained today in [place],  nothing available at time of call,  patient stated she will go to ED

## 2024-03-04 NOTE — ED ATTENDING ATTESTATION
3/4/2024  I, Ann Dias MD, saw and evaluated the patient. I have discussed the patient with the resident/non-physician practitioner and agree with the resident's/non-physician practitioner's findings, Plan of Care, and MDM as documented in the resident's/non-physician practitioner's note, except where noted. All available labs and Radiology studies were reviewed.  I was present for key portions of any procedure(s) performed by the resident/non-physician practitioner and I was immediately available to provide assistance.       At this point I agree with the current assessment done in the Emergency Department.  I have conducted an independent evaluation of this patient a history and physical is as follows:    78-year-old female presenting for evaluation of pain and swelling in her left knee.  Patient states that while putting her pants on 3 days ago she twisted her knee resulting in the pain and swelling.  She has been ambulating with some mild limp due to the pain.  Applying ice to the area helps.  Denies falling to the ground or other recent trauma.     Physical Exam  Vitals reviewed.   Constitutional:       General: She is not in acute distress.     Appearance: Normal appearance. She is not ill-appearing or toxic-appearing.   HENT:      Head: Normocephalic and atraumatic.      Right Ear: External ear normal.      Left Ear: External ear normal.      Nose: Nose normal.   Eyes:      Conjunctiva/sclera: Conjunctivae normal.   Cardiovascular:      Rate and Rhythm: Normal rate.      Pulses: Normal pulses.   Pulmonary:      Effort: Pulmonary effort is normal. No respiratory distress.   Abdominal:      General: There is no distension.   Musculoskeletal:         General: No deformity. Normal range of motion.      Cervical back: Normal range of motion.      Comments: Moderate knee effusion noted to the anterior aspect of the left knee, most prominent to the medial aspect of the joint.  No overlying erythema or  warmth.  No bony tenderness to palpation but patient reports discomfort in the joint with full flexion and extension of the knee.  Range of motion is intact.  Full range of motion of the left ankle and toes of the left foot.  No calf swelling or tenderness.  Compartments of the left lower leg are soft.   Skin:     General: Skin is warm and dry.   Neurological:      General: No focal deficit present.      Mental Status: She is alert and oriented to person, place, and time.   Psychiatric:         Mood and Affect: Mood normal.       X-ray of the left knee with no acute fracture or malalignment.  Exam not consistent with septic joint.  Patient is able to ambulate but with mild discomfort.  Recommend applying ice, Tylenol as needed for pain, follow-up with orthopedics in 1 week if pain and swelling have not improved.  Return precautions discussed.    ED Course         Critical Care Time  Procedures

## 2024-03-04 NOTE — ED PROVIDER NOTES
History  Chief Complaint   Patient presents with    Knee Injury     Reports twisted knee putting on pants on Friday, pain and swelling noticed on Saturday.      78-year-old female with past medical history of hypertension, presents for evaluation of left knee pain and swelling after twisting the left knee while trying to get her leg into her pants 3 days ago.  Patient states that she has been mildly limping on the left leg due to pain however has been able to walk without assistance.  Patient states that she has been icing the left knee with significant improvement of pain which seems to return while not icing it.  Denies associated symptoms of focal weakness/numbness/tingling, fever/chills, radiation of pain, hip pain, nausea/vomiting or any other symptoms.  No other concerns.        Prior to Admission Medications   Prescriptions Last Dose Informant Patient Reported? Taking?   Alpha-D-Galactosidase (BEANO) TABS  Self Yes No   Sig: Take by mouth   Digestive Enzyme CAPS  Self Yes No   Sig: Take by mouth   Minoxidil 5 % FOAM  Self Yes No   Sig: Apply topically   Multiple Vitamins-Minerals (CENTRUM SILVER ULTRA WOMENS PO)  Self Yes No   Sig: Take by mouth   Premarin vaginal cream   No No   Sig: Insert 1 g into the vagina once a week Brand Necessary   ascorbic acid (VITAMIN C) 500 mg tablet  Self Yes No   Sig: Take by mouth   cholestyramine (QUESTRAN) 4 g packet   Yes No   Sig: Take 1 packet by mouth 3 (three) times a day with meals   dicyclomine (BENTYL) 10 mg capsule   No No   Sig: Take 1 capsule (10 mg total) by mouth 3 (three) times a day before meals   diltiazem (CARDIZEM CD) 240 mg 24 hr capsule   No No   Sig: Take 1 capsule (240 mg total) by mouth daily May give patient  the stock bottle   famotidine (PEPCID) 10 mg tablet  Self Yes No   Sig: Take 10 mg by mouth 2 (two) times a day   lactase (LACTAID) 3,000 units tablet  Self Yes No   Sig: Take by mouth   pantoprazole (PROTONIX) 20 mg tablet   No No   Sig: Take 1  tablet (20 mg total) by mouth daily before breakfast   polyethylene glycol-propylene glycol (SYSTANE) 0.4-0.3 %  Self Yes No   Patient not taking: Reported on 2/22/2024      Facility-Administered Medications: None       Past Medical History:   Diagnosis Date    Acne     Basal cell carcinoma 06/08/2020    right lower forehead    BCC (basal cell carcinoma of skin) 03/09/2020    mid forehead    Benign neoplasm of skin     Disease of thyroid gland 2006    GERD (gastroesophageal reflux disease)     Hypertension     Inflamed seborrheic keratosis     Irritable bowel syndrome     Malignant neoplasm of skin of face     Migraines     Nonmelanoma skin cancer     Last Assessed:6/27/17    Squamous cell skin cancer 06/08/2020    In situ, left lower forehead    Tachycardia     Telogen effluvium     Temporomandibular joint disorder     Vertigo        Past Surgical History:   Procedure Laterality Date    ADENOIDECTOMY      APPENDECTOMY      CHOLECYSTECTOMY      COLONOSCOPY  05/03/2019    COMPLEX WOUND CLOSURE TO EXTREMITY N/A 7/28/2020    Procedure: COMPLEX CLOSURE MID FOREHEAD;  Surgeon: Randy Frank MD;  [facility];  Service: Plastics    ESOPHAGOGASTRODUODENOSCOPY  2009    FLAP LOCAL HEAD / NECK N/A 7/28/2020    Procedure: FLAP MID FOREHEAD;  Surgeon: Randy Frank MD;  [facility];  Service: Plastics    HYSTERECTOMY  1987    MALIGNANT SKIN LESION EXCISION      Excision of Lesion Face Malignant-9/14/2004 BCC Forehead    MOHS RECONSTRUCTION N/A 7/28/2020    Procedure: RECONSTRUCTION MOHS DEFECT MID FOREHEAD;  Surgeon: Randy Frank MD;  [facility];  Service: Plastics    MOHS SURGERY  07/27/2020    Right &left lower forehead, mid forehead    OOPHORECTOMY Bilateral 1987    ROTATOR CUFF REPAIR Right     SKIN BIOPSY      TONSILLECTOMY      TUBAL LIGATION  1976?       Family History   Problem Relation Age of Onset    Hypertension Mother     Osteoporosis Mother     Skin cancer Mother      Migraines Mother     Dementia Mother     Hypertension Father     Skin cancer Father     Dementia Father     Skin cancer Sister 73    Migraines Sister     No Known Problems Daughter     Uterine cancer Maternal Grandmother 29    Diabetes type II Maternal Grandfather     Cancer Paternal Grandmother     Uterine cancer Paternal Grandmother 65    No Known Problems Maternal Aunt     Cancer Paternal Aunt         Breast    Uterine cancer Paternal Aunt 55    No Known Problems Paternal Aunt     No Known Problems Paternal Aunt      I have reviewed and agree with the history as documented.    E-Cigarette/Vaping    E-Cigarette Use Never User      E-Cigarette/Vaping Substances    Nicotine No     THC No     CBD No     Flavoring No     Other No     Unknown No      Social History     Tobacco Use    Smoking status: Never    Smokeless tobacco: Never   Vaping Use    Vaping status: Never Used   Substance Use Topics    Alcohol use: No    Drug use: No        Review of Systems   Constitutional:  Negative for chills and fever.   HENT:  Negative for ear pain and sore throat.    Eyes:  Negative for pain and visual disturbance.   Respiratory:  Negative for cough and shortness of breath.    Cardiovascular:  Negative for chest pain and palpitations.   Gastrointestinal:  Negative for abdominal pain and vomiting.   Genitourinary:  Negative for dysuria and hematuria.   Musculoskeletal:  Positive for arthralgias. Negative for back pain.   Skin:  Negative for color change and rash.   Neurological:  Negative for seizures and syncope.   All other systems reviewed and are negative.      Physical Exam  ED Triage Vitals   Temperature Pulse Respirations Blood Pressure SpO2   03/04/24 1000 03/04/24 1001 03/04/24 1001 03/04/24 1001 03/04/24 1001   97.5 °F (36.4 °C) 87 16 (!) 187/77 99 %      Temp Source Heart Rate Source Patient Position - Orthostatic VS BP Location FiO2 (%)   03/04/24 1000 -- 03/04/24 1001 03/04/24 1001 --   Oral  Sitting Right arm        Pain Score       03/04/24 1001       8             Orthostatic Vital Signs  Vitals:    03/04/24 1001   BP: (!) 187/77   Pulse: 87   Patient Position - Orthostatic VS: Sitting       Physical Exam  Vitals and nursing note reviewed.   Constitutional:       General: She is not in acute distress.     Appearance: Normal appearance. She is well-developed. She is not ill-appearing, toxic-appearing or diaphoretic.   HENT:      Head: Normocephalic and atraumatic.      Right Ear: External ear normal.      Left Ear: External ear normal.      Nose: Nose normal.      Mouth/Throat:      Mouth: Mucous membranes are moist.   Eyes:      Extraocular Movements: Extraocular movements intact.      Conjunctiva/sclera: Conjunctivae normal.   Pulmonary:      Effort: Pulmonary effort is normal. No respiratory distress.   Abdominal:      Tenderness: There is no abdominal tenderness.   Musculoskeletal:         General: Swelling and tenderness present.      Cervical back: Normal range of motion and neck supple.      Comments: Tenderness over the lateral aspect of the knee.  Negative varus/valgus stressors.  Full flexion and extension intact.  Mild left knee joint effusion.  No overlying edema or warmth.  There is full weight on the left lower extremity with very minimal antalgic gait.   Skin:     General: Skin is warm and dry.      Capillary Refill: Capillary refill takes less than 2 seconds.   Neurological:      Mental Status: She is alert.   Psychiatric:         Mood and Affect: Mood normal.         ED Medications  Medications   lidocaine (LIDODERM) 5 % patch 1 patch (1 patch Topical Not Given 3/4/24 1045)       Diagnostic Studies  Results Reviewed       None                   XR knee 4+ vw left injury   ED Interpretation by Urmila Laughlin MD (03/04 1051)   No acute osseous process.            Procedures  Procedures      ED Course                                       Medical Decision Making  Patient remained stable throughout ED course.  No  evidence of or concern for acute septic joint, osseous abnormality, acute DVT.  Dx most likely left knee sprain given mechanism of injury.  Patient was provided an Ace wrap as well as ice for symptomatic management and an extensive discussion was had regarding the need for outpatient orthopedic surgery evaluation in about 1 week if symptoms persist and do not improve significantly.  Patient was able to ambulate without significant difficulty.  No other injuries noted.  Stable for discharge home. Pt understands and agreed with plan. All questions answered. No other concerns.        Amount and/or Complexity of Data Reviewed  Radiology: ordered and independent interpretation performed.    Risk  Prescription drug management.          Disposition  Final diagnoses:   Left knee sprain   Swelling of joint of left knee     Time reflects when diagnosis was documented in both MDM as applicable and the Disposition within this note       Time User Action Codes Description Comment    3/4/2024 11:33 AM Urmila Laughlin Add [S83.92XA] Left knee sprain     3/4/2024 11:33 AM Urmila Laughlin Add [M25.462] Swelling of joint of left knee           ED Disposition       ED Disposition   Discharge    Condition   Stable    Date/Time   Mon Mar 4, 2024 11:31 AM    Comment   Brigid Brown discharge to home/self care.                   Follow-up Information       Follow up With Specialties Details Why Contact Info Additional Information    Delicia Cintron DO Family Medicine Call  As needed [address]  [address]  [facility] [id number] [phone]       [facility] Emergency Department Emergency Medicine Go to  As needed, If symptoms [address] [facility] [id number] [phone] [facility] Emergency Department, [facility] [place], [place], [address]    [facility] [place] [facility] Call  As needed [address]  [address]  [facility] [phone] [facility] [place], [address], [address], [place], [place], [address] [phone]            Discharge Medication List as of 3/4/2024 11:44 AM        CONTINUE these medications which have NOT CHANGED    Details   Alpha-D-Galactosidase (BEANO) TABS Take by mouth, Historical Med      ascorbic acid (VITAMIN C) 500 mg tablet Take by mouth, Historical Med      cholestyramine (QUESTRAN) 4 g packet Take 1 packet by mouth 3 (three) times a day with meals, Historical Med      dicyclomine (BENTYL) 10 mg capsule Take 1 capsule (10 mg total) by mouth 3 (three) times a day before meals, Starting Thu 2/22/2024, Print      Digestive Enzyme CAPS Take by mouth, Historical Med      diltiazem (CARDIZEM CD) 240 mg 24 hr capsule Take 1 capsule (240 mg total) by mouth daily May give patient  the stock bottle, Starting Fri 2/16/2024, Until Wed 8/14/2024, Normal      famotidine (PEPCID) 10 mg tablet Take 10 mg by mouth 2 (two) times a day, Historical Med      lactase (LACTAID) 3,000 units tablet Take by mouth, Historical Med      Minoxidil 5 % FOAM Apply topically, Historical Med      Multiple Vitamins-Minerals (CENTRUM SILVER ULTRA WOMENS PO) Take by mouth, Historical Med      pantoprazole (PROTONIX) 20 mg tablet Take 1 tablet (20 mg total) by mouth daily before breakfast, Starting Thu 2/22/2024, Until Tue 8/20/2024, Print      polyethylene glycol-propylene glycol (SYSTANE) 0.4-0.3 % Historical Med      Premarin vaginal cream Insert 1 g into the vagina once a week Brand Necessary, Starting Wed 9/27/2023, Normal               PDMP Review       None             ED Provider  Attending physically available and evaluated Brigid Brown. I managed the patient along with the ED Attending.    Electronically Signed by           Urmila Laughlin MD  03/04/24 8076

## 2024-03-11 ENCOUNTER — APPOINTMENT (OUTPATIENT)
Dept: RADIOLOGY | Facility: AMBULARY SURGERY CENTER | Age: 79
End: 2024-03-11
Attending: ORTHOPAEDIC SURGERY
Payer: MEDICARE

## 2024-03-11 ENCOUNTER — OFFICE VISIT (OUTPATIENT)
Dept: OBGYN CLINIC | Facility: CLINIC | Age: 79
End: 2024-03-11
Payer: MEDICARE

## 2024-03-11 VITALS — HEIGHT: 63 IN | BODY MASS INDEX: 18.78 KG/M2 | WEIGHT: 106 LBS

## 2024-03-11 DIAGNOSIS — S83.92XA LEFT KNEE SPRAIN: ICD-10-CM

## 2024-03-11 DIAGNOSIS — M25.562 LEFT KNEE PAIN, UNSPECIFIED CHRONICITY: Primary | ICD-10-CM

## 2024-03-11 DIAGNOSIS — S83.412A SPRAIN OF MEDIAL COLLATERAL LIGAMENT OF LEFT KNEE, INITIAL ENCOUNTER: ICD-10-CM

## 2024-03-11 DIAGNOSIS — M25.462 SWELLING OF JOINT OF LEFT KNEE: ICD-10-CM

## 2024-03-11 DIAGNOSIS — M25.562 LEFT KNEE PAIN, UNSPECIFIED CHRONICITY: ICD-10-CM

## 2024-03-11 PROCEDURE — 99204 OFFICE O/P NEW MOD 45 MIN: CPT | Performed by: ORTHOPAEDIC SURGERY

## 2024-03-11 PROCEDURE — 73562 X-RAY EXAM OF KNEE 3: CPT

## 2024-03-11 NOTE — PATIENT INSTRUCTIONS
Bracing: sleep in brace, remove for hygiene purposes  Voltaren gel/diclofean prescription provided - use topically  Consider Aleve for symptom management  Follow-up in 4 weeks

## 2024-03-11 NOTE — PROGRESS NOTES
Assessment:  1. Left knee pain, unspecified chronicity  XR knee 3 vw left non injury      2. Left knee sprain  Ambulatory Referral to Orthopedic Surgery      3. Swelling of joint of left knee  Ambulatory Referral to Orthopedic Surgery      4. Sprain of medial collateral ligament of left knee, initial encounter  T-Rom  Post Op Knee Brace        Patient Active Problem List   Diagnosis   • Seborrheic keratosis   • Changing skin lesion   • Screening for blood or protein in urine   • History of skin cancer   • Essential hypertension   • Near syncope   • Basal cell carcinoma of right temple region   • Episodic confusion   • Allergic rhinitis   • Hypertension   • Hypothyroidism   • Hashimoto's disease   • History of vitamin D deficiency   • Subclinical hypothyroidism   • Sensorineural hearing loss (SNHL) of both ears   • Sprain of anterior talofibular ligament of left ankle   • Tinnitus of both ears   • Symptoms of cerebrovascular accident (CVA)   • IBS (irritable bowel syndrome)   • Grief   • Transient alteration of awareness   • Gastroesophageal reflux disease   • Dysphonia   • Pain in right lumbar region of back   • Right flank pain   • Urinary symptom or sign   • Right hip pain   • Fatigue   • TMJ dysfunction   • Myofascial pain   • Tongue pain   • Reflux laryngitis   • Vertigo   • Vaginal atrophy   • Lactose intolerance   • Mild protein-calorie malnutrition (HCC)   • Pigmented purpura (HCC)   • Stage 3 chronic kidney disease, unspecified whether stage 3a or 3b CKD (HCC)   • Age-related osteoporosis without current pathological fracture   • Sprain of medial collateral ligament of left knee, initial encounter           Plan      The patient's x-rays were reviewed today. The patient is experiencing symptoms consistent with MCL sprain. Treatment options were discussed in the form of bracing, over the counter NSAID's, topical creams. The patient was provided a TROM brace today. She was instructed to wear this throughout  the day and night. She may take off for hygiene purposes. The patient is to have the brace locked in extension. The patient was provided a prescription of Voltaren gel for symptom management. It was discussed the patient would benefit from Aleve as needed for symptom management. I will see the patient back in 4 weeks for re-evaluation and possible unlocking of the brace.           Subjective:     Patient ID:    Chief Complaint:Brigid Brown 78 y.o. female      HPI    Patient comes in today with regards to left knee.  The patient twisted her knee on 3/1/2024 while putting on her pants.  On 3/2/24 she noticed an increase in pain and swelling.  The patient was evaluated at the emergency room on 3/4/2024 where it was discussed she likely had a knee sprain was provided an Ace wrap, and instructed to follow-up with orthopedics.  Pain is located at the medial joint line greater than the lateral joint line. The pain is rated at 2 at its best and 5 at its worst. The patient denies any previous injuries to this knee.  The pain is described as a twinge.  It is mildly worsened with walking, getting up from a seated position, and is made better with ACE wrap, ice, resting.  The patient is not taking anything for treatment.She denies any previous injections or surgeries to this knee. The patient is not a diabetic or a smoker. The patient is allergic to Lidocaine and Methylprednisolone. The patient has gastritis and avoids NSAID's along with acetaminophen due to their side effects.       The following portions of the patient's history were reviewed and updated as appropriate: allergies, current medications, past family history, past social history, past surgical history and problem list.        Social History     Socioeconomic History   • Marital status: /Civil Union     Spouse name: Not on file   • Number of children: Not on file   • Years of education: Not on file   • Highest education level: Not on file   Occupational  History   • Not on file   Tobacco Use   • Smoking status: Never   • Smokeless tobacco: Never   Vaping Use   • Vaping status: Never Used   Substance and Sexual Activity   • Alcohol use: No   • Drug use: No   • Sexual activity: Yes     Partners: Male     Birth control/protection: Surgical   Other Topics Concern   • Not on file   Social History Narrative   • Not on file     Social Determinants of Health     Financial Resource Strain: Not on file   Food Insecurity: Not on file   Transportation Needs: No Transportation Needs (5/20/2023)    PRAPARE - Transportation    • Lack of Transportation (Medical): No    • Lack of Transportation (Non-Medical): No   Physical Activity: Not on file   Stress: Not on file   Social Connections: Not on file   Intimate Partner Violence: Not on file   Housing Stability: Not on file     Past Medical History:   Diagnosis Date   • Acne    • Basal cell carcinoma 06/08/2020    right lower forehead   • BCC (basal cell carcinoma of skin) 03/09/2020    mid forehead   • Benign neoplasm of skin    • Disease of thyroid gland 2006   • GERD (gastroesophageal reflux disease)    • Hypertension    • Inflamed seborrheic keratosis    • Irritable bowel syndrome    • Malignant neoplasm of skin of face    • Migraines    • Nonmelanoma skin cancer     Last Assessed:6/27/17   • Squamous cell skin cancer 06/08/2020    In situ, left lower forehead   • Tachycardia    • Telogen effluvium    • Temporomandibular joint disorder    • Vertigo      Past Surgical History:   Procedure Laterality Date   • ADENOIDECTOMY     • APPENDECTOMY     • CHOLECYSTECTOMY     • COLONOSCOPY  05/03/2019   • COMPLEX WOUND CLOSURE TO EXTREMITY N/A 7/28/2020    Procedure: COMPLEX CLOSURE MID FOREHEAD;  Surgeon: Randy Frank MD;  [facility];  Service: Plastics   • ESOPHAGOGASTRODUODENOSCOPY  2009   • FLAP LOCAL HEAD / NECK N/A 7/28/2020    Procedure: FLAP MID FOREHEAD;  Surgeon: Randy Frank MD;  [facility];   Service: Plastics   • HYSTERECTOMY  1987   • MALIGNANT SKIN LESION EXCISION      Excision of Lesion Face Malignant-9/14/2004 BCC Forehead   • MOHS RECONSTRUCTION N/A 7/28/2020    Procedure: RECONSTRUCTION MOHS DEFECT MID FOREHEAD;  Surgeon: Randy Frank MD;  [facility];  Service: Plastics   • MOHS SURGERY  07/27/2020    Right &left lower forehead, mid forehead   • OOPHORECTOMY Bilateral 1987   • ROTATOR CUFF REPAIR Right    • SKIN BIOPSY     • TONSILLECTOMY     • TUBAL LIGATION  1976?     Allergies   Allergen Reactions   • Cortisone Hypertension, Other (See Comments), Shortness Of Breath and Tachycardia   • Acebutolol    • Acetaminophen GI Intolerance     diarrhea   • Amlodipine    • Atenolol    • Azithromycin    • Bisphosphonates      Annotation - 03Apr2014: iriitis   • Candesartan    • Clarithromycin    • Erythromycin    • Fosinopril    • Irbesartan    • Levofloxacin    • Losartan    • Methylprednisolone Hypertension and Tachycardia   • Metoprolol    • Nisoldipine    • Olmesartan    • Propranolol    • Sulfamethoxazole-Trimethoprim Nausea Only   • Timolol    • Lidocaine Palpitations and Tachycardia     Current Outpatient Medications on File Prior to Visit   Medication Sig Dispense Refill   • Alpha-D-Galactosidase (BEANO) TABS Take by mouth     • ascorbic acid (VITAMIN C) 500 mg tablet Take by mouth     • cholestyramine (QUESTRAN) 4 g packet Take 1 packet by mouth 3 (three) times a day with meals     • Digestive Enzyme CAPS Take by mouth     • diltiazem (CARDIZEM CD) 240 mg 24 hr capsule Take 1 capsule (240 mg total) by mouth daily May give patient  the stock bottle 90 capsule 1   • lactase (LACTAID) 3,000 units tablet Take by mouth     • Multiple Vitamins-Minerals (CENTRUM SILVER ULTRA WOMENS PO) Take by mouth     • pantoprazole (PROTONIX) 20 mg tablet Take 1 tablet (20 mg total) by mouth daily before breakfast 30 tablet 0   • Premarin vaginal cream Insert 1 g into the vagina once a week Brand  Necessary 30 g 1   • famotidine (PEPCID) 10 mg tablet Take 10 mg by mouth 2 (two) times a day (Patient not taking: Reported on 3/11/2024)     • Minoxidil 5 % FOAM Apply topically     • polyethylene glycol-propylene glycol (SYSTANE) 0.4-0.3 %  (Patient not taking: Reported on 3/11/2024)     • [DISCONTINUED] dicyclomine (BENTYL) 10 mg capsule Take 1 capsule (10 mg total) by mouth 3 (three) times a day before meals (Patient not taking: Reported on 3/11/2024) 90 capsule 0     No current facility-administered medications on file prior to visit.              Objective:    Review of Systems   Constitutional:  Negative for appetite change and unexpected weight change.   HENT:  Negative for congestion and trouble swallowing.    Eyes:  Negative for visual disturbance.   Respiratory:  Negative for cough and shortness of breath.    Cardiovascular:  Negative for chest pain and palpitations.   Gastrointestinal:  Negative for nausea and vomiting.   Endocrine: Negative for cold intolerance and heat intolerance.   Musculoskeletal:  Positive for arthralgias. Negative for joint swelling and myalgias.   Skin:  Negative for rash.   Neurological:  Negative for numbness.       Ortho Exam  Left Knee  Alignment neutral  There is no effusion.    There is tenderness over the medial joint line, quadriceps insertion, origin of MCL.    Non-tender over posteromedial corner.  Range of motion from 0 to 120.    There is mild crepitus with range of motion.   There is 5/5 quadriceps strength.    The patient is able to perform a straight leg raise.    Stable to valgus and varus stress.   No ligament laxity   Cain's testing negative  Toes are pink and mobile  Compartments are soft  Brisk capillary refill  Sensation intact  The patient is neurovascular intact distally.    Physical Exam  Vitals and nursing note reviewed.   HENT:      Head: Normocephalic and atraumatic.   Eyes:      General: No scleral icterus.     Conjunctiva/sclera: Conjunctivae  "normal.   Cardiovascular:      Rate and Rhythm: Normal rate.   Pulmonary:      Effort: Pulmonary effort is normal. No respiratory distress.   Musculoskeletal:         General: Signs of injury present.      Cervical back: Normal range of motion and neck supple.   Skin:     General: Skin is warm and dry.   Neurological:      Mental Status: She is alert and oriented to person, place, and time.   Psychiatric:         Behavior: Behavior normal.         Procedures  No Procedures performed today    I have personally reviewed pertinent films in PACS and my interpretation is left knee x-rays: mild to moderate degenerative changes of the patellofemoral joint. Chondrocalcinosis of the left tibiofemoral joint space. Well preserved tibiofemoral joint spaces. No acute fracture. Calcification of artery.    Scribe Attestation    I,:  Mariely Pierce am acting as a scribe while in the presence of the attending physician.:       I,:  Fazal Luciano, DO personally performed the services described in this documentation    as scribed in my presence.:       \    Portions of the record may have been created with voice recognition software.  Occasional wrong word or \"sound a like\" substitutions may have occurred due to the inherent limitations of voice recognition software.  Read the chart carefully and recognize, using context, where substitutions have occurred.  "

## 2024-03-14 ENCOUNTER — TELEPHONE (OUTPATIENT)
Age: 79
End: 2024-03-14

## 2024-03-14 DIAGNOSIS — S83.412A SPRAIN OF MEDIAL COLLATERAL LIGAMENT OF LEFT KNEE, INITIAL ENCOUNTER: Primary | ICD-10-CM

## 2024-03-14 NOTE — TELEPHONE ENCOUNTER
Caller: Patient    Doctor: Ankita    Reason for call: Patient calling looking to schedule appointment w/ a Hortencia regarding getting a brace that was ordered.    Please call patient to coordinate this visit.    Call back#: [phone]  Alternate#: [phone] - [facility]

## 2024-03-22 ENCOUNTER — APPOINTMENT (OUTPATIENT)
Dept: LAB | Facility: MEDICAL CENTER | Age: 79
End: 2024-03-22
Payer: MEDICARE

## 2024-03-22 ENCOUNTER — OFFICE VISIT (OUTPATIENT)
Dept: FAMILY MEDICINE CLINIC | Facility: MEDICAL CENTER | Age: 79
End: 2024-03-22
Payer: MEDICARE

## 2024-03-22 VITALS
SYSTOLIC BLOOD PRESSURE: 150 MMHG | BODY MASS INDEX: 18.68 KG/M2 | DIASTOLIC BLOOD PRESSURE: 84 MMHG | HEART RATE: 71 BPM | WEIGHT: 103.8 LBS | TEMPERATURE: 97.9 F | OXYGEN SATURATION: 99 %

## 2024-03-22 DIAGNOSIS — R10.9 SIDE PAIN: ICD-10-CM

## 2024-03-22 DIAGNOSIS — M79.18 MUSCULOSKELETAL PAIN: Primary | ICD-10-CM

## 2024-03-22 LAB
BILIRUB UR QL STRIP: NEGATIVE
CLARITY UR: CLEAR
COLOR UR: COLORLESS
GLUCOSE UR STRIP-MCNC: NEGATIVE MG/DL
HGB UR QL STRIP.AUTO: NEGATIVE
KETONES UR STRIP-MCNC: NEGATIVE MG/DL
LEUKOCYTE ESTERASE UR QL STRIP: NEGATIVE
NITRITE UR QL STRIP: NEGATIVE
PH UR STRIP.AUTO: 6.5 [PH]
PROT UR STRIP-MCNC: NEGATIVE MG/DL
SP GR UR STRIP.AUTO: 1 (ref 1–1.03)
UROBILINOGEN UR STRIP-ACNC: <2 MG/DL

## 2024-03-22 PROCEDURE — 81003 URINALYSIS AUTO W/O SCOPE: CPT

## 2024-03-22 PROCEDURE — 99213 OFFICE O/P EST LOW 20 MIN: CPT

## 2024-03-22 NOTE — PATIENT INSTRUCTIONS
Please have your urine done at the lab, order provided.    You may try Biofreeze topically or continue with use of Voltaren gel topically.     referral placed to physical therapy.

## 2024-03-22 NOTE — PROGRESS NOTES
Assessment/Plan:       1. Musculoskeletal pain  Advised to continue with Voltaren gel topically or may try Biofreeze topically.  Referral placed to physical therapy.  - Ambulatory Referral to Physical Therapy; Future    2. Side pain  Check UA.  - UA w Reflex to Microscopic w Reflex to Culture; Future      Subjective:      Patient ID: Brigid Brown is a 78 y.o. female.    78 year old female presents with complaint of constant right side pain x 1 month, non-radiating. She reports this pain was present when seen here 1 month ago for epigastric pain by PCP Dr. Cintron but she failed to mention it to her because it was not as bothersome. Her GI symptoms are better with Protonix and she is no longer having abdominal pain. She also denies urinary sx, radiation of pain, incontinence, hematuria, diarrhea, constipation. Denies pain, numbness, tingling, weakness in lower extremities.  She describes the pain as a strong ache that does not change with movement, pain is constant 6/10. She has tried ice and heat which did not help. She tells me Voltaren gel helps to relieve the pain.   She is unable to take tylenol, NSAIDs or Lidocaine patch due to side effects.     Back Pain        The following portions of the patient's history were reviewed and updated as appropriate: allergies, current medications, past family history, past social history, past surgical history, and problem list.    Review of Systems   Constitutional: Negative.    HENT: Negative.     Eyes: Negative.    Respiratory: Negative.     Cardiovascular: Negative.    Gastrointestinal: Negative.    Endocrine: Negative.    Genitourinary: Negative.    Musculoskeletal:         Right side pain x 1 month   Skin: Negative.    Allergic/Immunologic: Negative.    Neurological: Negative.    Hematological: Negative.    Psychiatric/Behavioral: Negative.           Objective:      /84 (BP Location: Left arm, Patient Position: Sitting, Cuff Size: Standard)   Pulse 71   Temp 97.9  °F (36.6 °C) (Temporal)   Wt 47.1 kg (103 lb 12.8 oz)   LMP  (LMP Unknown)   SpO2 99%   BMI 18.68 kg/m²          Physical Exam  Vitals and nursing note reviewed.   Constitutional:       General: She is not in acute distress.     Appearance: Normal appearance. She is not ill-appearing.   HENT:      Head: Normocephalic and atraumatic.   Cardiovascular:      Rate and Rhythm: Normal rate.      Pulses: Normal pulses.   Pulmonary:      Effort: Pulmonary effort is normal.   Abdominal:      General: Abdomen is flat. Bowel sounds are normal.      Palpations: Abdomen is soft.      Tenderness: There is no abdominal tenderness.   Musculoskeletal:      Lumbar back: No tenderness. Normal range of motion. Negative right straight leg raise test and negative left straight leg raise test.      Comments: No tenderness noted to abdomen, back or side. No evidence of rash, discoloration or edema.      Skin:     General: Skin is warm and dry.      Findings: No erythema or rash.   Neurological:      General: No focal deficit present.      Mental Status: She is alert and oriented to person, place, and time. Mental status is at baseline.   Psychiatric:         Mood and Affect: Mood normal.         Behavior: Behavior normal.         Thought Content: Thought content normal.                    YULISSA Winn

## 2024-03-26 ENCOUNTER — TELEPHONE (OUTPATIENT)
Age: 79
End: 2024-03-26

## 2024-03-26 NOTE — TELEPHONE ENCOUNTER
Yesenia Gilliam was calling because she didn't have the referral showing electronially signed by provider, I faxed her the order that shows that at the bottom

## 2024-04-08 ENCOUNTER — OFFICE VISIT (OUTPATIENT)
Dept: OBGYN CLINIC | Facility: CLINIC | Age: 79
End: 2024-04-08
Payer: MEDICARE

## 2024-04-08 VITALS — BODY MASS INDEX: 18.25 KG/M2 | WEIGHT: 103 LBS | HEIGHT: 63 IN

## 2024-04-08 DIAGNOSIS — S83.412D SPRAIN OF MEDIAL COLLATERAL LIGAMENT OF LEFT KNEE, SUBSEQUENT ENCOUNTER: Primary | ICD-10-CM

## 2024-04-08 PROCEDURE — 99213 OFFICE O/P EST LOW 20 MIN: CPT | Performed by: ORTHOPAEDIC SURGERY

## 2024-04-08 NOTE — PROGRESS NOTES
Assessment:  1. Sprain of medial collateral ligament of left knee, subsequent encounter          Patient Active Problem List   Diagnosis    Seborrheic keratosis    Changing skin lesion    Screening for blood or protein in urine    History of skin cancer    Essential hypertension    Near syncope    Basal cell carcinoma of right temple region    Episodic confusion    Allergic rhinitis    Hypertension    Hypothyroidism    Hashimoto's disease    History of vitamin D deficiency    Subclinical hypothyroidism    Sensorineural hearing loss (SNHL) of both ears    Sprain of anterior talofibular ligament of left ankle    Tinnitus of both ears    Symptoms of cerebrovascular accident (CVA)    IBS (irritable bowel syndrome)    Grief    Transient alteration of awareness    Gastroesophageal reflux disease    Dysphonia    Pain in right lumbar region of back    Right flank pain    Urinary symptom or sign    Right hip pain    Fatigue    TMJ dysfunction    Myofascial pain    Tongue pain    Reflux laryngitis    Vertigo    Vaginal atrophy    Lactose intolerance    Mild protein-calorie malnutrition (HCC)    Pigmented purpura (HCC)    Stage 3 chronic kidney disease, unspecified whether stage 3a or 3b CKD (HCC)    Age-related osteoporosis without current pathological fracture    Sprain of medial collateral ligament of left knee, initial encounter       Plan:    79 y.o. female  with MCL sprain of the left knee    Patient to continue bracing for 2 more weeks to allow proper healing of the MCL.  Discussed our standard of care for MCL is the TROM brace. However, if patient cannot tolerate the TROM we can continue with the hinged. Discussed that patient may have persistent pain secondary to not following the standard of care.   Discussed PRP injections of the MCL if persistent pain.  Discussed this is $250 out-of-pocket cost to patient.  Patient to follow-up in 2 weeks.  Consider discontinuing bracing at this time and possible physical therapy  if needed.     The assessment and plan were formulated by Dr. Luciano and I assisted in carrying it out.    Subjective:   Patient ID: Brigid Brown is a 79 y.o. female .    HPI    Patient presents to the office for follow up of left knee pain.  Patient states she has been wearing brace at all times except for hygiene purposes.  Notes great relief of pain and has returned to normal activity without increase of pain.    The following portions of the patient's history were reviewed and updated as appropriate: allergies, current medications, past family history, past social history, past surgical history and problem list.    Social History     Socioeconomic History    Marital status: /Civil Union     Spouse name: Not on file    Number of children: Not on file    Years of education: Not on file    Highest education level: Not on file   Occupational History    Not on file   Tobacco Use    Smoking status: Never    Smokeless tobacco: Never   Vaping Use    Vaping status: Never Used   Substance and Sexual Activity    Alcohol use: No    Drug use: No    Sexual activity: Yes     Partners: Male     Birth control/protection: Surgical   Other Topics Concern    Not on file   Social History Narrative    Not on file     Social Determinants of Health     Financial Resource Strain: Not on file   Food Insecurity: Not on file   Transportation Needs: No Transportation Needs (5/20/2023)    PRAPARE - Transportation     Lack of Transportation (Medical): No     Lack of Transportation (Non-Medical): No   Physical Activity: Not on file   Stress: Not on file   Social Connections: Not on file   Intimate Partner Violence: Not on file   Housing Stability: Not on file     Past Medical History:   Diagnosis Date    Acne     Basal cell carcinoma 06/08/2020    right lower forehead    BCC (basal cell carcinoma of skin) 03/09/2020    mid forehead    Benign neoplasm of skin     Disease of thyroid gland 2006    GERD (gastroesophageal reflux disease)      Hypertension     Inflamed seborrheic keratosis     Irritable bowel syndrome     Malignant neoplasm of skin of face     Migraines     Nonmelanoma skin cancer     Last Assessed:6/27/17    Squamous cell skin cancer 06/08/2020    In situ, left lower forehead    Tachycardia     Telogen effluvium     Temporomandibular joint disorder     Vertigo      Past Surgical History:   Procedure Laterality Date    ADENOIDECTOMY      APPENDECTOMY      CHOLECYSTECTOMY      COLONOSCOPY  05/03/2019    COMPLEX WOUND CLOSURE TO EXTREMITY N/A 7/28/2020    Procedure: COMPLEX CLOSURE MID FOREHEAD;  Surgeon: Randy Frank MD;  Location: [facility];  Service: Plastics    ESOPHAGOGASTRODUODENOSCOPY  2009    FLAP LOCAL HEAD / NECK N/A 7/28/2020    Procedure: FLAP MID FOREHEAD;  Surgeon: Randy Frank MD;  Location: [facility];  Service: Plastics    HYSTERECTOMY  1987    MALIGNANT SKIN LESION EXCISION      Excision of Lesion Face Malignant-9/14/2004 BCC Forehead    MOHS RECONSTRUCTION N/A 7/28/2020    Procedure: RECONSTRUCTION MOHS DEFECT MID FOREHEAD;  Surgeon: Randy Frank MD;  Location: [facility];  Service: Plastics    MOHS SURGERY  07/27/2020    Right &left lower forehead, mid forehead    OOPHORECTOMY Bilateral 1987    ROTATOR CUFF REPAIR Right     SKIN BIOPSY      TONSILLECTOMY      TUBAL LIGATION  1976?     Allergies   Allergen Reactions    Cortisone Hypertension, Other (See Comments), Shortness Of Breath and Tachycardia    Acebutolol     Acetaminophen GI Intolerance     diarrhea    Amlodipine     Atenolol     Azithromycin     Bisphosphonates      Annotation - [id number]: iriitis    Candesartan     Clarithromycin     Erythromycin     Fosinopril     Irbesartan     Levofloxacin     Losartan     Methylprednisolone Hypertension and Tachycardia    Metoprolol     Nisoldipine     Olmesartan     Propranolol     Sulfamethoxazole-Trimethoprim Nausea Only    Timolol     Lidocaine Palpitations and Tachycardia     Current  Outpatient Medications on File Prior to Visit   Medication Sig Dispense Refill    Alpha-D-Galactosidase (BEANO) TABS Take by mouth      ascorbic acid (VITAMIN C) 500 mg tablet Take by mouth      cholestyramine (QUESTRAN) 4 g packet Take 1 packet by mouth 3 (three) times a day with meals      Diclofenac Sodium (VOLTAREN) 1 % Apply 2 g topically 4 (four) times a day 240 g 1    Digestive Enzyme CAPS Take by mouth      diltiazem (CARDIZEM CD) 240 mg 24 hr capsule Take 1 capsule (240 mg total) by mouth daily May give patient  the stock bottle 90 capsule 1    famotidine (PEPCID) 10 mg tablet Take 10 mg by mouth 2 (two) times a day      lactase (LACTAID) 3,000 units tablet Take by mouth      Minoxidil 5 % FOAM Apply topically      Multiple Vitamins-Minerals (CENTRUM SILVER ULTRA WOMENS PO) Take by mouth      pantoprazole (PROTONIX) 20 mg tablet Take 1 tablet (20 mg total) by mouth daily before breakfast 30 tablet 0    polyethylene glycol-propylene glycol (SYSTANE) 0.4-0.3 %       Premarin vaginal cream Insert 1 g into the vagina once a week Brand Necessary 30 g 1     No current facility-administered medications on file prior to visit.       Review of Systems   Constitutional:  Negative for chills and fever.   HENT:  Negative for ear pain and sore throat.    Eyes:  Negative for pain and visual disturbance.   Respiratory:  Negative for cough and shortness of breath.    Cardiovascular:  Negative for chest pain and palpitations.   Gastrointestinal:  Negative for abdominal pain and vomiting.   Genitourinary:  Negative for dysuria and hematuria.   Musculoskeletal:  Positive for arthralgias. Negative for back pain.   Skin:  Negative for color change and rash.   Neurological:  Negative for seizures and syncope.   All other systems reviewed and are negative.    See HPi    Objective:    There were no vitals filed for this visit.    Physical Exam  Vitals and nursing note reviewed.   Constitutional:       General: She is not in acute  "distress.     Appearance: She is well-developed.   HENT:      Head: Normocephalic and atraumatic.   Eyes:      Conjunctiva/sclera: Conjunctivae normal.   Cardiovascular:      Rate and Rhythm: Normal rate and regular rhythm.      Heart sounds: No murmur heard.  Pulmonary:      Effort: Pulmonary effort is normal. No respiratory distress.      Breath sounds: Normal breath sounds.   Abdominal:      Palpations: Abdomen is soft.      Tenderness: There is no abdominal tenderness.   Musculoskeletal:         General: No swelling.      Cervical back: Neck supple.   Skin:     General: Skin is warm and dry.      Capillary Refill: Capillary refill takes less than 2 seconds.   Neurological:      Mental Status: She is alert.   Psychiatric:         Mood and Affect: Mood normal.         Procedures  No Procedures performed today     Portions of the record may have been created with voice recognition software.  Occasional wrong word or \"sound a like\" substitutions may have occurred due to the inherent limitations of voice recognition software.  Read the chart carefully and recognize, using context, where substitutions have occurred.   "

## 2024-04-09 ENCOUNTER — APPOINTMENT (EMERGENCY)
Dept: CT IMAGING | Facility: HOSPITAL | Age: 79
End: 2024-04-09
Payer: MEDICARE

## 2024-04-09 ENCOUNTER — HOSPITAL ENCOUNTER (EMERGENCY)
Facility: HOSPITAL | Age: 79
Discharge: HOME | End: 2024-04-09
Attending: EMERGENCY MEDICINE
Payer: MEDICARE

## 2024-04-09 ENCOUNTER — APPOINTMENT (EMERGENCY)
Dept: RADIOLOGY | Facility: HOSPITAL | Age: 79
End: 2024-04-09
Payer: MEDICARE

## 2024-04-09 ENCOUNTER — NURSE TRIAGE (OUTPATIENT)
Age: 79
End: 2024-04-09

## 2024-04-09 VITALS
HEART RATE: 73 BPM | RESPIRATION RATE: 16 BRPM | DIASTOLIC BLOOD PRESSURE: 70 MMHG | OXYGEN SATURATION: 99 % | TEMPERATURE: 97.9 F | SYSTOLIC BLOOD PRESSURE: 163 MMHG

## 2024-04-09 DIAGNOSIS — R10.9 ACUTE ABDOMINAL PAIN: ICD-10-CM

## 2024-04-09 DIAGNOSIS — R53.83 FATIGUE: Primary | ICD-10-CM

## 2024-04-09 DIAGNOSIS — R03.0 ELEVATED BLOOD PRESSURE READING: ICD-10-CM

## 2024-04-09 DIAGNOSIS — N39.0 UTI (URINARY TRACT INFECTION): ICD-10-CM

## 2024-04-09 DIAGNOSIS — E87.1 HYPONATREMIA: ICD-10-CM

## 2024-04-09 LAB
ALBUMIN SERPL BCP-MCNC: 4.2 G/DL (ref 3.5–5)
ALP SERPL-CCNC: 58 U/L (ref 34–104)
ALT SERPL W P-5'-P-CCNC: 13 U/L (ref 7–52)
ANION GAP SERPL CALCULATED.3IONS-SCNC: 10 MMOL/L (ref 4–13)
AST SERPL W P-5'-P-CCNC: 17 U/L (ref 13–39)
ATRIAL RATE: 70 BPM
BACTERIA UR QL AUTO: ABNORMAL /HPF
BASOPHILS # BLD AUTO: 0.05 THOUSANDS/ÂΜL (ref 0–0.1)
BASOPHILS NFR BLD AUTO: 1 % (ref 0–1)
BILIRUB SERPL-MCNC: 0.59 MG/DL (ref 0.2–1)
BILIRUB UR QL STRIP: NEGATIVE
BUN SERPL-MCNC: 15 MG/DL (ref 5–25)
CALCIUM SERPL-MCNC: 9.6 MG/DL (ref 8.4–10.2)
CARDIAC TROPONIN I PNL SERPL HS: 10 NG/L
CHLORIDE SERPL-SCNC: 95 MMOL/L (ref 96–108)
CLARITY UR: CLEAR
CO2 SERPL-SCNC: 28 MMOL/L (ref 21–32)
COLOR UR: COLORLESS
CREAT SERPL-MCNC: 0.77 MG/DL (ref 0.6–1.3)
EOSINOPHIL # BLD AUTO: 0.04 THOUSAND/ÂΜL (ref 0–0.61)
EOSINOPHIL NFR BLD AUTO: 1 % (ref 0–6)
ERYTHROCYTE [DISTWIDTH] IN BLOOD BY AUTOMATED COUNT: 13.3 % (ref 11.6–15.1)
GFR SERPL CREATININE-BSD FRML MDRD: 72 ML/MIN/1.73SQ M
GLUCOSE SERPL-MCNC: 101 MG/DL (ref 65–140)
GLUCOSE UR STRIP-MCNC: NEGATIVE MG/DL
HCT VFR BLD AUTO: 43.8 % (ref 34.8–46.1)
HGB BLD-MCNC: 14 G/DL (ref 11.5–15.4)
HGB UR QL STRIP.AUTO: NEGATIVE
IMM GRANULOCYTES # BLD AUTO: 0.01 THOUSAND/UL (ref 0–0.2)
IMM GRANULOCYTES NFR BLD AUTO: 0 % (ref 0–2)
KETONES UR STRIP-MCNC: ABNORMAL MG/DL
LEUKOCYTE ESTERASE UR QL STRIP: ABNORMAL
LIPASE SERPL-CCNC: 23 U/L (ref 11–82)
LYMPHOCYTES # BLD AUTO: 1.04 THOUSANDS/ÂΜL (ref 0.6–4.47)
LYMPHOCYTES NFR BLD AUTO: 17 % (ref 14–44)
MCH RBC QN AUTO: 28.8 PG (ref 26.8–34.3)
MCHC RBC AUTO-ENTMCNC: 32 G/DL (ref 31.4–37.4)
MCV RBC AUTO: 90 FL (ref 82–98)
MONOCYTES # BLD AUTO: 0.7 THOUSAND/ÂΜL (ref 0.17–1.22)
MONOCYTES NFR BLD AUTO: 11 % (ref 4–12)
NEUTROPHILS # BLD AUTO: 4.33 THOUSANDS/ÂΜL (ref 1.85–7.62)
NEUTS SEG NFR BLD AUTO: 70 % (ref 43–75)
NITRITE UR QL STRIP: NEGATIVE
NON-SQ EPI CELLS URNS QL MICRO: ABNORMAL /HPF
NRBC BLD AUTO-RTO: 0 /100 WBCS
P AXIS: 63 DEGREES
PH UR STRIP.AUTO: 7 [PH]
PLATELET # BLD AUTO: 273 THOUSANDS/UL (ref 149–390)
PMV BLD AUTO: 9.6 FL (ref 8.9–12.7)
POTASSIUM SERPL-SCNC: 3.6 MMOL/L (ref 3.5–5.3)
PR INTERVAL: 132 MS
PROT SERPL-MCNC: 7.4 G/DL (ref 6.4–8.4)
PROT UR STRIP-MCNC: NEGATIVE MG/DL
QRS AXIS: -82 DEGREES
QRSD INTERVAL: 94 MS
QT INTERVAL: 440 MS
QTC INTERVAL: 475 MS
RBC # BLD AUTO: 4.86 MILLION/UL (ref 3.81–5.12)
RBC #/AREA URNS AUTO: ABNORMAL /HPF
SODIUM SERPL-SCNC: 133 MMOL/L (ref 135–147)
SP GR UR STRIP.AUTO: 1.01 (ref 1–1.03)
T WAVE AXIS: 64 DEGREES
UROBILINOGEN UR STRIP-ACNC: <2 MG/DL
VENTRICULAR RATE: 70 BPM
WBC # BLD AUTO: 6.17 THOUSAND/UL (ref 4.31–10.16)
WBC #/AREA URNS AUTO: ABNORMAL /HPF

## 2024-04-09 PROCEDURE — 85025 COMPLETE CBC W/AUTO DIFF WBC: CPT | Performed by: EMERGENCY MEDICINE

## 2024-04-09 PROCEDURE — 80053 COMPREHEN METABOLIC PANEL: CPT | Performed by: EMERGENCY MEDICINE

## 2024-04-09 PROCEDURE — 84484 ASSAY OF TROPONIN QUANT: CPT | Performed by: EMERGENCY MEDICINE

## 2024-04-09 PROCEDURE — 81001 URINALYSIS AUTO W/SCOPE: CPT | Performed by: EMERGENCY MEDICINE

## 2024-04-09 PROCEDURE — 93005 ELECTROCARDIOGRAM TRACING: CPT

## 2024-04-09 PROCEDURE — 36415 COLL VENOUS BLD VENIPUNCTURE: CPT | Performed by: EMERGENCY MEDICINE

## 2024-04-09 PROCEDURE — 74177 CT ABD & PELVIS W/CONTRAST: CPT

## 2024-04-09 PROCEDURE — 71046 X-RAY EXAM CHEST 2 VIEWS: CPT

## 2024-04-09 PROCEDURE — 83690 ASSAY OF LIPASE: CPT | Performed by: EMERGENCY MEDICINE

## 2024-04-09 RX ORDER — CEPHALEXIN 500 MG/1
500 CAPSULE ORAL EVERY 6 HOURS SCHEDULED
Qty: 20 CAPSULE | Refills: 0 | Status: SHIPPED | OUTPATIENT
Start: 2024-04-09 | End: 2024-04-14

## 2024-04-09 RX ORDER — CEPHALEXIN 250 MG/1
500 CAPSULE ORAL ONCE
Status: COMPLETED | OUTPATIENT
Start: 2024-04-09 | End: 2024-04-09

## 2024-04-09 RX ORDER — SUCRALFATE 1 G/1
1 TABLET ORAL ONCE
Status: DISCONTINUED | OUTPATIENT
Start: 2024-04-09 | End: 2024-04-09 | Stop reason: HOSPADM

## 2024-04-09 RX ADMIN — CEPHALEXIN 500 MG: 250 CAPSULE ORAL at 15:52

## 2024-04-09 RX ADMIN — SODIUM CHLORIDE 500 ML: 0.9 INJECTION, SOLUTION INTRAVENOUS at 13:37

## 2024-04-09 RX ADMIN — IOHEXOL 85 ML: 350 INJECTION, SOLUTION INTRAVENOUS at 14:42

## 2024-04-09 NOTE — TELEPHONE ENCOUNTER
"Received call from patient who reports elevated BP yesterday afternoon and 180/100 while on phone with RN. Patient is very fatigued; lays down after each ADL this morning (shower, getting dressed) and is laying iin bed while on phone. Reports decreased appetite and 10 lb weight loss while being treated for gastritis with pantoprazole. Denies lightheaded, dizziness, headaches, chest pain or visiual disturbances. RN advised patient to go to ER and notified PCP office instead of OV at 11:20 AM/ Patient agrees and will have spouse take her to [facility]    Reason for Disposition   Systolic BP >= 160 OR Diastolic >= 100, and any cardiac or neurologic symptoms (e.g., chest pain, difficulty breathing, unsteady gait, blurred vision)    Answer Assessment - Initial Assessment Questions  1. BLOOD PRESSURE: \"What is the blood pressure?\" \"Did you take at least two measurements 5 minutes apart?\"      180/90 HR=? yesterday afternoon; while on phone with RN EK=[id number]/100  2. ONSET: \"When did you take your blood pressure?\"      This morning  3. HOW: \"How did you obtain the blood pressure?\" (e.g., visiting nurse, automatic home BP monitor)      Home BP monitor  4. HISTORY: \"Do you have a history of high blood pressure?\"      Yes  5. MEDICATIONS: \"Are you taking any medications for blood pressure?\" \"Have you missed any doses recently?\"      Diltiazem 240 mg ; Protonix for gastritis  6. OTHER SYMPTOMS: \"Do you have any symptoms?\" (e.g., headache, chest pain, blurred vision, difficulty breathing, weakness)      Fatigue; decreased appetite with losing weight down to 93 lbs  7. PREGNANCY: \"Is there any chance you are pregnant?\" \"When was your last menstrual period?\"      Post menopausal    Protocols used: Blood Pressure - High-ADULT-OH    "

## 2024-04-09 NOTE — DISCHARGE INSTRUCTIONS
You were evaluated in the emergency department for: fatigue and abdominal pain. You can access your current and pending results through [facility]'s [facility] EHR. A radiologist will take a second look at your X-Rays, if you had any, and you will be contacted with any new findings.     You should follow-up with your primary care provider, as soon as possible, for re-evaluation.  If you do not have a primary care provider, I have referred you to [facility]. You will be contacted about scheduling an appointment. Their phone number is also included on this paperwork.  You have also been referred to gastroenterology and you should follow-up with them as well.    You may take 650mg of tylenol every four to six hours, not exceeding 3,000mg daily, for the management of your discomfort.     Your workup revealed no emergent features at this time; however, many disease processes are dynamic:    Please, return to the emergency department if you experience new or worsening symptoms, fever, chest pain, shortness of breath, difficulty breathing, dizziness, abdominal pain, persistent nausea/vomiting, syncope or passing out, blood in your urine or stool, coughing up blood, leg swelling/pain, urinary retention, bowel or bladder incontinence, numbness between your legs.    Additionally, your blood pressure was measured to be high. This is something that you should discuss with your primary care provider and have re-checked within one week.

## 2024-04-09 NOTE — TELEPHONE ENCOUNTER
PT called to inform Opal that she just found out her tests came out fine and she will be following up with a GI specialist.    Thank You

## 2024-04-09 NOTE — ED PROVIDER NOTES
History  Chief Complaint   Patient presents with    Medical Problem     Took pressure at home and was high, last few weeks feeling generalized weakness, decreased appetite, and weight loss (recent dx of gastritis)     Patient is an 79-year-old female, with a history significant for hypertension, GERD, and migraines per my review of the medical record, who presents to the ED today for evaluation of a roughly 3-week history of fatigue.  This is worsened with exertion and remitted by rest.  Patient also describes decreased urine output, decreased p.o. intake, estimated 10 pound weight loss in the last month.  Per my review of medical record, patient has had a roughly 3 pound weight loss in the last month.  Patient also became concerned today because her blood pressure was elevated to 180/90.  There is no associated chest pain, dyspnea, vision change, weakness, numbness.  Patient also reports a multiple month history of intermittent abdominal pain in the epigastric region/right upper quadrant that is sharp in character and worsened with touch.  She denies trauma as well as blood in stool.  Notably, patient states she has not passed flatus in the recent past and has been belching more. Per my review of the medical record, patient had a free T4 within normal limits 2 months ago.  Per patient's , present) collateral history, patient is not confused.  Patient is without other concerns at this time.    Wt Readings from Last 3 Encounters:  04/08/24 : 46.7 kg (103 lb)  03/22/24 : 47.1 kg (103 lb 12.8 oz)  03/11/24 : 48.1 kg (106 lb)           Prior to Admission Medications   Prescriptions Last Dose Informant Patient Reported? Taking?   Alpha-D-Galactosidase (BEANO) TABS  Self Yes No   Sig: Take by mouth   Diclofenac Sodium (VOLTAREN) 1 %   No No   Sig: Apply 2 g topically 4 (four) times a day   Digestive Enzyme CAPS  Self Yes No   Sig: Take by mouth   Minoxidil 5 % FOAM  Self Yes No   Sig: Apply topically   Multiple  Vitamins-Minerals (CENTRUM SILVER ULTRA WOMENS PO)  Self Yes No   Sig: Take by mouth   Premarin vaginal cream  Self No No   Sig: Insert 1 g into the vagina once a week Brand Necessary   ascorbic acid (VITAMIN C) 500 mg tablet  Self Yes No   Sig: Take by mouth   cholestyramine (QUESTRAN) 4 g packet  Self Yes No   Sig: Take 1 packet by mouth 3 (three) times a day with meals   diltiazem (CARDIZEM CD) 240 mg 24 hr capsule  Self No No   Sig: Take 1 capsule (240 mg total) by mouth daily May give patient  the stock bottle   famotidine (PEPCID) 10 mg tablet  Self Yes No   Sig: Take 10 mg by mouth 2 (two) times a day   lactase (LACTAID) 3,000 units tablet  Self Yes No   Sig: Take by mouth   pantoprazole (PROTONIX) 20 mg tablet  Self No No   Sig: Take 1 tablet (20 mg total) by mouth daily before breakfast   polyethylene glycol-propylene glycol (SYSTANE) 0.4-0.3 %  Self Yes No      Facility-Administered Medications: None       Past Medical History:   Diagnosis Date    Acne     Basal cell carcinoma 06/08/2020    right lower forehead    BCC (basal cell carcinoma of skin) 03/09/2020    mid forehead    Benign neoplasm of skin     Disease of thyroid gland 2006    GERD (gastroesophageal reflux disease)     Hypertension     Inflamed seborrheic keratosis     Irritable bowel syndrome     Malignant neoplasm of skin of face     Migraines     Nonmelanoma skin cancer     Last Assessed:6/27/17    Squamous cell skin cancer 06/08/2020    In situ, left lower forehead    Tachycardia     Telogen effluvium     Temporomandibular joint disorder     Vertigo        Past Surgical History:   Procedure Laterality Date    ADENOIDECTOMY      APPENDECTOMY      CHOLECYSTECTOMY      COLONOSCOPY  05/03/2019    COMPLEX WOUND CLOSURE TO EXTREMITY N/A 7/28/2020    Procedure: COMPLEX CLOSURE MID FOREHEAD;  Surgeon: Randy Frank MD;  Location: [facility];  Service: Plastics    ESOPHAGOGASTRODUODENOSCOPY  2009    FLAP LOCAL HEAD / NECK N/A 7/28/2020     Procedure: FLAP MID FOREHEAD;  Surgeon: Randy Frank MD;  Location: [facility];  Service: Plastics    HYSTERECTOMY  1987    MALIGNANT SKIN LESION EXCISION      Excision of Lesion Face Malignant-9/14/2004 BCC Forehead    MOHS RECONSTRUCTION N/A 7/28/2020    Procedure: RECONSTRUCTION MOHS DEFECT MID FOREHEAD;  Surgeon: Randy Frank MD;  Location: [facility];  Service: Plastics    MOHS SURGERY  07/27/2020    Right &left lower forehead, mid forehead    OOPHORECTOMY Bilateral 1987    ROTATOR CUFF REPAIR Right     SKIN BIOPSY      TONSILLECTOMY      TUBAL LIGATION  1976?       Family History   Problem Relation Age of Onset    Hypertension Mother     Osteoporosis Mother     Skin cancer Mother     Migraines Mother     Dementia Mother     Hypertension Father     Skin cancer Father     Dementia Father     Skin cancer Sister 73    Migraines Sister     No Known Problems Daughter     Uterine cancer Maternal Grandmother 29    Diabetes type II Maternal Grandfather     Cancer Paternal Grandmother     Uterine cancer Paternal Grandmother 65    No Known Problems Maternal Aunt     Cancer Paternal Aunt         Breast    Uterine cancer Paternal Aunt 55    No Known Problems Paternal Aunt     No Known Problems Paternal Aunt      I have reviewed and agree with the history as documented.    E-Cigarette/Vaping    E-Cigarette Use Never User      E-Cigarette/Vaping Substances    Nicotine No     THC No     CBD No     Flavoring No     Other No     Unknown No      Social History     Tobacco Use    Smoking status: Never    Smokeless tobacco: Never   Vaping Use    Vaping status: Never Used   Substance Use Topics    Alcohol use: No    Drug use: No       Review of Systems   Constitutional:  Positive for fatigue and unexpected weight change. Negative for fever.   HENT:  Negative for trouble swallowing.    Eyes:  Negative for visual disturbance.   Respiratory:  Negative for shortness of breath.    Cardiovascular:  Negative for chest  pain.   Gastrointestinal:  Positive for abdominal pain. Negative for blood in stool.   Endocrine: Negative for polyuria.   Genitourinary:  Negative for dysuria.   Musculoskeletal:  Negative for gait problem.   Skin:  Negative for rash.   Allergic/Immunologic: Positive for environmental allergies.   Neurological:  Negative for weakness and numbness.   Hematological:  Negative for adenopathy.   Psychiatric/Behavioral:  Negative for confusion.    All other systems reviewed and are negative.      Physical Exam  Physical Exam  Vitals and nursing note reviewed.   Constitutional:       General: She is not in acute distress.     Appearance: She is not toxic-appearing or diaphoretic.   HENT:      Head: Normocephalic and atraumatic.      Right Ear: External ear normal.      Left Ear: External ear normal.      Nose: Nose normal. No rhinorrhea.      Mouth/Throat:      Mouth: Mucous membranes are moist.      Pharynx: Oropharynx is clear. No oropharyngeal exudate or posterior oropharyngeal erythema.      Comments: Uvula midline. No oropharyngeal or submandibular mass/swelling  Eyes:      General: No scleral icterus.        Right eye: No discharge.         Left eye: No discharge.      Conjunctiva/sclera: Conjunctivae normal.      Pupils: Pupils are equal, round, and reactive to light.   Neck:      Comments: Patient is spontaneously rotating their neck to the left and right during the history and physical exam interaction without difficulty or apparent discomfort    Cardiovascular:      Rate and Rhythm: Normal rate and regular rhythm.      Pulses: Normal pulses.      Heart sounds: Normal heart sounds. No murmur heard.     No friction rub. No gallop.      Comments: 2+ Radial  Pulmonary:      Effort: Pulmonary effort is normal. No respiratory distress.      Breath sounds: Normal breath sounds. No stridor. No wheezing, rhonchi or rales.   Abdominal:      General: Abdomen is flat. There is no distension.      Palpations: Abdomen is  soft.      Tenderness: There is abdominal tenderness. There is guarding. There is no right CVA tenderness, left CVA tenderness or rebound.   Musculoskeletal:         General: No tenderness (No pain with calf squeeze) or deformity.      Cervical back: Neck supple. No tenderness. No muscular tenderness.      Right lower leg: No edema.      Left lower leg: No edema.   Lymphadenopathy:      Cervical: No cervical adenopathy.   Skin:     General: Skin is warm and dry.      Capillary Refill: Capillary refill takes less than 2 seconds.   Neurological:      Mental Status: She is alert.      Comments: Patient is speaking clearly in complete sentences.  Patient is answering appropriately and able follow commands.  Patient is moving all four extremities spontaneously.  No facial droop.  Tongue midline.      Psychiatric:         Mood and Affect: Mood normal.         Behavior: Behavior normal.         Vital Signs  ED Triage Vitals   Temperature Pulse Respirations Blood Pressure SpO2   04/09/24 1202 04/09/24 1202 04/09/24 1202 04/09/24 1202 04/09/24 1202   97.9 °F (36.6 °C) 85 18 134/74 98 %      Temp src Heart Rate Source Patient Position - Orthostatic VS BP Location FiO2 (%)   -- 04/09/24 1430 04/09/24 1202 04/09/24 1202 --    Monitor Sitting Right arm       Pain Score       --                  Vitals:    04/09/24 1202 04/09/24 1430   BP: 134/74 163/70   Pulse: 85 73   Patient Position - Orthostatic VS: Sitting Sitting         Visual Acuity      ED Medications  Medications   sucralfate (CARAFATE) tablet 1 g (1 g Oral Not Given 4/9/24 1337)   sodium chloride 0.9 % bolus 500 mL (0 mL Intravenous Stopped 4/9/24 1437)   iohexol (OMNIPAQUE) 350 MG/ML injection (MULTI-DOSE) 85 mL (85 mL Intravenous Given 4/9/24 1442)   cephalexin (KEFLEX) capsule 500 mg (500 mg Oral Given 4/9/24 1552)       Diagnostic Studies  Results Reviewed       Procedure Component Value Units Date/Time    HS Troponin 0hr (reflex protocol) [[id number]]  (Normal)  Collected: 04/09/24 1334    Lab Status: Final result Specimen: Blood from Arm, Right Updated: 04/09/24 1524     hs TnI 0hr 10 ng/L     Urine Microscopic [[id number]]  (Abnormal) Collected: 04/09/24 1511    Lab Status: Final result Specimen: Urine, Clean Catch Updated: 04/09/24 1522     RBC, UA 1-2 /hpf      WBC, UA 4-10 /hpf      Epithelial Cells Occasional /hpf      Bacteria, UA Occasional /hpf     UA w Reflex to Microscopic w Reflex to Culture [[id number]]  (Abnormal) Collected: 04/09/24 1511    Lab Status: Final result Specimen: Urine, Clean Catch Updated: 04/09/24 1520     Color, UA Colorless     Clarity, UA Clear     Specific Gravity, UA 1.013     pH, UA 7.0     Leukocytes, UA Moderate     Nitrite, UA Negative     Protein, UA Negative mg/dl      Glucose, UA Negative mg/dl      Ketones, UA 10 (1+) mg/dl      Urobilinogen, UA <2.0 mg/dl      Bilirubin, UA Negative     Occult Blood, UA Negative    Comprehensive metabolic panel [[id number]]  (Abnormal) Collected: 04/09/24 1334    Lab Status: Final result Specimen: Blood from Arm, Right Updated: 04/09/24 1405     Sodium 133 mmol/L      Potassium 3.6 mmol/L      Chloride 95 mmol/L      CO2 28 mmol/L      ANION GAP 10 mmol/L      BUN 15 mg/dL      Creatinine 0.77 mg/dL      Glucose 101 mg/dL      Calcium 9.6 mg/dL      AST 17 U/L      ALT 13 U/L      Alkaline Phosphatase 58 U/L      Total Protein 7.4 g/dL      Albumin 4.2 g/dL      Total Bilirubin 0.59 mg/dL      eGFR 72 ml/min/1.73sq m     Narrative:      National Kid[employer] guidelines for Chronic Kidney Disease (CKD):     Stage 1 with normal or high GFR (GFR > 90 mL/min/1.73 square meters)    Stage 2 Mild CKD (GFR = 60-89 mL/min/1.73 square meters)    Stage 3A Moderate CKD (GFR = 45-59 mL/min/1.73 square meters)    Stage 3B Moderate CKD (GFR = 30-44 mL/min/1.73 square meters)    Stage 4 Severe CKD (GFR = 15-29 mL/min/1.73 square meters)    Stage 5 End Stage CKD (GFR <15 mL/min/1.73 square meters)  Note:  GFR calculation is accurate only with a steady state creatinine    Lipase [[id number]]  (Normal) Collected: 04/09/24 1334    Lab Status: Final result Specimen: Blood from Arm, Right Updated: 04/09/24 1405     Lipase 23 u/L     CBC and differential [[id number]] Collected: 04/09/24 1334    Lab Status: Final result Specimen: Blood from Arm, Right Updated: 04/09/24 1346     WBC 6.17 Thousand/uL      RBC 4.86 Million/uL      Hemoglobin 14.0 g/dL      Hematocrit 43.8 %      MCV 90 fL      MCH 28.8 pg      MCHC 32.0 g/dL      RDW 13.3 %      MPV 9.6 fL      Platelets 273 Thousands/uL      nRBC 0 /100 WBCs      Segmented % 70 %      Immature Grans % 0 %      Lymphocytes % 17 %      Monocytes % 11 %      Eosinophils Relative 1 %      Basophils Relative 1 %      Absolute Neutrophils 4.33 Thousands/µL      Absolute Immature Grans 0.01 Thousand/uL      Absolute Lymphocytes 1.04 Thousands/µL      Absolute Monocytes 0.70 Thousand/µL      Eosinophils Absolute 0.04 Thousand/µL      Basophils Absolute 0.05 Thousands/µL                    CT abdomen pelvis with contrast   Final Result by Renee Ortiz MD (04/09 1513)      No acute intra-abdominal process.         Workstation performed: [id number]         XR chest 2 views   ED Interpretation by Jose Winston MD (04/09 1417)   Per my independent interpretation: No acute cardiopulmonary process                 Procedures  Procedures         ED Course  ED Course as of 04/09/24 1556   Tue Apr 09, 2024   1415 Sodium(!): 133  Low, new   1523 WBC, UA(!): 4-10   1523 Bacteria, UA: Occasional  Elevated    1526 hs TnI 0hr: 10  WNL -given no chest pain and fatigue history for weeks, no need for delta.  Very low suspicion for ACS as etiology of symptoms   1554 Results reviewed with patient.  Patient reports partial improvement in symptoms after fluids.  Patient able to stand/ambulate without provocation of her symptoms. Patient has neither questions nor concerns after receiving  discharge instructions and return precautions    1555 ECG per my independent interpretation: Normal rate, regular rhythm, no ectopy, leftward axis, no ST elevations or depressions                                               Medical Decision Making  Patient is an 79-year-old female, with a history significant for hypertension, GERD, and migraines per my review of the medical record, who presents to the ED today for evaluation of a roughly 3-week history of fatigue.  This is worsened with exertion and remitted by rest.  Patient also describes decreased urine output, decreased p.o. intake, estimated 10 pound weight loss in the last month.  Per my review of medical record, patient has had a roughly 3 pound weight loss in the last month.  Patient also became concerned today because her blood pressure was elevated to 180/90.  There is no associated chest pain, dyspnea, vision change, weakness, numbness.  Patient also reports a multiple month history of intermittent abdominal pain in the epigastric region/right upper quadrant that is sharp in character and worsened with touch.  She denies trauma as well as blood in stool.  Notably, patient states she has not passed flatus in the recent past and has been belching more. Per my review of the medical record, patient had a free T4 within normal limits 2 months ago.  Per patient's , present) collateral history, patient is not confused.  Patient is currently afebrile and hemodynamically stable.  Her physical exam is notable for tenderness to palpation in the epigastric region and right upper quadrant with guarding.  Heart lungs clear to auscultation, no lower extremity edema, no gross neurodeficit.  This presentation is concerning for: Anemia, electrolyte abnormality, GERD, malignancy, ACS, anxiety, YOLANDA, bowel obstruction, pancreatitis.  Will investigate with cardiac workup, CT abdomen pelvis.  Will manage with Carafate, fluids, and further based on workup.    Amount  and/or Complexity of Data Reviewed  Labs: ordered. Decision-making details documented in ED Course.  Radiology: ordered and independent interpretation performed.    Risk  Prescription drug management.             Disposition  Final diagnoses:   Fatigue   Hyponatremia   Elevated blood pressure reading   Acute abdominal pain   UTI (urinary tract infection)     Time reflects when diagnosis was documented in both MDM as applicable and the Disposition within this note       Time User Action Codes Description Comment    4/9/2024  3:24 PM Legare, Christopher A Add [R53.83] Fatigue     4/9/2024  3:24 PM Legare, Christopher A Add [E87.1] Hyponatremia     4/9/2024  3:24 PM Legare, Christopher A Add [R03.0] Elevated blood pressure reading     4/9/2024  3:24 PM Legare, Christopher A Add [R10.9] Acute abdominal pain     4/9/2024  3:27 PM Legare, Christopher A Add [N39.0] UTI (urinary tract infection)           ED Disposition       ED Disposition   Discharge    Condition   Stable    Date/Time   Tue Apr 9, 2024  3:44 PM    Comment   Brigid Brown discharge to home/self care.                   Follow-up Information       Follow up With Specialties Details Why Contact Info Additional Information    Delicia Cintron DO Family Medicine Schedule an appointment as soon as possible for a visit   [address] [place] [phone]       [facility] Schedule an appointment as soon as possible for a visit   [address] [place] [id number]  [phone] [facility], [address] [place] [address], [place], [address], [phone]            Discharge Medication List as of 4/9/2024  3:44 PM        START taking these medications    Details   cephalexin (KEFLEX) 500 mg capsule Take 1 capsule (500 mg total) by mouth every 6 (six) hours for 5 days, Starting Tue 4/9/2024, Until  Sun 4/14/2024, Normal           CONTINUE these medications which have NOT CHANGED    Details   Alpha-D-Galactosidase (BEANO) TABS Take by mouth, Historical Med      ascorbic acid (VITAMIN C) 500 mg tablet Take by mouth, Historical Med      cholestyramine (QUESTRAN) 4 g packet Take 1 packet by mouth 3 (three) times a day with meals, Historical Med      Diclofenac Sodium (VOLTAREN) 1 % Apply 2 g topically 4 (four) times a day, Starting Mon 3/11/2024, Until Fri 5/10/2024, Normal      Digestive Enzyme CAPS Take by mouth, Historical Med      diltiazem (CARDIZEM CD) 240 mg 24 hr capsule Take 1 capsule (240 mg total) by mouth daily May give patient  the stock bottle, Starting Fri 2/16/2024, Until Wed 8/14/2024, Normal      famotidine (PEPCID) 10 mg tablet Take 10 mg by mouth 2 (two) times a day, Historical Med      lactase (LACTAID) 3,000 units tablet Take by mouth, Historical Med      Minoxidil 5 % FOAM Apply topically, Historical Med      Multiple Vitamins-Minerals (CENTRUM SILVER ULTRA WOMENS PO) Take by mouth, Historical Med      pantoprazole (PROTONIX) 20 mg tablet Take 1 tablet (20 mg total) by mouth daily before breakfast, Starting Thu 2/22/2024, Until Tue 8/20/2024, Print      polyethylene glycol-propylene glycol (SYSTANE) 0.4-0.3 % Historical Med      Premarin vaginal cream Insert 1 g into the vagina once a week Brand Necessary, Starting Wed 9/27/2023, Normal                 PDMP Review       None            ED Provider  Electronically Signed by             Jose Winston MD  04/09/24 1552       Jose Winston MD  04/09/24 1555

## 2024-04-11 ENCOUNTER — OFFICE VISIT (OUTPATIENT)
Dept: FAMILY MEDICINE CLINIC | Facility: MEDICAL CENTER | Age: 79
End: 2024-04-11
Payer: MEDICARE

## 2024-04-11 VITALS
OXYGEN SATURATION: 99 % | DIASTOLIC BLOOD PRESSURE: 68 MMHG | HEART RATE: 90 BPM | RESPIRATION RATE: 16 BRPM | TEMPERATURE: 98 F | WEIGHT: 100.2 LBS | BODY MASS INDEX: 18.03 KG/M2 | SYSTOLIC BLOOD PRESSURE: 122 MMHG

## 2024-04-11 DIAGNOSIS — I10 ESSENTIAL HYPERTENSION: ICD-10-CM

## 2024-04-11 DIAGNOSIS — E03.8 SUBCLINICAL HYPOTHYROIDISM: ICD-10-CM

## 2024-04-11 DIAGNOSIS — K21.9 GASTROESOPHAGEAL REFLUX DISEASE, UNSPECIFIED WHETHER ESOPHAGITIS PRESENT: ICD-10-CM

## 2024-04-11 DIAGNOSIS — Z09 FOLLOW-UP EXAM: Primary | ICD-10-CM

## 2024-04-11 DIAGNOSIS — N30.00 ACUTE CYSTITIS WITHOUT HEMATURIA: ICD-10-CM

## 2024-04-11 DIAGNOSIS — R53.83 FATIGUE, UNSPECIFIED TYPE: ICD-10-CM

## 2024-04-11 PROCEDURE — 99214 OFFICE O/P EST MOD 30 MIN: CPT

## 2024-04-11 NOTE — PROGRESS NOTES
Assessment/Plan:    Continue course of Keflex until complete for treatment of UTI.  Follow-up with gastroenterology as discussed.     1. Follow-up exam  79-year-old female presents for emergency department follow-up from 4/9.    2. Essential hypertension  Blood pressure stable in office today at 122/68.  Continue current dose of diltiazem.    3. Gastroesophageal reflux disease, unspecified whether esophagitis present  Recently diagnosed with gastritis by GI.  Stable with current management.  Continue pantoprazole.  Follow-up with gastroenterologist as discussed.    4. Subclinical hypothyroidism  Due for recheck TFTs in 6 months per PCP.  Most recent TSH 5.25, normal T4.    5. Fatigue, unspecified type  No known cause for fatigue.  CBC within normal limits 4/9.  No signs of anemia.    TFTs slightly abnormal on labs from 2/20/2024.  Due for repeat in 6 months.  Advised to follow-up with PCP.    6.  Acute cystitis without hematuria  Continue course of Keflex until complete.    Subjective:      Patient ID: Brigid Brown is a 79 y.o. female.    79-year-old female who is a patient of Dr. Cintron presents for emergency department follow-up. She is here today with her . She was seen at [facility] on 4/9 with complaints of fatigue, decreased urine output, decreased p.o. intake, weight loss and elevated blood pressure.  At that time, she was also reporting a month history of intermittent abdominal pain.    Chest x-ray and CT abdomen/pelvis returned unremarkable.  Labs returned showing mildly decreased sodium level at 133 and urinary tract infection.  Patient was discharged on course of Keflex for UTI treatment with the advisement to follow-up with PCP and gastroenterology.  There were no other medication changes at discharge.  She does have a follow-up with gastroenterology scheduled for next month which is her regularly scheduled follow-up.    Today, she tells me she is experiencing some  "burning in her lower abdomen since last month, she was put on Protonix bid  after diagnosis of gastritis on EGD performed by her [facility] gastroenterologist. She reports this seems to be working well for her and she is tolerating PO intake well. She denies nausea, vomiting, diarrhea, blood in stool.   Her blood pressure today in office is stable at 122/68.  She is most concerned with the fatigue she is experiencing over the past 2 months she reports \"since all of this gastritis stuff started.\" CBC returned normal, no signs of anemia.  She does have a history of hypothyroidism, most recent TSH mildly elevated at 5.25 with normal T4.  She is due to have recheck TFTs in 6 months per PCP Dr. Cintron. She is not currently on levothyroxine.         The following portions of the patient's history were reviewed and updated as appropriate: allergies, current medications, past family history, past medical history, past surgical history, and problem list.    Review of Systems   Constitutional:  Positive for fatigue.   HENT: Negative.     Eyes: Negative.    Respiratory: Negative.     Cardiovascular: Negative.    Gastrointestinal: Negative.    Endocrine: Negative.    Genitourinary: Negative.    Musculoskeletal: Negative.    Skin: Negative.    Allergic/Immunologic: Negative.    Neurological: Negative.    Hematological: Negative.    Psychiatric/Behavioral: Negative.           Objective:      /68 (BP Location: Left arm, Patient Position: Sitting, Cuff Size: Standard)   Pulse 90   Temp 98 °F (36.7 °C) (Temporal)   Resp 16   Wt 45.5 kg (100 lb 3.2 oz)   LMP  (LMP Unknown)   SpO2 99%   BMI 18.03 kg/m²          Physical Exam  Vitals and nursing note reviewed.   Constitutional:       General: She is not in acute distress.     Appearance: Normal appearance. She is not ill-appearing.   HENT:      Head: Normocephalic and atraumatic.   Cardiovascular:      Rate and Rhythm: Normal rate and regular rhythm.      Pulses: Normal pulses. "      Heart sounds: Normal heart sounds.   Pulmonary:      Effort: Pulmonary effort is normal.      Breath sounds: Normal breath sounds.   Abdominal:      General: Abdomen is flat. Bowel sounds are normal. There is no distension.      Palpations: Abdomen is soft.      Tenderness: There is no abdominal tenderness.   Skin:     General: Skin is warm and dry.   Neurological:      General: No focal deficit present.      Mental Status: She is alert and oriented to person, place, and time. Mental status is at baseline.   Psychiatric:         Mood and Affect: Mood normal.         Behavior: Behavior normal.         Thought Content: Thought content normal.              TCM Call     Date and time call was made  1/19/2021 12:23 PM    Patient was hospitialized at  [facility]    Date of Admission  01/16/21    Date of discharge  01/17/21    Diagnosis  Transient altered of awareness    Disposition  Home    Current Symptoms  None      TCM Call     Post hospital issues  None    Scheduled for follow up?  Yes    Patients specialists  Endocrinologist; Neurologist    Did you obtain your prescribed medications  Yes    Do you need help managing your prescriptions or medications  No    Is transportation to your appointment needed  No    I have advised the patient to call PCP with any new or worsening symptoms  Ryann carter LPN    Living Arrangements  Spouse or Significiant other    Are you recieving any outpatient services  No    Are you recieving home care services  No    Are you using any community resources  No    Have you fallen in the last 12 months  No               YULISSA Winn

## 2024-04-15 LAB
ATRIAL RATE: 70 BPM
P AXIS: 63 DEGREES
PR INTERVAL: 132 MS
QRS AXIS: -82 DEGREES
QRSD INTERVAL: 94 MS
QT INTERVAL: 440 MS
QTC INTERVAL: 475 MS
T WAVE AXIS: 64 DEGREES
VENTRICULAR RATE: 70 BPM

## 2024-04-22 ENCOUNTER — TELEPHONE (OUTPATIENT)
Age: 79
End: 2024-04-22

## 2024-04-22 ENCOUNTER — OFFICE VISIT (OUTPATIENT)
Dept: FAMILY MEDICINE CLINIC | Facility: MEDICAL CENTER | Age: 79
End: 2024-04-22
Payer: MEDICARE

## 2024-04-22 VITALS
DIASTOLIC BLOOD PRESSURE: 72 MMHG | OXYGEN SATURATION: 98 % | SYSTOLIC BLOOD PRESSURE: 136 MMHG | BODY MASS INDEX: 17.75 KG/M2 | HEART RATE: 93 BPM | WEIGHT: 98.6 LBS | RESPIRATION RATE: 18 BRPM | TEMPERATURE: 97.8 F

## 2024-04-22 DIAGNOSIS — R10.816 EPIGASTRIC ABDOMINAL TENDERNESS WITHOUT REBOUND TENDERNESS: Primary | ICD-10-CM

## 2024-04-22 DIAGNOSIS — R63.4 UNINTENTIONAL WEIGHT LOSS: ICD-10-CM

## 2024-04-22 DIAGNOSIS — R53.83 FATIGUE: ICD-10-CM

## 2024-04-22 DIAGNOSIS — R10.9 ACUTE ABDOMINAL PAIN: ICD-10-CM

## 2024-04-22 PROCEDURE — 99213 OFFICE O/P EST LOW 20 MIN: CPT | Performed by: FAMILY MEDICINE

## 2024-04-22 PROCEDURE — G2211 COMPLEX E/M VISIT ADD ON: HCPCS | Performed by: FAMILY MEDICINE

## 2024-04-22 NOTE — TELEPHONE ENCOUNTER
Patients GI provider:  B[facility]A    Number to return call:  [phone]    Reason for call: Acute abdominal pain , difficulty eating , fatigue    Scheduled procedure/appointment date if applicable: Appt 6/27/24

## 2024-04-22 NOTE — PROGRESS NOTES
This delightful 79-year-old woman comes in today with a complaint of abdominal pain.  She is a patient of Dr. Champion and Dr. Champion has seen her for this complaint, and she has also seen our NP for this complaint.  And now she is in my office.  She needs a GI.    I have known her for 38 years.    She had CT scans, blood work etc. except for GI consult.  She has an appointment about a month out, however she is tearful, she cannot eat, she is lost 10 pounds over the last month or 2.    She has epigastric tenderness and she has flank pain bilaterally.    I reached out to GI through [place] text.  I put in a referral.    /72 (BP Location: Left arm, Patient Position: Sitting, Cuff Size: Standard)   Pulse 93   Temp 97.8 °F (36.6 °C) (Temporal)   Resp 18   Wt 44.7 kg (98 lb 9.6 oz)   LMP  (LMP Unknown)   SpO2 98%   BMI 17.75 kg/m²     Physical Exam  Constitutional:       General: She is in acute distress (mild).      Appearance: Normal appearance. She is well-developed. She is not diaphoretic.   HENT:      Head: Normocephalic and atraumatic.      Nose: Nose normal. No rhinorrhea.      Mouth/Throat:      Mouth: Mucous membranes are moist.      Pharynx: No posterior oropharyngeal erythema.   Eyes:      Extraocular Movements: Extraocular movements intact.      Conjunctiva/sclera: Conjunctivae normal.      Pupils: Pupils are equal, round, and reactive to light.   Cardiovascular:      Rate and Rhythm: Normal rate and regular rhythm.      Pulses: Normal pulses.   Pulmonary:      Effort: Pulmonary effort is normal. No respiratory distress.      Breath sounds: No stridor.   Abdominal:      General: Abdomen is flat. There is no distension.      Palpations: There is no mass.      Tenderness: There is abdominal tenderness (epigastrum). There is guarding. There is no right CVA tenderness, left CVA tenderness or rebound.   Musculoskeletal:         General: Normal range of motion.      Cervical back: Normal range of motion.    Skin:     General: Skin is warm and dry.   Neurological:      Mental Status: She is alert and oriented to person, place, and time.   Psychiatric:         Mood and Affect: Mood normal.         Behavior: Behavior normal.         Thought Content: Thought content normal.         Judgment: Judgment normal.

## 2024-04-23 ENCOUNTER — TELEPHONE (OUTPATIENT)
Dept: GASTROENTEROLOGY | Facility: AMBULARY SURGERY CENTER | Age: 79
End: 2024-04-23

## 2024-04-23 NOTE — TELEPHONE ENCOUNTER
----- Message from Ann Root sent at 4/22/2024  2:56 PM EDT -----    ----- Message -----  From: Willem Kebede MD  Sent: 4/22/2024   2:29 PM EDT  To: Gastroenterology [facility]    Received a message from PCP, patient is having significant abdominal pain and weight loss.    Can you guys please get her into the office with any 1 of us.    Okay to overbook with me.

## 2024-04-24 ENCOUNTER — OFFICE VISIT (OUTPATIENT)
Dept: OBGYN CLINIC | Facility: CLINIC | Age: 79
End: 2024-04-24
Payer: MEDICARE

## 2024-04-24 VITALS — HEIGHT: 63 IN | WEIGHT: 98 LBS | BODY MASS INDEX: 17.36 KG/M2

## 2024-04-24 DIAGNOSIS — S83.412D SPRAIN OF MEDIAL COLLATERAL LIGAMENT OF LEFT KNEE, SUBSEQUENT ENCOUNTER: Primary | ICD-10-CM

## 2024-04-24 PROCEDURE — 99212 OFFICE O/P EST SF 10 MIN: CPT | Performed by: ORTHOPAEDIC SURGERY

## 2024-04-24 NOTE — PROGRESS NOTES
Assessment:  1. Sprain of medial collateral ligament of left knee, subsequent encounter  Ambulatory referral to Physical Therapy          Patient Active Problem List   Diagnosis    Seborrheic keratosis    Changing skin lesion    Screening for blood or protein in urine    History of skin cancer    Essential hypertension    Near syncope    Basal cell carcinoma of right temple region    Episodic confusion    Allergic rhinitis    Hypertension    Hypothyroidism    Hashimoto's disease    History of vitamin D deficiency    Subclinical hypothyroidism    Sensorineural hearing loss (SNHL) of both ears    Sprain of anterior talofibular ligament of left ankle    Tinnitus of both ears    Symptoms of cerebrovascular accident (CVA)    IBS (irritable bowel syndrome)    Grief    Transient alteration of awareness    Gastroesophageal reflux disease    Dysphonia    Pain in right lumbar region of back    Right flank pain    Urinary symptom or sign    Right hip pain    Fatigue    TMJ dysfunction    Myofascial pain    Tongue pain    Reflux laryngitis    Vertigo    Vaginal atrophy    Lactose intolerance    Mild protein-calorie malnutrition (HCC)    Pigmented purpura (HCC)    Stage 3 chronic kidney disease, unspecified whether stage 3a or 3b CKD (HCC)    Age-related osteoporosis without current pathological fracture    Sprain of medial collateral ligament of left knee, initial encounter       Plan:    79 y.o. female  with MCL sprain of the left knee    Patient presents today with physical exam, subjective history that suggest clinical healing of her MCL sprain  She may discontinue use of hinged knee brace at this time  May resume all activities as tolerated, using pain as a guide for when to modify or discontinue  Follow-up on an as-needed basis    The assessment and plan were formulated by Dr. Luciano and I assisted in carrying it out.    Subjective:   Patient ID: Brigid Brown is a 79 y.o. female .    HPI    Patient presents today for  follow-up evaluation of left knee MCL sprain sustained approximately 4 to 6 weeks ago after twisting her knee.  Today presents with 0/10 pain.  She continues wearing her hinged knee brace.  Denies episodes of instability, denies mechanical symptoms.  She is currently using a wheelchair for ambulatory assistance due to other general medical condition.  She has not completed any physical therapy.  Denies paresthesias.  Denies any new injury or trauma to the left knee.    The following portions of the patient's history were reviewed and updated as appropriate: allergies, current medications, past family history, past social history, past surgical history and problem list.    Social History     Socioeconomic History    Marital status: /Civil Union     Spouse name: Not on file    Number of children: Not on file    Years of education: Not on file    Highest education level: Not on file   Occupational History    Not on file   Tobacco Use    Smoking status: Never    Smokeless tobacco: Never   Vaping Use    Vaping status: Never Used   Substance and Sexual Activity    Alcohol use: No    Drug use: No    Sexual activity: Yes     Partners: Male     Birth control/protection: Surgical   Other Topics Concern    Not on file   Social History Narrative    Not on file     Social Determinants of Health     Financial Resource Strain: Not on file   Food Insecurity: Not on file   Transportation Needs: No Transportation Needs (5/20/2023)    PRAPARE - Transportation     Lack of Transportation (Medical): No     Lack of Transportation (Non-Medical): No   Physical Activity: Not on file   Stress: Not on file   Social Connections: Not on file   Intimate Partner Violence: Not on file   Housing Stability: Not on file     Past Medical History:   Diagnosis Date    Acne     Basal cell carcinoma 06/08/2020    right lower forehead    BCC (basal cell carcinoma of skin) 03/09/2020    mid forehead    Benign neoplasm of skin     Disease of thyroid  gland 2006    GERD (gastroesophageal reflux disease)     Hypertension     Inflamed seborrheic keratosis     Irritable bowel syndrome     Malignant neoplasm of skin of face     Migraines     Nonmelanoma skin cancer     Last Assessed:6/27/17    Squamous cell skin cancer 06/08/2020    In situ, left lower forehead    Tachycardia     Telogen effluvium     Temporomandibular joint disorder     Vertigo      Past Surgical History:   Procedure Laterality Date    ADENOIDECTOMY      APPENDECTOMY      CHOLECYSTECTOMY      COLONOSCOPY  05/03/2019    COMPLEX WOUND CLOSURE TO EXTREMITY N/A 7/28/2020    Procedure: COMPLEX CLOSURE MID FOREHEAD;  Surgeon: Randy Frank MD;  Location: [facility];  Service: Plastics    ESOPHAGOGASTRODUODENOSCOPY  2009    FLAP LOCAL HEAD / NECK N/A 7/28/2020    Procedure: FLAP MID FOREHEAD;  Surgeon: Randy Frank MD;  Location: [facility];  Service: Plastics    HYSTERECTOMY  1987    MALIGNANT SKIN LESION EXCISION      Excision of Lesion Face Malignant-9/14/2004 BCC Forehead    MOHS RECONSTRUCTION N/A 7/28/2020    Procedure: RECONSTRUCTION MOHS DEFECT MID FOREHEAD;  Surgeon: Randy Frank MD;  Location: [facility];  Service: Plastics    MOHS SURGERY  07/27/2020    Right &left lower forehead, mid forehead    OOPHORECTOMY Bilateral 1987    ROTATOR CUFF REPAIR Right     SKIN BIOPSY      TONSILLECTOMY      TUBAL LIGATION  1976?     Allergies   Allergen Reactions    Cortisone Hypertension, Other (See Comments), Shortness Of Breath and Tachycardia    Acebutolol     Acetaminophen GI Intolerance     diarrhea    Amlodipine     Atenolol     Azithromycin     Bisphosphonates      Annotation - [id number]: iriitis    Candesartan     Clarithromycin     Erythromycin     Fosinopril     Irbesartan     Levofloxacin     Losartan     Methylprednisolone Hypertension and Tachycardia    Metoprolol     Nisoldipine     Olmesartan     Propranolol     Sulfamethoxazole-Trimethoprim Nausea Only    Timolol      Lidocaine Palpitations and Tachycardia     Current Outpatient Medications on File Prior to Visit   Medication Sig Dispense Refill    Alpha-D-Galactosidase (BEANO) TABS Take by mouth (Patient not taking: Reported on 4/22/2024)      ascorbic acid (VITAMIN C) 500 mg tablet Take by mouth      cholestyramine (QUESTRAN) 4 g packet Take 1 packet by mouth 3 (three) times a day with meals      Diclofenac Sodium (VOLTAREN) 1 % Apply 2 g topically 4 (four) times a day (Patient not taking: Reported on 4/22/2024) 240 g 1    Digestive Enzyme CAPS Take by mouth      diltiazem (CARDIZEM CD) 240 mg 24 hr capsule Take 1 capsule (240 mg total) by mouth daily May give patient  the stock bottle 90 capsule 1    famotidine (PEPCID) 10 mg tablet Take 10 mg by mouth 2 (two) times a day (Patient not taking: Reported on 4/22/2024)      lactase (LACTAID) 3,000 units tablet Take by mouth      Minoxidil 5 % FOAM Apply topically      Multiple Vitamins-Minerals (CENTRUM SILVER ULTRA WOMENS PO) Take by mouth      pantoprazole (PROTONIX) 20 mg tablet Take 1 tablet (20 mg total) by mouth daily before breakfast 30 tablet 0    polyethylene glycol-propylene glycol (SYSTANE) 0.4-0.3 %       Premarin vaginal cream Insert 1 g into the vagina once a week Brand Necessary 30 g 1     No current facility-administered medications on file prior to visit.       Review of Systems   Constitutional:  Negative for chills and fever.   HENT:  Negative for ear pain and sore throat.    Eyes:  Negative for pain and visual disturbance.   Respiratory:  Negative for cough and shortness of breath.    Cardiovascular:  Negative for chest pain and palpitations.   Gastrointestinal:  Negative for abdominal pain and vomiting.   Genitourinary:  Negative for dysuria and hematuria.   Musculoskeletal:  Positive for arthralgias. Negative for back pain.   Skin:  Negative for color change and rash.   Neurological:  Negative for seizures and syncope.   All other systems reviewed and are  negative.    Left Knee Exam  Alignment:  Normal knee alignment.  Inspection:  No swelling. No edema. No erythema. No ecchymosis. No muscle atrophy. No deformity.  Palpation:  No tenderness. No effusion. No warmth. No crepitus. No clicking, catching, or snapping.  ROM:  Knee Extension 0. Knee Flexion 120.  Strength:  5/5 quadriceps and hamstrings.  Stability:  No objective knee instability. Stable Varus / Valgus stress, Lachman, and Posterior drawer.  Tests:  No pertinent positive or negative tests.  Patella:  Patella tracks centrally with crepitus.  Neurovascular:  Sensation intact in DP/SP/Garces/Sa/T nerve distributions.  2+ DP & PT pulses. Brisk capillary refill in all toes.  Gait:  Not tested.      Objective:    There were no vitals filed for this visit.    Physical Exam  Vitals and nursing note reviewed.   Constitutional:       General: She is not in acute distress.     Appearance: She is well-developed.   HENT:      Head: Normocephalic and atraumatic.   Eyes:      Conjunctiva/sclera: Conjunctivae normal.   Cardiovascular:      Rate and Rhythm: Normal rate and regular rhythm.      Heart sounds: No murmur heard.  Pulmonary:      Effort: Pulmonary effort is normal. No respiratory distress.      Breath sounds: Normal breath sounds.   Abdominal:      Palpations: Abdomen is soft.      Tenderness: There is no abdominal tenderness.   Musculoskeletal:         General: No swelling.      Cervical back: Neck supple.   Skin:     General: Skin is warm and dry.      Capillary Refill: Capillary refill takes less than 2 seconds.   Neurological:      Mental Status: She is alert.   Psychiatric:         Mood and Affect: Mood normal.         Procedures  No Procedures performed today     Scribe Attestation      I,:  Ayala Valdez am acting as a scribe while in the presence of the attending physician.:       I,:  Fazal Luciano, DO personally performed the services described in this documentation    as scribed in my presence.:        "        Portions of the record may have been created with voice recognition software.  Occasional wrong word or \"sound a like\" substitutions may have occurred due to the inherent limitations of voice recognition software.  Read the chart carefully and recognize, using context, where substitutions have occurred.   "

## 2024-04-26 ENCOUNTER — OFFICE VISIT (OUTPATIENT)
Dept: GASTROENTEROLOGY | Facility: AMBULARY SURGERY CENTER | Age: 79
End: 2024-04-26
Payer: MEDICARE

## 2024-04-26 ENCOUNTER — TELEPHONE (OUTPATIENT)
Dept: GASTROENTEROLOGY | Facility: AMBULARY SURGERY CENTER | Age: 79
End: 2024-04-26

## 2024-04-26 VITALS
HEART RATE: 95 BPM | BODY MASS INDEX: 16.94 KG/M2 | HEIGHT: 63 IN | SYSTOLIC BLOOD PRESSURE: 128 MMHG | DIASTOLIC BLOOD PRESSURE: 76 MMHG | OXYGEN SATURATION: 98 % | WEIGHT: 95.6 LBS

## 2024-04-26 DIAGNOSIS — R63.4 WEIGHT LOSS, ABNORMAL: ICD-10-CM

## 2024-04-26 DIAGNOSIS — R19.4 CHANGE IN BOWEL HABIT: ICD-10-CM

## 2024-04-26 DIAGNOSIS — E44.1 MILD PROTEIN-CALORIE MALNUTRITION (HCC): ICD-10-CM

## 2024-04-26 DIAGNOSIS — R10.84 GENERALIZED POSTPRANDIAL ABDOMINAL PAIN: Primary | ICD-10-CM

## 2024-04-26 PROCEDURE — 99204 OFFICE O/P NEW MOD 45 MIN: CPT | Performed by: PHYSICIAN ASSISTANT

## 2024-04-26 RX ORDER — SUCRALFATE 1 G/1
TABLET ORAL
COMMUNITY
Start: 2024-04-01

## 2024-04-26 NOTE — PATIENT INSTRUCTIONS
Scheduled date of colonoscopy (as of today): May 1, 2024  Physician performing colonoscopy: Dr. Casas  Location of colonoscopy: [facility]   Bowel prep reviewed with patient: Miralax  Instructions reviewed with patient by: JOVITA  Clearances: N/A

## 2024-04-26 NOTE — PROGRESS NOTES
[facility] - Outpatient Consultation  Brigid Brown 79 y.o. female MRN: [id number]  Encounter: [id number]          ASSESSMENT AND PLAN:      Pleasant 79-year-old female with cholecystectomy who presents the office as a new patient for unintentional weight loss and abdominal pain.  CT scan was unremarkable.  EGD at [facility] 2/2024 was unremarkable.     Postprandial abdominal pain with unintentional weight loss  Change in bowel habits  New symptoms x 2 months.  She has lost 10 pounds unintentionally with BMI now 17.  She reports symptoms were associated with change in bowel habits, now more constipation.  No blood in the stool.  Recent EGD was unremarkable.  Last colonoscopy in 2019 was normal.  Rule out malignancy, mesenteric ischemia.    -In the setting of change in bowel habits, abdominal pain and unintentional weight loss, I recommend a colonoscopy to rule out underlying malignancy.  -Miralax bowel prep  -I obtained informed consent from the patient. The risks/benefits/alternatives of the procedure were discussed with the patient. Risks included, but not limited to, infection, bleeding, perforation, injury to organs in the abdomen, missed lesion and incomplete procedure were discussed. Patient was agreeable and electronic signature was obtained.    -Stop Questran.  - Start MiraLAX at 1/2 capful and increase to 1 capful daily.  -She reports side effects with Metamucil in the past.    -Obtain mesenteric Doppler    -Continue Protonix daily.  - Continue Pepcid 1-2 times a day as needed  -I recommended trying to use more boost/Ensure or protein supplementation to help keep weight up.      Patient was recommended to follow up after procedure. Patient was recommended to reach out via [employer] with any questions or concerns in the meantime.   ______________________________________________________________________    HPI:      Brigid Brown is a 79 y.o. female with hypertension, Hashimoto's  thyroiditis, history of basal cell carcinoma presents the office as a new patient for abdominal pain, weight loss.    Patient saw PCP for symptoms and had CT scan 4/9/2024 which was unremarkable.  This was with IV contrast only.  Her lab work shows normal CBC.  CMP with normal liver enzymes.  Lipase normal.  She also reports going to  GI and having an EGD performed 2/2024.  This showed gastritis and erosions with biopsies unremarkable.  This has not improved any of her symptoms by starting Protonix.    She reports symptoms started in February.  Mainly right-sided postprandial abdominal pain.  She also developed a change in bowel habits with this, now more with constipation.  Bowel movements every 3 days.  She reports she was recommended to take one half a packet of Questran every 3 days and has been on this for several weeks.  She has lost 10 pounds in the past 3 months.  She reports weakness and fatigue.  She denies any blood in the stool.  She has been trying to take boost however this caused worsening constipation.    Abdominal surgeries consistent for cholecystectomy and appendectomy, hysterectomy.    She had an unremarkable colonoscopy in 2019 and no repeat was recommended due to age.    REVIEW OF SYSTEMS:    CONSTITUTIONAL: Denies any fever, chills, rigors, and weight loss.  HEENT: No earache or tinnitus. Denies hearing loss or visual disturbances.  CARDIOVASCULAR: No chest pain or palpitations.   RESPIRATORY: Denies any cough, hemoptysis, shortness of breath or dyspnea on exertion.  GASTROINTESTINAL: As noted in the History of Present Illness.   GENITOURINARY: No problems with urination. Denies any hematuria or dysuria.  NEUROLOGIC: No dizziness or vertigo, denies headaches.   MUSCULOSKELETAL: Denies any muscle or joint pain.   SKIN: Denies skin rashes or itching.   ENDOCRINE: Denies excessive thirst. Denies intolerance to heat or cold.  PSYCHOSOCIAL: Denies depression or anxiety. Denies any recent memory  loss.       Historical Information   Past Medical History:   Diagnosis Date    Acne     Basal cell carcinoma 06/08/2020    right lower forehead    BCC (basal cell carcinoma of skin) 03/09/2020    mid forehead    Benign neoplasm of skin     Disease of thyroid gland 2006    GERD (gastroesophageal reflux disease)     Hypertension     Inflamed seborrheic keratosis     Irritable bowel syndrome     Malignant neoplasm of skin of face     Migraines     Nonmelanoma skin cancer     Last Assessed:6/27/17    Squamous cell skin cancer 06/08/2020    In situ, left lower forehead    Tachycardia     Telogen effluvium     Temporomandibular joint disorder     Vertigo      Past Surgical History:   Procedure Laterality Date    ADENOIDECTOMY      APPENDECTOMY      CHOLECYSTECTOMY      COLONOSCOPY  05/03/2019    COMPLEX WOUND CLOSURE TO EXTREMITY N/A 7/28/2020    Procedure: COMPLEX CLOSURE MID FOREHEAD;  Surgeon: Randy Frank MD;  Location: [facility];  Service: Plastics    ESOPHAGOGASTRODUODENOSCOPY  2009    FLAP LOCAL HEAD / NECK N/A 7/28/2020    Procedure: FLAP MID FOREHEAD;  Surgeon: Randy Frank MD;  Location: [facility];  Service: Plastics    HYSTERECTOMY  1987    MALIGNANT SKIN LESION EXCISION      Excision of Lesion Face Malignant-9/14/2004 BCC Forehead    MOHS RECONSTRUCTION N/A 7/28/2020    Procedure: RECONSTRUCTION MOHS DEFECT MID FOREHEAD;  Surgeon: Randy Frank MD;  Location: [facility];  Service: Plastics    MOHS SURGERY  07/27/2020    Right &left lower forehead, mid forehead    OOPHORECTOMY Bilateral 1987    ROTATOR CUFF REPAIR Right     SKIN BIOPSY      TONSILLECTOMY      TUBAL LIGATION  1976?     Social History   Social History     Substance and Sexual Activity   Alcohol Use No     Social History     Substance and Sexual Activity   Drug Use No     Social History     Tobacco Use   Smoking Status Never   Smokeless Tobacco Never     Family History   Problem Relation Age of Onset    Hypertension  Mother     Osteoporosis Mother     Skin cancer Mother     Migraines Mother     Dementia Mother     Hypertension Father     Skin cancer Father     Dementia Father     Skin cancer Sister 73    Migraines Sister     No Known Problems Daughter     Uterine cancer Maternal Grandmother 29    Diabetes type II Maternal Grandfather     Cancer Paternal Grandmother     Uterine cancer Paternal Grandmother 65    No Known Problems Maternal Aunt     Cancer Paternal Aunt         Breast    Uterine cancer Paternal Aunt 55    No Known Problems Paternal Aunt     No Known Problems Paternal Aunt        Meds/Allergies       Current Outpatient Medications:     Alpha-D-Galactosidase (BEANO) TABS    ascorbic acid (VITAMIN C) 500 mg tablet    cholestyramine (QUESTRAN) 4 g packet    Diclofenac Sodium (VOLTAREN) 1 %    Digestive Enzyme CAPS    diltiazem (CARDIZEM CD) 240 mg 24 hr capsule    famotidine (PEPCID) 10 mg tablet    lactase (LACTAID) 3,000 units tablet    Minoxidil 5 % FOAM    Multiple Vitamins-Minerals (CENTRUM SILVER ULTRA WOMENS PO)    pantoprazole (PROTONIX) 20 mg tablet    polyethylene glycol-propylene glycol (SYSTANE) 0.4-0.3 %    Premarin vaginal cream    Allergies   Allergen Reactions    Cortisone Hypertension, Other (See Comments), Shortness Of Breath and Tachycardia    Acebutolol     Acetaminophen GI Intolerance     diarrhea    Amlodipine     Atenolol     Azithromycin     Bisphosphonates      Annotation - [id number]: iriitis    Candesartan     Clarithromycin     Erythromycin     Fosinopril     Irbesartan     Levofloxacin     Losartan     Methylprednisolone Hypertension and Tachycardia    Metoprolol     Nisoldipine     Olmesartan     Propranolol     Sulfamethoxazole-Trimethoprim Nausea Only    Timolol     Lidocaine Palpitations and Tachycardia           Objective     not currently breastfeeding. There is no height or weight on file to calculate BMI.        PHYSICAL EXAM:      General Appearance:   Alert, cooperative, no distress    HEENT:   Normocephalic, atraumatic, anicteric.     Neck:  Supple, symmetrical, trachea midline   Lungs:   Clear to auscultation bilaterally; no rales, rhonchi or wheezing; respirations unlabored    Heart::   Regular rate and rhythm; no murmur, rub, or gallop.   Abdomen:   Soft, non-tender, non-distended; normal bowel sounds; no masses, no organomegaly. Benign abdomen.    Genitalia:   Deferred    Rectal:   Deferred    Extremities:  No cyanosis, clubbing or edema    Pulses:  2+ and symmetric    Skin:  No jaundice, rashes, or lesions    Lymph nodes:  No palpable cervical lymphadenopathy        Lab Results:   No visits with results within 1 Day(s) from this visit.   Latest known visit with results is:   Admission on 04/09/2024, Discharged on 04/09/2024   Component Date Value    WBC 04/09/2024 6.17     RBC 04/09/2024 4.86     Hemoglobin 04/09/2024 14.0     Hematocrit 04/09/2024 43.8     MCV 04/09/2024 90     MCH 04/09/2024 28.8     MCHC 04/09/2024 32.0     RDW 04/09/2024 13.3     MPV 04/09/2024 9.6     Platelets 04/09/2024 273     nRBC 04/09/2024 0     Segmented % 04/09/2024 70     Immature Grans % 04/09/2024 0     Lymphocytes % 04/09/2024 17     Monocytes % 04/09/2024 11     Eosinophils Relative 04/09/2024 1     Basophils Relative 04/09/2024 1     Absolute Neutrophils 04/09/2024 4.33     Absolute Immature Grans 04/09/2024 0.01     Absolute Lymphocytes 04/09/2024 1.04     Absolute Monocytes 04/09/2024 0.70     Eosinophils Absolute 04/09/2024 0.04     Basophils Absolute 04/09/2024 0.05     Sodium 04/09/2024 133 (L)     Potassium 04/09/2024 3.6     Chloride 04/09/2024 95 (L)     CO2 04/09/2024 28     ANION GAP 04/09/2024 10     BUN 04/09/2024 15     Creatinine 04/09/2024 0.77     Glucose 04/09/2024 101     Calcium 04/09/2024 9.6     AST 04/09/2024 17     ALT 04/09/2024 13     Alkaline Phosphatase 04/09/2024 58     Total Protein 04/09/2024 7.4     Albumin 04/09/2024 4.2     Total Bilirubin 04/09/2024 0.59     eGFR  04/09/2024 72     hs TnI 0hr 04/09/2024 10     Lipase 04/09/2024 23     Color, UA 04/09/2024 Colorless     Clarity, UA 04/09/2024 Clear     Specific Gravity, UA 04/09/2024 1.013     pH, UA 04/09/2024 7.0     Leukocytes, UA 04/09/2024 Moderate (A)     Nitrite, UA 04/09/2024 Negative     Protein, UA 04/09/2024 Negative     Glucose, UA 04/09/2024 Negative     Ketones, UA 04/09/2024 10 (1+) (A)     Urobilinogen, UA 04/09/2024 <2.0     Bilirubin, UA 04/09/2024 Negative     Occult Blood, UA 04/09/2024 Negative     RBC, UA 04/09/2024 1-2     WBC, UA 04/09/2024 4-10 (A)     Epithelial Cells 04/09/2024 Occasional     Bacteria, UA 04/09/2024 Occasional     Ventricular Rate 04/09/2024 70     Atrial Rate 04/09/2024 70     ME Interval 04/09/2024 132     QRSD Interval 04/09/2024 94     QT Interval 04/09/2024 440     QTC Interval 04/09/2024 475     P Axis 04/09/2024 63     QRS [place] 04/09/2024 -82     T Wave Axis 04/09/2024 64          Radiology Results:   XR chest 2 views    Result Date: 4/9/2024  Narrative: XR CHEST PA & LATERAL INDICATION: Fatigue, epigastric abdominal pain. COMPARISON: 1/16/2021 FINDINGS: Clear lungs. No pneumothorax or pleural effusion. Normal cardiomediastinal silhouette. Bones are unremarkable for age. Normal upper abdomen.     Impression: No acute cardiopulmonary disease. Workstation performed: [id number]     CT abdomen pelvis with contrast    Result Date: 4/9/2024  Narrative: CT ABDOMEN AND PELVIS WITH IV CONTRAST INDICATION: Epigastric/RUQ pain, no flatus/burping. COMPARISON: CT abdomen pelvis 2/22/2024. MRI abdomen 12/9/2019 TECHNIQUE: CT examination of the abdomen and pelvis was performed. Dual energy CT scan technique (DECT) was employed. Multiplanar 2D reformatted images were created from the source data. This examination, like all CT scans performed in the [facility], was performed utilizing techniques to minimize radiation dose exposure, including the use of iterative reconstruction  and automated exposure control. Radiation dose length product (DLP) for this visit: 265 mGy-cm IV Contrast: 85 mL of iohexol (OMNIPAQUE) Enteric Contrast: Not administered. FINDINGS: ABDOMEN LOWER CHEST: No clinically significant abnormality in the visualized lower chest. LIVER/BILIARY TREE: Stable size of enhancing right inferior hepatic lobe lesion measuring 1.6 cm since MRI abdomen 12/9/2019, previously characterized as a hemangioma. Stable additional subcentimeter low-attenuation hepatic lesions, too small to characterize.. GALLBLADDER: Post cholecystectomy. SPLEEN: Unremarkable. PANCREAS: Unremarkable. ADRENAL GLANDS: Unremarkable. KIDNEYS/URETERS: Small right renal simple cyst. Additional subcentimeter bilateral renal low-attenuation lesions, too small to characterize but statistically likely representing cysts. Bilateral parapelvic cysts.. No hydronephrosis. STOMACH AND BOWEL: Unremarkable. APPENDIX: No findings to suggest appendicitis. ABDOMINOPELVIC CAVITY: No ascites. No pneumoperitoneum. No lymphadenopathy. VESSELS: Atherosclerosis without abdominal aortic aneurysm. PELVIS REPRODUCTIVE ORGANS: Post hysterectomy. URINARY BLADDER: Unremarkable. ABDOMINAL WALL/INGUINAL REGIONS: Unremarkable. BONES: No acute fracture or suspicious osseous lesion.     Impression: No acute intra-abdominal process. Workstation performed: [id number]

## 2024-04-26 NOTE — TELEPHONE ENCOUNTER
----- Message from Ann Root sent at 4/22/2024  2:56 PM EDT -----    ----- Message -----  From: Willem Kebede MD  Sent: 4/22/2024   2:29 PM EDT  To: [facility]    Received a message from PCP, patient is having significant abdominal pain and weight loss.    Can you guys please get her into the office with any 1 of us.    Okay to overbook with me.

## 2024-04-29 ENCOUNTER — ANESTHESIA EVENT (OUTPATIENT)
Dept: ANESTHESIOLOGY | Facility: HOSPITAL | Age: 79
End: 2024-04-29

## 2024-04-29 ENCOUNTER — ANESTHESIA (OUTPATIENT)
Dept: ANESTHESIOLOGY | Facility: HOSPITAL | Age: 79
End: 2024-04-29

## 2024-05-01 ENCOUNTER — NURSE TRIAGE (OUTPATIENT)
Age: 79
End: 2024-05-01

## 2024-05-01 NOTE — TELEPHONE ENCOUNTER
"Reason for Disposition   Information only question and nurse able to answer    Answer Assessment - Initial Assessment Questions  1. REASON FOR CALL or QUESTION: \"What is your reason for calling today?\" or \"How can I best help you?\" or \"What question do you have that I can help answer?\"      SPOKE WITH PT, HAD TO CANCEL COLONOSCOPY THIS AM BECAUSE SHE WAS STILL HAVING FREQUENT BM'S. PT ADVISED TO CONTINUE TO HYDRATE, SHE CAN START EATING BLAND FOODS. ALL QUESTIONS ANSWERED.    Protocols used: Information Only Call - No Triage-ADULT-[place]    "

## 2024-05-02 ENCOUNTER — TELEPHONE (OUTPATIENT)
Dept: GASTROENTEROLOGY | Facility: CLINIC | Age: 79
End: 2024-05-02

## 2024-05-02 NOTE — TELEPHONE ENCOUNTER
Pt called to r/s colonoscopy. Colonoscopy r/s for 5/16 with Dr. Kebede at [facility]. Pt want's Yovana to know she has imaging scheduled tomorrow that Yovana ordered... states she wants to be called with the results immediately once received.

## 2024-05-03 ENCOUNTER — HOSPITAL ENCOUNTER (OUTPATIENT)
Dept: VASCULAR ULTRASOUND | Facility: HOSPITAL | Age: 79
Discharge: HOME | End: 2024-05-03
Payer: MEDICARE

## 2024-05-03 DIAGNOSIS — R10.84 GENERALIZED POSTPRANDIAL ABDOMINAL PAIN: ICD-10-CM

## 2024-05-03 PROCEDURE — 93975 VASCULAR STUDY: CPT

## 2024-05-03 PROCEDURE — 93975 VASCULAR STUDY: CPT | Performed by: SURGERY

## 2024-05-06 ENCOUNTER — HOSPITAL ENCOUNTER (EMERGENCY)
Facility: HOSPITAL | Age: 79
Discharge: HOME | End: 2024-05-06
Attending: EMERGENCY MEDICINE
Payer: MEDICARE

## 2024-05-06 ENCOUNTER — NURSE TRIAGE (OUTPATIENT)
Age: 79
End: 2024-05-06

## 2024-05-06 VITALS
DIASTOLIC BLOOD PRESSURE: 75 MMHG | OXYGEN SATURATION: 100 % | RESPIRATION RATE: 18 BRPM | SYSTOLIC BLOOD PRESSURE: 163 MMHG | TEMPERATURE: 97.6 F | HEART RATE: 72 BPM

## 2024-05-06 DIAGNOSIS — R00.2 PALPITATIONS: Primary | ICD-10-CM

## 2024-05-06 DIAGNOSIS — E86.0 DEHYDRATION: ICD-10-CM

## 2024-05-06 DIAGNOSIS — E87.1 ACUTE HYPONATREMIA: ICD-10-CM

## 2024-05-06 LAB
ALBUMIN SERPL BCP-MCNC: 4.1 G/DL (ref 3.5–5)
ALP SERPL-CCNC: 48 U/L (ref 34–104)
ALT SERPL W P-5'-P-CCNC: 16 U/L (ref 7–52)
ANION GAP SERPL CALCULATED.3IONS-SCNC: 9 MMOL/L (ref 4–13)
AST SERPL W P-5'-P-CCNC: 33 U/L (ref 13–39)
ATRIAL RATE: 95 BPM
BASOPHILS # BLD AUTO: 0.04 THOUSANDS/ÂΜL (ref 0–0.1)
BASOPHILS NFR BLD AUTO: 1 % (ref 0–1)
BILIRUB SERPL-MCNC: 0.72 MG/DL (ref 0.2–1)
BNP SERPL-MCNC: 57 PG/ML (ref 0–100)
BUN SERPL-MCNC: 15 MG/DL (ref 5–25)
CALCIUM SERPL-MCNC: 9.4 MG/DL (ref 8.4–10.2)
CARDIAC TROPONIN I PNL SERPL HS: 8 NG/L
CHLORIDE SERPL-SCNC: 94 MMOL/L (ref 96–108)
CO2 SERPL-SCNC: 28 MMOL/L (ref 21–32)
CREAT SERPL-MCNC: 0.92 MG/DL (ref 0.6–1.3)
EOSINOPHIL # BLD AUTO: 0.01 THOUSAND/ÂΜL (ref 0–0.61)
EOSINOPHIL NFR BLD AUTO: 0 % (ref 0–6)
ERYTHROCYTE [DISTWIDTH] IN BLOOD BY AUTOMATED COUNT: 13.2 % (ref 11.6–15.1)
GFR SERPL CREATININE-BSD FRML MDRD: 58 ML/MIN/1.73SQ M
GLUCOSE SERPL-MCNC: 107 MG/DL (ref 65–140)
HCT VFR BLD AUTO: 43.7 % (ref 34.8–46.1)
HGB BLD-MCNC: 14.1 G/DL (ref 11.5–15.4)
IMM GRANULOCYTES # BLD AUTO: 0.01 THOUSAND/UL (ref 0–0.2)
IMM GRANULOCYTES NFR BLD AUTO: 0 % (ref 0–2)
LYMPHOCYTES # BLD AUTO: 1.09 THOUSANDS/ÂΜL (ref 0.6–4.47)
LYMPHOCYTES NFR BLD AUTO: 16 % (ref 14–44)
MCH RBC QN AUTO: 29.1 PG (ref 26.8–34.3)
MCHC RBC AUTO-ENTMCNC: 32.3 G/DL (ref 31.4–37.4)
MCV RBC AUTO: 90 FL (ref 82–98)
MONOCYTES # BLD AUTO: 0.72 THOUSAND/ÂΜL (ref 0.17–1.22)
MONOCYTES NFR BLD AUTO: 11 % (ref 4–12)
NEUTROPHILS # BLD AUTO: 4.95 THOUSANDS/ÂΜL (ref 1.85–7.62)
NEUTS SEG NFR BLD AUTO: 72 % (ref 43–75)
NRBC BLD AUTO-RTO: 0 /100 WBCS
P AXIS: 83 DEGREES
PLATELET # BLD AUTO: 272 THOUSANDS/UL (ref 149–390)
PMV BLD AUTO: 9 FL (ref 8.9–12.7)
POTASSIUM SERPL-SCNC: 5.1 MMOL/L (ref 3.5–5.3)
PR INTERVAL: 136 MS
PROT SERPL-MCNC: 7.1 G/DL (ref 6.4–8.4)
QRS AXIS: -87 DEGREES
QRSD INTERVAL: 82 MS
QT INTERVAL: 364 MS
QTC INTERVAL: 457 MS
RBC # BLD AUTO: 4.84 MILLION/UL (ref 3.81–5.12)
SODIUM SERPL-SCNC: 131 MMOL/L (ref 135–147)
T WAVE AXIS: 77 DEGREES
VENTRICULAR RATE: 95 BPM
WBC # BLD AUTO: 6.82 THOUSAND/UL (ref 4.31–10.16)

## 2024-05-06 PROCEDURE — 36415 COLL VENOUS BLD VENIPUNCTURE: CPT | Performed by: EMERGENCY MEDICINE

## 2024-05-06 PROCEDURE — 84484 ASSAY OF TROPONIN QUANT: CPT | Performed by: EMERGENCY MEDICINE

## 2024-05-06 PROCEDURE — 93010 ELECTROCARDIOGRAM REPORT: CPT | Performed by: INTERNAL MEDICINE

## 2024-05-06 PROCEDURE — 99285 EMERGENCY DEPT VISIT HI MDM: CPT

## 2024-05-06 PROCEDURE — 80053 COMPREHEN METABOLIC PANEL: CPT | Performed by: EMERGENCY MEDICINE

## 2024-05-06 PROCEDURE — 99285 EMERGENCY DEPT VISIT HI MDM: CPT | Performed by: EMERGENCY MEDICINE

## 2024-05-06 PROCEDURE — 83880 ASSAY OF NATRIURETIC PEPTIDE: CPT | Performed by: EMERGENCY MEDICINE

## 2024-05-06 PROCEDURE — 96360 HYDRATION IV INFUSION INIT: CPT

## 2024-05-06 PROCEDURE — 85025 COMPLETE CBC W/AUTO DIFF WBC: CPT | Performed by: EMERGENCY MEDICINE

## 2024-05-06 PROCEDURE — 93005 ELECTROCARDIOGRAM TRACING: CPT

## 2024-05-06 RX ADMIN — SODIUM CHLORIDE 500 ML: 0.9 INJECTION, SOLUTION INTRAVENOUS at 15:53

## 2024-05-06 NOTE — TELEPHONE ENCOUNTER
Regarding: rapid heartbeat feeling weak exahausted and constipated  ----- Message from Shirley Bone sent at 5/6/2024 10:56 AM EDT -----  Pt called stating pt is experiencing rapid heartbeat ,feeling weak ,exhausted and constipation. Advised pt to go to ED.

## 2024-05-06 NOTE — ED PROVIDER NOTES
History  Chief Complaint   Patient presents with    Rapid Heart Rate     Rapid heart rate, weakness since Saturday. Denies cp/sob       History provided by:  Patient and spouse  Rapid Heart Rate  Palpitations quality:  Fast  Onset quality:  Gradual  Duration:  4 days  Timing:  Intermittent  Progression:  Waxing and waning  Chronicity:  New  Context comment:  Intermittent sensation of palpitations.  Says she has felt occasionally weak and tired, had palpitations while she was in triage, this EKG was normal, does not feel at time of exam  Relieved by:  None tried  Worsened by:  Nothing  Ineffective treatments:  None tried  Associated symptoms: no chest pain, no cough, no diaphoresis, no dizziness, no nausea, no numbness, no shortness of breath and no vomiting    Associated symptoms comment:  Decreased appetite although this has been for months.,  She has had a 20 pound weight loss over the past year, says food is just not appealing to her so she eats very little, no falls no injuries      Prior to Admission Medications   Prescriptions Last Dose Informant Patient Reported? Taking?   Alpha-D-Galactosidase (BEANO) TABS  Self Yes No   Sig: Take by mouth   Patient not taking: Reported on 4/22/2024   Diclofenac Sodium (VOLTAREN) 1 %   No No   Sig: Apply 2 g topically 4 (four) times a day   Patient not taking: Reported on 4/22/2024   Digestive Enzyme CAPS  Self Yes No   Sig: Take by mouth   Minoxidil 5 % FOAM  Self Yes No   Sig: Apply topically   Multiple Vitamins-Minerals (CENTRUM SILVER ULTRA WOMENS PO)  Self Yes No   Sig: Take by mouth   Premarin vaginal cream  Self No No   Sig: Insert 1 g into the vagina once a week Brand Necessary   ascorbic acid (VITAMIN C) 500 mg tablet  Self Yes No   Sig: Take by mouth   cholestyramine (QUESTRAN) 4 g packet  Self Yes No   Sig: Take 1 packet by mouth 3 (three) times a day with meals   diltiazem (CARDIZEM CD) 240 mg 24 hr capsule  Self No No   Sig: Take 1 capsule (240 mg total) by  mouth daily May give patient  the stock bottle   famotidine (PEPCID) 10 mg tablet  Self Yes No   Sig: Take 10 mg by mouth 2 (two) times a day   Patient not taking: Reported on 4/22/2024   lactase (LACTAID) 3,000 units tablet  Self Yes No   Sig: Take by mouth   pantoprazole (PROTONIX) 20 mg tablet  Self No No   Sig: Take 1 tablet (20 mg total) by mouth daily before breakfast   polyethylene glycol-propylene glycol (SYSTANE) 0.4-0.3 %  Self Yes No   sucralfate (CARAFATE) 1 g tablet   Yes No   Sig: take 1 tablet by mouth before meals and at bedtime   Patient not taking: Reported on 4/26/2024      Facility-Administered Medications: None       Past Medical History:   Diagnosis Date    Acne     Basal cell carcinoma 06/08/2020    right lower forehead    BCC (basal cell carcinoma of skin) 03/09/2020    mid forehead    Benign neoplasm of skin     Disease of thyroid gland 2006    GERD (gastroesophageal reflux disease)     Hypertension     Inflamed seborrheic keratosis     Irritable bowel syndrome     Malignant neoplasm of skin of face     Migraines     Nonmelanoma skin cancer     Last Assessed:6/27/17    Squamous cell skin cancer 06/08/2020    In situ, left lower forehead    Tachycardia     Telogen effluvium     Temporomandibular joint disorder     Vertigo        Past Surgical History:   Procedure Laterality Date    ADENOIDECTOMY      APPENDECTOMY      CHOLECYSTECTOMY      COLONOSCOPY  05/03/2019    COMPLEX WOUND CLOSURE TO EXTREMITY N/A 7/28/2020    Procedure: COMPLEX CLOSURE MID FOREHEAD;  Surgeon: Randy Frank MD;  Location: [facility];  Service: Plastics    ESOPHAGOGASTRODUODENOSCOPY  2009    FLAP LOCAL HEAD / NECK N/A 7/28/2020    Procedure: FLAP MID FOREHEAD;  Surgeon: Randy Frank MD;  Location: [facility];  Service: Plastics    HYSTERECTOMY  1987    MALIGNANT SKIN LESION EXCISION      Excision of Lesion Face Malignant-9/14/2004 BCC Forehead    MOHS RECONSTRUCTION N/A 7/28/2020    Procedure:  RECONSTRUCTION MOHS DEFECT MID FOREHEAD;  Surgeon: Randy Frank MD;  [facility];  Service: Plastics    MOHS SURGERY  07/27/2020    Right &left lower forehead, mid forehead    OOPHORECTOMY Bilateral 1987    ROTATOR CUFF REPAIR Right     SKIN BIOPSY      TONSILLECTOMY      TUBAL LIGATION  1976?       Family History   Problem Relation Age of Onset    Hypertension Mother     Osteoporosis Mother     Skin cancer Mother     Migraines Mother     Dementia Mother     Hypertension Father     Skin cancer Father     Dementia Father     Skin cancer Sister 73    Migraines Sister     No Known Problems Daughter     Uterine cancer Maternal Grandmother 29    Diabetes type II Maternal Grandfather     Cancer Paternal Grandmother     Uterine cancer Paternal Grandmother 65    No Known Problems Maternal Aunt     Cancer Paternal Aunt         Breast    Uterine cancer Paternal Aunt 55    No Known Problems Paternal Aunt     No Known Problems Paternal Aunt      I have reviewed and agree with the history as documented.    E-Cigarette/Vaping    E-Cigarette Use Never User      E-Cigarette/Vaping Substances    Nicotine No     THC No     CBD No     Flavoring No     Other No     Unknown No      Social History     Tobacco Use    Smoking status: Never    Smokeless tobacco: Never   Vaping Use    Vaping status: Never Used   Substance Use Topics    Alcohol use: No    Drug use: No       Review of Systems   Constitutional:  Positive for activity change, appetite change and unexpected weight change. Negative for chills, diaphoresis and fever.   HENT:  Negative for congestion, sinus pressure and sore throat.    Eyes:  Negative for pain and visual disturbance.   Respiratory:  Negative for cough, chest tightness, shortness of breath, wheezing and stridor.    Cardiovascular:  Positive for palpitations. Negative for chest pain.   Gastrointestinal:  Negative for abdominal distention, abdominal pain, constipation, diarrhea, nausea and vomiting.    Genitourinary:  Negative for dysuria and frequency.   Musculoskeletal:  Negative for neck pain and neck stiffness.   Skin:  Negative for rash.   Neurological:  Negative for dizziness, speech difficulty, light-headedness, numbness and headaches.       Physical Exam  Physical Exam  Vitals reviewed.   Constitutional:       General: She is not in acute distress.     Appearance: She is well-developed. She is not diaphoretic.   HENT:      Head: Normocephalic and atraumatic.      Right Ear: External ear normal.      Left Ear: External ear normal.      Nose: Nose normal.   Eyes:      General:         Right eye: No discharge.         Left eye: No discharge.      Pupils: Pupils are equal, round, and reactive to light.   Neck:      Trachea: No tracheal deviation.   Cardiovascular:      Rate and Rhythm: Normal rate and regular rhythm.      Heart sounds: Normal heart sounds. No murmur heard.  Pulmonary:      Effort: Pulmonary effort is normal. No respiratory distress.      Breath sounds: Normal breath sounds. No stridor.   Abdominal:      General: There is no distension.      Palpations: Abdomen is soft.      Tenderness: There is no abdominal tenderness. There is no guarding or rebound.   Musculoskeletal:         General: Normal range of motion.      Cervical back: Normal range of motion and neck supple.   Skin:     General: Skin is warm and dry.      Coloration: Skin is not pale.      Findings: No erythema.   Neurological:      General: No focal deficit present.      Mental Status: She is alert and oriented to person, place, and time.         Vital Signs  ED Triage Vitals   Temperature Pulse Respirations Blood Pressure SpO2   05/06/24 1331 05/06/24 1331 05/06/24 1331 05/06/24 1331 05/06/24 1331   97.6 °F (36.4 °C) 101 18 146/65 98 %      Temp src Heart Rate Source Patient Position - Orthostatic VS BP Location FiO2 (%)   -- 05/06/24 1542 05/06/24 1542 05/06/24 1542 --    Monitor Sitting Right arm       Pain Score       --                   Vitals:    05/06/24 1331 05/06/24 1542 05/06/24 1600   BP: 146/65 98/68 163/75   Pulse: 101 85 72   Patient Position - Orthostatic VS:  Sitting Sitting         Visual Acuity      ED Medications  Medications   sodium chloride 0.9 % bolus 500 mL (0 mL Intravenous Stopped 5/6/24 1707)       Diagnostic Studies  Results Reviewed       Procedure Component Value Units Date/Time    HS Troponin 0hr (reflex protocol) [[id number]]  (Normal) Collected: 05/06/24 1553    Lab Status: Final result Specimen: Blood from Arm, Right Updated: 05/06/24 1627     hs TnI 0hr 8 ng/L     B-Type Natriuretic Peptide(BNP) [[id number]]  (Normal) Collected: 05/06/24 1553    Lab Status: Final result Specimen: Blood from Arm, Right Updated: 05/06/24 1626     BNP 57 pg/mL     Comprehensive metabolic panel [[id number]]  (Abnormal) Collected: 05/06/24 1553    Lab Status: Final result Specimen: Blood from Arm, Right Updated: 05/06/24 1619     Sodium 131 mmol/L      Potassium 5.1 mmol/L      Chloride 94 mmol/L      CO2 28 mmol/L      ANION GAP 9 mmol/L      BUN 15 mg/dL      Creatinine 0.92 mg/dL      Glucose 107 mg/dL      Calcium 9.4 mg/dL      AST 33 U/L      ALT 16 U/L      Alkaline Phosphatase 48 U/L      Total Protein 7.1 g/dL      Albumin 4.1 g/dL      Total Bilirubin 0.72 mg/dL      eGFR 58 ml/min/1.73sq m     Narrative:      National Kid[employer] Foundation guidelines for Chronic Kidney Disease (CKD):     Stage 1 with normal or high GFR (GFR > 90 mL/min/1.73 square meters)    Stage 2 Mild CKD (GFR = 60-89 mL/min/1.73 square meters)    Stage 3A Moderate CKD (GFR = 45-59 mL/min/1.73 square meters)    Stage 3B Moderate CKD (GFR = 30-44 mL/min/1.73 square meters)    Stage 4 Severe CKD (GFR = 15-29 mL/min/1.73 square meters)    Stage 5 End Stage CKD (GFR <15 mL/min/1.73 square meters)  Note: GFR calculation is accurate only with a steady state creatinine    CBC and differential [[id number]] Collected: 05/06/24 1553    Lab Status: Final  result Specimen: Blood from Arm, Right Updated: 05/06/24 1600     WBC 6.82 Thousand/uL      RBC 4.84 Million/uL      Hemoglobin 14.1 g/dL      Hematocrit 43.7 %      MCV 90 fL      MCH 29.1 pg      MCHC 32.3 g/dL      RDW 13.2 %      MPV 9.0 fL      Platelets 272 Thousands/uL      nRBC 0 /100 WBCs      Segmented % 72 %      Immature Grans % 0 %      Lymphocytes % 16 %      Monocytes % 11 %      Eosinophils Relative 0 %      Basophils Relative 1 %      Absolute Neutrophils 4.95 Thousands/µL      Absolute Immature Grans 0.01 Thousand/uL      Absolute Lymphocytes 1.09 Thousands/µL      Absolute Monocytes 0.72 Thousand/µL      Eosinophils Absolute 0.01 Thousand/µL      Basophils Absolute 0.04 Thousands/µL                    No orders to display              Procedures  ECG 12 Lead Documentation Only    Date/Time: 5/6/2024 4:51 PM    Performed by: Donell Sosa DO  Authorized by: Donell Sosa DO    ECG reviewed by me, the ED Provider: yes    Patient location:  ED  Previous ECG:     Previous ECG:  Compared to current    Comparison ECG info:  4.9.24    Similarity:  No change  Interpretation:     Interpretation: normal    Rate:     ECG rate:  95    ECG rate assessment: normal    Rhythm:     Rhythm: sinus rhythm    Ectopy:     Ectopy: none    QRS:     QRS axis:  Normal    QRS intervals:  Normal  Conduction:     Conduction: normal    ST segments:     ST segments:  Normal  T waves:     T waves: normal             ED Course                                             Medical Decision Making        Initial ED assessment:   79-year-old female, increasingly weak but not eating, some weight loss, presents today due to palpitations which have now resolved but were present in triage and she had an EKG which was normal    Initial DDx includes but is not limited to:   Anxiety dehydration electrolyte abnormality, consider malignancy consider arrhythmia      Initial ED plan:    blood work cardiac monitoring, repeat  evaluations          Final ED summary/disposition:   After evaluation and workup in the emergency department, workup unremarkable, unclear exact etiology of patient's symptoms patient discharged with follow-up PCP    Amount and/or Complexity of Data Reviewed  Labs: ordered.             Disposition  Final diagnoses:   Palpitations   Dehydration   Acute hyponatremia     Time reflects when diagnosis was documented in both MDM as applicable and the Disposition within this note       Time User Action Codes Description Comment    5/6/2024  4:52 PM Donell Sosa Add [R00.2] Palpitations     5/6/2024  4:52 PM Donell Sosa Add [E86.0] Dehydration     5/6/2024  4:52 PM Donell Sosa Add [E87.1] Acute hyponatremia           ED Disposition       ED Disposition   Discharge    Condition   Stable    Date/Time   Mon May 6, 2024  4:52 PM    Comment   Brigid Brown discharge to home/self care.                   Follow-up Information       Follow up With Specialties Details Why Contact Info    Delicia Cintron DO Family Medicine Call in 1 day To arrange for the next available appointment [address] [place] [id number] [phone]              Discharge Medication List as of 5/6/2024  4:52 PM        CONTINUE these medications which have NOT CHANGED    Details   Alpha-D-Galactosidase (BEANO) TABS Take by mouth, Historical Med      ascorbic acid (VITAMIN C) 500 mg tablet Take by mouth, Historical Med      cholestyramine (QUESTRAN) 4 g packet Take 1 packet by mouth 3 (three) times a day with meals, Historical Med      Diclofenac Sodium (VOLTAREN) 1 % Apply 2 g topically 4 (four) times a day, Starting Mon 3/11/2024, Until Fri 5/10/2024, Normal      Digestive Enzyme CAPS Take by mouth, Historical Med      diltiazem (CARDIZEM CD) 240 mg 24 hr capsule Take 1 capsule (240 mg total) by mouth daily May give patient  the stock bottle, Starting Fri 2/16/2024, Until Wed 8/14/2024, Normal      famotidine (PEPCID)  10 mg tablet Take 10 mg by mouth 2 (two) times a day, Historical Med      lactase (LACTAID) 3,000 units tablet Take by mouth, Historical Med      Minoxidil 5 % FOAM Apply topically, Historical Med      Multiple Vitamins-Minerals (CENTRUM SILVER ULTRA WOMENS PO) Take by mouth, Historical Med      pantoprazole (PROTONIX) 20 mg tablet Take 1 tablet (20 mg total) by mouth daily before breakfast, Starting Thu 2/22/2024, Until Tue 8/20/2024, Print      polyethylene glycol-propylene glycol (SYSTANE) 0.4-0.3 % Historical Med      Premarin vaginal cream Insert 1 g into the vagina once a week Brand Necessary, Starting Wed 9/27/2023, Normal      sucralfate (CARAFATE) 1 g tablet take 1 tablet by mouth before meals and at bedtime, Historical Med             No discharge procedures on file.    PDMP Review       None            ED Provider  Electronically Signed by             Donell Sosa DO  05/06/24 7923

## 2024-05-06 NOTE — TELEPHONE ENCOUNTER
"Last ov new patient  4/26/24 HENRIK Hough, Procedure colon 5/3/19, currently scheduled for 5/16/24.     I spoke with patient, she will be reporting to [facility] ED for evaluation. Her  will transport her after she provides him lunch. I asked that she not wait and report soon with rapid heartbeat, weakness.       Answer Assessment - Initial Assessment Questions  1. REASON FOR CALL or QUESTION: \"What is your reason for calling today?\" or \"How can I best help you?\" or \"What question do you have that I can help answer?\"      I called patient in follow up to confirm she is reporting to ER for evaluation.    Protocols used: Information Only Call - No Triage-ADULT-[place]    "

## 2024-05-07 ENCOUNTER — NURSE TRIAGE (OUTPATIENT)
Age: 79
End: 2024-05-07

## 2024-05-07 NOTE — TELEPHONE ENCOUNTER
Pleasecall and reschedule patient for colonoscopy. Agree with commendations that she can take extra strength Gas-X or IBgard for bloating. Please explained to patient that the MiraLAX Dulcolax bowel prep is the easiest of all bowel prepand the one  that is recommended for her due to her underlying kidney issues. Thank you

## 2024-05-07 NOTE — TELEPHONE ENCOUNTER
Spoke with regarding recommendations and advised to reschedule her colonoscopy.Miralax /Dulcolax. Please call patient to reschedule colon. Thank you.

## 2024-05-07 NOTE — TELEPHONE ENCOUNTER
Regarding: gas and abdominal pain  ----- Message from Shirley Bone sent at 5/7/2024 10:57 AM EDT -----  Pt called in regard to still not feeling well pt did go to the ED on 5/6/2024. Pt states pt is still experiencing lots of gas and having abdominal pain. Per pt was told by ED pt is dehydrated. Pt would like to know what pt can do.

## 2024-05-07 NOTE — TELEPHONE ENCOUNTER
"Last ov 4/26/24 HENRIK Hough, Procedure 5/3/2019 colon, EGD 2/2024 [facility], patient was scheduled for colon 5/16/24 but she cancelled it today due to being seen in ED and stating she is not feeling well enough to follow through at this point. She states she will reschedule.    Patient continues with abdominal pain as noted at office visit. She is also noting bloating. She is increasing fluid intake since ED visit yesterday. I reviewed that she should continue medications as ordered. She can try Gas X (simethicone) or IB Guard for bloating. I recommended heating pad to abdomen, moving, exercise may help. She is passing gas. I recommended she eat small amounts thoughout the day vs meals.     Please advise if ED follow up required at this time vs rescheduling colonoscopy. Patient did want provider to know that she had a difficult time with previous prep (MiraLax/Dulcolax) with numerous episodes of incontinence during the night and not being able to get to bathroom in time. She would like something easier on her system. Patient instructed to report back to ED for severe pain or if she experiences heart palpitations again.    Reason for Disposition   Age > 60 years    Answer Assessment - Initial Assessment Questions  1. LOCATION: \"Where does it hurt?\"       Same as previous office visit.  2. RADIATION: \"Does the pain shoot anywhere else?\" (e.g., chest, back)      More concern with bloating  3. ONSET: \"When did the pain begin?\" (e.g., minutes, hours or days ago)       chronic  4. SUDDEN: \"Gradual or sudden onset?\"      chronic  5. PATTERN \"Does the pain come and go, or is it constant?\"     - If constant: \"Is it getting better, staying the same, or worsening?\"       (Note: Constant means the pain never goes away completely; most serious pain is constant and it progresses)      - If intermittent: \"How long does it last?\" \"Do you have pain now?\"      (Note: Intermittent means the pain goes away completely between bouts)      " "constant  6. SEVERITY: \"How bad is the pain?\"  (e.g., Scale 1-10; mild, moderate, or severe)    - MILD (1-3): doesn't interfere with normal activities, abdomen soft and not tender to touch     - MODERATE (4-7): interferes with normal activities or awakens from sleep, tender to touch     - SEVERE (8-10): excruciating pain, doubled over, unable to do any normal activities       Mild to moderate  7. RECURRENT SYMPTOM: \"Have you ever had this type of stomach pain before?\" If Yes, ask: \"When was the last time?\" and \"What happened that time?\"       yes  8. CAUSE: \"What do you think is causing the stomach pain?\"      Unknown  9. RELIEVING/AGGRAVATING FACTORS: \"What makes it better or worse?\" (e.g., movement, antacids, bowel movement)      Nothing at present  10. OTHER SYMPTOMS: \"Has there been any vomiting, diarrhea, constipation, or urine problems?\"        bloating  11. PREGNANCY: \"Is there any chance you are pregnant?\" \"When was your last menstrual period?\"        N/A    Protocols used: Abdominal Pain - Female-ADULT-[place]    "

## 2024-05-08 NOTE — TELEPHONE ENCOUNTER
Pt. Has gastritis and is c/o abdominal pain, pt. Is not moving her bowels, advised to use Miralax BID and after a few days if no relief she can have a dulcolax tab, if she develops diarrhea advised her to stop stool softener and titrate Miralax back to once daily, pt. Advised to call back by Monday if no change , pt. Verbalized understanding

## 2024-05-09 ENCOUNTER — APPOINTMENT (OUTPATIENT)
Dept: LAB | Facility: MEDICAL CENTER | Age: 79
End: 2024-05-09
Payer: MEDICARE

## 2024-05-09 ENCOUNTER — TELEPHONE (OUTPATIENT)
Age: 79
End: 2024-05-09

## 2024-05-09 ENCOUNTER — OFFICE VISIT (OUTPATIENT)
Dept: FAMILY MEDICINE CLINIC | Facility: MEDICAL CENTER | Age: 79
End: 2024-05-09
Payer: MEDICARE

## 2024-05-09 ENCOUNTER — APPOINTMENT (OUTPATIENT)
Dept: RADIOLOGY | Facility: MEDICAL CENTER | Age: 79
End: 2024-05-09
Payer: MEDICARE

## 2024-05-09 VITALS
BODY MASS INDEX: 16.63 KG/M2 | HEART RATE: 98 BPM | OXYGEN SATURATION: 97 % | DIASTOLIC BLOOD PRESSURE: 68 MMHG | TEMPERATURE: 97.8 F | RESPIRATION RATE: 18 BRPM | WEIGHT: 92.4 LBS | SYSTOLIC BLOOD PRESSURE: 120 MMHG

## 2024-05-09 DIAGNOSIS — R53.83 FATIGUE, UNSPECIFIED TYPE: ICD-10-CM

## 2024-05-09 DIAGNOSIS — R79.89 LOW SERUM SODIUM: ICD-10-CM

## 2024-05-09 DIAGNOSIS — R06.89 BREATHING DIFFICULTY: ICD-10-CM

## 2024-05-09 LAB
ALBUMIN SERPL BCP-MCNC: 4.4 G/DL (ref 3.5–5)
ALP SERPL-CCNC: 60 U/L (ref 34–104)
ALT SERPL W P-5'-P-CCNC: 18 U/L (ref 7–52)
ANION GAP SERPL CALCULATED.3IONS-SCNC: 12 MMOL/L (ref 4–13)
AST SERPL W P-5'-P-CCNC: 24 U/L (ref 13–39)
BILIRUB SERPL-MCNC: 0.47 MG/DL (ref 0.2–1)
BUN SERPL-MCNC: 12 MG/DL (ref 5–25)
CALCIUM SERPL-MCNC: 9.4 MG/DL (ref 8.4–10.2)
CHLORIDE SERPL-SCNC: 91 MMOL/L (ref 96–108)
CO2 SERPL-SCNC: 30 MMOL/L (ref 21–32)
CREAT SERPL-MCNC: 0.85 MG/DL (ref 0.6–1.3)
GFR SERPL CREATININE-BSD FRML MDRD: 64 ML/MIN/1.73SQ M
GLUCOSE P FAST SERPL-MCNC: 110 MG/DL (ref 65–99)
POTASSIUM SERPL-SCNC: 3.8 MMOL/L (ref 3.5–5.3)
PROT SERPL-MCNC: 7.2 G/DL (ref 6.4–8.4)
SODIUM SERPL-SCNC: 133 MMOL/L (ref 135–147)
TSH SERPL DL<=0.05 MIU/L-ACNC: 3.36 UIU/ML (ref 0.45–4.5)

## 2024-05-09 PROCEDURE — 71046 X-RAY EXAM CHEST 2 VIEWS: CPT

## 2024-05-09 PROCEDURE — 80053 COMPREHEN METABOLIC PANEL: CPT

## 2024-05-09 PROCEDURE — G2211 COMPLEX E/M VISIT ADD ON: HCPCS | Performed by: STUDENT IN AN ORGANIZED HEALTH CARE EDUCATION/TRAINING PROGRAM

## 2024-05-09 PROCEDURE — 36415 COLL VENOUS BLD VENIPUNCTURE: CPT

## 2024-05-09 PROCEDURE — 86800 THYROGLOBULIN ANTIBODY: CPT

## 2024-05-09 PROCEDURE — 84443 ASSAY THYROID STIM HORMONE: CPT

## 2024-05-09 PROCEDURE — 86376 MICROSOMAL ANTIBODY EACH: CPT

## 2024-05-09 PROCEDURE — 99214 OFFICE O/P EST MOD 30 MIN: CPT | Performed by: STUDENT IN AN ORGANIZED HEALTH CARE EDUCATION/TRAINING PROGRAM

## 2024-05-09 NOTE — TELEPHONE ENCOUNTER
Scheduled date of colonoscopy (as of today):05/13/2024  Physician performing colonoscopy:Dr. Julien  Location of colonoscopy:[facility]  Bowel prep reviewed with patient:Sumit/Dul  Instructions reviewed with patient by:CLEVE-Sumit/Dul [facility]  Clearances: n/a

## 2024-05-09 NOTE — PROGRESS NOTES
"  [facility] - Clinic Note  Delicia Cintron DO, 24     Brigid Brown MRN: [id number] : 1945 Age: 79 y.o.     Assessment/Plan     1. BMI less than 19,adult    -Workup underway for postprandial abdominal pain with unintentional weight loss with gastroenterology, unfortunately, patient could not proceed with colonoscopy as scheduled prior due to issue with bowel prep, strongly advised patient to reach out to gastroenterology to reschedule colonoscopy  -Advised about supplementing meals with Ensure, discussed with patient not meant to replace meal  - Ambulatory Referral to Nutrition Services; Future    2. Fatigue, unspecified type    -Recent lab workup during ER visit reviewed  - TSH, 3rd generation with Free T4 reflex; Future  - Thyroid Antibodies Panel; Future    3. Breathing difficulty    - XR chest pa & lateral; Future    4. Low serum sodium    - Comprehensive metabolic panel; Future      Depression Screening and Follow-up Plan: Patient's depression screening was positive with a PHQ-9 score of 6. Clincally patient does not have depression. No treatment is required.       Brigid Brown acknowledged understanding of treatment plan, all questions answered.  Close follow-up 4 weeks and sooner as needed.    Subjective      Brigid Brown is a 79 y.o. female who presents for ER follow-up.  She presents today with her .  Patient was evaluated in ER on 2024 complaining of palpitations and weakness.  Today, patient still complains of abdominal discomfort.  She also mention she feels as though \" cannot take a deep breath, does not fill up\".  No chest pain or wheezing.    The following portions of the patient's history were reviewed and updated as appropriate: allergies, current medications, past family history, past medical history, past social history, past surgical history and problem list.     Past Medical History:   Diagnosis Date    Acne     Basal cell carcinoma 2020    right lower " forehead    BCC (basal cell carcinoma of skin) 03/09/2020    mid forehead    Benign neoplasm of skin     Disease of thyroid gland 2006    GERD (gastroesophageal reflux disease)     Hypertension     Inflamed seborrheic keratosis     Irritable bowel syndrome     Malignant neoplasm of skin of face     Migraines     Nonmelanoma skin cancer     Last Assessed:6/27/17    Squamous cell skin cancer 06/08/2020    In situ, left lower forehead    Tachycardia     Telogen effluvium     Temporomandibular joint disorder     Vertigo        Allergies   Allergen Reactions    Cortisone Hypertension, Other (See Comments), Shortness Of Breath and Tachycardia    Acebutolol     Acetaminophen GI Intolerance     diarrhea    Amlodipine     Atenolol     Azithromycin     Bisphosphonates      Annotation - [id number]: iriitis    Candesartan     Clarithromycin     Erythromycin     Fosinopril     Irbesartan     Levofloxacin     Losartan     Methylprednisolone Hypertension and Tachycardia    Metoprolol     Nisoldipine     Olmesartan     Propranolol     Sulfamethoxazole-Trimethoprim Nausea Only    Timolol     Lidocaine Palpitations and Tachycardia       Past Surgical History:   Procedure Laterality Date    ADENOIDECTOMY      APPENDECTOMY      CHOLECYSTECTOMY      COLONOSCOPY  05/03/2019    COMPLEX WOUND CLOSURE TO EXTREMITY N/A 7/28/2020    Procedure: COMPLEX CLOSURE MID FOREHEAD;  Surgeon: Randy Frank MD;  [facility];  Service: Plastics    ESOPHAGOGASTRODUODENOSCOPY  2009    FLAP LOCAL HEAD / NECK N/A 7/28/2020    Procedure: FLAP MID FOREHEAD;  Surgeon: Randy Frank MD;  [facility];  Service: Plastics    HYSTERECTOMY  1987    MALIGNANT SKIN LESION EXCISION      Excision of Lesion Face Malignant-9/14/2004 BCC Forehead    MOHS RECONSTRUCTION N/A 7/28/2020    Procedure: RECONSTRUCTION MOHS DEFECT MID FOREHEAD;  Surgeon: Randy Frank MD;  [facility];  Service: Plastics    MOHS SURGERY  07/27/2020     Right &left lower forehead, mid forehead    OOPHORECTOMY Bilateral 1987    ROTATOR CUFF REPAIR Right     SKIN BIOPSY      TONSILLECTOMY      TUBAL LIGATION  1976?       Family History   Problem Relation Age of Onset    Hypertension Mother     Osteoporosis Mother     Skin cancer Mother     Migraines Mother     Dementia Mother     Hypertension Father     Skin cancer Father     Dementia Father     Skin cancer Sister 73    Migraines Sister     No Known Problems Daughter     Uterine cancer Maternal Grandmother 29    Diabetes type II Maternal Grandfather     Cancer Paternal Grandmother     Uterine cancer Paternal Grandmother 65    No Known Problems Maternal Aunt     Cancer Paternal Aunt         Breast    Uterine cancer Paternal Aunt 55    No Known Problems Paternal Aunt     No Known Problems Paternal Aunt        Social History     Socioeconomic History    Marital status: /Civil Union     Spouse name: None    Number of children: None    Years of education: None    Highest education level: None   Occupational History    None   Tobacco Use    Smoking status: Never    Smokeless tobacco: Never   Vaping Use    Vaping status: Never Used   Substance and Sexual Activity    Alcohol use: No    Drug use: No    Sexual activity: Yes     Partners: Male     Birth control/protection: Surgical   Other Topics Concern    None   Social History Narrative    None     Social Determinants of Health     Financial Resource Strain: Not on file   Food Insecurity: Not on file   Transportation Needs: No Transportation Needs (5/20/2023)    PRAPARE - Transportation     Lack of Transportation (Medical): No     Lack of Transportation (Non-Medical): No   Physical Activity: Not on file   Stress: Not on file   Social Connections: Not on file   Intimate Partner Violence: Not on file   Housing Stability: Not on file       Current Outpatient Medications   Medication Sig Dispense Refill    ascorbic acid (VITAMIN C) 500 mg tablet Take by mouth       Digestive Enzyme CAPS Take by mouth      diltiazem (CARDIZEM CD) 240 mg 24 hr capsule Take 1 capsule (240 mg total) by mouth daily May give patient  the stock bottle 90 capsule 1    lactase (LACTAID) 3,000 units tablet Take by mouth      Minoxidil 5 % FOAM Apply topically      Multiple Vitamins-Minerals (CENTRUM SILVER ULTRA WOMENS PO) Take by mouth      pantoprazole (PROTONIX) 20 mg tablet Take 1 tablet (20 mg total) by mouth daily before breakfast 30 tablet 0    polyethylene glycol-propylene glycol (SYSTANE) 0.4-0.3 %       Premarin vaginal cream Insert 1 g into the vagina once a week Brand Necessary 30 g 1    Alpha-D-Galactosidase (BEANO) TABS Take by mouth (Patient not taking: Reported on 4/22/2024)      cholestyramine (QUESTRAN) 4 g packet Take 1 packet by mouth 3 (three) times a day with meals (Patient not taking: Reported on 5/9/2024)      Diclofenac Sodium (VOLTAREN) 1 % Apply 2 g topically 4 (four) times a day (Patient not taking: Reported on 4/22/2024) 240 g 1    famotidine (PEPCID) 10 mg tablet Take 10 mg by mouth 2 (two) times a day (Patient not taking: Reported on 4/22/2024)      sucralfate (CARAFATE) 1 g tablet take 1 tablet by mouth before meals and at bedtime (Patient not taking: Reported on 4/26/2024)       No current facility-administered medications for this visit.       Review of Systems     As noted in HPI    Objective      /68 (BP Location: Left arm, Patient Position: Sitting, Cuff Size: Standard)   Pulse 98   Temp 97.8 °F (36.6 °C) (Temporal)   Resp 18   Wt 41.9 kg (92 lb 6.4 oz)   LMP  (LMP Unknown)   SpO2 97%   BMI 16.63 kg/m²     Physical Exam  Constitutional:       General: She is not in acute distress.     Appearance: She is not toxic-appearing.      Comments: Body mass index is 16.63 kg/m².     HENT:      Head: Normocephalic and atraumatic.      Mouth/Throat:      Mouth: Mucous membranes are moist.      Pharynx: Oropharynx is clear.   Eyes:      Conjunctiva/sclera:  "Conjunctivae normal.   Cardiovascular:      Rate and Rhythm: Normal rate and regular rhythm.      Pulses: Normal pulses.      Heart sounds: Normal heart sounds.   Pulmonary:      Effort: Pulmonary effort is normal. No respiratory distress.      Breath sounds: Normal breath sounds. No wheezing or rales.   Abdominal:      General: Bowel sounds are normal.      Palpations: Abdomen is soft.      Tenderness: There is generalized abdominal tenderness. There is no guarding or rebound.   Musculoskeletal:      Right lower leg: No edema.      Left lower leg: No edema.   Skin:     General: Skin is warm and dry.      Capillary Refill: Capillary refill takes less than 2 seconds.   Neurological:      Mental Status: She is oriented to person, place, and time.   Psychiatric:         Mood and Affect: Mood normal.         Behavior: Behavior normal.         Thought Content: Thought content normal.             Some portions of this record may have been generated with voice recognition software. There may be translation, syntax, or grammatical errors. Occasional wrong word or \"sound-a-like\" substitutions may have occurred due to the inherent limitations of the voice recognition software. Read the chart carefully and recognize, using context, where substations may have occurred. If you have any questions, please contact the dictating provider for clarification or correction, as needed.  "

## 2024-05-10 ENCOUNTER — TELEPHONE (OUTPATIENT)
Dept: GASTROENTEROLOGY | Facility: AMBULARY SURGERY CENTER | Age: 79
End: 2024-05-10

## 2024-05-10 LAB
THYROGLOB AB SERPL-ACNC: <1 IU/ML (ref 0–0.9)
THYROPEROXIDASE AB SERPL-ACNC: 62 IU/ML (ref 0–34)

## 2024-05-13 ENCOUNTER — HOSPITAL ENCOUNTER (OUTPATIENT)
Dept: GASTROENTEROLOGY | Facility: AMBULARY SURGERY CENTER | Age: 79
Setting detail: OUTPATIENT SURGERY
Discharge: HOME | End: 2024-05-13
Attending: INTERNAL MEDICINE | Admitting: INTERNAL MEDICINE
Payer: MEDICARE

## 2024-05-13 ENCOUNTER — ANESTHESIA EVENT (OUTPATIENT)
Dept: GASTROENTEROLOGY | Facility: AMBULARY SURGERY CENTER | Age: 79
End: 2024-05-13

## 2024-05-13 ENCOUNTER — ANESTHESIA (OUTPATIENT)
Dept: GASTROENTEROLOGY | Facility: AMBULARY SURGERY CENTER | Age: 79
End: 2024-05-13

## 2024-05-13 VITALS
SYSTOLIC BLOOD PRESSURE: 130 MMHG | DIASTOLIC BLOOD PRESSURE: 63 MMHG | OXYGEN SATURATION: 99 % | HEART RATE: 71 BPM | TEMPERATURE: 95.6 F | RESPIRATION RATE: 18 BRPM

## 2024-05-13 DIAGNOSIS — R09.89 HYPERINFLATION OF LUNGS: ICD-10-CM

## 2024-05-13 DIAGNOSIS — E44.1 MILD PROTEIN-CALORIE MALNUTRITION (HCC): ICD-10-CM

## 2024-05-13 DIAGNOSIS — R63.4 WEIGHT LOSS, ABNORMAL: ICD-10-CM

## 2024-05-13 DIAGNOSIS — R63.4 WEIGHT LOSS: ICD-10-CM

## 2024-05-13 DIAGNOSIS — R79.89 LOW SERUM SODIUM: Primary | ICD-10-CM

## 2024-05-13 DIAGNOSIS — R06.89 BREATHING DIFFICULTY: ICD-10-CM

## 2024-05-13 DIAGNOSIS — R19.4 CHANGE IN BOWEL HABIT: ICD-10-CM

## 2024-05-13 DIAGNOSIS — R73.01 ELEVATED FASTING BLOOD SUGAR: ICD-10-CM

## 2024-05-13 DIAGNOSIS — R79.89 ABNORMAL THYROID BLOOD TEST: ICD-10-CM

## 2024-05-13 PROBLEM — I34.0 MITRAL REGURGITATION: Status: ACTIVE | Noted: 2024-05-13

## 2024-05-13 PROCEDURE — 88305 TISSUE EXAM BY PATHOLOGIST: CPT | Performed by: PATHOLOGY

## 2024-05-13 PROCEDURE — 45380 COLONOSCOPY AND BIOPSY: CPT | Performed by: INTERNAL MEDICINE

## 2024-05-13 RX ORDER — PROPOFOL 10 MG/ML
INJECTION, EMULSION INTRAVENOUS AS NEEDED
Status: DISCONTINUED | OUTPATIENT
Start: 2024-05-13 | End: 2024-05-13

## 2024-05-13 RX ORDER — SODIUM CHLORIDE, SODIUM LACTATE, POTASSIUM CHLORIDE, CALCIUM CHLORIDE 600; 310; 30; 20 MG/100ML; MG/100ML; MG/100ML; MG/100ML
125 INJECTION, SOLUTION INTRAVENOUS CONTINUOUS
Status: DISCONTINUED | OUTPATIENT
Start: 2024-05-13 | End: 2024-05-17 | Stop reason: HOSPADM

## 2024-05-13 RX ORDER — PROPOFOL 10 MG/ML
INJECTION, EMULSION INTRAVENOUS CONTINUOUS PRN
Status: DISCONTINUED | OUTPATIENT
Start: 2024-05-13 | End: 2024-05-13

## 2024-05-13 RX ADMIN — PROPOFOL 100 MG: 10 INJECTION, EMULSION INTRAVENOUS at 10:26

## 2024-05-13 RX ADMIN — SODIUM CHLORIDE, SODIUM LACTATE, POTASSIUM CHLORIDE, AND CALCIUM CHLORIDE: .6; .31; .03; .02 INJECTION, SOLUTION INTRAVENOUS at 09:45

## 2024-05-13 RX ADMIN — PROPOFOL 100 MCG/KG/MIN: 10 INJECTION, EMULSION INTRAVENOUS at 10:26

## 2024-05-13 NOTE — ANESTHESIA POSTPROCEDURE EVALUATION
Post-Op Assessment Note    CV Status:  Stable  Pain Score: 0    Pain management: adequate       Mental Status:  Sleepy and arousable   Hydration Status:  Stable   PONV Controlled:  None   Airway Patency:  Patent and adequate     Post Op Vitals Reviewed: Yes    No anethesia notable event occurred.    Staff: CRNA               /60 (05/13/24 1053)    Temp      Pulse 75 (05/13/24 1053)   Resp 16 (05/13/24 1053)    SpO2 99 % (05/13/24 1053)

## 2024-05-13 NOTE — ANESTHESIA PREPROCEDURE EVALUATION
Procedure:  COLONOSCOPY    Relevant Problems   CARDIO   (+) Essential hypertension   (+) Hypertension   (+) Mitral regurgitation      ENDO   (+) Hypothyroidism   (+) Subclinical hypothyroidism      GI/HEPATIC   (+) Gastroesophageal reflux disease      /RENAL   (+) Stage 3 chronic kidney disease, unspecified whether stage 3a or 3b CKD (HCC)      Last echo 2019 showed EF 60% and mod MR  Seen in ED 5/6/24 for palpitations    Physical Exam    Airway    Mallampati score: II  TM Distance: <3 FB  Neck ROM: full     Dental   Comment: Denies loose teeth     Cardiovascular  Cardiovascular exam normal    Pulmonary  Pulmonary exam normal     Other Findings  Portions of exam deferred due to low yield and/or unknown COVID statuspost-pubertal.      Anesthesia Plan  ASA Score- 3     Anesthesia Type- IV sedation with anesthesia with ASA Monitors.         Additional Monitors:     Airway Plan:            Plan Factors-Exercise tolerance (METS): >4 METS.    Chart reviewed.   Existing labs reviewed. Patient summary reviewed.    Patient is not a current smoker.              Induction- intravenous.    Postoperative Plan-     Informed Consent- Anesthetic plan and risks discussed with patient.  I personally reviewed this patient with the CRNA. Discussed and agreed on the Anesthesia Plan with the CRNA..

## 2024-05-13 NOTE — H&P
History and Physical - [facility]  Brigid Brown 79 y.o. female MRN: [id number]    HPI: Brigid Brown is a 79 y.o. year old female who presents for evaluation of change in bowel habits, abdominal pain and weight loss.      Review of Systems    Historical Information   Past Medical History:   Diagnosis Date    Acne     Basal cell carcinoma 06/08/2020    right lower forehead    BCC (basal cell carcinoma of skin) 03/09/2020    mid forehead    Benign neoplasm of skin     Disease of thyroid gland 2006    GERD (gastroesophageal reflux disease)     Hypertension     Inflamed seborrheic keratosis     Irritable bowel syndrome     Malignant neoplasm of skin of face     Migraines     Nonmelanoma skin cancer     Last Assessed:6/27/17    Squamous cell skin cancer 06/08/2020    In situ, left lower forehead    Tachycardia     Telogen effluvium     Temporomandibular joint disorder     Vertigo      Past Surgical History:   Procedure Laterality Date    ADENOIDECTOMY      APPENDECTOMY      CHOLECYSTECTOMY      COLONOSCOPY  05/03/2019    COMPLEX WOUND CLOSURE TO EXTREMITY N/A 7/28/2020    Procedure: COMPLEX CLOSURE MID FOREHEAD;  Surgeon: Randy Frank MD;  Location: [facility];  Service: Plastics    ESOPHAGOGASTRODUODENOSCOPY  2009    FLAP LOCAL HEAD / NECK N/A 7/28/2020    Procedure: FLAP MID FOREHEAD;  Surgeon: Randy Frank MD;  [facility];  Service: Plastics    HYSTERECTOMY  1987    MALIGNANT SKIN LESION EXCISION      Excision of Lesion Face Malignant-9/14/2004 BCC Forehead    MOHS RECONSTRUCTION N/A 7/28/2020    Procedure: RECONSTRUCTION MOHS DEFECT MID FOREHEAD;  Surgeon: Randy Frank MD;  Location: [facility];  Service: Plastics    MOHS SURGERY  07/27/2020    Right &left lower forehead, mid forehead    OOPHORECTOMY Bilateral 1987    ROTATOR CUFF REPAIR Right     SKIN BIOPSY      TONSILLECTOMY      TUBAL LIGATION  1976?     Social History   Social History     Substance and  Sexual Activity   Alcohol Use No     Social History     Substance and Sexual Activity   Drug Use No     Social History     Tobacco Use   Smoking Status Never   Smokeless Tobacco Never     Family History   Problem Relation Age of Onset    Hypertension Mother     Osteoporosis Mother     Skin cancer Mother     Migraines Mother     Dementia Mother     Hypertension Father     Skin cancer Father     Dementia Father     Skin cancer Sister 73    Migraines Sister     No Known Problems Daughter     Uterine cancer Maternal Grandmother 29    Diabetes type II Maternal Grandfather     Cancer Paternal Grandmother     Uterine cancer Paternal Grandmother 65    No Known Problems Maternal Aunt     Cancer Paternal Aunt         Breast    Uterine cancer Paternal Aunt 55    No Known Problems Paternal Aunt     No Known Problems Paternal Aunt        Meds/Allergies     (Not in a hospital admission)      Allergies   Allergen Reactions    Cortisone Hypertension, Other (See Comments), Shortness Of Breath and Tachycardia    Acebutolol     Acetaminophen GI Intolerance     diarrhea    Amlodipine     Atenolol     Azithromycin     Bisphosphonates      Annotation - [id number]: iriitis    Candesartan     Clarithromycin     Erythromycin     Fosinopril     Irbesartan     Levofloxacin     Losartan     Methylprednisolone Hypertension and Tachycardia    Metoprolol     Nisoldipine     Olmesartan     Propranolol     Sulfamethoxazole-Trimethoprim Nausea Only    Timolol     Lidocaine Palpitations and Tachycardia       Objective     /65   Pulse 93   Temp (!) 95.6 °F (35.3 °C) (Temporal)   Resp 18   LMP  (LMP Unknown)   SpO2 99%       PHYSICAL EXAM    Gen: NAD  CV: RRR  CHEST: Clear  ABD: soft, NT/ND  EXT: no edema  Neuro: AAO      ASSESSMENT/PLAN:  This is a 79 y.o. year old female here for evaluation of change in bowel habits, abdominal pain and unintentional weight loss.    PLAN:   Procedure: Colonoscopy.

## 2024-05-14 ENCOUNTER — NURSE TRIAGE (OUTPATIENT)
Age: 79
End: 2024-05-14

## 2024-05-14 ENCOUNTER — TELEPHONE (OUTPATIENT)
Age: 79
End: 2024-05-14

## 2024-05-14 DIAGNOSIS — R63.4 WEIGHT LOSS: ICD-10-CM

## 2024-05-14 DIAGNOSIS — R79.89 ABNORMAL THYROID BLOOD TEST: Primary | ICD-10-CM

## 2024-05-14 DIAGNOSIS — K59.00 CONSTIPATION, UNSPECIFIED CONSTIPATION TYPE: Primary | ICD-10-CM

## 2024-05-14 NOTE — TELEPHONE ENCOUNTER
I spoke with patient and reviewed provider recommendations. She states she had taken ensure/boost in past and it caused diarrhea. She also has lactose intolerance. She has been following all orders with medications without improvement.  She continues with issues, not feeling right, not really passing gas and unsure what to do at this point.

## 2024-05-14 NOTE — TELEPHONE ENCOUNTER
Agree with recommendations.  Recommend taking Carafate at least 30 minutes prior to meals to see if this helps with improvement.  She can also try alternative such as Gaviscon or Mylanta prior to meals.  She can also use Pepcid on top of this as needed.  I recommend she try to increase protein supplementation such as Ensure and boost.  Thank you

## 2024-05-14 NOTE — TELEPHONE ENCOUNTER
"LOV 04/26/24, CT abd/pelv w con 04/09/24, Colonoscopy 04/26/24, New pt 06/27/24      Pt with history of IBS, GERD reports approximately 1 month of constant bilateral abdominal \"burning\" rated 6-7/10 that began suddenly. Pt reports the burning sensation is slightly more evident after meals however she states \"I have no appetite.\" Pt states the prescribed Carafate helps however if she eats immediately after taking it the symptom relief is non lasting. I recommended waiting at least 20 - 30 minutes after taking Carafate to eat. Continue hydrating appropriately. Pt agreeable. Denies nausea, vomiting, fever. Pt inquiring if anything else could be prescribed to treat symptoms. I informed pt I would contact provider for further recommendations      Reason for Disposition   Nursing judgment    Answer Assessment - Initial Assessment Questions  1. REASON FOR CALL or QUESTION: \"What is your reason for calling today?\" or \"How can I best help you?\" or \"What question do you have that I can help answer?\"      Please see note    Protocols used: Information Only Call - No Triage-ADULT-[place]    "

## 2024-05-14 NOTE — TELEPHONE ENCOUNTER
Patient called and stated ,she has a appt scheduled with Endo 6/26,patient is currently taking Protonix ,patient stated the medication is not working and continues to have epigastric burning,can patient be prescribed a different medication.Please advise.

## 2024-05-14 NOTE — TELEPHONE ENCOUNTER
Please tell the patient I recommend she follow-up with her PCP regarding her weight loss to consider appetite stimulant. Our workup was negative for cause of this.  I recommend alternative agent to Ensure/boost such as Gaston breakfast or any protein drink to help with avoiding further weight loss.  Her prior CT was somewhat consistent with constipation possibly contributing to symptoms.  I ordered KUB for her to get done to further assess any significant stool burden.  Recommend using Gas-X and continue Miralax.  If development of nausea and vomiting recommend going to the emergency room.  Again, recommend following up with PCP in the meantime.

## 2024-05-15 ENCOUNTER — TELEMEDICINE (OUTPATIENT)
Dept: FAMILY MEDICINE CLINIC | Facility: MEDICAL CENTER | Age: 79
End: 2024-05-15
Payer: MEDICARE

## 2024-05-15 ENCOUNTER — CLINICAL SUPPORT (OUTPATIENT)
Dept: NUTRITION | Facility: HOSPITAL | Age: 79
End: 2024-05-15
Attending: STUDENT IN AN ORGANIZED HEALTH CARE EDUCATION/TRAINING PROGRAM
Payer: MEDICARE

## 2024-05-15 ENCOUNTER — TELEPHONE (OUTPATIENT)
Age: 79
End: 2024-05-15

## 2024-05-15 VITALS — BODY MASS INDEX: 16.02 KG/M2 | HEIGHT: 63 IN | WEIGHT: 90.4 LBS

## 2024-05-15 DIAGNOSIS — R63.4 WEIGHT LOSS: Primary | ICD-10-CM

## 2024-05-15 DIAGNOSIS — R79.89 ABNORMAL THYROID BLOOD TEST: ICD-10-CM

## 2024-05-15 DIAGNOSIS — R63.4 UNINTENDED WEIGHT LOSS: Primary | ICD-10-CM

## 2024-05-15 PROCEDURE — 99442 PR PHYS/QHP TELEPHONE EVALUATION 11-20 MIN: CPT | Performed by: STUDENT IN AN ORGANIZED HEALTH CARE EDUCATION/TRAINING PROGRAM

## 2024-05-15 RX ORDER — PANTOPRAZOLE SODIUM 20 MG/1
20 TABLET, DELAYED RELEASE ORAL DAILY
COMMUNITY
End: 2024-05-16

## 2024-05-15 NOTE — ASSESSMENT & PLAN NOTE
Patient recently underwent colonoscopy, no colonic masses  Patient did touch base with her gastroenterologist yesterday in regards to epigastric complaint, she will continue PPI and resume Carafate  Patient did undergo EGD [facility] 2/28/2024 which revealed erosive gastritis, biopsy results from EGD reviewed  Thyroid antibody panel recently abnormal, patient will schedule appoint with endocrinology, recent TSH normal  Check celiac disease panel  Close follow-up in office for weight check  Patient strongly advised to start Ensure supplement, again to supplement meals not replace them; met with nutritionist this a.m.  Patient given ER precautions, she expressed understanding

## 2024-05-15 NOTE — PROGRESS NOTES
Virtual Brief Visit    This Visit is being completed by telephone. The Patient is located at Home and in the following state in which I hold an active license [place]    The patient was identified by name and date of birth. Brigid Brown was informed that this is a telemedicine visit and that the visit is being conducted through the [employer] Teams platform. She agrees to proceed..  My office door was closed. The patient was notified the following individuals were present in the room :NP.  She acknowledged consent and understanding of privacy and security of the video platform. The patient has agreed to participate and understands they can discontinue the visit at any time.    Patient is aware this is a billable service.       Assessment/Plan:    Problem List Items Addressed This Visit          Other    Unintended weight loss - Primary     Patient recently underwent colonoscopy, no colonic masses  Patient did touch base with her gastroenterologist yesterday in regards to epigastric complaint, she will continue PPI and resume Carafate  Patient did undergo EGD [facility] 2/28/2024 which revealed erosive gastritis, biopsy results from EGD reviewed  Thyroid antibody panel recently abnormal, patient will schedule appoint with endocrinology, recent TSH normal  Check celiac disease panel  Close follow-up in office for weight check  Patient strongly advised to start Ensure supplement, again to supplement meals not replace them            Recent Visits  Date Type Provider Dept   05/09/24 Office Visit Delicia Cintron DO [facility]   Showing recent visits within past 7 days and meeting all other requirements  Today's Visits  Date Type Provider Dept   05/15/24 Telemedicine Delicia Cintron DO [facility]   Showing today's visits and meeting all other requirements  Future Appointments  No visits were found meeting these conditions.  Showing future appointments within next 150 days and meeting all other requirements       ROGERS CESAR  Kevin is an 79-year-old female who has been struggling with weight loss.  Patient expresses she has decreased appetite.  Yesterday she was experiencing epigastric discomfort.  Patient met with nutritionist this morning.  Patient states her  bought supplemental shakes but she has not yet started.      Visit Time  Total Visit Duration: 15 minutes

## 2024-05-15 NOTE — TELEPHONE ENCOUNTER
Patient called in to inquire if provider going to prescribe thyroid medication for her. She is  feeling very weak; has appointment with  nutritionist this morning. Her current weight is down to 86 lbs. Please follow up with patient for provider response. [facility] pharmacy on patient profile.

## 2024-05-15 NOTE — PROGRESS NOTES
Nutrition Assessment Form    Patient Name: Brigid Brown    YOB: 1945    Sex: Female     Assessment Date: 5/15/2024  Start Time:  11am Stop Time: 12pm Total Minutes: 60 mins     Data:  Present at session: self   Parent/Patient Concerns/reason for visit: Has had IBS for 30years, but past couple months had had no appetite, weight loss   Medical Dx/Reason for Referral: Z68.1 (ICD-10-CM) - BMI less than 19,adult    Past Medical History:   Diagnosis Date    Acne     Basal cell carcinoma 06/08/2020    right lower forehead    BCC (basal cell carcinoma of skin) 03/09/2020    mid forehead    Benign neoplasm of skin     Disease of thyroid gland 2006    GERD (gastroesophageal reflux disease)     Hypertension     Inflamed seborrheic keratosis     Irritable bowel syndrome     Malignant neoplasm of skin of face     Migraines     Nonmelanoma skin cancer     Last Assessed:6/27/17    Squamous cell skin cancer 06/08/2020    In situ, left lower forehead    Tachycardia     Telogen effluvium     Temporomandibular joint disorder     Vertigo        Current Outpatient Medications   Medication Sig Dispense Refill    Alpha-D-Galactosidase (BEANO) TABS Take by mouth (Patient not taking: Reported on 4/22/2024)      ascorbic acid (VITAMIN C) 500 mg tablet Take by mouth      cholestyramine (QUESTRAN) 4 g packet Take 1 packet by mouth 3 (three) times a day with meals (Patient not taking: Reported on 5/9/2024)      Diclofenac Sodium (VOLTAREN) 1 % Apply 2 g topically 4 (four) times a day (Patient not taking: Reported on 4/22/2024) 240 g 1    Digestive Enzyme CAPS Take by mouth      diltiazem (CARDIZEM CD) 240 mg 24 hr capsule Take 1 capsule (240 mg total) by mouth daily May give patient  the stock bottle 90 capsule 1    famotidine (PEPCID) 10 mg tablet Take 10 mg by mouth 2 (two) times a day (Patient not taking: Reported on 4/22/2024)      lactase (LACTAID) 3,000 units tablet Take by mouth      Minoxidil 5 % FOAM Apply topically    "   Multiple Vitamins-Minerals (CENTRUM SILVER ULTRA WOMENS PO) Take by mouth      pantoprazole (PROTONIX) 20 mg tablet Take 1 tablet (20 mg total) by mouth daily before breakfast 30 tablet 0    polyethylene glycol-propylene glycol (SYSTANE) 0.4-0.3 %       Premarin vaginal cream Insert 1 g into the vagina once a week Brand Necessary 30 g 1    sucralfate (CARAFATE) 1 g tablet take 1 tablet by mouth before meals and at bedtime (Patient not taking: Reported on 4/26/2024)       No current facility-administered medications for this visit.     Facility-Administered Medications Ordered in Other Visits   Medication Dose Route Frequency Provider Last Rate Last Admin    lactated ringers infusion  125 mL/hr Intravenous Continuous Olga Sosa MD 75 mL/hr at 05/13/24 1023 Continue from Pre-op at 05/13/24 1023        Additional Meds/Supplements:    Special Learning Needs/barriers to learning/any new barriers    Height: HC Readings from Last 5 Encounters:   No data found for HC      Weight: Wt Readings from Last 10 Encounters:   05/15/24 41 kg (90 lb 6.4 oz)   05/09/24 41.9 kg (92 lb 6.4 oz)   04/26/24 43.4 kg (95 lb 9.6 oz)   04/24/24 44.5 kg (98 lb)   04/22/24 44.7 kg (98 lb 9.6 oz)   04/11/24 45.5 kg (100 lb 3.2 oz)   04/08/24 46.7 kg (103 lb)   03/22/24 47.1 kg (103 lb 12.8 oz)   03/11/24 48.1 kg (106 lb)   02/22/24 48.4 kg (106 lb 12.8 oz)     Estimated body mass index is 16.27 kg/m² as calculated from the following:    Height as of this encounter: 5' 2.5\" (1.588 m).    Weight as of this encounter: 41 kg (90 lb 6.4 oz).   Recent Weight Change: [x]Yes     []No  Amount: 16lbs/15%  wt. Loss x 3 months significant      Energy Needs: 1435-1640cal 35-40cal/kg to promote wt. Gain, 61.5g 1.5g/kg, 1435-1640mL 1mL/chente   Allergies   Allergen Reactions    Cortisone Hypertension, Other (See Comments), Shortness Of Breath and Tachycardia    Acebutolol     Acetaminophen GI Intolerance     diarrhea    Amlodipine     Atenolol     " Azithromycin     Bisphosphonates      Annotation - [id number]: iriitis    Candesartan     Clarithromycin     Erythromycin     Fosinopril     Irbesartan     Levofloxacin     Losartan     Methylprednisolone Hypertension and Tachycardia    Metoprolol     Nisoldipine     Olmesartan     Propranolol     Sulfamethoxazole-Trimethoprim Nausea Only    Timolol     Lidocaine Palpitations and Tachycardia    or intolerances    Social History     Substance and Sexual Activity   Alcohol Use No    _______x/wk or month  1 or 2 or 3 or 4 or____ drinks/session   Mixed drinks/ wine/ beer    none   Social History     Tobacco Use   Smoking Status Never   Smokeless Tobacco Never       Who shops? spouse   Who cooks/cooking methods/Eating out/take out habits   spouse  Cooking methods: bake/hale/air hale/grill/boil/other________    Take out: ___ x/wk or month   Dining out ____ x/wk or month   Exercise: Walk/ run/ bike/ gym/elliptical/other _________  ____ x/wk how many minutes:______   strength training    None d/t weakness   Other: ie: Sleep habits/ stress level/ work habits household-lives with ?/ food security none   Prior Nutritional Counseling? [x]Yes     []No  When:  Reported 5-10 years ago      Why: IBS        Diet Hx:  Breakfast: pb&j sandwich, jelly is sugar free, water   Lunch:   Pb pancake, sometimes soup, occasionally turkey sandwich, water       Dinner:   Small portion chicken, mashed potatoes, carrots       Snacks: AM - 4oz pedialyte  PM - 4oz pedialyte, protein bar, or ensure/boost  HS - none   Other Notes/ Initial Assessment:  Brigid presents with  for nutrition counseling. Brigid reported hx of IBS, lactose intolerant, but past couple months has had no appetite and significant weight loss. Brigid reported had testing and EGD/colonoscopy, was told she has gastritis, and was told thyroid panel was abnormal.  Brigid has had weakness where now is not able to cook, clean or do much. Brigid meets criteria for severe pro, chente malnutrition  d/t condition as evidence by  significant weight loss, 16lbs/15%  wt. Loss x 3 months, <75% energy intake > 1 month, BMI 16.2, severe signs of muscle fat loss at temples, orbitals, clavicles, treatment plan: high pro, high calorie diet, small frequent meals, snacks with protein and oral nutrition supplements, consider appetite stimulant.       Updated assessment (Follow up note only):           Nutrition Diagnosis:   Underweight  related to Inadequate energy intake as  evidenced by BMI <18.5 (adults)       Any change or new dx since previous visit:     Nutrition Diagnosis:         Medical Nutrition Therapy Intervention:  [x]Individualized Meal Plan -3 meals, 3 snacks, choosing calorie dense foods, 2 oral nutrition supplements. Reviewed High Protein, High Calorie Nutrition Therapy Diet education and provided high calorie high protein recipes. Recommend small frequent meals.   Provided ideas to maximize calorie intake at meals and snacks using calorie dense foods such as:   -Cook with lactose free whole milk or soy milk instead of water when making dishes such as hot cereal, cocoa, or pudding.  -Add regular jelly, jam, honey, butter or margarine to bread and crackers. -Add jam or fruit to lactose free ice cream and as a topping over cake.  -Mix dried fruit, nuts, granola, honey, or dry cereal with yogurt or hot cereals.  -Enjoy snacks such as lactose free milkshakes, smoothies, pudding, ice cream, or custard.  -Blend a fruit smoothie of a banana, frozen berries, milk or soy milk, with protein powder or CIB.    Reviewed strategies to increase protein:  -Add ¼ cup nonfat dry milk powder or protein powder to make a high-protein milk to drink or to use in recipes that call for milk. Vanilla or cocoa powder could help to boost the flavor.  -Add hard-cooked eggs, leftover meat, grated cheese, canned beans or tofu to noodles, rice, salads, sandwiches, soups, casseroles, pasta, tuna and other mixed dishes.  -Add protein  powder to hot cereals, meatloaf, casseroles, scrambled eggs, sauces, cream soups, and shakes.  -Add beans and lentils to salads, soups, casseroles, and vegetable dishes.  -Eat peanut or other nut butters on crackers, bread, toast, waffles, apples, bananas or celery sticks. Add it to milkshakes, smoothies, or desserts.    Reviewed ideas to increase fat intake:  -Snack on nuts and seeds or add them to foods like salads, pasta, cereals, yogurt, and ice cream.   -Sauté or stir-hale vegetables, meats, chicken, fish or tofu in olive or canola oil.   -Add olive oil, other vegetable oils, butter or margarine to soups, vegetables, potatoes, cooked cereal, rice, pasta, bread, crackers, pancakes, or waffles.  -Snack on olives or add to pasta, pizza, or salad.   []Understanding Lab Values   []Basic Pathophysiology of Disease []Food/Medication Interactions   []Food Diary []Exercise   []Lifestyle/Behavior Modification Techniques []Medication, Mechanism of Action   []Label Reading: CHO/ Na/ Fat/ other_________ []Self Blood Glucose Monitoring   [x]Weight/BMI Goals: gain/lose/maintain []Other -           Comprehension: []Excellent  []Very Good  [x]Good  []Fair   []Poor    Receptivity: []Excellent  []Very Good  [x]Good  []Fair   []Poor    Expected Compliance: []Excellent  []Very Good  [x]Good  []Fair   []Poor        Goals (initial)/ Progress made on previous goals/new goals:  Avoid sugar free jelly use regular jelly and increase pb to 2Tbsp at breakfast meal by f/u   2. Add protein with snack in between meals such as half tuna or egg salad sandwich or with crackers, pb with banana, lactose free whole milk with cereal by f/u   3. Consume 1-2 Ensure/boost in between meals by f/u  4. Add butter, cheese, nuts, seeds to current foods such as pasta, veggies, cereals by f/u        No follow-ups on file.  Labs:  CMP  Lab Results   Component Value Date    K 3.8 05/09/2024    CL 91 (L) 05/09/2024    CO2 30 05/09/2024    BUN 12 05/09/2024     "CREATININE 0.85 05/09/2024    GLUF 110 (H) 05/09/2024    CALCIUM 9.4 05/09/2024    AST 24 05/09/2024    ALT 18 05/09/2024    ALKPHOS 60 05/09/2024    EGFR 64 05/09/2024       BMP  Lab Results   Component Value Date    CALCIUM 9.4 05/09/2024    K 3.8 05/09/2024    CO2 30 05/09/2024    CL 91 (L) 05/09/2024    BUN 12 05/09/2024    CREATININE 0.85 05/09/2024       Lipids  No results found for: \"CHOL\"  Lab Results   Component Value Date    HDL 81 03/29/2022     01/17/2021     09/11/2020     Lab Results   Component Value Date    LDLCALC 76 03/29/2022    LDLCALC 62 01/17/2021    LDLCALC 63 09/11/2020     Lab Results   Component Value Date    TRIG 39 03/29/2022    TRIG 52 01/17/2021    TRIG 51 09/11/2020     No results found for: \"CHOLHDL\"    Hemoglobin A1C  Lab Results   Component Value Date    HGBA1C 5.3 01/17/2021       Fasting Glucose  Lab Results   Component Value Date    GLUF 110 (H) 05/09/2024       Insulin     Thyroid  Lab Results   Component Value Date    TSH 3.75 (H) 08/20/2019       Hepatic Function Panel  Lab Results   Component Value Date    ALT 18 05/09/2024    AST 24 05/09/2024    ALKPHOS 60 05/09/2024       Celiac Disease Antibody Panel  TISSUE TRANSGLUTAMINASE IGA   Date Value Ref Range Status   03/01/2023 <2 0 - 3 U/mL Final     Comment:                                   Negative        0 -  3                                Weak Positive   4 - 10                                Positive           >10   Tissue Transglutaminase (tTG) has been identified   as the endomysial antigen.  Studies have demonstr-   ated that endomysial IgA antibodies have over 99%   specificity for gluten sensitive enteropathy.      Iron  No results found for: \"IRON\", \"TIBC\", \"FERRITIN\"         Michelle Tracy RD  [facility]  [address] [place] [phone]    "

## 2024-05-16 ENCOUNTER — TELEPHONE (OUTPATIENT)
Age: 79
End: 2024-05-16

## 2024-05-16 ENCOUNTER — OFFICE VISIT (OUTPATIENT)
Dept: GASTROENTEROLOGY | Facility: CLINIC | Age: 79
End: 2024-05-16
Payer: MEDICARE

## 2024-05-16 ENCOUNTER — APPOINTMENT (OUTPATIENT)
Dept: LAB | Facility: MEDICAL CENTER | Age: 79
End: 2024-05-16

## 2024-05-16 VITALS
WEIGHT: 90.2 LBS | SYSTOLIC BLOOD PRESSURE: 118 MMHG | HEIGHT: 62 IN | BODY MASS INDEX: 16.6 KG/M2 | HEART RATE: 106 BPM | DIASTOLIC BLOOD PRESSURE: 70 MMHG

## 2024-05-16 DIAGNOSIS — R10.84 GENERALIZED ABDOMINAL PAIN: ICD-10-CM

## 2024-05-16 DIAGNOSIS — R63.4 WEIGHT LOSS, ABNORMAL: Primary | ICD-10-CM

## 2024-05-16 PROCEDURE — G2211 COMPLEX E/M VISIT ADD ON: HCPCS | Performed by: INTERNAL MEDICINE

## 2024-05-16 PROCEDURE — 99214 OFFICE O/P EST MOD 30 MIN: CPT | Performed by: INTERNAL MEDICINE

## 2024-05-16 PROCEDURE — 88305 TISSUE EXAM BY PATHOLOGIST: CPT | Performed by: PATHOLOGY

## 2024-05-16 RX ORDER — ESOMEPRAZOLE MAGNESIUM 40 MG/1
40 CAPSULE, DELAYED RELEASE ORAL EVERY MORNING
Qty: 30 CAPSULE | Refills: 1 | Status: SHIPPED | OUTPATIENT
Start: 2024-05-16

## 2024-05-16 NOTE — PROGRESS NOTES
[facility] - Outpatient Follow-up Note  Brigid Brown 79 y.o. female MRN: [id number]  Encounter: [id number]          ASSESSMENT AND PLAN:      1. Weight loss, abnormal  2. Generalized abdominal pain  The etiology of her symptoms remains unclear.  Will evaluate as below to look for evidence of small bowel pathology, inflammatory changes, stricture, internal hernia, etc.  Will change from pantoprazole to esomeprazole to hopefully provide better acid suppression.  Contingency might include further evaluation with gastric emptying scan or possibly repeat EGD.  Depending on results, might consider a trial of cyproheptadine for appetite stimulation.      - Calprotectin,Fecal; Future  - esomeprazole (NexIUM) 40 MG capsule; Take 1 capsule (40 mg total) by mouth every morning  Dispense: 30 capsule; Refill: 1  - FL upper GI / small bowel with air; Future    ______________________________________________________________________    SUBJECTIVE: Patient returns in follow-up of abdominal pain and weight loss.  This has been ongoing over the past 2 to 3 months.  She initially underwent EGD through the [facility] system with report of gastritis though records of this are not currently available through epic.  Took a course of oral sucralfate which she reports was initially helpful but then symptoms worsened again.  Was started on pantoprazole which has had no effect.  Reports an approximate 20 pound weight loss over this time.  Pain was previously reported as postprandial although she now reports it is essentially constant and not dramatically affected by eating.  Reports discomfort in the bilateral lower quadrants primarily but on exam is quite tender at the epigastrium.  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  No blood in her stool, no nausea or vomiting, fever or  chills, jaundice or rash, changes in bowel habit.  She does report early satiety.  Has undergone prior evaluation including CT scan with IV but not oral contrast which was unrevealing, colonoscopy to the cecum with normal random biopsies.  Mesenteric Doppler ultrasound with no evidence of occlusion of the celiac or superior mesenteric arteries.  CBC, LFTs, lipase reviewed and unremarkable.      REVIEW OF SYSTEMS:    ROS       Historical Information   Past Medical History:   Diagnosis Date    Acne     Basal cell carcinoma 06/08/2020    right lower forehead    BCC (basal cell carcinoma of skin) 03/09/2020    mid forehead    Benign neoplasm of skin     Disease of thyroid gland 2006    GERD (gastroesophageal reflux disease)     Hypertension     Inflamed seborrheic keratosis     Irritable bowel syndrome     Malignant neoplasm of skin of face     Migraines     Nonmelanoma skin cancer     Last Assessed:6/27/17    Squamous cell skin cancer 06/08/2020    In situ, left lower forehead    Tachycardia     Telogen effluvium     Temporomandibular joint disorder     Vertigo      Past Surgical History:   Procedure Laterality Date    ADENOIDECTOMY      APPENDECTOMY      CHOLECYSTECTOMY      COLONOSCOPY  05/03/2019    COMPLEX WOUND CLOSURE TO EXTREMITY N/A 7/28/2020    Procedure: COMPLEX CLOSURE MID FOREHEAD;  Surgeon: Randy Frank MD;  Location: [facility];  Service: Plastics    ESOPHAGOGASTRODUODENOSCOPY  2009    FLAP LOCAL HEAD / NECK N/A 7/28/2020    Procedure: FLAP MID FOREHEAD;  Surgeon: Randy Frank MD;  Location: [facility];  Service: Plastics    HYSTERECTOMY  1987    MALIGNANT SKIN LESION EXCISION      Excision of Lesion Face Malignant-9/14/2004 BCC Forehead    MOHS RECONSTRUCTION N/A 7/28/2020    Procedure: RECONSTRUCTION MOHS DEFECT MID FOREHEAD;  Surgeon: Randy Frank MD;  Location: [facility];  Service: Plastics    MOHS SURGERY  07/27/2020    Right &left lower forehead, mid forehead     OOPHORECTOMY Bilateral 1987    ROTATOR CUFF REPAIR Right     SKIN BIOPSY      TONSILLECTOMY      TUBAL LIGATION  1976?     Social History   Social History     Substance and Sexual Activity   Alcohol Use No     Social History     Substance and Sexual Activity   Drug Use No     Social History     Tobacco Use   Smoking Status Never   Smokeless Tobacco Never     Family History   Problem Relation Age of Onset    Hypertension Mother     Osteoporosis Mother     Skin cancer Mother     Migraines Mother     Dementia Mother     Hypertension Father     Skin cancer Father     Dementia Father     Skin cancer Sister 73    Migraines Sister     No Known Problems Daughter     Uterine cancer Maternal Grandmother 29    Diabetes type II Maternal Grandfather     Cancer Paternal Grandmother     Uterine cancer Paternal Grandmother 65    No Known Problems Maternal Aunt     Cancer Paternal Aunt         Breast    Uterine cancer Paternal Aunt 55    No Known Problems Paternal Aunt     No Known Problems Paternal Aunt        Meds/Allergies       Current Outpatient Medications:     ascorbic acid (VITAMIN C) 500 mg tablet    Digestive Enzyme CAPS    diltiazem (CARDIZEM CD) 240 mg 24 hr capsule    esomeprazole (NexIUM) 40 MG capsule    lactase (LACTAID) 3,000 units tablet    Minoxidil 5 % FOAM    Multiple Vitamins-Minerals (CENTRUM SILVER ULTRA WOMENS PO)    polyethylene glycol-propylene glycol (SYSTANE) 0.4-0.3 %    Premarin vaginal cream    Alpha-D-Galactosidase (BEANO) TABS    cholestyramine (QUESTRAN) 4 g packet    Diclofenac Sodium (VOLTAREN) 1 %    famotidine (PEPCID) 10 mg tablet    sucralfate (CARAFATE) 1 g tablet  No current facility-administered medications for this visit.    Facility-Administered Medications Ordered in Other Visits:     lactated ringers infusion, 125 mL/hr, Intravenous, Continuous, Continue from Pre-op at 05/13/24 1023    Allergies   Allergen Reactions    Cortisone Hypertension, Other (See Comments), Shortness Of Breath  "and Tachycardia    Acebutolol     Acetaminophen GI Intolerance     diarrhea    Amlodipine     Atenolol     Azithromycin     Bisphosphonates      Annotation - [id number]: iriitis    Candesartan     Clarithromycin     Erythromycin     Fosinopril     Irbesartan     Levofloxacin     Losartan     Methylprednisolone Hypertension and Tachycardia    Metoprolol     Nisoldipine     Olmesartan     Propranolol     Sulfamethoxazole-Trimethoprim Nausea Only    Timolol     Lidocaine Palpitations and Tachycardia           Objective     Blood pressure 118/70, pulse (!) 106, height 5' 2\" (1.575 m), weight 40.9 kg (90 lb 3.2 oz), not currently breastfeeding. Body mass index is 16.5 kg/m².      PHYSICAL EXAM:      Physical Exam  Vitals and nursing note reviewed.   Constitutional:       General: She is not in acute distress.     Comments: Cachectic   HENT:      Head: Normocephalic and atraumatic.   Eyes:      General: No scleral icterus.     Extraocular Movements: Extraocular movements intact.   Cardiovascular:      Rate and Rhythm: Normal rate.   Pulmonary:      Effort: Pulmonary effort is normal. No respiratory distress.   Abdominal:      General: There is no distension.      Palpations: Abdomen is soft.      Tenderness: There is abdominal tenderness. There is no guarding or rebound.      Comments: Tenderness primarily at the epigastrium.  Negative Carnett's sign   Skin:     General: Skin is warm and dry.      Coloration: Skin is not cyanotic.      Findings: No erythema.   Neurological:      General: No focal deficit present.      Mental Status: She is alert and oriented to person, place, and time.   Psychiatric:         Mood and Affect: Mood normal.         Behavior: Behavior normal.          Lab Results:   No visits with results within 1 Day(s) from this visit.   Latest known visit with results is:   Hospital Outpatient Visit on 05/13/2024   Component Date Value    Case Report 05/13/2024                      Value:Surgical Pathology " "Report                         Case: [id number]                                  Authorizing Provider:  Aurelio Julien MD       Collected:           05/13/2024 1046              Ordering Location:     [facility]   Received:            05/13/2024 1930                                     Center                                                                       Pathologist:           Misha Jacobo MD                                                           Specimen:    Colon, random colon biopsies/ro microscopic colitis                                        Final Diagnosis 05/13/2024                      Value:A. Colon, \"Random colon biopsies rule out microscopic colitis,\" Biopsy:  - Benign colonic mucosa with intact glandular architecture  - Negative for lymphocytic, collagenous, or active colitis  - Negative for acute colitis  - Negative for granulomas, dysplasia, or carcinoma        Additional Information 05/13/2024                      Value:All reported additional testing was performed with appropriately reactive controls.  These tests were developed and their performance characteristics determined by [facility] or appropriate performing facility, though some tests may be performed on tissues which have not been validated for performance characteristics (such as staining performed on alcohol exposed cell blocks and decalcified tissues).  Results should be interpreted with caution and in the context of the patients’ clinical condition. These tests may not be cleared or approved by the U.S. Food and Drug Administration, though the FDA has determined that such clearance or approval is not necessary. These tests are used for clinical purposes and they should not be regarded as investigational or for research. This laboratory has been approved by CLIA 88, designated as a high-complexity laboratory and is qualified to perform these tests.  .Interpretation performed at " "[facility], [address] [facility] [address]        Gross Description 05/13/2024                      Value:A. The specimen is received in formalin, labeled with the patient's name and hospital number, and is designated \" random colon biopsies rule out microscopic colitis\".  The specimen consists of 3 tan soft tissue fragments, each measuring 0.2 cm.  Entirely submitted. Screened cassette.    Note: The estimated total formalin fixation time based upon information provided by the submitting clinician and the standard processing schedule is under 72.0 hours.  RRavotti      Clinical Information 05/13/2024                      Value:FINDINGS:  · Medium diverticula of moderate severity in the sigmoid colon and rectosigmoid  · Skin tag observed during retroflexion  · Performed multiple random forceps biopsies in the ascending colon and descending colon. Chronic mucosa appeared healthy, random biopsies obtained to assess for microscopic colitis.  · Internal small hemorrhoids           Radiology Results:   Colonoscopy    Result Date: 5/13/2024  Narrative: Table formatting from the original result was not included. [address] [address] [phone] DATE OF SERVICE: 5/13/24 PHYSICIAN(S): Attending: Aurelio Julien MD Fellow: No Staff Documented INDICATION: Weight loss, abnormal, Mild protein-calorie malnutrition (HCC), Change in bowel habit POST-OP DIAGNOSIS: See the impression below. HISTORY: Prior colonoscopy: 5 years ago. BOWEL PREPARATION: Miralax/Dulcolax PREPROCEDURE: Informed consent was obtained for the procedure, including sedation. Risks including but not limited to bleeding, infection, perforation, adverse drug reaction and aspiration were explained in detail. Also explained about less than 100% sensitivity with the exam and other alternatives. The patient was placed in the left lateral decubitus position. Procedure: Colonoscopy " DETAILS OF PROCEDURE: Patient was taken to the procedure room where a time out was performed to confirm correct patient and correct procedure. The patient underwent monitored anesthesia care, which was administered by an anesthesia professional. The patient's blood pressure, heart rate, level of consciousness, oxygen, respirations, ECG and ETCO2 were monitored throughout the procedure. A digital rectal exam was performed. The scope was introduced through the anus and advanced to the cecum. Retroflexion was performed in the rectum. The quality of bowel preparation was evaluated using the Conroy Bowel Preparation Scale with scores of: right colon = 2, transverse colon = 2, left colon = 2. The total BBPS score was 6. Bowel prep was adequate. The patient experienced no blood loss. The procedure was not difficult. The patient tolerated the procedure well. There were no apparent adverse events. ANESTHESIA INFORMATION: ASA: III Anesthesia Type: IV Sedation with Anesthesia MEDICATIONS: No administrations occurring from 1018 to 1051 on 05/13/24 FINDINGS: Medium diverticula of moderate severity in the sigmoid colon and rectosigmoid Skin tag observed during retroflexion Performed multiple random forceps biopsies in the ascending colon and descending colon. Chronic mucosa appeared healthy, random biopsies obtained to assess for microscopic colitis. Internal small hemorrhoids EVENTS: Procedure Events Event Event Time ENDO CECUM REACHED 5/13/2024 10:43 AM ENDO SCOPE OUT TIME 5/13/2024 10:51 AM SPECIMENS: ID Type Source Tests Collected by Time Destination 1 : random colon biopsies/ro microscopic colitis Tissue Colon TISSUE EXAM Wendy Lowe RN 5/13/2024 10:46 AM  EQUIPMENT: Colonoscope - [id number]     Impression: Diverticulosis of moderate severity in the sigmoid colon and rectosigmoid Skin tag Performed forceps biopsies in the ascending colon and descending colon Small hemorrhoids RECOMMENDATION:  No further screening  colonoscopies necessary  Age greater than 65  High-fiber diet with 25 to 30 g of fiber on daily basis P No etiology of weight loss noted on colonoscopy.  Would recommend following up with the PCP to assess for any other causes of weight loss.  Aurelio Julien MD     XR chest pa & lateral    Result Date: 5/9/2024  Narrative: CHEST INDICATION:   Other abnormalities of breathing. COMPARISON:  None. EXAM PERFORMED/VIEWS:  XR CHEST PA & LATERAL FINDINGS: Cardiomediastinal silhouette appears unremarkable. The lungs are clear.  No pneumothorax or pleural effusion. Osseous structures appear within normal limits for patient age.     Impression: No acute cardiopulmonary disease.  Hyperinflated in appearance No significant change from 4/9/2024. 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 Pt. Complaining of right sided abdominal pain.  States that she can see her abdomen pulsating.  Operative History: appendectomy Cholecystectomy Hysterectomy  Risk Factors The patient has history of HTN.  She has no history of Diabetes or Hyperlipidemia.  FINDINGS:  Unilateral             PSV  EDV  AP Diam  Sup-[place] Ao              85   18      2.0  Celiac                 124   23           Prox. SMA               92   22           Px Inf-Alex Ao           70   12      1.4  Ds Inf-Alex Ao           74   10      1.5  Splenic                127   34           Common Hepatic Artery   94   33              CONCLUSION: Impression The abdominal aorta is patent and normal in caliber.  The celiac, splenic and common hepatic arteries are patent.  The main Renal Arteries could not be insonated.  The superior mesenteric artery is patent.  SIGNATURE: Electronically Signed by: DIANE BERGMAN DO on 2024-05-03 04:22:33 PM

## 2024-05-16 NOTE — TELEPHONE ENCOUNTER
Patients GI provider:  Dr. Milligan    Number to return call: [phone]    Reason for call: Pt calling stating the Nexium will cost over 200$. Pt looking for replacement if cheaper.    Scheduled procedure/appointment date if applicable: Apt 6/27/24

## 2024-05-17 ENCOUNTER — HOSPITAL ENCOUNTER (OUTPATIENT)
Dept: ULTRASOUND IMAGING | Facility: HOSPITAL | Age: 79
Discharge: HOME | End: 2024-05-17
Payer: MEDICARE

## 2024-05-17 ENCOUNTER — TELEPHONE (OUTPATIENT)
Age: 79
End: 2024-05-17

## 2024-05-17 DIAGNOSIS — K21.9 GASTROESOPHAGEAL REFLUX DISEASE WITHOUT ESOPHAGITIS: Primary | ICD-10-CM

## 2024-05-17 DIAGNOSIS — R79.89 ABNORMAL THYROID BLOOD TEST: ICD-10-CM

## 2024-05-17 PROCEDURE — 76536 US EXAM OF HEAD AND NECK: CPT

## 2024-05-17 RX ORDER — OMEPRAZOLE 40 MG/1
40 CAPSULE, DELAYED RELEASE ORAL DAILY
Qty: 30 CAPSULE | Refills: 2 | Status: SHIPPED | OUTPATIENT
Start: 2024-05-17

## 2024-05-17 NOTE — TELEPHONE ENCOUNTER
Please advise if there is a different medication that can be sent to pharmacy for patient. Thank you!

## 2024-05-20 ENCOUNTER — APPOINTMENT (OUTPATIENT)
Dept: LAB | Facility: MEDICAL CENTER | Age: 79
End: 2024-05-20
Payer: MEDICARE

## 2024-05-20 ENCOUNTER — NURSE TRIAGE (OUTPATIENT)
Age: 79
End: 2024-05-20

## 2024-05-20 ENCOUNTER — TELEPHONE (OUTPATIENT)
Age: 79
End: 2024-05-20

## 2024-05-20 DIAGNOSIS — R79.89 LOW SERUM SODIUM: ICD-10-CM

## 2024-05-20 DIAGNOSIS — R73.01 ELEVATED FASTING BLOOD SUGAR: ICD-10-CM

## 2024-05-20 DIAGNOSIS — R10.84 GENERALIZED ABDOMINAL PAIN: ICD-10-CM

## 2024-05-20 DIAGNOSIS — R63.4 WEIGHT LOSS: ICD-10-CM

## 2024-05-20 DIAGNOSIS — R63.4 WEIGHT LOSS, ABNORMAL: ICD-10-CM

## 2024-05-20 LAB
EST. AVERAGE GLUCOSE BLD GHB EST-MCNC: 114 MG/DL
GLIADIN PEPTIDE IGA SER-ACNC: 6.4 U/ML
GLIADIN PEPTIDE IGA SER-ACNC: NEGATIVE
GLIADIN PEPTIDE IGG SER-ACNC: <0.4 U/ML
GLIADIN PEPTIDE IGG SER-ACNC: NEGATIVE
HBA1C MFR BLD: 5.6 %
IGA SERPL-MCNC: 104 MG/DL (ref 66–433)
OSMOLALITY UR/SERPL-RTO: 287 MMOL/KG (ref 282–298)
TTG IGA SER-ACNC: <0.5 U/ML
TTG IGA SER-ACNC: NEGATIVE
TTG IGG SER-ACNC: <0.8 U/ML
TTG IGG SER-ACNC: NEGATIVE

## 2024-05-20 PROCEDURE — 82784 ASSAY IGA/IGD/IGG/IGM EACH: CPT

## 2024-05-20 PROCEDURE — 86364 TISS TRNSGLTMNASE EA IG CLAS: CPT

## 2024-05-20 PROCEDURE — 36415 COLL VENOUS BLD VENIPUNCTURE: CPT

## 2024-05-20 PROCEDURE — 83036 HEMOGLOBIN GLYCOSYLATED A1C: CPT

## 2024-05-20 PROCEDURE — 83930 ASSAY OF BLOOD OSMOLALITY: CPT

## 2024-05-20 PROCEDURE — 83993 ASSAY FOR CALPROTECTIN FECAL: CPT

## 2024-05-20 PROCEDURE — 86258 DGP ANTIBODY EACH IG CLASS: CPT

## 2024-05-20 NOTE — TELEPHONE ENCOUNTER
Patient called as she received a mail from the office.  Enclosed was an order for celiac disease.  She wanted to know what she had to do with it.  I instructed her that it was a lab slip to be taken to her lab.  Patient expressed understanding.

## 2024-05-20 NOTE — TELEPHONE ENCOUNTER
"Patient c/o urinary retention while at the lab this morning. She states that has not happened before and she was concerned. She denies pain, denies other urinary symptoms. She states she was able to urinate when she got home.   Told patient to monitor, and if symptoms persist to give provider another call.       Reason for Disposition   All other urine symptoms    Answer Assessment - Initial Assessment Questions  1. SYMPTOM: \"What's the main symptom you're concerned about?\" (e.g., frequency, incontinence)      Urinary retention at lab    2. ONSET: \"When did the  start?\"      This morning    3. PAIN: \"Is there any pain?\" If Yes, ask: \"How bad is it?\" (Scale: 1-10; mild, moderate, severe)      Denies    4. CAUSE: \"What do you think is causing the symptoms?\"      Unknown    5. OTHER SYMPTOMS: \"Do you have any other symptoms?\" (e.g., fever, flank pain, blood in urine, pain with urination)      Denies    6. PREGNANCY: \"Is there any chance you are pregnant?\" \"When was your last menstrual period?\"      N/A    Protocols used: Urinary Symptoms-ADULT-AH    "

## 2024-05-20 NOTE — TELEPHONE ENCOUNTER
Regarding: unable to void  ----- Message from Mee ROJO sent at 5/20/2024 12:53 PM EDT -----  Pt went to the lab today for blood work and to give a stool and urine sample.  She was unable to give urine sample.  She is worried because this has never happened to her before, being unable to void.  Attempted to warm transfer to nurse triage, unavailable.

## 2024-05-21 ENCOUNTER — TELEPHONE (OUTPATIENT)
Age: 79
End: 2024-05-21

## 2024-05-21 NOTE — TELEPHONE ENCOUNTER
Patient called would like results of her ultrasound and blood work. Patient is getting a little scared her weight is 86.5 lbs. Very tired. Would like a call back [phone]

## 2024-05-22 ENCOUNTER — TELEPHONE (OUTPATIENT)
Age: 79
End: 2024-05-22

## 2024-05-22 DIAGNOSIS — R63.0 DECREASED APPETITE: Primary | ICD-10-CM

## 2024-05-22 RX ORDER — CYPROHEPTADINE HYDROCHLORIDE 4 MG/1
2 TABLET ORAL 4 TIMES DAILY
Qty: 60 TABLET | Refills: 0 | Status: SHIPPED | OUTPATIENT
Start: 2024-05-22 | End: 2024-06-03 | Stop reason: SDUPTHER

## 2024-05-22 NOTE — TELEPHONE ENCOUNTER
Called patient in follow-up.  She recently saw GI and they are pursuing further workup potentially EGD as well.  Advised patient about ER precautions, she expressed understanding.  Will start cyproheptadine as well.  Patient expressed understanding and all questions answered.

## 2024-05-22 NOTE — TELEPHONE ENCOUNTER
Delicia Cintron DO  5/13/2024  1:35 PM EDT Back to Top      Patient informed about attached results and follow-up instruction provided.

## 2024-05-22 NOTE — TELEPHONE ENCOUNTER
Pt calling in to state that she found out her thyroid is fine but she is still experience the same symptoms and would like further testing to find out what is wrong. She has no appetite, fatigue, down to 86 pounds since February, left side stomach pains after eating, and increased thirst.

## 2024-05-22 NOTE — TELEPHONE ENCOUNTER
Patient called would like to know results of her thyroid blood work, gave patient message and states she did not write down what was said. Would like to know [phone]

## 2024-05-26 LAB — CALPROTECTIN STL-MCNT: 149 UG/G (ref 0–120)

## 2024-05-29 ENCOUNTER — TELEPHONE (OUTPATIENT)
Age: 79
End: 2024-05-29

## 2024-05-29 NOTE — TELEPHONE ENCOUNTER
Patient would like a call back regarding the fecal test.  Patient informed of Dr. Stearns note she stated that she continues to have burning in her stomach. what else can she do? Patient was taking Prilosec but not helping. She changed to sucralfate and its not helping either wants to know what else she can take. Still tired and test is next week for swallow test.      Osiris Rainey,  Testing for celiac disease negative.  A1c normal, 9 prediabetes nor diabetes range.

## 2024-05-30 NOTE — TELEPHONE ENCOUNTER
Spoke to pt and left her know that the GI was cc'd on the lab results and waiting for further advisement.

## 2024-06-03 ENCOUNTER — APPOINTMENT (OUTPATIENT)
Dept: LAB | Facility: MEDICAL CENTER | Age: 79
End: 2024-06-03
Payer: MEDICARE

## 2024-06-03 ENCOUNTER — OFFICE VISIT (OUTPATIENT)
Dept: FAMILY MEDICINE CLINIC | Facility: MEDICAL CENTER | Age: 79
End: 2024-06-03
Payer: MEDICARE

## 2024-06-03 VITALS
BODY MASS INDEX: 16.46 KG/M2 | SYSTOLIC BLOOD PRESSURE: 126 MMHG | OXYGEN SATURATION: 99 % | TEMPERATURE: 97.7 F | HEART RATE: 92 BPM | DIASTOLIC BLOOD PRESSURE: 82 MMHG | WEIGHT: 90 LBS | RESPIRATION RATE: 18 BRPM

## 2024-06-03 DIAGNOSIS — R10.10 UPPER ABDOMINAL PAIN: Primary | ICD-10-CM

## 2024-06-03 DIAGNOSIS — R94.6 ABNORMAL RESULTS OF THYROID FUNCTION STUDIES: ICD-10-CM

## 2024-06-03 DIAGNOSIS — K21.9 GASTROESOPHAGEAL REFLUX DISEASE WITHOUT ESOPHAGITIS: Primary | ICD-10-CM

## 2024-06-03 DIAGNOSIS — R68.2 DRY MOUTH: ICD-10-CM

## 2024-06-03 DIAGNOSIS — R63.0 DECREASED APPETITE: ICD-10-CM

## 2024-06-03 DIAGNOSIS — R10.84 GENERALIZED ABDOMINAL PAIN: ICD-10-CM

## 2024-06-03 DIAGNOSIS — K29.70 GASTRITIS, PRESENCE OF BLEEDING UNSPECIFIED, UNSPECIFIED CHRONICITY, UNSPECIFIED GASTRITIS TYPE: ICD-10-CM

## 2024-06-03 LAB
BASOPHILS # BLD AUTO: 0.05 THOUSANDS/ÂΜL (ref 0–0.1)
BASOPHILS NFR BLD AUTO: 1 % (ref 0–1)
CRP SERPL QL: <1 MG/L
EOSINOPHIL # BLD AUTO: 0.01 THOUSAND/ÂΜL (ref 0–0.61)
EOSINOPHIL NFR BLD AUTO: 0 % (ref 0–6)
ERYTHROCYTE [DISTWIDTH] IN BLOOD BY AUTOMATED COUNT: 13.1 % (ref 11.6–15.1)
HCT VFR BLD AUTO: 41.8 % (ref 34.8–46.1)
HGB BLD-MCNC: 13.8 G/DL (ref 11.5–15.4)
IMM GRANULOCYTES # BLD AUTO: 0.02 THOUSAND/UL (ref 0–0.2)
IMM GRANULOCYTES NFR BLD AUTO: 0 % (ref 0–2)
LYMPHOCYTES # BLD AUTO: 1.13 THOUSANDS/ÂΜL (ref 0.6–4.47)
LYMPHOCYTES NFR BLD AUTO: 19 % (ref 14–44)
MCH RBC QN AUTO: 28.9 PG (ref 26.8–34.3)
MCHC RBC AUTO-ENTMCNC: 33 G/DL (ref 31.4–37.4)
MCV RBC AUTO: 87 FL (ref 82–98)
MONOCYTES # BLD AUTO: 0.54 THOUSAND/ÂΜL (ref 0.17–1.22)
MONOCYTES NFR BLD AUTO: 9 % (ref 4–12)
NEUTROPHILS # BLD AUTO: 4.34 THOUSANDS/ÂΜL (ref 1.85–7.62)
NEUTS SEG NFR BLD AUTO: 71 % (ref 43–75)
NRBC BLD AUTO-RTO: 0 /100 WBCS
PLATELET # BLD AUTO: 346 THOUSANDS/UL (ref 149–390)
PMV BLD AUTO: 9.5 FL (ref 8.9–12.7)
RBC # BLD AUTO: 4.78 MILLION/UL (ref 3.81–5.12)
SODIUM 24H UR-SCNC: 10 MOL/L
WBC # BLD AUTO: 6.09 THOUSAND/UL (ref 4.31–10.16)

## 2024-06-03 PROCEDURE — G2211 COMPLEX E/M VISIT ADD ON: HCPCS | Performed by: STUDENT IN AN ORGANIZED HEALTH CARE EDUCATION/TRAINING PROGRAM

## 2024-06-03 PROCEDURE — 86038 ANTINUCLEAR ANTIBODIES: CPT

## 2024-06-03 PROCEDURE — 99214 OFFICE O/P EST MOD 30 MIN: CPT | Performed by: STUDENT IN AN ORGANIZED HEALTH CARE EDUCATION/TRAINING PROGRAM

## 2024-06-03 PROCEDURE — 86430 RHEUMATOID FACTOR TEST QUAL: CPT

## 2024-06-03 PROCEDURE — 36415 COLL VENOUS BLD VENIPUNCTURE: CPT

## 2024-06-03 PROCEDURE — 86140 C-REACTIVE PROTEIN: CPT

## 2024-06-03 PROCEDURE — 85025 COMPLETE CBC W/AUTO DIFF WBC: CPT

## 2024-06-03 PROCEDURE — 84300 ASSAY OF URINE SODIUM: CPT | Performed by: STUDENT IN AN ORGANIZED HEALTH CARE EDUCATION/TRAINING PROGRAM

## 2024-06-03 RX ORDER — CYPROHEPTADINE HYDROCHLORIDE 4 MG/1
2 TABLET ORAL 4 TIMES DAILY
Qty: 60 TABLET | Refills: 0 | Status: SHIPPED | OUTPATIENT
Start: 2024-06-03 | End: 2024-07-03

## 2024-06-03 NOTE — PROGRESS NOTES
"  [facility] - Clinic Note  Delicia Cintron DO, 24     Brigid Brown MRN: [id number] : 1945 Age: 79 y.o.     Assessment/Plan     1. Upper abdominal pain    -Pain when present mostly epigastric and left upper quadrant region  -FL upper GI / small bowel with air scheduled this week  -Next follow-up with GI 2024 in office  Component      Latest Ref Rng 2024   CALPROTECTIN      0 - 120 ug/g 149 (H)       2. Decreased appetite    - cyproheptadine (PERIACTIN) 4 mg tablet; Take 0.5 tablets (2 mg total) by mouth 4 (four) times a day  Dispense: 60 tablet; Refill: 0    3. Gastritis, presence of bleeding unspecified, unspecified chronicity, unspecified gastritis type    -Continue PPI and Carafate  -Follow-up with GI    4. Body mass index (BMI) less than 19 in adult    - Body mass index is 16.46 kg/m².  -Continue to closely monitor weight  -Met with nutritionist  -Advised about continued supplementation with Ensure/Boost  -Advised about goal kcal per day and well-balanced nutrition including protein    5. Dry mouth    - CBC and differential; Future  - RF Screen w/ Reflex to Titer; Future  - C-reactive protein; Future  - Antinuclear Antibodies, IFA; Future  - CBC and differential; Future      Brigid Brown acknowledged understanding of treatment plan, all questions answered.    Subjective      Brigid Brown is a 79 y.o. female who presents for weight check and close monitoring.  Patient states she continues with abdominal pain.  Pain mostly left upper quadrant lately she states.  Patient describes the pain is worse in the morning and after she consumes meal.  Patient does complain of dry mouth as well.  Patient aware to schedule appointment with her dentist to address partial.  Patient states she \" burps a lot\".  Patient states she is having bowel movements daily, no hematochezia, no melena.    Patient states lately she usually has been consuming the following-    Breakfast: Peanut butter jelly " and blueberry jam on bread; half a bottle of boost/Ensure  Lunch: Pancakes with peanut butter  Snack: Crackers, peanut butter and jam  Dinner: Soup or eggs and toast      The following portions of the patient's history were reviewed and updated as appropriate: allergies, current medications, past family history, past medical history, past social history, past surgical history and problem list.     Past Medical History:   Diagnosis Date    Acne     Basal cell carcinoma 06/08/2020    right lower forehead    BCC (basal cell carcinoma of skin) 03/09/2020    mid forehead    Benign neoplasm of skin     Disease of thyroid gland 2006    GERD (gastroesophageal reflux disease)     Hypertension     Inflamed seborrheic keratosis     Irritable bowel syndrome     Malignant neoplasm of skin of face     Migraines     Nonmelanoma skin cancer     Last Assessed:6/27/17    Squamous cell skin cancer 06/08/2020    In situ, left lower forehead    Tachycardia     Telogen effluvium     Temporomandibular joint disorder     Vertigo        Allergies   Allergen Reactions    Cortisone Hypertension, Other (See Comments), Shortness Of Breath and Tachycardia    Acebutolol     Acetaminophen GI Intolerance     diarrhea    Amlodipine     Atenolol     Azithromycin     Bisphosphonates      Annotation - [id number]: iriitis    Candesartan     Clarithromycin     Erythromycin     Fosinopril     Irbesartan     Levofloxacin     Losartan     Methylprednisolone Hypertension and Tachycardia    Metoprolol     Nisoldipine     Olmesartan     Propranolol     Sulfamethoxazole-Trimethoprim Nausea Only    Timolol     Lidocaine Palpitations and Tachycardia       Past Surgical History:   Procedure Laterality Date    ADENOIDECTOMY      APPENDECTOMY      CHOLECYSTECTOMY      COLONOSCOPY  05/03/2019    COMPLEX WOUND CLOSURE TO EXTREMITY N/A 7/28/2020    Procedure: COMPLEX CLOSURE MID FOREHEAD;  Surgeon: Randy Frank MD;  [facility]: [facility];  Service: Plastics     ESOPHAGOGASTRODUODENOSCOPY  2009    FLAP LOCAL HEAD / NECK N/A 7/28/2020    Procedure: FLAP MID FOREHEAD;  Surgeon: Randy Frank MD;  [facility];  Service: Plastics    HYSTERECTOMY  1987    MALIGNANT SKIN LESION EXCISION      Excision of Lesion Face Malignant-9/14/2004 BCC Forehead    MOHS RECONSTRUCTION N/A 7/28/2020    Procedure: RECONSTRUCTION MOHS DEFECT MID FOREHEAD;  Surgeon: Randy Frank MD;  [facility];  Service: Plastics    MOHS SURGERY  07/27/2020    Right &left lower forehead, mid forehead    OOPHORECTOMY Bilateral 1987    ROTATOR CUFF REPAIR Right     SKIN BIOPSY      TONSILLECTOMY      TUBAL LIGATION  1976?       Family History   Problem Relation Age of Onset    Hypertension Mother     Osteoporosis Mother     Skin cancer Mother     Migraines Mother     Dementia Mother     Hypertension Father     Skin cancer Father     Dementia Father     Skin cancer Sister 73    Migraines Sister     No Known Problems Daughter     Uterine cancer Maternal Grandmother 29    Diabetes type II Maternal Grandfather     Cancer Paternal Grandmother     Uterine cancer Paternal Grandmother 65    No Known Problems Maternal Aunt     Cancer Paternal Aunt         Breast    Uterine cancer Paternal Aunt 55    No Known Problems Paternal Aunt     No Known Problems Paternal Aunt        Social History     Socioeconomic History    Marital status: /Civil Union     Spouse name: None    Number of children: None    Years of education: None    Highest education level: None   Occupational History    None   Tobacco Use    Smoking status: Never    Smokeless tobacco: Never   Vaping Use    Vaping status: Never Used   Substance and Sexual Activity    Alcohol use: No    Drug use: No    Sexual activity: Yes     Partners: Male     Birth control/protection: Surgical   Other Topics Concern    None   Social History Narrative    None     Social Determinants of Health     Financial Resource Strain: Not on file    Food Insecurity: Not on file   Transportation Needs: No Transportation Needs (5/20/2023)    PRAPARE - Transportation     Lack of Transportation (Medical): No     Lack of Transportation (Non-Medical): No   Physical Activity: Not on file   Stress: Not on file   Social Connections: Not on file   Intimate Partner Violence: Not on file   Housing Stability: Not on file       Current Outpatient Medications   Medication Sig Dispense Refill    ascorbic acid (VITAMIN C) 500 mg tablet Take by mouth      cyproheptadine (PERIACTIN) 4 mg tablet Take 0.5 tablets (2 mg total) by mouth 4 (four) times a day 60 tablet 0    Digestive Enzyme CAPS Take by mouth      diltiazem (CARDIZEM CD) 240 mg 24 hr capsule Take 1 capsule (240 mg total) by mouth daily May give patient  the stock bottle 90 capsule 1    lactase (LACTAID) 3,000 units tablet Take by mouth      Minoxidil 5 % FOAM Apply topically      Multiple Vitamins-Minerals (CENTRUM SILVER ULTRA WOMENS PO) Take by mouth      omeprazole (PriLOSEC) 40 MG capsule Take 1 capsule (40 mg total) by mouth daily 30 capsule 2    polyethylene glycol-propylene glycol (SYSTANE) 0.4-0.3 %       Premarin vaginal cream Insert 1 g into the vagina once a week Brand Necessary 30 g 1    sucralfate (CARAFATE) 1 g tablet       Alpha-D-Galactosidase (BEANO) TABS Take by mouth (Patient not taking: Reported on 4/22/2024)      cholestyramine (QUESTRAN) 4 g packet Take 1 packet by mouth 3 (three) times a day with meals (Patient not taking: Reported on 5/9/2024)      Diclofenac Sodium (VOLTAREN) 1 % Apply 2 g topically 4 (four) times a day (Patient not taking: Reported on 4/22/2024) 240 g 1    esomeprazole (NexIUM) 40 MG capsule Take 1 capsule (40 mg total) by mouth every morning 30 capsule 1    famotidine (PEPCID) 10 mg tablet Take 10 mg by mouth 2 (two) times a day (Patient not taking: Reported on 4/22/2024)       No current facility-administered medications for this visit.       Review of Systems     As noted  "in HPI    Objective      /82 (BP Location: Left arm, Patient Position: Sitting, Cuff Size: Standard)   Pulse 92   Temp 97.7 °F (36.5 °C) (Temporal)   Resp 18   Wt 40.8 kg (90 lb)   LMP  (LMP Unknown)   SpO2 99%   BMI 16.46 kg/m²     Physical Exam  Vitals reviewed.   Constitutional:       General: She is not in acute distress.     Appearance: Normal appearance. She is not ill-appearing.      Comments: Body mass index is 16.46 kg/m².     HENT:      Head: Normocephalic and atraumatic.      Mouth/Throat:      Mouth: Mucous membranes are moist.      Pharynx: Oropharynx is clear.   Eyes:      Extraocular Movements: Extraocular movements intact.      Conjunctiva/sclera: Conjunctivae normal.      Pupils: Pupils are equal, round, and reactive to light.   Neck:      Thyroid: No thyroid tenderness.   Cardiovascular:      Rate and Rhythm: Normal rate and regular rhythm.      Pulses: Normal pulses.      Heart sounds: Normal heart sounds.   Pulmonary:      Effort: Pulmonary effort is normal.      Breath sounds: Normal breath sounds.   Abdominal:      General: Abdomen is flat. Bowel sounds are increased.      Palpations: Abdomen is soft.      Tenderness: There is abdominal tenderness in the epigastric area. There is no guarding or rebound.   Musculoskeletal:      Cervical back: Neck supple.      Right lower leg: No edema.      Left lower leg: No edema.   Skin:     General: Skin is warm and dry.      Capillary Refill: Capillary refill takes less than 2 seconds.   Neurological:      Mental Status: She is alert and oriented to person, place, and time.   Psychiatric:         Mood and Affect: Mood normal.         Behavior: Behavior normal.         Thought Content: Thought content normal.             Some portions of this record may have been generated with voice recognition software. There may be translation, syntax, or grammatical errors. Occasional wrong word or \"sound-a-like\" substitutions may have occurred due to the " inherent limitations of the voice recognition software. Read the chart carefully and recognize, using context, where substations may have occurred. If you have any questions, please contact the dictating provider for clarification or correction, as needed.

## 2024-06-03 NOTE — TELEPHONE ENCOUNTER
Patient forgot to mention at OV today that she requires mew order for medication. Previous order from historical provider has .

## 2024-06-04 LAB — RHEUMATOID FACT SER QL LA: NEGATIVE

## 2024-06-04 RX ORDER — SUCRALFATE 1 G/1
1 TABLET ORAL 3 TIMES DAILY PRN
Qty: 90 TABLET | Refills: 2 | Status: SHIPPED | OUTPATIENT
Start: 2024-06-04

## 2024-06-04 NOTE — TELEPHONE ENCOUNTER
Patient called in to follow up on refill request for   sucralfate (CARAFATE) 1 g tablet  that she forgot to ask for at OV 6/3/24. Please follow up with patient for provider response.

## 2024-06-05 ENCOUNTER — CONSULT (OUTPATIENT)
Dept: PULMONOLOGY | Facility: CLINIC | Age: 79
End: 2024-06-05
Payer: MEDICARE

## 2024-06-05 VITALS
RESPIRATION RATE: 16 BRPM | TEMPERATURE: 97.8 F | DIASTOLIC BLOOD PRESSURE: 62 MMHG | HEIGHT: 62 IN | OXYGEN SATURATION: 99 % | WEIGHT: 90 LBS | HEART RATE: 80 BPM | SYSTOLIC BLOOD PRESSURE: 98 MMHG | BODY MASS INDEX: 16.56 KG/M2

## 2024-06-05 DIAGNOSIS — R09.89 HYPERINFLATION OF LUNGS: ICD-10-CM

## 2024-06-05 DIAGNOSIS — R06.02 SOB (SHORTNESS OF BREATH): Primary | ICD-10-CM

## 2024-06-05 DIAGNOSIS — R06.89 BREATHING DIFFICULTY: ICD-10-CM

## 2024-06-05 LAB — ANA TITR SER IF: NEGATIVE {TITER}

## 2024-06-05 PROCEDURE — 99204 OFFICE O/P NEW MOD 45 MIN: CPT | Performed by: INTERNAL MEDICINE

## 2024-06-05 NOTE — PROGRESS NOTES
"Pulmonary Outpatient Consultation Note   Brigid Brown 79 y.o. female MRN: [id number]  6/5/2024    Referring provider: Delicia Cintron Do  [address] [place] [id number]     Assessment/Plan:      SOB (shortness of breath)  I do not see an obvious explanation to explain her symptoms of inability to take a deep breath based on the evaluation thus far.  Chest x-ray is clear.  Lungs are clear and saturations are 99% on room air.  She will undergo complete PFTs to assess whether there are any abnormalities that would warrant further treatment or testing from pulmonary perspective.  I suspect there are subdiaphragmatic issues, along with generalized deconditioning, that are playing a part in the symptoms.  She will continue to follow closely with family practice and GI.    I will call with PFT results to discuss next steps from a pulmonary perspective.    Visit orders:    Diagnoses and all orders for this visit:    SOB (shortness of breath)  -     Complete PFT with post bronchodilator; Future    Breathing difficulty  -     Ambulatory Referral to Pulmonology    Hyperinflation of lungs  -     Ambulatory Referral to Pulmonology        History of Present Illness   HPI:  Brigid Brown is a 79 y.o. female who was referred for evaluation regarding shortness of breath.  Patient describes her difficulty as being \"hard to take a deep breath\".  She feels that there is something in her right lower quadrant that is limiting her from taking a deep breath.  The symptoms are sporadic in nature and not always associated with activity.  She is undergoing evaluation from a GI perspective due to gastritis and unexplained weight loss.  She has no personal history of lung disease including asthma or emphysema.  She is a lifelong non-smoker.  She has no associated cough, wheeze or sputum production.  She has generalized weakness and fatigue.  She lost over 15 pounds due to poor appetite.  She has no lower extremity " edema.    Review of Systems otherwise negative    Historical Information   Past Medical History:   Diagnosis Date    Acne     Basal cell carcinoma 06/08/2020    right lower forehead    BCC (basal cell carcinoma of skin) 03/09/2020    mid forehead    Benign neoplasm of skin     Disease of thyroid gland 2006    GERD (gastroesophageal reflux disease)     Hypertension     Inflamed seborrheic keratosis     Irritable bowel syndrome     Malignant neoplasm of skin of face     Migraines     Nonmelanoma skin cancer     Last Assessed:6/27/17    Squamous cell skin cancer 06/08/2020    In situ, left lower forehead    Tachycardia     Telogen effluvium     Temporomandibular joint disorder     Vertigo      Past Surgical History:   Procedure Laterality Date    ADENOIDECTOMY      APPENDECTOMY      CHOLECYSTECTOMY      COLONOSCOPY  05/03/2019    COMPLEX WOUND CLOSURE TO EXTREMITY N/A 7/28/2020    Procedure: COMPLEX CLOSURE MID FOREHEAD;  Surgeon: Randy Frank MD;  [facility];  Service: Plastics    ESOPHAGOGASTRODUODENOSCOPY  2009    FLAP LOCAL HEAD / NECK N/A 7/28/2020    Procedure: FLAP MID FOREHEAD;  Surgeon: Randy Frank MD;  [facility];  Service: Plastics    HYSTERECTOMY  1987    MALIGNANT SKIN LESION EXCISION      Excision of Lesion Face Malignant-9/14/2004 BCC Forehead    MOHS RECONSTRUCTION N/A 7/28/2020    Procedure: RECONSTRUCTION MOHS DEFECT MID FOREHEAD;  Surgeon: Randy Frank MD;  [facility];  Service: Plastics    MOHS SURGERY  07/27/2020    Right &left lower forehead, mid forehead    OOPHORECTOMY Bilateral 1987    ROTATOR CUFF REPAIR Right     SKIN BIOPSY      TONSILLECTOMY      TUBAL LIGATION  1976?     Family History   Problem Relation Age of Onset    Hypertension Mother         Mother    Osteoporosis Mother     Skin cancer Mother     Migraines Mother     Dementia Mother     Hypertension Father         Father    Skin cancer Father     Dementia Father     Skin  cancer Sister 73    Migraines Sister     No Known Problems Daughter     Uterine cancer Maternal Grandmother 29    Diabetes type II Maternal Grandfather     Cancer Paternal Grandmother     Uterine cancer Paternal Grandmother 65    No Known Problems Maternal Aunt     Cancer Paternal Aunt         Breast    Uterine cancer Paternal Aunt 55    No Known Problems Paternal Aunt     No Known Problems Paternal Aunt        Social History     Tobacco Use   Smoking Status Never   Smokeless Tobacco Never       Meds/Allergies     Current Outpatient Medications:     ascorbic acid (VITAMIN C) 500 mg tablet, Take by mouth, Disp: , Rfl:     cyproheptadine (PERIACTIN) 4 mg tablet, Take 0.5 tablets (2 mg total) by mouth 4 (four) times a day, Disp: 60 tablet, Rfl: 0    Digestive Enzyme CAPS, Take by mouth, Disp: , Rfl:     diltiazem (CARDIZEM CD) 240 mg 24 hr capsule, Take 1 capsule (240 mg total) by mouth daily May give patient  the stock bottle, Disp: 90 capsule, Rfl: 1    lactase (LACTAID) 3,000 units tablet, Take by mouth, Disp: , Rfl:     Minoxidil 5 % FOAM, Apply topically, Disp: , Rfl:     Multiple Vitamins-Minerals (CENTRUM SILVER ULTRA WOMENS PO), Take by mouth, Disp: , Rfl:     omeprazole (PriLOSEC) 40 MG capsule, Take 1 capsule (40 mg total) by mouth daily, Disp: 30 capsule, Rfl: 2    polyethylene glycol-propylene glycol (SYSTANE) 0.4-0.3 %, , Disp: , Rfl:     Premarin vaginal cream, Insert 1 g into the vagina once a week Brand Necessary, Disp: 30 g, Rfl: 1    sucralfate (CARAFATE) 1 g tablet, Take 1 tablet (1 g total) by mouth 3 (three) times a day as needed (upper abdominal discomfort), Disp: 90 tablet, Rfl: 2    Alpha-D-Galactosidase (BEANO) TABS, Take by mouth (Patient not taking: Reported on 4/22/2024), Disp: , Rfl:     cholestyramine (QUESTRAN) 4 g packet, Take 1 packet by mouth 3 (three) times a day with meals (Patient not taking: Reported on 5/9/2024), Disp: , Rfl:     Diclofenac Sodium (VOLTAREN) 1 %, Apply 2 g  "topically 4 (four) times a day (Patient not taking: Reported on 4/22/2024), Disp: 240 g, Rfl: 1    esomeprazole (NexIUM) 40 MG capsule, Take 1 capsule (40 mg total) by mouth every morning, Disp: 30 capsule, Rfl: 1    famotidine (PEPCID) 10 mg tablet, Take 10 mg by mouth 2 (two) times a day (Patient not taking: Reported on 4/22/2024), Disp: , Rfl:   Allergies   Allergen Reactions    Cortisone Hypertension, Other (See Comments), Shortness Of Breath and Tachycardia    Acebutolol     Acetaminophen GI Intolerance     diarrhea    Amlodipine     Atenolol     Azithromycin     Bisphosphonates      Annotation - [id number]: iriitis    Candesartan     Clarithromycin     Erythromycin     Fosinopril     Irbesartan     Levofloxacin     Losartan     Methylprednisolone Hypertension and Tachycardia    Metoprolol     Nisoldipine     Olmesartan     Propranolol     Sulfamethoxazole-Trimethoprim Nausea Only    Timolol     Lidocaine Palpitations and Tachycardia       Vitals: Blood pressure 98/62, pulse 80, temperature 97.8 °F (36.6 °C), temperature source Tympanic, resp. rate 16, height 5' 2\" (1.575 m), weight 40.8 kg (90 lb), SpO2 99%, not currently breastfeeding., Body mass index is 16.46 kg/m². Oxygen Therapy  SpO2: 99 %  Oxygen Therapy: None (Room air)    Physical Exam   Physical Exam  Vitals reviewed.   Constitutional:       General: She is not in acute distress.     Appearance: She is well-developed. She is ill-appearing.      Comments: Cachectic   Eyes:      General: No scleral icterus.  Neck:      Vascular: No JVD.   Cardiovascular:      Rate and Rhythm: Normal rate and regular rhythm.   Pulmonary:      Breath sounds: No wheezing, rhonchi or rales.   Musculoskeletal:         General: No swelling.   Skin:     General: Skin is warm and dry.   Neurological:      Mental Status: She is alert and oriented to person, place, and time.         Labs: I have personally reviewed pertinent lab results.  Lab Results   Component Value Date    WBC " "6.09 06/03/2024    HGB 13.8 06/03/2024    HCT 41.8 06/03/2024    MCV 87 06/03/2024     06/03/2024     Lab Results   Component Value Date    CALCIUM 9.4 05/09/2024    K 3.8 05/09/2024    CO2 30 05/09/2024    CL 91 (L) 05/09/2024    BUN 12 05/09/2024    CREATININE 0.85 05/09/2024     No results found for: \"IGE\"  Lab Results   Component Value Date    ALT 18 05/09/2024    AST 24 05/09/2024    ALKPHOS 60 05/09/2024     Imaging and other studies: I have personally reviewed pertinent reports.   and I have personally reviewed pertinent films in PACS  Chest x-ray from 924 shows clear lungs  "

## 2024-06-05 NOTE — ASSESSMENT & PLAN NOTE
I do not see an obvious explanation to explain her symptoms of inability to take a deep breath based on the evaluation thus far.  Chest x-ray is clear.  Lungs are clear and saturations are 99% on room air.  She will undergo complete PFTs to assess whether there are any abnormalities that would warrant further treatment or testing from pulmonary perspective.  I suspect there are subdiaphragmatic issues, along with generalized deconditioning, that are playing a part in the symptoms.  She will continue to follow closely with family practice and GI.

## 2024-06-06 ENCOUNTER — HOSPITAL ENCOUNTER (OUTPATIENT)
Dept: RADIOLOGY | Facility: HOSPITAL | Age: 79
Discharge: HOME | End: 2024-06-06
Attending: INTERNAL MEDICINE
Payer: MEDICARE

## 2024-06-06 DIAGNOSIS — R10.84 GENERALIZED ABDOMINAL PAIN: ICD-10-CM

## 2024-06-06 DIAGNOSIS — R63.4 WEIGHT LOSS, ABNORMAL: ICD-10-CM

## 2024-06-06 PROCEDURE — 74248 X-RAY SM INT F-THRU STD: CPT

## 2024-06-06 PROCEDURE — 74246 X-RAY XM UPR GI TRC 2CNTRST: CPT

## 2024-06-07 ENCOUNTER — NURSE TRIAGE (OUTPATIENT)
Age: 79
End: 2024-06-07

## 2024-06-07 NOTE — TELEPHONE ENCOUNTER
If diarrhea occurs again can take Imodium (loperamide) 2 tablets by mouth x 1 then 1 tablet thereafter if another loose stool. (Maximum 4 tablets a day)

## 2024-06-07 NOTE — NURSING NOTE
Received a call from patient who reports few episodes of watery, white colored diarrhea including an accident in the middle of the night after her fluoro upper GI/small bowel with air yesterday 6/6. Also reports stomach ache. Recommended patient stay hydrated and to contact her doctor if her symptoms persist today. Also discussed with Radiologist MD Lozada. Per MD Lozada, symptoms should pass with hydration and that avoiding barium in the future if symptoms are very bothersome is okay, but it is not a reaction that would absolutely preclude barium in the future. Call back placed to patient to make aware. Again reminded to contact her PCP if symptoms do not improve. Patient verbalizes understanding. No  further questions at this time.

## 2024-06-07 NOTE — TELEPHONE ENCOUNTER
"Pt had an upper GI series yesterday. Pt states that she started to have diarrhea last evening and overnight. Pt has not have any episodes since 4am today. Pt states she is starting to feel better, but wanting advice as to what to take otc if diarrhea occurs again. Education provided.     Reason for Disposition   MILD-MODERATE diarrhea (e.g., 1-6 times / day more than normal)    Answer Assessment - Initial Assessment Questions  1. DIARRHEA SEVERITY: \"How bad is the diarrhea?\" \"How many extra stools have you had in the past 24 hours than normal?\"     - NO DIARRHEA (SCALE 0)    - MILD (SCALE 1-3): Few loose or mushy BMs; increase of 1-3 stools over normal daily number of stools; mild increase in ostomy output.    -  MODERATE (SCALE 4-7): Increase of 4-6 stools daily over normal; moderate increase in ostomy output.  * SEVERE (SCALE 8-10; OR 'WORST POSSIBLE'): Increase of 7 or more stools daily over normal; moderate increase in ostomy output; incontinence.      Mild  2. ONSET: \"When did the diarrhea begin?\"       Yesterday  3. BM CONSISTENCY: \"How loose or watery is the diarrhea?\"       Loose  4. VOMITING: \"Are you also vomiting?\" If Yes, ask: \"How many times in the past 24 hours?\"       Denies  5. ABDOMINAL PAIN: \"Are you having any abdominal pain?\" If Yes, ask: \"What does it feel like?\" (e.g., crampy, dull, intermittent, constant)       Crampy  6. ABDOMINAL PAIN SEVERITY: If present, ask: \"How bad is the pain?\"  (e.g., Scale 1-10; mild, moderate, or severe)    - MILD (1-3): doesn't interfere with normal activities, abdomen soft and not tender to touch     - MODERATE (4-7): interferes with normal activities or awakens from sleep, tender to touch     - SEVERE (8-10): excruciating pain, doubled over, unable to do any normal activities        Mild  7. ORAL INTAKE: If vomiting, \"Have you been able to drink liquids?\" \"How much fluids have you had in the past 24 hours?\"      Yes  8. HYDRATION: \"Any signs of dehydration?\" " "(e.g., dry mouth [not just dry lips], too weak to stand, dizziness, new weight loss) \"When did you last urinate?\"      Denies  9. EXPOSURE: \"Have you traveled to a foreign country recently?\" \"Have you been exposed to anyone with diarrhea?\" \"Could you have eaten any food that was spoiled?\"      Denies  10. ANTIBIOTIC USE: \"Are you taking antibiotics now or have you taken antibiotics in the past 2 months?\"        NO  11. OTHER SYMPTOMS: \"Do you have any other symptoms?\" (e.g., fever, blood in stool)        No  12. PREGNANCY: \"Is there any chance you are pregnant?\" \"When was your last menstrual period?\"        NA    Protocols used: Diarrhea-ADULT-[place]    "

## 2024-06-10 ENCOUNTER — APPOINTMENT (EMERGENCY)
Dept: CT IMAGING | Facility: HOSPITAL | Age: 79
End: 2024-06-10
Payer: MEDICARE

## 2024-06-10 ENCOUNTER — HOSPITAL ENCOUNTER (EMERGENCY)
Facility: HOSPITAL | Age: 79
Discharge: HOME | End: 2024-06-10
Attending: EMERGENCY MEDICINE
Payer: MEDICARE

## 2024-06-10 VITALS
SYSTOLIC BLOOD PRESSURE: 159 MMHG | OXYGEN SATURATION: 97 % | HEART RATE: 67 BPM | RESPIRATION RATE: 18 BRPM | DIASTOLIC BLOOD PRESSURE: 69 MMHG | TEMPERATURE: 97.5 F

## 2024-06-10 DIAGNOSIS — R19.5 STOOL DISCOLORATION: Primary | ICD-10-CM

## 2024-06-10 DIAGNOSIS — N89.8 LEUKORRHEA: ICD-10-CM

## 2024-06-10 DIAGNOSIS — Z87.898 HISTORY OF DIARRHEA: ICD-10-CM

## 2024-06-10 DIAGNOSIS — R63.4 WEIGHT LOSS: ICD-10-CM

## 2024-06-10 DIAGNOSIS — R62.7 FAILURE TO THRIVE IN ADULT: ICD-10-CM

## 2024-06-10 DIAGNOSIS — R10.9 ABDOMINAL PAIN: ICD-10-CM

## 2024-06-10 DIAGNOSIS — E87.6 HYPOKALEMIA: Primary | ICD-10-CM

## 2024-06-10 DIAGNOSIS — E46 MALNOURISHED (HCC): ICD-10-CM

## 2024-06-10 LAB
ALBUMIN SERPL BCP-MCNC: 4 G/DL (ref 3.5–5)
ALP SERPL-CCNC: 48 U/L (ref 34–104)
ALT SERPL W P-5'-P-CCNC: 17 U/L (ref 7–52)
ANION GAP SERPL CALCULATED.3IONS-SCNC: 8 MMOL/L (ref 4–13)
APTT PPP: 37 SECONDS (ref 23–37)
AST SERPL W P-5'-P-CCNC: 18 U/L (ref 13–39)
BACTERIA UR QL AUTO: ABNORMAL /HPF
BASOPHILS # BLD AUTO: 0.03 THOUSANDS/ÂΜL (ref 0–0.1)
BASOPHILS NFR BLD AUTO: 1 % (ref 0–1)
BILIRUB SERPL-MCNC: 0.67 MG/DL (ref 0.2–1)
BILIRUB UR QL STRIP: NEGATIVE
BUN SERPL-MCNC: 14 MG/DL (ref 5–25)
CALCIUM SERPL-MCNC: 9.2 MG/DL (ref 8.4–10.2)
CHLORIDE SERPL-SCNC: 93 MMOL/L (ref 96–108)
CLARITY UR: ABNORMAL
CO2 SERPL-SCNC: 34 MMOL/L (ref 21–32)
COLOR UR: COLORLESS
CREAT SERPL-MCNC: 0.83 MG/DL (ref 0.6–1.3)
EOSINOPHIL # BLD AUTO: 0.03 THOUSAND/ÂΜL (ref 0–0.61)
EOSINOPHIL NFR BLD AUTO: 1 % (ref 0–6)
ERYTHROCYTE [DISTWIDTH] IN BLOOD BY AUTOMATED COUNT: 13.2 % (ref 11.6–15.1)
GFR SERPL CREATININE-BSD FRML MDRD: 66 ML/MIN/1.73SQ M
GLUCOSE SERPL-MCNC: 97 MG/DL (ref 65–140)
GLUCOSE UR STRIP-MCNC: NEGATIVE MG/DL
HCT VFR BLD AUTO: 40.2 % (ref 34.8–46.1)
HGB BLD-MCNC: 13 G/DL (ref 11.5–15.4)
HGB UR QL STRIP.AUTO: ABNORMAL
IMM GRANULOCYTES # BLD AUTO: 0.01 THOUSAND/UL (ref 0–0.2)
IMM GRANULOCYTES NFR BLD AUTO: 0 % (ref 0–2)
INR PPP: 0.96 (ref 0.84–1.19)
KETONES UR STRIP-MCNC: ABNORMAL MG/DL
LEUKOCYTE ESTERASE UR QL STRIP: ABNORMAL
LYMPHOCYTES # BLD AUTO: 1.37 THOUSANDS/ÂΜL (ref 0.6–4.47)
LYMPHOCYTES NFR BLD AUTO: 26 % (ref 14–44)
MAGNESIUM SERPL-MCNC: 1.8 MG/DL (ref 1.9–2.7)
MCH RBC QN AUTO: 29 PG (ref 26.8–34.3)
MCHC RBC AUTO-ENTMCNC: 32.3 G/DL (ref 31.4–37.4)
MCV RBC AUTO: 90 FL (ref 82–98)
MONOCYTES # BLD AUTO: 0.51 THOUSAND/ÂΜL (ref 0.17–1.22)
MONOCYTES NFR BLD AUTO: 10 % (ref 4–12)
NEUTROPHILS # BLD AUTO: 3.4 THOUSANDS/ÂΜL (ref 1.85–7.62)
NEUTS SEG NFR BLD AUTO: 62 % (ref 43–75)
NITRITE UR QL STRIP: NEGATIVE
NON-SQ EPI CELLS URNS QL MICRO: ABNORMAL /HPF
NRBC BLD AUTO-RTO: 0 /100 WBCS
PH UR STRIP.AUTO: 5.5 [PH]
PLATELET # BLD AUTO: 268 THOUSANDS/UL (ref 149–390)
PMV BLD AUTO: 9.1 FL (ref 8.9–12.7)
POTASSIUM SERPL-SCNC: 2.9 MMOL/L (ref 3.5–5.3)
PROT SERPL-MCNC: 6.6 G/DL (ref 6.4–8.4)
PROT UR STRIP-MCNC: NEGATIVE MG/DL
PROTHROMBIN TIME: 13.4 SECONDS (ref 11.6–14.5)
RBC # BLD AUTO: 4.49 MILLION/UL (ref 3.81–5.12)
RBC #/AREA URNS AUTO: ABNORMAL /HPF
SODIUM SERPL-SCNC: 135 MMOL/L (ref 135–147)
SP GR UR STRIP.AUTO: 1 (ref 1–1.03)
TRANS CELLS #/AREA URNS HPF: PRESENT /[HPF]
TSH SERPL DL<=0.05 MIU/L-ACNC: 1.75 UIU/ML (ref 0.45–4.5)
UROBILINOGEN UR STRIP-ACNC: <2 MG/DL
WBC # BLD AUTO: 5.35 THOUSAND/UL (ref 4.31–10.16)
WBC #/AREA URNS AUTO: ABNORMAL /HPF
WBC CLUMPS # UR AUTO: PRESENT /UL

## 2024-06-10 PROCEDURE — 96368 THER/DIAG CONCURRENT INF: CPT

## 2024-06-10 PROCEDURE — 83735 ASSAY OF MAGNESIUM: CPT | Performed by: EMERGENCY MEDICINE

## 2024-06-10 PROCEDURE — 71260 CT THORAX DX C+: CPT

## 2024-06-10 PROCEDURE — 96365 THER/PROPH/DIAG IV INF INIT: CPT

## 2024-06-10 PROCEDURE — 36415 COLL VENOUS BLD VENIPUNCTURE: CPT | Performed by: PHYSICIAN ASSISTANT

## 2024-06-10 PROCEDURE — 99284 EMERGENCY DEPT VISIT MOD MDM: CPT

## 2024-06-10 PROCEDURE — 74177 CT ABD & PELVIS W/CONTRAST: CPT

## 2024-06-10 PROCEDURE — 84443 ASSAY THYROID STIM HORMONE: CPT | Performed by: PHYSICIAN ASSISTANT

## 2024-06-10 PROCEDURE — 85610 PROTHROMBIN TIME: CPT | Performed by: PHYSICIAN ASSISTANT

## 2024-06-10 PROCEDURE — 93005 ELECTROCARDIOGRAM TRACING: CPT

## 2024-06-10 PROCEDURE — 96366 THER/PROPH/DIAG IV INF ADDON: CPT

## 2024-06-10 PROCEDURE — 81001 URINALYSIS AUTO W/SCOPE: CPT | Performed by: PHYSICIAN ASSISTANT

## 2024-06-10 PROCEDURE — 99285 EMERGENCY DEPT VISIT HI MDM: CPT | Performed by: PHYSICIAN ASSISTANT

## 2024-06-10 PROCEDURE — 85025 COMPLETE CBC W/AUTO DIFF WBC: CPT | Performed by: PHYSICIAN ASSISTANT

## 2024-06-10 PROCEDURE — 85730 THROMBOPLASTIN TIME PARTIAL: CPT | Performed by: PHYSICIAN ASSISTANT

## 2024-06-10 PROCEDURE — 80053 COMPREHEN METABOLIC PANEL: CPT | Performed by: PHYSICIAN ASSISTANT

## 2024-06-10 PROCEDURE — 96361 HYDRATE IV INFUSION ADD-ON: CPT

## 2024-06-10 PROCEDURE — 87086 URINE CULTURE/COLONY COUNT: CPT | Performed by: PHYSICIAN ASSISTANT

## 2024-06-10 RX ORDER — POTASSIUM CHLORIDE 14.9 MG/ML
20 INJECTION INTRAVENOUS ONCE
Status: COMPLETED | OUTPATIENT
Start: 2024-06-10 | End: 2024-06-10

## 2024-06-10 RX ORDER — CEPHALEXIN 250 MG/1
500 CAPSULE ORAL ONCE
Status: COMPLETED | OUTPATIENT
Start: 2024-06-10 | End: 2024-06-10

## 2024-06-10 RX ORDER — POTASSIUM CHLORIDE 20 MEQ/1
40 TABLET, EXTENDED RELEASE ORAL ONCE
Status: COMPLETED | OUTPATIENT
Start: 2024-06-10 | End: 2024-06-10

## 2024-06-10 RX ORDER — CEPHALEXIN 500 MG/1
500 CAPSULE ORAL 4 TIMES DAILY
Qty: 20 CAPSULE | Refills: 0 | Status: SHIPPED | OUTPATIENT
Start: 2024-06-10 | End: 2024-06-15

## 2024-06-10 RX ORDER — MAGNESIUM SULFATE 1 G/100ML
1 INJECTION INTRAVENOUS ONCE
Status: COMPLETED | OUTPATIENT
Start: 2024-06-10 | End: 2024-06-10

## 2024-06-10 RX ADMIN — SODIUM CHLORIDE 1000 ML: 0.9 INJECTION, SOLUTION INTRAVENOUS at 17:58

## 2024-06-10 RX ADMIN — IOHEXOL 100 ML: 350 INJECTION, SOLUTION INTRAVENOUS at 19:16

## 2024-06-10 RX ADMIN — MAGNESIUM SULFATE HEPTAHYDRATE 1 G: 1 INJECTION, SOLUTION INTRAVENOUS at 21:04

## 2024-06-10 RX ADMIN — CEPHALEXIN 500 MG: 250 CAPSULE ORAL at 19:48

## 2024-06-10 RX ADMIN — POTASSIUM CHLORIDE 20 MEQ: 14.9 INJECTION, SOLUTION INTRAVENOUS at 19:47

## 2024-06-10 RX ADMIN — POTASSIUM CHLORIDE 40 MEQ: 1500 TABLET, EXTENDED RELEASE ORAL at 19:23

## 2024-06-10 NOTE — TELEPHONE ENCOUNTER
Patient called back and wanted to let Dr. Quinteros know that her actual weight today is, 86.5 lbs.

## 2024-06-10 NOTE — ED PROVIDER NOTES
History  Chief Complaint   Patient presents with    Dehydration     Sent by family provider for possible dehydration. 10pound weight loss in last 3 weeks. Denies n/v/d.      This 79-year-old female presents emergency room with complaints of weight loss.  She states she has not had much of an appetite but has been eating.  She states that she has had a 16 pound weight loss since the end of February.  She complains of a sharp pain in her right side of her abdomen.  She denies any coughing, runny nose, postnasal drip, sinus congestion or tenderness.  She denies any sore throat.  She denies any coughing.  She complains of shortness of breath that is present with exertion.  It is immediately relieved with rest.  She denies any chest pain.  She denies urinary frequency, urgency, hematuria, dysuria.  She complains of sharp pain in her right mid abdomen.  She denies any black tarry stools or bright red blood per rectum.  She said that she has had difficulty swallowing and has a swallow on the left side of her mouth.  She states otherwise the food will go down.  She had a barium swallow done that was ordered by her physician.  She states that following that test she has had diarrhea stools up to presently she has semiformed stools that are yellow.  Her upper GI with small bowel follow-through demonstrated mild esophageal reflux disease.  She had tertiary contractions and her contrast reached the as colon within 30 minutes.  It was interpreted as normal with the exception of the reflux.  Patient's had a previous cholecystectomy she states that she is up-to-date for her mammograms.  She denies any breast complaints or skin changes on both breasts.  Patient had her most recent colonoscopy in April 2024.  She was diagnosed with diverticulosis.      History provided by:  Patient  Abdominal Pain  Pain location: Right mid abdomen.  Pain quality: burning and sharp    Pain radiates to:  Does not radiate  Pain severity:  Mild  Onset  quality:  Gradual  Duration: 2 months.  Timing:  Intermittent  Progression:  Unchanged  Chronicity:  New  Context: previous surgery    Context: not alcohol use, not awakening from sleep, not diet changes, not eating, not laxative use, not medication withdrawal, not recent illness, not recent sexual activity, not recent travel, not retching, not sick contacts, not suspicious food intake and not trauma    Relieved by:  Nothing  Worsened by:  Nothing  Ineffective treatments:  None tried  Associated symptoms: anorexia and flatus    Associated symptoms: no belching, no chest pain, no chills, no constipation, no cough, no diarrhea, no dysuria, no fatigue, no fever, no hematemesis, no hematochezia, no hematuria, no melena, no nausea, no shortness of breath, no sore throat and no vomiting        Prior to Admission Medications   Prescriptions Last Dose Informant Patient Reported? Taking?   Alpha-D-Galactosidase (BEANO) TABS  Self Yes No   Sig: Take by mouth   Patient not taking: Reported on 4/22/2024   Diclofenac Sodium (VOLTAREN) 1 %   No No   Sig: Apply 2 g topically 4 (four) times a day   Patient not taking: Reported on 4/22/2024   Digestive Enzyme CAPS  Self Yes No   Sig: Take by mouth   Minoxidil 5 % FOAM  Self Yes No   Sig: Apply topically   Multiple Vitamins-Minerals (CENTRUM SILVER ULTRA WOMENS PO)  Self Yes No   Sig: Take by mouth   Premarin vaginal cream  Self No No   Sig: Insert 1 g into the vagina once a week Brand Necessary   ascorbic acid (VITAMIN C) 500 mg tablet  Self Yes No   Sig: Take by mouth   cholestyramine (QUESTRAN) 4 g packet  Self Yes No   Sig: Take 1 packet by mouth 3 (three) times a day with meals   Patient not taking: Reported on 5/9/2024   cyproheptadine (PERIACTIN) 4 mg tablet  Self No No   Sig: Take 0.5 tablets (2 mg total) by mouth 4 (four) times a day   diltiazem (CARDIZEM CD) 240 mg 24 hr capsule  Self No No   Sig: Take 1 capsule (240 mg total) by mouth daily May give patient  the stock  bottle   esomeprazole (NexIUM) 40 MG capsule  Self No No   Sig: Take 1 capsule (40 mg total) by mouth every morning   famotidine (PEPCID) 10 mg tablet  Self Yes No   Sig: Take 10 mg by mouth 2 (two) times a day   Patient not taking: Reported on 4/22/2024   lactase (LACTAID) 3,000 units tablet  Self Yes No   Sig: Take by mouth   omeprazole (PriLOSEC) 40 MG capsule  Self No No   Sig: Take 1 capsule (40 mg total) by mouth daily   polyethylene glycol-propylene glycol (SYSTANE) 0.4-0.3 %  Self Yes No   sucralfate (CARAFATE) 1 g tablet  Self No No   Sig: Take 1 tablet (1 g total) by mouth 3 (three) times a day as needed (upper abdominal discomfort)      Facility-Administered Medications: None       Past Medical History:   Diagnosis Date    Acne     Basal cell carcinoma 06/08/2020    right lower forehead    BCC (basal cell carcinoma of skin) 03/09/2020    mid forehead    Benign neoplasm of skin     Disease of thyroid gland 2006    GERD (gastroesophageal reflux disease)     Hypertension     Inflamed seborrheic keratosis     Irritable bowel syndrome     Malignant neoplasm of skin of face     Migraines     Nonmelanoma skin cancer     Last Assessed:6/27/17    Squamous cell skin cancer 06/08/2020    In situ, left lower forehead    Tachycardia     Telogen effluvium     Temporomandibular joint disorder     Vertigo        Past Surgical History:   Procedure Laterality Date    ADENOIDECTOMY      APPENDECTOMY      CHOLECYSTECTOMY      COLONOSCOPY  05/03/2019    COMPLEX WOUND CLOSURE TO EXTREMITY N/A 7/28/2020    Procedure: COMPLEX CLOSURE MID FOREHEAD;  Surgeon: Randy Frank MD;  Location: [facility];  Service: Plastics    ESOPHAGOGASTRODUODENOSCOPY  2009    FLAP LOCAL HEAD / NECK N/A 7/28/2020    Procedure: FLAP MID FOREHEAD;  Surgeon: Randy Frank MD;  Location: [facility];  Service: Plastics    HYSTERECTOMY  1987    MALIGNANT SKIN LESION EXCISION      Excision of Lesion Face Malignant-9/14/2004 BCC Forehead     MOHS RECONSTRUCTION N/A 7/28/2020    Procedure: RECONSTRUCTION MOHS DEFECT MID FOREHEAD;  Surgeon: Randy Frank MD;  Location: [facility];  Service: Plastics    MOHS SURGERY  07/27/2020    Right &left lower forehead, mid forehead    OOPHORECTOMY Bilateral 1987    ROTATOR CUFF REPAIR Right     SKIN BIOPSY      TONSILLECTOMY      TUBAL LIGATION  1976?       Family History   Problem Relation Age of Onset    Hypertension Mother         Mother    Osteoporosis Mother     Skin cancer Mother     Migraines Mother     Dementia Mother     Hypertension Father         Father    Skin cancer Father     Dementia Father     Skin cancer Sister 73    Migraines Sister     No Known Problems Daughter     Uterine cancer Maternal Grandmother 29    Diabetes type II Maternal Grandfather     Cancer Paternal Grandmother     Uterine cancer Paternal Grandmother 65    No Known Problems Maternal Aunt     Cancer Paternal Aunt         Breast    Uterine cancer Paternal Aunt 55    No Known Problems Paternal Aunt     No Known Problems Paternal Aunt      I have reviewed and agree with the history as documented.    E-Cigarette/Vaping    E-Cigarette Use Never User      E-Cigarette/Vaping Substances    Nicotine No     THC No     CBD No     Flavoring No     Other No     Unknown No      Social History     Tobacco Use    Smoking status: Never    Smokeless tobacco: Never   Vaping Use    Vaping status: Never Used   Substance Use Topics    Alcohol use: No    Drug use: No       Review of Systems   Constitutional:  Positive for unexpected weight change. Negative for activity change, appetite change, chills, diaphoresis, fatigue and fever.   HENT:  Negative for congestion, dental problem, ear discharge, ear pain, mouth sores, postnasal drip, rhinorrhea, sore throat and trouble swallowing.    Eyes:  Negative for pain, discharge, redness and itching.   Respiratory:  Negative for cough, chest tightness and shortness of breath.    Cardiovascular:  Negative for  chest pain and leg swelling.   Gastrointestinal:  Positive for abdominal pain, anorexia and flatus. Negative for abdominal distention, anal bleeding, blood in stool, constipation, diarrhea, hematemesis, hematochezia, melena, nausea and vomiting.   Genitourinary:  Negative for dysuria, frequency, hematuria and urgency.   Musculoskeletal:  Negative for arthralgias, back pain, gait problem, myalgias, neck pain and neck stiffness.   Skin:  Negative for color change, pallor and rash.   Psychiatric/Behavioral:  Negative for confusion.    All other systems reviewed and are negative.      Physical Exam  Physical Exam  Vitals and nursing note reviewed.   Constitutional:       General: She is not in acute distress.     Appearance: Normal appearance. She is normal weight. She is not ill-appearing, toxic-appearing or diaphoretic.   HENT:      Head: Normocephalic and atraumatic.      Right Ear: External ear normal.      Left Ear: External ear normal.      Nose: No congestion or rhinorrhea.      Mouth/Throat:      Mouth: Mucous membranes are dry.      Pharynx: No oropharyngeal exudate or posterior oropharyngeal erythema.   Eyes:      General:         Right eye: No discharge.         Left eye: No discharge.      Conjunctiva/sclera: Conjunctivae normal.   Cardiovascular:      Rate and Rhythm: Normal rate and regular rhythm.      Heart sounds: Normal heart sounds.   Pulmonary:      Effort: Pulmonary effort is normal.      Breath sounds: Normal breath sounds.   Abdominal:      General: There is no distension.      Tenderness: There is abdominal tenderness. There is guarding. There is no rebound.   Musculoskeletal:         General: No swelling or tenderness.      Cervical back: Neck supple. No rigidity.      Right lower leg: No edema.      Left lower leg: No edema.   Lymphadenopathy:      Cervical: No cervical adenopathy.   Skin:     General: Skin is warm.      Capillary Refill: Capillary refill takes less than 2 seconds.       Findings: No rash.   Neurological:      Mental Status: She is alert and oriented to person, place, and time.   Psychiatric:         Mood and Affect: Mood normal.         Behavior: Behavior normal.         Thought Content: Thought content normal.         Judgment: Judgment normal.         Vital Signs  ED Triage Vitals   Temperature Pulse Respirations Blood Pressure SpO2   06/10/24 1533 06/10/24 1533 06/10/24 1533 06/10/24 1533 06/10/24 1533   97.5 °F (36.4 °C) 79 18 125/62 98 %      Temp Source Heart Rate Source Patient Position - Orthostatic VS BP Location FiO2 (%)   06/10/24 1533 06/10/24 1533 06/10/24 1533 06/10/24 1533 --   Oral Monitor Sitting Right arm       Pain Score       06/10/24 1757       7           Vitals:    06/10/24 1757 06/10/24 2019 06/10/24 2100 06/10/24 2200   BP: 167/72 169/82 162/73 159/69   Pulse: 66 75 73 67   Patient Position - Orthostatic VS:             Visual Acuity      ED Medications  Medications   sodium chloride 0.9 % bolus 1,000 mL (0 mL Intravenous Stopped 6/10/24 1951)   potassium chloride (Klor-Con M20) CR tablet 40 mEq (40 mEq Oral Given 6/10/24 1923)   potassium chloride 20 mEq IVPB (premix) (0 mEq Intravenous Stopped 6/10/24 2313)   cephalexin (KEFLEX) capsule 500 mg (500 mg Oral Given 6/10/24 1948)   iohexol (OMNIPAQUE) 350 MG/ML injection (MULTI-DOSE) 100 mL (100 mL Intravenous Given 6/10/24 1916)   magnesium sulfate IVPB (premix) SOLN 1 g (0 g Intravenous Stopped 6/10/24 2324)       Diagnostic Studies  Results Reviewed       Procedure Component Value Units Date/Time    Urine culture [[id number]] Collected: 06/10/24 1755    Lab Status: Final result Specimen: Urine, Clean Catch Updated: 06/11/24 1743     Urine Culture No Growth <1000 cfu/mL    Magnesium [[id number]]  (Abnormal) Collected: 06/10/24 1755    Lab Status: Final result Specimen: Blood from Arm, Right Updated: 06/10/24 2036     Magnesium 1.8 mg/dL     TSH, 3rd generation with Free T4 reflex [[id number]]  (Normal)  Collected: 06/10/24 1755    Lab Status: Final result Specimen: Blood from Arm, Right Updated: 06/10/24 1906     TSH 3RD GENERATON 1.753 uIU/mL     Urine Microscopic [[id number]]  (Abnormal) Collected: 06/10/24 1755    Lab Status: Final result Specimen: Urine, Clean Catch Updated: 06/10/24 1834     RBC, UA 4-10 /hpf      WBC, UA Innumerable /hpf      Epithelial Cells Occasional /hpf      Bacteria, UA Occasional /hpf      WBC Clumps Present     Transitional Epithelial Cells Present    Comprehensive metabolic panel [[id number]]  (Abnormal) Collected: 06/10/24 1755    Lab Status: Final result Specimen: Blood from Arm, Right Updated: 06/10/24 1829     Sodium 135 mmol/L      Potassium 2.9 mmol/L      Chloride 93 mmol/L      CO2 34 mmol/L      ANION GAP 8 mmol/L      BUN 14 mg/dL      Creatinine 0.83 mg/dL      Glucose 97 mg/dL      Calcium 9.2 mg/dL      AST 18 U/L      ALT 17 U/L      Alkaline Phosphatase 48 U/L      Total Protein 6.6 g/dL      Albumin 4.0 g/dL      Total Bilirubin 0.67 mg/dL      eGFR 66 ml/min/1.73sq m     Narrative:      National Kid[employer] guidelines for Chronic Kidney Disease (CKD):     Stage 1 with normal or high GFR (GFR > 90 mL/min/1.73 square meters)    Stage 2 Mild CKD (GFR = 60-89 mL/min/1.73 square meters)    Stage 3A Moderate CKD (GFR = 45-59 mL/min/1.73 square meters)    Stage 3B Moderate CKD (GFR = 30-44 mL/min/1.73 square meters)    Stage 4 Severe CKD (GFR = 15-29 mL/min/1.73 square meters)    Stage 5 End Stage CKD (GFR <15 mL/min/1.73 square meters)  Note: GFR calculation is accurate only with a steady state creatinine    UA w Reflex to Microscopic w Reflex to Culture [[id number]]  (Abnormal) Collected: 06/10/24 1755    Lab Status: Final result Specimen: Urine, Clean Catch Updated: 06/10/24 1828     Color, UA Colorless     Clarity, UA Turbid     Specific Gravity, UA 1.003     pH, UA 5.5     Leukocytes, UA Large     Nitrite, UA Negative     Protein, UA Negative mg/dl       Glucose, UA Negative mg/dl      Ketones, UA Trace mg/dl      Urobilinogen, UA <2.0 mg/dl      Bilirubin, UA Negative     Occult Blood, UA Small    Protime-INR [[id number]]  (Normal) Collected: 06/10/24 1755    Lab Status: Final result Specimen: Blood from Arm, Right Updated: 06/10/24 1823     Protime 13.4 seconds      INR 0.96    APTT [[id number]]  (Normal) Collected: 06/10/24 1755    Lab Status: Final result Specimen: Blood from Arm, Right Updated: 06/10/24 1823     PTT 37 seconds     CBC and differential [[id number]] Collected: 06/10/24 1755    Lab Status: Final result Specimen: Blood from Arm, Right Updated: 06/10/24 1820     WBC 5.35 Thousand/uL      RBC 4.49 Million/uL      Hemoglobin 13.0 g/dL      Hematocrit 40.2 %      MCV 90 fL      MCH 29.0 pg      MCHC 32.3 g/dL      RDW 13.2 %      MPV 9.1 fL      Platelets 268 Thousands/uL      nRBC 0 /100 WBCs      Segmented % 62 %      Immature Grans % 0 %      Lymphocytes % 26 %      Monocytes % 10 %      Eosinophils Relative 1 %      Basophils Relative 1 %      Absolute Neutrophils 3.40 Thousands/µL      Absolute Immature Grans 0.01 Thousand/uL      Absolute Lymphocytes 1.37 Thousands/µL      Absolute Monocytes 0.51 Thousand/µL      Eosinophils Absolute 0.03 Thousand/µL      Basophils Absolute 0.03 Thousands/µL                    CT chest abdomen pelvis w contrast   Final Result by Shade Ayala MD (06/10 2052)      No acute intra-abdominal abnormality. Trace pelvic free fluid.      Soft tissue prominence of the mesenteric root, especially surrounding the superior mesenteric artery, increased when compared to a CT abdomen/pelvis dated April 9, 2024. These findings are nonspecific with differential considerations including sclerosing    mesenteritis, malignancy/metastatic disease, inflammatory process, lymphoma, among other etiologies. A PET/CT scan is recommended for further evaluation. A short-term follow-up CT abdomen/pelvis in 3 months is also recommended.       Clear lungs.      Stable right hepatic lobe hemangioma.         Workstation performed: [id number]                    Procedures  Procedures         ED Course  ED Course as of 06/12/24 1942   Mon Singh 10, 2024   1907 Patient has positive leukocytes =, negative nitrates.  Innumerable WBCs 4-10 RBC.  Occasional bacteria.  Plan to medicate with Keflex orally. Patient does not meet sirs criteria.   2046 Signed out to Naye Gunn                                             Medical Decision Making  This 79-year-old female presents emergency room with her .  She presents with a 16 pound weight loss which has been present since the end of February.  States that she has been slowly increasing her weight loss over that period of time.  She has had a decreased appetite but states that she is eating.  She does complain of some right mid abdominal painThat has been present intermittently.  She has not really noticed any other problems.  She denies any urinary symptoms.  She denies any black tarry stools or bright red blood per rectum.  She denies any nausea, vomiting, diarrhea.  Patient is up-to-date for her mammograms.  She had a recent colonoscopy in April which demonstrated diverticulosis.  She just recently had a barium swallow with a small bowel follow-through which demonstrated reflux disease.  She was seen and evaluated.  She is well in appearance.  She is alert and oriented x 3.  She is no acute distress.  Her oropharynx is nonerythematous without exudates.  Mucous membranes are tacky.  Her lungs are clear to auscultation.  Her heart is regular rate and rhythm without murmur.  Her abdomen is soft with tenderness and guarding in the right mid abdomen.  This reproduces her symptoms.  She has no dependent edema.  Her pants size is remarkably larger than her body habitus.  She does have muscle wasting over her temporal and supra and infra clavicular areas concerning for malnourishment.    Laboratory studies were  taken and were reviewed.  Patient had leukocytes on her urine with 4-10 white cells.  She was medicated with 500 mg of Keflex as she did not meet SIRS criteria or acute sepsis.  Her potassium was low at 2.8.  She was intravenously infused with 20 mEq of potassium over 2 hours.  She also received 40 mEq orally.    Patient was signed out to Naye Gunn prior to the CAT scan results.  A CAT scan results were reviewed.  Please see report.  Patient was discharged home.  She was instructed to follow-up with her family physician for a follow-up for her CAT scan results.  She will continue with Keflex 500 mg 4 times a day for 5 days as prescribed by Naye Gunn PA-C.      Amount and/or Complexity of Data Reviewed  Labs: ordered.     Details: Independently interpreted by me.  Her potassium was 2.8.  Radiology: ordered.     Details: Please see results.  ECG/medicine tests: ordered and independent interpretation performed.    Risk  Prescription drug management.           Disposition  Final diagnoses:   Hypokalemia   Malnourished (HCC)   Abdominal pain - right mid abdomen   Leukorrhea   Weight loss     Time reflects when diagnosis was documented in both MDM as applicable and the Disposition within this note       Time User Action Codes Description Comment    6/10/2024  8:25 PM Gutzweiler, Julie Add [E87.6] Hypokalemia     6/10/2024  8:25 PM Gutzweiler, Julie Add [E46] Malnourished (HCC)     6/10/2024  8:26 PM Gutzweiler, Julie Add [R10.9] Abdominal pain     6/10/2024  8:26 PM Gutzweiler, Julie Modify [R10.9] Abdominal pain right mid abdomen    6/10/2024  8:26 PM Gutzweiler, Julie Add [N39.0] UTI (urinary tract infection)     6/10/2024  8:26 PM Gutzweiler, Julie Remove [N39.0] UTI (urinary tract infection)     6/10/2024  8:27 PM Gutzweiler, Julie Add [N89.8] Leukorrhea     6/12/2024  7:41 PM Gutzweiler, Julie Add [R63.4] Weight loss           ED Disposition       ED Disposition   Discharge    Condition   Stable     Date/Time   Mon Singh 10, 2024 11:16 PM    Comment   Brigid Brown discharge to home/self care.                   Follow-up Information       Follow up With Specialties Details Why Contact Info Additional Information    Delicia Cintron,  Family Medicine Schedule an appointment as soon as possible for a visit   [address] [place] [phone]       Critical access hospital Emergency Department Emergency Medicine  If symptoms worsen, As needed [address] [facility] [address]  [phone] Critical access hospital Emergency Department, [address] [place], [place], [address]            Discharge Medication List as of 6/10/2024 11:16 PM        START taking these medications    Details   cephalexin (KEFLEX) 500 mg capsule Take 1 capsule (500 mg total) by mouth 4 (four) times a day for 5 days, Starting Mon 6/10/2024, Until Sat 6/15/2024, Normal           CONTINUE these medications which have NOT CHANGED    Details   Alpha-D-Galactosidase (BEANO) TABS Take by mouth, Historical Med      ascorbic acid (VITAMIN C) 500 mg tablet Take by mouth, Historical Med      cholestyramine (QUESTRAN) 4 g packet Take 1 packet by mouth 3 (three) times a day with meals, Historical Med      cyproheptadine (PERIACTIN) 4 mg tablet Take 0.5 tablets (2 mg total) by mouth 4 (four) times a day, Starting Mon 6/3/2024, Until Wed 7/3/2024, Normal      Diclofenac Sodium (VOLTAREN) 1 % Apply 2 g topically 4 (four) times a day, Starting Mon 3/11/2024, Until Fri 5/10/2024, Normal      Digestive Enzyme CAPS Take by mouth, Historical Med      diltiazem (CARDIZEM CD) 240 mg 24 hr capsule Take 1 capsule (240 mg total) by mouth daily May give patient  the stock bottle, Starting Fri 2/16/2024, Until Wed 8/14/2024, Normal      esomeprazole (NexIUM) 40 MG capsule Take 1 capsule (40 mg total) by mouth every morning, Starting Thu 5/16/2024, Normal      famotidine (PEPCID) 10 mg tablet  Take 10 mg by mouth 2 (two) times a day, Historical Med      lactase (LACTAID) 3,000 units tablet Take by mouth, Historical Med      Minoxidil 5 % FOAM Apply topically, Historical Med      Multiple Vitamins-Minerals (CENTRUM SILVER ULTRA WOMENS PO) Take by mouth, Historical Med      omeprazole (PriLOSEC) 40 MG capsule Take 1 capsule (40 mg total) by mouth daily, Starting Fri 5/17/2024, Normal      polyethylene glycol-propylene glycol (SYSTANE) 0.4-0.3 % Historical Med      Premarin vaginal cream Insert 1 g into the vagina once a week Brand Necessary, Starting Wed 9/27/2023, Normal      sucralfate (CARAFATE) 1 g tablet Take 1 tablet (1 g total) by mouth 3 (three) times a day as needed (upper abdominal discomfort), Starting Tue 6/4/2024, Normal             No discharge procedures on file.    PDMP Review       None            ED Provider  Electronically Signed by             Julie Lynn Gutzweiler, PA-C  06/12/24 Cely

## 2024-06-10 NOTE — TELEPHONE ENCOUNTER
I spoke to Brigid and I am concerned about failure to thrive in elderly.  Advised about ER, she expressed understanding.

## 2024-06-10 NOTE — TELEPHONE ENCOUNTER
Pt called asking for Dr Cintron to look at her GI test results from 6/6/24.    She is still losing weight and now weighs 83.5 lbs. Pt still experiences yellow bowels daily.    She feels dry, her lips are peeling and her  eyes and mouth feel dry even though she is drinking water.     She may have to take her  to the doctor so if we get her voice mail please leave a detailed message.

## 2024-06-11 LAB
ATRIAL RATE: 69 BPM
BACTERIA UR CULT: NORMAL
P AXIS: 79 DEGREES
PR INTERVAL: 138 MS
QRS AXIS: -71 DEGREES
QRSD INTERVAL: 92 MS
QT INTERVAL: 450 MS
QTC INTERVAL: 482 MS
T WAVE AXIS: 60 DEGREES
VENTRICULAR RATE: 69 BPM

## 2024-06-11 PROCEDURE — 93010 ELECTROCARDIOGRAM REPORT: CPT | Performed by: INTERNAL MEDICINE

## 2024-06-11 NOTE — DISCHARGE INSTRUCTIONS
Please follow-up with your primary care.  Follow-up CT of your abdomen should be done in 3 months.  Please take your antibiotic 4 times a day for the next 5 days to treat the white blood cells in your urine.  Return to the emergency department if symptoms worsen.

## 2024-06-11 NOTE — TELEPHONE ENCOUNTER
Pt called to give an update from her ED visit yesterday. They ran multiple tests. Her potassium was low. They put her on Keflex for a UTI. She had a CT chest abdomen pelvis w contrast.    She has an endo appt on 6/26/24.    Pt is scared because she is tying to eat but can't gain any weight and she continues to feel dry even thought she drinks plenty of water. What else can she do?

## 2024-06-11 NOTE — ED CARE HANDOFF
Emergency Department Sign Out Note        Sign out and transfer of care from Julie Gutzweiler PA-C. See Separate Emergency Department note.     The patient, Brigid Brown, was evaluated by the previous provider for hypokalemia, malnourished, abdominal pain and leukorrhea.    Workup Completed:  CT scan was pending at the time of signout as well as repletion of potassium and magnesium    ED Course / Workup Pending (followup):  CT scan revealed no acute intra-abdominal abnormality it did show trace pelvic fluid and soft tissue prominence of the mesenteric root.  Radiology recommends repeat CT scan in 3 months as well as potentially additional PET scan or CT scan in the more distant future.  Patient was given potassium p.o. and IV as well as IV magnesium and discharged to home.  All her questions were answered and strict return precautions were discussed which she expressed her understanding of.                                  ED Course as of 06/10/24 2314   Mon Singh 10, 2024   2036 Patient is coming in from PCP bc she's has a 16-17lb weight loss, not trying to loose weight, has been actively trying to gain weight. Some abdominal pain. Worsened with palpation. Colonoscopy in April was normal per chart review and pt, UTD w mammograms. Was given 40 PO and 20 IV. Waiting on mag - already given a gram. Waiting for CT scan. No urinary sx but does have leuks, will treat w 500 PO kelfex. Plan that once potassium and mag are repleated that patient can come home pending result of scan.      Procedures  Medical Decision Making  Amount and/or Complexity of Data Reviewed  Labs: ordered.  Radiology: ordered.    Risk  Prescription drug management.          Disposition  Final diagnoses:   Hypokalemia   Malnourished (HCC)   Abdominal pain - right mid abdomen   Leukorrhea     Time reflects when diagnosis was documented in both MDM as applicable and the Disposition within this note       Time User Action Codes Description Comment     6/10/2024  8:25 PM Gutzweiler, Julie Add [E87.6] Hypokalemia     6/10/2024  8:25 PM Gutzweiler, Julie Add [E46] Malnourished (HCC)     6/10/2024  8:26 PM Gutzweiler, Julie Add [R10.9] Abdominal pain     6/10/2024  8:26 PM Gutzweiler, Julie Modify [R10.9] Abdominal pain right mid abdomen    6/10/2024  8:26 PM Gutzweiler, Julie Add [N39.0] UTI (urinary tract infection)     6/10/2024  8:26 PM Gutzweiler, Julie Remove [N39.0] UTI (urinary tract infection)     6/10/2024  8:27 PM Gutzweiler, Julie Add [N89.8] Leukorrhea           ED Disposition       None          Follow-up Information    None       Patient's Medications   Discharge Prescriptions    No medications on file     No discharge procedures on file.       ED Provider  Electronically Signed by     Naye Garcia PA-C  06/11/24 0644

## 2024-06-12 DIAGNOSIS — R93.5 ABNORMAL FINDINGS ON DIAGNOSTIC IMAGING OF ABDOMEN: Primary | ICD-10-CM

## 2024-06-12 NOTE — RESULT ENCOUNTER NOTE
Please call the patient regarding his result.  Fecal calprotectin mildly elevated suggesting bowel inflammation though this is nonspecific.  Small bowel follow-through and colonoscopy were both unremarkable.  The elevated calprotectin could be related to gastritis as was apparently noted on prior EGD.  The recent CT scan does suggest possible inflammatory changes in the abdomen outside the intestines. The radiologist recommended further imaging which can be ordered by her primary care provider to help determine the underlying cause

## 2024-06-12 NOTE — TELEPHONE ENCOUNTER
Spoke to pt with message from telephone. She informed me that mouth, nose and eyes are dry, and her lips are peeling. She needs to take sips of water as she is eating to be able to swallow her food. Please advise.

## 2024-06-12 NOTE — TELEPHONE ENCOUNTER
ER note not filed yet.  Please advise patient to continue Keflex as prescribed.  CT chest abdomen pelvis with contrast reviewed.  Recommendation for PET scan to clarify about soft tissue prominence.  I have placed that order.  Please have patient do indeed keep follow-up appointments with endocrinology and gastroenterology.  I do advise that she take cyproheptadine as prescribed.

## 2024-06-14 DIAGNOSIS — E87.6 HYPOKALEMIA: Primary | ICD-10-CM

## 2024-06-15 ENCOUNTER — HOSPITAL ENCOUNTER (EMERGENCY)
Facility: HOSPITAL | Age: 79
Discharge: HOME | End: 2024-06-15
Attending: EMERGENCY MEDICINE
Payer: MEDICARE

## 2024-06-15 VITALS
BODY MASS INDEX: 16.33 KG/M2 | DIASTOLIC BLOOD PRESSURE: 66 MMHG | WEIGHT: 89.29 LBS | OXYGEN SATURATION: 99 % | HEART RATE: 76 BPM | TEMPERATURE: 97.3 F | RESPIRATION RATE: 16 BRPM | SYSTOLIC BLOOD PRESSURE: 140 MMHG

## 2024-06-15 DIAGNOSIS — E87.1 HYPONATREMIA: ICD-10-CM

## 2024-06-15 DIAGNOSIS — E87.6 HYPOKALEMIA: Primary | ICD-10-CM

## 2024-06-15 DIAGNOSIS — E83.42 HYPOMAGNESEMIA: ICD-10-CM

## 2024-06-15 DIAGNOSIS — Z91.89 HAS POORLY BALANCED DIET: ICD-10-CM

## 2024-06-15 LAB
ALBUMIN SERPL BCP-MCNC: 4 G/DL (ref 3.5–5)
ALP SERPL-CCNC: 47 U/L (ref 34–104)
ALT SERPL W P-5'-P-CCNC: 14 U/L (ref 7–52)
ANION GAP SERPL CALCULATED.3IONS-SCNC: 8 MMOL/L (ref 4–13)
AST SERPL W P-5'-P-CCNC: 16 U/L (ref 13–39)
BASOPHILS # BLD AUTO: 0.03 THOUSANDS/ÂΜL (ref 0–0.1)
BASOPHILS NFR BLD AUTO: 1 % (ref 0–1)
BILIRUB SERPL-MCNC: 0.57 MG/DL (ref 0.2–1)
BILIRUB UR QL STRIP: NEGATIVE
BUN SERPL-MCNC: 13 MG/DL (ref 5–25)
CALCIUM SERPL-MCNC: 9.4 MG/DL (ref 8.4–10.2)
CHLORIDE SERPL-SCNC: 91 MMOL/L (ref 96–108)
CLARITY UR: CLEAR
CO2 SERPL-SCNC: 31 MMOL/L (ref 21–32)
COLOR UR: COLORLESS
CREAT SERPL-MCNC: 0.99 MG/DL (ref 0.6–1.3)
EOSINOPHIL # BLD AUTO: 0.01 THOUSAND/ÂΜL (ref 0–0.61)
EOSINOPHIL NFR BLD AUTO: 0 % (ref 0–6)
ERYTHROCYTE [DISTWIDTH] IN BLOOD BY AUTOMATED COUNT: 13.2 % (ref 11.6–15.1)
GFR SERPL CREATININE-BSD FRML MDRD: 53 ML/MIN/1.73SQ M
GLUCOSE SERPL-MCNC: 115 MG/DL (ref 65–140)
GLUCOSE UR STRIP-MCNC: NEGATIVE MG/DL
HCT VFR BLD AUTO: 40.2 % (ref 34.8–46.1)
HGB BLD-MCNC: 13.4 G/DL (ref 11.5–15.4)
HGB UR QL STRIP.AUTO: NEGATIVE
IMM GRANULOCYTES # BLD AUTO: 0.02 THOUSAND/UL (ref 0–0.2)
IMM GRANULOCYTES NFR BLD AUTO: 0 % (ref 0–2)
KETONES UR STRIP-MCNC: NEGATIVE MG/DL
LEUKOCYTE ESTERASE UR QL STRIP: NEGATIVE
LIPASE SERPL-CCNC: 71 U/L (ref 11–82)
LYMPHOCYTES # BLD AUTO: 1 THOUSANDS/ÂΜL (ref 0.6–4.47)
LYMPHOCYTES NFR BLD AUTO: 18 % (ref 14–44)
MAGNESIUM SERPL-MCNC: 1.8 MG/DL (ref 1.9–2.7)
MCH RBC QN AUTO: 29.1 PG (ref 26.8–34.3)
MCHC RBC AUTO-ENTMCNC: 33.3 G/DL (ref 31.4–37.4)
MCV RBC AUTO: 87 FL (ref 82–98)
MONOCYTES # BLD AUTO: 0.54 THOUSAND/ÂΜL (ref 0.17–1.22)
MONOCYTES NFR BLD AUTO: 10 % (ref 4–12)
NEUTROPHILS # BLD AUTO: 3.83 THOUSANDS/ÂΜL (ref 1.85–7.62)
NEUTS SEG NFR BLD AUTO: 71 % (ref 43–75)
NITRITE UR QL STRIP: NEGATIVE
NRBC BLD AUTO-RTO: 0 /100 WBCS
PH UR STRIP.AUTO: 5.5 [PH]
PLATELET # BLD AUTO: 291 THOUSANDS/UL (ref 149–390)
PMV BLD AUTO: 9.2 FL (ref 8.9–12.7)
POTASSIUM SERPL-SCNC: 2.8 MMOL/L (ref 3.5–5.3)
PROT SERPL-MCNC: 6.6 G/DL (ref 6.4–8.4)
PROT UR STRIP-MCNC: NEGATIVE MG/DL
RBC # BLD AUTO: 4.6 MILLION/UL (ref 3.81–5.12)
SODIUM SERPL-SCNC: 130 MMOL/L (ref 135–147)
SP GR UR STRIP.AUTO: 1 (ref 1–1.03)
UROBILINOGEN UR STRIP-ACNC: <2 MG/DL
WBC # BLD AUTO: 5.43 THOUSAND/UL (ref 4.31–10.16)

## 2024-06-15 PROCEDURE — 96361 HYDRATE IV INFUSION ADD-ON: CPT

## 2024-06-15 PROCEDURE — 99284 EMERGENCY DEPT VISIT MOD MDM: CPT | Performed by: EMERGENCY MEDICINE

## 2024-06-15 PROCEDURE — 83690 ASSAY OF LIPASE: CPT

## 2024-06-15 PROCEDURE — 96365 THER/PROPH/DIAG IV INF INIT: CPT

## 2024-06-15 PROCEDURE — 83735 ASSAY OF MAGNESIUM: CPT

## 2024-06-15 PROCEDURE — 85025 COMPLETE CBC W/AUTO DIFF WBC: CPT

## 2024-06-15 PROCEDURE — 80053 COMPREHEN METABOLIC PANEL: CPT

## 2024-06-15 PROCEDURE — 96375 TX/PRO/DX INJ NEW DRUG ADDON: CPT

## 2024-06-15 PROCEDURE — 81003 URINALYSIS AUTO W/O SCOPE: CPT

## 2024-06-15 PROCEDURE — 99285 EMERGENCY DEPT VISIT HI MDM: CPT

## 2024-06-15 PROCEDURE — 36415 COLL VENOUS BLD VENIPUNCTURE: CPT

## 2024-06-15 RX ORDER — POTASSIUM CHLORIDE 750 MG/1
20 TABLET, EXTENDED RELEASE ORAL DAILY
Qty: 8 TABLET | Refills: 0 | Status: SHIPPED | OUTPATIENT
Start: 2024-06-15 | End: 2024-06-19

## 2024-06-15 RX ORDER — MAGNESIUM HYDROXIDE/ALUMINUM HYDROXICE/SIMETHICONE 120; 1200; 1200 MG/30ML; MG/30ML; MG/30ML
30 SUSPENSION ORAL ONCE
Status: COMPLETED | OUTPATIENT
Start: 2024-06-15 | End: 2024-06-15

## 2024-06-15 RX ORDER — SUCRALFATE 1 G/1
1 TABLET ORAL ONCE
Status: COMPLETED | OUTPATIENT
Start: 2024-06-15 | End: 2024-06-15

## 2024-06-15 RX ORDER — FAMOTIDINE 10 MG/ML
20 INJECTION, SOLUTION INTRAVENOUS ONCE
Status: COMPLETED | OUTPATIENT
Start: 2024-06-15 | End: 2024-06-15

## 2024-06-15 RX ORDER — MAGNESIUM SULFATE HEPTAHYDRATE 40 MG/ML
2 INJECTION, SOLUTION INTRAVENOUS ONCE
Status: COMPLETED | OUTPATIENT
Start: 2024-06-15 | End: 2024-06-15

## 2024-06-15 RX ORDER — MIRTAZAPINE 15 MG/1
15 TABLET, ORALLY DISINTEGRATING ORAL
Qty: 30 TABLET | Refills: 0 | Status: SHIPPED | OUTPATIENT
Start: 2024-06-15 | End: 2024-07-15

## 2024-06-15 RX ORDER — POTASSIUM CHLORIDE 20 MEQ/1
40 TABLET, EXTENDED RELEASE ORAL ONCE
Status: COMPLETED | OUTPATIENT
Start: 2024-06-15 | End: 2024-06-15

## 2024-06-15 RX ADMIN — POTASSIUM CHLORIDE 40 MEQ: 1500 TABLET, EXTENDED RELEASE ORAL at 15:28

## 2024-06-15 RX ADMIN — SUCRALFATE 1 G: 1 TABLET ORAL at 14:32

## 2024-06-15 RX ADMIN — SODIUM CHLORIDE 1000 ML: 0.9 INJECTION, SOLUTION INTRAVENOUS at 14:32

## 2024-06-15 RX ADMIN — MAGNESIUM SULFATE HEPTAHYDRATE 2 G: 40 INJECTION, SOLUTION INTRAVENOUS at 15:28

## 2024-06-15 RX ADMIN — ALUMINUM HYDROXIDE, MAGNESIUM HYDROXIDE, DIMETHICONE 30 ML: 200; 200; 20 LIQUID ORAL at 14:32

## 2024-06-15 RX ADMIN — FAMOTIDINE 20 MG: 10 INJECTION INTRAVENOUS at 14:32

## 2024-06-15 NOTE — ED PROVIDER NOTES
History  Chief Complaint   Patient presents with    Weakness - Generalized     Pt reports weakness, believes she is dehydrated, dry mucous membranes, yellow diarrhea.      79-year-old female with extensive PMH including HTN, IBS, and GERD who presents to the ED for dehydration has been ongoing for about a week.  Patient states she was recently seen in the emergency department on Monday, 6/10/2024, at which point she was told she has hypokalemia and was treated for UTI with Keflex.  Patient is back today because she had 3 loose bowel movements and feels dry to the mouth, nose, and lips.  Patient denies eating adequately and also states she is having burning-like pain after drinking Ensure.  She states she is also having epigastric pain with burning however, no chest pain or shortness of breath at this time.  Patient does feel however, that she has been drinking plenty water throughout the day.  Her CT scans from Monday showed concerning signs for malignancy and is awaiting PET scan.  Patient surgical history is also significant for cholecystectomy and appendectomy. Denies chest pain, SOB, cough, n/v, fever, chills, dizziness, lightheadedness, HA, dysuria, hematuria, hematochezia, or melena.               Prior to Admission Medications   Prescriptions Last Dose Informant Patient Reported? Taking?   Alpha-D-Galactosidase (BEANO) TABS  Self Yes No   Sig: Take by mouth   Patient not taking: Reported on 4/22/2024   Diclofenac Sodium (VOLTAREN) 1 %   No No   Sig: Apply 2 g topically 4 (four) times a day   Patient not taking: Reported on 4/22/2024   Digestive Enzyme CAPS  Self Yes No   Sig: Take by mouth   Minoxidil 5 % FOAM  Self Yes No   Sig: Apply topically   Multiple Vitamins-Minerals (CENTRUM SILVER ULTRA WOMENS PO)  Self Yes No   Sig: Take by mouth   Premarin vaginal cream  Self No No   Sig: Insert 1 g into the vagina once a week Brand Necessary   ascorbic acid (VITAMIN C) 500 mg tablet  Self Yes No   Sig: Take by  mouth   cephalexin (KEFLEX) 500 mg capsule   No No   Sig: Take 1 capsule (500 mg total) by mouth 4 (four) times a day for 5 days   cholestyramine (QUESTRAN) 4 g packet  Self Yes No   Sig: Take 1 packet by mouth 3 (three) times a day with meals   Patient not taking: Reported on 5/9/2024   cyproheptadine (PERIACTIN) 4 mg tablet  Self No No   Sig: Take 0.5 tablets (2 mg total) by mouth 4 (four) times a day   diltiazem (CARDIZEM CD) 240 mg 24 hr capsule  Self No No   Sig: Take 1 capsule (240 mg total) by mouth daily May give patient  the stock bottle   esomeprazole (NexIUM) 40 MG capsule  Self No No   Sig: Take 1 capsule (40 mg total) by mouth every morning   famotidine (PEPCID) 10 mg tablet  Self Yes No   Sig: Take 10 mg by mouth 2 (two) times a day   Patient not taking: Reported on 4/22/2024   lactase (LACTAID) 3,000 units tablet  Self Yes No   Sig: Take by mouth   omeprazole (PriLOSEC) 40 MG capsule  Self No No   Sig: Take 1 capsule (40 mg total) by mouth daily   polyethylene glycol-propylene glycol (SYSTANE) 0.4-0.3 %  Self Yes No   sucralfate (CARAFATE) 1 g tablet  Self No No   Sig: Take 1 tablet (1 g total) by mouth 3 (three) times a day as needed (upper abdominal discomfort)      Facility-Administered Medications: None       Past Medical History:   Diagnosis Date    Acne     Basal cell carcinoma 06/08/2020    right lower forehead    BCC (basal cell carcinoma of skin) 03/09/2020    mid forehead    Benign neoplasm of skin     Disease of thyroid gland 2006    GERD (gastroesophageal reflux disease)     Hypertension     Inflamed seborrheic keratosis     Irritable bowel syndrome     Malignant neoplasm of skin of face     Migraines     Nonmelanoma skin cancer     Last Assessed:6/27/17    Squamous cell skin cancer 06/08/2020    In situ, left lower forehead    Tachycardia     Telogen effluvium     Temporomandibular joint disorder     Vertigo        Past Surgical History:   Procedure Laterality Date    ADENOIDECTOMY       APPENDECTOMY      CHOLECYSTECTOMY      COLONOSCOPY  05/03/2019    COMPLEX WOUND CLOSURE TO EXTREMITY N/A 7/28/2020    Procedure: COMPLEX CLOSURE MID FOREHEAD;  Surgeon: Randy Frank MD;  Location: [facility];  Service: Plastics    ESOPHAGOGASTRODUODENOSCOPY  2009    FLAP LOCAL HEAD / NECK N/A 7/28/2020    Procedure: FLAP MID FOREHEAD;  Surgeon: Randy Frank MD;  Location: [facility];  Service: Plastics    HYSTERECTOMY  1987    MALIGNANT SKIN LESION EXCISION      Excision of Lesion Face Malignant-9/14/2004 BCC Forehead    MOHS RECONSTRUCTION N/A 7/28/2020    Procedure: RECONSTRUCTION MOHS DEFECT MID FOREHEAD;  Surgeon: Randy Frank MD;  Location: [facility];  Service: Plastics    MOHS SURGERY  07/27/2020    Right &left lower forehead, mid forehead    OOPHORECTOMY Bilateral 1987    ROTATOR CUFF REPAIR Right     SKIN BIOPSY      TONSILLECTOMY      TUBAL LIGATION  1976?       Family History   Problem Relation Age of Onset    Hypertension Mother         Mother    Osteoporosis Mother     Skin cancer Mother     Migraines Mother     Dementia Mother     Hypertension Father         Father    Skin cancer Father     Dementia Father     Skin cancer Sister 73    Migraines Sister     No Known Problems Daughter     Uterine cancer Maternal Grandmother 29    Diabetes type II Maternal Grandfather     Cancer Paternal Grandmother     Uterine cancer Paternal Grandmother 65    No Known Problems Maternal Aunt     Cancer Paternal Aunt         Breast    Uterine cancer Paternal Aunt 55    No Known Problems Paternal Aunt     No Known Problems Paternal Aunt      I have reviewed and agree with the history as documented.    E-Cigarette/Vaping    E-Cigarette Use Never User      E-Cigarette/Vaping Substances    Nicotine No     THC No     CBD No     Flavoring No     Other No     Unknown No      Social History     Tobacco Use    Smoking status: Never    Smokeless tobacco: Never   Vaping Use    Vaping status: Never Used    Substance Use Topics    Alcohol use: No    Drug use: No        Review of Systems   Constitutional:  Positive for appetite change and fatigue. Negative for chills, diaphoresis and fever.   HENT:  Negative for congestion, ear pain, postnasal drip, rhinorrhea, sore throat and trouble swallowing.    Eyes:  Negative for pain and visual disturbance.   Respiratory:  Negative for cough and shortness of breath.    Cardiovascular:  Negative for chest pain and palpitations.   Gastrointestinal:  Positive for abdominal pain and diarrhea. Negative for constipation, nausea and vomiting.   Genitourinary:  Negative for decreased urine volume, dysuria and hematuria.   Musculoskeletal:  Negative for arthralgias and back pain.   Skin:  Negative for color change and rash.   Neurological:  Negative for dizziness, seizures, syncope, weakness, light-headedness and headaches.   All other systems reviewed and are negative.      Physical Exam  ED Triage Vitals [06/15/24 1355]   Temperature Pulse Respirations Blood Pressure SpO2   (!) 97.3 °F (36.3 °C) 104 17 114/69 99 %      Temp Source Heart Rate Source Patient Position - Orthostatic VS BP Location FiO2 (%)   Oral Monitor Sitting Right arm --      Pain Score       --             Orthostatic Vital Signs  Vitals:    06/15/24 1355 06/15/24 1445   BP: 114/69 140/66   Pulse: 104 76   Patient Position - Orthostatic VS: Sitting        Physical Exam  Vitals and nursing note reviewed.   Constitutional:       General: She is not in acute distress.     Appearance: Normal appearance. She is normal weight.      Comments: Dry mucous membranes on visualization.   HENT:      Head: Normocephalic and atraumatic.      Right Ear: External ear normal.      Left Ear: External ear normal.      Nose: Nose normal.      Mouth/Throat:      Mouth: Mucous membranes are dry.      Pharynx: Oropharynx is clear.   Eyes:      Conjunctiva/sclera: Conjunctivae normal.   Cardiovascular:      Rate and Rhythm: Normal rate and  regular rhythm.      Pulses: Normal pulses.      Heart sounds: Normal heart sounds.      Comments: RRR with +S1 and S2, no murmurs appreciated on exam. Peripheral pulses intact.    Pulmonary:      Effort: Pulmonary effort is normal. No respiratory distress.      Breath sounds: Normal breath sounds. No wheezing, rhonchi or rales.      Comments: CTABL with no abnormal lung sounds such as wheezes or rales appreciated on exam.     Abdominal:      General: Abdomen is flat. Bowel sounds are normal. There is no distension.      Palpations: Abdomen is soft.      Tenderness: There is abdominal tenderness.      Comments: Significant tenderness to palpation of the abdominal diffusely but more prominent in the epigastric region. Soft, normo-active bowel sounds. Without rigidity, guarding, or distension.     Musculoskeletal:         General: Normal range of motion.      Cervical back: Normal range of motion.      Right lower leg: No edema.      Left lower leg: No edema.   Skin:     General: Skin is warm and dry.   Neurological:      General: No focal deficit present.      Mental Status: She is alert and oriented to person, place, and time. Mental status is at baseline.      Comments: CN grossly intact on visualization. No focal neurologic deficits noted on exam.  5/5 strength in all extremities. Neurovascularly intact with normal sensation and motor function.             ED Medications  Medications   sodium chloride 0.9 % bolus 1,000 mL (0 mL Intravenous Stopped 6/15/24 1610)   Famotidine (PF) (PEPCID) injection 20 mg (20 mg Intravenous Given 6/15/24 1432)   aluminum-magnesium hydroxide-simethicone (MAALOX) oral suspension 30 mL (30 mL Oral Given 6/15/24 1432)   sucralfate (CARAFATE) tablet 1 g (1 g Oral Given 6/15/24 1432)   magnesium sulfate 2 g/50 mL IVPB (premix) 2 g (0 g Intravenous Stopped 6/15/24 1610)   potassium chloride (Klor-Con M20) CR tablet 40 mEq (40 mEq Oral Given 6/15/24 1528)       Diagnostic Studies  Results  Reviewed       Procedure Component Value Units Date/Time    UA w Reflex to Microscopic w Reflex to Culture [[id number]] Collected: 06/15/24 1614    Lab Status: Final result Specimen: Urine, Clean Catch Updated: 06/15/24 1620     Color, UA Colorless     Clarity, UA Clear     Specific Gravity, UA 1.003     pH, UA 5.5     Leukocytes, UA Negative     Nitrite, UA Negative     Protein, UA Negative mg/dl      Glucose, UA Negative mg/dl      Ketones, UA Negative mg/dl      Urobilinogen, UA <2.0 mg/dl      Bilirubin, UA Negative     Occult Blood, UA Negative    Comprehensive metabolic panel [[id number]]  (Abnormal) Collected: 06/15/24 1427    Lab Status: Final result Specimen: Blood from Arm, Left Updated: 06/15/24 1451     Sodium 130 mmol/L      Potassium 2.8 mmol/L      Chloride 91 mmol/L      CO2 31 mmol/L      ANION GAP 8 mmol/L      BUN 13 mg/dL      Creatinine 0.99 mg/dL      Glucose 115 mg/dL      Calcium 9.4 mg/dL      AST 16 U/L      ALT 14 U/L      Alkaline Phosphatase 47 U/L      Total Protein 6.6 g/dL      Albumin 4.0 g/dL      Total Bilirubin 0.57 mg/dL      eGFR 53 ml/min/1.73sq m     Narrative:      [employer] guidelines for Chronic Kidney Disease (CKD):     Stage 1 with normal or high GFR (GFR > 90 mL/min/1.73 square meters)    Stage 2 Mild CKD (GFR = 60-89 mL/min/1.73 square meters)    Stage 3A Moderate CKD (GFR = 45-59 mL/min/1.73 square meters)    Stage 3B Moderate CKD (GFR = 30-44 mL/min/1.73 square meters)    Stage 4 Severe CKD (GFR = 15-29 mL/min/1.73 square meters)    Stage 5 End Stage CKD (GFR <15 mL/min/1.73 square meters)  Note: GFR calculation is accurate only with a steady state creatinine    Lipase [[id number]]  (Normal) Collected: 06/15/24 1427    Lab Status: Final result Specimen: Blood from Arm, Left Updated: 06/15/24 1451     Lipase 71 u/L     Magnesium [[id number]]  (Abnormal) Collected: 06/15/24 1427    Lab Status: Final result Specimen: Blood from Arm, Left Updated:  06/15/24 1451     Magnesium 1.8 mg/dL     CBC and differential [[id number]] Collected: 06/15/24 1427    Lab Status: Final result Specimen: Blood from Arm, Left Updated: 06/15/24 1442     WBC 5.43 Thousand/uL      RBC 4.60 Million/uL      Hemoglobin 13.4 g/dL      Hematocrit 40.2 %      MCV 87 fL      MCH 29.1 pg      MCHC 33.3 g/dL      RDW 13.2 %      MPV 9.2 fL      Platelets 291 Thousands/uL      nRBC 0 /100 WBCs      Segmented % 71 %      Immature Grans % 0 %      Lymphocytes % 18 %      Monocytes % 10 %      Eosinophils Relative 0 %      Basophils Relative 1 %      Absolute Neutrophils 3.83 Thousands/µL      Absolute Immature Grans 0.02 Thousand/uL      Absolute Lymphocytes 1.00 Thousands/µL      Absolute Monocytes 0.54 Thousand/µL      Eosinophils Absolute 0.01 Thousand/µL      Basophils Absolute 0.03 Thousands/µL                    No orders to display         Procedures  Procedures      ED Course  ED Course as of 06/15/24 2017   Sat Singh 15, 2024   1506 MAGNESIUM(!): 1.8  Same as previous labs   1506 Potassium(!): 2.8  Will replete potassium   1506 Sodium(!): 130   1506 LIPASE: 71                                       Medical Decision Making  79-year-old female with extensive PMH including HTN, IBS, and GERD who presented to the ED for dehydration has been ongoing for about a week.  Patient's labs were obtained and reviewed by the ED provider.  See ED course for more details about patient's ED stay.  Patient's labs showed concerns for hypomagnesemia, hyponatremia, and hypokalemia at which point her potassium was repleted while in the emergency department with 40 mEq oral potassium.  Patient was also given 2 g of magnesium sulfate for magnesium repletion along with 1 L bolus of normal saline, 20 mg of Pepcid, 30 mL of Maalox, and 1 g of Carafate.  Through shared decision-making between the patient and the provider, the patient was planned for discharge.  Patient was advised to follow-up outpatient with her PCP  and was also provided a prescription for potassium chloride along with mirtazapine for her symptoms.  Patient was encouraged to take the mirtazapine at night prior to bed due to the side effects of drowsiness.  Patient was also instructed to return to the ED if her symptoms worsen including but not limited to increasing diarrhea, fever, chills, nausea or vomiting, lightheadedness or dizziness, chest pain, shortness of breath, or changes in her behavior.    Amount and/or Complexity of Data Reviewed  Labs: ordered. Decision-making details documented in ED Course.    Risk  OTC drugs.  Prescription drug management.          Disposition  Final diagnoses:   Hypokalemia   Hypomagnesemia   Has poorly balanced diet   Hyponatremia     Time reflects when diagnosis was documented in both MDM as applicable and the Disposition within this note       Time User Action Codes Description Comment    6/15/2024  3:37 PM Bacilio Nevarez [E87.6] Hypokalemia     6/15/2024  3:37 PM Bacilio Nevarez [E83.42] Hypomagnesemia     6/15/2024  3:37 PM Bacilio Nevarez [Z91.89] Has poorly balanced diet     6/15/2024  3:46 PM Bacilio Nevarez [E87.1] Hyponatremia           ED Disposition       ED Disposition   Discharge    Condition   Stable    Date/Time   Sat Singh 15, 2024  3:36 PM    Comment   Brigid Brown discharge to home/self care.                   Follow-up Information    None         Discharge Medication List as of 6/15/2024  3:46 PM        START taking these medications    Details   mirtazapine (REMERON SOL-TAB) 15 mg disintegrating tablet Take 1 tablet (15 mg total) by mouth daily at bedtime, Starting Sat 6/15/2024, Until Mon 7/15/2024, Normal      potassium chloride (Klor-Con M10) 10 mEq tablet Take 2 tablets (20 mEq total) by mouth daily for 4 days, Starting Sat 6/15/2024, Until Wed 6/19/2024, Normal           CONTINUE these medications which have NOT CHANGED    Details   Alpha-D-Galactosidase (BEANO) TABS Take by mouth, Historical Med       ascorbic acid (VITAMIN C) 500 mg tablet Take by mouth, Historical Med      cephalexin (KEFLEX) 500 mg capsule Take 1 capsule (500 mg total) by mouth 4 (four) times a day for 5 days, Starting Mon 6/10/2024, Until Sat 6/15/2024, Normal      cholestyramine (QUESTRAN) 4 g packet Take 1 packet by mouth 3 (three) times a day with meals, Historical Med      Diclofenac Sodium (VOLTAREN) 1 % Apply 2 g topically 4 (four) times a day, Starting Mon 3/11/2024, Until Fri 5/10/2024, Normal      Digestive Enzyme CAPS Take by mouth, Historical Med      diltiazem (CARDIZEM CD) 240 mg 24 hr capsule Take 1 capsule (240 mg total) by mouth daily May give patient  the stock bottle, Starting Fri 2/16/2024, Until Wed 8/14/2024, Normal      esomeprazole (NexIUM) 40 MG capsule Take 1 capsule (40 mg total) by mouth every morning, Starting Thu 5/16/2024, Normal      famotidine (PEPCID) 10 mg tablet Take 10 mg by mouth 2 (two) times a day, Historical Med      lactase (LACTAID) 3,000 units tablet Take by mouth, Historical Med      Minoxidil 5 % FOAM Apply topically, Historical Med      Multiple Vitamins-Minerals (CENTRUM SILVER ULTRA WOMENS PO) Take by mouth, Historical Med      omeprazole (PriLOSEC) 40 MG capsule Take 1 capsule (40 mg total) by mouth daily, Starting Fri 5/17/2024, Normal      polyethylene glycol-propylene glycol (SYSTANE) 0.4-0.3 % Historical Med      Premarin vaginal cream Insert 1 g into the vagina once a week Brand Necessary, Starting Wed 9/27/2023, Normal      sucralfate (CARAFATE) 1 g tablet Take 1 tablet (1 g total) by mouth 3 (three) times a day as needed (upper abdominal discomfort), Starting Tue 6/4/2024, Normal           STOP taking these medications       cyproheptadine (PERIACTIN) 4 mg tablet Comments:   Reason for Stopping:             No discharge procedures on file.    PDMP Review       None             ED Provider  Attending physically available and evaluated Brigid Brown. I managed the patient along with  the ED Attending.    Electronically Signed by           Dante Saunders MD  06/15/24 2017

## 2024-06-15 NOTE — ED ATTENDING ATTESTATION
6/15/2024  IBacilio DO, saw and evaluated the patient. I have discussed the patient with the resident/non-physician practitioner and agree with the resident's/non-physician practitioner's findings, Plan of Care, and MDM as documented in the resident's/non-physician practitioner's note, except where noted. All available labs and Radiology studies were reviewed.  I was present for key portions of any procedure(s) performed by the resident/non-physician practitioner and I was immediately available to provide assistance.       At this point I agree with the current assessment done in the Emergency Department.  I have conducted an independent evaluation of this patient a history and physical is as follows:    MDM: 79-year-old female presenting with ongoing concern for generalized weakness.  Dehydration.  On exam, patient without any obvious generalized weakness.  Is performing activities of daily living.  This is been ongoing times months and not likely secondary to infectious or metabolic etiology.  Space recent lab work with mild hyponatremia, hypokalemia, hypomagnesemia -patient states the symptoms are stable and ongoing.  Regards to the most recent lab abnormalities, patient denies that there is any issue in eating.  She does not experience issue with swallowing, pain after eating, pain during eating, subsequent abdominal pain or nausea.  She notes ongoing issues times years as a pertains to her IBS and food triggers.  Recent concern for the possibility of urinary tract infection but ultimately patient has completed antibiotic course and urine culture was negative.  Recent CT scans without acute pathology.    In summation, patient appears to have been severely limiting her dietary intake secondary to concern for possible food trigger items as a relates to her IBS.  Patient has been steadily on cryoheptadine x2-3 weeks without any improvement of symptoms.  She has no findings on exam to suggest further imaging  "or any acute intra-abdominal process causing her symptoms.  Additionally, imaging has been performed during patient symptoms without acute findings.  Patient is currently not on any medications to cause the aforementioned lab derangements. Pt infrequent PPI use, but does not appear upper GI implicated however pt instructed to strictly continue as may be contributory.  Given patient's past medical history, severe limitation of her diet, concerns as a relates to the multiple food triggers, discussed risk first benefit with patient and daughter as a pertains to initiation of mirtazapine.  Notably this is likely to improve patient's symptoms of IBS, will additionally assist with the anxiety's as a relates to possible triggers of diarrhea, and lastly increase appetite.  Somnolence may occur so written for nightly.  Noted caution in the night in regards to the somnolence and the slight risk as a pertains to patient's age.  They verbalized understanding of these items and agreed with plan.  Spoke with the patient's primary care physician who agreed with plan as after mention and agree to follow patient in the office.  Normal saline, potassium, and magnesium provided in the emergency department.  Short course continued repletion of potassium followed as outpatient. Reviewed all findings both relevant and incidental with the patient at bedside. Pt verbalized understanding of findings, neccesary follow up, return to ED precautions. Pt agreed to review today's findings with their primary care provider. Pt non-toxic appearing upon discharge.       ED Course  ED Course as of 06/15/24 1558   Sat Singh 15, 2024   1514 79yoF,pmhx gastritis that occurred Feb 2024, presenting with ongoing rt flank \"burning\" - which she notes is constant in nature x weeks/months. Pt notes self restricting diet in order to avoid possibility of recurrent gastritis. Pt denies abd pain, n/v, change in bowel or bladder.     Vital signs stable.  Patient " "resting comfortably.  Alert and oriented.  Not ill-appearing.  No acute distress.  Lungs clear to auscultation.  No increased work of breathing.  Heart regular rhythm.  Abdomen soft nontender.  Area of \"burning\" is over right ASIS extending laterally over the pelvis.  No overlying skin change.  No tenderness to palpation.       1519 Noncompliant with PPI. Is no taking. Taking approx 75% of periactin dose.          Critical Care Time  Procedures      "

## 2024-06-18 ENCOUNTER — TELEMEDICINE (OUTPATIENT)
Dept: FAMILY MEDICINE CLINIC | Facility: MEDICAL CENTER | Age: 79
End: 2024-06-18

## 2024-06-18 DIAGNOSIS — Z12.31 ENCOUNTER FOR SCREENING MAMMOGRAM FOR MALIGNANT NEOPLASM OF BREAST: ICD-10-CM

## 2024-06-18 DIAGNOSIS — Z00.00 MEDICARE ANNUAL WELLNESS VISIT, SUBSEQUENT: Primary | ICD-10-CM

## 2024-06-18 DIAGNOSIS — E87.8 ELECTROLYTE ABNORMALITY: ICD-10-CM

## 2024-06-18 PROCEDURE — NC001 PR NO CHARGE: Performed by: STUDENT IN AN ORGANIZED HEALTH CARE EDUCATION/TRAINING PROGRAM

## 2024-06-18 NOTE — PATIENT INSTRUCTIONS
Medicare Preventive Visit Patient Instructions  Thank you for completing your Welcome to Medicare Visit or Medicare Annual Wellness Visit today. Your next wellness visit will be due in one year (6/19/2025).  The screening/preventive services that you may require over the next 5-10 years are detailed below. Some tests may not apply to you based off risk factors and/or age. Screening tests ordered at today's visit but not completed yet may show as past due. Also, please note that scanned in results may not display below.  Preventive Screenings:  Service Recommendations Previous Testing/Comments   Colorectal Cancer Screening  * Colonoscopy    * Fecal Occult Blood Test (FOBT)/Fecal Immunochemical Test (FIT)  * Fecal DNA/Cologuard Test  * Flexible Sigmoidoscopy Age: 45-75 years old   Colonoscopy: every 10 years (may be performed more frequently if at higher risk)  OR  FOBT/FIT: every 1 year  OR  Cologuard: every 3 years  OR  Sigmoidoscopy: every 5 years  Screening may be recommended earlier than age 45 if at higher risk for colorectal cancer. Also, an individualized decision between you and your healthcare provider will decide whether screening between the ages of 76-85 would be appropriate. Colonoscopy: 05/13/2024  FOBT/FIT: Not on file  Cologuard: Not on file  Sigmoidoscopy: Not on file    Screening Current     Breast Cancer Screening Age: 40+ years old  Frequency: every 1-2 years  Not required if history of left and right mastectomy Mammogram: 11/15/2023    Screening Current   Cervical Cancer Screening Between the ages of 21-29, pap smear recommended once every 3 years.   Between the ages of 30-65, can perform pap smear with HPV co-testing every 5 years.   Recommendations may differ for women with a history of total hysterectomy, cervical cancer, or abnormal pap smears in past. Pap Smear: 06/08/2022    Screening Not Indicated   Hepatitis C Screening Once for adults born between 1945 and 1965  More frequently in  patients at high risk for Hepatitis C Hep C Antibody: 08/07/2023    Screening Current   Diabetes Screening 1-2 times per year if you're at risk for diabetes or have pre-diabetes Fasting glucose: 110 mg/dL (5/9/2024)  A1C: 5.6 % (5/20/2024)  Screening Current   Cholesterol Screening Once every 5 years if you don't have a lipid disorder. May order more often based on risk factors. Lipid panel: 03/29/2022    Screening Current     Other Preventive Screenings Covered by Medicare:  Abdominal Aortic Aneurysm (AAA) Screening: covered once if your at risk. You're considered to be at risk if you have a family history of AAA.  Lung Cancer Screening: covers low dose CT scan once per year if you meet all of the following conditions: (1) Age 55-77; (2) No signs or symptoms of lung cancer; (3) Current smoker or have quit smoking within the last 15 years; (4) You have a tobacco smoking history of at least 20 pack years (packs per day multiplied by number of years you smoked); (5) You get a written order from a healthcare provider.  Glaucoma Screening: covered annually if you're considered high risk: (1) You have diabetes OR (2) Family history of glaucoma OR (3)  aged 50 and older OR (4)  American aged 65 and older  Osteoporosis Screening: covered every 2 years if you meet one of the following conditions: (1) You're estrogen deficient and at risk for osteoporosis based off medical history and other findings; (2) Have a vertebral abnormality; (3) On glucocorticoid therapy for more than 3 months; (4) Have primary hyperparathyroidism; (5) On osteoporosis medications and need to assess response to drug therapy.   Last bone density test (DXA Scan): 02/16/2022.  HIV Screening: covered annually if you're between the age of 15-65. Also covered annually if you are younger than 15 and older than 65 with risk factors for HIV infection. For pregnant patients, it is covered up to 3 times per  pregnancy.    Immunizations:  Immunization Recommendations   Influenza Vaccine Annual influenza vaccination during flu season is recommended for all persons aged >= 6 months who do not have contraindications   Pneumococcal Vaccine   * Pneumococcal conjugate vaccine = PCV13 (Prevnar 13), PCV15 (Vaxneuvance), PCV20 (Prevnar 20)  * Pneumococcal polysaccharide vaccine = PPSV23 (Pneumovax) Adults 19-63 yo with certain risk factors or if 65+ yo  If never received any pneumonia vaccine: recommend Prevnar 20 (PCV20)  Give PCV20 if previously received 1 dose of PCV13 or PPSV23   Hepatitis B Vaccine 3 dose series if at intermediate or high risk (ex: diabetes, end stage renal disease, liver disease)   Respiratory syncytial virus (RSV) Vaccine - COVERED BY MEDICARE PART D  * RSVPreF3 (Arexvy) CDC recommends that adults 60 years of age and older may receive a single dose of RSV vaccine using shared clinical decision-making (SCDM)   Tetanus (Td) Vaccine - COST NOT COVERED BY MEDICARE PART B Following completion of primary series, a booster dose should be given every 10 years to maintain immunity against tetanus. Td may also be given as tetanus wound prophylaxis.   Tdap Vaccine - COST NOT COVERED BY MEDICARE PART B Recommended at least once for all adults. For pregnant patients, recommended with each pregnancy.   Shingles Vaccine (Shingrix) - COST NOT COVERED BY MEDICARE PART B  2 shot series recommended in those 19 years and older who have or will have weakened immune systems or those 50 years and older     Health Maintenance Due:      Topic Date Due   • Hepatitis C Screening  Completed     Immunizations Due:  There are no preventive care reminders to display for this patient.  Advance Directives   What are advance directives?  Advance directives are legal documents that state your wishes and plans for medical care. These plans are made ahead of time in case you lose your ability to make decisions for yourself. Advance directives  can apply to any medical decision, such as the treatments you want, and if you want to donate organs.   What are the types of advance directives?  There are many types of advance directives, and each state has rules about how to use them. You may choose a combination of any of the following:  Living will:  This is a written record of the treatment you want. You can also choose which treatments you do not want, which to limit, and which to stop at a certain time. This includes surgery, medicine, IV fluid, and tube feedings.   Durable power of  for healthcare (DPAHC):  This is a written record that states who you want to make healthcare choices for you when you are unable to make them for yourself. This person, called a proxy, is usually a family member or a friend. You may choose more than 1 proxy.  Do not resuscitate (DNR) order:  A DNR order is used in case your heart stops beating or you stop breathing. It is a request not to have certain forms of treatment, such as CPR. A DNR order may be included in other types of advance directives.  Medical directive:  This covers the care that you want if you are in a coma, near death, or unable to make decisions for yourself. You can list the treatments you want for each condition. Treatment may include pain medicine, surgery, blood transfusions, dialysis, IV or tube feedings, and a ventilator (breathing machine).  Values history:  This document has questions about your views, beliefs, and how you feel and think about life. This information can help others choose the care that you would choose.  Why are advance directives important?  An advance directive helps you control your care. Although spoken wishes may be used, it is better to have your wishes written down. Spoken wishes can be misunderstood, or not followed. Treatments may be given even if you do not want them. An advance directive may make it easier for your family to make difficult choices about your care.    Underweight  Underweight is defined as having a body mass index (BMI) of less than 18.5 kg/m2   Anorexia  is a loss of appetite, decreased food intake, or both. Your appetite naturally decreases as you get older. You also get full faster than you used to. This occurs because your body needs less energy. Other body changes can also lead to a decreased appetite. Even though some appetite loss is normal, you still need to get enough calories and nutrients to keep you healthy. You can start to lose too much weight if you do not eat as much food as your body needs. Unwanted weight loss can cause health problems, or worsen health problems you already have. You can also become dehydrated if you do not drink enough liquid.  How to eat healthy and get enough nutrients:   Choose healthy foods.  Eat a variety of fruits, vegetables, whole grains, low-fat dairy foods, lean meats, and other protein foods. Limit foods high in fat, sugar, and salt. Limit or avoid alcohol as directed. Work with a dietitian to help you plan your meals if you need to follow a special diet. A dietitian can also teach you how to modify foods if you have trouble chewing or swallowing.   Snack on healthy foods between meals  if you only eat a small amount during meals. Snacks provide extra healthy nutrients and calories between meals. Examples include fruit, cheese, and whole grain crackers.   Drink liquids as directed  to avoid dehydration. Drink liquids between meals if they cause you to get full too quickly during meals. Ask how much liquid to drink each day and which liquids are best for you.   Use herbs, spices, and flavor enhancers to add flavor to foods.  Avoid using herbs and spice blends that also contain sodium. Ask your healthcare provider or dietitian about flavor enhancers. Flavor enhancers with ham, natural cardoso, and roast beef flavors can also be sprinkled on food to add flavor.   Share meals with others as often as you can.  Eating with  others may help you to eat better during meal time. Ask family members, neighbors, or friends to join you for lunch. There are also senior centers where you can meet people, and share meals with them.   Ask family and friends for help  with shopping or preparing foods. Ask for a ride to the grocery store, if needed.       © Copyright "[employer] 2018 Information is for End User's use only and may not be sold, redistributed or otherwise used for commercial purposes. All illustrations and images included in CareNotes® are the copyrighted property of [employer] or [employer]      Medicare Preventive Visit Patient Instructions  Thank you for completing your Welcome to Medicare Visit or Medicare Annual Wellness Visit today. Your next wellness visit will be due in one year (6/19/2025).  The screening/preventive services that you may require over the next 5-10 years are detailed below. Some tests may not apply to you based off risk factors and/or age. Screening tests ordered at today's visit but not completed yet may show as past due. Also, please note that scanned in results may not display below.  Preventive Screenings:  Service Recommendations Previous Testing/Comments   Colorectal Cancer Screening  * Colonoscopy    * Fecal Occult Blood Test (FOBT)/Fecal Immunochemical Test (FIT)  * Fecal DNA/Cologuard Test  * Flexible Sigmoidoscopy Age: 45-75 years old   Colonoscopy: every 10 years (may be performed more frequently if at higher risk)  OR  FOBT/FIT: every 1 year  OR  Cologuard: every 3 years  OR  Sigmoidoscopy: every 5 years  Screening may be recommended earlier than age 45 if at higher risk for colorectal cancer. Also, an individualized decision between you and your healthcare provider will decide whether screening between the ages of 76-85 would be appropriate. Colonoscopy: 05/13/2024  FOBT/FIT: Not on file  Cologuard: Not on file  Sigmoidoscopy: Not on file    Screening Current     Breast Cancer  Screening Age: 40+ years old  Frequency: every 1-2 years  Not required if history of left and right mastectomy Mammogram: 11/15/2023    Screening Current   Cervical Cancer Screening Between the ages of 21-29, pap smear recommended once every 3 years.   Between the ages of 30-65, can perform pap smear with HPV co-testing every 5 years.   Recommendations may differ for women with a history of total hysterectomy, cervical cancer, or abnormal pap smears in past. Pap Smear: 06/08/2022    Screening Not Indicated   Hepatitis C Screening Once for adults born between 1945 and 1965  More frequently in patients at high risk for Hepatitis C Hep C Antibody: 08/07/2023    Screening Current   Diabetes Screening 1-2 times per year if you're at risk for diabetes or have pre-diabetes Fasting glucose: 110 mg/dL (5/9/2024)  A1C: 5.6 % (5/20/2024)  Screening Current   Cholesterol Screening Once every 5 years if you don't have a lipid disorder. May order more often based on risk factors. Lipid panel: 03/29/2022    Screening Current     Other Preventive Screenings Covered by Medicare:  Abdominal Aortic Aneurysm (AAA) Screening: covered once if your at risk. You're considered to be at risk if you have a family history of AAA.  Lung Cancer Screening: covers low dose CT scan once per year if you meet all of the following conditions: (1) Age 55-77; (2) No signs or symptoms of lung cancer; (3) Current smoker or have quit smoking within the last 15 years; (4) You have a tobacco smoking history of at least 20 pack years (packs per day multiplied by number of years you smoked); (5) You get a written order from a healthcare provider.  Glaucoma Screening: covered annually if you're considered high risk: (1) You have diabetes OR (2) Family history of glaucoma OR (3)  aged 50 and older OR (4)  American aged 65 and older  Osteoporosis Screening: covered every 2 years if you meet one of the following conditions: (1) You're  estrogen deficient and at risk for osteoporosis based off medical history and other findings; (2) Have a vertebral abnormality; (3) On glucocorticoid therapy for more than 3 months; (4) Have primary hyperparathyroidism; (5) On osteoporosis medications and need to assess response to drug therapy.   Last bone density test (DXA Scan): 02/16/2022.  HIV Screening: covered annually if you're between the age of 15-65. Also covered annually if you are younger than 15 and older than 65 with risk factors for HIV infection. For pregnant patients, it is covered up to 3 times per pregnancy.    Immunizations:  Immunization Recommendations   Influenza Vaccine Annual influenza vaccination during flu season is recommended for all persons aged >= 6 months who do not have contraindications   Pneumococcal Vaccine   * Pneumococcal conjugate vaccine = PCV13 (Prevnar 13), PCV15 (Vaxneuvance), PCV20 (Prevnar 20)  * Pneumococcal polysaccharide vaccine = PPSV23 (Pneumovax) Adults 19-65 yo with certain risk factors or if 65+ yo  If never received any pneumonia vaccine: recommend Prevnar 20 (PCV20)  Give PCV20 if previously received 1 dose of PCV13 or PPSV23   Hepatitis B Vaccine 3 dose series if at intermediate or high risk (ex: diabetes, end stage renal disease, liver disease)   Respiratory syncytial virus (RSV) Vaccine - COVERED BY MEDICARE PART D  * RSVPreF3 (Arexvy) CDC recommends that adults 60 years of age and older may receive a single dose of RSV vaccine using shared clinical decision-making (SCDM)   Tetanus (Td) Vaccine - COST NOT COVERED BY MEDICARE PART B Following completion of primary series, a booster dose should be given every 10 years to maintain immunity against tetanus. Td may also be given as tetanus wound prophylaxis.   Tdap Vaccine - COST NOT COVERED BY MEDICARE PART B Recommended at least once for all adults. For pregnant patients, recommended with each pregnancy.   Shingles Vaccine (Shingrix) - COST NOT COVERED BY  MEDICARE PART B  2 shot series recommended in those 19 years and older who have or will have weakened immune systems or those 50 years and older     Health Maintenance Due:      Topic Date Due   • Hepatitis C Screening  Completed     Immunizations Due:  There are no preventive care reminders to display for this patient.  Advance Directives   What are advance directives?  Advance directives are legal documents that state your wishes and plans for medical care. These plans are made ahead of time in case you lose your ability to make decisions for yourself. Advance directives can apply to any medical decision, such as the treatments you want, and if you want to donate organs.   What are the types of advance directives?  There are many types of advance directives, and each state has rules about how to use them. You may choose a combination of any of the following:  Living will:  This is a written record of the treatment you want. You can also choose which treatments you do not want, which to limit, and which to stop at a certain time. This includes surgery, medicine, IV fluid, and tube feedings.   Durable power of  for healthcare (DPAHC):  This is a written record that states who you want to make healthcare choices for you when you are unable to make them for yourself. This person, called a proxy, is usually a family member or a friend. You may choose more than 1 proxy.  Do not resuscitate (DNR) order:  A DNR order is used in case your heart stops beating or you stop breathing. It is a request not to have certain forms of treatment, such as CPR. A DNR order may be included in other types of advance directives.  Medical directive:  This covers the care that you want if you are in a coma, near death, or unable to make decisions for yourself. You can list the treatments you want for each condition. Treatment may include pain medicine, surgery, blood transfusions, dialysis, IV or tube feedings, and a ventilator  (breathing machine).  Values history:  This document has questions about your views, beliefs, and how you feel and think about life. This information can help others choose the care that you would choose.  Why are advance directives important?  An advance directive helps you control your care. Although spoken wishes may be used, it is better to have your wishes written down. Spoken wishes can be misunderstood, or not followed. Treatments may be given even if you do not want them. An advance directive may make it easier for your family to make difficult choices about your care.   Underweight  Underweight is defined as having a body mass index (BMI) of less than 18.5 kg/m2   Anorexia  is a loss of appetite, decreased food intake, or both. Your appetite naturally decreases as you get older. You also get full faster than you used to. This occurs because your body needs less energy. Other body changes can also lead to a decreased appetite. Even though some appetite loss is normal, you still need to get enough calories and nutrients to keep you healthy. You can start to lose too much weight if you do not eat as much food as your body needs. Unwanted weight loss can cause health problems, or worsen health problems you already have. You can also become dehydrated if you do not drink enough liquid.  How to eat healthy and get enough nutrients:   Choose healthy foods.  Eat a variety of fruits, vegetables, whole grains, low-fat dairy foods, lean meats, and other protein foods. Limit foods high in fat, sugar, and salt. Limit or avoid alcohol as directed. Work with a dietitian to help you plan your meals if you need to follow a special diet. A dietitian can also teach you how to modify foods if you have trouble chewing or swallowing.   Snack on healthy foods between meals  if you only eat a small amount during meals. Snacks provide extra healthy nutrients and calories between meals. Examples include fruit, cheese, and whole  grain crackers.   Drink liquids as directed  to avoid dehydration. Drink liquids between meals if they cause you to get full too quickly during meals. Ask how much liquid to drink each day and which liquids are best for you.   Use herbs, spices, and flavor enhancers to add flavor to foods.  Avoid using herbs and spice blends that also contain sodium. Ask your healthcare provider or dietitian about flavor enhancers. Flavor enhancers with ham, natural cardoso, and roast beef flavors can also be sprinkled on food to add flavor.   Share meals with others as often as you can.  Eating with others may help you to eat better during meal time. Ask family members, neighbors, or friends to join you for lunch. There are also senior centers where you can meet people, and share meals with them.   Ask family and friends for help  with shopping or preparing foods. Ask for a ride to the grocery store, if needed.       © Copyright [employer] 2018 Information is for End User's use only and may not be sold, redistributed or otherwise used for commercial purposes. All illustrations and images included in CareNotes® are the copyrighted property of [employer] or [employer]

## 2024-06-18 NOTE — PROGRESS NOTES
"Ambulatory Visit  Name: Brigid Brown      : 1945      MRN: [id number]  Encounter Provider: Delicia Cintron DO  Encounter Date: 2024   Encounter department: [facility] [place]    Assessment & Plan   1. Medicare annual wellness visit, subsequent  -Advised about tetanus booster if not completed within the past 10 years  -Immunizations otherwise up-to-date  2. Encounter for screening mammogram for malignant neoplasm of breast  -     Mammo screening bilateral w 3d & cad; Future; Expected date: 11/15/2024  3. Electrolyte abnormality    -Recent ER visit, received IV fluids and electrolytes repleted  -Patient advised to complete repeat CMP next week to monitor electrolytes  -Encouraged adequate nutrition and hydration, patient states she is unable to tolerate boost or Ensure supplements, advised about at home protein shakes that she can use ingredients that she enjoys such as peanut butter, did not tolerate Remeron, advised to take cyproheptadine       Preventive health issues were discussed with patient, and age appropriate screening tests were ordered as noted in patient's After Visit Summary. Personalized health advice and appropriate referrals for health education or preventive services given if needed, as noted in patient's After Visit Summary.    History of Present Illness     HPI     Patient had ER visit on 6/15/2024 for dehydration.  Patient states she is no longer taking the Remeron due to \" burning on tongue\" sensation when taking the tablet.  Patient states she will start cyproheptadine.    Per ER note:  79-year-old female with extensive PMH including HTN, IBS, and GERD who presented to the ED for dehydration has been ongoing for about a week.  Patient's labs were obtained and reviewed by the ED provider.  See ED course for more details about patient's ED stay.  Patient's labs showed concerns for hypomagnesemia, hyponatremia, and hypokalemia at which point her potassium was repleted " while in the emergency department with 40 mEq oral potassium.  Patient was also given 2 g of magnesium sulfate for magnesium repletion along with 1 L bolus of normal saline, 20 mg of Pepcid, 30 mL of Maalox, and 1 g of Carafate.  Through shared decision-making between the patient and the provider, the patient was planned for discharge.  Patient was advised to follow-up outpatient with her PCP and was also provided a prescription for potassium chloride along with mirtazapine for her symptoms.  Patient was encouraged to take the mirtazapine at night prior to bed due to the side effects of drowsiness.  Patient was also instructed to return to the ED if her symptoms worsen including but not limited to increasing diarrhea, fever, chills, nausea or vomiting, lightheadedness or dizziness, chest pain, shortness of breath, or changes in her behavior.       Patient Care Team:  Delicia Cintron DO as PCP - General (Family Medicine)  Yolis Brady MD as PCP - Endocrinology (Endocrinology)  MD Tatiana Cedeno MD John Hratko, MD Alan Westheim, MD Remy Wilson Mimms, DO (Endocrinology)    Review of Systems    As noted in HPI   Medical History Reviewed by provider this encounter:  Tobacco  Allergies  Meds  Problems  Med Hx  Surg Hx  Fam Hx       Annual Wellness Visit Questionnaire   Brigid is here for her Subsequent Wellness visit.     Health Risk Assessment:   Patient rates overall health as fair. Patient feels that their physical health rating is slightly worse. Patient is satisfied with their life. Eyesight was rated as same. Hearing was rated as same. Patient feels that their emotional and mental health rating is same. Patients states they are never, rarely angry. Patient states they are always unusually tired/fatigued. Pain experienced in the last 7 days has been a lot. Patient's pain rating has been 6/10.     Fall Risk Screening:   In the past year, patient has experienced: no history of falling in past year       Urinary Incontinence Screening:   Patient has not leaked urine accidently in the last six months.     Home Safety:  Patient does not have trouble with stairs inside or outside of their home. Patient has working smoke alarms and has working carbon monoxide detector. Home safety hazards include: none.     Nutrition:   Current diet is Regular.     Medications:   Patient is currently taking over-the-counter supplements. OTC medications include: see medication list. Patient is able to manage medications.     Activities of Daily Living (ADLs)/Instrumental Activities of Daily Living (IADLs):   Walk and transfer into and out of bed and chair?: Yes  Dress and groom yourself?: Yes    Bathe or shower yourself?: Yes    Feed yourself? Yes  Do your laundry/housekeeping?: Yes  Manage your money, pay your bills and track your expenses?: Yes  Make your own meals?: Yes    Do your own shopping?: Yes    Previous Hospitalizations:   Any hospitalizations or ED visits within the last 12 months?: No      Advance Care Planning:   Living will: Yes    Advanced directive: Yes      Cognitive Screening:   Provider or family/friend/caregiver concerned regarding cognition?: No    PREVENTIVE SCREENINGS      Cardiovascular Screening:    General: Screening Current      Diabetes Screening:     General: Screening Current      Colorectal Cancer Screening:     General: Screening Current      Breast Cancer Screening:     General: Screening Current      Cervical Cancer Screening:    General: Screening Not Indicated      Osteoporosis Screening:    General: History Osteoporosis, Risks and Benefits Discussed and Patient Declines      Abdominal Aortic Aneurysm (AAA) Screening:        General: Screening Current      Lung Cancer Screening:     General: Screening Not Indicated      Hepatitis C Screening:    General: Screening Current    Screening, Brief Intervention, and Referral to Treatment (SBIRT)    Screening  Typical number of drinks in a day:  0  Typical number of drinks in a week: 0  Interpretation: Low risk drinking behavior.    Single Item Drug Screening:  How often have you used an illegal drug (including marijuana) or a prescription medication for non-medical reasons in the past year? never    Single Item Drug Screen Score: 0  Interpretation: Negative screen for possible drug use disorder    Other Counseling Topics:   Car/seat belt/driving safety, sunscreen and calcium and vitamin D intake and regular weightbearing exercise. Sees Dermatology for yearly skin checks     Social Determinants of Health     Food Insecurity: No Food Insecurity (6/18/2024)    Hunger Vital Sign     Worried About Running Out of Food in the Last Year: Never true     Ran Out of Food in the Last Year: Never true   Transportation Needs: No Transportation Needs (6/18/2024)    PRAPARE - Transportation     Lack of Transportation (Medical): No     Lack of Transportation (Non-Medical): No   Housing Stability: Low Risk  (6/18/2024)    Housing Stability Vital Sign     Unable to Pay for Housing in the Last Year: No     Number of Times Moved in the Last Year: 1     Homeless in the Last Year: No   Utilities: Not At Risk (6/18/2024)    [facility] Utilities     Threatened with loss of utilities: No     No results found.    Objective     LMP  (LMP Unknown)       Administrative Statements   I have spent a total time of 15 minutes on 06/18/24 In caring for this patient including Documenting in the medical record.        Virtual AWV Consent    Verification of patient location:    Patient is located at Home in the following state in which I hold an active license [place]    The patient was identified by name and date of birth. Brigidisrael Brown was informed that this is a telemedicine visit and that the visit is being conducted through  [facility] . My office door was closed. The patient was notified the following individuals were present in the room :RICHARD.  She acknowledged consent and understanding of privacy and  security of the video platform. The patient has agreed to participate and understands they can discontinue the visit at any time.    Patient is aware this is a billable service.     Reason for visit is AWV    Encounter provider Delicia Cintron DO    Provider located at [place] [address] [facility] [id number]      Recent Visits  No visits were found meeting these conditions.  Showing recent visits within past 7 days and meeting all other requirements  Today's Visits  Date Type Provider Dept   06/18/24 Telemedicine Delicia Cintron DO [facility]   Showing today's visits and meeting all other requirements  Future Appointments  No visits were found meeting these conditions.  Showing future appointments within next 150 days and meeting all other requirements           Visit Time  Total Visit Duration: 15 minutes

## 2024-06-19 ENCOUNTER — TELEPHONE (OUTPATIENT)
Age: 79
End: 2024-06-19

## 2024-06-19 NOTE — TELEPHONE ENCOUNTER
Patient asking if she is supposed to continue taking Sulcrafate, potassium, and prilosec?  Osiris Deng

## 2024-06-20 ENCOUNTER — TELEPHONE (OUTPATIENT)
Age: 79
End: 2024-06-20

## 2024-06-20 NOTE — TELEPHONE ENCOUNTER
She can try adding more fiber rich foods to her diet, more whole grains, fresh fruits and vegetables.  Ensure adequate hydration with water.  If she is having diarrhea she can take Imodium as needed.  I would recommend an in office evaluation with Dr. Cintron if symptoms persist.

## 2024-06-20 NOTE — TELEPHONE ENCOUNTER
Pt called to say her bowel are not right. Pt states they are loose and thin for the past two days. She denies any abdominal pain. She is not sure if its one of her meds. Pt is aware Dr Cintron is out of the office and would like Jojo to advise her. Pt declined an appt.

## 2024-06-25 ENCOUNTER — APPOINTMENT (OUTPATIENT)
Dept: LAB | Facility: MEDICAL CENTER | Age: 79
End: 2024-06-25
Payer: MEDICARE

## 2024-06-25 DIAGNOSIS — E87.6 HYPOKALEMIA: ICD-10-CM

## 2024-06-25 LAB
ALBUMIN SERPL BCG-MCNC: 3.9 G/DL (ref 3.5–5)
ALP SERPL-CCNC: 50 U/L (ref 34–104)
ALT SERPL W P-5'-P-CCNC: 19 U/L (ref 7–52)
ANION GAP SERPL CALCULATED.3IONS-SCNC: 12 MMOL/L (ref 4–13)
AST SERPL W P-5'-P-CCNC: 18 U/L (ref 13–39)
BILIRUB SERPL-MCNC: 0.61 MG/DL (ref 0.2–1)
BUN SERPL-MCNC: 13 MG/DL (ref 5–25)
CALCIUM SERPL-MCNC: 9.2 MG/DL (ref 8.4–10.2)
CHLORIDE SERPL-SCNC: 95 MMOL/L (ref 96–108)
CO2 SERPL-SCNC: 30 MMOL/L (ref 21–32)
CREAT SERPL-MCNC: 0.81 MG/DL (ref 0.6–1.3)
GFR SERPL CREATININE-BSD FRML MDRD: 68 ML/MIN/1.73SQ M
GLUCOSE P FAST SERPL-MCNC: 94 MG/DL (ref 65–99)
POTASSIUM SERPL-SCNC: 3.5 MMOL/L (ref 3.5–5.3)
PROT SERPL-MCNC: 6.6 G/DL (ref 6.4–8.4)
SODIUM SERPL-SCNC: 137 MMOL/L (ref 135–147)

## 2024-06-25 PROCEDURE — 80053 COMPREHEN METABOLIC PANEL: CPT

## 2024-06-25 PROCEDURE — 36415 COLL VENOUS BLD VENIPUNCTURE: CPT

## 2024-06-25 NOTE — TELEPHONE ENCOUNTER
Patient should continue Carafate and Prilosec.  I will recheck potassium level with current lab work and advise about potassium supplement if needed.

## 2024-06-26 ENCOUNTER — OFFICE VISIT (OUTPATIENT)
Dept: ENDOCRINOLOGY | Facility: CLINIC | Age: 79
End: 2024-06-26
Payer: MEDICARE

## 2024-06-26 VITALS
BODY MASS INDEX: 15.46 KG/M2 | DIASTOLIC BLOOD PRESSURE: 68 MMHG | HEART RATE: 94 BPM | SYSTOLIC BLOOD PRESSURE: 112 MMHG | OXYGEN SATURATION: 98 % | HEIGHT: 62 IN | WEIGHT: 84 LBS

## 2024-06-26 DIAGNOSIS — E06.3 HASHIMOTO'S DISEASE: Primary | ICD-10-CM

## 2024-06-26 DIAGNOSIS — R63.4 WEIGHT LOSS: ICD-10-CM

## 2024-06-26 PROCEDURE — 99214 OFFICE O/P EST MOD 30 MIN: CPT | Performed by: STUDENT IN AN ORGANIZED HEALTH CARE EDUCATION/TRAINING PROGRAM

## 2024-06-26 NOTE — PROGRESS NOTES
Brigid Brown 79 y.o. female MRN: [id number]    Encounter: [id number]      Assessment & Plan     Problem List Items Addressed This Visit     Hashimoto's disease - Primary     We discussed HPT axis anatomy and physiology. I discussed pathophysiology of hashimoto's and hypothyroidism. Presently, biochemical euthyroidism is observed, and there is no role for thyroid hormone replacement. Periodic monitoring of TFTs suggested on annual basis or PRN based on symptoms.          Weight loss     I explained that an endocrine etiology for weight loss was not apparent. Will offer am cortisol for completeness.          Relevant Orders    Cortisol Level,7-9 AM Specimen     RTC PRN    CC: abnormal thyroid blood test    History of Present Illness     HPI:    Brigid presents today for evaluation of abnormal thyroid labs and weight loss. She is joined by her son. There is several month history of fatigue, weight loss and difficulty with taking a deep breath. Patient has been having GI intolerance to meals, and is experiencing burning in the stomach. She is undergoing GI eval. She does report diarrheal illness.     Review of Systems   Constitutional:  Positive for fatigue and unexpected weight change.   Gastrointestinal:  Positive for abdominal pain.   Endocrine: Negative for polydipsia and polyuria.   Neurological:  Negative for tremors.   All other systems reviewed and are negative.      Historical Information   Past Medical History:   Diagnosis Date   • Acne    • Basal cell carcinoma 06/08/2020    right lower forehead   • BCC (basal cell carcinoma of skin) 03/09/2020    mid forehead   • Benign neoplasm of skin    • Disease of thyroid gland 2006   • GERD (gastroesophageal reflux disease)    • Hypertension    • Inflamed seborrheic keratosis    • Irritable bowel syndrome    • Malignant neoplasm of skin of face    • Migraines    • Nonmelanoma skin cancer     Last Assessed:6/27/17   • Squamous cell skin cancer 06/08/2020    In situ, left  lower forehead   • Tachycardia    • Telogen effluvium    • Temporomandibular joint disorder    • Vertigo      Past Surgical History:   Procedure Laterality Date   • ADENOIDECTOMY     • APPENDECTOMY     • CHOLECYSTECTOMY     • COLONOSCOPY  05/03/2019   • COMPLEX WOUND CLOSURE TO EXTREMITY N/A 7/28/2020    Procedure: COMPLEX CLOSURE MID FOREHEAD;  Surgeon: Randy Frank MD;  Location: [facility];  Service: Plastics   • ESOPHAGOGASTRODUODENOSCOPY  2009   • FLAP LOCAL HEAD / NECK N/A 7/28/2020    Procedure: FLAP MID FOREHEAD;  Surgeon: Randy Frank MD;  Location: [facility];  Service: Plastics   • HYSTERECTOMY  1987   • MALIGNANT SKIN LESION EXCISION      Excision of Lesion Face Malignant-9/14/2004 BCC Forehead   • MOHS RECONSTRUCTION N/A 7/28/2020    Procedure: RECONSTRUCTION MOHS DEFECT MID FOREHEAD;  Surgeon: Randy Frank MD;  Location: [facility];  Service: Plastics   • MOHS SURGERY  07/27/2020    Right &left lower forehead, mid forehead   • OOPHORECTOMY Bilateral 1987   • ROTATOR CUFF REPAIR Right    • SKIN BIOPSY     • TONSILLECTOMY     • TUBAL LIGATION  1976?     Social History   Social History     Substance and Sexual Activity   Alcohol Use No     Social History     Substance and Sexual Activity   Drug Use No     Social History     Tobacco Use   Smoking Status Never   Smokeless Tobacco Never     Family History:   Family History   Problem Relation Age of Onset   • Hypertension Mother         Mother   • Osteoporosis Mother    • Skin cancer Mother    • Migraines Mother    • Dementia Mother    • Hypertension Father         Father   • Skin cancer Father    • Dementia Father    • Skin cancer Sister 73   • Migraines Sister    • No Known Problems Daughter    • Uterine cancer Maternal Grandmother 29   • Diabetes type II Maternal Grandfather    • Cancer Paternal Grandmother    • Uterine cancer Paternal Grandmother 65   • No Known Problems Maternal Aunt    • Cancer Paternal Aunt         Breast   •  Uterine cancer Paternal Aunt 55   • No Known Problems Paternal Aunt    • No Known Problems Paternal Aunt        Meds/Allergies   Current Outpatient Medications   Medication Sig Dispense Refill   • ascorbic acid (VITAMIN C) 500 mg tablet Take by mouth     • Digestive Enzyme CAPS Take by mouth     • diltiazem (CARDIZEM CD) 240 mg 24 hr capsule Take 1 capsule (240 mg total) by mouth daily May give patient  the stock bottle 90 capsule 1   • lactase (LACTAID) 3,000 units tablet Take by mouth     • Minoxidil 5 % FOAM Apply topically     • Multiple Vitamins-Minerals (CENTRUM SILVER ULTRA WOMENS PO) Take by mouth     • omeprazole (PriLOSEC) 40 MG capsule Take 1 capsule (40 mg total) by mouth daily 30 capsule 2   • polyethylene glycol-propylene glycol (SYSTANE) 0.4-0.3 %      • Premarin vaginal cream Insert 1 g into the vagina once a week Brand Necessary 30 g 1   • Alpha-D-Galactosidase (BEANO) TABS Take by mouth (Patient not taking: Reported on 4/22/2024)     • cholestyramine (QUESTRAN) 4 g packet Take 1 packet by mouth 3 (three) times a day with meals (Patient not taking: Reported on 5/9/2024)     • Diclofenac Sodium (VOLTAREN) 1 % Apply 2 g topically 4 (four) times a day (Patient not taking: Reported on 4/22/2024) 240 g 1   • esomeprazole (NexIUM) 40 MG capsule Take 1 capsule (40 mg total) by mouth every morning 30 capsule 1   • famotidine (PEPCID) 10 mg tablet Take 10 mg by mouth 2 (two) times a day (Patient not taking: Reported on 4/22/2024)     • mirtazapine (REMERON SOL-TAB) 15 mg disintegrating tablet Take 1 tablet (15 mg total) by mouth daily at bedtime 30 tablet 0   • potassium chloride (Klor-Con M10) 10 mEq tablet Take 2 tablets (20 mEq total) by mouth daily for 4 days 8 tablet 0   • sucralfate (CARAFATE) 1 g tablet Take 1 tablet (1 g total) by mouth 3 (three) times a day as needed (upper abdominal discomfort) 90 tablet 2     No current facility-administered medications for this visit.     Allergies   Allergen  "Reactions   • Cortisone Hypertension, Other (See Comments), Shortness Of Breath and Tachycardia   • Acebutolol    • Acetaminophen GI Intolerance     diarrhea   • Amlodipine    • Atenolol    • Azithromycin    • Barium Sulfate Diarrhea     Patient reports watery stool and stomach ache post fluoro upper GI on 6/6/24. Per Radiologist MD Lozada, reaction should not absolutely preclude patient from having barium in the future if necessary. CG RN 6/7/24     • Bisphosphonates      Annotation - [id number]: iriitis   • Candesartan    • Clarithromycin    • Erythromycin    • Fosinopril    • Irbesartan    • Levofloxacin    • Losartan    • Methylprednisolone Hypertension and Tachycardia   • Metoprolol    • Nisoldipine    • Olmesartan    • Propranolol    • Sulfamethoxazole-Trimethoprim Nausea Only   • Timolol    • Lidocaine Palpitations and Tachycardia       Objective   Vitals: Blood pressure 112/68, pulse 94, height 5' 2\" (1.575 m), weight 38.1 kg (84 lb), SpO2 98%, not currently breastfeeding.    Physical Exam  Vitals reviewed.   Constitutional:       General: She is not in acute distress.     Appearance: Normal appearance.   HENT:      Head: Normocephalic and atraumatic.      Nose: Nose normal.   Eyes:      General: No scleral icterus.     Conjunctiva/sclera: Conjunctivae normal.   Cardiovascular:      Rate and Rhythm: Normal rate and regular rhythm.   Pulmonary:      Effort: Pulmonary effort is normal. No respiratory distress.   Musculoskeletal:         General: No deformity.   Skin:     General: Skin is warm and dry.   Neurological:      General: No focal deficit present.      Mental Status: She is alert.   Psychiatric:         Mood and Affect: Mood normal.         Behavior: Behavior normal.         The history was obtained from the review of the chart, patient and family.    Lab Results:   Lab Results   Component Value Date/Time    TSH 3RD GENERATON 1.753 06/10/2024 05:55 PM    TSH 3RD GENERATON 3.363 05/09/2024 10:56 AM    [facility] [facility] 02/20/2024 07:02 AM    Free T4 0.78 02/20/2024 07:02 AM       Imaging Studies:   Results for orders placed during the hospital encounter of 05/17/24    US thyroid    Impression  No nodule meets current ACR criteria for requiring biopsy or follow-up ultrasounds.        Reference: ACR Thyroid Imaging, Reporting and Data System (TI-RADS): White Paper of the ACR TI-RADS Committee. J AM Daljit Radiol 2017;14:587-595. Additional recommendations based on American Thyroid Association 2015 guidelines.      Workstation performed: [id number]      I have personally reviewed pertinent reports.   and I have personally reviewed pertinent films in PACS. Thyroid US reveals heterogeneous gland c/w autoimmune thyroiditis    Portions of the record may have been created with voice recognition software. Occasional wrong word or \"sound a like\" substitutions may have occurred due to the inherent limitations of voice recognition software. Read the chart carefully and recognize, using context, where substitutions have occurred.    "

## 2024-06-26 NOTE — ASSESSMENT & PLAN NOTE
We discussed HPT axis anatomy and physiology. I discussed pathophysiology of hashimoto's and hypothyroidism. Presently, biochemical euthyroidism is observed, and there is no role for thyroid hormone replacement. Periodic monitoring of TFTs suggested on annual basis or PRN based on symptoms.

## 2024-06-26 NOTE — ASSESSMENT & PLAN NOTE
I explained that an endocrine etiology for weight loss was not apparent. Will offer am cortisol for completeness.

## 2024-06-27 ENCOUNTER — OFFICE VISIT (OUTPATIENT)
Dept: GASTROENTEROLOGY | Facility: CLINIC | Age: 79
End: 2024-06-27
Payer: MEDICARE

## 2024-06-27 VITALS
HEIGHT: 62 IN | BODY MASS INDEX: 15.46 KG/M2 | HEART RATE: 86 BPM | WEIGHT: 84 LBS | DIASTOLIC BLOOD PRESSURE: 68 MMHG | SYSTOLIC BLOOD PRESSURE: 123 MMHG

## 2024-06-27 DIAGNOSIS — R19.7 DIARRHEA, UNSPECIFIED TYPE: ICD-10-CM

## 2024-06-27 DIAGNOSIS — K21.9 GASTROESOPHAGEAL REFLUX DISEASE WITHOUT ESOPHAGITIS: ICD-10-CM

## 2024-06-27 DIAGNOSIS — R63.4 UNINTENTIONAL WEIGHT LOSS: ICD-10-CM

## 2024-06-27 DIAGNOSIS — R63.4 WEIGHT LOSS, ABNORMAL: ICD-10-CM

## 2024-06-27 DIAGNOSIS — R10.84 GENERALIZED POSTPRANDIAL ABDOMINAL PAIN: Primary | ICD-10-CM

## 2024-06-27 PROCEDURE — 99214 OFFICE O/P EST MOD 30 MIN: CPT | Performed by: PHYSICIAN ASSISTANT

## 2024-06-27 NOTE — PROGRESS NOTES
[facility] - Outpatient Follow-up Note  Brigid Brown 79 y.o. female MRN: [id number]  Encounter: [id number]          ASSESSMENT AND PLAN:      1. Generalized postprandial abdominal pain  Patient with burning pain in abdomen with negative GI workup, did have erosive gastritis which subsequently was treated with Carafate and was given pantoprazole which then provided little relief,    2. Unintentional weight loss  Patient with unintentional weight loss, obviously concern for underlying malignancy due to fullness in the mesenteric root area, the PET/CT has been scheduled for further evaluation so we will await those results, if this is negative may need further imaging such as MRI especially if pancreatic elastase is low.  Encourage patient to go back on Remeron to help facilitate weight gain.  - Ambulatory referral to Gastroenterology    3. Diarrhea  Patient with diarrheal symptoms and does have chronic IBS D but recently noticed exacerbation of her symptoms but recently had antibiotics, rule out infectious etiology such as C. difficile    4. Gastroesophageal reflux disease without esophagitis  Patient with history of GERD as per chart but really had more symptoms of erosive gastritis and then had significant decline in weight    ______________________________________________________________________    SUBJECTIVE:    78 y/o returns for follow-up of abdominal pain and weight loss. This has been ongoing issue and she initially underwent EGD through the [facility] system with report of gastritis though records of this are not currently available through epic. Took a course of oral sucralfate which she reports was initially helpful but then symptoms worsened again. Was started on pantoprazole which has had no effect. Reports an approximate 20 pound weight loss over this time. Reported discomfort in the bilateral lower quadrants primarily but on exam is quite tender at the epigastrium.  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 No blood in her stool, no nausea or vomiting, fever or chills, jaundice or rash, changes in bowel habit. She does report early satiety. Has undergone prior evaluation including CT scan with IV but not oral contrast which was unrevealing, colonoscopy to the cecum with normal random biopsies. Mesenteric Doppler ultrasound with no evidence of occlusion of the celiac or superior mesenteric arteries. CBC, LFTs, lipase reviewed and unremarkable.   Upper GI exam was ordered at last OV which demonstrates very mild esophagoesophageal reflux and intermittent tertiary contractions when the patient is recumbent. No gastroesophageal reflux was directly observed during the exam. Unremarkable small bowel follow-through. Contrast is identified within the ascending colon within 30 minutes.  CAT scan was recently repeated of chest abdomen and pelvis which showed soft tissue prominence of the mesenteric root, especially surrounding the superior mesenteric artery, increased when compared to a CT abdomen/pelvis dated April 9, 2024. These findings are nonspecific with differential considerations including sclerosing mesenteritis, malignancy/metastatic disease, inflammatory process, lymphoma, among other etiologies. A PET/CT scan is recommended for further evaluation. A short-term follow-up CT abdomen/pelvis in 3 months is also recommended. Fecal calprotectin mildly elevated at 149 which is nonspecific.   She reports that she gets burning on her side but not in her actual epigastric area and initially the Carafate helped with his burning.  She was placed on Remeron but then she stopped taking it because she was making herself eat.  She reports that she is eating which is has no desire to eat and she does have early satiety.  She reports her diarrhea has gotten worse after she got the barium but then around that time it looks like she also was treated with antibiotics.  She  reports that sometimes stool will be oily and float.  Did have fecal calprotectin back in 2020 which was normal.  Patient reports he generally can eat and does not have significant early satiety but just no actual desire.  She reports she had been losing weight but over the past few years she was up to 130 pounds but then had progressive weight loss which she attributes to her gastritis in February.                REVIEW OF SYSTEMS IS OTHERWISE NEGATIVE.      Historical Information   Past Medical History:   Diagnosis Date    Acne     Basal cell carcinoma 06/08/2020    right lower forehead    BCC (basal cell carcinoma of skin) 03/09/2020    mid forehead    Benign neoplasm of skin     Disease of thyroid gland 2006    GERD (gastroesophageal reflux disease)     Hypertension     Inflamed seborrheic keratosis     Irritable bowel syndrome     Malignant neoplasm of skin of face     Migraines     Nonmelanoma skin cancer     Last Assessed:6/27/17    Squamous cell skin cancer 06/08/2020    In situ, left lower forehead    Tachycardia     Telogen effluvium     Temporomandibular joint disorder     Vertigo      Past Surgical History:   Procedure Laterality Date    ADENOIDECTOMY      APPENDECTOMY      CHOLECYSTECTOMY      COLONOSCOPY  05/03/2019    COMPLEX WOUND CLOSURE TO EXTREMITY N/A 7/28/2020    Procedure: COMPLEX CLOSURE MID FOREHEAD;  Surgeon: Randy Frank MD;  Location: [facility];  Service: Plastics    ESOPHAGOGASTRODUODENOSCOPY  2009    FLAP LOCAL HEAD / NECK N/A 7/28/2020    Procedure: FLAP MID FOREHEAD;  Surgeon: Randy Frank MD;  Location: [facility];  Service: Plastics    HYSTERECTOMY  1987    MALIGNANT SKIN LESION EXCISION      Excision of Lesion Face Malignant-9/14/2004 BCC Forehead    MOHS RECONSTRUCTION N/A 7/28/2020    Procedure: RECONSTRUCTION MOHS DEFECT MID FOREHEAD;  Surgeon: Randy Frank MD;  Location: [facility];  Service: Plastics    MOHS SURGERY  07/27/2020    Right &left lower  forehead, mid forehead    OOPHORECTOMY Bilateral 1987    ROTATOR CUFF REPAIR Right     SKIN BIOPSY      TONSILLECTOMY      TUBAL LIGATION  1976?     Social History   Social History     Substance and Sexual Activity   Alcohol Use No     Social History     Substance and Sexual Activity   Drug Use No     Social History     Tobacco Use   Smoking Status Never   Smokeless Tobacco Never     Family History   Problem Relation Age of Onset    Hypertension Mother         Mother    Osteoporosis Mother     Skin cancer Mother     Migraines Mother     Dementia Mother     Hypertension Father         Father    Skin cancer Father     Dementia Father     Skin cancer Sister 73    Migraines Sister     No Known Problems Daughter     Uterine cancer Maternal Grandmother 29    Diabetes type II Maternal Grandfather     Cancer Paternal Grandmother     Uterine cancer Paternal Grandmother 65    No Known Problems Maternal Aunt     Cancer Paternal Aunt         Breast    Uterine cancer Paternal Aunt 55    No Known Problems Paternal Aunt     No Known Problems Paternal Aunt        Meds/Allergies       Current Outpatient Medications:     Alpha-D-Galactosidase (BEANO) TABS    ascorbic acid (VITAMIN C) 500 mg tablet    cholestyramine (QUESTRAN) 4 g packet    Diclofenac Sodium (VOLTAREN) 1 %    Digestive Enzyme CAPS    diltiazem (CARDIZEM CD) 240 mg 24 hr capsule    esomeprazole (NexIUM) 40 MG capsule    famotidine (PEPCID) 10 mg tablet    lactase (LACTAID) 3,000 units tablet    Minoxidil 5 % FOAM    mirtazapine (REMERON SOL-TAB) 15 mg disintegrating tablet    Multiple Vitamins-Minerals (CENTRUM SILVER ULTRA WOMENS PO)    omeprazole (PriLOSEC) 40 MG capsule    polyethylene glycol-propylene glycol (SYSTANE) 0.4-0.3 %    potassium chloride (Klor-Con M10) 10 mEq tablet    Premarin vaginal cream    sucralfate (CARAFATE) 1 g tablet    Allergies   Allergen Reactions    Cortisone Hypertension, Other (See Comments), Shortness Of Breath and Tachycardia     Acebutolol     Acetaminophen GI Intolerance     diarrhea    Amlodipine     Atenolol     Azithromycin     Barium Sulfate Diarrhea     Patient reports watery stool and stomach ache post fluoro upper GI on 6/6/24. Per Radiologist MD Lozada, reaction should not absolutely preclude patient from having barium in the future if necessary. CG RN 6/7/24      Bisphosphonates      Annotation - [id number]: iriitis    Candesartan     Clarithromycin     Erythromycin     Fosinopril     Irbesartan     Levofloxacin     Losartan     Methylprednisolone Hypertension and Tachycardia    Metoprolol     Nisoldipine     Olmesartan     Propranolol     Sulfamethoxazole-Trimethoprim Nausea Only    Timolol     Lidocaine Palpitations and Tachycardia           Objective     not currently breastfeeding. There is no height or weight on file to calculate BMI.      PHYSICAL EXAM:      General Appearance:   Alert, cooperative, no distress   HEENT:   Normocephalic, atraumatic, anicteric.     Neck:  Supple, symmetrical, trachea midline   Lungs:   Clear to auscultation bilaterally; no rales, rhonchi or wheezing; respirations unlabored    Heart::   Regular rate and rhythm; no murmur, rub, or gallop.   Abdomen:   Soft, non-tender, non-distended; normal bowel sounds; no masses, no organomegaly    Genitalia:   Deferred    Rectal:   Deferred    Extremities:  No cyanosis, clubbing or edema    Pulses:  2+ and symmetric    Skin:  No jaundice, rashes, or lesions    Lymph nodes:  No palpable cervical lymphadenopathy        Lab Results:   No visits with results within 1 Day(s) from this visit.   Latest known visit with results is:   Appointment on 06/25/2024   Component Date Value    Sodium 06/25/2024 137     Potassium 06/25/2024 3.5     Chloride 06/25/2024 95 (L)     CO2 06/25/2024 30     ANION GAP 06/25/2024 12     BUN 06/25/2024 13     Creatinine 06/25/2024 0.81     Glucose, Fasting 06/25/2024 94     Calcium 06/25/2024 9.2     AST 06/25/2024 18     ALT 06/25/2024 19      Alkaline Phosphatase 06/25/2024 50     Total Protein 06/25/2024 6.6     Albumin 06/25/2024 3.9     Total Bilirubin 06/25/2024 0.61     eGFR 06/25/2024 68          Radiology Results:   CT chest abdomen pelvis w contrast    Result Date: 6/10/2024  Narrative: CT CHEST, ABDOMEN AND PELVIS WITH IV CONTRAST INDICATION: weight loss  right mid abdominal pain. COMPARISON: CT abdomen pelvis dated April 9, 2024. TECHNIQUE: CT examination of the chest, abdomen and pelvis was performed. Multiplanar 2D reformatted images were created from the source data. This examination, like all CT scans performed in the [facility], was performed utilizing techniques to minimize radiation dose exposure, including the use of iterative reconstruction and automated exposure control. Radiation dose length product (DLP) for this visit: 356 mGy-cm IV Contrast: 100 mL of iohexol (OMNIPAQUE) Enteric Contrast: Administered. FINDINGS: CHEST LUNGS: There is no infiltrate or pleural effusion. There is a linear focus of scarring versus atelectasis at the lateral left lung base. No suspicious pulmonary nodules. No tracheal or endobronchial lesion. PLEURA: There is a stable 4 mm right perifissural nodule (series 302 image 216) most compatible with an intrapulmonary lymph node. HEART/GREAT VESSELS: Heart is unremarkable for patient's age. No thoracic aortic aneurysm. MEDIASTINUM AND LAMAR: Unremarkable. CHEST WALL AND LOWER NECK: Unremarkable. ABDOMEN LIVER/BILIARY TREE: Stable size of an enhancing right inferior hepatic lobe lesion measuring 18 mm, previously described as a hemangioma on MRI abdomen dated December 9, 2019. Stable additional subcentimeter low-attenuation hepatic lesions, too small to accurately characterize. GALLBLADDER: No calcified gallstones. No pericholecystic inflammatory change. SPLEEN: Unremarkable. PANCREAS: Unremarkable. ADRENAL GLANDS: Unremarkable. KIDNEYS/URETERS: Unremarkable. No hydronephrosis. STOMACH AND  BOWEL: Unremarkable. APPENDIX: No findings to suggest appendicitis. ABDOMINOPELVIC CAVITY: Small amount of pelvic free fluid. No pneumoperitoneum. No lymphadenopathy. There is soft tissue prominence of the mesenteric root, particular surrounding the superior mesenteric artery which has increased from the prior exam. Mild  diffuse mesenteric edema is also mildly increased from the prior exam. VESSELS: Unremarkable for patient's age. PELVIS REPRODUCTIVE ORGANS: Post hysterectomy. URINARY BLADDER: Unremarkable. ABDOMINAL WALL/INGUINAL REGIONS: Unremarkable. BONES: No acute fracture or suspicious osseous lesion.     Impression: No acute intra-abdominal abnormality. Trace pelvic free fluid. Soft tissue prominence of the mesenteric root, especially surrounding the superior mesenteric artery, increased when compared to a CT abdomen/pelvis dated April 9, 2024. These findings are nonspecific with differential considerations including sclerosing  mesenteritis, malignancy/metastatic disease, inflammatory process, lymphoma, among other etiologies. A PET/CT scan is recommended for further evaluation. A short-term follow-up CT abdomen/pelvis in 3 months is also recommended. Clear lungs. Stable right hepatic lobe hemangioma. Workstation performed: [id number]     FL upper GI / small bowel with air    Result Date: 6/6/2024  Narrative: UPPER GI SERIES AND SMALL BOWEL FOLLOW THROUGH - DOUBLE CONTRAST INDICATION: R63.4: Abnormal weight loss R10.84: Generalized abdominal pain. COMPARISON: CT abdomen pelvis 4/9/2024 TECHNIQUE:  view of the abdomen was obtained and is unremarkable. An upper GI series was then performed using effervescent granules and barium. Finally, a small bowel follow through was performed including spot images of the terminal ileum. IMAGES: 130 FLUOROSCOPY TIME: 2.2 minutes FINDINGS: The esophagus is normal in caliber. Esophageal motility is normal and emptying of contrast from the esophagus is prompt with the  patient is upright. Intermittent tertiary contractions and mild esophagoesophageal reflux is noted when the patient is recumbent.. There is no evidence of discrete mucosal mass, ulceration or fold thickening. The stomach is unremarkable in size. The gastric mucosa is normal. No evidence of discrete penetrating ulcer or mass. Tiny gastric diverticulum is noted along the fundus. Contrast empties promptly into the duodenum. The duodenum is normal in caliber. The duodenojejunal junction is in its normal left upper quadrant location. Gastroesophageal reflux was not observed. There is no hiatal hernia. Normal caliber small bowel loops. There is a normal fold pattern and thickness. Dense contrast obscures fine mucosal detail. No evidence of discrete intraluminal filling defects, bowel kinking or fistula. Contrast is seen reaching the colon within 30 minutes. Visualized portions of the terminal ileum are unremarkable.     Impression: 1. Upper GI exam demonstrates very mild esophagoesophageal reflux and intermittent tertiary contractions when the patient is recumbent. No gastroesophageal reflux was directly observed during the exam. 2. Unremarkable small bowel follow-through. Contrast is identified within the ascending colon within 30 minutes. Workstation performed: [id number]

## 2024-07-03 ENCOUNTER — APPOINTMENT (OUTPATIENT)
Dept: LAB | Facility: MEDICAL CENTER | Age: 79
End: 2024-07-03

## 2024-07-06 ENCOUNTER — APPOINTMENT (OUTPATIENT)
Dept: LAB | Facility: MEDICAL CENTER | Age: 79
End: 2024-07-06
Payer: MEDICARE

## 2024-07-06 DIAGNOSIS — R19.7 DIARRHEA, UNSPECIFIED TYPE: ICD-10-CM

## 2024-07-06 DIAGNOSIS — R63.4 WEIGHT LOSS, ABNORMAL: ICD-10-CM

## 2024-07-06 PROCEDURE — 87506 IADNA-DNA/RNA PROBE TQ 6-11: CPT

## 2024-07-06 PROCEDURE — 82653 EL-1 FECAL QUANTITATIVE: CPT

## 2024-07-07 LAB
C COLI+JEJUNI TUF STL QL NAA+PROBE: NEGATIVE
C DIFF TOX B TCDB STL QL NAA+PROBE: NEGATIVE
CRYPTOSP DNA SPEC QL NAA+PROBE: NEGATIVE
E HISTOLYT DNA SPEC QL NAA+PROBE: NEGATIVE
EC STX1+STX2 GENES STL QL NAA+PROBE: NEGATIVE
G LAMBLIA DNA SPEC QL NAA+PROBE: NEGATIVE
SALMONELLA SP SPAO STL QL NAA+PROBE: NEGATIVE
SHIGELLA SP+EIEC IPAH STL QL NAA+PROBE: NEGATIVE

## 2024-07-08 ENCOUNTER — HOSPITAL ENCOUNTER (OUTPATIENT)
Dept: RADIOLOGY | Age: 79
Discharge: HOME | End: 2024-07-08
Payer: MEDICARE

## 2024-07-08 DIAGNOSIS — R93.5 ABNORMAL FINDINGS ON DIAGNOSTIC IMAGING OF ABDOMEN: ICD-10-CM

## 2024-07-08 DIAGNOSIS — D48.4 NEOPLASM OF UNCERTAIN BEHAVIOR OF MESENTERY: ICD-10-CM

## 2024-07-08 LAB — GLUCOSE SERPL-MCNC: 98 MG/DL (ref 65–140)

## 2024-07-08 PROCEDURE — 78815 PET IMAGE W/CT SKULL-THIGH: CPT

## 2024-07-08 PROCEDURE — A9552 F18 FDG: HCPCS

## 2024-07-08 PROCEDURE — 82948 REAGENT STRIP/BLOOD GLUCOSE: CPT

## 2024-07-11 ENCOUNTER — TELEPHONE (OUTPATIENT)
Age: 79
End: 2024-07-11

## 2024-07-11 ENCOUNTER — TELEPHONE (OUTPATIENT)
Dept: OTHER | Facility: OTHER | Age: 79
End: 2024-07-11

## 2024-07-11 NOTE — TELEPHONE ENCOUNTER
Patient would like Dr. Cintron to advise her if she should do a biopsy when her weight is 80 lb. Please follow up.

## 2024-07-11 NOTE — TELEPHONE ENCOUNTER
Patient states she has been trying to do the urine cortisol test for a few days and has been unable to. The instructions say to collect urine between 7-9am. The patient states she wakes up at 6am and can't wait until 7. Is it ok if she does it earlier?  Please advise

## 2024-07-12 LAB — ELASTASE PANC STL-MCNT: 331 UG ELAST./G

## 2024-07-13 ENCOUNTER — APPOINTMENT (OUTPATIENT)
Dept: LAB | Facility: MEDICAL CENTER | Age: 79
End: 2024-07-13
Payer: MEDICARE

## 2024-07-13 DIAGNOSIS — R63.4 WEIGHT LOSS: ICD-10-CM

## 2024-07-13 LAB — CORTIS AM PEAK SERPL-MCNC: 23.4 UG/DL (ref 6.7–22.6)

## 2024-07-13 PROCEDURE — 36415 COLL VENOUS BLD VENIPUNCTURE: CPT

## 2024-07-13 PROCEDURE — 82533 TOTAL CORTISOL: CPT

## 2024-07-15 ENCOUNTER — HOSPITAL ENCOUNTER (OUTPATIENT)
Dept: PULMONOLOGY | Facility: HOSPITAL | Age: 79
Discharge: HOME | End: 2024-07-15
Attending: INTERNAL MEDICINE
Payer: MEDICARE

## 2024-07-15 DIAGNOSIS — R06.02 SOB (SHORTNESS OF BREATH): ICD-10-CM

## 2024-07-15 PROCEDURE — 94726 PLETHYSMOGRAPHY LUNG VOLUMES: CPT | Performed by: INTERNAL MEDICINE

## 2024-07-15 PROCEDURE — 94010 BREATHING CAPACITY TEST: CPT | Performed by: INTERNAL MEDICINE

## 2024-07-15 PROCEDURE — 94729 DIFFUSING CAPACITY: CPT

## 2024-07-15 PROCEDURE — 94010 BREATHING CAPACITY TEST: CPT

## 2024-07-15 PROCEDURE — 94726 PLETHYSMOGRAPHY LUNG VOLUMES: CPT

## 2024-07-15 PROCEDURE — 94760 N-INVAS EAR/PLS OXIMETRY 1: CPT

## 2024-07-15 PROCEDURE — 94729 DIFFUSING CAPACITY: CPT | Performed by: INTERNAL MEDICINE

## 2024-07-15 RX ORDER — ALBUTEROL SULFATE 2.5 MG/3ML
2.5 SOLUTION RESPIRATORY (INHALATION) ONCE
Status: DISCONTINUED | OUTPATIENT
Start: 2024-07-15 | End: 2024-07-15

## 2024-07-15 NOTE — TELEPHONE ENCOUNTER
I am awaiting correspondence from gastroenterology advanced endoscopy to evaluate if will be able to proceed with tissue sampling.

## 2024-07-16 ENCOUNTER — TELEPHONE (OUTPATIENT)
Age: 79
End: 2024-07-16

## 2024-07-16 DIAGNOSIS — K63.89 MESENTERIC MASS: Primary | ICD-10-CM

## 2024-07-16 NOTE — TELEPHONE ENCOUNTER
"Pt called in stating that for the past week she has been having loose bowels after eating breakfast that float on top of the water. Pt denies abdominal pain/cramping. States it is not diarrhea but more loose than usual. Pt states she also has been getting a \"gurgle in her lower stomach\" after eating. Would like PCP to advise.   "

## 2024-07-17 NOTE — TELEPHONE ENCOUNTER
I would recommend incorporating Metamucil either in gummy or powder form daily to help bulk up stools.

## 2024-07-18 ENCOUNTER — PREP FOR PROCEDURE (OUTPATIENT)
Dept: GASTROENTEROLOGY | Facility: CLINIC | Age: 79
End: 2024-07-18

## 2024-07-18 ENCOUNTER — TELEPHONE (OUTPATIENT)
Age: 79
End: 2024-07-18

## 2024-07-18 DIAGNOSIS — R10.84 GENERALIZED POSTPRANDIAL ABDOMINAL PAIN: Primary | ICD-10-CM

## 2024-07-18 DIAGNOSIS — R63.4 UNINTENTIONAL WEIGHT LOSS: ICD-10-CM

## 2024-07-18 PROBLEM — Z00.00 MEDICARE ANNUAL WELLNESS VISIT, SUBSEQUENT: Status: RESOLVED | Noted: 2023-05-26 | Resolved: 2024-07-18

## 2024-07-18 NOTE — TELEPHONE ENCOUNTER
Patient was not able to keep appointment for  7/23/24 9:15 am with Dr. Mcginnis and needed to reschedule due to time. Scheduled patient for 07/30/24 at 1:00 pm. Patient was scheduled in an urgent appointment/ Patient states if there is a sooner appointment with a later time to please call her at [phone]

## 2024-07-24 ENCOUNTER — TELEPHONE (OUTPATIENT)
Age: 79
End: 2024-07-24

## 2024-07-24 NOTE — TELEPHONE ENCOUNTER
Patient called states everything she eats hurts her stomach or gets loose stools. Does not see Dr Drew until 7/30/2024. States anything to help with the pain would be greatly appreciated. Patient is upset she only weighs 78.5 lbs 7/24/2024 and a couple days ago she was 80 lbs.

## 2024-07-25 ENCOUNTER — TELEPHONE (OUTPATIENT)
Dept: GASTROENTEROLOGY | Facility: CLINIC | Age: 79
End: 2024-07-25

## 2024-07-25 DIAGNOSIS — K58.9 IRRITABLE BOWEL SYNDROME, UNSPECIFIED TYPE: Primary | ICD-10-CM

## 2024-07-25 RX ORDER — DICYCLOMINE HYDROCHLORIDE 10 MG/1
10 CAPSULE ORAL 3 TIMES DAILY PRN
Qty: 30 CAPSULE | Refills: 0 | Status: SHIPPED | OUTPATIENT
Start: 2024-07-25 | End: 2024-08-07

## 2024-07-25 NOTE — TELEPHONE ENCOUNTER
----- Message from Lakeisha BARR sent at 7/16/2024  2:22 PM EDT -----    ----- Message -----  From: Kim Mcgrath PA-C  Sent: 7/16/2024   1:52 PM EDT  To: [facility]    Ready to schedule EUS, Thank you!  ----- Message -----  From: Andres Lopez MD  Sent: 7/16/2024   1:47 PM EDT  To: Kim Mcgrath PA-C; #    I addressed this with agatha and recommended that we can take a look with EUS but should also refer her to surg onc. Thanks. Andres  ----- Message -----  From: Kim Mcgrath PA-C  Sent: 7/16/2024   1:41 PM EDT  To: Andres Lopez MD; [facility]      ----- Message -----  From: Agatha Momin PA-C  Sent: 7/10/2024   1:19 PM EDT  To: [facility]     Can one of the advanced endoscopist take a look at this?  Patient has had progressive weight loss and did have PET scan which did show some mild variable uptake along the mesenteric root soft tissue thickening in the recommending tissue sampling, please let me know if you think this would be accessible via EUS, I know we have done this for patients in the past but just wanted to confirm before scheduling, thank you      Mild variable FDG uptake suggested along the mesenteric root soft tissue thickening. This is nonspecific, could be related to a nonspecific inflammatory process with malignancy not excluded; sclerosing mesenteritis, amyloidosis, desmoid tumor, and   lymphoproliferative processes such as lymphoma are considerations. Consider continued imaging follow-up if tissue sampling is not performed.

## 2024-07-25 NOTE — TELEPHONE ENCOUNTER
Bentyl sent to the pharmacy for abdominal pain can use up to 3 times a day as needed.  History of IBS as well.  Recommend that she keep in touch with her gastroenterologist as well in regards to GI symptoms.  Recent visit.  They advised starting Remeron which agree with, if patient wants to discuss alternative such as SSRI for IBS symptoms can certainly discuss.

## 2024-07-25 NOTE — TELEPHONE ENCOUNTER
Procedure: EUS  Scheduled date of procedure (as of today): 8/7/24  Physician performing procedure: Dr. Marsh   Location of procedure: [place]   Instructions reviewed with patient by:  Lakeisha - emailed to   [email or web address]   Clearances: n/a

## 2024-07-29 PROBLEM — K63.89 MESENTERIC MASS: Status: ACTIVE | Noted: 2024-07-29

## 2024-07-30 ENCOUNTER — OFFICE VISIT (OUTPATIENT)
Dept: SURGICAL ONCOLOGY | Facility: CLINIC | Age: 79
End: 2024-07-30
Payer: MEDICARE

## 2024-07-30 VITALS
HEART RATE: 90 BPM | RESPIRATION RATE: 18 BRPM | HEIGHT: 62 IN | SYSTOLIC BLOOD PRESSURE: 110 MMHG | BODY MASS INDEX: 15.36 KG/M2 | OXYGEN SATURATION: 99 % | TEMPERATURE: 97.3 F | DIASTOLIC BLOOD PRESSURE: 70 MMHG

## 2024-07-30 DIAGNOSIS — K63.89 MESENTERIC MASS: Primary | ICD-10-CM

## 2024-07-30 PROCEDURE — 99203 OFFICE O/P NEW LOW 30 MIN: CPT | Performed by: SURGERY

## 2024-07-30 NOTE — LETTER
July 30, 2024     Delicia Cintron DO  [address] [place] [address]  [address]  [facility] [address]    Patient: Brigid Brown   YOB: 1945   Date of Visit: 7/30/2024       Dear Dr. Cintron:    Thank you for referring Brigid Brown to me for evaluation. Below are my notes for this consultation.    If you have questions, please do not hesitate to call me. I look forward to following your patient along with you.         Sincerely,        Angus Drew MD        CC: MD Angus Caraballo MD  7/30/2024  1:43 PM  Sign when Signing Visit               Surgical Oncology Consult       [facility] [place]  [address] [facility] [address]  [phone]    Brigid Brown  1945  [id number]  [facility] [place]  [address] [facility] [address]  [phone]    1. Mesenteric mass  Assessment & Plan:  59-year-old female with a mesenteric mass.  This is encasing her SMA, and on my review appears to would be involving her celiac as well.  I did discuss that even though there is no obvious pancreas mass seen, this is somewhat concerning for an underlying malignancy.  Her PET/CT shows no obvious primary masses and the lesion itself is not significantly FDG avid raising the possibility of a benign lesion.  The fact that this has significantly increased over 4 months, this does not appear to be a benign process.  I agree with the endoscopic ultrasound.  Hopefully this lesion can be biopsied when she has the endoscopic ultrasound.  I will see her back once we have those results.  If no biopsy could be performed or results are discordant she would likely require a surgical biopsy.  I discussed that if this is malignant chemotherapy would be the mainstay of her treatment, but she is not sure she would wish to have any therapy.  Palliative care would be reasonable if a malignancy is  diagnosed.  At this point I will see her back once we have the results of the EUS and biopsy.  She is agreeable to this plan.  All her questions were answered.  Orders:  -     Ambulatory Referral to Surgical Oncology      Chief Complaint   Patient presents with   • office visit       Return in about 2 weeks (around 8/13/2024) for Ofice visit short.    Oncology History    No history exists.       History of Present Illness: 79-year-old female who approximately 3 months ago started to notice unintentional weight loss.  Her appetite has been poor and she has been more fatigued.  CT from [place] 10, 2024 reveals a 4 mm nodule in the lung.  There was a 1.8 cm enhancing right inferior hepatic lobe lesion.  There was soft tissue of the mesenteric root and this is completely surrounded the SMA.  This is increased from her previous exam from 2 months prior.  Follow-up PET/CT on July 8, 2024 revealed variable FDG uptake in the soft tissue thickening.  This was nonspecific.  I personally reviewed the films.  She comes in now to discuss further therapy.  She has lost at least 20 pounds over the last 3 months.  She does have early satiety.  She has increased fatigue.  She is unable to use nutritional supplements secondary to her lactose intolerance.  She has 2 maternal cousins with pancreas cancer at age 68 and 70.    Review of Systems  Complete ROS Surg Onc:   Constitutional: The patient has fatigue and weight loss with a change in appetite.  Eyes: No complaints of visual problems, no scleral icterus.   ENT: no complaints of ear pain, no hoarseness, no difficulty swallowing,  no tinnitus and no new masses in head, oral cavity, or neck.   Cardiovascular: No complaints of chest pain, no palpitations, no ankle edema.   Respiratory: No complaints of shortness of breath, no cough.   Gastrointestinal: No complaints of jaundice, no bloody stools, no pale stools.   Genitourinary: No complaints of dysuria, no hematuria, no nocturia, no  frequent urination, no urethral discharge.   Musculoskeletal: No complaints of weakness, paralysis, joint stiffness or arthralgias.  Integumentary: No complaints of rash, no new lesions.   Neurological: No complaints of convulsions, no seizures, no dizziness.   Hematologic/Lymphatic: No complaints of easy bruising.   Endocrine:  No hot or cold intolerance.  No polydipsia, polyphagia, or polyuria.  Allergy/immunology:  No environmental allergies.  No food allergies.  Not immunocompromised.  Skin:  No pallor or rash.  No wound.          Patient Active Problem List   Diagnosis   • Seborrheic keratosis   • Changing skin lesion   • Screening for blood or protein in urine   • History of skin cancer   • Essential hypertension   • Near syncope   • Basal cell carcinoma of right temple region   • Episodic confusion   • Allergic rhinitis   • Hypertension   • Hypothyroidism   • Hashimoto's disease   • History of vitamin D deficiency   • Subclinical hypothyroidism   • Sensorineural hearing loss (SNHL) of both ears   • Sprain of anterior talofibular ligament of left ankle   • Tinnitus of both ears   • Symptoms of cerebrovascular accident (CVA)   • IBS (irritable bowel syndrome)   • Grief   • Transient alteration of awareness   • Gastroesophageal reflux disease   • Dysphonia   • Pain in right lumbar region of back   • Abdominal pain   • Urinary symptom or sign   • Right hip pain   • Fatigue   • TMJ dysfunction   • Myofascial pain   • Tongue pain   • Reflux laryngitis   • Vertigo   • Vaginal atrophy   • Lactose intolerance   • Mild protein-calorie malnutrition (HCC)   • Pigmented purpura (HCC)   • Stage 3 chronic kidney disease, unspecified whether stage 3a or 3b CKD (HCC)   • Age-related osteoporosis without current pathological fracture   • Sprain of medial collateral ligament of left knee, initial encounter   • Mitral regurgitation   • Weight loss   • Gastritis   • SOB (shortness of breath)   • Mesenteric mass     Past Medical  History:   Diagnosis Date   • Acne    • Basal cell carcinoma 06/08/2020    right lower forehead   • BCC (basal cell carcinoma of skin) 03/09/2020    mid forehead   • Benign neoplasm of skin    • Disease of thyroid gland 2006   • GERD (gastroesophageal reflux disease)    • Hypertension    • Inflamed seborrheic keratosis    • Irritable bowel syndrome    • Malignant neoplasm of skin of face    • Migraines    • Nonmelanoma skin cancer     Last Assessed:6/27/17   • Squamous cell skin cancer 06/08/2020    In situ, left lower forehead   • Tachycardia    • Telogen effluvium    • Temporomandibular joint disorder    • Vertigo      Past Surgical History:   Procedure Laterality Date   • ADENOIDECTOMY     • APPENDECTOMY     • CHOLECYSTECTOMY     • COLONOSCOPY  05/03/2019   • COMPLEX WOUND CLOSURE TO EXTREMITY N/A 7/28/2020    Procedure: COMPLEX CLOSURE MID FOREHEAD;  Surgeon: Randy Frank MD;  Location: [facility];  Service: Plastics   • ESOPHAGOGASTRODUODENOSCOPY  2009   • FLAP LOCAL HEAD / NECK N/A 7/28/2020    Procedure: FLAP MID FOREHEAD;  Surgeon: Randy Frank MD;  Location: [facility];  Service: Plastics   • HYSTERECTOMY  1987   • MALIGNANT SKIN LESION EXCISION      Excision of Lesion Face Malignant-9/14/2004 BCC Forehead   • MOHS RECONSTRUCTION N/A 7/28/2020    Procedure: RECONSTRUCTION MOHS DEFECT MID FOREHEAD;  Surgeon: Randy Frank MD;  Location: [facility];  Service: Plastics   • MOHS SURGERY  07/27/2020    Right &left lower forehead, mid forehead   • OOPHORECTOMY Bilateral 1987   • ROTATOR CUFF REPAIR Right    • SKIN BIOPSY     • TONSILLECTOMY     • TUBAL LIGATION  1976?     Family History   Problem Relation Age of Onset   • Hypertension Mother         Mother   • Osteoporosis Mother    • Skin cancer Mother    • Migraines Mother    • Dementia Mother    • Hypertension Father         Father   • Skin cancer Father    • Dementia Father    • Skin cancer Sister 73   • Migraines Sister    • No  Known Problems Daughter    • Uterine cancer Maternal Grandmother 29   • Diabetes type II Maternal Grandfather    • Cancer Paternal Grandmother    • Uterine cancer Paternal Grandmother 65   • No Known Problems Maternal Aunt    • Cancer Paternal Aunt         Breast   • Uterine cancer Paternal Aunt 55   • No Known Problems Paternal Aunt    • No Known Problems Paternal Aunt      Social History     Socioeconomic History   • Marital status: /Civil Union     Spouse name: Not on file   • Number of children: Not on file   • Years of education: Not on file   • Highest education level: Not on file   Occupational History   • Not on file   Tobacco Use   • Smoking status: Never   • Smokeless tobacco: Never   Vaping Use   • Vaping status: Never Used   Substance and Sexual Activity   • Alcohol use: No   • Drug use: No   • Sexual activity: Not Currently     Partners: Male     Birth control/protection: Post-menopausal   Other Topics Concern   • Not on file   Social History Narrative   • Not on file     Social Determinants of Health     Financial Resource Strain: Not on file   Food Insecurity: No Food Insecurity (6/18/2024)    Hunger Vital Sign    • Worried About Running Out of Food in the Last Year: Never true    • Ran Out of Food in the Last Year: Never true   Transportation Needs: No Transportation Needs (6/18/2024)    PRAPARE - Transportation    • Lack of Transportation (Medical): No    • Lack of Transportation (Non-Medical): No   Physical Activity: Not on file   Stress: Not on file   Social Connections: Not on file   Intimate Partner Violence: Not on file   Housing Stability: Low Risk  (6/18/2024)    Housing Stability Vital Sign    • Unable to Pay for Housing in the Last Year: No    • Number of Times Moved in the Last Year: 1    • Homeless in the Last Year: No       Current Outpatient Medications:   •  ascorbic acid (VITAMIN C) 500 mg tablet, Take by mouth, Disp: , Rfl:   •  dicyclomine (BENTYL) 10 mg capsule, Take 1  capsule (10 mg total) by mouth 3 (three) times a day as needed (abdominal pain) (Patient not taking: Reported on 7/30/2024), Disp: 30 capsule, Rfl: 0  •  Digestive Enzyme CAPS, Take by mouth, Disp: , Rfl:   •  diltiazem (CARDIZEM CD) 240 mg 24 hr capsule, Take 1 capsule (240 mg total) by mouth daily May give patient  the stock bottle, Disp: 90 capsule, Rfl: 1  •  lactase (LACTAID) 3,000 units tablet, Take by mouth, Disp: , Rfl:   •  Multiple Vitamins-Minerals (CENTRUM SILVER ULTRA WOMENS PO), Take by mouth, Disp: , Rfl:   •  Alpha-D-Galactosidase (BEANO) TABS, Take by mouth (Patient not taking: Reported on 4/22/2024), Disp: , Rfl:   •  cholestyramine (QUESTRAN) 4 g packet, Take 1 packet by mouth 3 (three) times a day with meals (Patient not taking: Reported on 5/9/2024), Disp: , Rfl:   •  Diclofenac Sodium (VOLTAREN) 1 %, Apply 2 g topically 4 (four) times a day (Patient not taking: Reported on 4/22/2024), Disp: 240 g, Rfl: 1  •  esomeprazole (NexIUM) 40 MG capsule, Take 1 capsule (40 mg total) by mouth every morning, Disp: 30 capsule, Rfl: 1  •  famotidine (PEPCID) 10 mg tablet, Take 10 mg by mouth 2 (two) times a day (Patient not taking: Reported on 4/22/2024), Disp: , Rfl:   •  Minoxidil 5 % FOAM, Apply topically, Disp: , Rfl:   •  mirtazapine (REMERON SOL-TAB) 15 mg disintegrating tablet, Take 1 tablet (15 mg total) by mouth daily at bedtime, Disp: 30 tablet, Rfl: 0  •  omeprazole (PriLOSEC) 40 MG capsule, Take 1 capsule (40 mg total) by mouth daily, Disp: 30 capsule, Rfl: 2  •  polyethylene glycol-propylene glycol (SYSTANE) 0.4-0.3 %, , Disp: , Rfl:   •  potassium chloride (Klor-Con M10) 10 mEq tablet, Take 2 tablets (20 mEq total) by mouth daily for 4 days, Disp: 8 tablet, Rfl: 0  •  Premarin vaginal cream, Insert 1 g into the vagina once a week Brand Necessary, Disp: 30 g, Rfl: 1  •  sucralfate (CARAFATE) 1 g tablet, Take 1 tablet (1 g total) by mouth 3 (three) times a day as needed (upper abdominal  discomfort), Disp: 90 tablet, Rfl: 2  Allergies   Allergen Reactions   • Cortisone Hypertension, Other (See Comments), Shortness Of Breath and Tachycardia   • Acebutolol    • Acetaminophen GI Intolerance     diarrhea   • Amlodipine    • Atenolol    • Azithromycin    • Barium Sulfate Diarrhea     Patient reports watery stool and stomach ache post fluoro upper GI on 6/6/24. Per Radiologist MD Lozada, reaction should not absolutely preclude patient from having barium in the future if necessary. CG RN 6/7/24     • Bisphosphonates      Annotation - [id number]: iriitis   • Candesartan    • Clarithromycin    • Erythromycin    • Fosinopril    • Irbesartan    • Levofloxacin    • Losartan    • Methylprednisolone Hypertension and Tachycardia   • Metoprolol    • Nisoldipine    • Olmesartan    • Propranolol    • Sulfamethoxazole-Trimethoprim Nausea Only   • Timolol    • Lidocaine Palpitations and Tachycardia     Vitals:    07/30/24 1256   BP: 110/70   Pulse: 90   Resp: 18   Temp: (!) 97.3 °F (36.3 °C)   SpO2: 99%       Physical Exam   Constitutional: General appearance: The Patient is malnourished who appears the stated age in no acute distress. Patient is pleasant and talkative.     HEENT:  Normocephalic.  Sclerae are anicteric. Mucous membranes are moist. Neck is supple without adenopathy. No JVD.  There is bitemporal wasting.  Chest: The lungs are clear to auscultation.     Cardiac: Heart is regular rate.     Abdomen: Abdomen is soft, non-tender, non-distended and without masses.     Extremities: There is no clubbing or cyanosis. There is no edema.  Symmetric.  Neuro: Grossly nonfocal. Gait is normal.     Lymphatic: No evidence of cervical adenopathy bilaterally.   No evidence of axillary adenopathy bilaterally.     Skin: Warm, anicteric.    Psych:  Patient is pleasant and talkative.  Breasts:      Pathology:  [unfilled]    Labs:      Imaging  Complete PFT with post bronchodilator    Result Date: 7/20/2024  Narrative:  Pulmonary Function Test Report Brigid Brown 79 y.o. female MRN: [id number] Date of Testin Date of Interpretation: 2024 Requesting Physician: Dr. Lewis Reason for Testing: Shortness of breath Reference set for interpretation: [facility]  Procedure: The patient was taken to pulmonary function testing laboratory.  The patient demonstrated good effort and cooperation.  The results of this test did not meet ATS standards for acceptability and repeatability.  Data set appears appropriate for interpretation.  Results: Spirometry: FEV1/FVC Ratio: 80 %   Z-score +0.23 FEV1: 83% predicted      Z-score -0.85 Forced Vital Capacity: 80% predicted Z-score -1.04 After administration of bronchodilator: Not administered Lung volumes: Total Lung Capacity 93% predicted  Z-score -0.55 Residual volume 123% predicted  Z-score +1.40 DLCO corrected for patients hemoglobin level: 100% predicted Flow volume loop: Normal Interpretation: Normal spirometry, lung volumes and diffusion capacity Ladonna Lewis D.O., [facility]    NM PET CT skull base to mid thigh    Result Date: 2024  Narrative: PET/CT SCAN INDICATION: Soft tissue prominence of the mesenteric root. 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 Multiplanar attenuation corrected and non attenuation corrected PET images are available for interpretation, and contiguous, low dose, axial CT sections were obtained from the skull base through the femurs. Intravenous contrast material was not utilized. This examination, like all CT scans performed in the [facility], was performed utilizing techniques to minimize radiation dose exposure, including the  use of iterative reconstruction and automated exposure control. Fasting serum glucose: 98 mg/dl FINDINGS: VISUALIZED BRAIN: No acute abnormalities are seen. HEAD/NECK: There is a physiologic distribution of FDG. Mild diffuse uptake in the thyroid, a SUV max 2.0, which can be seen with thyroiditis. No FDG avid cervical adenopathy is seen. CT images: Unremarkable. CHEST: No FDG avid soft tissue lesions are seen. CT images: Mild to moderate coronary artery calcification. Scattered lung cysts. ABDOMEN: Mild variable radiotracer uptake suggested in the soft tissue thickening at the level of the mesenteric root. The activity varies with SUV max of 2.1 at the proximal aspect to 3.1 more inferiorly.  See for example image 155 series 1200 where activity appears to be more focal along the inferior aspect of the soft tissue thickening. Overall soft tissue thickening better seen on prior contrast CT. Variable bowel activity likely physiologic. CT images: Status post cholecystectomy. Less well seen hepatic hemangioma in the right lobe inferiorly. PELVIS: No FDG avid soft tissue lesions are seen. CT images: Status post hysterectomy. OSSEOUS STRUCTURES: No FDG avid lesions are seen. CT images: No significant findings. For reference: SUV max of the mediastinal blood pool: 1.8 SUV max of the liver: 2.3     Impression: 1. Mild variable FDG uptake suggested along the mesenteric root soft tissue thickening. This is nonspecific, could be related to a nonspecific inflammatory process with malignancy not excluded; sclerosing mesenteritis, amyloidosis, desmoid tumor, and lymphoproliferative processes such as lymphoma are considerations. Consider continued imaging follow-up if tissue sampling is not performed. Workstation performed: [id number]     I personally reviewed and interpreted the above laboratory and imaging data.

## 2024-07-30 NOTE — PROGRESS NOTES
Surgical Oncology Consult       [facility] [place]  [address] [facility] [address]  [phone]    Brigid Brown  1945  [id number]  [facility] [place]  [address] [facility] [address]  [phone]    1. Mesenteric mass  Assessment & Plan:  59-year-old female with a mesenteric mass.  This is encasing her SMA, and on my review appears to would be involving her celiac as well.  I did discuss that even though there is no obvious pancreas mass seen, this is somewhat concerning for an underlying malignancy.  Her PET/CT shows no obvious primary masses and the lesion itself is not significantly FDG avid raising the possibility of a benign lesion.  The fact that this has significantly increased over 4 months, this does not appear to be a benign process.  I agree with the endoscopic ultrasound.  Hopefully this lesion can be biopsied when she has the endoscopic ultrasound.  I will see her back once we have those results.  If no biopsy could be performed or results are discordant she would likely require a surgical biopsy.  I discussed that if this is malignant chemotherapy would be the mainstay of her treatment, but she is not sure she would wish to have any therapy.  Palliative care would be reasonable if a malignancy is diagnosed.  At this point I will see her back once we have the results of the EUS and biopsy.  She is agreeable to this plan.  All her questions were answered.  Orders:  -     Ambulatory Referral to Surgical Oncology      Chief Complaint   Patient presents with    office visit       Return in about 2 weeks (around 8/13/2024) for Ofice visit short.    Oncology History    No history exists.       History of Present Illness: 79-year-old female who approximately 3 months ago started to notice unintentional weight loss.  Her appetite has been poor and  she has been more fatigued.  CT from [place] 10, 2024 reveals a 4 mm nodule in the lung.  There was a 1.8 cm enhancing right inferior hepatic lobe lesion.  There was soft tissue of the mesenteric root and this is completely surrounded the SMA.  This is increased from her previous exam from 2 months prior.  Follow-up PET/CT on July 8, 2024 revealed variable FDG uptake in the soft tissue thickening.  This was nonspecific.  I personally reviewed the films.  She comes in now to discuss further therapy.  She has lost at least 20 pounds over the last 3 months.  She does have early satiety.  She has increased fatigue.  She is unable to use nutritional supplements secondary to her lactose intolerance.  She has 2 maternal cousins with pancreas cancer at age 68 and 70.    Review of Systems  Complete ROS Surg Onc:   Constitutional: The patient has fatigue and weight loss with a change in appetite.  Eyes: No complaints of visual problems, no scleral icterus.   ENT: no complaints of ear pain, no hoarseness, no difficulty swallowing,  no tinnitus and no new masses in head, oral cavity, or neck.   Cardiovascular: No complaints of chest pain, no palpitations, no ankle edema.   Respiratory: No complaints of shortness of breath, no cough.   Gastrointestinal: No complaints of jaundice, no bloody stools, no pale stools.   Genitourinary: No complaints of dysuria, no hematuria, no nocturia, no frequent urination, no urethral discharge.   Musculoskeletal: No complaints of weakness, paralysis, joint stiffness or arthralgias.  Integumentary: No complaints of rash, no new lesions.   Neurological: No complaints of convulsions, no seizures, no dizziness.   Hematologic/Lymphatic: No complaints of easy bruising.   Endocrine:  No hot or cold intolerance.  No polydipsia, polyphagia, or polyuria.  Allergy/immunology:  No environmental allergies.  No food allergies.  Not immunocompromised.  Skin:  No pallor or rash.  No wound.          Patient Active  Problem List   Diagnosis    Seborrheic keratosis    Changing skin lesion    Screening for blood or protein in urine    History of skin cancer    Essential hypertension    Near syncope    Basal cell carcinoma of right temple region    Episodic confusion    Allergic rhinitis    Hypertension    Hypothyroidism    Hashimoto's disease    History of vitamin D deficiency    Subclinical hypothyroidism    Sensorineural hearing loss (SNHL) of both ears    Sprain of anterior talofibular ligament of left ankle    Tinnitus of both ears    Symptoms of cerebrovascular accident (CVA)    IBS (irritable bowel syndrome)    Grief    Transient alteration of awareness    Gastroesophageal reflux disease    Dysphonia    Pain in right lumbar region of back    Abdominal pain    Urinary symptom or sign    Right hip pain    Fatigue    TMJ dysfunction    Myofascial pain    Tongue pain    Reflux laryngitis    Vertigo    Vaginal atrophy    Lactose intolerance    Mild protein-calorie malnutrition (HCC)    Pigmented purpura (HCC)    Stage 3 chronic kidney disease, unspecified whether stage 3a or 3b CKD (HCC)    Age-related osteoporosis without current pathological fracture    Sprain of medial collateral ligament of left knee, initial encounter    Mitral regurgitation    Weight loss    Gastritis    SOB (shortness of breath)    Mesenteric mass     Past Medical History:   Diagnosis Date    Acne     Basal cell carcinoma 06/08/2020    right lower forehead    BCC (basal cell carcinoma of skin) 03/09/2020    mid forehead    Benign neoplasm of skin     Disease of thyroid gland 2006    GERD (gastroesophageal reflux disease)     Hypertension     Inflamed seborrheic keratosis     Irritable bowel syndrome     Malignant neoplasm of skin of face     Migraines     Nonmelanoma skin cancer     Last Assessed:6/27/17    Squamous cell skin cancer 06/08/2020    In situ, left lower forehead    Tachycardia     Telogen effluvium     Temporomandibular joint disorder      Vertigo      Past Surgical History:   Procedure Laterality Date    ADENOIDECTOMY      APPENDECTOMY      CHOLECYSTECTOMY      COLONOSCOPY  05/03/2019    COMPLEX WOUND CLOSURE TO EXTREMITY N/A 7/28/2020    Procedure: COMPLEX CLOSURE MID FOREHEAD;  Surgeon: Randy Frank MD;  Location: [facility];  Service: Plastics    ESOPHAGOGASTRODUODENOSCOPY  2009    FLAP LOCAL HEAD / NECK N/A 7/28/2020    Procedure: FLAP MID FOREHEAD;  Surgeon: Randy Frank MD;  Location: [facility];  Service: Plastics    HYSTERECTOMY  1987    MALIGNANT SKIN LESION EXCISION      Excision of Lesion Face Malignant-9/14/2004 BCC Forehead    MOHS RECONSTRUCTION N/A 7/28/2020    Procedure: RECONSTRUCTION MOHS DEFECT MID FOREHEAD;  Surgeon: Randy Frank MD;  Location: [facility];  Service: Plastics    MOHS SURGERY  07/27/2020    Right &left lower forehead, mid forehead    OOPHORECTOMY Bilateral 1987    ROTATOR CUFF REPAIR Right     SKIN BIOPSY      TONSILLECTOMY      TUBAL LIGATION  1976?     Family History   Problem Relation Age of Onset    Hypertension Mother         Mother    Osteoporosis Mother     Skin cancer Mother     Migraines Mother     Dementia Mother     Hypertension Father         Father    Skin cancer Father     Dementia Father     Skin cancer Sister 73    Migraines Sister     No Known Problems Daughter     Uterine cancer Maternal Grandmother 29    Diabetes type II Maternal Grandfather     Cancer Paternal Grandmother     Uterine cancer Paternal Grandmother 65    No Known Problems Maternal Aunt     Cancer Paternal Aunt         Breast    Uterine cancer Paternal Aunt 55    No Known Problems Paternal Aunt     No Known Problems Paternal Aunt      Social History     Socioeconomic History    Marital status: /Civil Union     Spouse name: Not on file    Number of children: Not on file    Years of education: Not on file    Highest education level: Not on file   Occupational History    Not on file   Tobacco Use     Smoking status: Never    Smokeless tobacco: Never   Vaping Use    Vaping status: Never Used   Substance and Sexual Activity    Alcohol use: No    Drug use: No    Sexual activity: Not Currently     Partners: Male     Birth control/protection: Post-menopausal   Other Topics Concern    Not on file   Social History Narrative    Not on file     Social Determinants of Health     Financial Resource Strain: Not on file   Food Insecurity: No Food Insecurity (6/18/2024)    Hunger Vital Sign     Worried About Running Out of Food in the Last Year: Never true     Ran Out of Food in the Last Year: Never true   Transportation Needs: No Transportation Needs (6/18/2024)    PRAPARE - Transportation     Lack of Transportation (Medical): No     Lack of Transportation (Non-Medical): No   Physical Activity: Not on file   Stress: Not on file   Social Connections: Not on file   Intimate Partner Violence: Not on file   Housing Stability: Low Risk  (6/18/2024)    Housing Stability Vital Sign     Unable to Pay for Housing in the Last Year: No     Number of Times Moved in the Last Year: 1     Homeless in the Last Year: No       Current Outpatient Medications:     ascorbic acid (VITAMIN C) 500 mg tablet, Take by mouth, Disp: , Rfl:     dicyclomine (BENTYL) 10 mg capsule, Take 1 capsule (10 mg total) by mouth 3 (three) times a day as needed (abdominal pain) (Patient not taking: Reported on 7/30/2024), Disp: 30 capsule, Rfl: 0    Digestive Enzyme CAPS, Take by mouth, Disp: , Rfl:     diltiazem (CARDIZEM CD) 240 mg 24 hr capsule, Take 1 capsule (240 mg total) by mouth daily May give patient  the stock bottle, Disp: 90 capsule, Rfl: 1    lactase (LACTAID) 3,000 units tablet, Take by mouth, Disp: , Rfl:     Multiple Vitamins-Minerals (CENTRUM SILVER ULTRA WOMENS PO), Take by mouth, Disp: , Rfl:     Alpha-D-Galactosidase (BEANO) TABS, Take by mouth (Patient not taking: Reported on 4/22/2024), Disp: , Rfl:     cholestyramine (QUESTRAN) 4 g packet,  Take 1 packet by mouth 3 (three) times a day with meals (Patient not taking: Reported on 5/9/2024), Disp: , Rfl:     Diclofenac Sodium (VOLTAREN) 1 %, Apply 2 g topically 4 (four) times a day (Patient not taking: Reported on 4/22/2024), Disp: 240 g, Rfl: 1    esomeprazole (NexIUM) 40 MG capsule, Take 1 capsule (40 mg total) by mouth every morning, Disp: 30 capsule, Rfl: 1    famotidine (PEPCID) 10 mg tablet, Take 10 mg by mouth 2 (two) times a day (Patient not taking: Reported on 4/22/2024), Disp: , Rfl:     Minoxidil 5 % FOAM, Apply topically, Disp: , Rfl:     mirtazapine (REMERON SOL-TAB) 15 mg disintegrating tablet, Take 1 tablet (15 mg total) by mouth daily at bedtime, Disp: 30 tablet, Rfl: 0    omeprazole (PriLOSEC) 40 MG capsule, Take 1 capsule (40 mg total) by mouth daily, Disp: 30 capsule, Rfl: 2    polyethylene glycol-propylene glycol (SYSTANE) 0.4-0.3 %, , Disp: , Rfl:     potassium chloride (Klor-Con M10) 10 mEq tablet, Take 2 tablets (20 mEq total) by mouth daily for 4 days, Disp: 8 tablet, Rfl: 0    Premarin vaginal cream, Insert 1 g into the vagina once a week Brand Necessary, Disp: 30 g, Rfl: 1    sucralfate (CARAFATE) 1 g tablet, Take 1 tablet (1 g total) by mouth 3 (three) times a day as needed (upper abdominal discomfort), Disp: 90 tablet, Rfl: 2  Allergies   Allergen Reactions    Cortisone Hypertension, Other (See Comments), Shortness Of Breath and Tachycardia    Acebutolol     Acetaminophen GI Intolerance     diarrhea    Amlodipine     Atenolol     Azithromycin     Barium Sulfate Diarrhea     Patient reports watery stool and stomach ache post fluoro upper GI on 6/6/24. Per Radiologist MD Lozada, reaction should not absolutely preclude patient from having barium in the future if necessary. CG RN 6/7/24      Bisphosphonates      Annotation - [id number]: iriitis    Candesartan     Clarithromycin     Erythromycin     Fosinopril     Irbesartan     Levofloxacin     Losartan     Methylprednisolone  Hypertension and Tachycardia    Metoprolol     Nisoldipine     Olmesartan     Propranolol     Sulfamethoxazole-Trimethoprim Nausea Only    Timolol     Lidocaine Palpitations and Tachycardia     Vitals:    24 1256   BP: 110/70   Pulse: 90   Resp: 18   Temp: (!) 97.3 °F (36.3 °C)   SpO2: 99%       Physical Exam   Constitutional: General appearance: The Patient is malnourished who appears the stated age in no acute distress. Patient is pleasant and talkative.     HEENT:  Normocephalic.  Sclerae are anicteric. Mucous membranes are moist. Neck is supple without adenopathy. No JVD.  There is bitemporal wasting.  Chest: The lungs are clear to auscultation.     Cardiac: Heart is regular rate.     Abdomen: Abdomen is soft, non-tender, non-distended and without masses.     Extremities: There is no clubbing or cyanosis. There is no edema.  Symmetric.  Neuro: Grossly nonfocal. Gait is normal.     Lymphatic: No evidence of cervical adenopathy bilaterally.   No evidence of axillary adenopathy bilaterally.     Skin: Warm, anicteric.    Psych:  Patient is pleasant and talkative.  Breasts:      Pathology:  [unfilled]    Labs:      Imaging  Complete PFT with post bronchodilator    Result Date: 2024  Narrative: Pulmonary Function Test Report Brigid Brown 79 y.o. female MRN: [id number] Date of Testin Date of Interpretation: 2024 Requesting Physician: Dr. Lewis Reason for Testing: Shortness of breath Reference set for interpretation: [facility]LOBAL  Procedure: The patient was taken to pulmonary function testing laboratory.  The patient demonstrated good effort and cooperation.  The results of this test did not meet ATS standards for acceptability and repeatability.  Data set appears appropriate for interpretation.  Results: Spirometry: FEV1/FVC Ratio: 80 %   Z-score +0.23 FEV1: 83% predicted      Z-score -0.85 Forced Vital Capacity: 80% predicted Z-score -1.04 After administration of bronchodilator: Not  administered Lung volumes: Total Lung Capacity 93% predicted  Z-score -0.55 Residual volume 123% predicted  Z-score +1.40 DLCO corrected for patients hemoglobin level: 100% predicted Flow volume loop: Normal Interpretation: Normal spirometry, lung volumes and diffusion capacity Ladonna Lewis D.O., [facility]    NM PET CT skull base to mid thigh    Result Date: 7/8/2024  Narrative: PET/CT SCAN INDICATION: Soft tissue prominence of the mesenteric root. 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 Multiplanar attenuation corrected and non attenuation corrected PET images are available for interpretation, and contiguous, low dose, axial CT sections were obtained from the skull base through the femurs. Intravenous contrast material was not utilized. This examination, like all CT scans performed in the [facility], was performed utilizing techniques to minimize radiation dose exposure, including the use of iterative reconstruction and automated exposure control. Fasting serum glucose: 98 mg/dl FINDINGS: VISUALIZED BRAIN: No acute abnormalities are seen. HEAD/NECK: There is a physiologic distribution of FDG. Mild diffuse uptake in the thyroid, a SUV max 2.0, which can be seen with thyroiditis. No FDG avid cervical adenopathy is seen. CT images: Unremarkable. CHEST: No FDG avid soft tissue lesions are seen. CT images: Mild to moderate coronary artery calcification. Scattered lung cysts. ABDOMEN: Mild variable radiotracer uptake suggested in the soft tissue thickening at the level of the mesenteric root. The activity varies with SUV max of 2.1 at the proximal aspect to 3.1 more inferiorly.  See for example image  155 series 1200 where activity appears to be more focal along the inferior aspect of the soft tissue thickening. Overall soft tissue thickening better seen on prior contrast CT. Variable bowel activity likely physiologic. CT images: Status post cholecystectomy. Less well seen hepatic hemangioma in the right lobe inferiorly. PELVIS: No FDG avid soft tissue lesions are seen. CT images: Status post hysterectomy. OSSEOUS STRUCTURES: No FDG avid lesions are seen. CT images: No significant findings. For reference: SUV max of the mediastinal blood pool: 1.8 SUV max of the liver: 2.3     Impression: 1. Mild variable FDG uptake suggested along the mesenteric root soft tissue thickening. This is nonspecific, could be related to a nonspecific inflammatory process with malignancy not excluded; sclerosing mesenteritis, amyloidosis, desmoid tumor, and lymphoproliferative processes such as lymphoma are considerations. Consider continued imaging follow-up if tissue sampling is not performed. Workstation performed: [id number]     I personally reviewed and interpreted the above laboratory and imaging data.

## 2024-07-30 NOTE — ASSESSMENT & PLAN NOTE
59-year-old female with a mesenteric mass.  This is encasing her SMA, and on my review appears to would be involving her celiac as well.  I did discuss that even though there is no obvious pancreas mass seen, this is somewhat concerning for an underlying malignancy.  Her PET/CT shows no obvious primary masses and the lesion itself is not significantly FDG avid raising the possibility of a benign lesion.  The fact that this has significantly increased over 4 months, this does not appear to be a benign process.  I agree with the endoscopic ultrasound.  Hopefully this lesion can be biopsied when she has the endoscopic ultrasound.  I will see her back once we have those results.  If no biopsy could be performed or results are discordant she would likely require a surgical biopsy.  I discussed that if this is malignant chemotherapy would be the mainstay of her treatment, but she is not sure she would wish to have any therapy.  Palliative care would be reasonable if a malignancy is diagnosed.  At this point I will see her back once we have the results of the EUS and biopsy.  She is agreeable to this plan.  All her questions were answered.

## 2024-08-07 ENCOUNTER — HOSPITAL ENCOUNTER (OUTPATIENT)
Dept: GASTROENTEROLOGY | Facility: HOSPITAL | Age: 79
Setting detail: OUTPATIENT SURGERY
Discharge: HOME | End: 2024-08-07
Attending: INTERNAL MEDICINE | Admitting: INTERNAL MEDICINE
Payer: MEDICARE

## 2024-08-07 ENCOUNTER — ANESTHESIA (OUTPATIENT)
Dept: GASTROENTEROLOGY | Facility: HOSPITAL | Age: 79
End: 2024-08-07

## 2024-08-07 ENCOUNTER — ANESTHESIA EVENT (OUTPATIENT)
Dept: GASTROENTEROLOGY | Facility: HOSPITAL | Age: 79
End: 2024-08-07

## 2024-08-07 VITALS
HEIGHT: 62 IN | BODY MASS INDEX: 14.35 KG/M2 | TEMPERATURE: 97 F | SYSTOLIC BLOOD PRESSURE: 120 MMHG | OXYGEN SATURATION: 98 % | DIASTOLIC BLOOD PRESSURE: 61 MMHG | RESPIRATION RATE: 16 BRPM | HEART RATE: 74 BPM | WEIGHT: 78 LBS

## 2024-08-07 DIAGNOSIS — R63.4 UNINTENTIONAL WEIGHT LOSS: ICD-10-CM

## 2024-08-07 DIAGNOSIS — R10.84 GENERALIZED POSTPRANDIAL ABDOMINAL PAIN: ICD-10-CM

## 2024-08-07 PROCEDURE — C1889 IMPLANT/INSERT DEVICE, NOC: HCPCS

## 2024-08-07 PROCEDURE — 88305 TISSUE EXAM BY PATHOLOGIST: CPT | Performed by: PATHOLOGY

## 2024-08-07 PROCEDURE — 43239 EGD BIOPSY SINGLE/MULTIPLE: CPT | Performed by: INTERNAL MEDICINE

## 2024-08-07 PROCEDURE — 43259 EGD US EXAM DUODENUM/JEJUNUM: CPT | Performed by: INTERNAL MEDICINE

## 2024-08-07 RX ORDER — SODIUM CHLORIDE 9 MG/ML
INJECTION, SOLUTION INTRAVENOUS CONTINUOUS PRN
Status: DISCONTINUED | OUTPATIENT
Start: 2024-08-07 | End: 2024-08-07

## 2024-08-07 RX ORDER — PROPOFOL 10 MG/ML
INJECTION, EMULSION INTRAVENOUS AS NEEDED
Status: DISCONTINUED | OUTPATIENT
Start: 2024-08-07 | End: 2024-08-07

## 2024-08-07 RX ADMIN — SODIUM CHLORIDE: 9 INJECTION, SOLUTION INTRAVENOUS at 15:07

## 2024-08-07 RX ADMIN — PROPOFOL 40 MG: 10 INJECTION, EMULSION INTRAVENOUS at 15:09

## 2024-08-07 RX ADMIN — PROPOFOL 100 MCG/KG/MIN: 10 INJECTION, EMULSION INTRAVENOUS at 15:10

## 2024-08-07 NOTE — ANESTHESIA PREPROCEDURE EVALUATION
Procedure:  ENDOSCOPIC ULTRASOUND (UPPER)    Relevant Problems   CARDIO   (+) Essential hypertension   (+) Hypertension   (+) Mitral regurgitation      ENDO   (+) Hypothyroidism   (+) Subclinical hypothyroidism      GI/HEPATIC   (+) Gastroesophageal reflux disease      /RENAL   (+) Stage 3 chronic kidney disease, unspecified whether stage 3a or 3b CKD (HCC)      MUSCULOSKELETAL   (+) Pain in right lumbar region of back      PULMONARY   (+) SOB (shortness of breath)        Physical Exam    Airway    Mallampati score: I  TM Distance: >3 FB       Dental    upper dentures    Cardiovascular  Rhythm: regular, Rate: normal, Murmur    Pulmonary  Pulmonary exam normal Breath sounds clear to auscultation    Other Findings  ECHO 2019:    SUMMARY     LEFT VENTRICLE:  Systolic function was normal. Ejection fraction was estimated to be 60 %.  There were no regional wall motion abnormalities.     LEFT ATRIUM:  The atrium was mildly dilated.     MITRAL VALVE:  There was moderate regurgitation.  post-pubertal.      Anesthesia Plan  ASA Score- 3     Anesthesia Type- IV sedation with anesthesia with ASA Monitors.         Additional Monitors:     Airway Plan:            Plan Factors-Exercise tolerance (METS): >4 METS.    Chart reviewed. EKG reviewed. Imaging results reviewed. Existing labs reviewed. Patient summary reviewed.    Patient is not a current smoker.  Patient did not smoke on day of surgery.            Induction- intravenous.    Postoperative Plan-     Perioperative Resuscitation Plan - Level 1 - Full Code.       Informed Consent- Anesthetic plan and risks discussed with patient.  I personally reviewed this patient with the CRNA. Discussed and agreed on the Anesthesia Plan with the CRNA..

## 2024-08-07 NOTE — H&P
History and Physical - [facility]  Brigid Brown 79 y.o. female MRN: [id number]                  HPI: Brigid Brown is a 79 y.o. year old female who presents for abnormal PET scan showing mesentric panniculitis.      REVIEW OF SYSTEMS: Per the HPI, and otherwise unremarkable.    Historical Information   Past Medical History:   Diagnosis Date    Acne     Basal cell carcinoma 06/08/2020    right lower forehead    BCC (basal cell carcinoma of skin) 03/09/2020    mid forehead    Benign neoplasm of skin     Disease of thyroid gland 2006    GERD (gastroesophageal reflux disease)     Hypertension     Inflamed seborrheic keratosis     Irritable bowel syndrome     Malignant neoplasm of skin of face     Migraines     Nonmelanoma skin cancer     Last Assessed:6/27/17    Squamous cell skin cancer 06/08/2020    In situ, left lower forehead    Tachycardia     Telogen effluvium     Temporomandibular joint disorder     Vertigo      Past Surgical History:   Procedure Laterality Date    ADENOIDECTOMY      APPENDECTOMY      CHOLECYSTECTOMY      COLONOSCOPY  05/03/2019    COMPLEX WOUND CLOSURE TO EXTREMITY N/A 7/28/2020    Procedure: COMPLEX CLOSURE MID FOREHEAD;  Surgeon: Randy Frank MD;  Location: [facility];  Service: Plastics    ESOPHAGOGASTRODUODENOSCOPY  2009    FLAP LOCAL HEAD / NECK N/A 7/28/2020    Procedure: FLAP MID FOREHEAD;  Surgeon: Randy Frank MD;  Location: [facility];  Service: Plastics    HYSTERECTOMY  1987    MALIGNANT SKIN LESION EXCISION      Excision of Lesion Face Malignant-9/14/2004 BCC Forehead    MOHS RECONSTRUCTION N/A 7/28/2020    Procedure: RECONSTRUCTION MOHS DEFECT MID FOREHEAD;  Surgeon: Randy Frank MD;  Location: [facility];  Service: Plastics    MOHS SURGERY  07/27/2020    Right &left lower forehead, mid forehead    OOPHORECTOMY Bilateral 1987    ROTATOR CUFF REPAIR Right     SKIN BIOPSY      TONSILLECTOMY      TUBAL LIGATION  1976?     Social History    Social History     Substance and Sexual Activity   Alcohol Use No     Social History     Substance and Sexual Activity   Drug Use No     Social History     Tobacco Use   Smoking Status Never   Smokeless Tobacco Never     Family History   Problem Relation Age of Onset    Hypertension Mother         Mother    Osteoporosis Mother     Skin cancer Mother     Migraines Mother     Dementia Mother     Hypertension Father         Father    Skin cancer Father     Dementia Father     Skin cancer Sister 73    Migraines Sister     No Known Problems Daughter     Uterine cancer Maternal Grandmother 29    Diabetes type II Maternal Grandfather     Cancer Paternal Grandmother     Uterine cancer Paternal Grandmother 65    No Known Problems Maternal Aunt     Cancer Paternal Aunt         Breast    Uterine cancer Paternal Aunt 55    No Known Problems Paternal Aunt     No Known Problems Paternal Aunt        Meds/Allergies       Current Outpatient Medications:     ascorbic acid (VITAMIN C) 500 mg tablet    Digestive Enzyme CAPS    diltiazem (CARDIZEM CD) 240 mg 24 hr capsule    lactase (LACTAID) 3,000 units tablet    Multiple Vitamins-Minerals (CENTRUM SILVER ULTRA WOMENS PO)    polyethylene glycol-propylene glycol (SYSTANE) 0.4-0.3 %    Allergies   Allergen Reactions    Cortisone Hypertension, Other (See Comments), Shortness Of Breath and Tachycardia    Acebutolol     Acetaminophen GI Intolerance     diarrhea    Amlodipine     Atenolol     Azithromycin     Barium Sulfate Diarrhea     Patient reports watery stool and stomach ache post fluoro upper GI on 6/6/24. Per Radiologist MD Lozada, reaction should not absolutely preclude patient from having barium in the future if necessary. KEZIA RN 6/7/24      Bisphosphonates      Annotation - [id number]: iriitis    Candesartan     Clarithromycin     Erythromycin     Fosinopril     Irbesartan     Levofloxacin     Losartan     Methylprednisolone Hypertension and Tachycardia    Metoprolol     Nisoldipine  "    Olmesartan     Propranolol     Sulfamethoxazole-Trimethoprim Nausea Only    Timolol     Lidocaine Palpitations and Tachycardia       Objective     /62   Pulse 80   Temp 98 °F (36.7 °C) (Tympanic)   Resp 20   Ht 5' 2\" (1.575 m)   Wt 35.4 kg (78 lb)   LMP  (LMP Unknown)   SpO2 99%   BMI 14.27 kg/m²       PHYSICAL EXAM    Gen: NAD  Head: NCAT  CV: RRR  CHEST: Clear  ABD: soft, NT/ND  EXT: no edema      ASSESSMENT/PLAN:  This is a 79 y.o. year old female here for egd eus, and she is stable and optimized for her procedure.        "

## 2024-08-07 NOTE — ANESTHESIA POSTPROCEDURE EVALUATION
Post-Op Assessment Note    CV Status:  Stable  Pain Score: 0    Pain management: adequate       Mental Status:  Alert and awake   Hydration Status:  Euvolemic   PONV Controlled:  Controlled   Airway Patency:  Patent  Two or more mitigation strategies used for obstructive sleep apnea   Post Op Vitals Reviewed: Yes    No anethesia notable event occurred.    Staff: JOHNATHON               /56 (08/07/24 1540)    Temp (!) 97 °F (36.1 °C) (08/07/24 1540)    Pulse 79 (08/07/24 1540)   Resp 16 (08/07/24 1540)    SpO2 99 % (08/07/24 1540)

## 2024-08-09 PROCEDURE — 88305 TISSUE EXAM BY PATHOLOGIST: CPT | Performed by: PATHOLOGY

## 2024-08-12 NOTE — RESULT ENCOUNTER NOTE
Hi Mrs Brown,  I am happy to report that all of the biopsy results from your endoscopy done on 8/7/24 have returned and they appear normal. This is good news. Please reach out to me if you have any further questions or concerns.    Daryl Ortiz MD  Fellow,   Department of Gastroenterology,  [facility].    Sent MCM as above.   Office follow up if she continues to have symptoms

## 2024-08-13 ENCOUNTER — TELEPHONE (OUTPATIENT)
Age: 79
End: 2024-08-13

## 2024-08-13 ENCOUNTER — TELEPHONE (OUTPATIENT)
Dept: FAMILY MEDICINE CLINIC | Facility: MEDICAL CENTER | Age: 79
End: 2024-08-13

## 2024-08-13 ENCOUNTER — OFFICE VISIT (OUTPATIENT)
Dept: FAMILY MEDICINE CLINIC | Facility: MEDICAL CENTER | Age: 79
End: 2024-08-13
Payer: MEDICARE

## 2024-08-13 VITALS
OXYGEN SATURATION: 97 % | HEART RATE: 85 BPM | TEMPERATURE: 97.1 F | WEIGHT: 80.4 LBS | SYSTOLIC BLOOD PRESSURE: 102 MMHG | BODY MASS INDEX: 14.8 KG/M2 | DIASTOLIC BLOOD PRESSURE: 60 MMHG | HEIGHT: 62 IN | RESPIRATION RATE: 18 BRPM

## 2024-08-13 DIAGNOSIS — R00.2 PALPITATIONS: ICD-10-CM

## 2024-08-13 DIAGNOSIS — I10 PRIMARY HYPERTENSION: ICD-10-CM

## 2024-08-13 DIAGNOSIS — R63.0 DECREASED APPETITE: Primary | ICD-10-CM

## 2024-08-13 PROCEDURE — 99214 OFFICE O/P EST MOD 30 MIN: CPT

## 2024-08-13 RX ORDER — MIRTAZAPINE 15 MG/1
15 TABLET, FILM COATED ORAL
Qty: 30 TABLET | Refills: 0 | Status: SHIPPED | OUTPATIENT
Start: 2024-08-13 | End: 2024-08-21 | Stop reason: SINTOL

## 2024-08-13 RX ORDER — DILTIAZEM HYDROCHLORIDE 180 MG/1
180 CAPSULE, COATED, EXTENDED RELEASE ORAL DAILY
Qty: 30 CAPSULE | Refills: 0 | Status: SHIPPED | OUTPATIENT
Start: 2024-08-13 | End: 2024-08-21

## 2024-08-13 NOTE — TELEPHONE ENCOUNTER
[facility] called asking if Cardizem 180mg can be changed to Tiazac ER 180mg bc Cardizem is on backorder with [facility] and Pt requests this  due to a previous allergic reaction. Ok per Jojo Fuentes

## 2024-08-13 NOTE — PROGRESS NOTES
Assessment/Plan:    Decrease dose of diltiazem.  Check Holter monitor.  Restart Remeron.  Follow-up with PCP and GI.     1. Decreased appetite  Advised to restart Remeron 15 mg daily at bedtime.  Advised about eating small frequent meals throughout the day higher calorie foods and supplementing with boost and Ensure shakes.  Follow-up with gastroenterology and PCP.  - mirtazapine (REMERON) 15 mg tablet; Take 1 tablet (15 mg total) by mouth daily at bedtime  Dispense: 30 tablet; Refill: 0    2. Primary hypertension  Decrease diltiazem to 180 mg daily as directed below.  Advised to monitor home blood pressure readings with smaller appropriate size cuff twice daily, keep log.  Follow-up with Dr. Cintron PCP in 4 weeks, send bp log in sooner if still having low readings.  - diltiazem (CARDIZEM CD) 180 mg 24 hr capsule; Take 1 capsule (180 mg total) by mouth daily  Dispense: 30 capsule; Refill: 0    3. Palpitations    - Holter monitor; Future      Subjective:      Patient ID: Brigid Brown is a 79 y.o. female.    79-year-old female presents today with her daughter.  She is a patient of Dr. Cintron. She is complaining of fatigue ongoing x 4 months as well as low blood pressure readings.  She tells me her blood pressure was low during her recent endoscopy and seems to be low ever since she has been losing weight.  She is not able to monitor it at home anymore as her blood pressure cuff no longer fits her due to weight loss.  She is following with gastroenterology as well as surgical oncology due to finding of mesenteric mass.  This was further evaluated with a PET scan and recent EGD with biopsies performed on 8/7 which returned normal. She is scheduled with GI for follow-up in October and with surgical oncology later this week.  She does continue to lose weight and have decreased appetite.  She tells me it is hard for her to tolerate certain foods and Ensure/boost shakes because it causes diarrhea which is also ongoing.  She  "was previously prescribed Remeron by Dr. Cintron which she tells me she only took for about 5 to 7 days because she read a warning that it should not be taken for longer than that.  She does report that while taking this medication she tolerated it well and remembers that it did help with her appetite but is not currently on, she has a lot left over from the 30 day supply prescribed.   She is currently on diltiazem 240 mg daily for hypertension.   She is also complaining of palpitations that started approximately 2 months ago, intermittently.  She tells me she has palpitations approximately 3 to 4 days/week both at rest and with exertion.  Denies chest pain, shortness of breath associated with this.          The following portions of the patient's history were reviewed and updated as appropriate: allergies, current medications, past medical history, past social history, past surgical history, and problem list.    Review of Systems   Constitutional:  Positive for appetite change and unexpected weight change.   HENT: Negative.     Eyes: Negative.    Respiratory: Negative.     Cardiovascular:  Positive for palpitations. Negative for chest pain and leg swelling.   Gastrointestinal: Negative.    Endocrine: Negative.    Genitourinary: Negative.    Musculoskeletal: Negative.    Skin: Negative.    Allergic/Immunologic: Negative.    Neurological: Negative.    Hematological: Negative.    Psychiatric/Behavioral: Negative.           Objective:      /60   Pulse 85   Temp (!) 97.1 °F (36.2 °C) (Temporal)   Resp 18   Ht 5' 2\" (1.575 m)   Wt 36.5 kg (80 lb 6.4 oz)   LMP  (LMP Unknown)   SpO2 97%   BMI 14.71 kg/m²          Physical Exam  Vitals and nursing note reviewed.   Constitutional:       General: She is not in acute distress.     Appearance: Normal appearance.   HENT:      Head: Normocephalic and atraumatic.   Cardiovascular:      Rate and Rhythm: Normal rate and regular rhythm.      Pulses: Normal pulses.      " Heart sounds: Normal heart sounds.   Pulmonary:      Effort: Pulmonary effort is normal.      Breath sounds: Normal breath sounds.   Abdominal:      General: Bowel sounds are normal.      Palpations: Abdomen is soft.      Tenderness: There is no abdominal tenderness. There is no guarding.   Musculoskeletal:      Right lower leg: No edema.      Left lower leg: No edema.   Skin:     General: Skin is warm and dry.   Neurological:      General: No focal deficit present.      Mental Status: She is alert and oriented to person, place, and time. Mental status is at baseline.   Psychiatric:         Mood and Affect: Mood normal.         Behavior: Behavior normal.         Thought Content: Thought content normal.                    YULISSA Winn

## 2024-08-13 NOTE — TELEPHONE ENCOUNTER
Brigid called in asking if her follow up on 8/16/24 with Dr Drew is necessary. She states the endoscopy that Dr Drew ordered came back negative. Please call her at [phone].

## 2024-08-15 ENCOUNTER — TELEPHONE (OUTPATIENT)
Age: 79
End: 2024-08-15

## 2024-08-15 NOTE — TELEPHONE ENCOUNTER
Patients daughter calling to speak with Dr. Drew. She is calling regarding the Biopsy results from 8/7/24.  She would like to discuss results and to confirm patients appt for 8/16/24 is necessary.  Please call daughterMerari at [phone]

## 2024-08-15 NOTE — TELEPHONE ENCOUNTER
LM for patient and patient's daughter stating Dr Drew would like to her tomorrow to discuss a surgery biopsy.

## 2024-08-16 ENCOUNTER — RA CDI HCC (OUTPATIENT)
Dept: OTHER | Facility: HOSPITAL | Age: 79
End: 2024-08-16

## 2024-08-16 ENCOUNTER — OFFICE VISIT (OUTPATIENT)
Dept: SURGICAL ONCOLOGY | Facility: CLINIC | Age: 79
End: 2024-08-16
Payer: MEDICARE

## 2024-08-16 VITALS
BODY MASS INDEX: 14.35 KG/M2 | DIASTOLIC BLOOD PRESSURE: 62 MMHG | RESPIRATION RATE: 18 BRPM | WEIGHT: 78 LBS | SYSTOLIC BLOOD PRESSURE: 90 MMHG | HEIGHT: 62 IN | TEMPERATURE: 97.9 F | HEART RATE: 96 BPM | OXYGEN SATURATION: 100 %

## 2024-08-16 DIAGNOSIS — D49.9 NEOPLASM OF UNSPECIFIED BEHAVIOR OF UNSPECIFIED SITE: ICD-10-CM

## 2024-08-16 DIAGNOSIS — K63.89 MESENTERIC MASS: Primary | ICD-10-CM

## 2024-08-16 PROCEDURE — 99215 OFFICE O/P EST HI 40 MIN: CPT | Performed by: SURGERY

## 2024-08-16 RX ORDER — CEFAZOLIN SODIUM 1 G/50ML
1000 SOLUTION INTRAVENOUS ONCE
OUTPATIENT
Start: 2024-08-16 | End: 2024-08-16

## 2024-08-16 NOTE — H&P (VIEW-ONLY)
Surgical Oncology Follow Up       [facility] [place]  [address] [facility] [address]  [phone]    Brigid Brown  1945  [id number]  [facility] [place]  [address] [facility] [address]  [phone]    1. Mesenteric mass  Assessment & Plan:  79-year-old female with a mesenteric mass. This is encasing her SMA, and on my review appears to would be involving her celiac as well. I did discuss that even though there is no obvious pancreas mass seen, this is somewhat concerning for an underlying malignancy. Her PET/CT shows no obvious primary masses and the lesion itself is not significantly FDG avid raising the possibility of a benign lesion. The fact that this has significantly increased over 4 months, this does not appear to be a benign process.  Endoscopic ultrasound was not revealing.  I would recommend that she undergo surgical biopsy.  The risks of surgical biopsy with lymphoma protocol and possible gastrojejunostomy were explained including bleeding, infection, recurrence, need for further surgery, wound complications, adjacent organ injury, anastomotic leak.  Informed consent was obtained.  We will schedule this at our earliest mutual convenience.  Once there is a definitive diagnosis, treatment can be tailored to the pathology. She is agreeable to this plan. All her questions were answered.   Orders:  -     Case request operating room: BIOPSY ABDOMINAL MASS, LYMPHOMA PROTOCOL, POSSIBLE GASTROJEJUNOSTOMY; Standing  -     Comprehensive metabolic panel; Future  -     CBC and differential; Future  -     APTT; Future  -     Protime-INR; Future  -     Ambulatory referral to surgical optimization; Future  -     Case request operating room: BIOPSY ABDOMINAL MASS, LYMPHOMA PROTOCOL, POSSIBLE GASTROJEJUNOSTOMY  2. Neoplasm of unspecified behavior of unspecified  site  -     APTT; Future  -     Protime-INR; Future         Chief Complaint   Patient presents with    office visit       No follow-ups on file.      Oncology History    No history exists.           History of Present Illness: Patient returns in follow-up.  EUS on August 7, 2024 revealed no abnormality to correlate with the PET findings.  The pancreatic duct appeared normal.  There was no lymphadenopathy.  Patient continues to lose weight and is having diarrhea.  She is using Imodium with some benefit.  She is not using protein drinks because of how they make her feel.    Review of Systems  Complete ROS Surg Onc:   Complete ROS Surg Onc:   Constitutional: The patient denies new or recent history of general fatigue, no recent weight loss, no change in appetite.   Eyes: No complaints of visual problems, no scleral icterus.   ENT: no complaints of ear pain, no hoarseness, no difficulty swallowing,  no tinnitus and no new masses in head, oral cavity, or neck.   Cardiovascular: No complaints of chest pain, no palpitations, no ankle edema.   Respiratory: No complaints of shortness of breath, no cough.   Gastrointestinal: No complaints of jaundice, no bloody stools, no pale stools.   Genitourinary: No complaints of dysuria, no hematuria, no nocturia, no frequent urination, no urethral discharge.   Musculoskeletal: No complaints of weakness, paralysis, joint stiffness or arthralgias.  Integumentary: No complaints of rash, no new lesions.   Neurological: No complaints of convulsions, no seizures, no dizziness.   Hematologic/Lymphatic: No complaints of easy bruising.   Endocrine:  No hot or cold intolerance.  No polydipsia, polyphagia, or polyuria.  Allergy/immunology:  No environmental allergies.  No food allergies.  Not immunocompromised.  Skin:  No pallor or rash.  No wound.        Patient Active Problem List   Diagnosis    Seborrheic keratosis    Changing skin lesion    Screening for blood or protein in urine    History  of skin cancer    Essential hypertension    Near syncope    Basal cell carcinoma of right temple region    Episodic confusion    Allergic rhinitis    Hypertension    Hypothyroidism    Hashimoto's disease    History of vitamin D deficiency    Subclinical hypothyroidism    Sensorineural hearing loss (SNHL) of both ears    Sprain of anterior talofibular ligament of left ankle    Tinnitus of both ears    Symptoms of cerebrovascular accident (CVA)    IBS (irritable bowel syndrome)    Grief    Transient alteration of awareness    Gastroesophageal reflux disease    Dysphonia    Pain in right lumbar region of back    Abdominal pain    Urinary symptom or sign    Right hip pain    Fatigue    TMJ dysfunction    Myofascial pain    Tongue pain    Reflux laryngitis    Vertigo    Vaginal atrophy    Lactose intolerance    Mild protein-calorie malnutrition (HCC)    Pigmented purpura (HCC)    Stage 3 chronic kidney disease, unspecified whether stage 3a or 3b CKD (HCC)    Age-related osteoporosis without current pathological fracture    Sprain of medial collateral ligament of left knee, initial encounter    Mitral regurgitation    Weight loss    Gastritis    SOB (shortness of breath)    Mesenteric mass     Past Medical History:   Diagnosis Date    Acne     Basal cell carcinoma 06/08/2020    right lower forehead    BCC (basal cell carcinoma of skin) 03/09/2020    mid forehead    Benign neoplasm of skin     Disease of thyroid gland 2006    GERD (gastroesophageal reflux disease)     Hypertension     Inflamed seborrheic keratosis     Irritable bowel syndrome     Malignant neoplasm of skin of face     Migraines     Nonmelanoma skin cancer     Last Assessed:6/27/17    Squamous cell skin cancer 06/08/2020    In situ, left lower forehead    Tachycardia     Telogen effluvium     Temporomandibular joint disorder     Vertigo      Past Surgical History:   Procedure Laterality Date    ADENOIDECTOMY      APPENDECTOMY      CHOLECYSTECTOMY       COLONOSCOPY  05/03/2019    COMPLEX WOUND CLOSURE TO EXTREMITY N/A 7/28/2020    Procedure: COMPLEX CLOSURE MID FOREHEAD;  Surgeon: Randy Frank MD;  Location: [facility];  Service: Plastics    ESOPHAGOGASTRODUODENOSCOPY  2009    FLAP LOCAL HEAD / NECK N/A 7/28/2020    Procedure: FLAP MID FOREHEAD;  Surgeon: Randy Frank MD;  Location: [facility];  Service: Plastics    HYSTERECTOMY  1987    MALIGNANT SKIN LESION EXCISION      Excision of Lesion Face Malignant-9/14/2004 BCC Forehead    MOHS RECONSTRUCTION N/A 7/28/2020    Procedure: RECONSTRUCTION MOHS DEFECT MID FOREHEAD;  Surgeon: Randy Frank MD;  Location: [facility];  Service: Plastics    MOHS SURGERY  07/27/2020    Right &left lower forehead, mid forehead    OOPHORECTOMY Bilateral 1987    ROTATOR CUFF REPAIR Right     SKIN BIOPSY      TONSILLECTOMY      TUBAL LIGATION  1976?     Family History   Problem Relation Age of Onset    Hypertension Mother         Mother    Osteoporosis Mother     Skin cancer Mother     Migraines Mother     Dementia Mother     Hypertension Father         Father    Skin cancer Father     Dementia Father     Skin cancer Sister 73    Migraines Sister     No Known Problems Daughter     Uterine cancer Maternal Grandmother 29    Diabetes type II Maternal Grandfather     Cancer Paternal Grandmother     Uterine cancer Paternal Grandmother 65    No Known Problems Maternal Aunt     Cancer Paternal Aunt         Breast    Uterine cancer Paternal Aunt 55    No Known Problems Paternal Aunt     No Known Problems Paternal Aunt      Social History     Socioeconomic History    Marital status: /Civil Union     Spouse name: Not on file    Number of children: Not on file    Years of education: Not on file    Highest education level: Not on file   Occupational History    Not on file   Tobacco Use    Smoking status: Never    Smokeless tobacco: Never   Vaping Use    Vaping status: Never Used   Substance and Sexual Activity     Alcohol use: No    Drug use: No    Sexual activity: Not Currently     Partners: Male     Birth control/protection: Post-menopausal   Other Topics Concern    Not on file   Social History Narrative    Not on file     Social Determinants of Health     Financial Resource Strain: Not on file   Food Insecurity: No Food Insecurity (6/18/2024)    Hunger Vital Sign     Worried About Running Out of Food in the Last Year: Never true     Ran Out of Food in the Last Year: Never true   Transportation Needs: No Transportation Needs (6/18/2024)    PRAPARE - Transportation     Lack of Transportation (Medical): No     Lack of Transportation (Non-Medical): No   Physical Activity: Not on file   Stress: Not on file   Social Connections: Not on file   Intimate Partner Violence: Not on file   Housing Stability: Low Risk  (6/18/2024)    Housing Stability Vital Sign     Unable to Pay for Housing in the Last Year: No     Number of Times Moved in the Last Year: 1     Homeless in the Last Year: No       Current Outpatient Medications:     ascorbic acid (VITAMIN C) 500 mg tablet, Take by mouth, Disp: , Rfl:     Digestive Enzyme CAPS, Take by mouth, Disp: , Rfl:     diltiazem (CARDIZEM CD) 180 mg 24 hr capsule, Take 1 capsule (180 mg total) by mouth daily, Disp: 30 capsule, Rfl: 0    lactase (LACTAID) 3,000 units tablet, Take by mouth, Disp: , Rfl:     mirtazapine (REMERON) 15 mg tablet, Take 1 tablet (15 mg total) by mouth daily at bedtime, Disp: 30 tablet, Rfl: 0    Multiple Vitamins-Minerals (CENTRUM SILVER ULTRA WOMENS PO), Take by mouth, Disp: , Rfl:     polyethylene glycol-propylene glycol (SYSTANE) 0.4-0.3 %, , Disp: , Rfl:   Allergies   Allergen Reactions    Cortisone Hypertension, Other (See Comments), Shortness Of Breath and Tachycardia    Acebutolol     Acetaminophen GI Intolerance     diarrhea    Amlodipine     Atenolol     Azithromycin     Barium Sulfate Diarrhea     Patient reports watery stool and stomach ache post fluoro upper GI  on 6/6/24. Per Radiologist MD Lozada, reaction should not absolutely preclude patient from having barium in the future if necessary. CG RN 6/7/24      Bisphosphonates      Annotation - [id number]: iriitis    Candesartan     Clarithromycin     Erythromycin     Fosinopril     Irbesartan     Levofloxacin     Losartan     Methylprednisolone Hypertension and Tachycardia    Metoprolol     Nisoldipine     Olmesartan     Propranolol     Sulfamethoxazole-Trimethoprim Nausea Only    Timolol     Lidocaine Palpitations and Tachycardia     Vitals:    08/16/24 1351   BP: 90/62   Pulse: 96   Resp: 18   Temp: 97.9 °F (36.6 °C)   SpO2: 100%       Physical Exam  Constitutional: General appearance: The Patient is well-developed and well-nourished who appears the stated age in no acute distress. Patient is pleasant and talkative.     HEENT:  Normocephalic.  Sclerae are anicteric. Mucous membranes are moist. Neck is supple without adenopathy. No JVD.     Chest: The lungs are clear to auscultation.     Cardiac: Heart is regular rate.     Abdomen: Abdomen is soft, non-tender, non-distended.  There is a central abdominal mass.     Extremities: There is no clubbing or cyanosis. There is no edema.  Symmetric.  Neuro: Grossly nonfocal. Gait is normal.     Lymphatic: No evidence of cervical adenopathy bilaterally.   No evidence of axillary adenopathy bilaterally. No evidence of inguinal adenopathy bilaterally.     Skin: Warm, anicteric.    Psych:  Patient is pleasant and talkative.  Breasts:        Pathology:  [unfilled]    Labs:      Imaging  Endoscopic ultrasonography, GI (Upper)    Result Date: 8/7/2024  Narrative: Table formatting from the original result was not included. [facility] [address] [facility] [address] [phone] DATE OF SERVICE: 8/07/24 PHYSICIAN(S): Attending: Erika Marsh MD Fellow: Daryl Ortiz MD INDICATION: Unintentional weight loss, Generalized postprandial abdominal pain POST-OP  DIAGNOSIS: See the impression below. PREPROCEDURE: Informed consent was obtained for the procedure, including sedation.  Risks of perforation, hemorrhage, adverse drug reaction and aspiration were discussed. The patient was placed in the left lateral decubitus position. Patient was explained about the risks and benefits of the procedure. Risks including but not limited to bleeding, infection, and perforation were explained in detail. Also explained about less than 100% sensitivity with the exam and other alternatives. PROCEDURE: EUS UPPER DETAILS OF PROCEDURE: Patient was taken to the procedure room where a time out was performed to confirm correct patient and correct procedure. The patient underwent monitored anesthesia care, which was administered by an anesthesia professional. The patient's blood pressure, heart rate, oxygen, respirations, level of consciousness, ECG and ETCO2 were monitored throughout the procedure. The endoscope and linear scope were introduced through the mouth and advanced to the duodenum. The patient experienced no blood loss. The procedure was not difficult. The patient tolerated the procedure well. There were no apparent adverse events. ANESTHESIA INFORMATION: ASA: III Anesthesia Type: IV Sedation with Anesthesia MEDICATIONS: No administrations occurring from 1508 to 1538 on 08/07/24 FINDINGS: The esophagus appeared normal. 1 cm hiatal hernia - GE junction 42 cm from the incisors, diaphragmatic impression 43 cm from the incisors Mild, localized erythematous mucosa in the stomach The duodenum appeared normal. Normal celiac takeoff.  No evidence of pancreatic ductal dilation throughout the examination.  Bile duct was normal caliber with no filling defects noted.  Ampulla appeared normal.  No masses or lymphadenopathy appreciated.  No abnormality noted to correlate with PET findings. SPECIMENS: ID Type Source Tests Collected by Time Destination 1 :  Tissue Duodenum TISSUE EXAM Daryl Ortiz  MD 8/7/2024  3:13 PM  2 :  Tissue Stomach TISSUE EXAM Daryl Ortiz MD 8/7/2024  3:15 PM       Impression: The esophagus appeared normal. 1 cm hiatal hernia Mild erythematous mucosa in the stomach The duodenum appeared normal. Normal celiac takeoff.  No evidence of pancreatic ductal dilation throughout the examination.  Bile duct was normal caliber with no filling defects noted.  Ampulla appeared normal.  No masses or lymphadenopathy appreciated.  No abnormality noted to correlate with PET findings. RECOMMENDATION: Follow up in the office as scheduled  Erika Marsh MD     I personally reviewed and interpreted the above laboratory and imaging data.

## 2024-08-16 NOTE — PROGRESS NOTES
Surgical Oncology Follow Up       [facility] [place]  [address] [facility] [address]  [phone]    Brigid Brown  1945  [id number]  [facility] [place]  [address] [facility] [address]  [phone]    1. Mesenteric mass  Assessment & Plan:  79-year-old female with a mesenteric mass. This is encasing her SMA, and on my review appears to would be involving her celiac as well. I did discuss that even though there is no obvious pancreas mass seen, this is somewhat concerning for an underlying malignancy. Her PET/CT shows no obvious primary masses and the lesion itself is not significantly FDG avid raising the possibility of a benign lesion. The fact that this has significantly increased over 4 months, this does not appear to be a benign process.  Endoscopic ultrasound was not revealing.  I would recommend that she undergo surgical biopsy.  The risks of surgical biopsy with lymphoma protocol and possible gastrojejunostomy were explained including bleeding, infection, recurrence, need for further surgery, wound complications, adjacent organ injury, anastomotic leak.  Informed consent was obtained.  We will schedule this at our earliest mutual convenience.  Once there is a definitive diagnosis, treatment can be tailored to the pathology. She is agreeable to this plan. All her questions were answered.   Orders:  -     Case request operating room: BIOPSY ABDOMINAL MASS, LYMPHOMA PROTOCOL, POSSIBLE GASTROJEJUNOSTOMY; Standing  -     Comprehensive metabolic panel; Future  -     CBC and differential; Future  -     APTT; Future  -     Protime-INR; Future  -     Ambulatory referral to surgical optimization; Future  -     Case request operating room: BIOPSY ABDOMINAL MASS, LYMPHOMA PROTOCOL, POSSIBLE GASTROJEJUNOSTOMY  2. Neoplasm of unspecified behavior of unspecified  site  -     APTT; Future  -     Protime-INR; Future         Chief Complaint   Patient presents with    office visit       No follow-ups on file.      Oncology History    No history exists.           History of Present Illness: Patient returns in follow-up.  EUS on August 7, 2024 revealed no abnormality to correlate with the PET findings.  The pancreatic duct appeared normal.  There was no lymphadenopathy.  Patient continues to lose weight and is having diarrhea.  She is using Imodium with some benefit.  She is not using protein drinks because of how they make her feel.    Review of Systems  Complete ROS Surg Onc:   Complete ROS Surg Onc:   Constitutional: The patient denies new or recent history of general fatigue, no recent weight loss, no change in appetite.   Eyes: No complaints of visual problems, no scleral icterus.   ENT: no complaints of ear pain, no hoarseness, no difficulty swallowing,  no tinnitus and no new masses in head, oral cavity, or neck.   Cardiovascular: No complaints of chest pain, no palpitations, no ankle edema.   Respiratory: No complaints of shortness of breath, no cough.   Gastrointestinal: No complaints of jaundice, no bloody stools, no pale stools.   Genitourinary: No complaints of dysuria, no hematuria, no nocturia, no frequent urination, no urethral discharge.   Musculoskeletal: No complaints of weakness, paralysis, joint stiffness or arthralgias.  Integumentary: No complaints of rash, no new lesions.   Neurological: No complaints of convulsions, no seizures, no dizziness.   Hematologic/Lymphatic: No complaints of easy bruising.   Endocrine:  No hot or cold intolerance.  No polydipsia, polyphagia, or polyuria.  Allergy/immunology:  No environmental allergies.  No food allergies.  Not immunocompromised.  Skin:  No pallor or rash.  No wound.        Patient Active Problem List   Diagnosis    Seborrheic keratosis    Changing skin lesion    Screening for blood or protein in urine    History  of skin cancer    Essential hypertension    Near syncope    Basal cell carcinoma of right temple region    Episodic confusion    Allergic rhinitis    Hypertension    Hypothyroidism    Hashimoto's disease    History of vitamin D deficiency    Subclinical hypothyroidism    Sensorineural hearing loss (SNHL) of both ears    Sprain of anterior talofibular ligament of left ankle    Tinnitus of both ears    Symptoms of cerebrovascular accident (CVA)    IBS (irritable bowel syndrome)    Grief    Transient alteration of awareness    Gastroesophageal reflux disease    Dysphonia    Pain in right lumbar region of back    Abdominal pain    Urinary symptom or sign    Right hip pain    Fatigue    TMJ dysfunction    Myofascial pain    Tongue pain    Reflux laryngitis    Vertigo    Vaginal atrophy    Lactose intolerance    Mild protein-calorie malnutrition (HCC)    Pigmented purpura (HCC)    Stage 3 chronic kidney disease, unspecified whether stage 3a or 3b CKD (HCC)    Age-related osteoporosis without current pathological fracture    Sprain of medial collateral ligament of left knee, initial encounter    Mitral regurgitation    Weight loss    Gastritis    SOB (shortness of breath)    Mesenteric mass     Past Medical History:   Diagnosis Date    Acne     Basal cell carcinoma 06/08/2020    right lower forehead    BCC (basal cell carcinoma of skin) 03/09/2020    mid forehead    Benign neoplasm of skin     Disease of thyroid gland 2006    GERD (gastroesophageal reflux disease)     Hypertension     Inflamed seborrheic keratosis     Irritable bowel syndrome     Malignant neoplasm of skin of face     Migraines     Nonmelanoma skin cancer     Last Assessed:6/27/17    Squamous cell skin cancer 06/08/2020    In situ, left lower forehead    Tachycardia     Telogen effluvium     Temporomandibular joint disorder     Vertigo      Past Surgical History:   Procedure Laterality Date    ADENOIDECTOMY      APPENDECTOMY      CHOLECYSTECTOMY       COLONOSCOPY  05/03/2019    COMPLEX WOUND CLOSURE TO EXTREMITY N/A 7/28/2020    Procedure: COMPLEX CLOSURE MID FOREHEAD;  Surgeon: Randy Frank MD;  Location: [facility];  Service: Plastics    ESOPHAGOGASTRODUODENOSCOPY  2009    FLAP LOCAL HEAD / NECK N/A 7/28/2020    Procedure: FLAP MID FOREHEAD;  Surgeon: Randy Frank MD;  Location: [facility];  Service: Plastics    HYSTERECTOMY  1987    MALIGNANT SKIN LESION EXCISION      Excision of Lesion Face Malignant-9/14/2004 BCC Forehead    MOHS RECONSTRUCTION N/A 7/28/2020    Procedure: RECONSTRUCTION MOHS DEFECT MID FOREHEAD;  Surgeon: Randy Frank MD;  Location: [facility];  Service: Plastics    MOHS SURGERY  07/27/2020    Right &left lower forehead, mid forehead    OOPHORECTOMY Bilateral 1987    ROTATOR CUFF REPAIR Right     SKIN BIOPSY      TONSILLECTOMY      TUBAL LIGATION  1976?     Family History   Problem Relation Age of Onset    Hypertension Mother         Mother    Osteoporosis Mother     Skin cancer Mother     Migraines Mother     Dementia Mother     Hypertension Father         Father    Skin cancer Father     Dementia Father     Skin cancer Sister 73    Migraines Sister     No Known Problems Daughter     Uterine cancer Maternal Grandmother 29    Diabetes type II Maternal Grandfather     Cancer Paternal Grandmother     Uterine cancer Paternal Grandmother 65    No Known Problems Maternal Aunt     Cancer Paternal Aunt         Breast    Uterine cancer Paternal Aunt 55    No Known Problems Paternal Aunt     No Known Problems Paternal Aunt      Social History     Socioeconomic History    Marital status: /Civil Union     Spouse name: Not on file    Number of children: Not on file    Years of education: Not on file    Highest education level: Not on file   Occupational History    Not on file   Tobacco Use    Smoking status: Never    Smokeless tobacco: Never   Vaping Use    Vaping status: Never Used   Substance and Sexual Activity     Alcohol use: No    Drug use: No    Sexual activity: Not Currently     Partners: Male     Birth control/protection: Post-menopausal   Other Topics Concern    Not on file   Social History Narrative    Not on file     Social Determinants of Health     Financial Resource Strain: Not on file   Food Insecurity: No Food Insecurity (6/18/2024)    Hunger Vital Sign     Worried About Running Out of Food in the Last Year: Never true     Ran Out of Food in the Last Year: Never true   Transportation Needs: No Transportation Needs (6/18/2024)    PRAPARE - Transportation     Lack of Transportation (Medical): No     Lack of Transportation (Non-Medical): No   Physical Activity: Not on file   Stress: Not on file   Social Connections: Not on file   Intimate Partner Violence: Not on file   Housing Stability: Low Risk  (6/18/2024)    Housing Stability Vital Sign     Unable to Pay for Housing in the Last Year: No     Number of Times Moved in the Last Year: 1     Homeless in the Last Year: No       Current Outpatient Medications:     ascorbic acid (VITAMIN C) 500 mg tablet, Take by mouth, Disp: , Rfl:     Digestive Enzyme CAPS, Take by mouth, Disp: , Rfl:     diltiazem (CARDIZEM CD) 180 mg 24 hr capsule, Take 1 capsule (180 mg total) by mouth daily, Disp: 30 capsule, Rfl: 0    lactase (LACTAID) 3,000 units tablet, Take by mouth, Disp: , Rfl:     mirtazapine (REMERON) 15 mg tablet, Take 1 tablet (15 mg total) by mouth daily at bedtime, Disp: 30 tablet, Rfl: 0    Multiple Vitamins-Minerals (CENTRUM SILVER ULTRA WOMENS PO), Take by mouth, Disp: , Rfl:     polyethylene glycol-propylene glycol (SYSTANE) 0.4-0.3 %, , Disp: , Rfl:   Allergies   Allergen Reactions    Cortisone Hypertension, Other (See Comments), Shortness Of Breath and Tachycardia    Acebutolol     Acetaminophen GI Intolerance     diarrhea    Amlodipine     Atenolol     Azithromycin     Barium Sulfate Diarrhea     Patient reports watery stool and stomach ache post fluoro upper GI  on 6/6/24. Per Radiologist MD Lozada, reaction should not absolutely preclude patient from having barium in the future if necessary. CG RN 6/7/24      Bisphosphonates      Annotation - [id number]: iriitis    Candesartan     Clarithromycin     Erythromycin     Fosinopril     Irbesartan     Levofloxacin     Losartan     Methylprednisolone Hypertension and Tachycardia    Metoprolol     Nisoldipine     Olmesartan     Propranolol     Sulfamethoxazole-Trimethoprim Nausea Only    Timolol     Lidocaine Palpitations and Tachycardia     Vitals:    08/16/24 1351   BP: 90/62   Pulse: 96   Resp: 18   Temp: 97.9 °F (36.6 °C)   SpO2: 100%       Physical Exam  Constitutional: General appearance: The Patient is well-developed and well-nourished who appears the stated age in no acute distress. Patient is pleasant and talkative.     HEENT:  Normocephalic.  Sclerae are anicteric. Mucous membranes are moist. Neck is supple without adenopathy. No JVD.     Chest: The lungs are clear to auscultation.     Cardiac: Heart is regular rate.     Abdomen: Abdomen is soft, non-tender, non-distended.  There is a central abdominal mass.     Extremities: There is no clubbing or cyanosis. There is no edema.  Symmetric.  Neuro: Grossly nonfocal. Gait is normal.     Lymphatic: No evidence of cervical adenopathy bilaterally.   No evidence of axillary adenopathy bilaterally. No evidence of inguinal adenopathy bilaterally.     Skin: Warm, anicteric.    Psych:  Patient is pleasant and talkative.  Breasts:        Pathology:  [unfilled]    Labs:      Imaging  Endoscopic ultrasonography, GI (Upper)    Result Date: 8/7/2024  Narrative: Table formatting from the original result was not included. [facility] [address] [facility] [address] [phone] DATE OF SERVICE: 8/07/24 PHYSICIAN(S): Attending: Erika Marsh MD Fellow: Daryl Ortiz MD INDICATION: Unintentional weight loss, Generalized postprandial abdominal pain POST-OP  DIAGNOSIS: See the impression below. PREPROCEDURE: Informed consent was obtained for the procedure, including sedation.  Risks of perforation, hemorrhage, adverse drug reaction and aspiration were discussed. The patient was placed in the left lateral decubitus position. Patient was explained about the risks and benefits of the procedure. Risks including but not limited to bleeding, infection, and perforation were explained in detail. Also explained about less than 100% sensitivity with the exam and other alternatives. PROCEDURE: EUS UPPER DETAILS OF PROCEDURE: Patient was taken to the procedure room where a time out was performed to confirm correct patient and correct procedure. The patient underwent monitored anesthesia care, which was administered by an anesthesia professional. The patient's blood pressure, heart rate, oxygen, respirations, level of consciousness, ECG and ETCO2 were monitored throughout the procedure. The endoscope and linear scope were introduced through the mouth and advanced to the duodenum. The patient experienced no blood loss. The procedure was not difficult. The patient tolerated the procedure well. There were no apparent adverse events. ANESTHESIA INFORMATION: ASA: III Anesthesia Type: IV Sedation with Anesthesia MEDICATIONS: No administrations occurring from 1508 to 1538 on 08/07/24 FINDINGS: The esophagus appeared normal. 1 cm hiatal hernia - GE junction 42 cm from the incisors, diaphragmatic impression 43 cm from the incisors Mild, localized erythematous mucosa in the stomach The duodenum appeared normal. Normal celiac takeoff.  No evidence of pancreatic ductal dilation throughout the examination.  Bile duct was normal caliber with no filling defects noted.  Ampulla appeared normal.  No masses or lymphadenopathy appreciated.  No abnormality noted to correlate with PET findings. SPECIMENS: ID Type Source Tests Collected by Time Destination 1 :  Tissue Duodenum TISSUE EXAM Daryl Ortiz  MD 8/7/2024  3:13 PM  2 :  Tissue Stomach TISSUE EXAM Daryl Ortiz MD 8/7/2024  3:15 PM       Impression: The esophagus appeared normal. 1 cm hiatal hernia Mild erythematous mucosa in the stomach The duodenum appeared normal. Normal celiac takeoff.  No evidence of pancreatic ductal dilation throughout the examination.  Bile duct was normal caliber with no filling defects noted.  Ampulla appeared normal.  No masses or lymphadenopathy appreciated.  No abnormality noted to correlate with PET findings. RECOMMENDATION: Follow up in the office as scheduled  Erika Marsh MD     I personally reviewed and interpreted the above laboratory and imaging data.

## 2024-08-16 NOTE — ASSESSMENT & PLAN NOTE
79-year-old female with a mesenteric mass. This is encasing her SMA, and on my review appears to would be involving her celiac as well. I did discuss that even though there is no obvious pancreas mass seen, this is somewhat concerning for an underlying malignancy. Her PET/CT shows no obvious primary masses and the lesion itself is not significantly FDG avid raising the possibility of a benign lesion. The fact that this has significantly increased over 4 months, this does not appear to be a benign process.  Endoscopic ultrasound was not revealing.  I would recommend that she undergo surgical biopsy.  The risks of surgical biopsy with lymphoma protocol and possible gastrojejunostomy were explained including bleeding, infection, recurrence, need for further surgery, wound complications, adjacent organ injury, anastomotic leak.  Informed consent was obtained.  We will schedule this at our earliest mutual convenience.  Once there is a definitive diagnosis, treatment can be tailored to the pathology. She is agreeable to this plan. All her questions were answered.

## 2024-08-16 NOTE — LETTER
August 16, 2024     Delicia Cintron DO  [address] [place] [address]  [address]  [facility] [address]    Patient: Brigid Brown   YOB: 1945   Date of Visit: 8/16/2024       Dear Dr. Cintron:    Thank you for referring Brigid Brown to me for evaluation. Below are my notes for this consultation.    If you have questions, please do not hesitate to call me. I look forward to following your patient along with you.         Sincerely,        Angus Drew MD        CC: MD Angus Caraballo MD  8/16/2024  2:22 PM  Sign when Signing Visit               Surgical Oncology Follow Up       [facility] [place]  [address] [facility] [address]  [phone]    Brigid Brown  1945  [id number]  [facility]  [address] [facility] [address]  [phone]    1. Mesenteric mass  Assessment & Plan:  79-year-old female with a mesenteric mass. This is encasing her SMA, and on my review appears to would be involving her celiac as well. I did discuss that even though there is no obvious pancreas mass seen, this is somewhat concerning for an underlying malignancy. Her PET/CT shows no obvious primary masses and the lesion itself is not significantly FDG avid raising the possibility of a benign lesion. The fact that this has significantly increased over 4 months, this does not appear to be a benign process.  Endoscopic ultrasound was not revealing.  I would recommend that she undergo surgical biopsy.  The risks of surgical biopsy with lymphoma protocol and possible gastrojejunostomy were explained including bleeding, infection, recurrence, need for further surgery, wound complications, adjacent organ injury, anastomotic leak.  Informed consent was obtained.  We will schedule this at our earliest mutual convenience.  Once there is a definitive diagnosis, treatment can be  tailored to the pathology. She is agreeable to this plan. All her questions were answered.   Orders:  -     Case request operating room: BIOPSY ABDOMINAL MASS, LYMPHOMA PROTOCOL, POSSIBLE GASTROJEJUNOSTOMY; Standing  -     Comprehensive metabolic panel; Future  -     CBC and differential; Future  -     APTT; Future  -     Protime-INR; Future  -     Ambulatory referral to surgical optimization; Future  -     Case request operating room: BIOPSY ABDOMINAL MASS, LYMPHOMA PROTOCOL, POSSIBLE GASTROJEJUNOSTOMY  2. Neoplasm of unspecified behavior of unspecified site  -     APTT; Future  -     Protime-INR; Future         Chief Complaint   Patient presents with   • office visit       No follow-ups on file.      Oncology History    No history exists.           History of Present Illness: Patient returns in follow-up.  EUS on August 7, 2024 revealed no abnormality to correlate with the PET findings.  The pancreatic duct appeared normal.  There was no lymphadenopathy.  Patient continues to lose weight and is having diarrhea.  She is using Imodium with some benefit.  She is not using protein drinks because of how they make her feel.    Review of Systems  Complete ROS Surg Onc:   Complete ROS Surg Onc:   Constitutional: The patient denies new or recent history of general fatigue, no recent weight loss, no change in appetite.   Eyes: No complaints of visual problems, no scleral icterus.   ENT: no complaints of ear pain, no hoarseness, no difficulty swallowing,  no tinnitus and no new masses in head, oral cavity, or neck.   Cardiovascular: No complaints of chest pain, no palpitations, no ankle edema.   Respiratory: No complaints of shortness of breath, no cough.   Gastrointestinal: No complaints of jaundice, no bloody stools, no pale stools.   Genitourinary: No complaints of dysuria, no hematuria, no nocturia, no frequent urination, no urethral discharge.   Musculoskeletal: No complaints of weakness, paralysis, joint stiffness or  arthralgias.  Integumentary: No complaints of rash, no new lesions.   Neurological: No complaints of convulsions, no seizures, no dizziness.   Hematologic/Lymphatic: No complaints of easy bruising.   Endocrine:  No hot or cold intolerance.  No polydipsia, polyphagia, or polyuria.  Allergy/immunology:  No environmental allergies.  No food allergies.  Not immunocompromised.  Skin:  No pallor or rash.  No wound.        Patient Active Problem List   Diagnosis   • Seborrheic keratosis   • Changing skin lesion   • Screening for blood or protein in urine   • History of skin cancer   • Essential hypertension   • Near syncope   • Basal cell carcinoma of right temple region   • Episodic confusion   • Allergic rhinitis   • Hypertension   • Hypothyroidism   • Hashimoto's disease   • History of vitamin D deficiency   • Subclinical hypothyroidism   • Sensorineural hearing loss (SNHL) of both ears   • Sprain of anterior talofibular ligament of left ankle   • Tinnitus of both ears   • Symptoms of cerebrovascular accident (CVA)   • IBS (irritable bowel syndrome)   • Grief   • Transient alteration of awareness   • Gastroesophageal reflux disease   • Dysphonia   • Pain in right lumbar region of back   • Abdominal pain   • Urinary symptom or sign   • Right hip pain   • Fatigue   • TMJ dysfunction   • Myofascial pain   • Tongue pain   • Reflux laryngitis   • Vertigo   • Vaginal atrophy   • Lactose intolerance   • Mild protein-calorie malnutrition (HCC)   • Pigmented purpura (HCC)   • Stage 3 chronic kidney disease, unspecified whether stage 3a or 3b CKD (HCC)   • Age-related osteoporosis without current pathological fracture   • Sprain of medial collateral ligament of left knee, initial encounter   • Mitral regurgitation   • Weight loss   • Gastritis   • SOB (shortness of breath)   • Mesenteric mass     Past Medical History:   Diagnosis Date   • Acne    • Basal cell carcinoma 06/08/2020    right lower forehead   • BCC (basal cell  carcinoma of skin) 03/09/2020    mid forehead   • Benign neoplasm of skin    • Disease of thyroid gland 2006   • GERD (gastroesophageal reflux disease)    • Hypertension    • Inflamed seborrheic keratosis    • Irritable bowel syndrome    • Malignant neoplasm of skin of face    • Migraines    • Nonmelanoma skin cancer     Last Assessed:6/27/17   • Squamous cell skin cancer 06/08/2020    In situ, left lower forehead   • Tachycardia    • Telogen effluvium    • Temporomandibular joint disorder    • Vertigo      Past Surgical History:   Procedure Laterality Date   • ADENOIDECTOMY     • APPENDECTOMY     • CHOLECYSTECTOMY     • COLONOSCOPY  05/03/2019   • COMPLEX WOUND CLOSURE TO EXTREMITY N/A 7/28/2020    Procedure: COMPLEX CLOSURE MID FOREHEAD;  Surgeon: Randy Frank MD;  Location: [facility];  Service: Plastics   • ESOPHAGOGASTRODUODENOSCOPY  2009   • FLAP LOCAL HEAD / NECK N/A 7/28/2020    Procedure: FLAP MID FOREHEAD;  Surgeon: Randy Frank MD;  Location: [facility];  Service: Plastics   • HYSTERECTOMY  1987   • MALIGNANT SKIN LESION EXCISION      Excision of Lesion Face Malignant-9/14/2004 BCC Forehead   • MOHS RECONSTRUCTION N/A 7/28/2020    Procedure: RECONSTRUCTION MOHS DEFECT MID FOREHEAD;  Surgeon: Randy Frank MD;  Location: [facility];  Service: Plastics   • MOHS SURGERY  07/27/2020    Right &left lower forehead, mid forehead   • OOPHORECTOMY Bilateral 1987   • ROTATOR CUFF REPAIR Right    • SKIN BIOPSY     • TONSILLECTOMY     • TUBAL LIGATION  1976?     Family History   Problem Relation Age of Onset   • Hypertension Mother         Mother   • Osteoporosis Mother    • Skin cancer Mother    • Migraines Mother    • Dementia Mother    • Hypertension Father         Father   • Skin cancer Father    • Dementia Father    • Skin cancer Sister 73   • Migraines Sister    • No Known Problems Daughter    • Uterine cancer Maternal Grandmother 29   • Diabetes type II Maternal Grandfather    •  Cancer Paternal Grandmother    • Uterine cancer Paternal Grandmother 65   • No Known Problems Maternal Aunt    • Cancer Paternal Aunt         Breast   • Uterine cancer Paternal Aunt 55   • No Known Problems Paternal Aunt    • No Known Problems Paternal Aunt      Social History     Socioeconomic History   • Marital status: /Civil Union     Spouse name: Not on file   • Number of children: Not on file   • Years of education: Not on file   • Highest education level: Not on file   Occupational History   • Not on file   Tobacco Use   • Smoking status: Never   • Smokeless tobacco: Never   Vaping Use   • Vaping status: Never Used   Substance and Sexual Activity   • Alcohol use: No   • Drug use: No   • Sexual activity: Not Currently     Partners: Male     Birth control/protection: Post-menopausal   Other Topics Concern   • Not on file   Social History Narrative   • Not on file     Social Determinants of Health     Financial Resource Strain: Not on file   Food Insecurity: No Food Insecurity (6/18/2024)    Hunger Vital Sign    • Worried About Running Out of Food in the Last Year: Never true    • Ran Out of Food in the Last Year: Never true   Transportation Needs: No Transportation Needs (6/18/2024)    PRAPARE - Transportation    • Lack of Transportation (Medical): No    • Lack of Transportation (Non-Medical): No   Physical Activity: Not on file   Stress: Not on file   Social Connections: Not on file   Intimate Partner Violence: Not on file   Housing Stability: Low Risk  (6/18/2024)    Housing Stability Vital Sign    • Unable to Pay for Housing in the Last Year: No    • Number of Times Moved in the Last Year: 1    • Homeless in the Last Year: No       Current Outpatient Medications:   •  ascorbic acid (VITAMIN C) 500 mg tablet, Take by mouth, Disp: , Rfl:   •  Digestive Enzyme CAPS, Take by mouth, Disp: , Rfl:   •  diltiazem (CARDIZEM CD) 180 mg 24 hr capsule, Take 1 capsule (180 mg total) by mouth daily, Disp: 30  capsule, Rfl: 0  •  lactase (LACTAID) 3,000 units tablet, Take by mouth, Disp: , Rfl:   •  mirtazapine (REMERON) 15 mg tablet, Take 1 tablet (15 mg total) by mouth daily at bedtime, Disp: 30 tablet, Rfl: 0  •  Multiple Vitamins-Minerals (CENTRUM SILVER ULTRA WOMENS PO), Take by mouth, Disp: , Rfl:   •  polyethylene glycol-propylene glycol (SYSTANE) 0.4-0.3 %, , Disp: , Rfl:   Allergies   Allergen Reactions   • Cortisone Hypertension, Other (See Comments), Shortness Of Breath and Tachycardia   • Acebutolol    • Acetaminophen GI Intolerance     diarrhea   • Amlodipine    • Atenolol    • Azithromycin    • Barium Sulfate Diarrhea     Patient reports watery stool and stomach ache post fluoro upper GI on 6/6/24. Per Radiologist MD Lozada, reaction should not absolutely preclude patient from having barium in the future if necessary. CG RN 6/7/24     • Bisphosphonates      Annotation - [id number]: iriitis   • Candesartan    • Clarithromycin    • Erythromycin    • Fosinopril    • Irbesartan    • Levofloxacin    • Losartan    • Methylprednisolone Hypertension and Tachycardia   • Metoprolol    • Nisoldipine    • Olmesartan    • Propranolol    • Sulfamethoxazole-Trimethoprim Nausea Only   • Timolol    • Lidocaine Palpitations and Tachycardia     Vitals:    08/16/24 1351   BP: 90/62   Pulse: 96   Resp: 18   Temp: 97.9 °F (36.6 °C)   SpO2: 100%       Physical Exam  Constitutional: General appearance: The Patient is well-developed and well-nourished who appears the stated age in no acute distress. Patient is pleasant and talkative.     HEENT:  Normocephalic.  Sclerae are anicteric. Mucous membranes are moist. Neck is supple without adenopathy. No JVD.     Chest: The lungs are clear to auscultation.     Cardiac: Heart is regular rate.     Abdomen: Abdomen is soft, non-tender, non-distended.  There is a central abdominal mass.     Extremities: There is no clubbing or cyanosis. There is no edema.  Symmetric.  Neuro: Grossly nonfocal.  Gait is normal.     Lymphatic: No evidence of cervical adenopathy bilaterally.   No evidence of axillary adenopathy bilaterally. No evidence of inguinal adenopathy bilaterally.     Skin: Warm, anicteric.    Psych:  Patient is pleasant and talkative.  Breasts:        Pathology:  [unfilled]    Labs:      Imaging  Endoscopic ultrasonography, GI (Upper)    Result Date: 8/7/2024  Narrative: Table formatting from the original result was not included. [facility] [address] [facility] [address] [phone] DATE OF SERVICE: 8/07/24 PHYSICIAN(S): Attending: Erika Marsh MD Fellow: Daryl Ortiz MD INDICATION: Unintentional weight loss, Generalized postprandial abdominal pain POST-OP DIAGNOSIS: See the impression below. PREPROCEDURE: Informed consent was obtained for the procedure, including sedation.  Risks of perforation, hemorrhage, adverse drug reaction and aspiration were discussed. The patient was placed in the left lateral decubitus position. Patient was explained about the risks and benefits of the procedure. Risks including but not limited to bleeding, infection, and perforation were explained in detail. Also explained about less than 100% sensitivity with the exam and other alternatives. PROCEDURE: EUS UPPER DETAILS OF PROCEDURE: Patient was taken to the procedure room where a time out was performed to confirm correct patient and correct procedure. The patient underwent monitored anesthesia care, which was administered by an anesthesia professional. The patient's blood pressure, heart rate, oxygen, respirations, level of consciousness, ECG and ETCO2 were monitored throughout the procedure. The endoscope and linear scope were introduced through the mouth and advanced to the duodenum. The patient experienced no blood loss. The procedure was not difficult. The patient tolerated the procedure well. There were no apparent adverse events. ANESTHESIA INFORMATION: ASA: III Anesthesia  Type: IV Sedation with Anesthesia MEDICATIONS: No administrations occurring from 1508 to 1538 on 08/07/24 FINDINGS: The esophagus appeared normal. 1 cm hiatal hernia - GE junction 42 cm from the incisors, diaphragmatic impression 43 cm from the incisors Mild, localized erythematous mucosa in the stomach The duodenum appeared normal. Normal celiac takeoff.  No evidence of pancreatic ductal dilation throughout the examination.  Bile duct was normal caliber with no filling defects noted.  Ampulla appeared normal.  No masses or lymphadenopathy appreciated.  No abnormality noted to correlate with PET findings. SPECIMENS: ID Type Source Tests Collected by Time Destination 1 :  Tissue Duodenum TISSUE EXAM Daryl Ortiz MD 8/7/2024  3:13 PM  2 :  Tissue Stomach TISSUE EXAM Daryl Ortiz MD 8/7/2024  3:15 PM       Impression: The esophagus appeared normal. 1 cm hiatal hernia Mild erythematous mucosa in the stomach The duodenum appeared normal. Normal celiac takeoff.  No evidence of pancreatic ductal dilation throughout the examination.  Bile duct was normal caliber with no filling defects noted.  Ampulla appeared normal.  No masses or lymphadenopathy appreciated.  No abnormality noted to correlate with PET findings. RECOMMENDATION: Follow up in the office as scheduled  Erika Marsh MD     I personally reviewed and interpreted the above laboratory and imaging data.

## 2024-08-18 DIAGNOSIS — E44.1 MILD PROTEIN-CALORIE MALNUTRITION (HCC): Primary | ICD-10-CM

## 2024-08-19 RX ORDER — CYPROHEPTADINE HYDROCHLORIDE 4 MG/1
TABLET ORAL
Qty: 120 TABLET | Refills: 0 | Status: ON HOLD | OUTPATIENT
Start: 2024-08-19

## 2024-08-20 ENCOUNTER — TELEPHONE (OUTPATIENT)
Dept: ANESTHESIOLOGY | Facility: CLINIC | Age: 79
End: 2024-08-20

## 2024-08-20 ENCOUNTER — APPOINTMENT (OUTPATIENT)
Dept: LAB | Facility: MEDICAL CENTER | Age: 79
DRG: 314 | End: 2024-08-20
Payer: MEDICARE

## 2024-08-20 ENCOUNTER — HOSPITAL ENCOUNTER (OUTPATIENT)
Dept: NON INVASIVE DIAGNOSTICS | Facility: HOSPITAL | Age: 79
Discharge: HOME | End: 2024-08-20
Payer: MEDICARE

## 2024-08-20 ENCOUNTER — TELEPHONE (OUTPATIENT)
Age: 79
End: 2024-08-20

## 2024-08-20 DIAGNOSIS — D49.9 NEOPLASM OF UNSPECIFIED BEHAVIOR OF UNSPECIFIED SITE: ICD-10-CM

## 2024-08-20 DIAGNOSIS — R00.2 PALPITATIONS: ICD-10-CM

## 2024-08-20 DIAGNOSIS — E03.8 SUBCLINICAL HYPOTHYROIDISM: ICD-10-CM

## 2024-08-20 DIAGNOSIS — K63.89 MESENTERIC MASS: ICD-10-CM

## 2024-08-20 LAB
ALBUMIN SERPL BCG-MCNC: 3.6 G/DL (ref 3.5–5)
ALP SERPL-CCNC: 62 U/L (ref 34–104)
ALT SERPL W P-5'-P-CCNC: 20 U/L (ref 7–52)
ANION GAP SERPL CALCULATED.3IONS-SCNC: 12 MMOL/L (ref 4–13)
APTT PPP: 36 SECONDS (ref 23–34)
AST SERPL W P-5'-P-CCNC: 22 U/L (ref 13–39)
BASOPHILS # BLD AUTO: 0.05 THOUSANDS/ÂΜL (ref 0–0.1)
BASOPHILS NFR BLD AUTO: 1 % (ref 0–1)
BILIRUB SERPL-MCNC: 0.55 MG/DL (ref 0.2–1)
BUN SERPL-MCNC: 20 MG/DL (ref 5–25)
CALCIUM SERPL-MCNC: 9.1 MG/DL (ref 8.4–10.2)
CHLORIDE SERPL-SCNC: 94 MMOL/L (ref 96–108)
CO2 SERPL-SCNC: 32 MMOL/L (ref 21–32)
CREAT SERPL-MCNC: 1.05 MG/DL (ref 0.6–1.3)
EOSINOPHIL # BLD AUTO: 0.06 THOUSAND/ÂΜL (ref 0–0.61)
EOSINOPHIL NFR BLD AUTO: 1 % (ref 0–6)
ERYTHROCYTE [DISTWIDTH] IN BLOOD BY AUTOMATED COUNT: 13.7 % (ref 11.6–15.1)
GFR SERPL CREATININE-BSD FRML MDRD: 49 ML/MIN/1.73SQ M
GLUCOSE P FAST SERPL-MCNC: 86 MG/DL (ref 65–99)
HCT VFR BLD AUTO: 39.9 % (ref 34.8–46.1)
HGB BLD-MCNC: 12.9 G/DL (ref 11.5–15.4)
IMM GRANULOCYTES # BLD AUTO: 0.01 THOUSAND/UL (ref 0–0.2)
IMM GRANULOCYTES NFR BLD AUTO: 0 % (ref 0–2)
INR PPP: 1.05 (ref 0.85–1.19)
LYMPHOCYTES # BLD AUTO: 1.4 THOUSANDS/ÂΜL (ref 0.6–4.47)
LYMPHOCYTES NFR BLD AUTO: 30 % (ref 14–44)
MCH RBC QN AUTO: 28.7 PG (ref 26.8–34.3)
MCHC RBC AUTO-ENTMCNC: 32.3 G/DL (ref 31.4–37.4)
MCV RBC AUTO: 89 FL (ref 82–98)
MONOCYTES # BLD AUTO: 0.45 THOUSAND/ÂΜL (ref 0.17–1.22)
MONOCYTES NFR BLD AUTO: 10 % (ref 4–12)
NEUTROPHILS # BLD AUTO: 2.71 THOUSANDS/ÂΜL (ref 1.85–7.62)
NEUTS SEG NFR BLD AUTO: 58 % (ref 43–75)
NRBC BLD AUTO-RTO: 0 /100 WBCS
PLATELET # BLD AUTO: 353 THOUSANDS/UL (ref 149–390)
PMV BLD AUTO: 9.5 FL (ref 8.9–12.7)
POTASSIUM SERPL-SCNC: 3.1 MMOL/L (ref 3.5–5.3)
PROT SERPL-MCNC: 6.3 G/DL (ref 6.4–8.4)
PROTHROMBIN TIME: 14 SECONDS (ref 12.3–15)
RBC # BLD AUTO: 4.49 MILLION/UL (ref 3.81–5.12)
SODIUM SERPL-SCNC: 138 MMOL/L (ref 135–147)
TSH SERPL DL<=0.05 MIU/L-ACNC: 2.76 UIU/ML (ref 0.45–4.5)
WBC # BLD AUTO: 4.68 THOUSAND/UL (ref 4.31–10.16)

## 2024-08-20 PROCEDURE — 85025 COMPLETE CBC W/AUTO DIFF WBC: CPT

## 2024-08-20 PROCEDURE — 84443 ASSAY THYROID STIM HORMONE: CPT

## 2024-08-20 PROCEDURE — 85610 PROTHROMBIN TIME: CPT

## 2024-08-20 PROCEDURE — 93226 XTRNL ECG REC<48 HR SCAN A/R: CPT

## 2024-08-20 PROCEDURE — 36415 COLL VENOUS BLD VENIPUNCTURE: CPT

## 2024-08-20 PROCEDURE — 93225 XTRNL ECG REC<48 HRS REC: CPT

## 2024-08-20 PROCEDURE — 85730 THROMBOPLASTIN TIME PARTIAL: CPT

## 2024-08-20 PROCEDURE — 80053 COMPREHEN METABOLIC PANEL: CPT

## 2024-08-20 NOTE — TELEPHONE ENCOUNTER
"Pt would like PCP to call her back regarding her medication and labs. States her blood work came back and her \"hepari\" is high. Pt states she does not take heparin and would like some clarification as to why its high. Also would like to let PCP know her BP has been running low. Sunday it was 93/56 Monday 78/40 and today 78/40. States she has an appt at 2:30 and will not be back until 4. She would like to also discuss her medications. Please advise.  "

## 2024-08-21 ENCOUNTER — ANESTHESIA EVENT (OUTPATIENT)
Dept: PERIOP | Facility: HOSPITAL | Age: 79
DRG: 393 | End: 2024-08-21
Payer: MEDICARE

## 2024-08-21 DIAGNOSIS — I10 ESSENTIAL HYPERTENSION: Primary | ICD-10-CM

## 2024-08-21 DIAGNOSIS — K58.9 IRRITABLE BOWEL SYNDROME, UNSPECIFIED TYPE: ICD-10-CM

## 2024-08-21 DIAGNOSIS — E87.6 HYPOKALEMIA: ICD-10-CM

## 2024-08-21 RX ORDER — DILTIAZEM HYDROCHLORIDE 120 MG/1
120 CAPSULE, EXTENDED RELEASE ORAL 2 TIMES DAILY
Qty: 180 CAPSULE | Refills: 0 | Status: SHIPPED | OUTPATIENT
Start: 2024-08-21 | End: 2024-08-23

## 2024-08-21 RX ORDER — ESCITALOPRAM OXALATE 5 MG/1
5 TABLET ORAL DAILY
Qty: 90 TABLET | Refills: 0 | Status: SHIPPED | OUTPATIENT
Start: 2024-08-21

## 2024-08-21 RX ORDER — POTASSIUM CHLORIDE 1500 MG/1
20 TABLET, EXTENDED RELEASE ORAL DAILY
Qty: 5 TABLET | Refills: 0 | Status: ON HOLD | OUTPATIENT
Start: 2024-08-21 | End: 2024-08-29

## 2024-08-21 NOTE — TELEPHONE ENCOUNTER
Called patient in follow-up.  Reviewed her current medication list with her.  She states she is no longer taking Remeron due to side effects.  She does clarify she resume cyproheptadine.  Patient has ongoing issue with diarrhea, following with GI.  I discussed with her SSRI and potential side effects.  After discussion, she would like to start SSRI.  Lexapro sent to pharmacy 5 mg p.o. daily.  Patient states her blood pressure reading today 113/66.  I will reduce her Cardizem dose.  Adjusted dose sent to pharmacy and patient will monitor blood pressures, patient aware about parameters or symptoms in which case to seek medical attention.  Preprocedural lab work ordered by surgical oncology, discussed with patient.  Potassium supplement sent to pharmacy.  All questions answered.  Patient to keep appointment upcoming this week 8/23/2024.

## 2024-08-22 ENCOUNTER — TELEPHONE (OUTPATIENT)
Dept: ANESTHESIOLOGY | Facility: CLINIC | Age: 79
End: 2024-08-22

## 2024-08-22 ENCOUNTER — APPOINTMENT (EMERGENCY)
Dept: RADIOLOGY | Facility: HOSPITAL | Age: 79
DRG: 314 | End: 2024-08-22
Payer: MEDICARE

## 2024-08-22 ENCOUNTER — HOSPITAL ENCOUNTER (INPATIENT)
Facility: HOSPITAL | Age: 79
LOS: 1 days | Discharge: HOME | DRG: 314 | End: 2024-08-23
Attending: EMERGENCY MEDICINE | Admitting: INTERNAL MEDICINE
Payer: MEDICARE

## 2024-08-22 DIAGNOSIS — E46 MALNUTRITION (HCC): ICD-10-CM

## 2024-08-22 DIAGNOSIS — R42 LIGHTHEADEDNESS: ICD-10-CM

## 2024-08-22 DIAGNOSIS — E87.6 HYPOKALEMIA: Primary | ICD-10-CM

## 2024-08-22 DIAGNOSIS — R53.1 GENERALIZED WEAKNESS: ICD-10-CM

## 2024-08-22 PROBLEM — E87.8 ELECTROLYTE ABNORMALITY: Status: ACTIVE | Noted: 2024-08-22

## 2024-08-22 PROBLEM — E43 SEVERE PROTEIN-CALORIE MALNUTRITION (HCC): Status: ACTIVE | Noted: 2022-11-03

## 2024-08-22 LAB
2HR DELTA HS TROPONIN: -5 NG/L
4HR DELTA HS TROPONIN: -2 NG/L
ALBUMIN SERPL BCG-MCNC: 3.6 G/DL (ref 3.5–5)
ALP SERPL-CCNC: 80 U/L (ref 34–104)
ALT SERPL W P-5'-P-CCNC: 29 U/L (ref 7–52)
ANION GAP SERPL CALCULATED.3IONS-SCNC: 8 MMOL/L (ref 4–13)
AST SERPL W P-5'-P-CCNC: 39 U/L (ref 13–39)
BACTERIA UR QL AUTO: ABNORMAL /HPF
BASOPHILS # BLD AUTO: 0.04 THOUSANDS/ÂΜL (ref 0–0.1)
BASOPHILS NFR BLD AUTO: 1 % (ref 0–1)
BILIRUB SERPL-MCNC: 0.48 MG/DL (ref 0.2–1)
BILIRUB UR QL STRIP: NEGATIVE
BUN SERPL-MCNC: 22 MG/DL (ref 5–25)
CALCIUM SERPL-MCNC: 8.7 MG/DL (ref 8.4–10.2)
CAOX CRY URNS QL MICRO: ABNORMAL /HPF
CARDIAC TROPONIN I PNL SERPL HS: 17 NG/L
CARDIAC TROPONIN I PNL SERPL HS: 20 NG/L
CARDIAC TROPONIN I PNL SERPL HS: 22 NG/L
CHLORIDE SERPL-SCNC: 96 MMOL/L (ref 96–108)
CLARITY UR: ABNORMAL
CO2 SERPL-SCNC: 33 MMOL/L (ref 21–32)
COLOR UR: ABNORMAL
CREAT SERPL-MCNC: 1.19 MG/DL (ref 0.6–1.3)
EOSINOPHIL # BLD AUTO: 0.02 THOUSAND/ÂΜL (ref 0–0.61)
EOSINOPHIL NFR BLD AUTO: 0 % (ref 0–6)
ERYTHROCYTE [DISTWIDTH] IN BLOOD BY AUTOMATED COUNT: 13.8 % (ref 11.6–15.1)
GFR SERPL CREATININE-BSD FRML MDRD: 42 ML/MIN/1.73SQ M
GLUCOSE SERPL-MCNC: 144 MG/DL (ref 65–140)
GLUCOSE UR STRIP-MCNC: NEGATIVE MG/DL
HCT VFR BLD AUTO: 37.2 % (ref 34.8–46.1)
HGB BLD-MCNC: 12.4 G/DL (ref 11.5–15.4)
HGB UR QL STRIP.AUTO: NEGATIVE
HYALINE CASTS #/AREA URNS LPF: ABNORMAL /LPF
IMM GRANULOCYTES # BLD AUTO: 0.02 THOUSAND/UL (ref 0–0.2)
IMM GRANULOCYTES NFR BLD AUTO: 0 % (ref 0–2)
KETONES UR STRIP-MCNC: NEGATIVE MG/DL
LEUKOCYTE ESTERASE UR QL STRIP: ABNORMAL
LYMPHOCYTES # BLD AUTO: 1 THOUSANDS/ÂΜL (ref 0.6–4.47)
LYMPHOCYTES NFR BLD AUTO: 17 % (ref 14–44)
MAGNESIUM SERPL-MCNC: 1.9 MG/DL (ref 1.9–2.7)
MCH RBC QN AUTO: 29.2 PG (ref 26.8–34.3)
MCHC RBC AUTO-ENTMCNC: 33.3 G/DL (ref 31.4–37.4)
MCV RBC AUTO: 88 FL (ref 82–98)
MONOCYTES # BLD AUTO: 0.5 THOUSAND/ÂΜL (ref 0.17–1.22)
MONOCYTES NFR BLD AUTO: 8 % (ref 4–12)
MUCOUS THREADS UR QL AUTO: ABNORMAL
NEUTROPHILS # BLD AUTO: 4.46 THOUSANDS/ÂΜL (ref 1.85–7.62)
NEUTS SEG NFR BLD AUTO: 74 % (ref 43–75)
NITRITE UR QL STRIP: NEGATIVE
NON-SQ EPI CELLS URNS QL MICRO: ABNORMAL /HPF
NRBC BLD AUTO-RTO: 0 /100 WBCS
PH UR STRIP.AUTO: 5.5 [PH]
PHOSPHATE SERPL-MCNC: 3 MG/DL (ref 2.3–4.1)
PLATELET # BLD AUTO: 321 THOUSANDS/UL (ref 149–390)
PMV BLD AUTO: 9 FL (ref 8.9–12.7)
POTASSIUM SERPL-SCNC: 2.6 MMOL/L (ref 3.5–5.3)
PROT SERPL-MCNC: 6.3 G/DL (ref 6.4–8.4)
PROT UR STRIP-MCNC: NEGATIVE MG/DL
RBC # BLD AUTO: 4.24 MILLION/UL (ref 3.81–5.12)
RBC #/AREA URNS AUTO: ABNORMAL /HPF
SODIUM SERPL-SCNC: 137 MMOL/L (ref 135–147)
SP GR UR STRIP.AUTO: 1.01 (ref 1–1.03)
TRANS CELLS #/AREA URNS HPF: PRESENT /[HPF]
TSH SERPL DL<=0.05 MIU/L-ACNC: 2.42 UIU/ML (ref 0.45–4.5)
UROBILINOGEN UR STRIP-ACNC: <2 MG/DL
WBC # BLD AUTO: 6.04 THOUSAND/UL (ref 4.31–10.16)
WBC #/AREA URNS AUTO: ABNORMAL /HPF

## 2024-08-22 PROCEDURE — 80053 COMPREHEN METABOLIC PANEL: CPT | Performed by: EMERGENCY MEDICINE

## 2024-08-22 PROCEDURE — 81001 URINALYSIS AUTO W/SCOPE: CPT

## 2024-08-22 PROCEDURE — 93005 ELECTROCARDIOGRAM TRACING: CPT

## 2024-08-22 PROCEDURE — 99223 1ST HOSP IP/OBS HIGH 75: CPT | Performed by: INTERNAL MEDICINE

## 2024-08-22 PROCEDURE — 96365 THER/PROPH/DIAG IV INF INIT: CPT

## 2024-08-22 PROCEDURE — 84100 ASSAY OF PHOSPHORUS: CPT

## 2024-08-22 PROCEDURE — 99285 EMERGENCY DEPT VISIT HI MDM: CPT | Performed by: EMERGENCY MEDICINE

## 2024-08-22 PROCEDURE — 84443 ASSAY THYROID STIM HORMONE: CPT

## 2024-08-22 PROCEDURE — 84484 ASSAY OF TROPONIN QUANT: CPT | Performed by: EMERGENCY MEDICINE

## 2024-08-22 PROCEDURE — 83735 ASSAY OF MAGNESIUM: CPT

## 2024-08-22 PROCEDURE — 96366 THER/PROPH/DIAG IV INF ADDON: CPT

## 2024-08-22 PROCEDURE — 99285 EMERGENCY DEPT VISIT HI MDM: CPT

## 2024-08-22 PROCEDURE — 85025 COMPLETE CBC W/AUTO DIFF WBC: CPT | Performed by: EMERGENCY MEDICINE

## 2024-08-22 PROCEDURE — 71045 X-RAY EXAM CHEST 1 VIEW: CPT

## 2024-08-22 PROCEDURE — 36415 COLL VENOUS BLD VENIPUNCTURE: CPT

## 2024-08-22 RX ORDER — CYPROHEPTADINE HYDROCHLORIDE 4 MG/1
TABLET ORAL
Status: CANCELLED | OUTPATIENT
Start: 2024-08-22

## 2024-08-22 RX ORDER — ESCITALOPRAM OXALATE 10 MG/1
5 TABLET ORAL DAILY
Status: DISCONTINUED | OUTPATIENT
Start: 2024-08-23 | End: 2024-08-23 | Stop reason: HOSPADM

## 2024-08-22 RX ORDER — ONDANSETRON 2 MG/ML
4 INJECTION INTRAMUSCULAR; INTRAVENOUS EVERY 6 HOURS PRN
Status: DISCONTINUED | OUTPATIENT
Start: 2024-08-22 | End: 2024-08-23 | Stop reason: HOSPADM

## 2024-08-22 RX ORDER — ASCORBIC ACID 500 MG
500 TABLET ORAL DAILY
Status: DISCONTINUED | OUTPATIENT
Start: 2024-08-23 | End: 2024-08-23 | Stop reason: HOSPADM

## 2024-08-22 RX ORDER — CHOLECALCIFEROL (VITAMIN D3) 125 MCG
3000 CAPSULE ORAL
Status: DISCONTINUED | OUTPATIENT
Start: 2024-08-23 | End: 2024-08-23 | Stop reason: HOSPADM

## 2024-08-22 RX ORDER — MAGNESIUM SULFATE HEPTAHYDRATE 40 MG/ML
2 INJECTION, SOLUTION INTRAVENOUS ONCE
Status: COMPLETED | OUTPATIENT
Start: 2024-08-22 | End: 2024-08-22

## 2024-08-22 RX ORDER — HEPARIN SODIUM 5000 [USP'U]/ML
5000 INJECTION, SOLUTION INTRAVENOUS; SUBCUTANEOUS EVERY 8 HOURS SCHEDULED
Status: DISCONTINUED | OUTPATIENT
Start: 2024-08-22 | End: 2024-08-23 | Stop reason: HOSPADM

## 2024-08-22 RX ORDER — DEXTROSE MONOHYDRATE AND SODIUM CHLORIDE 5; .45 G/100ML; G/100ML
50 INJECTION, SOLUTION INTRAVENOUS CONTINUOUS
Status: DISCONTINUED | OUTPATIENT
Start: 2024-08-22 | End: 2024-08-23

## 2024-08-22 RX ORDER — ENOXAPARIN SODIUM 100 MG/ML
40 INJECTION SUBCUTANEOUS DAILY
Status: DISCONTINUED | OUTPATIENT
Start: 2024-08-23 | End: 2024-08-22

## 2024-08-22 RX ORDER — ACETAMINOPHEN 325 MG/1
650 TABLET ORAL EVERY 4 HOURS PRN
Status: CANCELLED | OUTPATIENT
Start: 2024-08-22

## 2024-08-22 RX ORDER — POTASSIUM CHLORIDE 1500 MG/1
40 TABLET, EXTENDED RELEASE ORAL ONCE
Status: COMPLETED | OUTPATIENT
Start: 2024-08-22 | End: 2024-08-22

## 2024-08-22 RX ORDER — CYPROHEPTADINE HYDROCHLORIDE 4 MG/1
2 TABLET ORAL 4 TIMES DAILY
Status: DISCONTINUED | OUTPATIENT
Start: 2024-08-22 | End: 2024-08-23 | Stop reason: HOSPADM

## 2024-08-22 RX ADMIN — CYPROHEPTADINE HYDROCHLORIDE 2 MG: 4 TABLET ORAL at 22:44

## 2024-08-22 RX ADMIN — HEPARIN SODIUM 5000 UNITS: 5000 INJECTION INTRAVENOUS; SUBCUTANEOUS at 22:44

## 2024-08-22 RX ADMIN — MAGNESIUM SULFATE HEPTAHYDRATE 2 G: 40 INJECTION, SOLUTION INTRAVENOUS at 17:32

## 2024-08-22 RX ADMIN — DEXTROSE AND SODIUM CHLORIDE 50 ML/HR: 5; .45 INJECTION, SOLUTION INTRAVENOUS at 20:37

## 2024-08-22 RX ADMIN — POTASSIUM PHOSPHATE, MONOBASIC AND POTASSIUM PHOSPHATE, DIBASIC 21 MMOL: 224; 236 INJECTION, SOLUTION, CONCENTRATE INTRAVENOUS at 20:25

## 2024-08-22 RX ADMIN — POTASSIUM CHLORIDE 40 MEQ: 1500 TABLET, EXTENDED RELEASE ORAL at 17:19

## 2024-08-22 RX ADMIN — SODIUM CHLORIDE 1000 ML: 0.9 INJECTION, SOLUTION INTRAVENOUS at 17:22

## 2024-08-22 NOTE — ASSESSMENT & PLAN NOTE
Secondary to poor oral intake and anorexia.  Treat the patient with potassium phosphate, check potassium magnesium and phosphate in the morning.  Got magnesium sulfate in the ED.  Will have to keep the patient on telemetry monitor secondary to low potassium and getting IV potassium phosphate.

## 2024-08-22 NOTE — ASSESSMENT & PLAN NOTE
Most likely secondary to hypotension and electrolyte abnormality and poor  oral intake.  We will admit the patient to medical surgical as inpatient.  Treat the patient with gentle IV fluids D5 half-normal saline at 50 cc an hour.  Discontinue diltiazem.  Monitor the patient on telemetry.

## 2024-08-22 NOTE — ASSESSMENT & PLAN NOTE
Patient is getting worked up for mesenteric mass, scheduled for surgical biopsy by Dr. Drew on 9/6/2024.  Can follow-up with surgical oncology as an outpatient.

## 2024-08-22 NOTE — ED ATTENDING ATTESTATION
8/22/2024  I, Jose Winston MD, saw and evaluated the patient. I have discussed the patient with the resident/non-physician practitioner and agree with the resident's/non-physician practitioner's findings, Plan of Care, and MDM as documented in the resident's/non-physician practitioner's note, except where noted. All available labs and Radiology studies were reviewed.  I was present for key portions of any procedure(s) performed by the resident/non-physician practitioner and I was immediately available to provide assistance.       At this point I agree with the current assessment done in the Emergency Department.  I have conducted an independent evaluation of this patient a history and physical is as follows:    History    Patient is an 79-year-old female, with a history significant for GERD and hypertension and malnutrition per my review the medical record, who presents to the ED today for evaluation of a 2-day history of low blood pressure measured at home with systolic in the 70s.  Patient reports associated lightheadedness/generalized weakness.  Patient also describes nonbloody diarrhea (no history of A-fib, recent antibiotic use, recent hospitalization, recent travel).  No clear exacerbating or remitting factors.  There is no associated fever, chest pain, dyspnea, abdominal pain unless pressure is applied patient states this is baseline for her and she is being worked up for it.     Patient is without other concerns at this time.     ROS  Patient denies: Fever; dysphagia; vision change; chest pain; dyspnea; abdominal pain; polyuria; dysuria; rash; weakness; numbness; difficulty walking; confusion    Physical Exam    GENERAL APPEARANCE: NAD.  Cachectic appearing  NEURO: Patient is speaking clearly in complete sentences.  Patient is answering appropriately and able follow commands.  Patient is moving all four extremities spontaneously.  No facial droop.  Tongue midline.  HEENT: PERRL, Moist mucous  membranes, external ears normal, nose normal  Neck: No cervical adenopathy  CV: RRR. No murmurs, rubs, gallops  LUNGS: Clear to auscultation: No wheezes, stridor, rhonchi, rales  GI: Abdomen non-distended. Soft.  Slight epigastric tenderness to palpation, no guarding or rebound.  Patient states this is baseline for her and why she underwent workup for the intra-abdominal mass  : Deferred at this time  MSK: No deformity.   Skin: Warm and dry  Capillary refill: <2 seconds    Patient is currently afebrile and hematin moist.    Assessment/Plan/MDM  Lightheadedness, generalized weakness, diarrhea  -This presentation is concerning for: Electrolyte abnormality, YOLANDA, ACS, anemia, dehydration, malnutrition.  No reason to suspect mesenteric ischemia based on history physical exam.  Patient at risk for UTI/pneumonia  -Will investigate with cardiac workup, UA  -Will manage with fluids and further based upon workup    ED Course  ED Course as of 08/22/24 2130   Thu Aug 22, 2024   1702 Blood Pressure: 107/61  Similar to prior blood pressures over the last 2 weeks per my review of the medical record   1709 hs TnI 0hr: 22  Elevated          Critical Care Time  Procedures

## 2024-08-22 NOTE — ASSESSMENT & PLAN NOTE
Malnutrition Findings:       Secondary to possible malignancy/mass eccentric.  And anorexia.  Encourage increase oral intake, will order some protein supplements.  Follow-up nutritionist recommendations.                          BMI Findings:           There is no height or weight on file to calculate BMI.

## 2024-08-22 NOTE — H&P
[facility]  H&P  Name: Brigid Brown 79 y.o. female I MRN: [id number]  Unit/Bed#: ED-29 I Date of Admission: 8/22/2024   Date of Service: 8/22/2024 I Hospital Day: 0      Assessment & Plan   * Dizziness  Assessment & Plan  Most likely secondary to hypotension and electrolyte abnormality and poor  oral intake.  We will admit the patient to medical surgical as inpatient.  Treat the patient with gentle IV fluids D5 half-normal saline at 50 cc an hour.  Discontinue diltiazem.  Monitor the patient on telemetry.    Electrolyte abnormality  Assessment & Plan  Secondary to poor oral intake and anorexia.  Treat the patient with potassium phosphate, check potassium magnesium and phosphate in the morning.  Got magnesium sulfate in the ED.  Will have to keep the patient on telemetry monitor secondary to low potassium and getting IV potassium phosphate.    Mesenteric mass  Assessment & Plan  Patient is getting worked up for mesenteric mass, scheduled for surgical biopsy by Dr. Drew on 9/6/2024.  Can follow-up with surgical oncology as an outpatient.    Severe protein-calorie malnutrition (HCC)  Assessment & Plan  Malnutrition Findings:       Secondary to possible malignancy/mass eccentric.  And anorexia.  Encourage increase oral intake, will order some protein supplements.  Follow-up nutritionist recommendations.                          BMI Findings:           There is no height or weight on file to calculate BMI.       Lactose intolerance  Assessment & Plan  On lactase.    Essential hypertension  Assessment & Plan  Patient blood pressure stable, with her poor oral intake and age above 80, does not need aggressive management.  No other comorbidities.  Can discontinue diltiazem.             Date of Service:   08/22/24    VTE Prophylaxis: VTE Score: 5 High Risk (Score >/= 5) - Pharmacological DVT Prophylaxis Ordered: enoxaparin (Lovenox). Sequential Compression Devices Ordered.  Code Status: Full Code.    POLST:There is no POLST form on file for this patient (pre-hospital)   Discussion with family:  Updated  (daughter) at bedside.    Anticipated Length of Stay:  Patient will be admitted on an Inpatient basis with an anticipated length of stay of  > 2 midnights.   Justification for Hospital Stay: Hypokalemia, dizziness, electrolyte abnormality.    Total Time for Visit, including Counseling / Coordination of Care: 45 minutes.  Greater than 50% of this total time spent on direct patient counseling and coordination of care.    Chief Complaint:     Hypotension (hypotension. States she takes her BP once a week. Today found to be 70s systolic yesterday and today. Pressures 90-110s with EMS. Pt reports Lightheaded/dizziness. )    History of Present Illness:    Brigid Brown is a 79 y.o. female with PMHx significant for recently diagnosed mesenteric mass and scheduled for outpatient biopsy, suspected to be malignancy/lymphoma,  hypertension, protein calorie malnutrition, IBD, lactose intolerant who presents with chief complaints of poor oral intake over the past few days to 1 months, and dizziness and lightheadedness over the last couple days and some blurry vision.  Patient mentioned that she has been noticing the symptoms of dizziness and blurry vision on and off over the last couple days for which her primary care decrease her Cardizem to 40 mg daily to 180 mg daily.  Since she started noticing continued symptoms her primary care asked her to reduce the dose to 120 mg daily.  However she is not able to  the new prescription.  And continued to have the symptoms and hence came to the ED for further evaluation.  Patient denies any diarrhea more than her usual, no fevers or chills or sick contact.  Other than poor oral intake she denies any other symptoms.    In the ED patient was found to have systolic blood pressure in the 90s, heart rate 72, and saturating 98% on room air.  No fever.  Laboratory data  showed a potassium of 2.6, magnesium was low at 1.9, and patient did not have any leukocytosis.  Patient was admitted for electrolyte normality, dehydration, and hypotension.    Review of Systems:  Review of Systems: A thorough 10 point review of System was done which was negative for other than that mentioned in HPI.     Past Medical and Surgical History:     Past Medical History:   Diagnosis Date    Acne     Basal cell carcinoma 06/08/2020    right lower forehead    BCC (basal cell carcinoma of skin) 03/09/2020    mid forehead    Benign neoplasm of skin     Disease of thyroid gland 2006    GERD (gastroesophageal reflux disease)     Hypertension     Inflamed seborrheic keratosis     Irritable bowel syndrome     Malignant neoplasm of skin of face     Migraines     Nonmelanoma skin cancer     Last Assessed:6/27/17    Squamous cell skin cancer 06/08/2020    In situ, left lower forehead    Tachycardia     Telogen effluvium     Temporomandibular joint disorder     Vertigo        Past Surgical History:   Procedure Laterality Date    ADENOIDECTOMY      APPENDECTOMY      CHOLECYSTECTOMY      COLONOSCOPY  05/03/2019    COMPLEX WOUND CLOSURE TO EXTREMITY N/A 7/28/2020    Procedure: COMPLEX CLOSURE MID FOREHEAD;  Surgeon: Randy Frank MD;  Location: [facility];  Service: Plastics    ESOPHAGOGASTRODUODENOSCOPY  2009    FLAP LOCAL HEAD / NECK N/A 7/28/2020    Procedure: FLAP MID FOREHEAD;  Surgeon: Randy Frank MD;  Location: [facility];  Service: Plastics    HYSTERECTOMY  1987    MALIGNANT SKIN LESION EXCISION      Excision of Lesion Face Malignant-9/14/2004 BCC Forehead    MOHS RECONSTRUCTION N/A 7/28/2020    Procedure: RECONSTRUCTION MOHS DEFECT MID FOREHEAD;  Surgeon: Randy Frank MD;  Location: [facility];  Service: Plastics    MOHS SURGERY  07/27/2020    Right &left lower forehead, mid forehead    OOPHORECTOMY Bilateral 1987    ROTATOR CUFF REPAIR Right     SKIN BIOPSY      TONSILLECTOMY       TUBAL LIGATION  1976?       Meds/Allergies:  Prior to Admission medications    Medication Sig Start Date End Date Taking? Authorizing Provider   ascorbic acid (VITAMIN C) 500 mg tablet Take by mouth    Historical Provider, MD   cyproheptadine (PERIACTIN) 4 mg tablet take 0.5 tablet by mouth four times a day 8/19/24   Delicia Cintron DO   Digestive Enzyme CAPS Take by mouth    Historical Provider, MD   diltiazem (CARDIZEM SR) 120 mg 12 hr capsule Take 1 capsule (120 mg total) by mouth 2 (two) times a day 8/21/24 11/19/24  Delicia Cintron DO   escitalopram (LEXAPRO) 5 mg tablet Take 1 tablet (5 mg total) by mouth daily 8/21/24   Delicia Cintron DO   lactase (LACTAID) 3,000 units tablet Take by mouth    Historical Provider, MD   Multiple Vitamins-Minerals (CENTRUM SILVER ULTRA WOMENS PO) Take by mouth    Historical Provider, MD   polyethylene glycol-propylene glycol (SYSTANE) 0.4-0.3 %     Historical Provider, MD   potassium chloride (Klor-Con M20) 20 mEq tablet Take 1 tablet (20 mEq total) by mouth daily for 5 days 8/21/24 8/26/24  Delicia Cintron DO     I have reviewed home medications with patient personally.    Allergies:   Allergies   Allergen Reactions    Cortisone Hypertension, Other (See Comments), Shortness Of Breath and Tachycardia    Acebutolol     Acetaminophen GI Intolerance     diarrhea    Amlodipine     Atenolol     Azithromycin     Barium Sulfate Diarrhea     Patient reports watery stool and stomach ache post fluoro upper GI on 6/6/24. Per Radiologist MD Lozada, reaction should not absolutely preclude patient from having barium in the future if necessary. KEZIA RN 6/7/24      Bisphosphonates      Annotation - [id number]: iriitis    Candesartan     Clarithromycin     Erythromycin     Fosinopril     Irbesartan     Levofloxacin     Losartan     Methylprednisolone Hypertension and Tachycardia    Metoprolol     Nisoldipine     Olmesartan     Propranolol     Sulfamethoxazole-Trimethoprim Nausea Only    Timolol      Lidocaine Palpitations and Tachycardia       Social History:     Marital Status: /Civil Union   Occupation: Reviewed and Non Contributory   Patient Pre-hospital Living Situation: Home   Patient Pre-hospital Level of Mobility: Walks   Patient Pre-hospital Diet Restrictions: None     Substance Use History:   Social History     Substance and Sexual Activity   Alcohol Use No     Social History     Tobacco Use   Smoking Status Never   Smokeless Tobacco Never     Social History     Substance and Sexual Activity   Drug Use No       Family History:    Family History   Problem Relation Age of Onset    Hypertension Mother         Mother    Osteoporosis Mother     Skin cancer Mother     Migraines Mother     Dementia Mother     Hypertension Father         Father    Skin cancer Father     Dementia Father     Skin cancer Sister 73    Migraines Sister     No Known Problems Daughter     Uterine cancer Maternal Grandmother 29    Diabetes type II Maternal Grandfather     Cancer Paternal Grandmother     Uterine cancer Paternal Grandmother 65    No Known Problems Maternal Aunt     Cancer Paternal Aunt         Breast    Uterine cancer Paternal Aunt 55    No Known Problems Paternal Aunt     No Known Problems Paternal Aunt        Physical Exam:     Vitals:   Blood Pressure: 123/60 (08/22/24 1800)  Pulse: 66 (08/22/24 1800)  Temperature: (!) 97.2 °F (36.2 °C) (08/22/24 1635)  Temp Source: Oral (08/22/24 1635)  Respirations: 18 (08/22/24 1800)  SpO2: 99 % (08/22/24 1800)    Physical Exam  General Exam: Alert and Oriented x 3, NAD, cachectic and malnourished.  Eyes: MICHAEL  Head exam: Bilateral temporal muscle wasting noted, bilateral buccal fat pad loss noted.  Neck: Supple.   CVS: S1, S2 Cecil, RRR.   R/S: Clear to auscultate anteriorly.   Abd: Soft, NT, mild distention, BS+ve  Extremities: No edema noted.  Bilateral upper and lower extremity muscle atrophy noted.  Skin: No acute Rash noted.   CNS: No acute FND. Moves all 4  "extremities.   Psych: Co-operative, Not agitated.     Lab Results: I have personally reviewed pertinent reports.    Results from last 7 days   Lab Units 08/22/24  1634   WBC Thousand/uL 6.04   HEMOGLOBIN g/dL 12.4   HEMATOCRIT % 37.2   PLATELETS Thousands/uL 321     Results from last 7 days   Lab Units 08/22/24  1634   POTASSIUM mmol/L 2.6*   CHLORIDE mmol/L 96   CO2 mmol/L 33*   BUN mg/dL 22   CREATININE mg/dL 1.19   CALCIUM mg/dL 8.7   ALK PHOS U/L 80   ALT U/L 29   AST U/L 39     Results from last 7 days   Lab Units 08/20/24  0710   INR  1.05               Imaging: I have personally reviewed pertinent reports.      XR chest 1 view portable   ED Interpretation by Jose Winston MD (08/22 1756)   Per my independent interpretation:  no acute cardiopulmonary process       Final Result by Elisha Tsai MD (08/22 1819)      No acute cardiopulmonary disease.            Workstation performed: [id number]             XR chest 1 view portable   ED Interpretation   Per my independent interpretation:  no acute cardiopulmonary process       Final Result      No acute cardiopulmonary disease.            Workstation performed: [id number]             EKG, Imaging, and Other Studies Reviewed on Admission:   NSR    Discussed the case with ED Provider  Discussed the case with n/a  [facility] Records Reviewed: Yes     ** Please Note: \"This note has been constructed using a voice recognition system.Therefore there may be syntax, spelling, and/or grammatical errors. Please call if you have any questions. \"**    "

## 2024-08-22 NOTE — ASSESSMENT & PLAN NOTE
Patient blood pressure stable, with her poor oral intake and age above 78, does not need aggressive management.  No other comorbidities.  Can discontinue diltiazem.

## 2024-08-23 ENCOUNTER — TELEPHONE (OUTPATIENT)
Age: 79
End: 2024-08-23

## 2024-08-23 VITALS
OXYGEN SATURATION: 98 % | DIASTOLIC BLOOD PRESSURE: 69 MMHG | WEIGHT: 81 LBS | BODY MASS INDEX: 14.82 KG/M2 | RESPIRATION RATE: 18 BRPM | TEMPERATURE: 98.3 F | SYSTOLIC BLOOD PRESSURE: 130 MMHG | HEART RATE: 89 BPM

## 2024-08-23 LAB
ANION GAP SERPL CALCULATED.3IONS-SCNC: 4 MMOL/L (ref 4–13)
BASOPHILS # BLD AUTO: 0.05 THOUSANDS/ÂΜL (ref 0–0.1)
BASOPHILS NFR BLD AUTO: 1 % (ref 0–1)
BUN SERPL-MCNC: 14 MG/DL (ref 5–25)
CALCIUM SERPL-MCNC: 8.1 MG/DL (ref 8.4–10.2)
CHLORIDE SERPL-SCNC: 103 MMOL/L (ref 96–108)
CO2 SERPL-SCNC: 32 MMOL/L (ref 21–32)
CREAT SERPL-MCNC: 0.86 MG/DL (ref 0.6–1.3)
EOSINOPHIL # BLD AUTO: 0.07 THOUSAND/ÂΜL (ref 0–0.61)
EOSINOPHIL NFR BLD AUTO: 2 % (ref 0–6)
ERYTHROCYTE [DISTWIDTH] IN BLOOD BY AUTOMATED COUNT: 13.7 % (ref 11.6–15.1)
GFR SERPL CREATININE-BSD FRML MDRD: 63 ML/MIN/1.73SQ M
GLUCOSE SERPL-MCNC: 92 MG/DL (ref 65–140)
HCT VFR BLD AUTO: 32 % (ref 34.8–46.1)
HGB BLD-MCNC: 10.5 G/DL (ref 11.5–15.4)
IMM GRANULOCYTES # BLD AUTO: 0.01 THOUSAND/UL (ref 0–0.2)
IMM GRANULOCYTES NFR BLD AUTO: 0 % (ref 0–2)
LYMPHOCYTES # BLD AUTO: 1.13 THOUSANDS/ÂΜL (ref 0.6–4.47)
LYMPHOCYTES NFR BLD AUTO: 24 % (ref 14–44)
MCH RBC QN AUTO: 29.3 PG (ref 26.8–34.3)
MCHC RBC AUTO-ENTMCNC: 32.8 G/DL (ref 31.4–37.4)
MCV RBC AUTO: 89 FL (ref 82–98)
MONOCYTES # BLD AUTO: 0.44 THOUSAND/ÂΜL (ref 0.17–1.22)
MONOCYTES NFR BLD AUTO: 9 % (ref 4–12)
NEUTROPHILS # BLD AUTO: 3.09 THOUSANDS/ÂΜL (ref 1.85–7.62)
NEUTS SEG NFR BLD AUTO: 64 % (ref 43–75)
NRBC BLD AUTO-RTO: 0 /100 WBCS
PHOSPHATE SERPL-MCNC: 3.8 MG/DL (ref 2.3–4.1)
PLATELET # BLD AUTO: 256 THOUSANDS/UL (ref 149–390)
PMV BLD AUTO: 9 FL (ref 8.9–12.7)
POTASSIUM SERPL-SCNC: 3.2 MMOL/L (ref 3.5–5.3)
RBC # BLD AUTO: 3.58 MILLION/UL (ref 3.81–5.12)
SODIUM SERPL-SCNC: 139 MMOL/L (ref 135–147)
WBC # BLD AUTO: 4.79 THOUSAND/UL (ref 4.31–10.16)

## 2024-08-23 PROCEDURE — 85025 COMPLETE CBC W/AUTO DIFF WBC: CPT | Performed by: INTERNAL MEDICINE

## 2024-08-23 PROCEDURE — 99239 HOSP IP/OBS DSCHRG MGMT >30: CPT | Performed by: PHYSICIAN ASSISTANT

## 2024-08-23 PROCEDURE — 84100 ASSAY OF PHOSPHORUS: CPT | Performed by: INTERNAL MEDICINE

## 2024-08-23 PROCEDURE — 80048 BASIC METABOLIC PNL TOTAL CA: CPT | Performed by: INTERNAL MEDICINE

## 2024-08-23 RX ORDER — POTASSIUM CHLORIDE 1500 MG/1
40 TABLET, EXTENDED RELEASE ORAL ONCE
Status: COMPLETED | OUTPATIENT
Start: 2024-08-23 | End: 2024-08-23

## 2024-08-23 RX ADMIN — LACTASE TAB 3000 UNIT 3000 UNITS: 3000 TAB at 07:38

## 2024-08-23 RX ADMIN — ESCITALOPRAM OXALATE 5 MG: 10 TABLET ORAL at 08:54

## 2024-08-23 RX ADMIN — OXYCODONE HYDROCHLORIDE AND ACETAMINOPHEN 500 MG: 500 TABLET ORAL at 08:53

## 2024-08-23 RX ADMIN — LACTASE TAB 3000 UNIT 3000 UNITS: 3000 TAB at 12:50

## 2024-08-23 RX ADMIN — CYPROHEPTADINE HYDROCHLORIDE 2 MG: 4 TABLET ORAL at 12:50

## 2024-08-23 RX ADMIN — HEPARIN SODIUM 5000 UNITS: 5000 INJECTION INTRAVENOUS; SUBCUTANEOUS at 05:22

## 2024-08-23 RX ADMIN — CYPROHEPTADINE HYDROCHLORIDE 2 MG: 4 TABLET ORAL at 08:53

## 2024-08-23 RX ADMIN — POTASSIUM CHLORIDE 40 MEQ: 1500 TABLET, EXTENDED RELEASE ORAL at 07:39

## 2024-08-23 RX ADMIN — MULTIPLE VITAMINS W/ MINERALS TAB 1 TABLET: TAB ORAL at 08:53

## 2024-08-23 NOTE — ASSESSMENT & PLAN NOTE
Most likely secondary to hypotension and electrolyte abnormality and poor oral intake.  Telemetry negative   BP and dizziness improved with hydration   Stable for discharge   Hold Diltiazem at discharge   Encourage oral intake  Continue KCl supplementation

## 2024-08-23 NOTE — ASSESSMENT & PLAN NOTE
Patient is getting worked up for mesenteric mass, scheduled for surgical biopsy by Dr. Drew on 9/6/2024.  Follow-up with surgical oncology as an outpatient.

## 2024-08-23 NOTE — TELEPHONE ENCOUNTER
Patient calling to set up follow up from Ed - was advised to follow up within the next 3 days of next week M-W- warm transferred to clerical

## 2024-08-23 NOTE — PLAN OF CARE
Problem: PAIN - ADULT  Goal: Verbalizes/displays adequate comfort level or baseline comfort level  Description: Interventions:  - Encourage patient to monitor pain and request assistance  - Assess pain using appropriate pain scale  - Administer analgesics based on type and severity of pain and evaluate response  - Implement non-pharmacological measures as appropriate and evaluate response  - Consider cultural and social influences on pain and pain management  - Notify physician/advanced practitioner if interventions unsuccessful or patient reports new pain  Outcome: Progressing     Problem: INFECTION - ADULT  Goal: Absence or prevention of progression during hospitalization  Description: INTERVENTIONS:  - Assess and monitor for signs and symptoms of infection  - Monitor lab/diagnostic results  - Monitor all insertion sites, i.e. indwelling lines, tubes, and drains  - Monitor endotracheal if appropriate and nasal secretions for changes in amount and color  - [place] appropriate cooling/warming therapies per order  - Administer medications as ordered  - Instruct and encourage patient and family to use good hand hygiene technique  - Identify and instruct in appropriate isolation precautions for identified infection/condition  Outcome: Progressing  Goal: Absence of fever/infection during neutropenic period  Description: INTERVENTIONS:  - Monitor WBC    Outcome: Progressing     Problem: SAFETY ADULT  Goal: Patient will remain free of falls  Description: INTERVENTIONS:  - Educate patient/family on patient safety including physical limitations  - Instruct patient to call for assistance with activity   - Consult OT/PT to assist with strengthening/mobility   - Keep Call bell within reach  - Keep bed low and locked with side rails adjusted as appropriate  - Keep care items and personal belongings within reach  - Initiate and maintain comfort rounds  - Make Fall Risk Sign visible to staff  - Obtain necessary fall risk  management equipment  - Apply yellow socks and bracelet for high fall risk patients  - Consider moving patient to room near nurses station  Outcome: Progressing  Goal: Maintain or return to baseline ADL function  Description: INTERVENTIONS:  -  Assess patient's ability to carry out ADLs; assess patient's baseline for ADL function and identify physical deficits which impact ability to perform ADLs (bathing, care of mouth/teeth, toileting, grooming, dressing, etc.)  - Assess/evaluate cause of self-care deficits   - Assess range of motion  - Assess patient's mobility; develop plan if impaired  - Assess patient's need for assistive devices and provide as appropriate  - Encourage maximum independence but intervene and supervise when necessary  - Involve family in performance of ADLs  - Assess for home care needs following discharge   - Consider OT consult to assist with ADL evaluation and planning for discharge  - Provide patient education as appropriate  Outcome: Progressing  Goal: Maintains/Returns to pre admission functional level  Description: INTERVENTIONS:  - Perform AM-PAC 6 Click Basic Mobility/ Daily Activity assessment daily.  - Set and communicate daily mobility goal to care team and patient/family/caregiver.   - Collaborate with rehabilitation services on mobility goals if consulted  - Out of bed for toileting  - Record patient progress and toleration of activity level   Outcome: Progressing     Problem: DISCHARGE PLANNING  Goal: Discharge to home or other facility with appropriate resources  Description: INTERVENTIONS:  - Identify barriers to discharge w/patient and caregiver  - Arrange for needed discharge resources and transportation as appropriate  - Identify discharge learning needs (meds, wound care, etc.)  - Arrange for interpretive services to assist at discharge as needed  - Refer to Case Management Department for coordinating discharge planning if the patient needs post-hospital services based on  physician/advanced practitioner order or complex needs related to functional status, cognitive ability, or social support system  Outcome: Progressing     Problem: Knowledge Deficit  Goal: Patient/family/caregiver demonstrates understanding of disease process, treatment plan, medications, and discharge instructions  Description: Complete learning assessment and assess knowledge base.  Interventions:  - Provide teaching at level of understanding  - Provide teaching via preferred learning methods  Outcome: Progressing     Problem: CARDIOVASCULAR - ADULT  Goal: Maintains optimal cardiac output and hemodynamic stability  Description: INTERVENTIONS:  - Monitor I/O, vital signs and rhythm  - Monitor for S/S and trends of decreased cardiac output  - Administer and titrate ordered vasoactive medications to optimize hemodynamic stability  - Assess quality of pulses, skin color and temperature  - Assess for signs of decreased coronary artery perfusion  - Instruct patient to report change in severity of symptoms  Outcome: Progressing  Goal: Absence of cardiac dysrhythmias or at baseline rhythm  Description: INTERVENTIONS:  - Continuous cardiac monitoring, vital signs, obtain 12 lead EKG if ordered  - Administer antiarrhythmic and heart rate control medications as ordered  - Monitor electrolytes and administer replacement therapy as ordered  Outcome: Progressing     Problem: GENITOURINARY - ADULT  Goal: Maintains or returns to baseline urinary function  Description: INTERVENTIONS:  - Assess urinary function  - Encourage oral fluids to ensure adequate hydration if ordered  - Administer IV fluids as ordered to ensure adequate hydration  - Administer ordered medications as needed  - Offer frequent toileting  - Follow urinary retention protocol if ordered  Outcome: Progressing  Goal: Absence of urinary retention  Description: INTERVENTIONS:  - Assess patient’s ability to void and empty bladder  - Monitor I/O  - Bladder scan as  needed  - Discuss with physician/AP medications to alleviate retention as needed  - Discuss catheterization for long term situations as appropriate  Outcome: Progressing     Problem: Nutrition/Hydration-ADULT  Goal: Nutrient/Hydration intake appropriate for improving, restoring or maintaining nutritional needs  Description: Monitor and assess patient's nutrition/hydration status for malnutrition. Collaborate with interdisciplinary team and initiate plan and interventions as ordered.  Monitor patient's weight and dietary intake as ordered or per policy. Utilize nutrition screening tool and intervene as necessary. Determine patient's food preferences and provide high-protein, high-caloric foods as appropriate.     INTERVENTIONS:  - Monitor oral intake, urinary output, labs, and treatment plans  - Assess nutrition and hydration status and recommend course of action  - Evaluate amount of meals eaten  - Assist patient with eating if necessary   - Allow adequate time for meals  - Recommend/ encourage appropriate diets, oral nutritional supplements, and vitamin/mineral supplements  - Order, calculate, and assess calorie counts as needed  - Recommend, monitor, and adjust tube feedings and TPN/PPN based on assessed needs  - Assess need for intravenous fluids  - Provide specific nutrition/hydration education as appropriate  - Include patient/family/caregiver in decisions related to nutrition  Outcome: Progressing     Problem: Potential for Falls  Goal: Patient will remain free of falls  Description: INTERVENTIONS:  - Educate patient/family on patient safety including physical limitations  - Instruct patient to call for assistance with activity   - Consult OT/PT to assist with strengthening/mobility   - Keep Call bell within reach  - Keep bed low and locked with side rails adjusted as appropriate  - Keep care items and personal belongings within reach  - Initiate and maintain comfort rounds  - Make Fall Risk Sign visible to  staff  - Obtain necessary fall risk management equipment  - Apply yellow socks and bracelet for high fall risk patients  - Consider moving patient to room near nurses station  Outcome: Progressing

## 2024-08-23 NOTE — PLAN OF CARE
Problem: CARDIOVASCULAR - ADULT  Goal: Maintains optimal cardiac output and hemodynamic stability  Description: INTERVENTIONS:  - Monitor I/O, vital signs and rhythm  - Monitor for S/S and trends of decreased cardiac output  - Administer and titrate ordered vasoactive medications to optimize hemodynamic stability  - Assess quality of pulses, skin color and temperature  - Assess for signs of decreased coronary artery perfusion  - Instruct patient to report change in severity of symptoms  Outcome: Progressing     Problem: CARDIOVASCULAR - ADULT  Goal: Absence of cardiac dysrhythmias or at baseline rhythm  Description: INTERVENTIONS:  - Continuous cardiac monitoring, vital signs, obtain 12 lead EKG if ordered  - Administer antiarrhythmic and heart rate control medications as ordered  - Monitor electrolytes and administer replacement therapy as ordered  Outcome: Progressing     Problem: Nutrition/Hydration-ADULT  Goal: Nutrient/Hydration intake appropriate for improving, restoring or maintaining nutritional needs  Description: Monitor and assess patient's nutrition/hydration status for malnutrition. Collaborate with interdisciplinary team and initiate plan and interventions as ordered.  Monitor patient's weight and dietary intake as ordered or per policy. Utilize nutrition screening tool and intervene as necessary. Determine patient's food preferences and provide high-protein, high-caloric foods as appropriate.     INTERVENTIONS:  - Monitor oral intake, urinary output, labs, and treatment plans  - Assess nutrition and hydration status and recommend course of action  - Evaluate amount of meals eaten  - Assist patient with eating if necessary   - Allow adequate time for meals  - Recommend/ encourage appropriate diets, oral nutritional supplements, and vitamin/mineral supplements  - Order, calculate, and assess calorie counts as needed  - Recommend, monitor, and adjust tube feedings and TPN/PPN based on assessed needs  -  Assess need for intravenous fluids  - Provide specific nutrition/hydration education as appropriate  - Include patient/family/caregiver in decisions related to nutrition  Outcome: Progressing     Problem: Knowledge Deficit  Goal: Patient/family/caregiver demonstrates understanding of disease process, treatment plan, medications, and discharge instructions  Description: Complete learning assessment and assess knowledge base.  Interventions:  - Provide teaching at level of understanding  - Provide teaching via preferred learning methods  Outcome: Progressing

## 2024-08-23 NOTE — ASSESSMENT & PLAN NOTE
Secondary to poor oral intake and anorexia.  Phosphorus normalized  K improved and supplemented again prior to discharge   Continue KCl called in by PCP  Check BMP in a few days

## 2024-08-23 NOTE — ED PROVIDER NOTES
History  Chief Complaint   Patient presents with    Hypotension     hypotension. States she takes her BP once a week. Today found to be 70s systolic yesterday and today. Pressures 90-110s with EMS. Pt reports Lightheaded/dizziness.      Brigid Brown is a 79 y.o. female     They presented to the emergency department on August 22, 2024. Patient presents with:  Hypotension and diarrhea for the last 2 days.  Patient is also complaining of lightheadedness that occurred earlier this morning while she was sitting and watching TV.  Patient measured her blood blood pressure when she started feeling lightheadedness and showed mid 70s systolic.  Patient has been having decreased appetite for the last few months and has lost 20 pounds without trying in the last 6 months.  Past medical history includes hypothyroidism, IBS, CKD, unknown large abdominal mass that is planned to be removed in the next 2 weeks.  Patient denies fever, chills, chest pain, shortness of breath, cough, abdominal pain, nausea, vomiting, constipation, dysuria, polyuria, hematuria, or any other complaint at this time.               Prior to Admission Medications   Prescriptions Last Dose Informant Patient Reported? Taking?   Digestive Enzyme CAPS  Self Yes No   Sig: Take by mouth   Multiple Vitamins-Minerals (CENTRUM SILVER ULTRA WOMENS PO)  Self Yes No   Sig: Take by mouth   ascorbic acid (VITAMIN C) 500 mg tablet  Self Yes No   Sig: Take by mouth   cyproheptadine (PERIACTIN) 4 mg tablet   No No   Sig: take 0.5 tablet by mouth four times a day   diltiazem (CARDIZEM SR) 120 mg 12 hr capsule   No No   Sig: Take 1 capsule (120 mg total) by mouth 2 (two) times a day   escitalopram (LEXAPRO) 5 mg tablet   No No   Sig: Take 1 tablet (5 mg total) by mouth daily   lactase (LACTAID) 3,000 units tablet  Self Yes No   Sig: Take by mouth   polyethylene glycol-propylene glycol (SYSTANE) 0.4-0.3 %  Self Yes No   potassium chloride (Klor-Con M20) 20 mEq tablet   No No    Sig: Take 1 tablet (20 mEq total) by mouth daily for 5 days      Facility-Administered Medications: None       Past Medical History:   Diagnosis Date    Acne     Basal cell carcinoma 06/08/2020    right lower forehead    BCC (basal cell carcinoma of skin) 03/09/2020    mid forehead    Benign neoplasm of skin     Disease of thyroid gland 2006    GERD (gastroesophageal reflux disease)     Hypertension     Inflamed seborrheic keratosis     Irritable bowel syndrome     Malignant neoplasm of skin of face     Migraines     Nonmelanoma skin cancer     Last Assessed:6/27/17    Squamous cell skin cancer 06/08/2020    In situ, left lower forehead    Tachycardia     Telogen effluvium     Temporomandibular joint disorder     Vertigo        Past Surgical History:   Procedure Laterality Date    ADENOIDECTOMY      APPENDECTOMY      CHOLECYSTECTOMY      COLONOSCOPY  05/03/2019    COMPLEX WOUND CLOSURE TO EXTREMITY N/A 7/28/2020    Procedure: COMPLEX CLOSURE MID FOREHEAD;  Surgeon: Randy Frank MD;  Location: [facility];  Service: Plastics    ESOPHAGOGASTRODUODENOSCOPY  2009    FLAP LOCAL HEAD / NECK N/A 7/28/2020    Procedure: FLAP MID FOREHEAD;  Surgeon: Randy Frank MD;  Location: [facility];  Service: Plastics    HYSTERECTOMY  1987    MALIGNANT SKIN LESION EXCISION      Excision of Lesion Face Malignant-9/14/2004 BCC Forehead    MOHS RECONSTRUCTION N/A 7/28/2020    Procedure: RECONSTRUCTION MOHS DEFECT MID FOREHEAD;  Surgeon: Randy Frank MD;  Location: [facility];  Service: Plastics    MOHS SURGERY  07/27/2020    Right &left lower forehead, mid forehead    OOPHORECTOMY Bilateral 1987    ROTATOR CUFF REPAIR Right     SKIN BIOPSY      TONSILLECTOMY      TUBAL LIGATION  1976?       Family History   Problem Relation Age of Onset    Hypertension Mother         Mother    Osteoporosis Mother     Skin cancer Mother     Migraines Mother     Dementia Mother     Hypertension Father         Father    Skin  cancer Father     Dementia Father     Skin cancer Sister 73    Migraines Sister     No Known Problems Daughter     Uterine cancer Maternal Grandmother 29    Diabetes type II Maternal Grandfather     Cancer Paternal Grandmother     Uterine cancer Paternal Grandmother 65    No Known Problems Maternal Aunt     Cancer Paternal Aunt         Breast    Uterine cancer Paternal Aunt 55    No Known Problems Paternal Aunt     No Known Problems Paternal Aunt      I have reviewed and agree with the history as documented.    E-Cigarette/Vaping    E-Cigarette Use Never User      E-Cigarette/Vaping Substances    Nicotine No     THC No     CBD No     Flavoring No     Other No     Unknown No      Social History     Tobacco Use    Smoking status: Never    Smokeless tobacco: Never   Vaping Use    Vaping status: Never Used   Substance Use Topics    Alcohol use: No    Drug use: No        Review of Systems   Constitutional:  Negative for chills and fever.   HENT:  Negative for ear pain and sore throat.    Eyes:  Negative for pain and visual disturbance.   Respiratory:  Negative for cough and shortness of breath.    Cardiovascular:  Negative for chest pain and palpitations.   Gastrointestinal:  Positive for diarrhea. Negative for abdominal pain, constipation, nausea and vomiting.   Genitourinary:  Negative for dysuria and hematuria.   Musculoskeletal:  Negative for arthralgias and back pain.   Skin:  Negative for color change and rash.   Neurological:  Negative for seizures and syncope.   All other systems reviewed and are negative.      Physical Exam  ED Triage Vitals   Temperature Pulse Respirations Blood Pressure SpO2   08/22/24 1635 08/22/24 1616 08/22/24 1616 08/22/24 1616 08/22/24 1616   (!) 97.2 °F (36.2 °C) 79 18 107/61 97 %      Temp Source Heart Rate Source Patient Position - Orthostatic VS BP Location FiO2 (%)   08/22/24 1635 08/22/24 1616 08/22/24 1616 08/22/24 1616 --   Oral Monitor Sitting Right arm       Pain Score        08/22/24 1616       3             Orthostatic Vital Signs  Vitals:    08/22/24 1616 08/22/24 1700 08/22/24 1800   BP: 107/61 97/52 123/60   Pulse: 79 72 66   Patient Position - Orthostatic VS: Sitting Lying Sitting       Physical Exam  Vitals and nursing note reviewed.   Constitutional:       General: She is not in acute distress.     Appearance: She is cachectic.   HENT:      Head: Normocephalic and atraumatic.   Eyes:      Conjunctiva/sclera: Conjunctivae normal.   Cardiovascular:      Rate and Rhythm: Normal rate and regular rhythm.      Pulses: Normal pulses.      Heart sounds: Normal heart sounds. No murmur heard.     No friction rub. No gallop.   Pulmonary:      Effort: Pulmonary effort is normal. No respiratory distress.      Breath sounds: Normal breath sounds. No stridor. No wheezing, rhonchi or rales.   Abdominal:      Palpations: Abdomen is soft.      Tenderness: There is no abdominal tenderness.   Musculoskeletal:         General: No swelling.      Cervical back: Neck supple.   Skin:     General: Skin is warm and dry.      Capillary Refill: Capillary refill takes less than 2 seconds.   Neurological:      Mental Status: She is alert.   Psychiatric:         Mood and Affect: Mood normal.         ED Medications  Medications   potassium phosphates 21 mmol in sodium chloride 0.9 % 250 mL infusion (21 mmol Intravenous New Bag 8/22/24 2025)   ascorbic acid (VITAMIN C) tablet 500 mg (has no administration in time range)   cyproheptadine (PERIACTIN) tablet 2 mg (2 mg Oral Given 8/22/24 2244)   lactase (LACTAID) tablet 3,000 Units (has no administration in time range)   escitalopram (LEXAPRO) tablet 5 mg (has no administration in time range)   multivitamin-minerals (CENTRUM) tablet 1 tablet (has no administration in time range)   dextrose 5 % and sodium chloride 0.45 % infusion (50 mL/hr Intravenous New Bag 8/22/24 2037)   ondansetron (ZOFRAN) injection 4 mg (has no administration in time range)   heparin  (porcine) subcutaneous injection 5,000 Units (5,000 Units Subcutaneous Given 8/22/24 2244)   potassium chloride (Klor-Con M20) CR tablet 40 mEq (40 mEq Oral Given 8/22/24 1719)   sodium chloride 0.9 % bolus 1,000 mL (0 mL Intravenous Stopped 8/22/24 1829)   magnesium sulfate 2 g/50 mL IVPB (premix) 2 g (0 g Intravenous Stopped 8/22/24 1941)       Diagnostic Studies  Results Reviewed       Procedure Component Value Units Date/Time    HS Troponin I 4hr [[id number]]  (Normal) Collected: 08/22/24 2045    Lab Status: Final result Specimen: Blood from Arm, Right Updated: 08/22/24 2116     hs TnI 4hr 20 ng/L      Delta 4hr hsTnI -2 ng/L     Urine Microscopic [[id number]]  (Abnormal) Collected: 08/22/24 1929    Lab Status: Final result Specimen: Urine, Clean Catch Updated: 08/22/24 1947     RBC, UA None Seen /hpf      WBC, UA 4-10 /hpf      Epithelial Cells Occasional /hpf      Bacteria, UA Occasional /hpf      MUCUS THREADS Occasional     Hyaline Casts, UA 10-25 /lpf      Ca Oxalate Marjorie, UA Occasional /hpf      Transitional Epithelial Cells Present    UA w Reflex to Microscopic w Reflex to Culture [[id number]]  (Abnormal) Collected: 08/22/24 1929    Lab Status: Final result Specimen: Urine, Clean Catch Updated: 08/22/24 1939     Color, UA Light Yellow     Clarity, UA Turbid     Specific Gravity, UA 1.006     pH, UA 5.5     Leukocytes, UA Large     Nitrite, UA Negative     Protein, UA Negative mg/dl      Glucose, UA Negative mg/dl      Ketones, UA Negative mg/dl      Urobilinogen, UA <2.0 mg/dl      Bilirubin, UA Negative     Occult Blood, UA Negative    HS Troponin I 2hr [[id number]]  (Normal) Collected: 08/22/24 1812    Lab Status: Final result Specimen: Blood from Arm, Right Updated: 08/22/24 1843     hs TnI 2hr 17 ng/L      Delta 2hr hsTnI -5 ng/L     TSH, 3rd generation with Free T4 reflex [[id number]]  (Normal) Collected: 08/22/24 1634    Lab Status: Final result Specimen: Blood from Arm, Left Updated: 08/22/24 1835      TSH 3RD GENERATON 2.421 uIU/mL     Phosphorus [[id number]]  (Normal) Collected: 08/22/24 1634    Lab Status: Final result Specimen: Blood from Arm, Left Updated: 08/22/24 1747     Phosphorus 3.0 mg/dL     Magnesium [[id number]]  (Normal) Collected: 08/22/24 1634    Lab Status: Final result Specimen: Blood from Arm, Left Updated: 08/22/24 1747     Magnesium 1.9 mg/dL     HS Troponin 0hr (reflex protocol) [[id number]]  (Normal) Collected: 08/22/24 1634    Lab Status: Final result Specimen: Blood from Arm, Left Updated: 08/22/24 1704     hs TnI 0hr 22 ng/L     Comprehensive metabolic panel [[id number]]  (Abnormal) Collected: 08/22/24 1634    Lab Status: Final result Specimen: Blood from Arm, Left Updated: 08/22/24 1657     Sodium 137 mmol/L      Potassium 2.6 mmol/L      Chloride 96 mmol/L      CO2 33 mmol/L      ANION GAP 8 mmol/L      BUN 22 mg/dL      Creatinine 1.19 mg/dL      Glucose 144 mg/dL      Calcium 8.7 mg/dL      AST 39 U/L      ALT 29 U/L      Alkaline Phosphatase 80 U/L      Total Protein 6.3 g/dL      Albumin 3.6 g/dL      Total Bilirubin 0.48 mg/dL      eGFR 42 ml/min/1.73sq m     Narrative:      National Kid[employer] guidelines for Chronic Kidney Disease (CKD):     Stage 1 with normal or high GFR (GFR > 90 mL/min/1.73 square meters)    Stage 2 Mild CKD (GFR = 60-89 mL/min/1.73 square meters)    Stage 3A Moderate CKD (GFR = 45-59 mL/min/1.73 square meters)    Stage 3B Moderate CKD (GFR = 30-44 mL/min/1.73 square meters)    Stage 4 Severe CKD (GFR = 15-29 mL/min/1.73 square meters)    Stage 5 End Stage CKD (GFR <15 mL/min/1.73 square meters)  Note: GFR calculation is accurate only with a steady state creatinine    CBC and differential [[id number]] Collected: 08/22/24 1634    Lab Status: Final result Specimen: Blood from Arm, Left Updated: 08/22/24 1642     WBC 6.04 Thousand/uL      RBC 4.24 Million/uL      Hemoglobin 12.4 g/dL      Hematocrit 37.2 %      MCV 88 fL      MCH 29.2 pg      MCHC 33.3  g/dL      RDW 13.8 %      MPV 9.0 fL      Platelets 321 Thousands/uL      nRBC 0 /100 WBCs      Segmented % 74 %      Immature Grans % 0 %      Lymphocytes % 17 %      Monocytes % 8 %      Eosinophils Relative 0 %      Basophils Relative 1 %      Absolute Neutrophils 4.46 Thousands/µL      Absolute Immature Grans 0.02 Thousand/uL      Absolute Lymphocytes 1.00 Thousands/µL      Absolute Monocytes 0.50 Thousand/µL      Eosinophils Absolute 0.02 Thousand/µL      Basophils Absolute 0.04 Thousands/µL                    XR chest 1 view portable   ED Interpretation by Jose Winston MD (08/22 1756)   Per my independent interpretation:  no acute cardiopulmonary process       Final Result by Elisha Tsai MD (08/22 1819)      No acute cardiopulmonary disease.            Workstation performed: [id number]               Procedures  Procedures      ED Course             HEART Risk Score      Flowsheet Row Most Recent Value   Heart Score Risk Calculator    History 1 Filed at: 08/22/2024 2246   ECG 1 Filed at: 08/22/2024 2246   Age 2 Filed at: 08/22/2024 2246   Risk Factors 2 Filed at: 08/22/2024 2246   Troponin 1 Filed at: 08/22/2024 2246   HEART Score 7 Filed at: 08/22/2024 2246                                  Medical Decision Making  Patient presents with:  Hypotension and diarrhea for the last 2 days.  Patient is also complaining of lightheadedness that occurred earlier this morning while she was sitting and watching TV.  Patient measured her blood blood pressure when she started feeling lightheadedness and showed mid 70s systolic.  Patient has been having decreased appetite for the last few months and has lost 20 pounds without trying in the last 6 months.  Past medical history includes hypothyroidism, IBS, CKD, unknown large abdominal mass that is planned to be removed in the next 2 weeks.      Patient seen and examined noted to have Cachetic.      Differential diagnosis includes but is not limited to protein  caloric malnutrition, UTI, ACS, electrolyte abnormality.      Patient's labs notable for: Potassium 2.6, hyaline casts and oxalate crystals in UA, unremarkable phosphorus, magnesium, TSH, CBC imaging revealed: No acute cardiopulmonary disease, and EKG: Prolonged QTc but does not exceed 500.    Heart Score: 7     Discussed patient's case with Dr. David regarding admission who accepted the patient for further evaluation and management.    Patient was rx'd as below       Amount and/or Complexity of Data Reviewed  Labs: ordered.  Radiology: ordered and independent interpretation performed.    Risk  Prescription drug management.  Decision regarding hospitalization.          Disposition  Final diagnoses:   Hypokalemia   Malnutrition (HCC)   Generalized weakness   Lightheadedness     Time reflects when diagnosis was documented in both MDM as applicable and the Disposition within this note       Time User Action Codes Description Comment    8/22/2024  7:47 PM David Childress [E87.6] Hypokalemia     8/22/2024  8:01 PM David Childress [E46] Malnutrition (HCC)     8/22/2024  9:27 PM Jose Winston Add [R53.1] Generalized weakness     8/22/2024  9:27 PM Jose Winston [R42] Lightheadedness           ED Disposition       ED Disposition   Admit    Condition   Stable    Date/Time   Thu Aug 22, 2024 1947    Comment   Case was discussed with Frankie and the patient's admission status was agreed to be Admission Status: observation status to the service of Dr. David .               Follow-up Information    None         Current Discharge Medication List        CONTINUE these medications which have NOT CHANGED    Details   ascorbic acid (VITAMIN C) 500 mg tablet Take by mouth      cyproheptadine (PERIACTIN) 4 mg tablet take 0.5 tablet by mouth four times a day  Qty: 120 tablet, Refills: 0    Associated Diagnoses: Mild protein-calorie malnutrition (HCC)      Digestive Enzyme CAPS Take by mouth      diltiazem (CARDIZEM SR)  120 mg 12 hr capsule Take 1 capsule (120 mg total) by mouth 2 (two) times a day  Qty: 180 capsule, Refills: 0    Associated Diagnoses: Essential hypertension      escitalopram (LEXAPRO) 5 mg tablet Take 1 tablet (5 mg total) by mouth daily  Qty: 90 tablet, Refills: 0    Associated Diagnoses: Irritable bowel syndrome, unspecified type      lactase (LACTAID) 3,000 units tablet Take by mouth      Multiple Vitamins-Minerals (CENTRUM SILVER ULTRA WOMENS PO) Take by mouth      polyethylene glycol-propylene glycol (SYSTANE) 0.4-0.3 %       potassium chloride (Klor-Con M20) 20 mEq tablet Take 1 tablet (20 mEq total) by mouth daily for 5 days  Qty: 5 tablet, Refills: 0    Associated Diagnoses: Hypokalemia           No discharge procedures on file.    PDMP Review       None             ED Provider  Attending physically available and evaluated Brigid Brown. I managed the patient along with the ED Attending.    Electronically Signed by           David Childress MD  08/22/24 2340

## 2024-08-23 NOTE — DISCHARGE SUMMARY
[facility]  Discharge- Brigid Brown 1945, 79 y.o. female MRN: [id number]  Unit/Bed#: S -01 Encounter: [id number]  Primary Care Provider: Delicia Cintron DO   Date and time admitted to hospital: 8/22/2024  4:12 PM    * Dizziness  Assessment & Plan  Most likely secondary to hypotension and electrolyte abnormality and poor oral intake.  Telemetry negative   BP and dizziness improved with hydration   Stable for discharge   Hold Diltiazem at discharge   Encourage oral intake  Continue KCl supplementation     Electrolyte abnormality  Assessment & Plan  Secondary to poor oral intake and anorexia.  Phosphorus normalized  K improved and supplemented again prior to discharge   Continue KCl called in by PCP  Check BMP in a few days       Essential hypertension  Assessment & Plan  BP acceptable off Diltiazem   Hold Diltiazem   Monitor BP at home and restart if needed    Severe protein-calorie malnutrition (HCC)  Assessment & Plan  Malnutrition Findings:       Secondary to possible malignancy/mass eccentric.  And anorexia.  Encourage increase oral intake, will order some protein supplements.    Follow-up nutritionist recommendations.                          BMI Findings:           Body mass index is 14.82 kg/m².       Mesenteric mass  Assessment & Plan  Patient is getting worked up for mesenteric mass, scheduled for surgical biopsy by Dr. Drew on 9/6/2024.  Follow-up with surgical oncology as an outpatient.    Lactose intolerance  Assessment & Plan  On lactase.        Medical Problems       Resolved Problems  Date Reviewed: 8/23/2024   None       Discharging Physician / Practitioner: Sherwin Grayson PA-C  PCP: Delicia Cintron DO  Admission Date:   Admission Orders (From admission, onward)       Ordered        08/22/24 1908  INPATIENT ADMISSION  Once                          Discharge Date: 08/23/24    Consultations During Hospital Stay:  None    Procedures Performed:   CXR    Significant  Findings / Test Results:   CXR: No acute pulmonary disease     Incidental Findings:   None     Test Results Pending at Discharge (will require follow up):   None     Outpatient Tests Requested:  BMP    Complications:  None    Reason for Admission: Dizziness, Hypotension, Hypokalemia     Hospital Course:   Brigid Brown is a 79 y.o. female patient who originally presented to the hospital on 8/22/2024 due to dizziness. Patient was found to have multiple electrolyte abnormalities and was hypotensive in the ED. She was admitted, placed on telemetry, electrolytes were replenished, and she was given fluids. Diltiazem was placed on hold. Her BP improved. Electrolytes improved. Dizziness resolved. Telemetry was negative. She was given another potassium dose day after admission. She was deemed stable for discharge. She will continue to hold Diltiazem and monitor BP at home. She will recheck BMP in a few days and continue potassium supplementation that was ordered by PCP prior to hospitalization. She will continue to follow up with Surgical Oncology as previously planned. She was encouraged to follow up with her family doctor in 1-2 weeks.    Hospital Course: No notes on file    The patient, initially admitted to the hospital as inpatient, was discharged earlier than expected given the following: she was treated in an efficient manner and her condition and symptoms improved prior to meeting the original two midnight expectations. .    Please see above list of diagnoses and related plan for additional information.     Condition at Discharge: stable    Discharge Day Visit / Exam:   Subjective:  Patient reports dizziness improved. States that she had a BM this morning which was her baseline diarrhea and pain. She is excited to go home and feels comfortable with that plan.   Vitals: Blood Pressure: 130/69 (08/23/24 0739)  Pulse: 89 (08/23/24 0739)  Temperature: 98.3 °F (36.8 °C) (08/23/24 0739)  Temp Source: Oral (08/22/24  1635)  Respirations: 18 (08/23/24 0739)  Weight - Scale: 36.7 kg (81 lb) (08/23/24 0600)  SpO2: 98 % (08/23/24 0739)  Exam:   Physical Exam  Constitutional:       General: She is not in acute distress.     Appearance: Normal appearance. She is underweight. She is not ill-appearing or diaphoretic.   HENT:      Head: Normocephalic and atraumatic.      Mouth/Throat:      Mouth: Mucous membranes are moist.   Eyes:      General: No scleral icterus.     Pupils: Pupils are equal, round, and reactive to light.   Cardiovascular:      Rate and Rhythm: Normal rate and regular rhythm.      Pulses: Normal pulses.      Heart sounds: Normal heart sounds, S1 normal and S2 normal. No murmur heard.     No systolic murmur is present.      No diastolic murmur is present.      No gallop. No S3 or S4 sounds.   Pulmonary:      Effort: Pulmonary effort is normal. No accessory muscle usage or respiratory distress.      Breath sounds: Normal breath sounds. No stridor. No wheezing, rhonchi or rales.   Chest:      Chest wall: No tenderness.   Abdominal:      General: Bowel sounds are normal. There is no distension.      Palpations: Abdomen is soft.      Tenderness: There is no abdominal tenderness. There is no guarding.   Musculoskeletal:      Right lower leg: No edema.      Left lower leg: No edema.   Skin:     General: Skin is warm and dry.      Coloration: Skin is not jaundiced.   Neurological:      General: No focal deficit present.      Mental Status: She is alert. Mental status is at baseline.      Motor: No tremor or seizure activity.   Psychiatric:         Behavior: Behavior is cooperative.          Discussion with Family: Updated  (daughter) via phone.    Discharge instructions/Information to patient and family:   See after visit summary for information provided to patient and family.      Provisions for Follow-Up Care:  See after visit summary for information related to follow-up care and any pertinent home health  orders.      Mobility at time of Discharge:   -HLM Achieved: 7: Walk 25 feet or more  HLM Goal achieved. Continue to encourage appropriate mobility.     Disposition:   Home    Planned Readmission: None     Discharge Statement:  I spent 45 minutes discharging the patient. This time was spent on the day of discharge. I had direct contact with the patient on the day of discharge. Greater than 50% of the total time was spent examining patient, answering all patient questions, arranging and discussing plan of care with patient as well as directly providing post-discharge instructions.  Additional time then spent on discharge activities.    Discharge Medications:  See after visit summary for reconciled discharge medications provided to patient and/or family.      **Please Note: This note may have been constructed using a voice recognition system**

## 2024-08-24 LAB
ATRIAL RATE: 80 BPM
P AXIS: 85 DEGREES
PR INTERVAL: 136 MS
QRS AXIS: -87 DEGREES
QRSD INTERVAL: 90 MS
QT INTERVAL: 418 MS
QTC INTERVAL: 482 MS
T WAVE AXIS: 90 DEGREES
VENTRICULAR RATE: 80 BPM

## 2024-08-24 PROCEDURE — 93010 ELECTROCARDIOGRAM REPORT: CPT | Performed by: INTERNAL MEDICINE

## 2024-08-26 ENCOUNTER — TRANSITIONAL CARE MANAGEMENT (OUTPATIENT)
Dept: FAMILY MEDICINE CLINIC | Facility: CLINIC | Age: 79
End: 2024-08-26

## 2024-08-27 ENCOUNTER — OFFICE VISIT (OUTPATIENT)
Dept: FAMILY MEDICINE CLINIC | Facility: MEDICAL CENTER | Age: 79
End: 2024-08-27
Payer: MEDICARE

## 2024-08-27 VITALS
SYSTOLIC BLOOD PRESSURE: 90 MMHG | BODY MASS INDEX: 14.61 KG/M2 | RESPIRATION RATE: 18 BRPM | DIASTOLIC BLOOD PRESSURE: 70 MMHG | WEIGHT: 79.4 LBS | TEMPERATURE: 97.7 F | HEART RATE: 88 BPM | OXYGEN SATURATION: 98 % | HEIGHT: 62 IN

## 2024-08-27 DIAGNOSIS — E87.6 HYPOKALEMIA: ICD-10-CM

## 2024-08-27 DIAGNOSIS — Z76.89 ENCOUNTER FOR SUPPORT AND COORDINATION OF TRANSITION OF CARE: Primary | ICD-10-CM

## 2024-08-27 DIAGNOSIS — E43 SEVERE PROTEIN-CALORIE MALNUTRITION (HCC): ICD-10-CM

## 2024-08-27 DIAGNOSIS — N18.30 STAGE 3 CHRONIC KIDNEY DISEASE, UNSPECIFIED WHETHER STAGE 3A OR 3B CKD (HCC): ICD-10-CM

## 2024-08-27 PROCEDURE — 99496 TRANSJ CARE MGMT HIGH F2F 7D: CPT | Performed by: STUDENT IN AN ORGANIZED HEALTH CARE EDUCATION/TRAINING PROGRAM

## 2024-08-27 PROCEDURE — 93227 XTRNL ECG REC<48 HR R&I: CPT | Performed by: STUDENT IN AN ORGANIZED HEALTH CARE EDUCATION/TRAINING PROGRAM

## 2024-08-27 NOTE — PROGRESS NOTES
"  [facility] - Clinic Note  Delicia Cintron DO, 24     Brigid Brown MRN: [id number] : 1945 Age: 79 y.o.     Assessment/Plan     1. Encounter for support and coordination of transition of care    -Remain off of antihypertensive, monitor blood pressures  -Strongly encouraged increased p.o. intake, appreciate support from nutrition services, get advised about supplemental shakes to not replace meals but to supplement  -Continue cyproheptadine appetite stimulant  -Continue Lexapro for IBS  -Continue potassium supplementation and follow-up BMP  -Mesenteric mass biopsy scheduled on 2024    2. Hypokalemia    -Complete potassium supplementation and follow-up BMP later this week  - Basic metabolic panel; Future    3. Severe protein-calorie malnutrition (HCC)    - Ambulatory Referral to Nutrition Services; Future      Brigid Brown and daughter acknowledged understanding of treatment plan, all questions answered.    Subjective      Brigid Brown is a 79 y.o. female who presents for transition of care appointment after recent hospitalization at [facility] from 2024 to 2024 for dizziness determined to be secondary to hypotension and electrolyte abnormality and poor oral intake.  Patient was monitor on telemetry.  Symptoms improved with hydration.  Diltiazem was discontinued.  Patient presents today with her daughter Merari.  She checked her blood pressure earlier today \"107/60 something\".       Hospital course per discharge summary:  Brigid Brown is a 79 y.o. female patient who originally presented to the hospital on 2024 due to dizziness. Patient was found to have multiple electrolyte abnormalities and was hypotensive in the ED. She was admitted, placed on telemetry, electrolytes were replenished, and she was given fluids. Diltiazem was placed on hold. Her BP improved. Electrolytes improved. Dizziness resolved. Telemetry was negative. She was given another " potassium dose day after admission. She was deemed stable for discharge. She will continue to hold Diltiazem and monitor BP at home. She will recheck BMP in a few days and continue potassium supplementation that was ordered by PCP prior to hospitalization. She will continue to follow up with Surgical Oncology as previously planned. She was encouraged to follow up with her family doctor in 1-2 weeks.       TCM Call       Date and time call was made  8/26/2024 10:56 AM    Hospital care reviewed  Records reviewed    Patient was hospitialized at  [facility]    Date of Admission  08/22/24    Date of discharge  08/23/24    Diagnosis  Dizziness    Disposition  Home    Current Symptoms  None          TCM Call       Post hospital issues  None    Scheduled for follow up?  Yes    Patients specialists  Endocrinologist; Neurologist    Did you obtain your prescribed medications  Yes    Do you need help managing your prescriptions or medications  No    Is transportation to your appointment needed  No    I have advised the patient to call PCP with any new or worsening symptoms  CAESAR Jade    Living Arrangements  Alone    Support System  Family; Children    The type of support provided  Emotional; Physical    Do you have social support  Yes, as much as I need    Are you recieving any outpatient services  No    Are you recieving home care services  No    Are you using any community resources  No    Have you fallen in the last 12 months  No    Comments  Pt is schedule for appt tomorrow 8/27/24              The following portions of the patient's history were reviewed and updated as appropriate: allergies, current medications, past family history, past medical history, past social history, past surgical history and problem list.     Past Medical History:   Diagnosis Date    Acne     Basal cell carcinoma 06/08/2020    right lower forehead    BCC (basal cell carcinoma of skin) 03/09/2020    mid forehead    Benign  neoplasm of skin     Disease of thyroid gland 2006    GERD (gastroesophageal reflux disease)     Hypertension     Inflamed seborrheic keratosis     Irritable bowel syndrome     Malignant neoplasm of skin of face     Migraines     Nonmelanoma skin cancer     Last Assessed:6/27/17    Squamous cell skin cancer 06/08/2020    In situ, left lower forehead    Tachycardia     Telogen effluvium     Temporomandibular joint disorder     Vertigo        Allergies   Allergen Reactions    Cortisone Hypertension, Other (See Comments), Shortness Of Breath and Tachycardia    Acebutolol     Acetaminophen GI Intolerance     diarrhea    Amlodipine     Atenolol     Azithromycin     Barium Sulfate Diarrhea     Patient reports watery stool and stomach ache post fluoro upper GI on 6/6/24. Per Radiologist MD Lozada, reaction should not absolutely preclude patient from having barium in the future if necessary. CG RN 6/7/24      Bisphosphonates      Annotation - [id number]: iriitis    Candesartan     Clarithromycin     Erythromycin     Fosinopril     Irbesartan     Levofloxacin     Losartan     Methylprednisolone Hypertension and Tachycardia    Metoprolol     Nisoldipine     Olmesartan     Propranolol     Sulfamethoxazole-Trimethoprim Nausea Only    Timolol     Lidocaine Palpitations and Tachycardia       Past Surgical History:   Procedure Laterality Date    ADENOIDECTOMY      APPENDECTOMY      CHOLECYSTECTOMY      COLONOSCOPY  05/03/2019    COMPLEX WOUND CLOSURE TO EXTREMITY N/A 7/28/2020    Procedure: COMPLEX CLOSURE MID FOREHEAD;  Surgeon: Randy Frank MD;  [facility];  Service: Plastics    ESOPHAGOGASTRODUODENOSCOPY  2009    FLAP LOCAL HEAD / NECK N/A 7/28/2020    Procedure: FLAP MID FOREHEAD;  Surgeon: Randy Frank MD;  [facility];  Service: Plastics    HYSTERECTOMY  1987    MALIGNANT SKIN LESION EXCISION      Excision of Lesion Face Malignant-9/14/2004 BCC Forehead    MOHS RECONSTRUCTION N/A 7/28/2020     Procedure: RECONSTRUCTION MOHS DEFECT MID FOREHEAD;  Surgeon: Randy Frank MD;  [facility];  Service: Plastics    MOHS SURGERY  07/27/2020    Right &left lower forehead, mid forehead    OOPHORECTOMY Bilateral 1987    ROTATOR CUFF REPAIR Right     SKIN BIOPSY      TONSILLECTOMY      TUBAL LIGATION  1976?       Family History   Problem Relation Age of Onset    Hypertension Mother         Mother    Osteoporosis Mother     Skin cancer Mother     Migraines Mother     Dementia Mother     Hypertension Father         Father    Skin cancer Father     Dementia Father     Skin cancer Sister 73    Migraines Sister     No Known Problems Daughter     Uterine cancer Maternal Grandmother 29    Diabetes type II Maternal Grandfather     Cancer Paternal Grandmother     Uterine cancer Paternal Grandmother 65    No Known Problems Maternal Aunt     Cancer Paternal Aunt         Breast    Uterine cancer Paternal Aunt 55    No Known Problems Paternal Aunt     No Known Problems Paternal Aunt        Social History     Socioeconomic History    Marital status: /Civil Union     Spouse name: None    Number of children: None    Years of education: None    Highest education level: None   Occupational History    None   Tobacco Use    Smoking status: Never    Smokeless tobacco: Never   Vaping Use    Vaping status: Never Used   Substance and Sexual Activity    Alcohol use: No    Drug use: No    Sexual activity: Not Currently     Partners: Male     Birth control/protection: Post-menopausal   Other Topics Concern    None   Social History Narrative    None     Social Determinants of Health     Financial Resource Strain: Not on file   Food Insecurity: No Food Insecurity (8/22/2024)    Hunger Vital Sign     Worried About Running Out of Food in the Last Year: Never true     Ran Out of Food in the Last Year: Never true   Transportation Needs: No Transportation Needs (8/22/2024)    PRAPARE - Transportation     Lack of  "Transportation (Medical): No     Lack of Transportation (Non-Medical): No   Physical Activity: Not on file   Stress: Not on file   Social Connections: Not on file   Intimate Partner Violence: Not on file   Housing Stability: Low Risk  (8/22/2024)    Housing Stability Vital Sign     Unable to Pay for Housing in the Last Year: No     Number of Times Moved in the Last Year: 0     Homeless in the Last Year: No       Current Outpatient Medications   Medication Sig Dispense Refill    ascorbic acid (VITAMIN C) 500 mg tablet Take by mouth      cyproheptadine (PERIACTIN) 4 mg tablet take 0.5 tablet by mouth four times a day 120 tablet 0    Digestive Enzyme CAPS Take by mouth      escitalopram (LEXAPRO) 5 mg tablet Take 1 tablet (5 mg total) by mouth daily 90 tablet 0    lactase (LACTAID) 3,000 units tablet Take by mouth      Multiple Vitamins-Minerals (CENTRUM SILVER ULTRA WOMENS PO) Take by mouth      polyethylene glycol-propylene glycol (SYSTANE) 0.4-0.3 %       potassium chloride (Klor-Con M20) 20 mEq tablet Take 1 tablet (20 mEq total) by mouth daily for 5 days 5 tablet 0     No current facility-administered medications for this visit.       Review of Systems     As noted in HPI    Objective      BP 90/70 (BP Location: Left arm, Patient Position: Sitting, Cuff Size: Child)   Pulse 88   Temp 97.7 °F (36.5 °C) (Temporal)   Resp 18   Ht 5' 2\" (1.575 m)   Wt 36 kg (79 lb 6.4 oz)   LMP  (LMP Unknown)   SpO2 98%   BMI 14.52 kg/m²     Physical Exam  Vitals reviewed.   Constitutional:       General: She is not in acute distress.     Appearance: She is not toxic-appearing.      Comments: Frail   HENT:      Head: Normocephalic.      Comments: Temporal wasting     Mouth/Throat:      Mouth: Mucous membranes are moist.      Pharynx: Oropharynx is clear.   Eyes:      Conjunctiva/sclera: Conjunctivae normal.   Cardiovascular:      Rate and Rhythm: Normal rate and regular rhythm.      Pulses: Normal pulses.      Heart sounds: " "Normal heart sounds.   Pulmonary:      Effort: Pulmonary effort is normal.      Breath sounds: Normal breath sounds.   Abdominal:      General: Abdomen is flat. Bowel sounds are normal.      Palpations: Abdomen is soft.      Tenderness: There is abdominal tenderness in the left upper quadrant. There is no guarding or rebound.   Skin:     General: Skin is warm and dry.   Neurological:      Mental Status: She is alert and oriented to person, place, and time.   Psychiatric:         Mood and Affect: Mood normal.         Behavior: Behavior normal.         Thought Content: Thought content normal.             Some portions of this record may have been generated with voice recognition software. There may be translation, syntax, or grammatical errors. Occasional wrong word or \"sound-a-like\" substitutions may have occurred due to the inherent limitations of the voice recognition software. Read the chart carefully and recognize, using context, where substations may have occurred. If you have any questions, please contact the dictating provider for clarification or correction, as needed.  "

## 2024-08-28 ENCOUNTER — TELEMEDICINE (OUTPATIENT)
Dept: ANESTHESIOLOGY | Facility: CLINIC | Age: 79
End: 2024-08-28
Payer: MEDICARE

## 2024-08-28 VITALS — DIASTOLIC BLOOD PRESSURE: 62 MMHG | SYSTOLIC BLOOD PRESSURE: 96 MMHG

## 2024-08-28 DIAGNOSIS — K63.89 MESENTERIC MASS: ICD-10-CM

## 2024-08-28 DIAGNOSIS — Z01.89 ENCOUNTER FOR GERIATRIC ASSESSMENT: Primary | ICD-10-CM

## 2024-08-28 DIAGNOSIS — R09.89 SYMPTOMS OF CEREBROVASCULAR ACCIDENT (CVA): ICD-10-CM

## 2024-08-28 DIAGNOSIS — I10 ESSENTIAL HYPERTENSION: ICD-10-CM

## 2024-08-28 DIAGNOSIS — E43 SEVERE PROTEIN-CALORIE MALNUTRITION (HCC): ICD-10-CM

## 2024-08-28 DIAGNOSIS — R63.4 WEIGHT LOSS: ICD-10-CM

## 2024-08-28 DIAGNOSIS — I10 PRIMARY HYPERTENSION: ICD-10-CM

## 2024-08-28 DIAGNOSIS — N18.30 STAGE 3 CHRONIC KIDNEY DISEASE, UNSPECIFIED WHETHER STAGE 3A OR 3B CKD (HCC): ICD-10-CM

## 2024-08-28 PROCEDURE — 99214 OFFICE O/P EST MOD 30 MIN: CPT | Performed by: NURSE PRACTITIONER

## 2024-08-28 NOTE — PROGRESS NOTES
[facility]      Reason for Visit: Pre-operative Evaluation for Risk Stratification and Optimization    Patient ID: Brigid Brown is a 79 y.o. female.  79 year old female referred to SOC for pre-surgery geriatric screening 2.2 age, frailty   Other consult concerns include: surgical optimization & BEST program   She is scheduled on     This Visit is being completed by telephone. The Patient is located at Home and in the following state in which I hold an active license [place]    The patient was identified by name and date of birth Brigid Brown was informed that this is a telemedicine visit and that the visit is being conducted through the Epic Embedded platform. She agrees to proceed..  My office door was closed and no one else was in the room.  Patient acknowledged consent and understanding of privacy and security of the video platform. The patient has agreed to participate and understands they can discontinue the visit at any time.    Patient is aware this is a billable service.      Surgery:   Case: [id number] Date/Time: 09/06/24 0800   Procedures:      BIOPSY ABDOMINAL MASS, LYMPHOMA PROTOCOL (Abdomen)      POSSIBLE GASTROJEJUNOSTOMY (Abdomen)   Anesthesia type: General   Diagnosis: Mesenteric mass [K63.89]   Pre-op diagnosis: Mesenteric mass [K63.89]   Location: [facility] OR ROOM 07 / [facility]   Surgeons: Angus Drew MD         High risk geriatric surgery patient  Recommend inpatient geriatric consult   Recommend geriatric pain protocol   Recommend DP on admit       Today, I had a discussion with the patient and her daughter.  I discussed the risk and benefits of general anesthesia including; but not limit too, the risk of postoperative respiratory distress, postop pneumonia, postop cognitive impairment (both temporarily and/or permanent), postop urinary retention (YOLANDA), postop deconditioning, and death.  The patient verbalized they are fully aware of these  risks and wishes to proceed.      In preparation for general anesthesia we started our BEST protocol placing emphasis on     Breathing  Encouraged exercise lungs prior to surgery    Eating  Encouraged to increase protein prior to surgery    Self  Encouraged to prepare their home for recovery  Encouraged to sleep 8- 10 hours nightly   Encouraged to limit napping during the day   Encouraged to limit stress   Encouraged to exercise the mind daily-this can be done through completing crossword puzzles, sudoku puzzles, reading, self reflecting through journaling, adult number coloring, flash cards, board games, game shows on TV, and staying socially active    Train   Encouraged to stay active prior to surgery  Encouraged to increase exercise prior to surgery        Assessment    Pre-operative Medical Evaluation for planned surgery  Recommendations as listed in PLAN section below      Problem List Items Addressed This Visit       Symptoms of cerebrovascular accident (CVA)  DENIES   C/o flashing light   Diagnosed migraines   Daughter present and agrees         Severe protein-calorie malnutrition (HCC)  Nutrition eval completed   High risk malnutrition   Can see nutrition post-op - ORDERED   SOC care package sent   RISKS BMI 14  MNA <11  Weight loss - Primary  6 months unexpected   Highest 170  Baseline 125  Lowest weight 79  Ensure 1 bottle already   Instructed to drink 2 bottles of ensure daily   Eat high protein foods   SOC CARE PACKAGE SENT         Stage 3 chronic kidney disease, unspecified whether stage 3a or 3b CKD (HCC)  Mild kidney disease   HIGH noreen risk- can see nephology post-op   Latest Reference Range & Units 08/23/24 04:44   Sodium 135 - 147 mmol/L 139   Potassium 3.5 - 5.3 mmol/L 3.2 (L)   Chloride 96 - 108 mmol/L 103   Carbon Dioxide 21 - 32 mmol/L 32   ANION GAP 4 - 13 mmol/L 4   BUN 5 - 25 mg/dL 14   Creatinine 0.60 - 1.30 mg/dL 0.86   GLUCOSE 65 - 140 mg/dL 92   Calcium 8.4 - 10.2 mg/dL 8.1 (L)   GFR,  "Calculated ml/min/1.73sq m 63   Phosphorus 2.3 - 4.1 mg/dL 3.8   (L): Data is abnormally low              Encounter for geriatric assessment  High risk geriatric surgery patient  Recommend inpatient geriatric consult   Recommend geriatric pain protocol   Recommend DP on admit        8/28/2024 1:15 PM    BP 96/62     ALWAYS LOW PER PATIENT AND DAUGHTER   WEIGHT 79 LBS       PRE-OP WORKSHEET DATA  Assessment of Pre-Operative Risks     SOC Quality Hard Stops:    SSI Risk:  BMI (>40) : Estimated body mass index is 14.52 kg/m² as calculated from the following:    Height as of 8/27/24: 5' 2\" (1.575 m).    Weight as of 8/27/24: 36 kg (79 lb 6.4 oz). (if >than 40 then offer weight management)  -Weight management consult No    Nutrition score 8   - consult for nutrition - YES ORDERED     HbA1c (<8.0) :   Lab Results   Component Value Date    HGBA1C 5.6 05/20/2024    (>8.0 recommend Endocrine consult)   -endocrine consult indicated No    Tobacco use:  No     -lifestyle management consult No    SOC Anemia review:  Hgb ( >11):   Lab Results   Component Value Date    HGB 10.5 (L) 08/23/2024    HGB 12.4 08/22/2024    HGB 12.9 08/20/2024      -IV venofer indicated  No    YOLANDA risk:  GFR (>60) (Less then 45 = Nephrology consult):    Lab Results   Component Value Date    EGFR 63 08/23/2024    EGFR 42 08/22/2024    EGFR 49 08/20/2024      -Nephrology consult indicated No    GERIATRIC ASSESSMENT Yes  Mini-Cognitive Screen (MCI) score <2    inpatient geriatric consult Yes   -ordered delirium precautions on admit Yes  Depression Screen   Falls (yes within the last 6 months)   -Fall precautions ordered on admit No  Mini Nutritional Assessment (MNA) (score <12)   -Estimated body mass index is 14.52 kg/m² as calculated from the following:    Height as of 8/27/24: 5' 2\" (1.575 m).    Weight as of 8/27/24: 36 kg (79 lb 6.4 oz).   -order inpatient nutrition evaluation No    Carb Drinks given by SOC   No      Subjective:         History of " Present Illness:     Brigid Brown is a 79 y.o. female who presents to the office today for a preoperative screening. They are electing to undergo planned procedure with an understanding that all surgery has inherent risk. Today they present for preoperative risk assessment and recommendations for optimization in preparation for surgery.  We have reviewed their past medical/surgical/social history as listed below, their most recent labs and EKG, and labs were also reviewed and recommendations are as below.       ROS:      Denies fevers and denies chills  Denies congestion and denies sore throat  Denies chest pain  Denies palpitations  Denies shortness of breath  Denies abdominal pain  Denies nausea, vomiting, and diarrhea  Denies any issues with skin... example NO open wounds or sores  Denies rashes  Denies difficulty urinating  Denies any issues with urine... example a dark color or an odor  Denies dizziness  Denies headaches  Denies confusion and denies hallucinations    Admits to being in well health today       Objective:    BP 96/62   LMP  (LMP Unknown)       General Appearance: no distress, appropriate  HEENT: PERRLA, conjuctiva normal; oropharynx clear; mucous membranes moist;   Neck:  Supple  Lungs: no accessory muscle use noted  Psych: affect normal, mood normal  Neuro: AAOx3        The following portions of the patient's history were reviewed and updated as appropriate: allergies, current medications, past family history, past medical history, past social history, past surgical history and problem list.     Past History:       Past Medical History:   Diagnosis Date    Acne     Basal cell carcinoma 06/08/2020    right lower forehead    BCC (basal cell carcinoma of skin) 03/09/2020    mid forehead    Benign neoplasm of skin     Disease of thyroid gland 2006    GERD (gastroesophageal reflux disease)     Hypertension     Inflamed seborrheic keratosis     Irritable bowel syndrome     Malignant neoplasm of skin  of face     Migraines     Nonmelanoma skin cancer     Last Assessed:6/27/17    Squamous cell skin cancer 06/08/2020    In situ, left lower forehead    Tachycardia     Telogen effluvium     Temporomandibular joint disorder     Vertigo     Past Surgical History:   Procedure Laterality Date    ADENOIDECTOMY      APPENDECTOMY      CHOLECYSTECTOMY      COLONOSCOPY  05/03/2019    COMPLEX WOUND CLOSURE TO EXTREMITY N/A 7/28/2020    Procedure: COMPLEX CLOSURE MID FOREHEAD;  Surgeon: Randy Frank MD;  [facility];  Service: Plastics    ESOPHAGOGASTRODUODENOSCOPY  2009    FLAP LOCAL HEAD / NECK N/A 7/28/2020    Procedure: FLAP MID FOREHEAD;  Surgeon: Randy Frank MD;  [facility];  Service: Plastics    HYSTERECTOMY  1987    MALIGNANT SKIN LESION EXCISION      Excision of Lesion Face Malignant-9/14/2004 BCC Forehead    MOHS RECONSTRUCTION N/A 7/28/2020    Procedure: RECONSTRUCTION MOHS DEFECT MID FOREHEAD;  Surgeon: Randy Frank MD;  [facility];  Service: Plastics    MOHS SURGERY  07/27/2020    Right &left lower forehead, mid forehead    OOPHORECTOMY Bilateral 1987    ROTATOR CUFF REPAIR Right     SKIN BIOPSY      TONSILLECTOMY      TUBAL LIGATION  1976?          Social History     Tobacco Use    Smoking status: Never    Smokeless tobacco: Never   Vaping Use    Vaping status: Never Used   Substance Use Topics    Alcohol use: No    Drug use: No     Family History   Problem Relation Age of Onset    Hypertension Mother         Mother    Osteoporosis Mother     Skin cancer Mother     Migraines Mother     Dementia Mother     Hypertension Father         Father    Skin cancer Father     Dementia Father     Skin cancer Sister 73    Migraines Sister     No Known Problems Daughter     Uterine cancer Maternal Grandmother 29    Diabetes type II Maternal Grandfather     Cancer Paternal Grandmother     Uterine cancer Paternal Grandmother 65    No Known Problems Maternal Aunt     Cancer  Paternal Aunt         Breast    Uterine cancer Paternal Aunt 55    No Known Problems Paternal Aunt     No Known Problems Paternal Aunt           Allergies:     Allergies   Allergen Reactions    Cortisone Hypertension, Other (See Comments), Shortness Of Breath and Tachycardia    Acebutolol     Acetaminophen GI Intolerance     diarrhea    Amlodipine     Atenolol     Azithromycin     Barium Sulfate Diarrhea     Patient reports watery stool and stomach ache post fluoro upper GI on 6/6/24. Per Radiologist MD Lozada, reaction should not absolutely preclude patient from having barium in the future if necessary. CG RN 6/7/24      Bisphosphonates      Annotation - [id number]: iriitis    Candesartan     Clarithromycin     Erythromycin     Fosinopril     Irbesartan     Levofloxacin     Losartan     Methylprednisolone Hypertension and Tachycardia    Metoprolol     Nisoldipine     Olmesartan     Propranolol     Sulfamethoxazole-Trimethoprim Nausea Only    Timolol     Lidocaine Palpitations and Tachycardia        Current Medications:     Current Outpatient Medications   Medication Instructions    ascorbic acid (VITAMIN C) 500 mg tablet Oral    cyproheptadine (PERIACTIN) 4 mg tablet take 0.5 tablet by mouth four times a day    Digestive Enzyme CAPS Oral    escitalopram (LEXAPRO) 5 mg, Oral, Daily    lactase (LACTAID) 3,000 units tablet Oral    Multiple Vitamins-Minerals (CENTRUM SILVER ULTRA WOMENS PO) Oral    polyethylene glycol-propylene glycol (SYSTANE) 0.4-0.3 % No dose, route, or frequency recorded.    potassium chloride (Klor-Con M20) 20 mEq tablet 20 mEq, Oral, Daily           Assessment of intra and post operative respiratory, hemodynamic and thrombotic risks     Prior Anesthesia Reactions: No      Pre-Op Data Reviewed:       Laboratory  Encounter Date: 08/22/24   ECG 12 lead   Result Value    Ventricular Rate 80    Atrial Rate 80    VT Interval 136    QRSD Interval 90    QT Interval 418    QTC Interval 482    P Axis 85     QRS Axis -87    T Wave Axis 90    Narrative    Normal sinus rhythm with sinus arrhythmia  Left axis deviation  Nonspecific ST and T wave abnormality  Prolonged QT  Abnormal ECG  When compared with ECG of 10-HUMBLE-2024 20:07,  Nonspecific T wave abnormality, worse in Lateral leads  Confirmed by Reva Pena ([id number]) on 8/24/2024 11:28:39 AM       Time of visit including pre-visit chart review, visit and post-visit coordination of plan and care , review of pre-surgical lab work, preparation and time spent documenting note in electronic medical record, time spent face-to-face in physical examination answering patient questions by care team 35 minutes             [facility]

## 2024-08-29 ENCOUNTER — CLINICAL SUPPORT (OUTPATIENT)
Dept: NUTRITION | Facility: HOSPITAL | Age: 79
End: 2024-08-29
Payer: MEDICARE

## 2024-08-29 ENCOUNTER — APPOINTMENT (OUTPATIENT)
Dept: LAB | Facility: MEDICAL CENTER | Age: 79
End: 2024-08-29
Payer: MEDICARE

## 2024-08-29 VITALS — BODY MASS INDEX: 14.33 KG/M2 | WEIGHT: 78.37 LBS

## 2024-08-29 DIAGNOSIS — E43 SEVERE PROTEIN-CALORIE MALNUTRITION (HCC): ICD-10-CM

## 2024-08-29 DIAGNOSIS — E87.6 HYPOKALEMIA: ICD-10-CM

## 2024-08-29 DIAGNOSIS — N18.30 STAGE 3 CHRONIC KIDNEY DISEASE, UNSPECIFIED WHETHER STAGE 3A OR 3B CKD (HCC): ICD-10-CM

## 2024-08-29 DIAGNOSIS — N18.31 STAGE 3A CHRONIC KIDNEY DISEASE (HCC): Primary | ICD-10-CM

## 2024-08-29 LAB
ANION GAP SERPL CALCULATED.3IONS-SCNC: 9 MMOL/L (ref 4–13)
BUN SERPL-MCNC: 25 MG/DL (ref 5–25)
CALCIUM SERPL-MCNC: 9.1 MG/DL (ref 8.4–10.2)
CHLORIDE SERPL-SCNC: 96 MMOL/L (ref 96–108)
CO2 SERPL-SCNC: 31 MMOL/L (ref 21–32)
CREAT SERPL-MCNC: 1.1 MG/DL (ref 0.6–1.3)
GFR SERPL CREATININE-BSD FRML MDRD: 47 ML/MIN/1.73SQ M
GLUCOSE P FAST SERPL-MCNC: 84 MG/DL (ref 65–99)
POTASSIUM SERPL-SCNC: 3.7 MMOL/L (ref 3.5–5.3)
SODIUM SERPL-SCNC: 136 MMOL/L (ref 135–147)

## 2024-08-29 PROCEDURE — 80048 BASIC METABOLIC PNL TOTAL CA: CPT

## 2024-08-29 PROCEDURE — 36415 COLL VENOUS BLD VENIPUNCTURE: CPT

## 2024-08-29 PROCEDURE — 97803 MED NUTRITION INDIV SUBSEQ: CPT

## 2024-08-29 RX ORDER — LOPERAMIDE HCL 2 MG
2 CAPSULE ORAL 4 TIMES DAILY PRN
COMMUNITY
End: 2024-09-11

## 2024-08-29 NOTE — PROGRESS NOTES
"Nutrition Assessment Form     Patient Name: Brigid Brown    YOB: 1945    Sex: Female      Assessment Date: 8/29/2024  Start Time:  12:30 AM Stop Time: 1:00 PM Total Minutes: 30 mins      Data:  Present at session: self   Parent/Patient Concerns/reason for visit: \"I would like to stop losing weight.\"   Medical Dx/Reason for Referral: N18.30 (ICD-10-CM) - Stage 3 chronic kidney disease, unspecified whether stage 3a or 3b CKD (HCC)  E43 (ICD-10-CM) - Severe protein-calorie malnutrition (HCC)   Medical History        Past Medical History:   Diagnosis Date    Acne      Basal cell carcinoma 06/08/2020     right lower forehead    BCC (basal cell carcinoma of skin) 03/09/2020     mid forehead    Benign neoplasm of skin      Disease of thyroid gland 2006    GERD (gastroesophageal reflux disease)      Hypertension      Inflamed seborrheic keratosis      Irritable bowel syndrome      Malignant neoplasm of skin of face      Migraines      Nonmelanoma skin cancer       Last Assessed:6/27/17    Squamous cell skin cancer 06/08/2020     In situ, left lower forehead    Tachycardia      Telogen effluvium      Temporomandibular joint disorder      Vertigo             Current Medications          Current Outpatient Medications   Medication Sig Dispense Refill    Alpha-D-Galactosidase (BEANO) TABS Take by mouth (Patient not taking: Reported on 4/22/2024)        ascorbic acid (VITAMIN C) 500 mg tablet Take by mouth        cholestyramine (QUESTRAN) 4 g packet Take 1 packet by mouth 3 (three) times a day with meals (Patient not taking: Reported on 5/9/2024)        Diclofenac Sodium (VOLTAREN) 1 % Apply 2 g topically 4 (four) times a day (Patient not taking: Reported on 4/22/2024) 240 g 1    Digestive Enzyme CAPS Take by mouth        diltiazem (CARDIZEM CD) 240 mg 24 hr capsule Take 1 capsule (240 mg total) by mouth daily May give patient  the stock bottle 90 capsule 1    famotidine (PEPCID) 10 mg tablet Take 10 mg by mouth 2 " "(two) times a day (Patient not taking: Reported on 4/22/2024)        lactase (LACTAID) 3,000 units tablet Take by mouth        Minoxidil 5 % FOAM Apply topically        Multiple Vitamins-Minerals (CENTRUM SILVER ULTRA WOMENS PO) Take by mouth        pantoprazole (PROTONIX) 20 mg tablet Take 1 tablet (20 mg total) by mouth daily before breakfast 30 tablet 0    polyethylene glycol-propylene glycol (SYSTANE) 0.4-0.3 %          Premarin vaginal cream Insert 1 g into the vagina once a week Brand Necessary 30 g 1    sucralfate (CARAFATE) 1 g tablet take 1 tablet by mouth before meals and at bedtime (Patient not taking: Reported on 4/26/2024)          No current facility-administered medications for this visit.                Facility-Administered Medications Ordered in Other Visits   Medication Dose Route Frequency Provider Last Rate Last Admin    lactated ringers infusion  125 mL/hr Intravenous Continuous Olga Sosa MD 75 mL/hr at 05/13/24 1023 Continue from Pre-op at 05/13/24 1023            Additional Meds/Supplements:  MVI, Vitamin C    Special Learning Needs/barriers to learning/any new barriers  None   Height: HC Readings from Last 5 Encounters:   No data found for HC      Weight:     Wt Readings from Last 10 Encounters:   05/15/24 41 kg (90 lb 6.4 oz)   05/09/24 41.9 kg (92 lb 6.4 oz)   04/26/24 43.4 kg (95 lb 9.6 oz)   04/24/24 44.5 kg (98 lb)   04/22/24 44.7 kg (98 lb 9.6 oz)   04/11/24 45.5 kg (100 lb 3.2 oz)   04/08/24 46.7 kg (103 lb)   03/22/24 47.1 kg (103 lb 12.8 oz)   03/11/24 48.1 kg (106 lb)   02/22/24 48.4 kg (106 lb 12.8 oz)      Estimated body mass index is 16.27 kg/m² as calculated from the following:    Height as of this encounter: 5' 2.5\" (1.588 m).    Weight as of this encounter: 41 kg (90 lb 6.4 oz).   Recent Weight Change: [x]Yes     []No  Amount: -12 # (13.3%) since 5/15/24      Energy Needs: 1435-1640cal 35-40cal/kg to promote wt. Gain, 61.5g 1.5g/kg, 1435-1640mL 1mL/chente   Allergies "         Allergies   Allergen Reactions    Cortisone Hypertension, Other (See Comments), Shortness Of Breath and Tachycardia    Acebutolol      Acetaminophen GI Intolerance       diarrhea    Amlodipine      Atenolol      Azithromycin      Bisphosphonates         Annotation - [id number]: iriitis    Candesartan      Clarithromycin      Erythromycin      Fosinopril      Irbesartan      Levofloxacin      Losartan      Methylprednisolone Hypertension and Tachycardia    Metoprolol      Nisoldipine      Olmesartan      Propranolol      Sulfamethoxazole-Trimethoprim Nausea Only    Timolol      Lidocaine Palpitations and Tachycardia       or intolerances     Social History          Substance and Sexual Activity   Alcohol Use No    _______x/wk or month  1 or 2 or 3 or 4 or____ drinks/session   Mixed drinks/ wine/ beer     none   Tobacco Use History   Social History          Tobacco Use   Smoking Status Never   Smokeless Tobacco Never           Who shops? spouse   Who cooks/cooking methods/Eating out/take out habits    spouse  Cooking methods: bake/hale/air hale/grill/boil/other________     Take out: ___ x/wk or month   Dining out ____ x/wk or month   Exercise: Walk/ run/ bike/ gym/elliptical/other _________  ____ x/wk how many minutes:______   strength training    None d/t weakness   Other: ie: Sleep habits/ stress level/ work habits household-lives with ?/ food security none   Prior Nutritional Counseling? [x]Yes     []No  When:  Reported 5-10 years ago       Why: IBS         Diet Hx:  Breakfast: pb&j sandwich, jelly is sugar free, water   Lunch:    Pb pancake, sometimes soup, occasionally turkey sandwich, water         Dinner:    Small portion chicken, mashed potatoes, carrots         Snacks: AM - 4oz pedialyte  PM - 4oz pedialyte, protein bar, or ensure/boost  HS - none   Other Notes/ Initial Assessment:  Brigid presents with  for nutrition counseling. Brigid reported hx of IBS, lactose intolerant, but past couple months  has had no appetite and significant weight loss. Brigid reported had testing and EGD/colonoscopy, was told she has gastritis, and was told thyroid panel was abnormal.  Brigid has had weakness where now is not able to cook, clean or do much. Brigid meets criteria for severe pro, chente malnutrition d/t condition as evidence by  significant weight loss, 16lbs/15%  wt. Loss x 3 months, <75% energy intake > 1 month, BMI 16.2, severe signs of muscle fat loss at temples, orbitals, clavicles, treatment plan: high pro, high calorie diet, small frequent meals, snacks with protein and oral nutrition supplements, consider appetite stimulant.        Updated assessment (Follow up note only) 8/29/24: Brigid presents to the visit with her daughter, Merari. Brigid reports feeling discouraged with recent weight loss (-12 # (13.3%) since 5/15/24). She would also like information of chronic kidney disease management. Merari states that Brigid consumes the same foods and upon discussion with patient she does not like to eat many foods and has restricted many food items due to IBS symptoms, mainly diarrhea with 3 occurrences in the morning most days. She reports since the last visit, she stopped consuming the Pedialyte drinks and met some of the goals discussed with the previous RD but having barriers with some loss of appetite or fear of IBS flare-ups. We reviewed potential lactose-free options and foods to include in her favored foods list. She reports previously creating a food/symptom list for her GI provider but feels she is aware of what foods cause her GI symptoms. Discussed adding in small snacks or 5-6 small meals daily if desired vs 3 larger ones, and appropriate options and portions.  Appropriate timing of meals, including eating within 1 hr of waking and eating meals slowly 20mins/meals. Reviewed high calorie, high protein meal/snack options with examples. Discussed importance of including these items to promote weight maintenance, and avoiding  muscle/weight loss. If feeling full quickly - emphasized consuming fluid between meals, especially ones with carbonation. Reviewed preparing meals which take little energy to consume to reduce early satiety due to fatigue from work of chewing. Provided NLA Heart Healthy Eating Habits when Underweight, Chronic Kidney Disease Nutrition Therapy, and NCM High calorie, High protein nutrition therapy. Will follow up in November.            Nutrition Diagnosis  Nutrition Diagnosis:   Underweight  related to Inadequate energy intake as  evidenced by BMI <18.5 (adults)       Any change or new dx since previous visit: No change        Nutrition Diagnosis  Nutrition Diagnosis:       Medical Nutrition Therapy Intervention:  [x]Individualized Meal Plan -3 meals, 3 snacks, choosing calorie dense foods, 2 oral nutrition supplements. Reviewed High Protein, High Calorie Nutrition Therapy Diet education and provided high calorie high protein recipes. Recommend small frequent meals.   Provided ideas to maximize calorie intake at meals and snacks using calorie dense foods such as:   -Cook with lactose free whole milk or soy milk instead of water when making dishes such as hot cereal, cocoa, or pudding.  -Add regular jelly, jam, honey, butter or margarine to bread and crackers. -Add jam or fruit to lactose free ice cream and as a topping over cake.  -Mix dried fruit, nuts, granola, honey, or dry cereal with yogurt or hot cereals.  -Enjoy snacks such as lactose free milkshakes, smoothies, pudding, ice cream, or custard.  -Blend a fruit smoothie of a banana, frozen berries, milk or soy milk, with protein powder or CIB.     Reviewed strategies to increase protein:  -Add ¼ cup nonfat dry milk powder or protein powder to make a high-protein milk to drink or to use in recipes that call for milk. Vanilla or cocoa powder could help to boost the flavor.  -Add hard-cooked eggs, leftover meat, grated cheese, canned beans or tofu to noodles, rice,  salads, sandwiches, soups, casseroles, pasta, tuna and other mixed dishes.  -Add protein powder to hot cereals, meatloaf, casseroles, scrambled eggs, sauces, cream soups, and shakes.  -Add beans and lentils to salads, soups, casseroles, and vegetable dishes.  -Eat peanut or other nut butters on crackers, bread, toast, waffles, apples, bananas or celery sticks. Add it to milkshakes, smoothies, or desserts.     Reviewed ideas to increase fat intake:  -Snack on nuts and seeds or add them to foods like salads, pasta, cereals, yogurt, and ice cream.   -Sauté or stir-hale vegetables, meats, chicken, fish or tofu in olive or canola oil.   -Add olive oil, other vegetable oils, butter or margarine to soups, vegetables, potatoes, cooked cereal, rice, pasta, bread, crackers, pancakes, or waffles.  -Snack on olives or add to pasta, pizza, or salad.    []Understanding Lab Values   []Basic Pathophysiology of Disease []Food/Medication Interactions   []Food Diary []Exercise   []Lifestyle/Behavior Modification Techniques []Medication, Mechanism of Action   []Label Reading: CHO/ Na/ Fat/ other_________ []Self Blood Glucose Monitoring   [x]Weight/BMI Goals: gain/lose/maintain []Other -              Comprehension: []Excellent  []Very Good  [x]Good  []Fair   []Poor    Receptivity: []Excellent  []Very Good  [x]Good  []Fair   []Poor    Expected Compliance: []Excellent  []Very Good  [x]Good  []Fair   []Poor          Nutrition Goals[]Expand by Default  Goals (initial)/ Progress made on previous goals/new goals Continued 8/29:  Avoid sugar free jelly use regular jelly and increase pb to 2Tbsp at breakfast meal by f/u   2. Add protein with snack in between meals such as half tuna or egg salad sandwich or with crackers, pb with banana, lactose free whole milk with cereal by f/u   3. Consume 1-2 Ensure/boost in between meals by f/u  4. Add butter, cheese, nuts, seeds to current foods such as pasta, veggies, cereals by f/u         Goals  "(initial)/ Progress made on previous goals/new goals 8/29/24:  1. Brigid will consume 1-2 Ensure Clear supplements once per day by our next visit.   2. Brigid will drink fluids between meals by our next visit.    3. Brigid will consume 3 meals, 3 snacks per day by our next visit.      No follow-ups on file.  Labs:  CMP        Lab Results   Component Value Date     K 3.8 05/09/2024     CL 91 (L) 05/09/2024     CO2 30 05/09/2024     BUN 12 05/09/2024     CREATININE 0.85 05/09/2024     GLUF 110 (H) 05/09/2024     CALCIUM 9.4 05/09/2024     AST 24 05/09/2024     ALT 18 05/09/2024     ALKPHOS 60 05/09/2024     EGFR 64 05/09/2024       BMP        Lab Results   Component Value Date     CALCIUM 9.4 05/09/2024     K 3.8 05/09/2024     CO2 30 05/09/2024     CL 91 (L) 05/09/2024     BUN 12 05/09/2024     CREATININE 0.85 05/09/2024       Lipids  No results found for: \"CHOL\"        Lab Results   Component Value Date     HDL 81 03/29/2022      01/17/2021      09/11/2020            Lab Results   Component Value Date     LDLCALC 76 03/29/2022     LDLCALC 62 01/17/2021     LDLCALC 63 09/11/2020            Lab Results   Component Value Date     TRIG 39 03/29/2022     TRIG 52 01/17/2021     TRIG 51 09/11/2020      No results found for: \"CHOLHDL\"    Hemoglobin A1C        Lab Results   Component Value Date     HGBA1C 5.3 01/17/2021       Fasting Glucose        Lab Results   Component Value Date     GLUF 110 (H) 05/09/2024       Insulin      Thyroid        Lab Results   Component Value Date     TSH 3.75 (H) 08/20/2019       Hepatic Function Panel        Lab Results   Component Value Date     ALT 18 05/09/2024     AST 24 05/09/2024     ALKPHOS 60 05/09/2024       Celiac Disease Antibody Panel          TISSUE TRANSGLUTAMINASE IGA   Date Value Ref Range Status   03/01/2023 <2 0 - 3 U/mL Final       Comment:                                     Negative        0 -  3                                Weak Positive   4 - 10                " "                Positive           >10   Tissue Transglutaminase (tTG) has been identified   as the endomysial antigen.  Studies have demonstr-   ated that endomysial IgA antibodies have over 99%   specificity for gluten sensitive enteropathy.      Iron  No results found for: \"IRON\", \"TIBC\", \"FERRITIN\"      Meka Herrera RD  [facility] CLINICAL NUTRITION [facility]  [address] [place] [address]  "

## 2024-08-29 NOTE — PRE-PROCEDURE INSTRUCTIONS
Pre-Surgery Instructions:   Medication Instructions    ascorbic acid (VITAMIN C) 500 mg tablet Hold day of surgery.    cyproheptadine (PERIACTIN) 4 mg tablet Take day of surgery.    Digestive Enzyme CAPS Hold day of surgery.    escitalopram (LEXAPRO) 5 mg tablet Take day of surgery.    lactase (LACTAID) 3,000 units tablet Hold day of surgery.    loperamide (IMODIUM) 2 mg capsule Hold day of surgery.    Multiple Vitamins-Minerals (CENTRUM SILVER ULTRA WOMENS PO) Stop taking 7 days prior to surgery.    polyethylene glycol-propylene glycol (SYSTANE) 0.4-0.3 % Uses PRN- OK to take day of surgery    Medication instructions for day surgery reviewed. Please use only a sip of water to take your instructed medications. Avoid all over the counter vitamins, supplements and NSAIDS for one week prior to surgery per anesthesia guidelines. Tylenol is ok to take as needed.     You will receive a call one business day prior to surgery with an arrival time and hospital directions. If your surgery is scheduled on a Monday, the hospital will be calling you on the Friday prior to your surgery. If you have not heard from anyone by 8pm, please call the hospital supervisor through the hospital  at [phone]. ([place] [phone] or [place] [phone]).    Do not eat or drink anything after midnight the night before your surgery, including candy, mints, lifesavers, or chewing gum. Do not drink alcohol 24hrs before your surgery. Try not to smoke at least 24hrs before your surgery.       Follow the pre surgery showering instructions as listed in the “My Surgical Experience Booklet” or otherwise provided by your surgeon's office. Do not use a blade to shave the surgical area 1 week before surgery. It is okay to use a clean electric clippers up to 24 hours before surgery. Do not apply any lotions, creams, including makeup, cologne, deodorant, or perfumes after showering on the day of your surgery. Do not use dry shampoo, hair  spray, hair gel, or any type of hair products.     No contact lenses, eye make-up, or artificial eyelashes. Remove nail polish, including gel polish, and any artificial, gel, or acrylic nails if possible. Remove all jewelry including rings and body piercing jewelry.     Wear causal clothing that is easy to take on and off. Consider your type of surgery.    Keep any valuables, jewelry, piercings at home. Please bring any specially ordered equipment (sling, braces) if indicated.    Arrange for a responsible person to drive you to and from the hospital on the day of your surgery. Please confirm the visitor policy for the day of your procedure when you receive your phone call with an arrival time.     Call the surgeon's office with any new illnesses, exposures, or additional questions prior to surgery.    Please reference your “My Surgical Experience Booklet” for additional information to prepare for your upcoming surgery.    Pt instructed to stop nsaids and supplements one week prior to surgery. Pt verbalized understanding of shower and med instructions.

## 2024-09-03 ENCOUNTER — OFFICE VISIT (OUTPATIENT)
Dept: FAMILY MEDICINE CLINIC | Facility: MEDICAL CENTER | Age: 79
End: 2024-09-03
Payer: MEDICARE

## 2024-09-03 ENCOUNTER — TELEPHONE (OUTPATIENT)
Age: 79
End: 2024-09-03

## 2024-09-03 VITALS
WEIGHT: 79.2 LBS | SYSTOLIC BLOOD PRESSURE: 100 MMHG | BODY MASS INDEX: 14.57 KG/M2 | HEART RATE: 88 BPM | OXYGEN SATURATION: 98 % | HEIGHT: 62 IN | TEMPERATURE: 97.7 F | DIASTOLIC BLOOD PRESSURE: 60 MMHG | RESPIRATION RATE: 18 BRPM

## 2024-09-03 DIAGNOSIS — R19.7 DIARRHEA, UNSPECIFIED TYPE: Primary | ICD-10-CM

## 2024-09-03 DIAGNOSIS — J02.9 SORE THROAT: Primary | ICD-10-CM

## 2024-09-03 LAB
SARS-COV-2 AG UPPER RESP QL IA: NEGATIVE
VALID CONTROL: NORMAL

## 2024-09-03 PROCEDURE — 99213 OFFICE O/P EST LOW 20 MIN: CPT

## 2024-09-03 PROCEDURE — 87811 SARS-COV-2 COVID19 W/OPTIC: CPT

## 2024-09-03 RX ORDER — COLESEVELAM 180 1/1
1875 TABLET ORAL 2 TIMES DAILY WITH MEALS
Qty: 180 TABLET | Refills: 2 | Status: SHIPPED | OUTPATIENT
Start: 2024-09-03 | End: 2024-09-05

## 2024-09-03 NOTE — TELEPHONE ENCOUNTER
Would recommend to try WelChol for symptomatic relief of diarrhea, she can try 3 twice a day but she may adjust accordingly, I would just start this instead of starting this in the [employer] because she will not know what is effective, I see she has upcoming biopsy with surgical oncology, continue to hydrate as recommended, we can try to put her in with a sooner office visit ideally with a physician due to ongoing issues

## 2024-09-03 NOTE — TELEPHONE ENCOUNTER
Merari (daughter) aware of latest recommendations.  Can we please try to find her a sooner appt with physician as per Agatha. Please see message thread. Thank you.      Visit Information Date & Time Provider Department Dept. Phone Encounter #  
 11/7/2017  3:30 PM Luly Whalen, [address] [phone] [id number] Follow-up Instructions Return in about 4 months (around 3/7/2018). Upcoming Health Maintenance Date Due  
 HPV AGE 9Y-34Y (1 of 2 - Male 2-Dose Series) 1/18/2016 Influenza Age 5 to Adult 8/1/2017 MCV through Age 25 (2 of 2) 1/18/2021 DTaP/Tdap/Td series (7 - Td) 12/22/2025 Allergies as of 11/7/2017  Review Complete On: 11/7/2017 By: Luly Whalen MD  
 No Known Allergies Current Immunizations  Reviewed on 11/7/2017 Name Date DTAP Vaccine 1/5/2010, 4/20/2006, 2005, 2005, 2005 H1N1 FLU VACCINE 2/19/2010, 1/5/2010 HIB Vaccine 4/20/2006, 2005, 2005, 2005 Hepatitis A Vaccine 1/22/2009, 1/19/2007 Hepatitis B Vaccine 2005, 2005, 2005 IPV 2/19/2010, 2005, 2005, 2005 Influenza Nasal Vaccine (Quad) 12/22/2015 Influenza Vaccine PF 11/15/2013 Influenza Vaccine Split 11/2/2006, 2005, 2005, 2005 MMR Vaccine 1/5/2010, 1/20/2006 Meningococcal (MCV4P) Vaccine 3/21/2016 Pneumococcal Vaccine (Pcv) 1/20/2006, 2005, 2005, 2005 Tdap 12/22/2015 Varicella Virus Vaccine Live 1/5/2010, 4/20/2006 Reviewed by Luly Whalen MD on 11/7/2017 at  4:16 PM  
 Reviewed by Luly Whalen MD on 11/7/2017 at  4:17 PM  
You Were Diagnosed With   
  
 Codes Comments Attention deficit hyperactivity disorder (ADHD), combined type    -  Primary ICD-10-CM: F90.2 ICD-9-CM: 314.01 Rhus dermatitis     ICD-10-CM: L23.7 ICD-9-CM: 692.6 Vitals BP Pulse Temp Height(growth percentile) 112/66 (65 %/ 62 %)* (BP 1 Location: Left arm, BP Patient Position: Sitting) 86 98.7 °F (37.1 °C) (Oral) (!) 5' 0.47\" (1.536 m) (45 %, Z= -0.13) Weight(growth percentile) SpO2 BMI Smoking Status 118 lb (53.5 kg) (81 %, Z= 0.87) 99% 22.69 kg/m2 (90 %, Z= 1.26) Never Smoker *BP percentiles are based on NHBPEP's 4th Report Growth percentiles are based on [facility] 2-20 Years data. BMI and BSA Data Body Mass Index Body Surface Area  
 22.69 kg/m 2 1.51 m 2 Preferred Pharmacy Pharmacy Name Phone [facility] #[id number] [phone] [facility] [phone] Your Updated Medication List  
  
   
This list is accurate as of: 11/7/17  4:17 PM.  Always use your most recent med list.  
  
  
  
  
 hydrOXYzine HCl 25 mg tablet Commonly known as:  ATARAX Take 1 Tab by mouth every six (6) hours as needed for Itching. Indications: Pruritus of Skin  
  
 lisdexamfetamine 20 mg capsule Commonly known as:  VYVANSE Take 1 Cap (20 mg total) by mouth every morningIndications: add Earliest Fill Date: 11/25/17. Max Daily Amount: 20 mg  
Start taking on:  11/25/2017  
  
 predniSONE 20 mg tablet Commonly known as:  Nanine Knife Take  by mouth daily (with breakfast). triamcinolone acetonide 0.025 % topical cream  
Commonly known as:  KENALOG Apply  to affected area two (2) times a day. use thin layer Prescriptions Printed Refills  
 lisdexamfetamine (VYVANSE) 20 mg capsule 0 Starting on: 11/25/2017 Sig: Take 1 Cap (20 mg total) by mouth every morningIndications: add Earliest Fill Date: 11/25/17. Max Daily Amount: 20 mg  
 Class: Print Route: Oral  
  
Prescriptions Sent to Pharmacy Refills  
 hydrOXYzine HCl (ATARAX) 25 mg tablet 0 Sig: Take 1 Tab by mouth every six (6) hours as needed for Itching. Indications: Pruritus of Skin Class: Normal  
 Pharmacy: [facility] [address], [address] [place] [address] #: [phone] Route: Oral  
  
Follow-up Instructions Return in about 4 months (around 3/7/2018). Patient Instructions Poison [place], [place], and [place] in Teens: Care Instructions Your Care Instructions Poison ivy, poison oak, and poison sumac are plants that can cause a skin rash upon contact. The red, itchy rash often shows up in lines or streaks and may cause fluid-filled blisters or large, raised hives. The rash is caused by an allergic reaction to an oil in poison ivy, oak, and sumac. The rash may occur when you touch the plant or when you touch clothing, pet fur, sporting gear, gardening tools, or other objects that have come in contact with one of these plants. You cannot catch or spread the rash, even if you touch it or the blister fluid, because the plant oil will already have been absorbed or washed off the skin. The rash may seem to be spreading, but either it is still developing from earlier contact or you have touched something that still has the plant oil on it. Follow-up care is a key part of your treatment and safety. Be sure to make and go to all appointments, and call your doctor if you are having problems. It's also a good idea to know your test results and keep a list of the medicines you take. How can you care for yourself at home? · If your doctor prescribed a cream, use it as directed. If your doctor prescribed medicine, take it exactly as prescribed. Call your doctor if you think you are having a problem with your medicine. · Use cold, wet cloths to reduce itching. · Keep cool, and stay out of the sun. · Leave the rash open to the air. · Wash all clothing or other things that may have come in contact with the plant oil. · Avoid most lotions and ointments until the rash heals. Calamine lotion may help relieve symptoms of a plant rash. Use it 3 or 4 times a day. To prevent poison ivy exposure If you know you will be working around poison ivy, oak, or sumac: · Use a cream or lotion to help prevent the plant oil from getting on your skin. These products are available over the counter. ¨ Apply the product less than 1 hour before contact with the plant, in a thick, complete layer. ¨ Wash it off thoroughly within 4 hours or as soon as possible after contact with plants. The product only delays the oil from getting into your skin. · Be sure to wash your hands before and after you use the restroom. When should you call for help? Call your doctor now or seek immediate medical care if: 
? · You have signs of infection, such as: 
¨ Increased pain, swelling, warmth, or redness. ¨ Red streaks leading from the rash. ¨ Pus draining from the rash. ¨ A fever. ? Watch closely for changes in your health, and be sure to contact your doctor if: 
? · Your rash does not clear up after 1 to 2 weeks. ? · You do not get better as expected. Where can you learn more? Go to http://farzad-dejan.info/. Enter T385 in the search box to learn more about \"Poison [place], [place], and [place] in Teens: Care Instructions. \" Current as of: October 13, 2016 Content Version: 11.4 © 4286-5362 [employer]. Care instructions adapted under license by [employer] (which disclaims liability or warranty for this information). If you have questions about a medical condition or this instruction, always ask your healthcare professional. [place] [address] any warranty or liability for your use of this information. Introducing [facility]! Dear Parent or Guardian, Thank you for requesting a [employer] account for your child. With [employer], you can view your childs hospital or ER discharge instructions, current allergies, immunizations and much more. In order to access your childs information, we require a signed consent on file.   Please see the [employer] department or call [phone] for instructions on completing a [employer] Proxy request.   
 Additional Information If you have questions, please visit the Frequently Asked Questions section of the [employer] website at [email or web address] Remember, [email or web address] is NOT to be used for urgent needs. For medical emergencies, dial 911. Now available from your iPhone and Android! Please provide this summary of care documentation to your next provider. Your primary care clinician is listed as Aleta Martin. If you have any questions after today's visit, please call [phone].

## 2024-09-03 NOTE — PROGRESS NOTES
"Assessment/Plan:         1. Sore throat  Advised about supportive measures such as warm tea with honey, Tylenol as needed, Chloraseptic spray, saline nasal spray.  Advised to call the office if symptoms worsen or fail to improve.  Component      Latest Ref Rng 9/3/2024   SARS-CoV-2 Ag      Negative  Negative        - POCT Rapid Covid Ag      Subjective:      Patient ID: Brigid Brown is a 79 y.o. female.    79 year old female presents with complaint of sore/scratchy throat, thick mucus x 2 days.  Denies cough, congestion, fever, cp, sob.  Denies sick contacts.   Did not test for covid.           The following portions of the patient's history were reviewed and updated as appropriate: allergies, past family history, past medical history, past social history, past surgical history, and problem list.    Review of Systems   Constitutional: Negative.    HENT:  Positive for sore throat.    Eyes: Negative.    Respiratory: Negative.     Cardiovascular: Negative.    Gastrointestinal: Negative.    Endocrine: Negative.    Genitourinary: Negative.    Musculoskeletal: Negative.    Skin: Negative.    Allergic/Immunologic: Negative.    Neurological: Negative.    Hematological: Negative.    Psychiatric/Behavioral: Negative.           Objective:      /60 (BP Location: Left arm, Patient Position: Sitting, Cuff Size: Standard)   Pulse 88   Temp 97.7 °F (36.5 °C) (Temporal)   Resp 18   Ht 5' 2\" (1.575 m)   Wt 35.9 kg (79 lb 3.2 oz)   LMP  (LMP Unknown)   SpO2 98%   BMI 14.49 kg/m²          Physical Exam  Vitals and nursing note reviewed.   Constitutional:       General: She is not in acute distress.     Appearance: She is well-developed. She is not ill-appearing.   HENT:      Head: Normocephalic and atraumatic.      Right Ear: There is impacted cerumen.      Left Ear: Tympanic membrane and ear canal normal.      Nose: Nose normal.      Mouth/Throat:      Mouth: Mucous membranes are moist.      Pharynx: Oropharynx is " clear. No oropharyngeal exudate or posterior oropharyngeal erythema.   Eyes:      Conjunctiva/sclera: Conjunctivae normal.   Cardiovascular:      Rate and Rhythm: Normal rate and regular rhythm.      Pulses: Normal pulses.      Heart sounds: Normal heart sounds.   Pulmonary:      Effort: Pulmonary effort is normal.      Breath sounds: Normal breath sounds.   Musculoskeletal:      Cervical back: Normal range of motion and neck supple.   Lymphadenopathy:      Cervical: No cervical adenopathy.   Skin:     General: Skin is warm and dry.   Neurological:      General: No focal deficit present.      Mental Status: She is alert and oriented to person, place, and time. Mental status is at baseline.   Psychiatric:         Mood and Affect: Mood normal.         Behavior: Behavior normal.         Thought Content: Thought content normal.                    YULISSA Winn

## 2024-09-03 NOTE — TELEPHONE ENCOUNTER
LOV 06/27/24, NM PET CT 07/08/24, CT CAP w con 06/10/24, F/U 10/30/24    Merari, pt's daughter, calling in to report ongoing post prandial diarrhea, intermittent nighttime fecal incontinence and decreased appetite since last office visit. Pt is using Periactin QID however is unsure of its effectiveness. Pt is cautious to eat due to symptomatic diarrhea. Imodium is no longer relieving symptoms. Informed Merari I would relay concerns to pt's provider and office will call back with update. Denies nausea, vomiting, fever, hematochezia.    In the meantime, pt may add Benefiber to her daily morning routine. Hydrate with Pedialyte daily to replenish electrolytes and add in 1-2 Boost, Very High Calorie drink daily; 530 calories and 22 g of protein per 8 oz. To prevent early satiety, this drink may be sipped, 2-3 tablespoons, every 10-15 minutes throughout the day. Merari is inquiring if pt should be seen sooner than 10/30 follow up?    Please update Merari when advice is available. [phone]

## 2024-09-05 DIAGNOSIS — R19.7 DIARRHEA, UNSPECIFIED TYPE: ICD-10-CM

## 2024-09-05 PROBLEM — C26.9 GI MALIGNANCY (HCC): Status: ACTIVE | Noted: 2024-09-05

## 2024-09-05 RX ORDER — COLESEVELAM 180 1/1
TABLET ORAL
Qty: 540 TABLET | Refills: 1 | Status: SHIPPED | OUTPATIENT
Start: 2024-09-05

## 2024-09-06 ENCOUNTER — HOSPITAL ENCOUNTER (INPATIENT)
Facility: HOSPITAL | Age: 79
LOS: 5 days | Discharge: HOME HEALTH | DRG: 393 | End: 2024-09-11
Attending: SURGERY | Admitting: SURGERY
Payer: MEDICARE

## 2024-09-06 ENCOUNTER — ANESTHESIA (OUTPATIENT)
Dept: PERIOP | Facility: HOSPITAL | Age: 79
DRG: 393 | End: 2024-09-06
Payer: MEDICARE

## 2024-09-06 DIAGNOSIS — E43 SEVERE PROTEIN-CALORIE MALNUTRITION (HCC): ICD-10-CM

## 2024-09-06 DIAGNOSIS — E87.1 HYPONATREMIA: ICD-10-CM

## 2024-09-06 DIAGNOSIS — K63.89 MESENTERIC MASS: Primary | ICD-10-CM

## 2024-09-06 DIAGNOSIS — Z01.89 ENCOUNTER FOR GERIATRIC ASSESSMENT: ICD-10-CM

## 2024-09-06 DIAGNOSIS — N18.30 STAGE 3 CHRONIC KIDNEY DISEASE, UNSPECIFIED WHETHER STAGE 3A OR 3B CKD (HCC): ICD-10-CM

## 2024-09-06 DIAGNOSIS — R63.4 WEIGHT LOSS: ICD-10-CM

## 2024-09-06 LAB
ABO GROUP BLD: NORMAL
ABO GROUP BLD: NORMAL
ANION GAP SERPL CALCULATED.3IONS-SCNC: 8 MMOL/L (ref 4–13)
BASE EXCESS BLDA CALC-SCNC: 0 MMOL/L (ref -2–3)
BLD GP AB SCN SERPL QL: NEGATIVE
BUN SERPL-MCNC: 24 MG/DL (ref 5–25)
CA-I BLD-SCNC: 0.9 MMOL/L (ref 1.12–1.32)
CALCIUM SERPL-MCNC: 7.6 MG/DL (ref 8.4–10.2)
CHLORIDE SERPL-SCNC: 102 MMOL/L (ref 96–108)
CO2 SERPL-SCNC: 26 MMOL/L (ref 21–32)
CREAT SERPL-MCNC: 1 MG/DL (ref 0.6–1.3)
GFR SERPL CREATININE-BSD FRML MDRD: 52 ML/MIN/1.73SQ M
GLUCOSE SERPL-MCNC: 109 MG/DL (ref 65–140)
GLUCOSE SERPL-MCNC: 114 MG/DL (ref 65–140)
HCO3 BLDA-SCNC: 23.9 MMOL/L (ref 24–30)
HCT VFR BLD CALC: 31 % (ref 34.8–46.1)
HGB BLDA-MCNC: 10.5 G/DL (ref 11.5–15.4)
MAGNESIUM SERPL-MCNC: 1.6 MG/DL (ref 1.9–2.7)
PCO2 BLD: 25 MMOL/L (ref 21–32)
PCO2 BLD: 36.4 MM HG (ref 42–50)
PH BLD: 7.43 [PH] (ref 7.3–7.4)
PLATELET # BLD AUTO: 265 THOUSANDS/UL (ref 149–390)
PMV BLD AUTO: 8.8 FL (ref 8.9–12.7)
PO2 BLD: 102 MM HG (ref 35–45)
POTASSIUM BLD-SCNC: 2.2 MMOL/L (ref 3.5–5.3)
POTASSIUM SERPL-SCNC: 2.7 MMOL/L (ref 3.5–5.3)
RH BLD: POSITIVE
RH BLD: POSITIVE
SAO2 % BLD FROM PO2: 98 % (ref 60–85)
SODIUM BLD-SCNC: 139 MMOL/L (ref 136–145)
SODIUM SERPL-SCNC: 136 MMOL/L (ref 135–147)
SPECIMEN EXPIRATION DATE: NORMAL
SPECIMEN SOURCE: ABNORMAL

## 2024-09-06 PROCEDURE — 88331 PATH CONSLTJ SURG 1 BLK 1SPC: CPT | Performed by: PATHOLOGY

## 2024-09-06 PROCEDURE — 84295 ASSAY OF SERUM SODIUM: CPT

## 2024-09-06 PROCEDURE — 85014 HEMATOCRIT: CPT

## 2024-09-06 PROCEDURE — 86900 BLOOD TYPING SEROLOGIC ABO: CPT | Performed by: ANESTHESIOLOGY

## 2024-09-06 PROCEDURE — 82947 ASSAY GLUCOSE BLOOD QUANT: CPT

## 2024-09-06 PROCEDURE — 88342 IMHCHEM/IMCYTCHM 1ST ANTB: CPT | Performed by: PATHOLOGY

## 2024-09-06 PROCEDURE — NC001 PR NO CHARGE: Performed by: INTERNAL MEDICINE

## 2024-09-06 PROCEDURE — 49000 EXPLORATION OF ABDOMEN: CPT | Performed by: SURGERY

## 2024-09-06 PROCEDURE — 80048 BASIC METABOLIC PNL TOTAL CA: CPT | Performed by: NURSE ANESTHETIST, CERTIFIED REGISTERED

## 2024-09-06 PROCEDURE — 88341 IMHCHEM/IMCYTCHM EA ADD ANTB: CPT | Performed by: PATHOLOGY

## 2024-09-06 PROCEDURE — 83735 ASSAY OF MAGNESIUM: CPT | Performed by: SURGERY

## 2024-09-06 PROCEDURE — 85049 AUTOMATED PLATELET COUNT: CPT | Performed by: STUDENT IN AN ORGANIZED HEALTH CARE EDUCATION/TRAINING PROGRAM

## 2024-09-06 PROCEDURE — 82803 BLOOD GASES ANY COMBINATION: CPT

## 2024-09-06 PROCEDURE — 88305 TISSUE EXAM BY PATHOLOGIST: CPT | Performed by: PATHOLOGY

## 2024-09-06 PROCEDURE — 84132 ASSAY OF SERUM POTASSIUM: CPT

## 2024-09-06 PROCEDURE — 86901 BLOOD TYPING SEROLOGIC RH(D): CPT | Performed by: ANESTHESIOLOGY

## 2024-09-06 PROCEDURE — 82330 ASSAY OF CALCIUM: CPT

## 2024-09-06 PROCEDURE — 86850 RBC ANTIBODY SCREEN: CPT | Performed by: ANESTHESIOLOGY

## 2024-09-06 PROCEDURE — 0HB7XZX EXCISION OF ABDOMEN SKIN, EXTERNAL APPROACH, DIAGNOSTIC: ICD-10-PCS | Performed by: SURGERY

## 2024-09-06 RX ORDER — ONDANSETRON 2 MG/ML
4 INJECTION INTRAMUSCULAR; INTRAVENOUS EVERY 6 HOURS PRN
Status: DISCONTINUED | OUTPATIENT
Start: 2024-09-06 | End: 2024-09-11 | Stop reason: HOSPADM

## 2024-09-06 RX ORDER — OXYCODONE HYDROCHLORIDE 5 MG/1
5 TABLET ORAL EVERY 4 HOURS PRN
Status: DISCONTINUED | OUTPATIENT
Start: 2024-09-06 | End: 2024-09-11 | Stop reason: HOSPADM

## 2024-09-06 RX ORDER — FENTANYL CITRATE/PF 50 MCG/ML
25 SYRINGE (ML) INJECTION
Status: DISCONTINUED | OUTPATIENT
Start: 2024-09-06 | End: 2024-09-06 | Stop reason: HOSPADM

## 2024-09-06 RX ORDER — MAGNESIUM HYDROXIDE 1200 MG/15ML
LIQUID ORAL AS NEEDED
Status: DISCONTINUED | OUTPATIENT
Start: 2024-09-06 | End: 2024-09-06 | Stop reason: HOSPADM

## 2024-09-06 RX ORDER — ROCURONIUM BROMIDE 10 MG/ML
INJECTION, SOLUTION INTRAVENOUS AS NEEDED
Status: DISCONTINUED | OUTPATIENT
Start: 2024-09-06 | End: 2024-09-06

## 2024-09-06 RX ORDER — CEFAZOLIN SODIUM 1 G/50ML
1000 SOLUTION INTRAVENOUS ONCE
Status: COMPLETED | OUTPATIENT
Start: 2024-09-06 | End: 2024-09-06

## 2024-09-06 RX ORDER — HYDROMORPHONE HCL/PF 1 MG/ML
0.5 SYRINGE (ML) INJECTION
Status: DISCONTINUED | OUTPATIENT
Start: 2024-09-06 | End: 2024-09-06 | Stop reason: HOSPADM

## 2024-09-06 RX ORDER — SODIUM CHLORIDE 9 MG/ML
INJECTION, SOLUTION INTRAVENOUS CONTINUOUS PRN
Status: DISCONTINUED | OUTPATIENT
Start: 2024-09-06 | End: 2024-09-06

## 2024-09-06 RX ORDER — FENTANYL CITRATE 50 UG/ML
INJECTION, SOLUTION INTRAMUSCULAR; INTRAVENOUS AS NEEDED
Status: DISCONTINUED | OUTPATIENT
Start: 2024-09-06 | End: 2024-09-06

## 2024-09-06 RX ORDER — EPHEDRINE SULFATE 50 MG/ML
INJECTION INTRAVENOUS AS NEEDED
Status: DISCONTINUED | OUTPATIENT
Start: 2024-09-06 | End: 2024-09-06

## 2024-09-06 RX ORDER — POTASSIUM CHLORIDE 14.9 MG/ML
20 INJECTION INTRAVENOUS
Status: COMPLETED | OUTPATIENT
Start: 2024-09-06 | End: 2024-09-07

## 2024-09-06 RX ORDER — HEPARIN SODIUM 5000 [USP'U]/ML
5000 INJECTION, SOLUTION INTRAVENOUS; SUBCUTANEOUS EVERY 8 HOURS SCHEDULED
Status: DISCONTINUED | OUTPATIENT
Start: 2024-09-06 | End: 2024-09-11 | Stop reason: HOSPADM

## 2024-09-06 RX ORDER — GLYCOPYRROLATE 0.2 MG/ML
INJECTION INTRAMUSCULAR; INTRAVENOUS AS NEEDED
Status: DISCONTINUED | OUTPATIENT
Start: 2024-09-06 | End: 2024-09-06

## 2024-09-06 RX ORDER — SODIUM CHLORIDE, SODIUM LACTATE, POTASSIUM CHLORIDE, CALCIUM CHLORIDE 600; 310; 30; 20 MG/100ML; MG/100ML; MG/100ML; MG/100ML
INJECTION, SOLUTION INTRAVENOUS CONTINUOUS PRN
Status: DISCONTINUED | OUTPATIENT
Start: 2024-09-06 | End: 2024-09-06

## 2024-09-06 RX ORDER — POTASSIUM CHLORIDE 1500 MG/1
40 TABLET, EXTENDED RELEASE ORAL ONCE
Status: COMPLETED | OUTPATIENT
Start: 2024-09-06 | End: 2024-09-06

## 2024-09-06 RX ORDER — ONDANSETRON 2 MG/ML
4 INJECTION INTRAMUSCULAR; INTRAVENOUS ONCE AS NEEDED
Status: DISCONTINUED | OUTPATIENT
Start: 2024-09-06 | End: 2024-09-06 | Stop reason: HOSPADM

## 2024-09-06 RX ORDER — ACETAMINOPHEN 325 MG/1
975 TABLET ORAL EVERY 8 HOURS SCHEDULED
Status: DISCONTINUED | OUTPATIENT
Start: 2024-09-06 | End: 2024-09-07

## 2024-09-06 RX ORDER — LIDOCAINE HYDROCHLORIDE 10 MG/ML
INJECTION, SOLUTION EPIDURAL; INFILTRATION; INTRACAUDAL; PERINEURAL AS NEEDED
Status: DISCONTINUED | OUTPATIENT
Start: 2024-09-06 | End: 2024-09-06

## 2024-09-06 RX ORDER — SODIUM CHLORIDE, SODIUM LACTATE, POTASSIUM CHLORIDE, CALCIUM CHLORIDE 600; 310; 30; 20 MG/100ML; MG/100ML; MG/100ML; MG/100ML
84 INJECTION, SOLUTION INTRAVENOUS CONTINUOUS
Status: DISCONTINUED | OUTPATIENT
Start: 2024-09-06 | End: 2024-09-07

## 2024-09-06 RX ORDER — SODIUM CHLORIDE 9 MG/ML
125 INJECTION, SOLUTION INTRAVENOUS CONTINUOUS
Status: DISCONTINUED | OUTPATIENT
Start: 2024-09-06 | End: 2024-09-06

## 2024-09-06 RX ORDER — ONDANSETRON 2 MG/ML
INJECTION INTRAMUSCULAR; INTRAVENOUS AS NEEDED
Status: DISCONTINUED | OUTPATIENT
Start: 2024-09-06 | End: 2024-09-06

## 2024-09-06 RX ORDER — MAGNESIUM SULFATE HEPTAHYDRATE 40 MG/ML
2 INJECTION, SOLUTION INTRAVENOUS ONCE
Status: COMPLETED | OUTPATIENT
Start: 2024-09-06 | End: 2024-09-06

## 2024-09-06 RX ORDER — BUPIVACAINE HYDROCHLORIDE 2.5 MG/ML
INJECTION, SOLUTION EPIDURAL; INFILTRATION; INTRACAUDAL AS NEEDED
Status: DISCONTINUED | OUTPATIENT
Start: 2024-09-06 | End: 2024-09-06 | Stop reason: HOSPADM

## 2024-09-06 RX ORDER — HYDROMORPHONE HCL/PF 1 MG/ML
0.2 SYRINGE (ML) INJECTION EVERY 2 HOUR PRN
Status: DISCONTINUED | OUTPATIENT
Start: 2024-09-06 | End: 2024-09-11 | Stop reason: HOSPADM

## 2024-09-06 RX ORDER — PROPOFOL 10 MG/ML
INJECTION, EMULSION INTRAVENOUS AS NEEDED
Status: DISCONTINUED | OUTPATIENT
Start: 2024-09-06 | End: 2024-09-06

## 2024-09-06 RX ORDER — POTASSIUM CHLORIDE 14.9 MG/ML
INJECTION INTRAVENOUS CONTINUOUS PRN
Status: DISCONTINUED | OUTPATIENT
Start: 2024-09-06 | End: 2024-09-06

## 2024-09-06 RX ORDER — ALBUMIN, HUMAN INJ 5% 5 %
SOLUTION INTRAVENOUS CONTINUOUS PRN
Status: DISCONTINUED | OUTPATIENT
Start: 2024-09-06 | End: 2024-09-06

## 2024-09-06 RX ADMIN — SUGAMMADEX 100 MG: 100 INJECTION, SOLUTION INTRAVENOUS at 15:11

## 2024-09-06 RX ADMIN — POTASSIUM CHLORIDE 20 MEQ: 14.9 INJECTION, SOLUTION INTRAVENOUS at 19:48

## 2024-09-06 RX ADMIN — PROPOFOL 50 MG: 10 INJECTION, EMULSION INTRAVENOUS at 12:57

## 2024-09-06 RX ADMIN — ROCURONIUM 10 MG: 50 INJECTION, SOLUTION INTRAVENOUS at 14:04

## 2024-09-06 RX ADMIN — LIDOCAINE HYDROCHLORIDE 20 MG: 10 INJECTION, SOLUTION EPIDURAL; INFILTRATION; INTRACAUDAL; PERINEURAL at 12:57

## 2024-09-06 RX ADMIN — GLYCOPYRROLATE 0.2 MG: 0.2 INJECTION, SOLUTION INTRAMUSCULAR; INTRAVENOUS at 13:07

## 2024-09-06 RX ADMIN — OXYCODONE HYDROCHLORIDE 5 MG: 5 TABLET ORAL at 17:26

## 2024-09-06 RX ADMIN — SODIUM CHLORIDE: 0.9 INJECTION, SOLUTION INTRAVENOUS at 13:01

## 2024-09-06 RX ADMIN — MAGNESIUM SULFATE HEPTAHYDRATE 2 G: 40 INJECTION, SOLUTION INTRAVENOUS at 18:33

## 2024-09-06 RX ADMIN — ALBUMIN (HUMAN): 12.5 INJECTION, SOLUTION INTRAVENOUS at 14:45

## 2024-09-06 RX ADMIN — FENTANYL CITRATE 25 MCG: 50 INJECTION INTRAMUSCULAR; INTRAVENOUS at 12:57

## 2024-09-06 RX ADMIN — SODIUM CHLORIDE, SODIUM LACTATE, POTASSIUM CHLORIDE, AND CALCIUM CHLORIDE 84 ML/HR: .6; .31; .03; .02 INJECTION, SOLUTION INTRAVENOUS at 17:22

## 2024-09-06 RX ADMIN — CEFAZOLIN SODIUM 2000 MG: 1 SOLUTION INTRAVENOUS at 12:59

## 2024-09-06 RX ADMIN — ROCURONIUM 10 MG: 50 INJECTION, SOLUTION INTRAVENOUS at 13:37

## 2024-09-06 RX ADMIN — POTASSIUM CHLORIDE: 14.9 INJECTION, SOLUTION INTRAVENOUS at 14:54

## 2024-09-06 RX ADMIN — FENTANYL CITRATE 25 MCG: 50 INJECTION INTRAMUSCULAR; INTRAVENOUS at 15:01

## 2024-09-06 RX ADMIN — PHENYLEPHRINE HYDROCHLORIDE 25 MCG/MIN: 10 INJECTION INTRAVENOUS at 14:39

## 2024-09-06 RX ADMIN — FENTANYL CITRATE 25 MCG: 50 INJECTION INTRAMUSCULAR; INTRAVENOUS at 15:30

## 2024-09-06 RX ADMIN — EPHEDRINE SULFATE 10 MG: 50 INJECTION, SOLUTION INTRAVENOUS at 13:06

## 2024-09-06 RX ADMIN — POTASSIUM CHLORIDE 40 MEQ: 1500 TABLET, EXTENDED RELEASE ORAL at 17:14

## 2024-09-06 RX ADMIN — ONDANSETRON 4 MG: 2 INJECTION INTRAMUSCULAR; INTRAVENOUS at 14:47

## 2024-09-06 RX ADMIN — FENTANYL CITRATE 25 MCG: 50 INJECTION INTRAMUSCULAR; INTRAVENOUS at 13:36

## 2024-09-06 RX ADMIN — SODIUM CHLORIDE 125 ML/HR: 0.9 INJECTION, SOLUTION INTRAVENOUS at 11:00

## 2024-09-06 RX ADMIN — ROCURONIUM 30 MG: 50 INJECTION, SOLUTION INTRAVENOUS at 12:57

## 2024-09-06 RX ADMIN — FENTANYL CITRATE 25 MCG: 50 INJECTION INTRAMUSCULAR; INTRAVENOUS at 16:02

## 2024-09-06 RX ADMIN — POTASSIUM CHLORIDE 20 MEQ: 14.9 INJECTION, SOLUTION INTRAVENOUS at 17:14

## 2024-09-06 NOTE — ANESTHESIA PREPROCEDURE EVALUATION
Procedure:  BIOPSY ABDOMINAL MASS, LYMPHOMA PROTOCOL (Abdomen)  POSSIBLE GASTROJEJUNOSTOMY (Abdomen)    Relevant Problems   CARDIO   (+) Essential hypertension   (+) Hypertension   (+) Mitral regurgitation      ENDO   (+) Hypothyroidism   (+) Subclinical hypothyroidism      GI/HEPATIC  Mesenteric mass   (+) Gastroesophageal reflux disease      /RENAL   (+) Stage 3 chronic kidney disease, unspecified whether stage 3a or 3b CKD (HCC)      MUSCULOSKELETAL   (+) Pain in right lumbar region of back      PULMONARY   (+) SOB (shortness of breath)      Recent hospitalization from August 22, 2024 to August 23, 2024 for dizziness from hypotension and electrolyte abnormality and poor oral intake Physical Exam    Airway    Mallampati score: II  TM Distance: >3 FB  Neck ROM: full     Dental        Cardiovascular  Rhythm: regular, Cardiovascular exam normal    Pulmonary  Pulmonary exam normal Breath sounds clear to auscultation    Other Findings  post-pubertal.      Anesthesia Plan  ASA Score- 3     Anesthesia Type- general with ASA Monitors.         Additional Monitors:     Airway Plan: ETT.           Plan Factors-Exercise tolerance (METS): >4 METS.    Chart reviewed. EKG reviewed. Imaging results reviewed. Existing labs reviewed. Patient summary reviewed.                  Induction- intravenous.    Postoperative Plan-     Perioperative Resuscitation Plan - Level 1 - Full Code.       Informed Consent- Anesthetic plan and risks discussed with patient.  I personally reviewed this patient with the CRNA. Discussed and agreed on the Anesthesia Plan with the CRNA..

## 2024-09-06 NOTE — PLAN OF CARE
Problem: PAIN - ADULT  Goal: Verbalizes/displays adequate comfort level or baseline comfort level  Description: Interventions:  - Encourage patient to monitor pain and request assistance  - Assess pain using appropriate pain scale  - Administer analgesics based on type and severity of pain and evaluate response  - Implement non-pharmacological measures as appropriate and evaluate response  - Consider cultural and social influences on pain and pain management  - Notify physician/advanced practitioner if interventions unsuccessful or patient reports new pain  Outcome: Progressing     Problem: INFECTION - ADULT  Goal: Absence or prevention of progression during hospitalization  Description: INTERVENTIONS:  - Assess and monitor for signs and symptoms of infection  - Monitor lab/diagnostic results  - Monitor all insertion sites, i.e. indwelling lines, tubes, and drains  - Monitor endotracheal if appropriate and nasal secretions for changes in amount and color  - [place] appropriate cooling/warming therapies per order  - Administer medications as ordered  - Instruct and encourage patient and family to use good hand hygiene technique  - Identify and instruct in appropriate isolation precautions for identified infection/condition  Outcome: Progressing  Goal: Absence of fever/infection during neutropenic period  Description: INTERVENTIONS:  - Monitor WBC    Outcome: Progressing     Problem: DISCHARGE PLANNING  Goal: Discharge to home or other facility with appropriate resources  Description: INTERVENTIONS:  - Identify barriers to discharge w/patient and caregiver  - Arrange for needed discharge resources and transportation as appropriate  - Identify discharge learning needs (meds, wound care, etc.)  - Arrange for interpretive services to assist at discharge as needed  - Refer to Case Management Department for coordinating discharge planning if the patient needs post-hospital services based on physician/advanced  practitioner order or complex needs related to functional status, cognitive ability, or social support system  Outcome: Progressing     Problem: Knowledge Deficit  Goal: Patient/family/caregiver demonstrates understanding of disease process, treatment plan, medications, and discharge instructions  Description: Complete learning assessment and assess knowledge base.  Interventions:  - Provide teaching at level of understanding  - Provide teaching via preferred learning methods  Outcome: Progressing

## 2024-09-06 NOTE — CONSULTS
This consult was generated through the SOC/Nephrology YOLANDA risk reduction program. The patient's chart was reviewed. She is not on any diuretics, ACE/ARB and the patient has not had any YOLANDA episodes within the last 3 months. Therefore a Nephrology consult is not needed. We will not formally see the patient but should you need any Nephrology follow up, please re-consult us

## 2024-09-06 NOTE — INTERVAL H&P NOTE
H&P reviewed. After examining the patient I find no changes in the patients condition since the H&P had been written.    Vitals:    09/06/24 1024   BP: 140/70   Pulse: 83   Resp: 18   Temp: 97.6 °F (36.4 °C)   SpO2: 100%

## 2024-09-06 NOTE — OP NOTE
OPERATIVE REPORT  PATIENT NAME: Brigid Brown    :  1945  MRN: [id number]  Pt Location: BE OR ROOM 07    SURGERY DATE: 2024    Surgeons and Role:     * Angus Drew MD - Primary     * Brady Pack MD - Assisting    Preop Diagnosis:  Mesenteric mass [K63.89]    Post-Op Diagnosis Codes:     * Mesenteric mass [K63.89]    Procedure(s):  BIOPSY ABDOMINAL MASS. LYMPHOMA PROTOCOL    Specimen(s):  ID Type Source Tests Collected by Time Destination   1 : mesenteric mass Tissue Mesentery TISSUE EXAM Angus Drew MD 2024  2:03 PM        Estimated Blood Loss:   100 mL    Drains:  Urethral Catheter Latex 16 Fr. (Active)   Site Assessment Skin intact;Clean 24 154   Collection Container Standard drainage bag 24 154   Securement Method Securing device (Describe) 24 154   Number of days: 0       Anesthesia Type:   General    Operative Indications:  Mesenteric mass [K63.89]  79-year-old female with a mesenteric mass encasing her SMA.  Diagnosis is still in question.  It was recommended that she undergo laparotomy with biopsy of the mesenteric mass.  Risks and benefits were explained.  Informed consent was obtained.  Patient was brought to the operating room.    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  A Caceres catheter was placed.  Lines were placed by anesthesia.  Patient was then prepped and draped in the usual sterile fashion.  A timeout was performed.  Midline incision was made.  Using sharp dissection this was taken through skin, subtenons tissue and down through the fascia and into the peritoneal cavity.  We had to extend this inferiorly to get better access to the mass.  Once we did this, we could feel the mass.  There was no evidence of distant metastatic disease.  Upon elevating the mass this was clearly  at the root of the mesentery and appeared to be encasing both the SMA as well as the SMV.  We then divided the gastrocolic omentum in an avascular plane to get into the lesser sac.  At that time we were able to visualize the pancreas and the pancreas appeared essentially normal, and the mass appeared to be more posterior to the pancreas.  At this point, we attempted to biopsy the mass from the left side using a Sheldon-Cut needle.  When we did this, the needle went into the mass but there was no significant tissue and we did have some venous bleeding.  This area was oversewn with 2-0 Prolene suture and then Floseal was placed over this with direct pressure.  There was ultimately excellent hemostasis.  We then decided to approach this area from the right. We then lysed several adhesions off of the liver and were ultimately able to do a wide Kocher maneuver.  Once we did this, we could still feel the mass from the right and now we performed several Sheldon-Cut biopsies to obtain tissue.  We did get 2 good cores of tissue.  There was excellent hemostasis.  This was sent for frozen section.  Frozen section revealed carcinoma.  Pathology did say they had enough tissue to perform all their ancillary studies.  Consequently we now inspected the small bowel and this appeared completely viable.  The wound was copiously irrigated.  There was excellent hemostasis.  The fascia was approximated with #1 none looped PDS suture starting at either end of the incision and secured centrally.  Subcutaneous tissue was irrigated.  Skin was approximated with a running 4-0 Monocryl suture in aseptic fashion.  Skin glue was used on the skin.  Patient was extubated and taken to the recovery room in stable condition having tolerated the procedure well.  I was present and participated in all aspects of this procedure.       I was present for the entire procedure.    Patient Disposition:  PACU              SIGNATURE: Angus Drew MD  DATE: September 6,  2024  TIME: 4:33 PM

## 2024-09-06 NOTE — QUICK NOTE
Post Op Check:    Brigid Brown is a 79 y.o. female s/p 9/6 ex lap, biopsy mesenteric mass    AVSS on RA    Saw patient at the bedside once they arrived to the floor.   Patients pain in some pain improved with the medication. No nausea or vomiting.     GEN: NAD, A&O  Chest: Normal work of breathing, no respiratory distress  Abd: S, ND, incision intact    Plan:  Diet Clear Liquid  Pain control PRN  Strict follow urine output  Potassium replacement    Brady Pack MD  Surgery, PGY-4

## 2024-09-06 NOTE — PROGRESS NOTES
"General Surgery  Progress Note   Brigid Brown 79 y.o. female MRN: [id number]  Unit/Bed#: [facility] 832-01 Encounter: [id number]    Assessment:  78 yo female with mesenteric mass s/p Ex lap and biopsy of mass .     Plan:  -Advance to regular diet  -DC fluids  -Remove momin  -F/u Geriatrics consult & Nutrition consult   -Pain/ nausea control PRN  -OOB/ ambulation  -Incentive Spirometry  -Please message the General surgery resident role with any concerns      Subjective/Objective     Subjective:   Patient seen and examined at bedside, in no acute distress. No acute events overnight.  Patient reports some abdominal pain at 7 out of 10 in severity.  No nausea vomiting fevers chills chest pain or shortness of breath.  Patient is tolerating diet.  Patient denies bowel movements.  Patient is passing flatus.  Patient is ambulating to the bathroom.  Patient has a Momin.    Objective:   Vitals:Blood pressure 138/83, pulse 99, temperature 98.7 °F (37.1 °C), resp. rate 16, height 5' 2\" (1.575 m), weight 34 kg (75 lb), SpO2 96%, not currently breastfeeding.  Temp (24hrs), Av.2 °F (36.8 °C), Min:97.6 °F (36.4 °C), Max:98.7 °F (37.1 °C)      I/O          0701  09/05 0700 09/ 0701  /06 0700 09/ 0701  / 0700    I.V. (mL/kg)   1400 (41.2)    IV Piggyback   300    Total Intake(mL/kg)   1700 (50)    Urine (mL/kg/hr)   150    Blood   100    Total Output   250    Net   +1450           Unmeasured Urine Occurrence   2 x            Invasive Devices       Peripheral Intravenous Line  Duration             Peripheral IV 24 Left Hand <1 day    Peripheral IV 24 Right Forearm <1 day              Drain  Duration             Urethral Catheter Latex 16 Fr. <1 day                    Physical Exam:  GEN: NAD  HEENT: MMM  CV: well perfused RR  Lung: Normal effort  Ab: Soft, mild diffuse tenderness but moderate to severe with deep palpation infraumbilical area no peritonitis, mild distention, incisions clean dry and intact.  "   Extrem: No CCE   Neuro: A+Ox3     Lab Results   Component Value Date    WBC 10.40 (H) 09/07/2024    HGB 12.1 09/07/2024    HCT 36.0 09/07/2024    MCV 88 09/07/2024     09/07/2024      Lab Results   Component Value Date    SODIUM 137 09/07/2024    K 3.7 09/07/2024     09/07/2024    CO2 24 09/07/2024    AGAP 11 09/07/2024    BUN 21 09/07/2024    CREATININE 0.93 09/07/2024    GLUC 87 09/07/2024    GLUF 84 08/29/2024    CALCIUM 8.1 (L) 09/07/2024    AST 39 08/22/2024    ALT 29 08/22/2024    ALKPHOS 80 08/22/2024    TP 6.3 (L) 08/22/2024    TBILI 0.48 08/22/2024    EGFR 57 09/07/2024       VTE Prophylaxis: Heparin        Angel Kuhn MD  9/7/2024  6:42 AM

## 2024-09-06 NOTE — ANESTHESIA POSTPROCEDURE EVALUATION
Post-Op Assessment Note    CV Status:  Stable  Pain Score: 0    Pain management: adequate       Mental Status:  Alert and awake   Hydration Status:  Euvolemic   PONV Controlled:  Controlled   Airway Patency:  Patent     Post Op Vitals Reviewed: Yes    No anethesia notable event occurred.    Staff: CRNA               /76 (09/06/24 1545)    Temp 98.1 °F (36.7 °C) (09/06/24 1545)    Pulse 97 (09/06/24 1545)   Resp 14 (09/06/24 1545)    SpO2 98 % (09/06/24 1545)

## 2024-09-07 LAB
ANION GAP SERPL CALCULATED.3IONS-SCNC: 11 MMOL/L (ref 4–13)
BASOPHILS # BLD AUTO: 0.05 THOUSANDS/ÂΜL (ref 0–0.1)
BASOPHILS NFR BLD AUTO: 1 % (ref 0–1)
BUN SERPL-MCNC: 21 MG/DL (ref 5–25)
CALCIUM SERPL-MCNC: 8.1 MG/DL (ref 8.4–10.2)
CHLORIDE SERPL-SCNC: 102 MMOL/L (ref 96–108)
CO2 SERPL-SCNC: 24 MMOL/L (ref 21–32)
CREAT SERPL-MCNC: 0.93 MG/DL (ref 0.6–1.3)
EOSINOPHIL # BLD AUTO: 0.04 THOUSAND/ÂΜL (ref 0–0.61)
EOSINOPHIL NFR BLD AUTO: 0 % (ref 0–6)
ERYTHROCYTE [DISTWIDTH] IN BLOOD BY AUTOMATED COUNT: 14 % (ref 11.6–15.1)
GFR SERPL CREATININE-BSD FRML MDRD: 57 ML/MIN/1.73SQ M
GLUCOSE SERPL-MCNC: 87 MG/DL (ref 65–140)
HCT VFR BLD AUTO: 36 % (ref 34.8–46.1)
HGB BLD-MCNC: 12.1 G/DL (ref 11.5–15.4)
IMM GRANULOCYTES # BLD AUTO: 0.03 THOUSAND/UL (ref 0–0.2)
IMM GRANULOCYTES NFR BLD AUTO: 0 % (ref 0–2)
LYMPHOCYTES # BLD AUTO: 0.56 THOUSANDS/ÂΜL (ref 0.6–4.47)
LYMPHOCYTES NFR BLD AUTO: 5 % (ref 14–44)
MAGNESIUM SERPL-MCNC: 2.2 MG/DL (ref 1.9–2.7)
MCH RBC QN AUTO: 29.6 PG (ref 26.8–34.3)
MCHC RBC AUTO-ENTMCNC: 33.6 G/DL (ref 31.4–37.4)
MCV RBC AUTO: 88 FL (ref 82–98)
MONOCYTES # BLD AUTO: 0.47 THOUSAND/ÂΜL (ref 0.17–1.22)
MONOCYTES NFR BLD AUTO: 5 % (ref 4–12)
NEUTROPHILS # BLD AUTO: 9.25 THOUSANDS/ÂΜL (ref 1.85–7.62)
NEUTS SEG NFR BLD AUTO: 89 % (ref 43–75)
NRBC BLD AUTO-RTO: 0 /100 WBCS
PLATELET # BLD AUTO: 306 THOUSANDS/UL (ref 149–390)
PMV BLD AUTO: 9.1 FL (ref 8.9–12.7)
POTASSIUM SERPL-SCNC: 3.7 MMOL/L (ref 3.5–5.3)
RBC # BLD AUTO: 4.09 MILLION/UL (ref 3.81–5.12)
SODIUM SERPL-SCNC: 137 MMOL/L (ref 135–147)
WBC # BLD AUTO: 10.4 THOUSAND/UL (ref 4.31–10.16)

## 2024-09-07 PROCEDURE — 97163 PT EVAL HIGH COMPLEX 45 MIN: CPT

## 2024-09-07 PROCEDURE — 97166 OT EVAL MOD COMPLEX 45 MIN: CPT

## 2024-09-07 PROCEDURE — 80048 BASIC METABOLIC PNL TOTAL CA: CPT | Performed by: STUDENT IN AN ORGANIZED HEALTH CARE EDUCATION/TRAINING PROGRAM

## 2024-09-07 PROCEDURE — 85025 COMPLETE CBC W/AUTO DIFF WBC: CPT

## 2024-09-07 PROCEDURE — 99024 POSTOP FOLLOW-UP VISIT: CPT | Performed by: STUDENT IN AN ORGANIZED HEALTH CARE EDUCATION/TRAINING PROGRAM

## 2024-09-07 PROCEDURE — 83735 ASSAY OF MAGNESIUM: CPT | Performed by: SURGERY

## 2024-09-07 RX ORDER — POTASSIUM CHLORIDE 14.9 MG/ML
20 INJECTION INTRAVENOUS
Status: DISCONTINUED | OUTPATIENT
Start: 2024-09-07 | End: 2024-09-07

## 2024-09-07 RX ORDER — POTASSIUM CHLORIDE 1500 MG/1
40 TABLET, EXTENDED RELEASE ORAL ONCE
Status: COMPLETED | OUTPATIENT
Start: 2024-09-07 | End: 2024-09-07

## 2024-09-07 RX ORDER — GABAPENTIN 300 MG/1
300 CAPSULE ORAL 3 TIMES DAILY
Status: DISCONTINUED | OUTPATIENT
Start: 2024-09-07 | End: 2024-09-09

## 2024-09-07 RX ADMIN — HEPARIN SODIUM 5000 UNITS: 5000 INJECTION INTRAVENOUS; SUBCUTANEOUS at 13:47

## 2024-09-07 RX ADMIN — POTASSIUM CHLORIDE 40 MEQ: 1500 TABLET, EXTENDED RELEASE ORAL at 13:53

## 2024-09-07 RX ADMIN — GABAPENTIN 300 MG: 300 CAPSULE ORAL at 11:56

## 2024-09-07 RX ADMIN — HEPARIN SODIUM 5000 UNITS: 5000 INJECTION INTRAVENOUS; SUBCUTANEOUS at 22:09

## 2024-09-07 RX ADMIN — GABAPENTIN 300 MG: 300 CAPSULE ORAL at 16:14

## 2024-09-07 RX ADMIN — SODIUM CHLORIDE, SODIUM LACTATE, POTASSIUM CHLORIDE, AND CALCIUM CHLORIDE 84 ML/HR: .6; .31; .03; .02 INJECTION, SOLUTION INTRAVENOUS at 02:40

## 2024-09-07 RX ADMIN — GABAPENTIN 300 MG: 300 CAPSULE ORAL at 22:09

## 2024-09-07 RX ADMIN — HEPARIN SODIUM 5000 UNITS: 5000 INJECTION INTRAVENOUS; SUBCUTANEOUS at 06:01

## 2024-09-07 NOTE — MALNUTRITION/BMI
This medical record reflects one or more clinical indicators suggestive of malnutrition and/or morbid obesity.    Malnutrition Findings:   Adult Malnutrition type: Chronic illness  Adult Degree of Malnutrition: Other severe protein calorie malnutrition  Malnutrition Characteristics: Muscle loss, Fat loss, Inadequate energy, Weight loss                  360 Statement: severe protein energy malnutrition is related to physiological causes as evidenced by severe muscle/fat wasting, inadequate energy intakes, and significant weight loss (15#/16.7% weight loss x3m, 31#/29.2% weight loss x6m). Treated with advancing diet as medically able, supplements and education on adequate intakes.    BMI Findings:  Adult BMI Classifications: Underweight < 18.5        Body mass index is 13.72 kg/m².     See Nutrition note dated 9/7/24 for additional details.  Completed nutrition assessment is viewable in the nutrition documentation.

## 2024-09-07 NOTE — PLAN OF CARE
Problem: PAIN - ADULT  Goal: Verbalizes/displays adequate comfort level or baseline comfort level  Description: Interventions:  - Encourage patient to monitor pain and request assistance  - Assess pain using appropriate pain scale  - Administer analgesics based on type and severity of pain and evaluate response  - Implement non-pharmacological measures as appropriate and evaluate response  - Consider cultural and social influences on pain and pain management  - Notify physician/advanced practitioner if interventions unsuccessful or patient reports new pain  Outcome: Progressing     Problem: INFECTION - ADULT  Goal: Absence or prevention of progression during hospitalization  Description: INTERVENTIONS:  - Assess and monitor for signs and symptoms of infection  - Monitor lab/diagnostic results  - Monitor all insertion sites, i.e. indwelling lines, tubes, and drains  - Monitor endotracheal if appropriate and nasal secretions for changes in amount and color  - [place] appropriate cooling/warming therapies per order  - Administer medications as ordered  - Instruct and encourage patient and family to use good hand hygiene technique  - Identify and instruct in appropriate isolation precautions for identified infection/condition  Outcome: Progressing  Goal: Absence of fever/infection during neutropenic period  Description: INTERVENTIONS:  - Monitor WBC    Outcome: Progressing     Problem: SAFETY ADULT  Goal: Patient will remain free of falls  Description: INTERVENTIONS:  - Educate patient/family on patient safety including physical limitations  - Instruct patient to call for assistance with activity   - Consult OT/PT to assist with strengthening/mobility   - Keep Call bell within reach  - Keep bed low and locked with side rails adjusted as appropriate  - Keep care items and personal belongings within reach  - Initiate and maintain comfort rounds  - Make Fall Risk Sign visible to staff  - Apply yellow socks and bracelet  for high fall risk patients  - Consider moving patient to room near nurses station  Outcome: Progressing  Goal: Maintain or return to baseline ADL function  Description: INTERVENTIONS:  -  Assess patient's ability to carry out ADLs; assess patient's baseline for ADL function and identify physical deficits which impact ability to perform ADLs (bathing, care of mouth/teeth, toileting, grooming, dressing, etc.)  - Assess/evaluate cause of self-care deficits   - Assess range of motion  - Assess patient's mobility; develop plan if impaired  - Assess patient's need for assistive devices and provide as appropriate  - Encourage maximum independence but intervene and supervise when necessary  - Involve family in performance of ADLs  - Assess for home care needs following discharge   - Consider OT consult to assist with ADL evaluation and planning for discharge  - Provide patient education as appropriate  Outcome: Progressing  Goal: Maintains/Returns to pre admission functional level  Description: INTERVENTIONS:  - Perform AM-PAC 6 Click Basic Mobility/ Daily Activity assessment daily.  - Set and communicate daily mobility goal to care team and patient/family/caregiver.   - Collaborate with rehabilitation services on mobility goals if consulted  - Out of bed for toileting  - Record patient progress and toleration of activity level   Outcome: Progressing     Problem: DISCHARGE PLANNING  Goal: Discharge to home or other facility with appropriate resources  Description: INTERVENTIONS:  - Identify barriers to discharge w/patient and caregiver  - Arrange for needed discharge resources and transportation as appropriate  - Identify discharge learning needs (meds, wound care, etc.)  - Arrange for interpretive services to assist at discharge as needed  - Refer to Case Management Department for coordinating discharge planning if the patient needs post-hospital services based on physician/advanced practitioner order or complex needs  related to functional status, cognitive ability, or social support system  Outcome: Progressing     Problem: Knowledge Deficit  Goal: Patient/family/caregiver demonstrates understanding of disease process, treatment plan, medications, and discharge instructions  Description: Complete learning assessment and assess knowledge base.  Interventions:  - Provide teaching at level of understanding  - Provide teaching via preferred learning methods  Outcome: Progressing     Problem: Prexisting or High Potential for Compromised Skin Integrity  Goal: Skin integrity is maintained or improved  Description: INTERVENTIONS:  - Identify patients at risk for skin breakdown  - Assess and monitor skin integrity  - Assess and monitor nutrition and hydration status  - Monitor labs   - Assess for incontinence   - Turn and reposition patient  - Assist with mobility/ambulation  - Relieve pressure over bony prominences  - Avoid friction and shearing  - Provide appropriate hygiene as needed including keeping skin clean and dry  - Evaluate need for skin moisturizer/barrier cream  - Collaborate with interdisciplinary team   - Patient/family teaching  - Consider wound care consult   Outcome: Progressing

## 2024-09-07 NOTE — PLAN OF CARE
Problem: PHYSICAL THERAPY ADULT  Goal: Performs mobility at highest level of function for planned discharge setting.  See evaluation for individualized goals.  Description: Treatment/Interventions: ADL retraining, Functional transfer training, LE strengthening/ROM, Elevations, Therapeutic exercise, Endurance training, Gait training, Spoke to nursing, OT          See flowsheet documentation for full assessment, interventions and recommendations.  Note: Prognosis: Good  Problem List: Decreased strength, Decreased endurance, Impaired balance, Decreased mobility, Pain  Assessment: Pt is an 79 y.o. 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 Pt has past medical history including cancer, GERD, hypertension, and thryoid disease. During session, patient was A/Ox4 and able to perform all transfers and ambulation with supervision and HHA but was limited by musular fatigue. Pt was independent with all ADLs and IADLs prior to this hospital stay. Patient would benefit from continued PT in order to address stair negotiation, muscular endurance and other deficits before discharge.        Rehab Resource Intensity Level, PT: III (Minimum Resource Intensity)    See flowsheet documentation for full assessment.

## 2024-09-07 NOTE — PHYSICAL THERAPY NOTE
PHYSICAL THERAPY NOTE          Patient Name: Brigid Brown  Today's Date: 9/7/2024 09/07/24 1001   PT Last Visit   PT Visit Date 09/07/24   Note Type   Note type Evaluation   Pain Assessment   Pain Assessment Tool 0-10   Pain Score 7   Pain Location/Orientation Location: Abdomen   Hospital Pain Intervention(s) Ambulation/increased activity;Repositioned   Restrictions/Precautions   Weight Bearing Precautions Per Order No   Home Living   Type of Home House   Home Layout One level  (1 step in back (primary use), 8 in front)   Bathroom Shower/Tub Tub/shower unit   Bathroom Equipment Shower chair  (doesn't currently use)   Home Equipment Cane  (doesn't currently use)   Prior Function   Level of [place] Independent with ADLs;Independent with functional mobility;Independent with IADLS   Lives With Spouse   IADLs Independent with driving;Independent with meal prep;Independent with medication management   Falls in the last 6 months 0   Cognition   Overall Cognitive Status WFL   Orientation Level Oriented X4   Subjective   Subjective Pt agreeable to ambulation during session   RLE Assessment   RLE Assessment WFL   LLE Assessment   LLE Assessment WFL   Coordination   Movements are Fluid and Coordinated 1   Bed Mobility   Supine to Sit 5  Supervision   Sit to Supine Unable to assess   Additional Comments pt was returned to recliner post session   Transfers   Sit to Stand 5  Supervision   Additional items   (HHA)   Stand to Sit 5  Supervision   Additional items   (HHA)   Ambulation/Elevation   Gait pattern WNL   Gait Assistance 5  Supervision   Additional items Assist x 1   Assistive Device Rolling walker   Distance 35' x 2   Balance   Static Sitting Fair   Dynamic Sitting Fair   Static Standing Fair   Dynamic Standing Fair   Ambulatory Fair   Endurance Deficit   Endurance Deficit Yes   Endurance Deficit Description Pt currently lacking  muscular endurance needed for increased ambulation and activity.   Activity Tolerance   Activity Tolerance Patient limited by fatigue;Patient limited by pain   Medical Staff Made Aware OT present for co-eval   Nurse Made Aware nursing cleared pt   Assessment   Prognosis Good   Problem List Decreased strength;Decreased endurance;Impaired balance;Decreased mobility;Pain   Assessment Pt is an 79 y.o. 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 Pt has past medical history including cancer, GERD, hypertension, and thryoid disease. During session, patient was A/Ox4 and able to perform all transfers and ambulation with supervision and HHA but was limited by musular fatigue. Pt was independent with all ADLs and IADLs prior to this hospital stay. Patient would benefit from continued PT in order to address stair negotiation, muscular endurance and other deficits before discharge.   Goals   Patient Goals to return home and do her normal activities   STG Expiration Date 09/14/24   Short Term Goal #1 1)Pt's endurance level will increase to allow ambulation more than 250' with or without AD and modified independent to allow for greater participation with ADLs, 2) pt able to negotiate stairs with supervision to allow pt to ascend/descend stair to get in and out of her home, and 3) pt balance improve to fair+ or better to allow pt to be able to return home and resume independence with ADLs and IADLs.   PT Treatment Day 1   Plan   Treatment/Interventions ADL retraining;Functional transfer training;LE strengthening/ROM;Elevations;Therapeutic exercise;Endurance training;Gait training;Spoke to nursing;OT   PT Frequency 2-3x/wk   Discharge Recommendation   Rehab Resource Intensity Level, PT III (Minimum Resource Intensity)   AM-PAC Basic Mobility Inpatient   Turning in Flat Bed Without Bedrails 3   Lying on Back to Sitting on Edge of Flat Bed Without Bedrails 3   Moving Bed to Chair 3   Standing Up From Chair Using Arms  3   Walk in Room 3   Climb 3-5 Stairs With Railing 3   Basic Mobility Inpatient Raw Score 18   Basic Mobility Standardized Score 41.05   [facility] Level Of Mobility   -HLM Goal 6: Walk 10 steps or more   -HLM Achieved 7: Walk 25 feet or more   End of Consult   Patient Position at End of Consult Bedside chair;Bed/Chair alarm activated;All needs within reach   Scar Savage, SPT

## 2024-09-07 NOTE — PLAN OF CARE
Problem: PAIN - ADULT  Goal: Verbalizes/displays adequate comfort level or baseline comfort level  Description: Interventions:  - Encourage patient to monitor pain and request assistance  - Assess pain using appropriate pain scale  - Administer analgesics based on type and severity of pain and evaluate response  - Implement non-pharmacological measures as appropriate and evaluate response  - Consider cultural and social influences on pain and pain management  - Notify physician/advanced practitioner if interventions unsuccessful or patient reports new pain  Outcome: Progressing     Problem: INFECTION - ADULT  Goal: Absence or prevention of progression during hospitalization  Description: INTERVENTIONS:  - Assess and monitor for signs and symptoms of infection  - Monitor lab/diagnostic results  - Monitor all insertion sites, i.e. indwelling lines, tubes, and drains  - Monitor endotracheal if appropriate and nasal secretions for changes in amount and color  - [place] appropriate cooling/warming therapies per order  - Administer medications as ordered  - Instruct and encourage patient and family to use good hand hygiene technique  - Identify and instruct in appropriate isolation precautions for identified infection/condition  Outcome: Progressing  Goal: Absence of fever/infection during neutropenic period  Description: INTERVENTIONS:  - Monitor WBC    Outcome: Progressing     Problem: SAFETY ADULT  Goal: Patient will remain free of falls  Description: INTERVENTIONS:  - Educate patient/family on patient safety including physical limitations  - Instruct patient to call for assistance with activity   - Consult OT/PT to assist with strengthening/mobility   - Keep Call bell within reach  - Keep bed low and locked with side rails adjusted as appropriate  - Keep care items and personal belongings within reach  - Initiate and maintain comfort rounds  - Make Fall Risk Sign visible to staff  - Apply yellow socks and bracelet  for high fall risk patients  - Consider moving patient to room near nurses station  Outcome: Progressing  Goal: Maintain or return to baseline ADL function  Description: INTERVENTIONS:  -  Assess patient's ability to carry out ADLs; assess patient's baseline for ADL function and identify physical deficits which impact ability to perform ADLs (bathing, care of mouth/teeth, toileting, grooming, dressing, etc.)  - Assess/evaluate cause of self-care deficits   - Assess range of motion  - Assess patient's mobility; develop plan if impaired  - Assess patient's need for assistive devices and provide as appropriate  - Encourage maximum independence but intervene and supervise when necessary  - Involve family in performance of ADLs  - Assess for home care needs following discharge   - Consider OT consult to assist with ADL evaluation and planning for discharge  - Provide patient education as appropriate  Outcome: Progressing  Goal: Maintains/Returns to pre admission functional level  Description: INTERVENTIONS:  - Perform AM-PAC 6 Click Basic Mobility/ Daily Activity assessment daily.  - Set and communicate daily mobility goal to care team and patient/family/caregiver.   - Collaborate with rehabilitation services on mobility goals if consulted  - Out of bed for toileting  - Record patient progress and toleration of activity level   Outcome: Progressing     Problem: DISCHARGE PLANNING  Goal: Discharge to home or other facility with appropriate resources  Description: INTERVENTIONS:  - Identify barriers to discharge w/patient and caregiver  - Arrange for needed discharge resources and transportation as appropriate  - Identify discharge learning needs (meds, wound care, etc.)  - Arrange for interpretive services to assist at discharge as needed  - Refer to Case Management Department for coordinating discharge planning if the patient needs post-hospital services based on physician/advanced practitioner order or complex needs  related to functional status, cognitive ability, or social support system  Outcome: Progressing     Problem: Knowledge Deficit  Goal: Patient/family/caregiver demonstrates understanding of disease process, treatment plan, medications, and discharge instructions  Description: Complete learning assessment and assess knowledge base.  Interventions:  - Provide teaching at level of understanding  - Provide teaching via preferred learning methods  Outcome: Progressing

## 2024-09-08 ENCOUNTER — APPOINTMENT (INPATIENT)
Dept: RADIOLOGY | Facility: HOSPITAL | Age: 79
DRG: 393 | End: 2024-09-08
Payer: MEDICARE

## 2024-09-08 LAB
ANION GAP SERPL CALCULATED.3IONS-SCNC: 7 MMOL/L (ref 4–13)
ANION GAP SERPL CALCULATED.3IONS-SCNC: 8 MMOL/L (ref 4–13)
BASOPHILS # BLD AUTO: 0.01 THOUSANDS/ÂΜL (ref 0–0.1)
BASOPHILS # BLD AUTO: 0.02 THOUSANDS/ÂΜL (ref 0–0.1)
BASOPHILS NFR BLD AUTO: 0 % (ref 0–1)
BASOPHILS NFR BLD AUTO: 0 % (ref 0–1)
BUN SERPL-MCNC: 22 MG/DL (ref 5–25)
BUN SERPL-MCNC: 23 MG/DL (ref 5–25)
CALCIUM SERPL-MCNC: 7.8 MG/DL (ref 8.4–10.2)
CALCIUM SERPL-MCNC: 7.9 MG/DL (ref 8.4–10.2)
CHLORIDE SERPL-SCNC: 99 MMOL/L (ref 96–108)
CHLORIDE SERPL-SCNC: 99 MMOL/L (ref 96–108)
CO2 SERPL-SCNC: 23 MMOL/L (ref 21–32)
CO2 SERPL-SCNC: 23 MMOL/L (ref 21–32)
CREAT SERPL-MCNC: 0.91 MG/DL (ref 0.6–1.3)
CREAT SERPL-MCNC: 0.98 MG/DL (ref 0.6–1.3)
EOSINOPHIL # BLD AUTO: 0.15 THOUSAND/ÂΜL (ref 0–0.61)
EOSINOPHIL # BLD AUTO: 0.27 THOUSAND/ÂΜL (ref 0–0.61)
EOSINOPHIL NFR BLD AUTO: 2 % (ref 0–6)
EOSINOPHIL NFR BLD AUTO: 4 % (ref 0–6)
ERYTHROCYTE [DISTWIDTH] IN BLOOD BY AUTOMATED COUNT: 14.1 % (ref 11.6–15.1)
ERYTHROCYTE [DISTWIDTH] IN BLOOD BY AUTOMATED COUNT: 14.1 % (ref 11.6–15.1)
GFR SERPL CREATININE-BSD FRML MDRD: 54 ML/MIN/1.73SQ M
GFR SERPL CREATININE-BSD FRML MDRD: 59 ML/MIN/1.73SQ M
GLUCOSE SERPL-MCNC: 139 MG/DL (ref 65–140)
GLUCOSE SERPL-MCNC: 141 MG/DL (ref 65–140)
HCT VFR BLD AUTO: 32.6 % (ref 34.8–46.1)
HCT VFR BLD AUTO: 33 % (ref 34.8–46.1)
HGB BLD-MCNC: 10.9 G/DL (ref 11.5–15.4)
HGB BLD-MCNC: 11.2 G/DL (ref 11.5–15.4)
IMM GRANULOCYTES # BLD AUTO: 0.03 THOUSAND/UL (ref 0–0.2)
IMM GRANULOCYTES # BLD AUTO: 0.03 THOUSAND/UL (ref 0–0.2)
IMM GRANULOCYTES NFR BLD AUTO: 0 % (ref 0–2)
IMM GRANULOCYTES NFR BLD AUTO: 0 % (ref 0–2)
LYMPHOCYTES # BLD AUTO: 0.42 THOUSANDS/ÂΜL (ref 0.6–4.47)
LYMPHOCYTES # BLD AUTO: 0.49 THOUSANDS/ÂΜL (ref 0.6–4.47)
LYMPHOCYTES NFR BLD AUTO: 5 % (ref 14–44)
LYMPHOCYTES NFR BLD AUTO: 6 % (ref 14–44)
MAGNESIUM SERPL-MCNC: 1.8 MG/DL (ref 1.9–2.7)
MAGNESIUM SERPL-MCNC: 1.9 MG/DL (ref 1.9–2.7)
MCH RBC QN AUTO: 29.5 PG (ref 26.8–34.3)
MCH RBC QN AUTO: 29.9 PG (ref 26.8–34.3)
MCHC RBC AUTO-ENTMCNC: 33.4 G/DL (ref 31.4–37.4)
MCHC RBC AUTO-ENTMCNC: 33.9 G/DL (ref 31.4–37.4)
MCV RBC AUTO: 88 FL (ref 82–98)
MCV RBC AUTO: 88 FL (ref 82–98)
MONOCYTES # BLD AUTO: 0.25 THOUSAND/ÂΜL (ref 0.17–1.22)
MONOCYTES # BLD AUTO: 0.53 THOUSAND/ÂΜL (ref 0.17–1.22)
MONOCYTES NFR BLD AUTO: 3 % (ref 4–12)
MONOCYTES NFR BLD AUTO: 6 % (ref 4–12)
NEUTROPHILS # BLD AUTO: 6.67 THOUSANDS/ÂΜL (ref 1.85–7.62)
NEUTROPHILS # BLD AUTO: 7.78 THOUSANDS/ÂΜL (ref 1.85–7.62)
NEUTS SEG NFR BLD AUTO: 87 % (ref 43–75)
NEUTS SEG NFR BLD AUTO: 87 % (ref 43–75)
NRBC BLD AUTO-RTO: 0 /100 WBCS
NRBC BLD AUTO-RTO: 0 /100 WBCS
PHOSPHATE SERPL-MCNC: 2.9 MG/DL (ref 2.3–4.1)
PLATELET # BLD AUTO: 281 THOUSANDS/UL (ref 149–390)
PLATELET # BLD AUTO: 289 THOUSANDS/UL (ref 149–390)
PMV BLD AUTO: 8.7 FL (ref 8.9–12.7)
PMV BLD AUTO: 8.9 FL (ref 8.9–12.7)
POTASSIUM SERPL-SCNC: 3.3 MMOL/L (ref 3.5–5.3)
POTASSIUM SERPL-SCNC: 4 MMOL/L (ref 3.5–5.3)
RBC # BLD AUTO: 3.69 MILLION/UL (ref 3.81–5.12)
RBC # BLD AUTO: 3.74 MILLION/UL (ref 3.81–5.12)
SODIUM SERPL-SCNC: 129 MMOL/L (ref 135–147)
SODIUM SERPL-SCNC: 130 MMOL/L (ref 135–147)
WBC # BLD AUTO: 7.73 THOUSAND/UL (ref 4.31–10.16)
WBC # BLD AUTO: 8.92 THOUSAND/UL (ref 4.31–10.16)

## 2024-09-08 PROCEDURE — 74018 RADEX ABDOMEN 1 VIEW: CPT

## 2024-09-08 PROCEDURE — 83735 ASSAY OF MAGNESIUM: CPT

## 2024-09-08 PROCEDURE — 85025 COMPLETE CBC W/AUTO DIFF WBC: CPT

## 2024-09-08 PROCEDURE — 84100 ASSAY OF PHOSPHORUS: CPT

## 2024-09-08 PROCEDURE — 80048 BASIC METABOLIC PNL TOTAL CA: CPT

## 2024-09-08 PROCEDURE — 93005 ELECTROCARDIOGRAM TRACING: CPT

## 2024-09-08 PROCEDURE — 99024 POSTOP FOLLOW-UP VISIT: CPT | Performed by: STUDENT IN AN ORGANIZED HEALTH CARE EDUCATION/TRAINING PROGRAM

## 2024-09-08 RX ORDER — AMOXICILLIN 250 MG
1 CAPSULE ORAL
Status: DISCONTINUED | OUTPATIENT
Start: 2024-09-08 | End: 2024-09-10

## 2024-09-08 RX ORDER — SODIUM CHLORIDE 9 MG/ML
50 INJECTION, SOLUTION INTRAVENOUS CONTINUOUS
Status: DISCONTINUED | OUTPATIENT
Start: 2024-09-08 | End: 2024-09-09

## 2024-09-08 RX ORDER — POTASSIUM CHLORIDE 1500 MG/1
40 TABLET, EXTENDED RELEASE ORAL ONCE
Status: COMPLETED | OUTPATIENT
Start: 2024-09-08 | End: 2024-09-08

## 2024-09-08 RX ORDER — SODIUM CHLORIDE, SODIUM GLUCONATE, SODIUM ACETATE, POTASSIUM CHLORIDE, MAGNESIUM CHLORIDE, SODIUM PHOSPHATE, DIBASIC, AND POTASSIUM PHOSPHATE .53; .5; .37; .037; .03; .012; .00082 G/100ML; G/100ML; G/100ML; G/100ML; G/100ML; G/100ML; G/100ML
1000 INJECTION, SOLUTION INTRAVENOUS ONCE
Status: COMPLETED | OUTPATIENT
Start: 2024-09-08 | End: 2024-09-08

## 2024-09-08 RX ORDER — MAGNESIUM SULFATE HEPTAHYDRATE 40 MG/ML
2 INJECTION, SOLUTION INTRAVENOUS ONCE
Status: COMPLETED | OUTPATIENT
Start: 2024-09-08 | End: 2024-09-09

## 2024-09-08 RX ORDER — SODIUM CHLORIDE, SODIUM GLUCONATE, SODIUM ACETATE, POTASSIUM CHLORIDE, MAGNESIUM CHLORIDE, SODIUM PHOSPHATE, DIBASIC, AND POTASSIUM PHOSPHATE .53; .5; .37; .037; .03; .012; .00082 G/100ML; G/100ML; G/100ML; G/100ML; G/100ML; G/100ML; G/100ML
1000 INJECTION, SOLUTION INTRAVENOUS ONCE
Status: DISCONTINUED | OUTPATIENT
Start: 2024-09-08 | End: 2024-09-08

## 2024-09-08 RX ADMIN — MAGNESIUM SULFATE HEPTAHYDRATE 2 G: 40 INJECTION, SOLUTION INTRAVENOUS at 21:19

## 2024-09-08 RX ADMIN — SODIUM CHLORIDE, SODIUM GLUCONATE, SODIUM ACETATE, POTASSIUM CHLORIDE, MAGNESIUM CHLORIDE, SODIUM PHOSPHATE, DIBASIC, AND POTASSIUM PHOSPHATE 1000 ML: .53; .5; .37; .037; .03; .012; .00082 INJECTION, SOLUTION INTRAVENOUS at 08:29

## 2024-09-08 RX ADMIN — HEPARIN SODIUM 5000 UNITS: 5000 INJECTION INTRAVENOUS; SUBCUTANEOUS at 21:19

## 2024-09-08 RX ADMIN — SODIUM CHLORIDE 500 ML: 0.9 INJECTION, SOLUTION INTRAVENOUS at 02:25

## 2024-09-08 RX ADMIN — SODIUM CHLORIDE 1000 ML: 0.9 INJECTION, SOLUTION INTRAVENOUS at 18:18

## 2024-09-08 RX ADMIN — GABAPENTIN 300 MG: 300 CAPSULE ORAL at 21:19

## 2024-09-08 RX ADMIN — SENNOSIDES AND DOCUSATE SODIUM 1 TABLET: 50; 8.6 TABLET ORAL at 21:19

## 2024-09-08 RX ADMIN — GABAPENTIN 300 MG: 300 CAPSULE ORAL at 08:29

## 2024-09-08 RX ADMIN — POTASSIUM CHLORIDE 40 MEQ: 1500 TABLET, EXTENDED RELEASE ORAL at 11:42

## 2024-09-08 RX ADMIN — HEPARIN SODIUM 5000 UNITS: 5000 INJECTION INTRAVENOUS; SUBCUTANEOUS at 13:07

## 2024-09-08 RX ADMIN — SODIUM CHLORIDE 75 ML/HR: 0.9 INJECTION, SOLUTION INTRAVENOUS at 23:00

## 2024-09-08 NOTE — PLAN OF CARE
Problem: PAIN - ADULT  Goal: Verbalizes/displays adequate comfort level or baseline comfort level  Description: Interventions:  - Encourage patient to monitor pain and request assistance  - Assess pain using appropriate pain scale  - Administer analgesics based on type and severity of pain and evaluate response  - Implement non-pharmacological measures as appropriate and evaluate response  - Consider cultural and social influences on pain and pain management  - Notify physician/advanced practitioner if interventions unsuccessful or patient reports new pain  Outcome: Progressing     Problem: INFECTION - ADULT  Goal: Absence or prevention of progression during hospitalization  Description: INTERVENTIONS:  - Assess and monitor for signs and symptoms of infection  - Monitor lab/diagnostic results  - Monitor all insertion sites, i.e. indwelling lines, tubes, and drains  - Monitor endotracheal if appropriate and nasal secretions for changes in amount and color  - [place] appropriate cooling/warming therapies per order  - Administer medications as ordered  - Instruct and encourage patient and family to use good hand hygiene technique  - Identify and instruct in appropriate isolation precautions for identified infection/condition  Outcome: Progressing  Goal: Absence of fever/infection during neutropenic period  Description: INTERVENTIONS:  - Monitor WBC    Outcome: Progressing     Problem: SAFETY ADULT  Goal: Patient will remain free of falls  Description: INTERVENTIONS:  - Educate patient/family on patient safety including physical limitations  - Instruct patient to call for assistance with activity   - Consult OT/PT to assist with strengthening/mobility   - Keep Call bell within reach  - Keep bed low and locked with side rails adjusted as appropriate  - Keep care items and personal belongings within reach  - Initiate and maintain comfort rounds  - Make Fall Risk Sign visible to staff  - Apply yellow socks and bracelet  for high fall risk patients  - Consider moving patient to room near nurses station  Outcome: Progressing  Goal: Maintain or return to baseline ADL function  Description: INTERVENTIONS:  -  Assess patient's ability to carry out ADLs; assess patient's baseline for ADL function and identify physical deficits which impact ability to perform ADLs (bathing, care of mouth/teeth, toileting, grooming, dressing, etc.)  - Assess/evaluate cause of self-care deficits   - Assess range of motion  - Assess patient's mobility; develop plan if impaired  - Assess patient's need for assistive devices and provide as appropriate  - Encourage maximum independence but intervene and supervise when necessary  - Involve family in performance of ADLs  - Assess for home care needs following discharge   - Consider OT consult to assist with ADL evaluation and planning for discharge  - Provide patient education as appropriate  Outcome: Progressing  Goal: Maintains/Returns to pre admission functional level  Description: INTERVENTIONS:  - Perform AM-PAC 6 Click Basic Mobility/ Daily Activity assessment daily.  - Set and communicate daily mobility goal to care team and patient/family/caregiver.   - Collaborate with rehabilitation services on mobility goals if consulted  - Out of bed for toileting  - Record patient progress and toleration of activity level   Outcome: Progressing

## 2024-09-08 NOTE — CASE MANAGEMENT
Case Management Assessment & Discharge Planning Note    Patient name Brigid Brown  Location [facility] 832/[facility] 832-01 MRN [id number]  : 1945 Date 2024       Current Admission Date: 2024  Current Admission Diagnosis:Mesenteric mass   Patient Active Problem List    Diagnosis Date Noted Date Diagnosed    Encounter for geriatric assessment 2024     Electrolyte abnormality 2024     Mesenteric mass 2024     SOB (shortness of breath) 2024     Gastritis 2024     Weight loss 05/15/2024     Mitral regurgitation 2024     Sprain of medial collateral ligament of left knee, initial encounter 2024     Age-related osteoporosis without current pathological fracture 2024     Vaginal atrophy 2022     Lactose intolerance 2022     Severe protein-calorie malnutrition (HCC) 2022     Pigmented purpura (HCC) 2022     Stage 3 chronic kidney disease, unspecified whether stage 3a or 3b CKD (HCC) 2022     Dizziness 2022     Reflux laryngitis 2021     TMJ dysfunction 2021     Myofascial pain 2021     Tongue pain 2021     Pain in right lumbar region of back 2021     Abdominal pain 2021     Urinary symptom or sign 2021     Right hip pain 2021     Fatigue 2021     Gastroesophageal reflux disease 2021     Dysphonia 2021     Transient alteration of awareness 2021     Symptoms of cerebrovascular accident (CVA) 2021     IBS (irritable bowel syndrome) 2021     Grief 2021     Tinnitus of both ears 2020     Sprain of anterior talofibular ligament of left ankle 2019     Sensorineural hearing loss (SNHL) of both ears 2019     Hashimoto's disease 10/25/2018     History of vitamin D deficiency 10/25/2018     Episodic confusion 2018     Basal cell carcinoma of right temple region 2018     Essential hypertension 2018     Near syncope  02/26/2018     Screening for blood or protein in urine 02/23/2018     History of skin cancer 02/23/2018     Subclinical hypothyroidism 11/28/2017     Changing skin lesion 06/27/2017     Seborrheic keratosis 04/28/2014     Hypothyroidism 05/23/2012     Allergic rhinitis 05/17/2012     Hypertension 05/17/2012       LOS (days): 2  Geometric Mean LOS (GMLOS) (days): 2.1  Days to GMLOS:0.1     OBJECTIVE:  PATIENT READMITTED TO HOSPITAL  Risk of Unplanned Readmission Score: 18.31         Current admission status: Inpatient       Preferred Pharmacy:   [id number] - [place] - [address]  [address]  [place] [address]  Phone: [phone] Fax: [phone]    Primary Care Provider: Delicia Cintron DO    Primary Insurance: MEDICARE  Secondary Insurance: AARP    ASSESSMENT:  Active Health Care Proxies       Hunter Brown [facility] Care Representative - Spouse   Primary Phone: [phone] (Mobile)  Home Phone: [phone]                 Readmission Root Cause  30 Day Readmission: Yes  Who directed you to return to the hospital?: Self  Did you understand whom to contact if you had questions or problems?: Yes  Were you able to get your prescriptions filled when you left the hospital?: Yes  Did you take your medications as prescribed?: Yes  Were you able to get to your follow-up appointments?: Yes  Patient was readmitted due to: Mesenteric Mass  Action Plan: [facility]  Referral sent    Patient Information  Admitted from:: Home  Mental Status: Alert  During Assessment patient was accompanied by: Spouse, Son  Assessment information provided by:: Patient, Spouse, Son  Primary Caregiver: Self  Caregiver's Name:: Hunter Brown  Caregiver's Relationship to Patient:: Family Member  Caregiver's Telephone Number:: [phone]  Support Systems: Spouse/significant other, Son  County of Residence: [place]  What city do you live in?: [place]  Home entry access options. Select all that apply.: Stairs  Number  of steps to enter home.: 1  Type of Current Residence: [facility]  Living Arrangements: Lives w/ Spouse/significant other  Is patient a ?: No    Activities of Daily Living Prior to Admission  Functional Status: Independent  Completes ADLs independently?: Yes  Ambulates independently?: Yes  Does patient use assisted devices?: Yes  Assisted Devices (DME) used: Shower Chair, Straight Cane  Does patient currently own DME?: Yes  What DME does the patient currently own?: Shower Chair  Does patient have a history of Outpatient Therapy (PT/OT)?: No  Does the patient have a history of Short-Term Rehab?: No  Does patient have a history of HHC?: No  Does patient currently have HHC?: No         Patient Information Continued  Income Source: Pension/shelter  Does patient have prescription coverage?: Yes  Does patient receive dialysis treatments?: No  Does patient have a history of substance abuse?: No  Does patient have a history of Mental Health Diagnosis?: No    Social Determinants of Health (SDOH)      Flowsheet Row Most Recent Value   Housing Stability    In the last 12 months, was there a time when you were not able to pay the mortgage or rent on time? N   In the past 12 months, how many times have you moved where you were living? 0   At any time in the past 12 months, were you homeless or living in a shelter (including now)? N   Transportation Needs    In the past 12 months, has lack of transportation kept you from medical appointments or from getting medications? no   In the past 12 months, has lack of transportation kept you from meetings, work, or from getting things needed for daily living? No   Food Insecurity    Within the past 12 months, you worried that your food would run out before you got the money to buy more. Never true   Within the past 12 months, the food you bought just didn't last and you didn't have money to get more. Never true   Utilities    In the past 12 months has the electric, gas, oil, or water  company threatened to shut off services in your home? No            DISCHARGE DETAILS:    Discharge planning discussed with:: Patient, her spouse, and son.  Freedom of Choice: Yes  Comments - Freedom of Choice: FOC discussed.  CM contacted family/caregiver?: Yes  Were Treatment Team discharge recommendations reviewed with patient/caregiver?: Yes  Did patient/caregiver verbalize understanding of patient care needs?: Yes       Contacts  Patient Contacts: Hunter Brown  Relationship to Patient:: Family  Contact Method: In Person  Reason/Outcome: Continuity of Care, Emergency Contact, Discharge Planning    Requested Home Health Care         Is the patient interested in HHC at discharge?: Yes  Home Health Discipline requested:: Occupational Therapy, Physical Therapy  Homebound Criteria Met:: Requires the Assistance of Another Person for Safe Ambulation or to Leave the Home  Supporting Clincal Findings:: Limited Endurance, Fatigues Easliy in Short Distances    DME Referral Provided  Referral made for DME?: No    Other Referral/Resources/Interventions Provided:  Interventions: HHC  Referral Comments: Recs for HHC PT/OT at discharge. Pt and family in agreement.  Referrals sent in [place].  CM will discuss choice when available.

## 2024-09-08 NOTE — PLAN OF CARE
Problem: PAIN - ADULT  Goal: Verbalizes/displays adequate comfort level or baseline comfort level  Description: Interventions:  - Encourage patient to monitor pain and request assistance  - Assess pain using appropriate pain scale  - Administer analgesics based on type and severity of pain and evaluate response  - Implement non-pharmacological measures as appropriate and evaluate response  - Consider cultural and social influences on pain and pain management  - Notify physician/advanced practitioner if interventions unsuccessful or patient reports new pain  Outcome: Progressing     Problem: INFECTION - ADULT  Goal: Absence or prevention of progression during hospitalization  Description: INTERVENTIONS:  - Assess and monitor for signs and symptoms of infection  - Monitor lab/diagnostic results  - Monitor all insertion sites, i.e. indwelling lines, tubes, and drains  - Monitor endotracheal if appropriate and nasal secretions for changes in amount and color  - [place] appropriate cooling/warming therapies per order  - Administer medications as ordered  - Instruct and encourage patient and family to use good hand hygiene technique  - Identify and instruct in appropriate isolation precautions for identified infection/condition  Outcome: Progressing  Goal: Absence of fever/infection during neutropenic period  Description: INTERVENTIONS:  - Monitor WBC    Outcome: Progressing     Problem: SAFETY ADULT  Goal: Patient will remain free of falls  Description: INTERVENTIONS:  - Educate patient/family on patient safety including physical limitations  - Instruct patient to call for assistance with activity   - Consult OT/PT to assist with strengthening/mobility   - Keep Call bell within reach  - Keep bed low and locked with side rails adjusted as appropriate  - Keep care items and personal belongings within reach  - Initiate and maintain comfort rounds  - Make Fall Risk Sign visible to staff  - Apply yellow socks and bracelet  for high fall risk patients  - Consider moving patient to room near nurses station  Outcome: Progressing  Goal: Maintain or return to baseline ADL function  Description: INTERVENTIONS:  -  Assess patient's ability to carry out ADLs; assess patient's baseline for ADL function and identify physical deficits which impact ability to perform ADLs (bathing, care of mouth/teeth, toileting, grooming, dressing, etc.)  - Assess/evaluate cause of self-care deficits   - Assess range of motion  - Assess patient's mobility; develop plan if impaired  - Assess patient's need for assistive devices and provide as appropriate  - Encourage maximum independence but intervene and supervise when necessary  - Involve family in performance of ADLs  - Assess for home care needs following discharge   - Consider OT consult to assist with ADL evaluation and planning for discharge  - Provide patient education as appropriate  Outcome: Progressing  Goal: Maintains/Returns to pre admission functional level  Description: INTERVENTIONS:  - Perform AM-PAC 6 Click Basic Mobility/ Daily Activity assessment daily.  - Set and communicate daily mobility goal to care team and patient/family/caregiver.   - Collaborate with rehabilitation services on mobility goals if consulted  - Out of bed for toileting  - Record patient progress and toleration of activity level   Outcome: Progressing     Problem: DISCHARGE PLANNING  Goal: Discharge to home or other facility with appropriate resources  Description: INTERVENTIONS:  - Identify barriers to discharge w/patient and caregiver  - Arrange for needed discharge resources and transportation as appropriate  - Identify discharge learning needs (meds, wound care, etc.)  - Arrange for interpretive services to assist at discharge as needed  - Refer to Case Management Department for coordinating discharge planning if the patient needs post-hospital services based on physician/advanced practitioner order or complex needs  related to functional status, cognitive ability, or social support system  Outcome: Progressing     Problem: Knowledge Deficit  Goal: Patient/family/caregiver demonstrates understanding of disease process, treatment plan, medications, and discharge instructions  Description: Complete learning assessment and assess knowledge base.  Interventions:  - Provide teaching at level of understanding  - Provide teaching via preferred learning methods  Outcome: Progressing     Problem: Prexisting or High Potential for Compromised Skin Integrity  Goal: Skin integrity is maintained or improved  Description: INTERVENTIONS:  - Identify patients at risk for skin breakdown  - Assess and monitor skin integrity  - Assess and monitor nutrition and hydration status  - Monitor labs   - Assess for incontinence   - Turn and reposition patient  - Assist with mobility/ambulation  - Relieve pressure over bony prominences  - Avoid friction and shearing  - Provide appropriate hygiene as needed including keeping skin clean and dry  - Evaluate need for skin moisturizer/barrier cream  - Collaborate with interdisciplinary team   - Patient/family teaching  - Consider wound care consult   Outcome: Progressing     Problem: Nutrition/Hydration-ADULT  Goal: Nutrient/Hydration intake appropriate for improving, restoring or maintaining nutritional needs  Description: Monitor and assess patient's nutrition/hydration status for malnutrition. Collaborate with interdisciplinary team and initiate plan and interventions as ordered.  Monitor patient's weight and dietary intake as ordered or per policy. Utilize nutrition screening tool and intervene as necessary. Determine patient's food preferences and provide high-protein, high-caloric foods as appropriate.     INTERVENTIONS:  - Monitor oral intake, urinary output, labs, and treatment plans  - Assess nutrition and hydration status and recommend course of action  - Evaluate amount of meals eaten  - Assist  patient with eating if necessary   - Allow adequate time for meals  - Recommend/ encourage appropriate diets, oral nutritional supplements, and vitamin/mineral supplements  - Order, calculate, and assess calorie counts as needed  - Recommend, monitor, and adjust tube feedings and TPN/PPN based on assessed needs  - Assess need for intravenous fluids  - Provide specific nutrition/hydration education as appropriate  - Include patient/family/caregiver in decisions related to nutrition  Outcome: Progressing

## 2024-09-08 NOTE — OCCUPATIONAL THERAPY NOTE
Occupational Therapy Evaluation     Patient Name: Brigid Brown  Today's Date: 9/8/2024  Problem List  Principal Problem:    Mesenteric mass    Past Medical History  Past Medical History:   Diagnosis Date    Acne     Basal cell carcinoma 06/08/2020    right lower forehead    BCC (basal cell carcinoma of skin) 03/09/2020    mid forehead    Benign neoplasm of skin     Disease of thyroid gland 2006    GERD (gastroesophageal reflux disease)     Hypertension     Inflamed seborrheic keratosis     Irritable bowel syndrome     Malignant neoplasm of skin of face     Migraines     Nonmelanoma skin cancer     Last Assessed:6/27/17    Squamous cell skin cancer 06/08/2020    In situ, left lower forehead    Tachycardia     Telogen effluvium     Temporomandibular joint disorder     Vertigo      Past Surgical History  Past Surgical History:   Procedure Laterality Date    ADENOIDECTOMY      APPENDECTOMY      CHOLECYSTECTOMY      COLONOSCOPY  05/03/2019    COMPLEX WOUND CLOSURE TO EXTREMITY N/A 7/28/2020    Procedure: COMPLEX CLOSURE MID FOREHEAD;  Surgeon: Randy Frank MD;  Location: [facility];  Service: Plastics    ESOPHAGOGASTRODUODENOSCOPY  2009    FLAP LOCAL HEAD / NECK N/A 7/28/2020    Procedure: FLAP MID FOREHEAD;  Surgeon: Randy Frank MD;  Location: [facility];  Service: Plastics    HYSTERECTOMY  1987    MALIGNANT SKIN LESION EXCISION      Excision of Lesion Face Malignant-9/14/2004 BCC Forehead    MOHS RECONSTRUCTION N/A 7/28/2020    Procedure: RECONSTRUCTION MOHS DEFECT MID FOREHEAD;  Surgeon: Randy Frank MD;  Location: [facility];  Service: Plastics    MOHS SURGERY  07/27/2020    Right &left lower forehead, mid forehead    OOPHORECTOMY Bilateral 1987    ROTATOR CUFF REPAIR Right     SKIN BIOPSY      TONSILLECTOMY      TUBAL LIGATION  1976?      09/07/24 0945   OT Last Visit   OT Visit Date 09/07/24   Note Type   Note type Evaluation   Pain Assessment   Pain Assessment Tool 0-10   Pain  "Score No Pain   Restrictions/Precautions   Weight Bearing Precautions Per Order No   Home Living   Type of Home House   Home Layout One level   Bathroom Shower/Tub Tub/shower unit   Bathroom Equipment Shower chair   Home Equipment Cane   Prior Function   Level of [place] Independent with ADLs   Lives With Spouse   IADLs Independent with driving   Falls in the last 6 months 0   Lifestyle   Autonomy I with ADL's/shares homemaking tasks with spouse, no AD with functional mobiltiy   Reciprocal Relationships spouse   Service to Others retired   General   Family/Caregiver Present No   ADL   Eating Assistance 7  Independent   Grooming Assistance 7  Independent   UB Bathing Assistance 5  Supervision/Setup   LB Bathing Assistance 5  Supervision/Setup   UB Dressing Assistance 5  Supervision/Setup   LB Dressing Assistance 5  Supervision/Setup   Toileting Assistance  5  Supervision/Setup   Bed Mobility   Supine to Sit 5  Supervision   Transfers   Sit to Stand 5  Supervision   Stand to Sit 5  Supervision   Additional Comments +RW   Functional Mobility   Functional Mobility 4 minimal assistance   Additional Comments Min a for CG with RW household distance functional mobility, mild impaired activity tolerance. Pt reports \"I feel weak\", no LOB or seated rest breaks needed.   Balance   Static Sitting Good   Dynamic Sitting Fair +   Static Standing Fair   Dynamic Standing Fair -   Ambulatory Poor +   Activity Tolerance   Activity Tolerance Patient limited by fatigue   Medical Staff Made Aware PT nolan/JUNIE singleton due to the patient's co-morbidities, clinically unstable presentation, and present impairments which are a regression from the patient's baseline.    Nurse Made Aware RN   RUE Assessment   RUE Assessment WFL   LUE Assessment   LUE Assessment WFL   Hand Function   Gross Motor Coordination Functional   Fine Motor Coordination Functional   Vision-Basic Assessment   Current Vision Wears glasses all the time   Vision - " Complex Assessment   Acuity Able to read clock/calendar on wall without difficulty   Cognition   Overall Cognitive Status WFL   Arousal/Participation Alert;Responsive;Cooperative   Attention Within functional limits   Orientation Level Oriented X4   Memory Within functional limits   Following Commands Follows all commands and directions without difficulty   Comments pt pleasant and motivated   Assessment   Assessment Pt is a 79 y.o. female who was admitted to [facility] on 9/6/2024 with Mesenteric mass s/p ex lap biopsy 9/6. Patient  has a past medical history of Acne, Basal cell carcinoma (06/08/2020), BCC (basal cell carcinoma of skin) (03/09/2020), Benign neoplasm of skin, Disease of thyroid gland (2006), GERD (gastroesophageal reflux disease), Hypertension, Inflamed seborrheic keratosis, Irritable bowel syndrome, Malignant neoplasm of skin of face, Migraines, Nonmelanoma skin cancer, Squamous cell skin cancer (06/08/2020), Tachycardia, Telogen effluvium, Temporomandibular joint disorder, and Vertigo.  At baseline pt was completing I with ADL's/IADL's, no AD with functional mobilty. Pt lives with spouse in a 1SH with 1STE. Currently pt requires S/set-up for overall ADLS and S with RW for functional mobility/transfers. Pt currently presents with impairments in the following categories -difficulty performing IADLS  activity tolerance. These impairments, as well as pt's fatigue  limit pt's ability to safely engage in all baseline areas of occupation, includingfunctional mobility/transfers and community mobility From OT standpoint, recommend min level III upon D/C. No further acute OT needs indicated at this time - Anticipate d/c home with family support when medically cleared - d/c from caseload   Goals   Patient Goals go home   Discharge Recommendation   Rehab Resource Intensity Level, OT III (Minimum Resource Intensity)   AM-PAC Daily Activity Inpatient   Lower Body Dressing 3   Bathing 3   Toileting 3    Upper Body Dressing 3   Grooming 4   Eating 4   Daily Activity Raw Score 20   Daily Activity Standardized Score (Calc for Raw Score >=11) 42.03   AM-PAC Applied Cognition Inpatient   Following a Speech/Presentation 3   Understanding Ordinary Conversation 4   Taking Medications 4   Remembering Where Things Are Placed or Put Away 4   Remembering List of 4-5 Errands 4   Taking Care of Complicated Tasks 4   Applied Cognition Raw Score 23   Applied Cognition Standardized Score 53.08   End of Consult   Patient Position at End of Consult Bedside chair;All needs within reach;Bed/Chair alarm activated   Nurse Communication Nurse aware of consult   Yahaira Christina OTR/L

## 2024-09-08 NOTE — PROGRESS NOTES
Patient experiencing visual disturbances, noticeable abdominal distention, tachycardia and increased respirations. Notified white surgery that daughter at bedside was also concerned for fatigue and generalized weakness. White sx resident Angel Kuhn notified.    Patient had low urine output during day shift hours despite receiving fluids and taking in fluids PO.  Bladder scan was completed for 436 ml.   Coming to bedside to evaluate patient.

## 2024-09-08 NOTE — PROGRESS NOTES
"Progress Note - Surgical Oncology  Brigid Brown 79 y.o. female MRN: [id number]  Unit/Bed#: [facility] 832-01 Encounter: [id number]    Assessment:  Brigid Brown is a 79 y.o. female with mesenteric mass s/p Ex lap and biopsy of mass 9/6.     Plan:  - advance to regular diet  - f/u geriatrics recs  - appreciate nutrition recs  - prn pain and nausea control  - continue urinary retention protocol  - OOB/ ambulation  - Incentive spirometry   - begin dispo planning    Subjective/Objective     Subjective: AVSS. Patient reports pain is controlled. Tolerating diet. Having bowel function. Denies fever, chills, nausea, vomiting. Did not void overnight and was given a 500cc bolus of NS. Bladder scan this morning shows 272cc of urine and patient reports that she does not need to use the restroom.    Objective:     Blood pressure 149/85, pulse 98, temperature 98.2 °F (36.8 °C), resp. rate 18, height 5' 2\" (1.575 m), weight 34 kg (75 lb), SpO2 96%, not currently breastfeeding.,Body mass index is 13.72 kg/m².      Intake/Output Summary (Last 24 hours) at 9/8/2024 0535  Last data filed at 9/7/2024 1739  Gross per 24 hour   Intake 120 ml   Output 225 ml   Net -105 ml       Invasive Devices       Peripheral Intravenous Line  Duration             Peripheral IV 09/06/24 Left Hand 1 day    Peripheral IV 09/06/24 Right Forearm 1 day                    Physical Exam:   General: NAD  HENT: NCAT MMM  Neck: supple, no JVD  CV: nl rate  Lungs: nl wob. No resp distress  ABD: Soft, nontender, mildly distended in the lower abdomen/suprapubic region. Incisions clean, dry, intact  Extrem: No CCE  Neuro: AAOx3      Lab, Imaging and other studies:I have personally reviewed pertinent lab results.    VTE Pharmacologic Prophylaxis: Heparin  VTE Mechanical Prophylaxis: sequential compression device    Javier Mauro MD  General Surgery Resident     "

## 2024-09-08 NOTE — QUICK NOTE
Informed by nursing that patient is experiencing visual perceptual disturbances when she closes her eyes.  Patient experiencing extreme fatigue and weakness.  Patient reports difficulty sleeping for the past 2 nights.  Patient has had low urine output throughout the day and has remained tachycardic to the low 120s.  EKG shows sinus tachycardia.  Abdomen moderately distended however nontender and patient denies nausea and vomiting.  Symptoms likely due to dehydration and excessive fatigue.    KUB ordered, CBC, BMP, Mag, Phos, 1L NS.  Will continue to monitor    Angel Kuhn MD  9/8/2024 5:57 PM

## 2024-09-09 ENCOUNTER — HOME HEALTH ADMISSION (OUTPATIENT)
Dept: HOME HEALTH SERVICES | Facility: HOME HEALTHCARE | Age: 79
End: 2024-09-09
Payer: MEDICARE

## 2024-09-09 PROBLEM — E87.1 HYPONATREMIA: Status: ACTIVE | Noted: 2024-09-09

## 2024-09-09 PROBLEM — N18.31 CHRONIC KIDNEY DISEASE (CKD) STAGE G3A/A2, MODERATELY DECREASED GLOMERULAR FILTRATION RATE (GFR) BETWEEN 45-59 ML/MIN/1.73 SQUARE METER AND ALBUMINURIA CREATININE RATIO BETWEEN 30-299 MG/G (HCC): Status: ACTIVE | Noted: 2022-11-03

## 2024-09-09 LAB
ALBUMIN SERPL BCG-MCNC: 2 G/DL (ref 3.5–5)
ALBUMIN SERPL BCG-MCNC: 2.9 G/DL (ref 3.5–5)
ALP SERPL-CCNC: 55 U/L (ref 34–104)
ALT SERPL W P-5'-P-CCNC: 13 U/L (ref 7–52)
ANION GAP SERPL CALCULATED.3IONS-SCNC: 4 MMOL/L (ref 4–13)
ANION GAP SERPL CALCULATED.3IONS-SCNC: 6 MMOL/L (ref 4–13)
ANION GAP SERPL CALCULATED.3IONS-SCNC: 7 MMOL/L (ref 4–13)
ANION GAP SERPL CALCULATED.3IONS-SCNC: 7 MMOL/L (ref 4–13)
AST SERPL W P-5'-P-CCNC: 22 U/L (ref 13–39)
ATRIAL RATE: 117 BPM
BASOPHILS # BLD AUTO: 0.03 THOUSANDS/ÂΜL (ref 0–0.1)
BASOPHILS NFR BLD AUTO: 1 % (ref 0–1)
BILIRUB SERPL-MCNC: 0.53 MG/DL (ref 0.2–1)
BUN SERPL-MCNC: 16 MG/DL (ref 5–25)
BUN SERPL-MCNC: 19 MG/DL (ref 5–25)
BUN SERPL-MCNC: 19 MG/DL (ref 5–25)
BUN SERPL-MCNC: 21 MG/DL (ref 5–25)
CALCIUM ALBUM COR SERPL-MCNC: 9.5 MG/DL (ref 8.3–10.1)
CALCIUM SERPL-MCNC: 5.7 MG/DL (ref 8.4–10.2)
CALCIUM SERPL-MCNC: 7.6 MG/DL (ref 8.4–10.2)
CALCIUM SERPL-MCNC: 7.7 MG/DL (ref 8.4–10.2)
CALCIUM SERPL-MCNC: 8.6 MG/DL (ref 8.4–10.2)
CHLORIDE SERPL-SCNC: 100 MMOL/L (ref 96–108)
CHLORIDE SERPL-SCNC: 101 MMOL/L (ref 96–108)
CHLORIDE SERPL-SCNC: 111 MMOL/L (ref 96–108)
CHLORIDE SERPL-SCNC: 99 MMOL/L (ref 96–108)
CO2 SERPL-SCNC: 20 MMOL/L (ref 21–32)
CO2 SERPL-SCNC: 23 MMOL/L (ref 21–32)
CO2 SERPL-SCNC: 24 MMOL/L (ref 21–32)
CO2 SERPL-SCNC: 25 MMOL/L (ref 21–32)
CREAT SERPL-MCNC: 0.49 MG/DL (ref 0.6–1.3)
CREAT SERPL-MCNC: 0.74 MG/DL (ref 0.6–1.3)
CREAT SERPL-MCNC: 0.76 MG/DL (ref 0.6–1.3)
CREAT SERPL-MCNC: 0.82 MG/DL (ref 0.6–1.3)
EOSINOPHIL # BLD AUTO: 0.34 THOUSAND/ÂΜL (ref 0–0.61)
EOSINOPHIL NFR BLD AUTO: 5 % (ref 0–6)
ERYTHROCYTE [DISTWIDTH] IN BLOOD BY AUTOMATED COUNT: 14.3 % (ref 11.6–15.1)
GFR SERPL CREATININE-BSD FRML MDRD: 67 ML/MIN/1.73SQ M
GFR SERPL CREATININE-BSD FRML MDRD: 73 ML/MIN/1.73SQ M
GFR SERPL CREATININE-BSD FRML MDRD: 76 ML/MIN/1.73SQ M
GFR SERPL CREATININE-BSD FRML MDRD: 91 ML/MIN/1.73SQ M
GLUCOSE SERPL-MCNC: 102 MG/DL (ref 65–140)
GLUCOSE SERPL-MCNC: 102 MG/DL (ref 65–140)
GLUCOSE SERPL-MCNC: 107 MG/DL (ref 65–140)
GLUCOSE SERPL-MCNC: 114 MG/DL (ref 65–140)
HCT VFR BLD AUTO: 33.5 % (ref 34.8–46.1)
HGB BLD-MCNC: 11.1 G/DL (ref 11.5–15.4)
IMM GRANULOCYTES # BLD AUTO: 0.03 THOUSAND/UL (ref 0–0.2)
IMM GRANULOCYTES NFR BLD AUTO: 1 % (ref 0–2)
LYMPHOCYTES # BLD AUTO: 0.49 THOUSANDS/ÂΜL (ref 0.6–4.47)
LYMPHOCYTES NFR BLD AUTO: 8 % (ref 14–44)
MCH RBC QN AUTO: 29.7 PG (ref 26.8–34.3)
MCHC RBC AUTO-ENTMCNC: 33.1 G/DL (ref 31.4–37.4)
MCV RBC AUTO: 90 FL (ref 82–98)
MONOCYTES # BLD AUTO: 0.37 THOUSAND/ÂΜL (ref 0.17–1.22)
MONOCYTES NFR BLD AUTO: 6 % (ref 4–12)
NEUTROPHILS # BLD AUTO: 5.08 THOUSANDS/ÂΜL (ref 1.85–7.62)
NEUTS SEG NFR BLD AUTO: 79 % (ref 43–75)
NRBC BLD AUTO-RTO: 0 /100 WBCS
OSMOLALITY UR/SERPL-RTO: 279 MMOL/KG (ref 282–298)
OSMOLALITY UR: 389 MMOL/KG
P AXIS: 55 DEGREES
PLATELET # BLD AUTO: 267 THOUSANDS/UL (ref 149–390)
PMV BLD AUTO: 9.1 FL (ref 8.9–12.7)
POTASSIUM SERPL-SCNC: 3.2 MMOL/L (ref 3.5–5.3)
POTASSIUM SERPL-SCNC: 4 MMOL/L (ref 3.5–5.3)
POTASSIUM SERPL-SCNC: 4.3 MMOL/L (ref 3.5–5.3)
POTASSIUM SERPL-SCNC: 4.6 MMOL/L (ref 3.5–5.3)
PR INTERVAL: 134 MS
PROT SERPL-MCNC: 5.5 G/DL (ref 6.4–8.4)
QRS AXIS: -86 DEGREES
QRSD INTERVAL: 84 MS
QT INTERVAL: 310 MS
QTC INTERVAL: 432 MS
RBC # BLD AUTO: 3.74 MILLION/UL (ref 3.81–5.12)
SODIUM 24H UR-SCNC: 12 MOL/L
SODIUM SERPL-SCNC: 128 MMOL/L (ref 135–147)
SODIUM SERPL-SCNC: 132 MMOL/L (ref 135–147)
SODIUM SERPL-SCNC: 132 MMOL/L (ref 135–147)
SODIUM SERPL-SCNC: 135 MMOL/L (ref 135–147)
T WAVE AXIS: 66 DEGREES
VENTRICULAR RATE: 117 BPM
WBC # BLD AUTO: 6.34 THOUSAND/UL (ref 4.31–10.16)

## 2024-09-09 PROCEDURE — 99024 POSTOP FOLLOW-UP VISIT: CPT | Performed by: SURGERY

## 2024-09-09 PROCEDURE — 80048 BASIC METABOLIC PNL TOTAL CA: CPT

## 2024-09-09 PROCEDURE — 84300 ASSAY OF URINE SODIUM: CPT

## 2024-09-09 PROCEDURE — 93010 ELECTROCARDIOGRAM REPORT: CPT | Performed by: INTERNAL MEDICINE

## 2024-09-09 PROCEDURE — 99223 1ST HOSP IP/OBS HIGH 75: CPT | Performed by: NURSE PRACTITIONER

## 2024-09-09 PROCEDURE — 80048 BASIC METABOLIC PNL TOTAL CA: CPT | Performed by: SURGERY

## 2024-09-09 PROCEDURE — 80053 COMPREHEN METABOLIC PANEL: CPT | Performed by: PHYSICIAN ASSISTANT

## 2024-09-09 PROCEDURE — 82040 ASSAY OF SERUM ALBUMIN: CPT | Performed by: PHYSICIAN ASSISTANT

## 2024-09-09 PROCEDURE — 97116 GAIT TRAINING THERAPY: CPT

## 2024-09-09 PROCEDURE — 85025 COMPLETE CBC W/AUTO DIFF WBC: CPT

## 2024-09-09 PROCEDURE — 83935 ASSAY OF URINE OSMOLALITY: CPT

## 2024-09-09 PROCEDURE — 99223 1ST HOSP IP/OBS HIGH 75: CPT | Performed by: INTERNAL MEDICINE

## 2024-09-09 PROCEDURE — 83930 ASSAY OF BLOOD OSMOLALITY: CPT

## 2024-09-09 RX ORDER — SODIUM CHLORIDE 1 G/1
2 TABLET ORAL 2 TIMES DAILY WITH MEALS
Status: DISCONTINUED | OUTPATIENT
Start: 2024-09-09 | End: 2024-09-10

## 2024-09-09 RX ORDER — POTASSIUM CHLORIDE 1500 MG/1
40 TABLET, EXTENDED RELEASE ORAL ONCE
Status: COMPLETED | OUTPATIENT
Start: 2024-09-09 | End: 2024-09-09

## 2024-09-09 RX ORDER — TORSEMIDE 10 MG/1
10 TABLET ORAL ONCE
Status: COMPLETED | OUTPATIENT
Start: 2024-09-09 | End: 2024-09-09

## 2024-09-09 RX ORDER — CALCIUM GLUCONATE 20 MG/ML
2 INJECTION, SOLUTION INTRAVENOUS ONCE
Status: COMPLETED | OUTPATIENT
Start: 2024-09-09 | End: 2024-09-10

## 2024-09-09 RX ORDER — GABAPENTIN 100 MG/1
100 CAPSULE ORAL 3 TIMES DAILY
Status: DISCONTINUED | OUTPATIENT
Start: 2024-09-09 | End: 2024-09-10

## 2024-09-09 RX ORDER — BISACODYL 10 MG
10 SUPPOSITORY, RECTAL RECTAL ONCE
Status: COMPLETED | OUTPATIENT
Start: 2024-09-09 | End: 2024-09-09

## 2024-09-09 RX ADMIN — HEPARIN SODIUM 5000 UNITS: 5000 INJECTION INTRAVENOUS; SUBCUTANEOUS at 14:43

## 2024-09-09 RX ADMIN — TORSEMIDE 10 MG: 10 TABLET ORAL at 14:21

## 2024-09-09 RX ADMIN — BISACODYL 10 MG: 10 SUPPOSITORY RECTAL at 16:43

## 2024-09-09 RX ADMIN — SENNOSIDES AND DOCUSATE SODIUM 1 TABLET: 50; 8.6 TABLET ORAL at 23:18

## 2024-09-09 RX ADMIN — GABAPENTIN 100 MG: 100 CAPSULE ORAL at 16:43

## 2024-09-09 RX ADMIN — SODIUM CHLORIDE 2 G: 1 TABLET ORAL at 16:43

## 2024-09-09 RX ADMIN — HEPARIN SODIUM 5000 UNITS: 5000 INJECTION INTRAVENOUS; SUBCUTANEOUS at 05:09

## 2024-09-09 RX ADMIN — HEPARIN SODIUM 5000 UNITS: 5000 INJECTION INTRAVENOUS; SUBCUTANEOUS at 23:18

## 2024-09-09 RX ADMIN — CALCIUM GLUCONATE 2 G: 20 INJECTION, SOLUTION INTRAVENOUS at 16:43

## 2024-09-09 RX ADMIN — POTASSIUM CHLORIDE 40 MEQ: 1500 TABLET, EXTENDED RELEASE ORAL at 16:43

## 2024-09-09 NOTE — ASSESSMENT & PLAN NOTE
Etiology: Multifactorial in the setting of decreased solute intake, +/- SIADH in the setting of acute pain due to surgery, as well as IV fluids  Admission sodium 136  Current sodium today 132-and started to decrease on 9/8/2024  Workup:  Serum osmolality 279  Urine osmolality and urine sodium-needs to be collected  Plan:  With lower extremity edema will discontinue IV fluids and add sodium salt tablets 2 g twice daily  Repeat BMP pending  Currently every 6 hours BMP  Avoid rapid rise correction for goal correction 6-8 points within 24 hours

## 2024-09-09 NOTE — PROGRESS NOTES
Patient:    MRN:  [id number]    Crystal Request ID:  [id number]    Level of care reserved:  Home Health Agency    Partner Reserved:  [facility], [place] [address] [phone]    Clinical needs requested:    Geography searched:  [id number]    Start of Service:    Request sent:  2:19pm EDT on 9/9/2024 by Marlene Bruno    Partner reserved:  3:57pm EDT on 9/9/2024 by Marlene Bruno    Choice list shared:  3:57pm EDT on 9/9/2024 by Marlene Bruno

## 2024-09-09 NOTE — PLAN OF CARE
Problem: PAIN - ADULT  Goal: Verbalizes/displays adequate comfort level or baseline comfort level  Description: Interventions:  - Encourage patient to monitor pain and request assistance  - Assess pain using appropriate pain scale  - Administer analgesics based on type and severity of pain and evaluate response  - Implement non-pharmacological measures as appropriate and evaluate response  - Consider cultural and social influences on pain and pain management  - Notify physician/advanced practitioner if interventions unsuccessful or patient reports new pain  Outcome: Progressing     Problem: INFECTION - ADULT  Goal: Absence or prevention of progression during hospitalization  Description: INTERVENTIONS:  - Assess and monitor for signs and symptoms of infection  - Monitor lab/diagnostic results  - Monitor all insertion sites, i.e. indwelling lines, tubes, and drains  - Monitor endotracheal if appropriate and nasal secretions for changes in amount and color  - [place] appropriate cooling/warming therapies per order  - Administer medications as ordered  - Instruct and encourage patient and family to use good hand hygiene technique  - Identify and instruct in appropriate isolation precautions for identified infection/condition  Outcome: Progressing  Goal: Absence of fever/infection during neutropenic period  Description: INTERVENTIONS:  - Monitor WBC    Outcome: Progressing     Problem: SAFETY ADULT  Goal: Patient will remain free of falls  Description: INTERVENTIONS:  - Educate patient/family on patient safety including physical limitations  - Instruct patient to call for assistance with activity   - Consult OT/PT to assist with strengthening/mobility   - Keep Call bell within reach  - Keep bed low and locked with side rails adjusted as appropriate  - Keep care items and personal belongings within reach  - Initiate and maintain comfort rounds  - Make Fall Risk Sign visible to staff  - Apply yellow socks and bracelet  for high fall risk patients  - Consider moving patient to room near nurses station  Outcome: Progressing  Goal: Maintain or return to baseline ADL function  Description: INTERVENTIONS:  -  Assess patient's ability to carry out ADLs; assess patient's baseline for ADL function and identify physical deficits which impact ability to perform ADLs (bathing, care of mouth/teeth, toileting, grooming, dressing, etc.)  - Assess/evaluate cause of self-care deficits   - Assess range of motion  - Assess patient's mobility; develop plan if impaired  - Assess patient's need for assistive devices and provide as appropriate  - Encourage maximum independence but intervene and supervise when necessary  - Involve family in performance of ADLs  - Assess for home care needs following discharge   - Consider OT consult to assist with ADL evaluation and planning for discharge  - Provide patient education as appropriate  Outcome: Progressing  Goal: Maintains/Returns to pre admission functional level  Description: INTERVENTIONS:  - Perform AM-PAC 6 Click Basic Mobility/ Daily Activity assessment daily.  - Set and communicate daily mobility goal to care team and patient/family/caregiver.   - Collaborate with rehabilitation services on mobility goals if consulted  - Out of bed for toileting  - Record patient progress and toleration of activity level   Outcome: Progressing     Problem: DISCHARGE PLANNING  Goal: Discharge to home or other facility with appropriate resources  Description: INTERVENTIONS:  - Identify barriers to discharge w/patient and caregiver  - Arrange for needed discharge resources and transportation as appropriate  - Identify discharge learning needs (meds, wound care, etc.)  - Arrange for interpretive services to assist at discharge as needed  - Refer to Case Management Department for coordinating discharge planning if the patient needs post-hospital services based on physician/advanced practitioner order or complex needs  related to functional status, cognitive ability, or social support system  Outcome: Progressing     Problem: Knowledge Deficit  Goal: Patient/family/caregiver demonstrates understanding of disease process, treatment plan, medications, and discharge instructions  Description: Complete learning assessment and assess knowledge base.  Interventions:  - Provide teaching at level of understanding  - Provide teaching via preferred learning methods  Outcome: Progressing     Problem: Prexisting or High Potential for Compromised Skin Integrity  Goal: Skin integrity is maintained or improved  Description: INTERVENTIONS:  - Identify patients at risk for skin breakdown  - Assess and monitor skin integrity  - Assess and monitor nutrition and hydration status  - Monitor labs   - Assess for incontinence   - Turn and reposition patient  - Assist with mobility/ambulation  - Relieve pressure over bony prominences  - Avoid friction and shearing  - Provide appropriate hygiene as needed including keeping skin clean and dry  - Evaluate need for skin moisturizer/barrier cream  - Collaborate with interdisciplinary team   - Patient/family teaching  - Consider wound care consult   Outcome: Progressing     Problem: Nutrition/Hydration-ADULT  Goal: Nutrient/Hydration intake appropriate for improving, restoring or maintaining nutritional needs  Description: Monitor and assess patient's nutrition/hydration status for malnutrition. Collaborate with interdisciplinary team and initiate plan and interventions as ordered.  Monitor patient's weight and dietary intake as ordered or per policy. Utilize nutrition screening tool and intervene as necessary. Determine patient's food preferences and provide high-protein, high-caloric foods as appropriate.     INTERVENTIONS:  - Monitor oral intake, urinary output, labs, and treatment plans  - Assess nutrition and hydration status and recommend course of action  - Evaluate amount of meals eaten  - Assist  patient with eating if necessary   - Allow adequate time for meals  - Recommend/ encourage appropriate diets, oral nutritional supplements, and vitamin/mineral supplements  - Order, calculate, and assess calorie counts as needed  - Recommend, monitor, and adjust tube feedings and TPN/PPN based on assessed needs  - Assess need for intravenous fluids  - Provide specific nutrition/hydration education as appropriate  - Include patient/family/caregiver in decisions related to nutrition  Outcome: Progressing

## 2024-09-09 NOTE — PHYSICAL THERAPY NOTE
Physical Therapy Treatment Note    Patient's Name: Brigid Brown  : 24 1410   PT Last Visit   PT Visit Date 24   Note Type   Note Type Treatment   Pain Assessment   Pain Assessment Tool 0-10   Pain Score 3   Pain Location/Orientation Location: [facility]   Hospital Pain Intervention(s) Repositioned;Ambulation/increased activity   Restrictions/Precautions   Weight Bearing Precautions Per Order No   Other Precautions Pain;Fall Risk;Multiple lines;Bed Alarm;Chair Alarm   General   Chart Reviewed Yes   Response to Previous Treatment Patient with no complaints from previous session.   Family/Caregiver Present Yes  (spouse)   Subjective   Subjective Agreeable to mobilize.   Bed Mobility   Additional Comments Pt greeted in chair.   Transfers   Sit to Stand 5  Supervision   Additional items Armrests;Increased time required;Verbal cues   Stand to Sit 5  Supervision   Additional items Armrests;Increased time required;Verbal cues   Additional Comments RW   Ambulation/Elevation   Gait pattern Narrow LOU;Improper Weight shift  (lateral displacement)   Gait Assistance 5  Supervision  (CS w/ RW / MinAx1 w/o AD)   Additional items Verbal cues   Assistive Device Rolling walker;None   Distance 50' w/ RW + 100' w/o AD + seated rest + 50'x2 w/ RW   Stair Management Assistance Not tested   Balance   Static Sitting Good   Dynamic Sitting Fair +   Static Standing Fair   Dynamic Standing Fair -   Ambulatory Fair -  (RW)   Endurance Deficit   Endurance Deficit Yes   Endurance Deficit Description weakness, fatigue, seated rest break required   Activity Tolerance   Activity Tolerance Patient limited by fatigue   Assessment   Prognosis Good   Problem List Decreased strength;Decreased endurance;Impaired balance;Decreased mobility;Pain   Assessment Pt seen for PT treatment session w/ interventions consisting of t/f + gait training. Pt demonstrated progress this session w/ increased distance covered during gait  training. Pt demonstrated narrow Ruby - cues to widen stance. Attempted gait training w/o AD - pt much more unsteady, w/ L lateral lean, and lateral displacement. Recommend use of RW for ambulation upon d/c (pt stated that she owns a RW). From a PT standpoint continue to recommend HHPT upon d/c.   Goals   Patient Goals go for a walk   PT Treatment Day 2   Plan   Treatment/Interventions Functional transfer training;LE strengthening/ROM;Therapeutic exercise;Endurance training;Patient/family training;Equipment eval/education;Bed mobility;Gait training;Compensatory technique education;Elevations;Family   Progress Progressing toward goals   PT Frequency 2-3x/wk   Discharge Recommendation   Rehab Resource Intensity Level, PT III (Minimum Resource Intensity)   AM-PAC Basic Mobility Inpatient   Turning in Flat Bed Without Bedrails 3   Lying on Back to Sitting on Edge of Flat Bed Without Bedrails 3   Moving Bed to Chair 3   Standing Up From Chair Using Arms 3   Walk in Room 3   Climb 3-5 Stairs With Railing 3   Basic Mobility Inpatient Raw Score 18   Basic Mobility Standardized Score 41.05   [facility] Highest Level Of Mobility   -HLM Goal 6: Walk 10 steps or more   -HLM Achieved 7: Walk 25 feet or more   Education   Education Provided Mobility training;Assistive device   Patient Demonstrates acceptance/verbal understanding;Reinforcement needed   End of Consult   Patient Position at End of Consult Bedside chair;Bed/Chair alarm activated;All needs within reach  (spouse at bedside, all lines in tact)       Agatha Fox, PT, DPT

## 2024-09-09 NOTE — CASE MANAGEMENT
Case Management Discharge Planning Note    Patient name Brigid Brown  Location [facility] 832/[facility] 832-01 MRN [id number]  : 1945 Date 2024       Current Admission Date: 2024  Current Admission Diagnosis:Mesenteric mass   Patient Active Problem List    Diagnosis Date Noted Date Diagnosed    Hyponatremia 2024     Encounter for geriatric assessment 2024     Electrolyte abnormality 2024     Mesenteric mass 2024     SOB (shortness of breath) 2024     Gastritis 2024     Weight loss 05/15/2024     Mitral regurgitation 2024     Sprain of medial collateral ligament of left knee, initial encounter 2024     Age-related osteoporosis without current pathological fracture 2024     Vaginal atrophy 2022     Lactose intolerance 2022     Severe protein-calorie malnutrition (HCC) 2022     Pigmented purpura (HCC) 2022     Chronic kidney disease (CKD) stage G3a/A2, moderately decreased glomerular filtration rate (GFR) between 45-59 mL/min/1.73 square meter and albuminuria creatinine ratio between  mg/g (HCC) 2022     Dizziness 2022     Reflux laryngitis 2021     TMJ dysfunction 2021     Myofascial pain 2021     Tongue pain 2021     Pain in right lumbar region of back 2021     Abdominal pain 2021     Urinary symptom or sign 2021     Right hip pain 2021     Fatigue 2021     Gastroesophageal reflux disease 2021     Dysphonia 2021     Transient alteration of awareness 2021     Symptoms of cerebrovascular accident (CVA) 2021     IBS (irritable bowel syndrome) 2021     Grief 2021     Tinnitus of both ears 2020     Sprain of anterior talofibular ligament of left ankle 2019     Sensorineural hearing loss (SNHL) of both ears 2019     Hashimoto's disease 10/25/2018     History of vitamin D deficiency 10/25/2018     Episodic confusion  03/12/2018     Basal cell carcinoma of right temple region 03/05/2018     Essential hypertension 02/26/2018     Near syncope 02/26/2018     Screening for blood or protein in urine 02/23/2018     History of skin cancer 02/23/2018     Subclinical hypothyroidism 11/28/2017     Changing skin lesion 06/27/2017     Seborrheic keratosis 04/28/2014     Hypothyroidism 05/23/2012     Allergic rhinitis 05/17/2012     Hypertension 05/17/2012       LOS (days): 3  Geometric Mean LOS (GMLOS) (days): 2.1  Days to GMLOS:-0.9     OBJECTIVE:  Risk of Unplanned Readmission Score: 20.63         Current admission status: Inpatient   Preferred Pharmacy:   [id number] - [place] - [address]  [address]  [place] [address]  Phone: [phone] Fax: [phone]    Primary Care Provider: Delicia Cintron DO    Primary Insurance: MEDICARE  Secondary Insurance: AARP    DISCHARGE DETAILS:       Requested Home Health Care         Is the patient interested in HHC at discharge?: Yes  Home Health Discipline requested:: Occupational Therapy, Physical Therapy  [facility] Health Agency [facility]:: [facility]  Home Health Follow-Up Provider:: PCP  Home Health Services Needed:: Strengthening/Theraputic Exercises to Improve Function, Gait/ADL Training  Homebound Criteria Met:: Requires the Assistance of Another Person for Safe Ambulation or to Leave the Home  Supporting Clincal Findings:: Limited Endurance       [facility] reserved for Home PT/OT  Tentative d/c 9/10

## 2024-09-09 NOTE — ASSESSMENT & PLAN NOTE
Status post ex lap and biopsy of mesentery mass on 9/6/2024  Oncology/surgical following  Advancing diet per primary team

## 2024-09-09 NOTE — ASSESSMENT & PLAN NOTE
Etiology: Suspect secondary to hypertensive nephrosclerosis, unexpected 90 lb weight loss and age related nephron loss  Baseline creatinine 0.8-1.1 dating back to 2019  Admission creatinine 0.93 with EGFR 57  Current creatinine today 0.82-at baseline  Previously Dr. Duncan in 2015 for hypertension  Not on ACE/ARB or diuretics outpatient  Urinalysis with micro on 8/22/2024 did not reveal any hematuria RBCs or proteinuria

## 2024-09-09 NOTE — PROGRESS NOTES
"General Surgery  Progress Note   Brigid Brown 79 y.o. female MRN: [id number]  Unit/Bed#: [facility] 832-01 Encounter: [id number]    Assessment:  79 y.o. female with mesenteric mass s/p Ex lap and biopsy of mass .      Plan:  - Advance to clear liquid diet  - NS @50, will consider 1.8% NS if sodium continues to downtrend  - F/u urine sodium & osm and serum osm  - F/u Nephro recs   - F/u geriatrics recs  - Appreciate nutrition recs  - Q6h BMP's  - Replete electrolytes PRN  - PRN pain and nausea control  - Continue urinary retention protocol  - OOB/ ambulation  - Incentive spirometry   - Dispo planning  - Please message the General surgery resident role with any concerns      Subjective/Objective     Subjective:   Patient seen and examined at bedside, in no acute distress. No acute events overnight.  Throughout the day yesterday patient was experiencing some perceptual disturbances when closing her eyes as well as weakness and fatigue.  Patient reports not getting much sleep for the past 2 nights.  Patient is morning reports no more perceptual disturbances.  Patient was finally able to get a good night sleep.  Patient denies any abdominal pain.  No nausea vomiting fevers chills chest pain or shortness of breath.  Patient denies any bowel movement or flatus.  Patient is not ambulating.    Objective:   Vitals:Blood pressure 126/80, pulse (!) 110, temperature 98.4 °F (36.9 °C), resp. rate 18, height 5' 2\" (1.575 m), weight 34 kg (75 lb), SpO2 95%, not currently breastfeeding.  Temp (24hrs), Av.4 °F (36.9 °C), Min:98.2 °F (36.8 °C), Max:98.7 °F (37.1 °C)      I/O          0701   0700  07 0700    P.O. 120 600    I.V. (mL/kg)  1000 (29.4)    IV Piggyback  1000    Total Intake(mL/kg) 120 (3.5) 2600 (76.5)    Urine (mL/kg/hr) 50 (0.1) 550 (1)    Blood      Total Output 50 550    Net +70 +2050                  Invasive Devices       Peripheral Intravenous Line  Duration             Peripheral IV " 09/06/24 Left Hand 2 days    Peripheral IV 09/06/24 Right Forearm 2 days                    Physical Exam:  GEN: NAD  HEENT: MMM  CV: well perfused RR  Lung: Normal effort  Ab: Soft, mild tenderness with deep palpation, moderate distention   Extrem: No CCE   Neuro: A+Ox3     Lab Results   Component Value Date    WBC 8.92 09/08/2024    HGB 11.2 (L) 09/08/2024    HCT 33.0 (L) 09/08/2024    MCV 88 09/08/2024     09/08/2024      Lab Results   Component Value Date    SODIUM 128 (L) 09/09/2024    K 4.3 09/09/2024    CL 99 09/09/2024    CO2 23 09/09/2024    AGAP 6 09/09/2024    BUN 21 09/09/2024    CREATININE 0.74 09/09/2024    GLUC 114 09/09/2024    GLUF 84 08/29/2024    CALCIUM 7.6 (L) 09/09/2024    AST 39 08/22/2024    ALT 29 08/22/2024    ALKPHOS 80 08/22/2024    TP 6.3 (L) 08/22/2024    TBILI 0.48 08/22/2024    EGFR 76 09/09/2024       VTE Prophylaxis: Heparin      Angel Kuhn MD  9/9/2024  5:21 AM

## 2024-09-09 NOTE — CONSULTS
NEPHROLOGY HOSPITAL CONSULTATION   Brigid Brown 79 y.o. female MRN: [id number]  Unit/Bed#: [facility] 832-01 Encounter: [id number]    Brief History of Admission - Presented to [facility] for elective surgical procedure with biopsy of abdominal mass on 9/6/2024.  Nephrology consulted for hyponatremia    Assessment & Plan  Mesenteric mass  Status post ex lap and biopsy of mesentery mass on 9/6/2024  Oncology/surgical following  Advancing diet per primary team  Hyponatremia  Etiology: Multifactorial in the setting of decreased solute intake, +/- SIADH in the setting of acute pain due to surgery, as well as IV fluids  Admission sodium 136  Current sodium today 132-and started to decrease on 9/8/2024  Workup:  Serum osmolality 279  Urine osmolality and urine sodium-needs to be collected  Plan:  With lower extremity edema will discontinue IV fluids and add sodium salt tablets 2 g twice daily  Repeat BMP pending  Currently every 6 hours BMP  Avoid rapid rise correction for goal correction 6-8 points within 24 hours  Chronic kidney disease (CKD) stage G3a/A2, moderately decreased glomerular filtration rate (GFR) between 45-59 mL/min/1.73 square meter and albuminuria creatinine ratio between  mg/g (HCC)  Etiology: Suspect secondary to hypertensive nephrosclerosis, unexpected 90 lb weight loss and age related nephron loss  Baseline creatinine 0.8-1.1 dating back to 2019  Admission creatinine 0.93 with EGFR 57  Current creatinine today 0.82-at baseline  Previously Dr. Duncan in 2015 for hypertension  Not on ACE/ARB or diuretics outpatient  Urinalysis with micro on 8/22/2024 did not reveal any hematuria RBCs or proteinuria    I have reviewed the nephrology recommendations including discontinuing IV fluids and adding salt tablets, with primary team, and we are in agreement with renal plan including the information outlined above.    Portions of the record may have been created with voice recognition software. Occasional wrong word or  "\"sound a like\" substitutions may have occurred due to the inherent limitations of voice recognition software. Read the chart carefully and recognize, using context, where substitutions have occurred.If you have any questions, please contact the dictating provider.    HISTORY OF PRESENT ILLNESS:  Requesting Physician: Angus Drew MD  Reason for Consult: Hyponatremia, CKD stage III AA    Brigid Brown is a 79 y.o. female who was admitted to [facility] after presenting for elective surgical procedure with biopsy of abdominal mass on 9/6/2024.  She has a history of hypertension, mitral regurgitation, hypothyroidism, GERD, CKD  IIIA. A renal consultation is requested today for assistance in the management of hyponatremia.  Question the patient reports just starting to eat.  Has been on a clear liquid diet.  She is taking nutritional supplements and without difficulty.  Denies nausea vomiting headaches dizziness.  Does feel tired and weak.  Reports Caceres catheter was just removed and has not had a bowel movement.  Juan is slightly distended as discomfort.    PAST MEDICAL HISTORY:  Past Medical History:   Diagnosis Date    Acne     Basal cell carcinoma 06/08/2020    right lower forehead    BCC (basal cell carcinoma of skin) 03/09/2020    mid forehead    Benign neoplasm of skin     Disease of thyroid gland 2006    GERD (gastroesophageal reflux disease)     Hypertension     Inflamed seborrheic keratosis     Irritable bowel syndrome     Malignant neoplasm of skin of face     Migraines     Nonmelanoma skin cancer     Last Assessed:6/27/17    Squamous cell skin cancer 06/08/2020    In situ, left lower forehead    Tachycardia     Telogen effluvium     Temporomandibular joint disorder     Vertigo        PAST SURGICAL HISTORY:  Past Surgical History:   Procedure Laterality Date    ADENOIDECTOMY      APPENDECTOMY      CHOLECYSTECTOMY      COLONOSCOPY  05/03/2019    COMPLEX WOUND CLOSURE TO EXTREMITY N/A 7/28/2020    Procedure: COMPLEX " CLOSURE MID FOREHEAD;  Surgeon: Randy Frank MD;  Location: [facility];  Service: Plastics    ESOPHAGOGASTRODUODENOSCOPY  2009    FLAP LOCAL HEAD / NECK N/A 7/28/2020    Procedure: FLAP MID FOREHEAD;  Surgeon: Randy Frank MD;  Location: [facility];  Service: Plastics    HYSTERECTOMY  1987    MALIGNANT SKIN LESION EXCISION      Excision of Lesion Face Malignant-9/14/2004 BCC Forehead    MOHS RECONSTRUCTION N/A 7/28/2020    Procedure: RECONSTRUCTION MOHS DEFECT MID FOREHEAD;  Surgeon: Randy Frank MD;  Location: [facility];  Service: Plastics    MOHS SURGERY  07/27/2020    Right &left lower forehead, mid forehead    OOPHORECTOMY Bilateral 1987    ROTATOR CUFF REPAIR Right     SKIN BIOPSY      TONSILLECTOMY      TUBAL LIGATION  1976?       ALLERGIES:  Allergies   Allergen Reactions    Cortisone Hypertension, Other (See Comments), Shortness Of Breath and Tachycardia    Acebutolol     Acetaminophen GI Intolerance     diarrhea    Amlodipine     Atenolol     Azithromycin     Barium Sulfate Diarrhea     Patient reports watery stool and stomach ache post fluoro upper GI on 6/6/24. Per Radiologist MD Lozada, reaction should not absolutely preclude patient from having barium in the future if necessary. CG RN 6/7/24      Bisphosphonates      Annotation - [id number]: iriitis    Candesartan     Clarithromycin     Erythromycin     Fosinopril     Irbesartan     Levofloxacin     Losartan     Methylprednisolone Hypertension and Tachycardia    Metoprolol     Nisoldipine     Olmesartan     Propranolol     Sulfamethoxazole-Trimethoprim Nausea Only    Timolol     Lidocaine Palpitations and Tachycardia       SOCIAL HISTORY:  Social History     Substance and Sexual Activity   Alcohol Use Not Currently     Social History     Substance and Sexual Activity   Drug Use Never     Social History     Tobacco Use   Smoking Status Never   Smokeless Tobacco Never       FAMILY HISTORY:  Family History   Problem Relation Age of  Onset    Hypertension Mother         Mother    Osteoporosis Mother     Skin cancer Mother     Migraines Mother     Dementia Mother     Hypertension Father         Father    Skin cancer Father     Dementia Father     Skin cancer Sister 73    Migraines Sister     No Known Problems Daughter     Uterine cancer Maternal Grandmother 29    Diabetes type II Maternal Grandfather     Cancer Paternal Grandmother     Uterine cancer Paternal Grandmother 65    No Known Problems Maternal Aunt     Cancer Paternal Aunt         Breast    Uterine cancer Paternal Aunt 55    No Known Problems Paternal Aunt     No Known Problems Paternal Aunt        MEDICATIONS:    Current Facility-Administered Medications:     bisacodyl (DULCOLAX) rectal suppository 10 mg, 10 mg, Rectal, Once, Damian Rubin MD    gabapentin (NEURONTIN) capsule 100 mg, 100 mg, Oral, TID, Silvina Khan PA-C    heparin (porcine) subcutaneous injection 5,000 Units, 5,000 Units, Subcutaneous, Q8H IDALMIS, 5,000 Units at 09/09/24 0509 **AND** [COMPLETED] Platelet count, , , Once, Brady Pack MD    HYDROmorphone (DILAUDID) injection 0.2 mg, 0.2 mg, Intravenous, Q2H PRN, Brady Pack MD    ondansetron (ZOFRAN) injection 4 mg, 4 mg, Intravenous, Q6H PRN, Brady Pack MD    oxyCODONE (ROXICODONE) split tablet 2.5 mg, 2.5 mg, Oral, Q4H PRN **OR** oxyCODONE (ROXICODONE) IR tablet 5 mg, 5 mg, Oral, Q4H PRN, Brady Pack MD, 5 mg at 09/06/24 1726    senna-docusate sodium (SENOKOT S) 8.6-50 mg per tablet 1 tablet, 1 tablet, Oral, HS, Angel Kuhn MD, 1 tablet at 09/08/24 2119    sodium chloride tablet 2 g, 2 g, Oral, BID With Meals, Lucas Tafoya MD    torsemide (DEMADEX) tablet 10 mg, 10 mg, Oral, Once, Lucas Tafoya MD    REVIEW OF SYSTEMS:  Constitutional: +  fatigue, - for anorexia, fever, chills, diaphoresis  HENT: Negative for postnasal drip  Eyes: Negative for visual disturbance.   Respiratory: Negative for cough, shortness of breath and  wheezing.   Cardiovascular: Negative for chest pain, palpitations and leg swelling.   Gastrointestinal: Negative for diarrhea, nausea and vomiting.  + abdominal pain, constipation,  Genitourinary: No dysuria, hematuria-momin just removed   Endocrine: Negative for polyuria.   Musculoskeletal: Negative for arthralgias, back pain and joint swelling.   Skin: Negative for rash.   Neurological: Negative for focal weakness, headaches, dizziness.  Hematological: Negative for easy bruising or bleeding.  Psychiatric/Behavioral: Negative for confusion   All the systems were reviewed and were negative except as documented on the HPI.    PHYSICAL EXAM:  Current Weight: Weight - Scale: 34 kg (75 lb)  First Weight: Weight - Scale: 34 kg (75 lb)  Vitals:    09/08/24 0757 09/08/24 1554 09/08/24 2331 09/09/24 0814   BP: 121/76 124/77 126/80 128/82   Pulse: (!) 124 (!) 124 (!) 110    Resp: 18 (!) 30 18 14   Temp: 98.2 °F (36.8 °C) 98.7 °F (37.1 °C) 98.4 °F (36.9 °C) 98.2 °F (36.8 °C)   TempSrc:       SpO2: 95% 95% 95%    Weight:       Height:           Intake/Output Summary (Last 24 hours) at 9/9/2024 1259  Last data filed at 9/9/2024 1045  Gross per 24 hour   Intake 1660 ml   Output 850 ml   Net 810 ml     Physical Exam  Vitals and nursing note reviewed.   Constitutional:       Comments: NAD, cachectic   HENT:      Head: Normocephalic and atraumatic.      Nose: Nose normal.      Mouth/Throat:      Mouth: Mucous membranes are dry.      Pharynx: Oropharynx is clear.   Eyes:      Extraocular Movements: Extraocular movements intact.      Conjunctiva/sclera: Conjunctivae normal.   Cardiovascular:      Rate and Rhythm: Regular rhythm. Tachycardia present.      Pulses: Normal pulses.      Heart sounds: Normal heart sounds.   Pulmonary:      Effort: Pulmonary effort is normal.      Breath sounds: Normal breath sounds.      Comments: Decreased bases  Abdominal:      General: Bowel sounds are normal. There is distension.      Tenderness: There  is abdominal tenderness.   Musculoskeletal:      Cervical back: Normal range of motion and neck supple.      Right lower leg: Edema present.      Left lower leg: Edema present.      Comments: Trace lower extremity edema   Skin:     General: Skin is warm and dry.      Coloration: Skin is pale.   Neurological:      General: No focal deficit present.      Mental Status: She is alert and oriented to person, place, and time.   Psychiatric:         Mood and Affect: Mood normal.         Behavior: Behavior normal.          Lab Results:   Results from last 7 days   Lab Units 09/09/24  0635 09/09/24  0020 09/08/24  1822 09/08/24  0457 09/07/24  0535 09/06/24  1606 09/06/24  1436   WBC Thousand/uL 6.34  --  8.92 7.73 10.40*   < >  --    HEMOGLOBIN g/dL 11.1*  --  11.2* 10.9* 12.1   < >  --    I STAT HEMOGLOBIN g/dl  --   --   --   --   --   --  10.5*   HEMATOCRIT % 33.5*  --  33.0* 32.6* 36.0   < >  --    HEMATOCRIT, ISTAT %  --   --   --   --   --   --  31*   PLATELETS Thousands/uL 267  --  289 281 306   < >  --    POTASSIUM mmol/L 4.6 4.3 4.0 3.3* 3.7   < >  --    CHLORIDE mmol/L 101 99 99 99 102   < >  --    CO2 mmol/L 24 23 23 23 24   < >  --    CO2, I-STAT mmol/L  --   --   --   --   --   --  25   BUN mg/dL 19 21 22 23 21   < >  --    CREATININE mg/dL 0.82 0.74 0.98 0.91 0.93   < >  --    CALCIUM mg/dL 7.7* 7.6* 7.9* 7.8* 8.1*   < >  --    MAGNESIUM mg/dL  --   --  1.8* 1.9 2.2   < >  --    PHOSPHORUS mg/dL  --   --  2.9  --   --   --   --    GLUCOSE, ISTAT mg/dl  --   --   --   --   --   --  114    < > = values in this interval not displayed.     Other Studies:     [id number]

## 2024-09-09 NOTE — RESTORATIVE TECHNICIAN NOTE
Restorative Technician Note      Patient Name: Brigid Brown     Restorative Tech Visit Date: 09/09/24  Note Type: Mobility  Patient Position Upon Consult: Seated edge of bed  Activity Performed: Ambulated  Assistive Device: Standard walker  Education Provided: Yes  Patient Position at End of Consult: Bedside chair; All needs within reach; Bed/Chair alarm activated    Kristan Alston  DPT, Restorative Technician

## 2024-09-09 NOTE — PROGRESS NOTES
Progress Note  Brigid Brown 79 y.o. female MRN: [id number]  Unit/Bed#: [facility] 832-01 Encounter: [id number]    Assessment:  79F with mesenteric mass s/p 9/6 ex lap and biopsy of mass.     Plan:  - soft diet  - f/u Na, continue NaCl tabs, appreciate nephro recs  - appreciate geriatric recs  - appreciate nutrition recs  - DVT ppx, IS, OOB/ambulate    Subjective/Objective   NAOE. Pain controlled. Tolerating diet, no n/v. No BM, no flatus. Voiding. Walking.    Physical Exam:  General: NAD  CV: nl rate  Lungs: nl wob. No resp distress.  ABD: Soft, ND, NT, CDI  Extrem: No CCE  UOP 1050cc    Patient Vitals for the past 24 hrs:   BP Temp Pulse Resp SpO2   09/09/24 2310 129/77 98.2 °F (36.8 °C) 101 18 95 %   09/09/24 1605 100/70 98 °F (36.7 °C) (!) 110 16 97 %   09/09/24 0814 128/82 98.2 °F (36.8 °C) -- 14 --       I/O         09/07 0701  09/08 0700 09/08 0701  09/09 0700 09/09 0701  09/10 0700    P.O. 120 600     I.V. (mL/kg)  1000 (29.4)     IV Piggyback  1000     Total Intake(mL/kg) 120 (3.5) 2600 (76.5)     Urine (mL/kg/hr) 50 (0.1) 750 (0.9)     Blood       Total Output 50 750     Net +70 +1850            Unmeasured Urine Occurrence  1 x             Recent Labs     09/08/24  0457 09/08/24  1822 09/09/24  0020 09/09/24  0635 09/09/24  1336 09/09/24  2049   WBC 7.73 8.92  --  6.34  --   --    HGB 10.9* 11.2*  --  11.1*  --   --     289  --  267  --   --    SODIUM 130* 129*   < > 132* 135 132*   K 3.3* 4.0   < > 4.6 3.2* 4.0   CL 99 99   < > 101 111* 100   CO2 23 23   < > 24 20* 25   BUN 23 22   < > 19 16 19   CREATININE 0.91 0.98   < > 0.82 0.49* 0.76   GLUC 139 141*   < > 102 107 102   CALCIUM 7.8* 7.9*   < > 7.7* 5.7* 8.6   MG 1.9 1.8*  --   --   --   --    PHOS  --  2.9  --   --   --   --    AST  --   --   --   --   --  22   ALT  --   --   --   --   --  13   ALKPHOS  --   --   --   --   --  55   TBILI  --   --   --   --   --  0.53   EGFR 59 54   < > 67 91 73    < > = values in this interval not displayed.     Lab  Results   Component Value Date    URINECX No Growth <1000 cfu/mL 06/10/2024    LEUKOCYTESUR Large (A) 08/22/2024

## 2024-09-09 NOTE — CONSULTS
"Consultation - Geriatrics   Brigid Brown 79 y.o. female MRN: [id number]  Unit/Bed#: [facility] 832-01 Encounter: [id number]      Assessment/Plan  Weight loss, unexpected  Over the past 6 months she lost approximately 90 pounds with out trying.. She reports she has been eating peanut butter and ensure to gain weight. She has been able to gain about 45 pounds back.   Additionally she has been on cyproheptadine to stimulate appetite  She was previously on remeron but stopped taking it due to adverse side effect of \"burning on tongue\"  Presently on clear liquid diet post op, continue ensure clear  Reports having multiple allergies and food sensitivies  (dairy, fruits and veggies)  Once diet advanced recommend addition of peanut butter  Encouraged small frequent meals, snacks 6-8 times day    Chronic IBS  Follows with GI as outpatient  Prescribed the following:  Periactin QID  Immodium prn  Benefiber daily  Lexapro 5 mg po daily  Has GI follow up appointment 10/30    Frailty  Exacerbated by underlying weight loss  Has supportive   Encourage po intake  Oob, mobilization  PT/OT  IS    Mesenteric mass  S/p biopsy  Clear diet  PT/OT  Encourage oob, mobilization  Maintain delirium precautions    Acute pain due to surgery  Hx of multiple allergies, confirmed allergic to tylenol  Reports doing well with current medications:  Gabapentin 100 mg po TID dose reduced from 300 mg po TID, agree  Prn oxycodone 2.5 mg for moderate pain and prn oxycodone 5 mg po severe pain  Prn IV dilaudid for breakthrough pain  Monitor for constipation    Ambulatory dysfunction  Exacerbated by frailty, surgery, medication, hospitalization, weakness, hx of fall in the past 6 months  Fall precautions  PT/OT  Oob, mobilization    Cognitive screening  No reported memory loss  At risk secondary to hx of CVA  TSH 2.421 (8/22/24)  Recommend check Vitamin B 12 level  MRI brain (1/17/21): mild to moderate scattered chronic microvascular ischemic " change    Delirium precautions:  Alert and oriented x 4  Provide frequent redirection, reorientation, distraction techniques  Avoid deliriogenic medications such as tramadol, benzodiazepines, anticholinergics,  Benadryl  Treat pain, See geriatric pain protocol  Monitor for constipation and urinary retention  Encourage early and frequent moblization, OOB  Encourage Hydration/ Nutrition  Implement sleep hygiene, limit night time interuptions, group activities    Insomnia  Reports awakened by noise  Recommend ear plugs to help block noise  First line is behavioral therapy  Avoid sedative hypnotics such as benzodiazepines and benadryl  Encourage staying awake during the day  Encourage daytime activity, morning exercise  Decrease or eliminate day time naps  Avoid caffeine especially during late afternoon and evening hours  Establish a night time routine      Home medication review  [facility]  Escitalopram 5 mg po daily, last refill 8/21/24 # 90 days        History of Present Illness   Physician Requesting Consult: Angus Drew MD  Reason for Consult / Principal Problem: geriatric assessment  Hx and PE limited by: NA  HPI: Brigid Brown is a 79 y.o. year old female who presents with mesenteric mass. She is admitted for abdominal mass biopsy.    She has hypothyroidism, GERD, Chronic IBS,  HTN, hx of CVA    Prior to arrival she lives at home with her . She is independent with IADLs and ADLs. She wears glasses. She has hearing loss. She has urinary and fecal incontinence.     She is in bed, alert and oriented x 4. She reports she feels better today. She felt very groggy yesterday.     Inpatient consult to Gerontology  Consult performed by: YULISSA Jose  Consult ordered by: YULISSA Walsh          Additional history obtained from: Chart review and patient evaluation.   Review of Systems   Constitutional:  Positive for unexpected weight change.   HENT:  Negative for voice change.    Eyes:  Negative for  visual disturbance.   Respiratory:  Negative for cough.    Cardiovascular:  Negative for chest pain.   Gastrointestinal:  Negative for constipation.   Genitourinary:         Incotinence   Musculoskeletal:  Positive for gait problem.   Neurological:  Positive for weakness.   Psychiatric/Behavioral:  Positive for sleep disturbance. Negative for confusion.        Historical Information   Past Medical History:   Diagnosis Date    Acne     Basal cell carcinoma 06/08/2020    right lower forehead    BCC (basal cell carcinoma of skin) 03/09/2020    mid forehead    Benign neoplasm of skin     Disease of thyroid gland 2006    GERD (gastroesophageal reflux disease)     Hypertension     Inflamed seborrheic keratosis     Irritable bowel syndrome     Malignant neoplasm of skin of face     Migraines     Nonmelanoma skin cancer     Last Assessed:6/27/17    Squamous cell skin cancer 06/08/2020    In situ, left lower forehead    Tachycardia     Telogen effluvium     Temporomandibular joint disorder     Vertigo      Past Surgical History:   Procedure Laterality Date    ADENOIDECTOMY      APPENDECTOMY      CHOLECYSTECTOMY      COLONOSCOPY  05/03/2019    COMPLEX WOUND CLOSURE TO EXTREMITY N/A 7/28/2020    Procedure: COMPLEX CLOSURE MID FOREHEAD;  Surgeon: Randy Frank MD;  Location: [facility];  Service: Plastics    ESOPHAGOGASTRODUODENOSCOPY  2009    FLAP LOCAL HEAD / NECK N/A 7/28/2020    Procedure: FLAP MID FOREHEAD;  Surgeon: Randy Frank MD;  Location: [facility];  Service: Plastics    HYSTERECTOMY  1987    MALIGNANT SKIN LESION EXCISION      Excision of Lesion Face Malignant-9/14/2004 BCC Forehead    MOHS RECONSTRUCTION N/A 7/28/2020    Procedure: RECONSTRUCTION MOHS DEFECT MID FOREHEAD;  Surgeon: Randy Frank MD;  Location: [facility];  Service: Plastics    MOHS SURGERY  07/27/2020    Right &left lower forehead, mid forehead    OOPHORECTOMY Bilateral 1987    ROTATOR CUFF REPAIR Right     SKIN BIOPSY       TONSILLECTOMY      TUBAL LIGATION  1976?     Social History   Social History     Substance and Sexual Activity   Alcohol Use Not Currently     Social History     Substance and Sexual Activity   Drug Use Never     Social History     Tobacco Use   Smoking Status Never   Smokeless Tobacco Never         Family History:   Family History   Problem Relation Age of Onset    Hypertension Mother         Mother    Osteoporosis Mother     Skin cancer Mother     Migraines Mother     Dementia Mother     Hypertension Father         Father    Skin cancer Father     Dementia Father     Skin cancer Sister 73    Migraines Sister     No Known Problems Daughter     Uterine cancer Maternal Grandmother 29    Diabetes type II Maternal Grandfather     Cancer Paternal Grandmother     Uterine cancer Paternal Grandmother 65    No Known Problems Maternal Aunt     Cancer Paternal Aunt         Breast    Uterine cancer Paternal Aunt 55    No Known Problems Paternal Aunt     No Known Problems Paternal Aunt        Meds/Allergies   Current meds:   Current Facility-Administered Medications   Medication Dose Route Frequency    bisacodyl (DULCOLAX) rectal suppository 10 mg  10 mg Rectal Once    gabapentin (NEURONTIN) capsule 100 mg  100 mg Oral TID    heparin (porcine) subcutaneous injection 5,000 Units  5,000 Units Subcutaneous Q8H IDALMIS    HYDROmorphone (DILAUDID) injection 0.2 mg  0.2 mg Intravenous Q2H PRN    ondansetron (ZOFRAN) injection 4 mg  4 mg Intravenous Q6H PRN    oxyCODONE (ROXICODONE) split tablet 2.5 mg  2.5 mg Oral Q4H PRN    Or    oxyCODONE (ROXICODONE) IR tablet 5 mg  5 mg Oral Q4H PRN    senna-docusate sodium (SENOKOT S) 8.6-50 mg per tablet 1 tablet  1 tablet Oral HS    sodium chloride 0.9 % infusion  50 mL/hr Intravenous Continuous          Allergies   Allergen Reactions    Cortisone Hypertension, Other (See Comments), Shortness Of Breath and Tachycardia    Acebutolol     Acetaminophen GI Intolerance     diarrhea    Amlodipine      "Atenolol     Azithromycin     Barium Sulfate Diarrhea     Patient reports watery stool and stomach ache post fluoro upper GI on 6/6/24. Per Radiologist MD Lozada, reaction should not absolutely preclude patient from having barium in the future if necessary. KEZIA RN 6/7/24      Bisphosphonates      Annotation - [id number]: iriitis    Candesartan     Clarithromycin     Erythromycin     Fosinopril     Irbesartan     Levofloxacin     Losartan     Methylprednisolone Hypertension and Tachycardia    Metoprolol     Nisoldipine     Olmesartan     Propranolol     Sulfamethoxazole-Trimethoprim Nausea Only    Timolol     Lidocaine Palpitations and Tachycardia       Objective   Vitals: Blood pressure 128/82, pulse (!) 110, temperature 98.2 °F (36.8 °C), resp. rate 14, height 5' 2\" (1.575 m), weight 34 kg (75 lb), SpO2 95%, not currently breastfeeding.,Body mass index is 13.72 kg/m².      Physical Exam    Lab Results:   I have personally reviewed pertinent lab results including the following:    Lab Results:   Results from last 7 days   Lab Units 09/09/24  0635   WBC Thousand/uL 6.34   HEMOGLOBIN g/dL 11.1*   HEMATOCRIT % 33.5*   PLATELETS Thousands/uL 267        Results from last 7 days   Lab Units 09/09/24  0635 09/06/24  1606 09/06/24  1436   POTASSIUM mmol/L 4.6   < >  --    CHLORIDE mmol/L 101   < >  --    CO2 mmol/L 24   < >  --    CO2, I-STAT mmol/L  --   --  25   BUN mg/dL 19   < >  --    CREATININE mg/dL 0.82   < >  --    CALCIUM mg/dL 7.7*   < >  --    GLUCOSE, ISTAT mg/dl  --   --  114    < > = values in this interval not displayed.       Imaging Studies: I have personally reviewed pertinent films in PACS  MRI brain (1/17/21)    Imaging Studies: I have personally reviewed pertinent films in PACS  EKG, Pathology, and Other Studies: I have personally reviewed pertinent films in PACS  VTE Prophylaxis: Sequential compression device (Venodyne)     Code Status: Level 1 - Full Code    I have spent a total time of 75 minutes in " caring for this patient on the day of the visit/encounter including Diagnostic results, Prognosis, Risks and benefits of tx options, Instructions for management, Patient and family education, Importance of tx compliance, Risk factor reductions, Impressions, Counseling / Coordination of care, Documenting in the medical record, Reviewing / ordering tests, medicine, procedures  , Obtaining or reviewing history  , and Communicating with other healthcare professionals .

## 2024-09-09 NOTE — PLAN OF CARE
Problem: PHYSICAL THERAPY ADULT  Goal: Performs mobility at highest level of function for planned discharge setting.  See evaluation for individualized goals.  Description: Treatment/Interventions: ADL retraining, Functional transfer training, LE strengthening/ROM, Elevations, Therapeutic exercise, Endurance training, Gait training, Spoke to nursing, OT          See flowsheet documentation for full assessment, interventions and recommendations.  Outcome: Progressing  Note: Prognosis: Good  Problem List: Decreased strength, Decreased endurance, Impaired balance, Decreased mobility, Pain  Assessment: Pt seen for PT treatment session w/ interventions consisting of t/f + gait training. Pt demonstrated progress this session w/ increased distance covered during gait training. Pt demonstrated narrow Ruby - cues to widen stance. Attempted gait training w/o AD - pt much more unsteady, w/ L lateral lean, and lateral displacement. Recommend use of RW for ambulation upon d/c (pt stated that she owns a RW). From a PT standpoint continue to recommend HHPT upon d/c.        Rehab Resource Intensity Level, PT: III (Minimum Resource Intensity)    See flowsheet documentation for full assessment.

## 2024-09-10 PROBLEM — Z71.89 GOALS OF CARE, COUNSELING/DISCUSSION: Status: ACTIVE | Noted: 2024-09-10

## 2024-09-10 PROBLEM — F32.A ANXIETY AND DEPRESSION: Status: ACTIVE | Noted: 2024-09-10

## 2024-09-10 PROBLEM — R54 FRAILTY SYNDROME IN GERIATRIC PATIENT: Status: ACTIVE | Noted: 2024-09-10

## 2024-09-10 PROBLEM — Z91.89 AT RISK FOR DELIRIUM: Status: ACTIVE | Noted: 2024-09-10

## 2024-09-10 PROBLEM — Z78.9: Status: ACTIVE | Noted: 2024-09-10

## 2024-09-10 PROBLEM — R26.2 AMBULATORY DYSFUNCTION: Status: ACTIVE | Noted: 2024-09-10

## 2024-09-10 PROBLEM — Z51.5 PALLIATIVE CARE BY SPECIALIST: Status: ACTIVE | Noted: 2024-09-10

## 2024-09-10 PROBLEM — G89.18 ACUTE POST-OPERATIVE PAIN: Status: ACTIVE | Noted: 2024-09-10

## 2024-09-10 PROBLEM — F41.9 ANXIETY AND DEPRESSION: Status: ACTIVE | Noted: 2024-09-10

## 2024-09-10 PROBLEM — R14.0 ABDOMINAL DISTENSION: Status: ACTIVE | Noted: 2024-09-10

## 2024-09-10 PROBLEM — R11.0 NAUSEA: Status: ACTIVE | Noted: 2024-09-10

## 2024-09-10 LAB
ANION GAP SERPL CALCULATED.3IONS-SCNC: 5 MMOL/L (ref 4–13)
BUN SERPL-MCNC: 19 MG/DL (ref 5–25)
CALCIUM SERPL-MCNC: 8 MG/DL (ref 8.4–10.2)
CHLORIDE SERPL-SCNC: 102 MMOL/L (ref 96–108)
CO2 SERPL-SCNC: 24 MMOL/L (ref 21–32)
CREAT SERPL-MCNC: 0.75 MG/DL (ref 0.6–1.3)
GFR SERPL CREATININE-BSD FRML MDRD: 74 ML/MIN/1.73SQ M
GLUCOSE SERPL-MCNC: 99 MG/DL (ref 65–140)
MAGNESIUM SERPL-MCNC: 1.8 MG/DL (ref 1.9–2.7)
PHOSPHATE SERPL-MCNC: 3 MG/DL (ref 2.3–4.1)
POTASSIUM SERPL-SCNC: 4.2 MMOL/L (ref 3.5–5.3)
SODIUM SERPL-SCNC: 131 MMOL/L (ref 135–147)

## 2024-09-10 PROCEDURE — 97168 OT RE-EVAL EST PLAN CARE: CPT

## 2024-09-10 PROCEDURE — 99223 1ST HOSP IP/OBS HIGH 75: CPT | Performed by: INTERNAL MEDICINE

## 2024-09-10 PROCEDURE — 97535 SELF CARE MNGMENT TRAINING: CPT

## 2024-09-10 PROCEDURE — 80048 BASIC METABOLIC PNL TOTAL CA: CPT | Performed by: PHYSICIAN ASSISTANT

## 2024-09-10 PROCEDURE — 99024 POSTOP FOLLOW-UP VISIT: CPT | Performed by: SURGERY

## 2024-09-10 PROCEDURE — 99233 SBSQ HOSP IP/OBS HIGH 50: CPT | Performed by: NURSE PRACTITIONER

## 2024-09-10 PROCEDURE — 99232 SBSQ HOSP IP/OBS MODERATE 35: CPT | Performed by: INTERNAL MEDICINE

## 2024-09-10 PROCEDURE — 84100 ASSAY OF PHOSPHORUS: CPT | Performed by: INTERNAL MEDICINE

## 2024-09-10 PROCEDURE — 83735 ASSAY OF MAGNESIUM: CPT | Performed by: INTERNAL MEDICINE

## 2024-09-10 RX ORDER — OXYCODONE HYDROCHLORIDE 5 MG/1
2.5 TABLET ORAL EVERY 6 HOURS PRN
Qty: 8 TABLET | Refills: 0 | Status: SHIPPED | OUTPATIENT
Start: 2024-09-10 | End: 2024-09-14

## 2024-09-10 RX ORDER — SODIUM CHLORIDE 1 G/1
2 TABLET ORAL
Qty: 180 TABLET | Refills: 0 | Status: SHIPPED | OUTPATIENT
Start: 2024-09-10 | End: 2024-10-10

## 2024-09-10 RX ORDER — MAGNESIUM SULFATE HEPTAHYDRATE 40 MG/ML
2 INJECTION, SOLUTION INTRAVENOUS ONCE
Status: COMPLETED | OUTPATIENT
Start: 2024-09-10 | End: 2024-09-10

## 2024-09-10 RX ORDER — BISACODYL 10 MG
10 SUPPOSITORY, RECTAL RECTAL ONCE
Status: COMPLETED | OUTPATIENT
Start: 2024-09-10 | End: 2024-09-10

## 2024-09-10 RX ORDER — BISACODYL 10 MG
10 SUPPOSITORY, RECTAL RECTAL DAILY PRN
Qty: 7 SUPPOSITORY | Refills: 0 | Status: SHIPPED | OUTPATIENT
Start: 2024-09-10 | End: 2024-09-17

## 2024-09-10 RX ORDER — SODIUM CHLORIDE 1 G/1
2 TABLET ORAL
Status: DISCONTINUED | OUTPATIENT
Start: 2024-09-10 | End: 2024-09-11 | Stop reason: HOSPADM

## 2024-09-10 RX ORDER — AMOXICILLIN 250 MG
1 CAPSULE ORAL
Qty: 7 TABLET | Refills: 0 | Status: SHIPPED | OUTPATIENT
Start: 2024-09-10 | End: 2024-09-17

## 2024-09-10 RX ORDER — SODIUM CHLORIDE 9 MG/ML
50 INJECTION, SOLUTION INTRAVENOUS CONTINUOUS
Status: DISCONTINUED | OUTPATIENT
Start: 2024-09-10 | End: 2024-09-11

## 2024-09-10 RX ADMIN — HEPARIN SODIUM 5000 UNITS: 5000 INJECTION INTRAVENOUS; SUBCUTANEOUS at 21:30

## 2024-09-10 RX ADMIN — HEPARIN SODIUM 5000 UNITS: 5000 INJECTION INTRAVENOUS; SUBCUTANEOUS at 14:04

## 2024-09-10 RX ADMIN — HEPARIN SODIUM 5000 UNITS: 5000 INJECTION INTRAVENOUS; SUBCUTANEOUS at 05:06

## 2024-09-10 RX ADMIN — GABAPENTIN 100 MG: 100 CAPSULE ORAL at 10:08

## 2024-09-10 RX ADMIN — SODIUM CHLORIDE 50 ML/HR: 0.9 INJECTION, SOLUTION INTRAVENOUS at 17:09

## 2024-09-10 RX ADMIN — ONDANSETRON 4 MG: 2 INJECTION INTRAMUSCULAR; INTRAVENOUS at 11:47

## 2024-09-10 RX ADMIN — SODIUM CHLORIDE 2 G: 1 TABLET ORAL at 17:04

## 2024-09-10 RX ADMIN — MAGNESIUM SULFATE HEPTAHYDRATE 2 G: 40 INJECTION, SOLUTION INTRAVENOUS at 09:59

## 2024-09-10 RX ADMIN — SODIUM CHLORIDE 2 G: 1 TABLET ORAL at 11:50

## 2024-09-10 RX ADMIN — BISACODYL 10 MG: 10 SUPPOSITORY RECTAL at 09:59

## 2024-09-10 NOTE — ASSESSMENT & PLAN NOTE
Geriatric pain protocol  Scheduled acetaminophen 975 mg po Q8  Oxycodone 2.5 mg po Q 4 prn moderate pain  Oxycodone 5 mg po Q 4 prn severe pain   Monitor for constipation  Lidoderm patch

## 2024-09-10 NOTE — CONSULTS
Consultation - Palliative Care   Name: Brigid Brown 79 y.o. female I MRN: [id number]  [facility] I Date of Admission: 9/6/2024   Date of Service: 9/10/2024 I Hospital Day: 4   Inpatient consult to Palliative Care  Consult performed by: Dion Bhatia MD  Consult ordered by: Silvina Khan PA-C        Physician Requesting Evaluation: Angus Drew MD   Reason for Evaluation / Principal Problem: mesenteric mass    Assessment & Plan  Goals of care, counseling/discussion  Decisional apparatus:  Patient is / is not competent on exam today.  If competency is lost, patient's substitute decision maker would default to spouse [place] Act 169.  Advance Directive / Living Will / POLST / POA Forms: Yes, living will and POA forms scanned into chart.     Goals  Level 1 code status.   Full code.   Disease focused care.   No limits placed.     Palliative care by specialist  Pertinent palliative diagnoses include: Mesenteric mass concerning for malignancy, CKD, acute post-operative pain, ambulatory dysfunction, anxiety and depression, frail elder    Social support  Patient is supported by spouse  Supportive listening provided  Normalized experience of patient/family  Provided anxiety containment  Provided anticipatory guidance  Investigated spiritual needs    Follow up  Palliative Care will continue to follow for symptom management and goals of care discussions will be ongoing.    Palliative Medicine will sign off today as goals of care are clear and symptoms are well managed at this time.  Please reach out to on-call provider via Epic Secure Chat  if questions or concerns arise.  Please do not hesitate to reach our on call provider through our clinic answering service at [phone] should you have acute symptom control concerns.   Mesenteric mass  Enlarging mass cocnerning for carcinoma  S/p ex lap and mass bx 09/06  See surgical oncology plan  Severe protein-calorie malnutrition (HCC)    - talked at length  about appetite stimulants.    - remeron with adverse rxn   - tolerated cyproheptadine but d/c'd due to admission to hospital.  Would consider re-trialing.     - talked about marinol in the future   - appreciate nutrition consult   - will follow as patient's diet evolves, did seem to have good meal completion today before her n/v.     Malnutrition Findings:   Adult Malnutrition type: Chronic illness  Adult Degree of Malnutrition: Other severe protein calorie malnutrition  Malnutrition Characteristics: Muscle loss, Fat loss, Inadequate energy, Weight loss                  360 Statement: severe protein energy malnutrition is related to physiological causes as evidenced by severe muscle/fat wasting, inadequate energy intakes, and significant weight loss (15#/16.7% weight loss x3m, 31#/29.2% weight loss x6m). Treated with advancing diet as medically able, supplements and education on adequate intakes.    BMI Findings:  Adult BMI Classifications: Underweight < 18.5        Body mass index is 13.72 kg/m².     Acute post-operative pain  - discussed judicious use of opioid therapy, given allergy to tylenol and relative contraindication to antiinflammatory therapy.    - Agree with oxycodone 2.5mg/5mg PRN  - Agree with IV dilaudid 0.2mg PRN for breakthrough  Nausea  - zofran 4mg q4h PRN      Abdominal distension  - will discuss with primary re: follow up imaging.  Defer bowel med changes for now.  Agree with modified diet.               PDMP Review: I have reviewed the patient's controlled substance dispensing history in the Prescription Drug Monitoring Program in compliance with the [place] regulations before prescribing any controlled substances.    History of Present Illness   HPI: Brigid Brown is a 79 y.o. year old female admitted to [facility] under surgical-oncology service for ex-lap and bx of mesenteric mass that is concerning for malignancy. Procedure performed on 09/06/24. PMHx significant for CKD, HTN, MVR, hypothyroidism,  GERD. Geriatrics and nephrology team consulted. [facility] team consulted for goals of care counseling, symptom management and supportive cares.     Patient seen with spouse and daughter at bedside.  Endorses liquidy diarrhea without blood.  Unable to quantify, but reports significant leakage.  Episode of nausea has improved after emesis and a dose of zofran and tolerated most of her sandwich this afternoon.  Abdominal pain worse today than it has been.  Has been trying to avoid additional meds if possible.  Present at baseline, worse with movement.  Gabapentin has not proven overly effective.  Sleeping well the past 2 nights.  Mood has been more anxious today but she feels well supported by her family.        I have reviewed the patient's PMH, PSH, Social History, Family History, Meds, and Allergies    Objective      Temp:  [98 °F (36.7 °C)-98.2 °F (36.8 °C)] 98 °F (36.7 °C)  HR:  [] 97  Resp:  [12-18] 16  BP: (100-138)/(70-80) 122/75  O2 Device: None (Room air)          I/O last 24 hours:  In: 778 [P.O.:778]  Out: 1150 [Urine:1150]  Lines/Drains/Airways       Active Status       None                  Physical Exam  Vitals and nursing note reviewed.   Constitutional:       General: She is not in acute distress.     Appearance: She is ill-appearing. She is not toxic-appearing or diaphoretic.   HENT:      Head: Atraumatic.      Right Ear: External ear normal.      Left Ear: External ear normal.      Nose: Nose normal.      Mouth/Throat:      Mouth: Mucous membranes are moist.   Eyes:      General: No scleral icterus.        Right eye: No discharge.         Left eye: No discharge.   Cardiovascular:      Rate and Rhythm: Normal rate.   Pulmonary:      Effort: No respiratory distress.   Abdominal:      General: There is distension.      Tenderness: There is abdominal tenderness (near incision site).   Musculoskeletal:         General: Swelling and deformity (diffuse muscle wasitng with prominent clavicles, muscle wasting  in forearms.) present.   Skin:     General: Skin is warm and dry.      Coloration: Skin is not jaundiced.   Neurological:      General: No focal deficit present.      Mental Status: She is alert and oriented to person, place, and time.   Psychiatric:         Mood and Affect: Mood normal.         Behavior: Behavior normal.          Lab Results: I have reviewed the following results: CBC/BMP:   .     09/10/24  0643   SODIUM 131*   K 4.2      CO2 24   BUN 19   CREATININE 0.75   GLUC 99   MG 1.8*   PHOS 3.0    , Creatinine Clearance: Estimated Creatinine Clearance: 31.6 mL/min (by C-G formula based on SCr of 0.75 mg/dL).  Lab Results   Component Value Date/Time    SODIUM 131 (L) 09/10/2024 06:43 AM    SODIUM 139 08/20/2019 06:45 AM    K 4.2 09/10/2024 06:43 AM    K 3.8 08/20/2019 06:45 AM    BUN 19 09/10/2024 06:43 AM    BUN 25 11/22/2019 11:30 AM    CREATININE 0.75 09/10/2024 06:43 AM    CREATININE 1.01 11/22/2019 11:30 AM    GLUC 99 09/10/2024 06:43 AM    GLUC 81 08/20/2019 06:45 AM    CALCIUM 8.0 (L) 09/10/2024 06:43 AM    CALCIUM 8.9 08/20/2019 06:45 AM    AST 22 09/09/2024 08:49 PM    AST 16 08/20/2019 06:45 AM    ALT 13 09/09/2024 08:49 PM    ALT 26 08/20/2019 06:45 AM    ALB 2.9 (L) 09/09/2024 08:49 PM    ALB 4.0 08/20/2019 06:45 AM    TP 5.5 (L) 09/09/2024 08:49 PM    TP 6.8 08/20/2019 06:45 AM    EGFR 74 09/10/2024 06:43 AM    EGFR 54 (L) 11/22/2019 11:30 AM     Lab Results   Component Value Date/Time    HGB 11.1 (L) 09/09/2024 06:35 AM    WBC 6.34 09/09/2024 06:35 AM     09/09/2024 06:35 AM    INR 1.05 08/20/2024 07:10 AM    PTT 36 (H) 08/20/2024 07:10 AM     Lab Results   Component Value Date/Time    [id number] 2.421 08/22/2024 04:34 PM       Imaging Review: Reviewed radiology reports from this admission including: CT abdomen/pelvis and xray(s).  Other Studies: Pathology reports reviewed.    Code Status: Level 1 - Full Code  Advance Directive and Living Will: Yes    Power of :  Yes  POLST:      Administrative Statements   I have spent a total time of 60 minutes in caring for this patient on the day of the visit/encounter including Risks and benefits of tx options, Instructions for management, Patient and family education, Counseling / Coordination of care, Documenting in the medical record, Reviewing / ordering tests, medicine, procedures  , Obtaining or reviewing history  , and Communicating with other healthcare professionals .

## 2024-09-10 NOTE — OCCUPATIONAL THERAPY NOTE
Patient participated in Skilled OT session 9/102/2024 with interventions consisting of ADL re training with the use of correct body mechnaics, Energy Conservation techniques, deep breathing technique, safety awareness and fall prevention techniques, therapeutic exercise to: increase functional use of BUEs, increase BUE muscle strength ,  therapeutic activities to: increase activity tolerance, increase standing tolerance time with unilateral UE support to complete sink level ADLs, increase dynamic sit/ stand balance during functional activity , increase postural control, increase trunk control, and increase OOB/ sitting tolerance . Patient agreeable to OT treatment session, upon arrival patient was found supine in bed.  In comparison to previous session, patient with improvements in endurance and standing tolerance . Patient requiring verbal cues for pacing thru activity steps. Patient continues to be functioning below baseline level, occupational performance remains limited secondary to factors listed above and increased risk for falls and injury.   From OT standpoint, recommendation at time of d/c would be Home OT when medically stable.   Patient to benefit from continued Occupational Therapy treatment while in the hospital to address deficits as defined above and maximize level of functional independence with ADLs and functional mobility.

## 2024-09-10 NOTE — ASSESSMENT & PLAN NOTE
- will discuss with primary re: follow up imaging.  Defer bowel med changes for now.  Agree with modified diet.

## 2024-09-10 NOTE — ASSESSMENT & PLAN NOTE
Decisional apparatus:  Patient is / is not competent on exam today.  If competency is lost, patient's substitute decision maker would default to spouse by [place] Act 169.  Advance Directive / Living Will / POLST / POA Forms: Yes, living will and POA forms scanned into chart.     Goals  Level 1 code status.   Full code.   Disease focused care.   No limits placed.

## 2024-09-10 NOTE — PLAN OF CARE
Problem: PAIN - ADULT  Goal: Verbalizes/displays adequate comfort level or baseline comfort level  Description: Interventions:  - Encourage patient to monitor pain and request assistance  - Assess pain using appropriate pain scale  - Administer analgesics based on type and severity of pain and evaluate response  - Implement non-pharmacological measures as appropriate and evaluate response  - Consider cultural and social influences on pain and pain management  - Notify physician/advanced practitioner if interventions unsuccessful or patient reports new pain  Outcome: Progressing     Problem: INFECTION - ADULT  Goal: Absence or prevention of progression during hospitalization  Description: INTERVENTIONS:  - Assess and monitor for signs and symptoms of infection  - Monitor lab/diagnostic results  - Monitor all insertion sites, i.e. indwelling lines, tubes, and drains  - Monitor endotracheal if appropriate and nasal secretions for changes in amount and color  - [place] appropriate cooling/warming therapies per order  - Administer medications as ordered  - Instruct and encourage patient and family to use good hand hygiene technique  - Identify and instruct in appropriate isolation precautions for identified infection/condition  Outcome: Progressing  Goal: Absence of fever/infection during neutropenic period  Description: INTERVENTIONS:  - Monitor WBC    Outcome: Progressing     Problem: SAFETY ADULT  Goal: Patient will remain free of falls  Description: INTERVENTIONS:  - Educate patient/family on patient safety including physical limitations  - Instruct patient to call for assistance with activity   - Consult OT/PT to assist with strengthening/mobility   - Keep Call bell within reach  - Keep bed low and locked with side rails adjusted as appropriate  - Keep care items and personal belongings within reach  - Initiate and maintain comfort rounds  - Make Fall Risk Sign visible to staff  - Apply yellow socks and bracelet  for high fall risk patients  - Consider moving patient to room near nurses station  Outcome: Progressing  Goal: Maintain or return to baseline ADL function  Description: INTERVENTIONS:  -  Assess patient's ability to carry out ADLs; assess patient's baseline for ADL function and identify physical deficits which impact ability to perform ADLs (bathing, care of mouth/teeth, toileting, grooming, dressing, etc.)  - Assess/evaluate cause of self-care deficits   - Assess range of motion  - Assess patient's mobility; develop plan if impaired  - Assess patient's need for assistive devices and provide as appropriate  - Encourage maximum independence but intervene and supervise when necessary  - Involve family in performance of ADLs  - Assess for home care needs following discharge   - Consider OT consult to assist with ADL evaluation and planning for discharge  - Provide patient education as appropriate  Outcome: Progressing  Goal: Maintains/Returns to pre admission functional level  Description: INTERVENTIONS:  - Perform AM-PAC 6 Click Basic Mobility/ Daily Activity assessment daily.  - Set and communicate daily mobility goal to care team and patient/family/caregiver.   - Collaborate with rehabilitation services on mobility goals if consulted  - Out of bed for toileting  - Record patient progress and toleration of activity level   Outcome: Progressing     Problem: DISCHARGE PLANNING  Goal: Discharge to home or other facility with appropriate resources  Description: INTERVENTIONS:  - Identify barriers to discharge w/patient and caregiver  - Arrange for needed discharge resources and transportation as appropriate  - Identify discharge learning needs (meds, wound care, etc.)  - Arrange for interpretive services to assist at discharge as needed  - Refer to Case Management Department for coordinating discharge planning if the patient needs post-hospital services based on physician/advanced practitioner order or complex needs  related to functional status, cognitive ability, or social support system  Outcome: Progressing     Problem: Knowledge Deficit  Goal: Patient/family/caregiver demonstrates understanding of disease process, treatment plan, medications, and discharge instructions  Description: Complete learning assessment and assess knowledge base.  Interventions:  - Provide teaching at level of understanding  - Provide teaching via preferred learning methods  Outcome: Progressing     Problem: Prexisting or High Potential for Compromised Skin Integrity  Goal: Skin integrity is maintained or improved  Description: INTERVENTIONS:  - Identify patients at risk for skin breakdown  - Assess and monitor skin integrity  - Assess and monitor nutrition and hydration status  - Monitor labs   - Assess for incontinence   - Turn and reposition patient  - Assist with mobility/ambulation  - Relieve pressure over bony prominences  - Avoid friction and shearing  - Provide appropriate hygiene as needed including keeping skin clean and dry  - Evaluate need for skin moisturizer/barrier cream  - Collaborate with interdisciplinary team   - Patient/family teaching  - Consider wound care consult   Outcome: Progressing     Problem: Nutrition/Hydration-ADULT  Goal: Nutrient/Hydration intake appropriate for improving, restoring or maintaining nutritional needs  Description: Monitor and assess patient's nutrition/hydration status for malnutrition. Collaborate with interdisciplinary team and initiate plan and interventions as ordered.  Monitor patient's weight and dietary intake as ordered or per policy. Utilize nutrition screening tool and intervene as necessary. Determine patient's food preferences and provide high-protein, high-caloric foods as appropriate.     INTERVENTIONS:  - Monitor oral intake, urinary output, labs, and treatment plans  - Assess nutrition and hydration status and recommend course of action  - Evaluate amount of meals eaten  - Assist  patient with eating if necessary   - Allow adequate time for meals  - Recommend/ encourage appropriate diets, oral nutritional supplements, and vitamin/mineral supplements  - Order, calculate, and assess calorie counts as needed  - Recommend, monitor, and adjust tube feedings and TPN/PPN based on assessed needs  - Assess need for intravenous fluids  - Provide specific nutrition/hydration education as appropriate  - Include patient/family/caregiver in decisions related to nutrition  Outcome: Progressing

## 2024-09-10 NOTE — PLAN OF CARE
Problem: OCCUPATIONAL THERAPY ADULT  Goal: Performs self-care activities at highest level of function for planned discharge setting.  See evaluation for individualized goals.  Description: Treatment Interventions: ADL retraining, Functional transfer training, UE strengthening/ROM, Endurance training, Patient/family training, Equipment evaluation/education, Compensatory technique education, Continued evaluation, Energy conservation, Activityengagement          See flowsheet documentation for full assessment, interventions and recommendations.   Note: Limitation: Decreased ADL status, Decreased endurance, Decreased self-care trans, Decreased high-level ADLs  Prognosis: Fair  Assessment: Pt seen for OT re-eval 9/10 due to increased fatigue, incontinence, nausea and decreased strength. Pt presented to [facility] on 9/6 for mesenteric mass, s/p ex lap biopsy. Pt seen for initial OT evaluation on 9/7 and performed at a S/Min A level; OT signed off and recommended Home OT. At time of reeval, pt performing bed mobility at S, transfers and functional mobility @ S, LB dressing @ Min-Mod A 2/2 pain/fatigue, UB dress S. Pt uses RW for functional mobility at this time 2/2 decreased strength and increased fatigue/pain/nausea. Pt seated OOB in chair @ end of session w/ all needs in reach. Anticipate pt can D/C home w/ Home OT services. Will continue to follow to work toward below established goals and maximize functional independence prior to D/C.     Rehab Resource Intensity Level, OT: III (Minimum Resource Intensity)     Yudith Kim MS, OTR/L

## 2024-09-10 NOTE — OCCUPATIONAL THERAPY NOTE
Occupational Therapy Re-Evaluation     Brigid Brown    9/10/2024    Principal Problem:    Mesenteric mass  Active Problems:    Sensorineural hearing loss (SNHL) of both ears    IBS (irritable bowel syndrome)    Severe protein-calorie malnutrition (HCC)    Chronic kidney disease (CKD) stage G3a/A2, moderately decreased glomerular filtration rate (GFR) between 45-59 mL/min/1.73 square meter and albuminuria creatinine ratio between  mg/g (HCC)    Hyponatremia    Frailty syndrome in geriatric patient    Ambulatory dysfunction    Acute post-operative pain    Anxiety and depression    At risk for delirium    No impairment of memory      @hPl@    Past Surgical History:   Procedure Laterality Date    ADENOIDECTOMY      APPENDECTOMY      CHOLECYSTECTOMY      COLONOSCOPY  05/03/2019    COMPLEX WOUND CLOSURE TO EXTREMITY N/A 7/28/2020    Procedure: COMPLEX CLOSURE MID FOREHEAD;  Surgeon: Randy Frank MD;  Location: [facility];  Service: Plastics    ESOPHAGOGASTRODUODENOSCOPY  2009    FLAP LOCAL HEAD / NECK N/A 7/28/2020    Procedure: FLAP MID FOREHEAD;  Surgeon: Randy Frank MD;  Location: [facility];  Service: Plastics    HYSTERECTOMY  1987    LYMPH NODE DISSECTION N/A 9/6/2024    Procedure: BIOPSY ABDOMINAL MASS, LYMPHOMA PROTOCOL;  Surgeon: Angus Drew MD;  Location: [facility];  Service: Surgical Oncology    MALIGNANT SKIN LESION EXCISION      Excision of Lesion Face Malignant-9/14/2004 [facility] Forehead    MOHS RECONSTRUCTION N/A 7/28/2020    Procedure: RECONSTRUCTION MOHS DEFECT MID FOREHEAD;  Surgeon: Randy Frank MD;  Location: [facility];  Service: Plastics    MOHS SURGERY  07/27/2020    Right &left lower forehead, mid forehead    OOPHORECTOMY Bilateral 1987    ROTATOR CUFF REPAIR Right     SKIN BIOPSY      TONSILLECTOMY      TUBAL LIGATION  1976?        09/10/24 1120   OT Last Visit   OT Visit Date 09/10/24   Note Type   Note type Re-Evaluation   Pain Assessment   Pain Assessment Tool  0-10   Pain Score 5   Pain Location/Orientation Location: Abdomen   Pain Onset/Description Onset: Ongoing;Descriptor: Discomfort   Patient's Stated Pain Goal No pain   Hospital Pain Intervention(s) Repositioned;Ambulation/increased activity;Emotional support   Restrictions/Precautions   Weight Bearing Precautions Per Order No   Other Precautions Multiple lines;Pain;Fall Risk   Home Living   Additional Comments see initial OT eval   Prior Function   Comments see initial OT eval   Lifestyle   Autonomy I with ADL's/shares homemaking tasks with spouse, no AD with functional mobiltiy   Reciprocal Relationships spouse   Service to Others retired   Intrinsic Gratification wordle   General   Family/Caregiver Present No   ADL   Eating Assistance 5  Supervision/Setup   Grooming Assistance 5  Supervision/Setup   UB Bathing Assistance 5  Supervision/Setup   LB Bathing Assistance 4  Minimal Assistance   UB Dressing Assistance 5  Supervision/Setup   LB Dressing Assistance 3  Moderate Assistance   LB Dressing Deficit Thread RLE into underwear;Thread LLE into underwear;Fasteners   Toileting Assistance  4  Minimal Assistance   Functional Assistance 5  Supervision/Setup   Functional Deficit Increased time to complete   Bed Mobility   Supine to Sit 5  Supervision   Additional items Increased time required;Verbal cues   Sit to Supine 5  Supervision   Additional items Increased time required;Verbal cues   Additional Comments sat EOB w G balance/trunk control. Limited by nausea and abdominal pain.   Transfers   Sit to Stand 5  Supervision   Additional items Increased time required;Verbal cues   Stand to Sit 5  Supervision   Additional items Increased time required;Verbal cues   Additional Comments no AD/DME used   Functional Mobility   Functional Mobility 5  Supervision   Additional Comments pt engaged in short household distance functional mobility w/ S; use of RW   Additional items Rolling walker   Balance   Static Sitting Fair    Dynamic Sitting Fair -   Static Standing Fair -   Dynamic Standing Fair -   Ambulatory Fair -   Activity Tolerance   Activity Tolerance Patient limited by fatigue;Patient limited by pain   Medical Staff Made Aware PT, RN, MD   Nurse Made Aware yes   RUE Assessment   RUE Assessment WFL   LUE Assessment   LUE Assessment WFL   Hand Function   Gross Motor Coordination Functional   Fine Motor Coordination Functional   Vision-Basic Assessment   Current Vision Wears glasses all the time   Psychosocial   Psychosocial (WDL) WDL   Cognition   Overall Cognitive Status WFL   Arousal/Participation Responsive;Cooperative   Attention Within functional limits   Orientation Level Oriented X4   Memory Within functional limits   Following Commands Follows all commands and directions without difficulty   Comments pt is pleasant and cooperative. limited by nausea and fatigiue   Assessment   Limitation Decreased ADL status;Decreased endurance;Decreased self-care trans;Decreased high-level ADLs   Prognosis Fair   Assessment Pt seen for OT re-eval 9/10 due to increased fatigue, incontinence, nausea and decreased strength. Pt presented to [facility] on 9/6 for mesenteric mass, s/p ex lap biopsy. Pt seen for initial OT evaluation on 9/7 and performed at a S/Min A level; OT signed off and recommended Home OT. At time of reeval, pt performing bed mobility at S, transfers and functional mobility @ S, LB dressing @ Min-Mod A 2/2 pain/fatigue, UB dress S. Pt uses RW for functional mobility at this time 2/2 decreased strength and increased fatigue/pain/nausea. Pt seated OOB in chair @ end of session w/ all needs in reach. Continues to be limited by pain, fatigue, activity tolerance, decreased strength, decreased forward functional reach, LB ADLS and functional mobility.Anticipate pt can D/C home w/ Home OT services. Will continue to follow to work toward below established goals and maximize functional independence prior to D/C.   Goals   Patient Goals  to have less pain   LTG Time Frame 10-14   Long Term Goal #1 see below listed goals   Plan   Treatment Interventions ADL retraining;Functional transfer training;UE strengthening/ROM;Endurance training;Patient/family training;Equipment evaluation/education;Compensatory technique education;Continued evaluation;Energy conservation;Activityengagement   Goal Expiration Date 09/24/24   OT Frequency 2-3x/wk   Discharge Recommendation   Rehab Resource Intensity Level, OT III (Minimum Resource Intensity)   Additional Comments  The patient's raw score on the AM-PAC Daily Activity Inpatient Short Form is 19. A raw score of greater than or equal to 19 suggests the patient may benefit from discharge to home. Please refer to the recommendation of the Occupational Therapist for safe discharge planning.   AM-PAC Daily Activity Inpatient   Lower Body Dressing 2   Bathing 3   Toileting 3   Upper Body Dressing 3   Grooming 4   Eating 4   Daily Activity Raw Score 19   Daily Activity Standardized Score (Calc for Raw Score >=11) 40.22   AM-PAC Applied Cognition Inpatient   Following a Speech/Presentation 3   Understanding Ordinary Conversation 4   Taking Medications 4   Remembering Where Things Are Placed or Put Away 4   Remembering List of 4-5 Errands 4   Taking Care of Complicated Tasks 3   Applied Cognition Raw Score 22   Applied Cognition Standardized Score 47.83   Additional Treatment Session   Start Time 1105   End Time 1120   Treatment Assessment Pt seen for additional OT tx w/ focus on toileting, LB ADLS, standing tolerance, oral care, endurance and functional mobility. Pt performed bed mobility at a S level; sat EOB for approx 5 mins w/ CGA for balance. Limited by nausea and pain while seated EOB. Pt c/o of incontinence during the day, pt required Mod A for donning of brief while @ bedside. Mod A for hygiene while in standing. Pt then stood @ sink w/ S to complete oral care w/ unilateral UE support for balance. Pt completed  transfers and functional mobility @ S level. Used RW for household distance functional mobility. Pt limited by pain,fatigue, and nausea this session. Placed OOB in recliner @ end of session w/ all needs in reach. Anticipate pt will continue to be ok to D/C home w/ Home OT services, pending progress and medically stability. Will continue to follow.   Additional Treatment Day 1   End of Consult   Education Provided Yes   Patient Position at End of Consult Bedside chair;All needs within reach   Nurse Communication Nurse aware of consult       GOALS    1) Pt will improve activity tolerance to G for 30 min txment sessions for increase engagement in functional tasks    2) Pt will complete UB/LB dressing/self care w/ mod I using adaptive device and DME as needed    3) Pt will complete bathing w/ Mod I w/ use of AE and DME as needed    4) Pt will complete toileting w/ mod I w/ G hygiene/thoroughness using DME as needed    5) Pt will improve functional transfers to Mod I on/off all surfaces using DME as needed w/ G balance/safety     6) Pt will improve functional mobility during ADL/IADL/leisure tasks to Mod I using DME as needed w/ G balance/safety     7) Pt will participate in simulated IADL management task to increase independence to Mod I w/ G safety and endurance    8) Pt will be attentive 100% of the time during ongoing cognitive assessment w/ G participation to assist w/ safe d/c planning/recommendations    9) Pt will demonstrate G carryover of pt/caregiver education and training as appropriate w/o cues w/ good tolerance to increase safety during functional tasks    10) Pt will demonstrate 100% carryover of energy conservation techniques t/o functional I/ADL/leisure tasks w/o cues s/p skilled education to increase endurance during functional tasks     Yudith Kim MS, OTR/L

## 2024-09-10 NOTE — ASSESSMENT & PLAN NOTE
- talked at length about appetite stimulants.    - remeron with adverse rxn   - tolerated cyproheptadine but d/c'd due to admission to hospital.  Would consider re-trialing.     - talked about marinol in the future   - appreciate nutrition consult   - will follow as patient's diet evolves, did seem to have good meal completion today before her n/v.     Malnutrition Findings:   Adult Malnutrition type: Chronic illness  Adult Degree of Malnutrition: Other severe protein calorie malnutrition  Malnutrition Characteristics: Muscle loss, Fat loss, Inadequate energy, Weight loss                  360 Statement: severe protein energy malnutrition is related to physiological causes as evidenced by severe muscle/fat wasting, inadequate energy intakes, and significant weight loss (15#/16.7% weight loss x3m, 31#/29.2% weight loss x6m). Treated with advancing diet as medically able, supplements and education on adequate intakes.    BMI Findings:  Adult BMI Classifications: Underweight < 18.5        Body mass index is 13.72 kg/m².

## 2024-09-10 NOTE — CASE MANAGEMENT
Case Management Discharge Planning Note    Patient name Brigid Brown  Location [facility] 832/[facility] 832-01 MRN [id number]  : 1945 Date 9/10/2024       Current Admission Date: 2024  Current Admission Diagnosis:Mesenteric mass   Patient Active Problem List    Diagnosis Date Noted Date Diagnosed    Hyponatremia 2024     Encounter for geriatric assessment 2024     Electrolyte abnormality 2024     Mesenteric mass 2024     SOB (shortness of breath) 2024     Gastritis 2024     Weight loss 05/15/2024     Mitral regurgitation 2024     Sprain of medial collateral ligament of left knee, initial encounter 2024     Age-related osteoporosis without current pathological fracture 2024     Vaginal atrophy 2022     Lactose intolerance 2022     Severe protein-calorie malnutrition (HCC) 2022     Pigmented purpura (HCC) 2022     Chronic kidney disease (CKD) stage G3a/A2, moderately decreased glomerular filtration rate (GFR) between 45-59 mL/min/1.73 square meter and albuminuria creatinine ratio between  mg/g (HCC) 2022     Dizziness 2022     Reflux laryngitis 2021     TMJ dysfunction 2021     Myofascial pain 2021     Tongue pain 2021     Pain in right lumbar region of back 2021     Abdominal pain 2021     Urinary symptom or sign 2021     Right hip pain 2021     Fatigue 2021     Gastroesophageal reflux disease 2021     Dysphonia 2021     Transient alteration of awareness 2021     Symptoms of cerebrovascular accident (CVA) 2021     IBS (irritable bowel syndrome) 2021     Grief 2021     Tinnitus of both ears 2020     Sprain of anterior talofibular ligament of left ankle 2019     Sensorineural hearing loss (SNHL) of both ears 2019     Hashimoto's disease 10/25/2018     History of vitamin D deficiency 10/25/2018     Episodic confusion  03/12/2018     Basal cell carcinoma of right temple region 03/05/2018     Essential hypertension 02/26/2018     Near syncope 02/26/2018     Screening for blood or protein in urine 02/23/2018     History of skin cancer 02/23/2018     Subclinical hypothyroidism 11/28/2017     Changing skin lesion 06/27/2017     Seborrheic keratosis 04/28/2014     Hypothyroidism 05/23/2012     Allergic rhinitis 05/17/2012     Hypertension 05/17/2012       LOS (days): 4  Geometric Mean LOS (GMLOS) (days): 2.1  Days to GMLOS:-1.7     OBJECTIVE:  Risk of Unplanned Readmission Score: 21.08         Current admission status: Inpatient   Preferred Pharmacy:   [id number] - [place] - [address]  [address]  [place] [address]  Phone: [phone] Fax: [phone]    Primary Care Provider: Delicia Cintron DO    Primary Insurance: MEDICARE  Secondary Insurance: AARP    DISCHARGE DETAILS:                                Requested Home Health Care         Is the patient interested in HHC at discharge?: Yes  Home Health Discipline requested:: Occupational Therapy, Physical Therapy  Home Health Agency Name:: [facility]  Home Health Follow-Up Provider:: PCP  Home Health Services Needed:: Strengthening/Theraputic Exercises to Improve Function, Gait/ADL Training  Homebound Criteria Met:: Requires the Assistance of Another Person for Safe Ambulation or to Leave the Home, Uses an Assist Device (i.e. cane, walker, etc)  Supporting Clincal Findings:: Fatigues Easliy in Short Distances          IMM Given (Date):: 09/10/24  IMM Given to:: Patient              Pt clear for d/c to home  HHC reserved with [facility]  IMM reviewed with patient, signed and placed in chart   to transport home

## 2024-09-10 NOTE — ASSESSMENT & PLAN NOTE
Clinical Frail Scale: 5- Mildly Frail  Has supportive   Frailty exacerbated by weight loss  PT/OT

## 2024-09-10 NOTE — ASSESSMENT & PLAN NOTE
Chronic, exacerbated by hospitalization, surgery   Pt tearful that she had an episode of diarrhea with incontinence this am, abd now slightly distended

## 2024-09-10 NOTE — ASSESSMENT & PLAN NOTE
Follows with GI as outpatient  Prescribed the following:  Periactin QID  Immodium prn  Benefiber daily  Lexapro 5 mg po daily  Has follow up appointment with GI on 10/30

## 2024-09-10 NOTE — PLAN OF CARE
Problem: Nutrition/Hydration-ADULT  Goal: Nutrient/Hydration intake appropriate for improving, restoring or maintaining nutritional needs  Description: Monitor and assess patient's nutrition/hydration status for malnutrition. Collaborate with interdisciplinary team and initiate plan and interventions as ordered.  Monitor patient's weight and dietary intake as ordered or per policy. Utilize nutrition screening tool and intervene as necessary. Determine patient's food preferences and provide high-protein, high-caloric foods as appropriate.     INTERVENTIONS:  - Monitor oral intake, urinary output, labs, and treatment plans  - Assess nutrition and hydration status and recommend course of action  - Evaluate amount of meals eaten  - Assist patient with eating if necessary   - Allow adequate time for meals  - Recommend/ encourage appropriate diets, oral nutritional supplements, and vitamin/mineral supplements  - Order, calculate, and assess calorie counts as needed  - Recommend, monitor, and adjust tube feedings and TPN/PPN based on assessed needs  - Assess need for intravenous fluids  - Provide specific nutrition/hydration education as appropriate  - Include patient/family/caregiver in decisions related to nutrition  Outcome: Progressing Slowly

## 2024-09-10 NOTE — PROGRESS NOTES
NEPHROLOGY PROGRESS NOTE   Brigid Brown 79 y.o. female MRN: [id number]  Unit/Bed#: [facility] 832-01 Encounter: [id number]    ASSESSMENT & PLAN:  79-year-old female presented to [facility] for elective surgical procedure for biopsy of abdominal mass which was done on 9/6 by surgical team.  Fluids are currently consulted for hyponatremia.  Hyponatremia  -Admission Sodium: Was at normal range at 136 on 9/6.  -Baseline Sodium: Did have sodium 131-135 in April to June  -Work Up: Urine Na: 12 , Urine Osmolality 389 , Serum Osmolality 279 , TSH-2.4  -Etiology: Hyponatremia due to poor solute intake and possibility of fluid overload as she had lower extremity edema possibly from third spacing with low serum albumin.  Urine studies do not suggest SIADH  -Plan:   Last sodium level from today a.m. trended down to 131 meq/L.  Increase salt tablets 2 g 3 times daily.  Continue fluid restriction 1.5 L/day.  Continue to monitor sodium level, if sodium level more than 130 meq/L, okay for discharge from nephrology side.     Chronic kidney disease stage IIIa  -Likely due to hypertensive nephrosclerosis and age-related nephron loss  -Baseline creatinine 0.8 to 1.1 mg/dL  -Admission creatinine was 0.93 mg/dL  -UA from 8/22/2024 did not show any RBC.  Showed only 4-10 WBC but no UTI symptoms.  No proteinuria  -Currently stable creatinine 0.75 mg/dL, continue to monitor     Hypokalemia, resolved status post replacement    Hypocalcemia: Status post replacement on 9/9.  Possibility of blood work on 9/9 1 PM to be another as calcium level has improved with 2 g of IV calcium gluconate.  Corrected calcium will be at normal range, continue to monitor    Hypomagnesemia magnesium 1.8-replaced     Anemia, unspecified hemoglobin was 11.1 g/dL  -Continue to monitor hemoglobin per primary team     Mesenteric mass   -status post exploratory laparotomy and biopsy of mesenteric mass on 9/6  -Continue management per surgical team    Discussed with  surgical advanced practitioner regarding plan to increase salt tablets 2 g 3 times daily and okay for discharge from nephrology side and primary team agreed with the plan      SUBJECTIVE:  Complaining of nausea.  Also abdominal discomfort    OBJECTIVE:  Current Weight: Weight - Scale: 34 kg (75 lb)  Vitals:    09/10/24 0750   BP: 138/80   Pulse: (!) 109   Resp: 12   Temp: 98 °F (36.7 °C)   SpO2: 95%       Intake/Output Summary (Last 24 hours) at 9/10/2024 1217  Last data filed at 9/10/2024 0927  Gross per 24 hour   Intake 418 ml   Output 900 ml   Net -482 ml       Physical Exam  General:  Ill looking, awake.  Eyes: Conjunctivae pink,  Sclera anicteric  ENT: lips and mucous membranes moist  Neck: supple   Chest: Clear to Auscultation both lungs,  no crackles, ronchus or wheezing.  CVS: S1 & S2 present, normal rate, regular rhythm, no murmur.  Abdomen: soft, abdominal tenderness present, non-distended, Bowel sounds normoactive  Extremities: no edema of  legs  Skin: no rash  Neuro: awake, alert, oriented x 3   Psych: Mood and affect appropriate      Medications:    Current Facility-Administered Medications:     gabapentin (NEURONTIN) capsule 100 mg, 100 mg, Oral, TID, Silvina Khan PA-C, 100 mg at 09/10/24 1008    heparin (porcine) subcutaneous injection 5,000 Units, 5,000 Units, Subcutaneous, Q8H IDALMIS, 5,000 Units at 09/10/24 0506 **AND** [COMPLETED] Platelet count, , , Once, Brady Pack MD    HYDROmorphone (DILAUDID) injection 0.2 mg, 0.2 mg, Intravenous, Q2H PRN, Brady Pack MD    ondansetron (ZOFRAN) injection 4 mg, 4 mg, Intravenous, Q6H PRN, Brady Pack MD, 4 mg at 09/10/24 1147    oxyCODONE (ROXICODONE) split tablet 2.5 mg, 2.5 mg, Oral, Q4H PRN **OR** oxyCODONE (ROXICODONE) IR tablet 5 mg, 5 mg, Oral, Q4H PRN, Brady Pack MD, 5 mg at 09/06/24 1726    senna-docusate sodium (SENOKOT S) 8.6-50 mg per tablet 1 tablet, 1 tablet, Oral, HS, Angel Kuhn MD, 1 tablet at  "09/09/24 2318    sodium chloride tablet 2 g, 2 g, Oral, TID With Meals, Silvina Khan PA-C, 2 g at 09/10/24 1150    Invasive Devices:        Lab Results:   Results from last 7 days   Lab Units 09/10/24  0643 09/09/24  2049 09/09/24  1336 09/09/24  0635 09/09/24  0020 09/08/24  1822 09/08/24  0457 09/06/24  1606 09/06/24  1436   WBC Thousand/uL  --   --   --  6.34  --  8.92 7.73   < >  --    HEMOGLOBIN g/dL  --   --   --  11.1*  --  11.2* 10.9*   < >  --    I STAT HEMOGLOBIN g/dl  --   --   --   --   --   --   --   --  10.5*   HEMATOCRIT %  --   --   --  33.5*  --  33.0* 32.6*   < >  --    HEMATOCRIT, ISTAT %  --   --   --   --   --   --   --   --  31*   PLATELETS Thousands/uL  --   --   --  267  --  289 281   < >  --    POTASSIUM mmol/L 4.2 4.0 3.2* 4.6   < > 4.0 3.3*   < >  --    CHLORIDE mmol/L 102 100 111* 101   < > 99 99   < >  --    CO2 mmol/L 24 25 20* 24   < > 23 23   < >  --    CO2, I-STAT mmol/L  --   --   --   --   --   --   --   --  25   BUN mg/dL 19 19 16 19   < > 22 23   < >  --    CREATININE mg/dL 0.75 0.76 0.49* 0.82   < > 0.98 0.91   < >  --    CALCIUM mg/dL 8.0* 8.6 5.7* 7.7*   < > 7.9* 7.8*   < >  --    MAGNESIUM mg/dL 1.8*  --   --   --   --  1.8* 1.9   < >  --    PHOSPHORUS mg/dL 3.0  --   --   --   --  2.9  --   --   --    ALK PHOS U/L  --  55  --   --   --   --   --   --   --    ALT U/L  --  13  --   --   --   --   --   --   --    AST U/L  --  22  --   --   --   --   --   --   --    GLUCOSE, ISTAT mg/dl  --   --   --   --   --   --   --   --  114    < > = values in this interval not displayed.       Previous work up:         Portions of the record may have been created with voice recognition software. Occasional wrong word or \"sound a like\" substitutions may have occurred due to the inherent limitations of voice recognition software. Read the chart carefully and recognize, using context, where substitutions have occurred.If you have any questions, please contact the dictating " provider.

## 2024-09-10 NOTE — DISCHARGE INSTR - AVS FIRST PAGE
DISCHARGE INSTRUCTIONS    Follow Up: Follow up with Dr. Drew on 9/20 at 11AM  -Follow-up with PCP on 9/12 at 3:20PM    Nephrology:   -Take 2 salt tabs 3 times a day  -1.5L fluid restriction daily  -Obtain BMP lab work Monday, 9/16/2024    Diet: You may resume a regular diet    Pain: Tylenol as needed for mild pain.  Oxycodone 2.5 mg as needed for moderate/severe pain every 6 hours.    Shower: You may shower over the wound. Do not bathe or use a pool or hot tub until cleared by the physician.    Activity: As tolerated. You may go up and down stairs, walk as much as you are comfortable, but walk at least 3 times each day. Do not lift anything heavier than 15 pounds for at least 2-4 weeks, unless cleared by the doctor.    Driving: Do not drive or make any important decisions while on narcotic pain medication. Generally, you may drive when your off all narcotic pain medications.    Medications: Resume all of your previous medications, unless told otherwise by the doctor. You do not need to take the narcotic pain medications unless you are having significant pain and discomfort.    Call the office: If you are experiencing any of the following, fevers above 101.5° or chills, significant nausea or vomiting, increase in abdominal pain, if the wound develops drainage and/or is excessive redness around the wound, or if you have significant diarrhea or other worsening symptoms.

## 2024-09-10 NOTE — ASSESSMENT & PLAN NOTE
Hearing impairment strongly correlated with depression, cognitive impairment, delirium and falls in the older adult  Use hearing aids or sound amplifier  Speaking face to face  Use clear dictation, enunciation of words

## 2024-09-10 NOTE — ASSESSMENT & PLAN NOTE
Exacerbated by frailty, medications, surgery, hospitalization  Fall precautions  PT/OT  Encourage frequent oob, mobilization

## 2024-09-10 NOTE — ASSESSMENT & PLAN NOTE
Malnutrition Findings:   Adult Malnutrition type: Chronic illness  Adult Degree of Malnutrition: Other severe protein calorie malnutrition  Malnutrition Characteristics: Muscle loss, Fat loss, Inadequate energy, Weight loss              Over the past 6 months she lost 90 pounds with out trying to lose weight. She has been eating ensure and peanut butter, She has gained about 45 pounds back.   She was initially started on remeron to gain weight but was not able to tolerate medication due to adverse side effects. She was started on cyproheptadine to stimulate her appetite.  Weight gain complicated by dietary insensitivities (dairy, veggies, fruit) reports able to tolerate peanut butter and ensure  Encourage frequent high calorie nutritious snacks and meals  360 Statement: severe protein energy malnutrition is related to physiological causes as evidenced by severe muscle/fat wasting, inadequate energy intakes, and significant weight loss (15#/16.7% weight loss x3m, 31#/29.2% weight loss x6m). Treated with advancing diet as medically able, supplements and education on adequate intakes.    BMI Findings:  Adult BMI Classifications: Underweight < 18.5        Body mass index is 13.72 kg/m².

## 2024-09-10 NOTE — NUTRITION
09/10/24 2038   Recommendations/Interventions   Intervention Comments Spoke with patient's daughter at bedside. Currently diet decreased back to Clear liquids, toast, crackers. Pt taking minimal clears as dislikes jello, has not tried Ensure Clear as yet. Once patient able to tolerate diet advancement as with regular diet suggest trying to incorporate Greek yogurt few times / week for probiotics due to loose stools.; Requested nursing obtain actual wt versus stated. Noted patient has been following with outpatient RD for ongoing assistance to help with gradual wt gain.

## 2024-09-10 NOTE — ASSESSMENT & PLAN NOTE
Pertinent palliative diagnoses include: Mesenteric mass concerning for malignancy, CKD, acute post-operative pain, ambulatory dysfunction, anxiety and depression, frail elder    Social support  Patient is supported by spouse  Supportive listening provided  Normalized experience of patient/family  Provided anxiety containment  Provided anticipatory guidance  Investigated spiritual needs    Follow up  Palliative Care will continue to follow for symptom management and goals of care discussions will be ongoing.    Palliative Medicine will sign off today as goals of care are clear and symptoms are well managed at this time.  Please reach out to on-call provider via Epic Secure Chat  if questions or concerns arise.  Please do not hesitate to reach our on call provider through our clinic answering service at [phone] should you have acute symptom control concerns.

## 2024-09-10 NOTE — ASSESSMENT & PLAN NOTE
Alert and oriented x 3  Maintain delirium precautions  Provide frequent redirection, reorientation, distraction techniques  Avoid deliriogenic medications such as tramadol, benzodiazepines, anticholinergics,  Benadryl  Treat pain, See geriatric pain protocol  Monitor for constipation and urinary retention  Encourage early and frequent moblization, OOB  Encourage Hydration/ Nutrition  Implement sleep hygiene, limit night time interuptions, group activities

## 2024-09-10 NOTE — ASSESSMENT & PLAN NOTE
- discussed judicious use of opioid therapy, given allergy to tylenol and relative contraindication to antiinflammatory therapy.    - Agree with oxycodone 2.5mg/5mg PRN  - Agree with IV dilaudid 0.2mg PRN for breakthrough

## 2024-09-10 NOTE — PROGRESS NOTES
Progress Note - Geriatric Medicine   Name: Brigid Brown 79 y.o. female I MRN: [id number]  [facility] Date of Admission: 9/6/2024   Date of Service: 9/10/2024 I Hospital Day: 4    Assessment & Plan  Mesenteric mass  S/p biopsy  Diet advanced to surgical soft, protein supplement  Oob, mobilization  PT/OT  Maintain delirium precautions        Severe protein-calorie malnutrition (HCC)  Malnutrition Findings:   Adult Malnutrition type: Chronic illness  Adult Degree of Malnutrition: Other severe protein calorie malnutrition  Malnutrition Characteristics: Muscle loss, Fat loss, Inadequate energy, Weight loss              Over the past 6 months she lost 90 pounds with out trying to lose weight. She has been eating ensure and peanut butter, She has gained about 45 pounds back.   She was initially started on remeron to gain weight but was not able to tolerate medication due to adverse side effects. She was started on cyproheptadine to stimulate her appetite.  Weight gain complicated by dietary insensitivities (dairy, veggies, fruit) reports able to tolerate peanut butter and ensure  Encourage frequent high calorie nutritious snacks and meals  360 Statement: severe protein energy malnutrition is related to physiological causes as evidenced by severe muscle/fat wasting, inadequate energy intakes, and significant weight loss (15#/16.7% weight loss x3m, 31#/29.2% weight loss x6m). Treated with advancing diet as medically able, supplements and education on adequate intakes.    BMI Findings:  Adult BMI Classifications: Underweight < 18.5        Body mass index is 13.72 kg/m².     Sensorineural hearing loss (SNHL) of both ears    Hearing impairment strongly correlated with depression, cognitive impairment, delirium and falls in the older adult  Use hearing aids or sound amplifier  Speaking face to face  Use clear dictation, enunciation of words       IBS (irritable bowel syndrome)  Follows with GI as  "outpatient  Prescribed the following:  Periactin QID  Immodium prn  Benefiber daily  Lexapro 5 mg po daily  Has follow up appointment with GI on 10/30  Frailty syndrome in geriatric patient  Clinical Frail Scale: 5- Mildly Frail  Has supportive   Frailty exacerbated by weight loss  PT/OT    Ambulatory dysfunction  Exacerbated by frailty, medications, surgery, hospitalization  Fall precautions  PT/OT  Encourage frequent oob, mobilization  Acute post-operative pain  Geriatric pain protocol  Scheduled acetaminophen 975 mg po Q8  Oxycodone 2.5 mg po Q 4 prn moderate pain  Oxycodone 5 mg po Q 4 prn severe pain   Monitor for constipation  Lidoderm patch   Anxiety and depression  Chronic, exacerbated by hospitalization, surgery   Pt tearful that she had an episode of diarrhea with incontinence this am, abd now slightly distended  At risk for delirium  Alert and oriented x 3  Maintain delirium precautions  Provide frequent redirection, reorientation, distraction techniques  Avoid deliriogenic medications such as tramadol, benzodiazepines, anticholinergics,  Benadryl  Treat pain, See geriatric pain protocol  Monitor for constipation and urinary retention  Encourage early and frequent moblization, OOB  Encourage Hydration/ Nutrition  Implement sleep hygiene, limit night time interuptions, group activities    No impairment of memory  No reported memory loss  At risk secondary to hx of CVA  TSH 2.421 (8/22/24)  Recommend check Vitamin B12 level for reversible causes of memory loss, goal level greater than 400  MRI brain (1/17/21) mild to moderate scattered chronic microvascular ischemic changes    History of Present Illness   She is lying in bed, oriented x 3. She is tearful, talking about having incontinence of stool while walking. Additionally she is concerned about her abd being \"distended\"   Rounded with nursing.     Objective      Temp:  [98 °F (36.7 °C)-98.2 °F (36.8 °C)] 98 °F (36.7 °C)  HR:  [101-110] 109  Resp:  " [12-18] 12  BP: (100-138)/(70-80) 138/80  O2 Device: None (Room air)          I/O last 24 hours:  In: 778 [P.O.:778]  Out: 1150 [Urine:1150]  Lines/Drains/Airways       Active Status       None                  Physical Exam   General : NAD  HEENT : MMM   Heart : Normal rate, no murmur rub or gallop  Lungs : CTA no wheezes, rales or rhonchi  Abdomen : Soft, NT/ND, BS auscultated in all 4 quads.   Ext :  no edema  Skin : Pink, warm, dry, age appropriate turgor and mobility  Neuro : Nonfocal  Psych : Alert and O x 3     Lab Results: I have reviewed the following results: CBC/BMP:   .     09/10/24  0643   SODIUM 131*   K 4.2      CO2 24   BUN 19   CREATININE 0.75   GLUC 99   MG 1.8*   PHOS 3.0     Imaging Review: Reviewed radiology reports from this admission including: CT head.  Other Studies: EKG was reviewed.     Therapies:   Basic Mobility Inpatient Raw Score: 18  -HL Goal: 6: Walk 10 steps or more  -HL Achieved: 6: Walk 10 steps or more      VTE Prophylaxis: Sequential compression device (Venodyne)     Code Status: Level 1 - Full Code  Advance Directive and Living Will: Yes    Power of : Yes  POLST:      Family and Social Support:       Administrative Statements   I have spent a total time of 75 minutes in caring for this patient on the day of the visit/encounter including Diagnostic results, Prognosis, Risks and benefits of tx options, Instructions for management, Importance of tx compliance, Risk factor reductions, Impressions, Counseling / Coordination of care, Documenting in the medical record, Reviewing / ordering tests, medicine, procedures  , Obtaining or reviewing history  , and Communicating with other healthcare professionals .

## 2024-09-10 NOTE — ASSESSMENT & PLAN NOTE
No reported memory loss  At risk secondary to hx of CVA  TSH 2.421 (8/22/24)  Recommend check Vitamin B12 level for reversible causes of memory loss, goal level greater than 400  MRI brain (1/17/21) mild to moderate scattered chronic microvascular ischemic changes

## 2024-09-10 NOTE — ASSESSMENT & PLAN NOTE
S/p biopsy  Diet advanced to surgical soft, protein supplement  Oob, mobilization  PT/OT  Maintain delirium precautions

## 2024-09-11 ENCOUNTER — TELEPHONE (OUTPATIENT)
Dept: NEPHROLOGY | Facility: CLINIC | Age: 79
End: 2024-09-11

## 2024-09-11 VITALS
HEART RATE: 110 BPM | TEMPERATURE: 98.7 F | WEIGHT: 75 LBS | BODY MASS INDEX: 13.8 KG/M2 | SYSTOLIC BLOOD PRESSURE: 128 MMHG | DIASTOLIC BLOOD PRESSURE: 77 MMHG | RESPIRATION RATE: 16 BRPM | OXYGEN SATURATION: 95 % | HEIGHT: 62 IN

## 2024-09-11 DIAGNOSIS — N18.30 STAGE 3 CHRONIC KIDNEY DISEASE, UNSPECIFIED WHETHER STAGE 3A OR 3B CKD (HCC): Primary | ICD-10-CM

## 2024-09-11 DIAGNOSIS — E87.1 HYPONATREMIA: ICD-10-CM

## 2024-09-11 LAB
ANION GAP SERPL CALCULATED.3IONS-SCNC: 7 MMOL/L (ref 4–13)
BASOPHILS # BLD AUTO: 0.03 THOUSANDS/ÂΜL (ref 0–0.1)
BASOPHILS NFR BLD AUTO: 0 % (ref 0–1)
BUN SERPL-MCNC: 20 MG/DL (ref 5–25)
CALCIUM SERPL-MCNC: 8 MG/DL (ref 8.4–10.2)
CHLORIDE SERPL-SCNC: 103 MMOL/L (ref 96–108)
CO2 SERPL-SCNC: 23 MMOL/L (ref 21–32)
CREAT SERPL-MCNC: 0.8 MG/DL (ref 0.6–1.3)
EOSINOPHIL # BLD AUTO: 0.49 THOUSAND/ÂΜL (ref 0–0.61)
EOSINOPHIL NFR BLD AUTO: 7 % (ref 0–6)
ERYTHROCYTE [DISTWIDTH] IN BLOOD BY AUTOMATED COUNT: 14.2 % (ref 11.6–15.1)
GFR SERPL CREATININE-BSD FRML MDRD: 69 ML/MIN/1.73SQ M
GLUCOSE SERPL-MCNC: 95 MG/DL (ref 65–140)
HCT VFR BLD AUTO: 32.4 % (ref 34.8–46.1)
HGB BLD-MCNC: 10.7 G/DL (ref 11.5–15.4)
IMM GRANULOCYTES # BLD AUTO: 0.03 THOUSAND/UL (ref 0–0.2)
IMM GRANULOCYTES NFR BLD AUTO: 0 % (ref 0–2)
LYMPHOCYTES # BLD AUTO: 0.83 THOUSANDS/ÂΜL (ref 0.6–4.47)
LYMPHOCYTES NFR BLD AUTO: 12 % (ref 14–44)
MAGNESIUM SERPL-MCNC: 2.1 MG/DL (ref 1.9–2.7)
MCH RBC QN AUTO: 29.4 PG (ref 26.8–34.3)
MCHC RBC AUTO-ENTMCNC: 33 G/DL (ref 31.4–37.4)
MCV RBC AUTO: 89 FL (ref 82–98)
MONOCYTES # BLD AUTO: 0.48 THOUSAND/ÂΜL (ref 0.17–1.22)
MONOCYTES NFR BLD AUTO: 7 % (ref 4–12)
NEUTROPHILS # BLD AUTO: 4.99 THOUSANDS/ÂΜL (ref 1.85–7.62)
NEUTS SEG NFR BLD AUTO: 74 % (ref 43–75)
NRBC BLD AUTO-RTO: 0 /100 WBCS
PLATELET # BLD AUTO: 334 THOUSANDS/UL (ref 149–390)
PMV BLD AUTO: 9 FL (ref 8.9–12.7)
POTASSIUM SERPL-SCNC: 4.1 MMOL/L (ref 3.5–5.3)
RBC # BLD AUTO: 3.64 MILLION/UL (ref 3.81–5.12)
SODIUM SERPL-SCNC: 133 MMOL/L (ref 135–147)
WBC # BLD AUTO: 6.85 THOUSAND/UL (ref 4.31–10.16)

## 2024-09-11 PROCEDURE — NC001 PR NO CHARGE: Performed by: STUDENT IN AN ORGANIZED HEALTH CARE EDUCATION/TRAINING PROGRAM

## 2024-09-11 PROCEDURE — 85025 COMPLETE CBC W/AUTO DIFF WBC: CPT | Performed by: PHYSICIAN ASSISTANT

## 2024-09-11 PROCEDURE — 80048 BASIC METABOLIC PNL TOTAL CA: CPT | Performed by: PHYSICIAN ASSISTANT

## 2024-09-11 PROCEDURE — 83735 ASSAY OF MAGNESIUM: CPT | Performed by: PHYSICIAN ASSISTANT

## 2024-09-11 PROCEDURE — 99232 SBSQ HOSP IP/OBS MODERATE 35: CPT | Performed by: INTERNAL MEDICINE

## 2024-09-11 RX ADMIN — HEPARIN SODIUM 5000 UNITS: 5000 INJECTION INTRAVENOUS; SUBCUTANEOUS at 05:37

## 2024-09-11 RX ADMIN — SODIUM CHLORIDE 2 G: 1 TABLET ORAL at 08:36

## 2024-09-11 RX ADMIN — SODIUM CHLORIDE 2 G: 1 TABLET ORAL at 11:39

## 2024-09-11 NOTE — PLAN OF CARE
Problem: PAIN - ADULT  Goal: Verbalizes/displays adequate comfort level or baseline comfort level  Description: Interventions:  - Encourage patient to monitor pain and request assistance  - Assess pain using appropriate pain scale  - Administer analgesics based on type and severity of pain and evaluate response  - Implement non-pharmacological measures as appropriate and evaluate response  - Consider cultural and social influences on pain and pain management  - Notify physician/advanced practitioner if interventions unsuccessful or patient reports new pain  Outcome: Progressing     Problem: INFECTION - ADULT  Goal: Absence or prevention of progression during hospitalization  Description: INTERVENTIONS:  - Assess and monitor for signs and symptoms of infection  - Monitor lab/diagnostic results  - Monitor all insertion sites, i.e. indwelling lines, tubes, and drains  - Monitor endotracheal if appropriate and nasal secretions for changes in amount and color  - [place] appropriate cooling/warming therapies per order  - Administer medications as ordered  - Instruct and encourage patient and family to use good hand hygiene technique  - Identify and instruct in appropriate isolation precautions for identified infection/condition  Outcome: Progressing  Goal: Absence of fever/infection during neutropenic period  Description: INTERVENTIONS:  - Monitor WBC    Outcome: Progressing     Problem: SAFETY ADULT  Goal: Patient will remain free of falls  Description: INTERVENTIONS:  - Educate patient/family on patient safety including physical limitations  - Instruct patient to call for assistance with activity   - Consult OT/PT to assist with strengthening/mobility   - Keep Call bell within reach  - Keep bed low and locked with side rails adjusted as appropriate  - Keep care items and personal belongings within reach  - Initiate and maintain comfort rounds  - Make Fall Risk Sign visible to staff  - Offer Toileting every 2 Hours,  in advance of need  - Initiate/Maintain bed/chair alarm  - Obtain necessary fall risk management equipment  - Apply yellow socks and bracelet for high fall risk patients  - Consider moving patient to room near nurses station  Outcome: Progressing  Goal: Maintain or return to baseline ADL function  Description: INTERVENTIONS:  -  Assess patient's ability to carry out ADLs; assess patient's baseline for ADL function and identify physical deficits which impact ability to perform ADLs (bathing, care of mouth/teeth, toileting, grooming, dressing, etc.)  - Assess/evaluate cause of self-care deficits   - Assess range of motion  - Assess patient's mobility; develop plan if impaired  - Assess patient's need for assistive devices and provide as appropriate  - Encourage maximum independence but intervene and supervise when necessary  - Involve family in performance of ADLs  - Assess for home care needs following discharge   - Consider OT consult to assist with ADL evaluation and planning for discharge  - Provide patient education as appropriate  Outcome: Progressing  Goal: Maintains/Returns to pre admission functional level  Description: INTERVENTIONS:  - Perform AM-PAC 6 Click Basic Mobility/ Daily Activity assessment daily.  - Set and communicate daily mobility goal to care team and patient/family/caregiver.   - Collaborate with rehabilitation services on mobility goals if consulted  - Reposition patient every 2 hours.  - Ambulate patient 3 times a day  - Out of bed to chair 3 times a day   - Out of bed for meals 3 times a day  - Out of bed for toileting  - Record patient progress and toleration of activity level   Outcome: Progressing     Problem: DISCHARGE PLANNING  Goal: Discharge to home or other facility with appropriate resources  Description: INTERVENTIONS:  - Identify barriers to discharge w/patient and caregiver  - Arrange for needed discharge resources and transportation as appropriate  - Identify discharge  learning needs (meds, wound care, etc.)  - Arrange for interpretive services to assist at discharge as needed  - Refer to Case Management Department for coordinating discharge planning if the patient needs post-hospital services based on physician/advanced practitioner order or complex needs related to functional status, cognitive ability, or social support system  Outcome: Progressing     Problem: Knowledge Deficit  Goal: Patient/family/caregiver demonstrates understanding of disease process, treatment plan, medications, and discharge instructions  Description: Complete learning assessment and assess knowledge base.  Interventions:  - Provide teaching at level of understanding  - Provide teaching via preferred learning methods  Outcome: Progressing     Problem: Prexisting or High Potential for Compromised Skin Integrity  Goal: Skin integrity is maintained or improved  Description: INTERVENTIONS:  - Identify patients at risk for skin breakdown  - Assess and monitor skin integrity  - Assess and monitor nutrition and hydration status  - Monitor labs   - Assess for incontinence   - Turn and reposition patient  - Assist with mobility/ambulation  - Relieve pressure over bony prominences  - Avoid friction and shearing  - Provide appropriate hygiene as needed including keeping skin clean and dry  - Evaluate need for skin moisturizer/barrier cream  - Collaborate with interdisciplinary team   - Patient/family teaching  - Consider wound care consult   Outcome: Progressing     Problem: Nutrition/Hydration-ADULT  Goal: Nutrient/Hydration intake appropriate for improving, restoring or maintaining nutritional needs  Description: Monitor and assess patient's nutrition/hydration status for malnutrition. Collaborate with interdisciplinary team and initiate plan and interventions as ordered.  Monitor patient's weight and dietary intake as ordered or per policy. Utilize nutrition screening tool and intervene as necessary. Determine  patient's food preferences and provide high-protein, high-caloric foods as appropriate.     INTERVENTIONS:  - Monitor oral intake, urinary output, labs, and treatment plans  - Assess nutrition and hydration status and recommend course of action  - Evaluate amount of meals eaten  - Assist patient with eating if necessary   - Allow adequate time for meals  - Recommend/ encourage appropriate diets, oral nutritional supplements, and vitamin/mineral supplements  - Order, calculate, and assess calorie counts as needed  - Recommend, monitor, and adjust tube feedings and TPN/PPN based on assessed needs  - Assess need for intravenous fluids  - Provide specific nutrition/hydration education as appropriate  - Include patient/family/caregiver in decisions related to nutrition  Outcome: Progressing

## 2024-09-11 NOTE — PROGRESS NOTES
Progress Note - Oncology-Surgical   Name: Brigid Brown 79 y.o. female I MRN: [id number]  Unit/Bed#: [facility] 832-01 I Date of Admission: 9/6/2024   Date of Service: 9/11/2024 I Hospital Day: 5    Assessment & Plan  Mesenteric mass  - Patient now s/p 9/6 ex lap, biopsy of intra-abdominal mass  - Tolerating a diet  - Meeting goals for discharge today  - Encourage PO intake  - Encourage out of bed and ambulation  - PRN pain meds  - PRN anti nausea meds  - DVT prophylaxis  Severe protein-calorie malnutrition (HCC)  Malnutrition Findings:   Adult Malnutrition type: Chronic illness  Adult Degree of Malnutrition: Other severe protein calorie malnutrition  Malnutrition Characteristics: Muscle loss, Fat loss, Inadequate energy, Weight loss                  360 Statement: severe protein energy malnutrition is related to physiological causes as evidenced by severe muscle/fat wasting, inadequate energy intakes, and significant weight loss (15#/16.7% weight loss x3m, 31#/29.2% weight loss x6m). Treated with advancing diet as medically able, supplements and education on adequate intakes.    BMI Findings:  Adult BMI Classifications: Underweight < 18.5        Body mass index is 13.72 kg/m².     Chronic kidney disease (CKD) stage G3a/A2, moderately decreased glomerular filtration rate (GFR) between 45-59 mL/min/1.73 square meter and albuminuria creatinine ratio between  mg/g ([facility])  Lab Results   Component Value Date    EGFR 69 09/11/2024    EGFR 74 09/10/2024    EGFR 73 09/09/2024    CREATININE 0.80 09/11/2024    CREATININE 0.75 09/10/2024    CREATININE 0.76 09/09/2024           History of Present Illness   No acute events overnight. Patient states she has minimal abdominal pain. Denies having nausea, vomiting, fevers, chills, chest pain, shortness of breath. Tolerating PO intake. Voiding without difficulty. Still having loose bowel movements. Passing flatus.     Objective      Temp:  [98 °F (36.7 °C)-98.5 °F (36.9 °C)] 98.5 °F  (36.9 °C)  HR:  [] 91  Resp:  [12-16] 16  BP: (122-138)/(73-80) 136/73  O2 Device: None (Room air)          I/O         09/09 0701  09/10 0700 09/10 0701 09/11 0700    P.O. 660 118    I.V. (mL/kg)  550.8 (16.2)    Total Intake(mL/kg) 660 (19.4) 668.8 (19.7)    Urine (mL/kg/hr) 1050 (1.3) 100 (0.1)    Stool  0    Total Output 1050 100    Net -390 +568.8          Unmeasured Urine Occurrence  3 x    Unmeasured Stool Occurrence  3 x          Lines/Drains/Airways       Active Status       None                  Physical Exam     Physical Exam:  General: No acute distress, alert and oriented  Neuro: alert, oriented x3  HENT: PERRL, EOMI  CV: Well perfused, regular rate and rhythm  Lungs: Normal work of breathing, no increased respiratory effort  Abdomen: Soft, appropriately tender, non-distended. Incisions clean, dry and intact.  MSK/Extremities: No edema, clubbing or cyanosis  Skin: Warm, dry      Lab Results: I have reviewed the following results: CBC/BMP:   .     09/10/24  0643 09/11/24  0413   WBC  --  6.85   HGB  --  10.7*   HCT  --  32.4*   PLT  --  334   SODIUM 131* 133*   K 4.2 4.1    103   CO2 24 23   BUN 19 20   CREATININE 0.75 0.80   GLUC 99 95   MG 1.8* 2.1   PHOS 3.0  --       Imaging Review: No pertinent imaging studies reviewed.  Other Studies: No additional pertinent studies reviewed.    VTE Pharmacologic Prophylaxis: Heparin  VTE Mechanical Prophylaxis: sequential compression device

## 2024-09-11 NOTE — ASSESSMENT & PLAN NOTE
Malnutrition Findings:   Adult Malnutrition type: Chronic illness  Adult Degree of Malnutrition: Other severe protein calorie malnutrition  Malnutrition Characteristics: Muscle loss, Fat loss, Inadequate energy, Weight loss                  360 Statement: severe protein energy malnutrition is related to physiological causes as evidenced by severe muscle/fat wasting, inadequate energy intakes, and significant weight loss (15#/16.7% weight loss x3m, 31#/29.2% weight loss x6m). Treated with advancing diet as medically able, supplements and education on adequate intakes.    BMI Findings:  Adult BMI Classifications: Underweight < 18.5        Body mass index is 13.72 kg/m².

## 2024-09-11 NOTE — RESTORATIVE TECHNICIAN NOTE
Restorative Technician Note      Patient Name: Brigid Brown     Restorative Tech Visit Date: 09/11/24  Note Type: Mobility  Patient Position Upon Consult: Seated edge of bed  Activity Performed: Transferred  Assistive Device: Other (Comment) (HHA to chair)  Education Provided: Yes  Patient Position at End of Consult: Bedside chair; All needs within reach; Bed/Chair alarm activated    Kristan Alston  DPT, Restorative Technician

## 2024-09-11 NOTE — QUICK NOTE
Severe Protein-Calorie Malnutrition related to physiological causes as evidenced by severe muscle/fat wasting, inadequate energy intakes, and significant weight loss (15#/16.7% weight loss x3m, 31#/29.2% weight loss x6m). Treated with advancing diet as medically able, supplements and education on adequate intakes and Nutrition Evaluation     Freddy Ribeiro MD  General Surgery

## 2024-09-11 NOTE — PLAN OF CARE
Problem: PAIN - ADULT  Goal: Verbalizes/displays adequate comfort level or baseline comfort level  Description: Interventions:  - Encourage patient to monitor pain and request assistance  - Assess pain using appropriate pain scale  - Administer analgesics based on type and severity of pain and evaluate response  - Implement non-pharmacological measures as appropriate and evaluate response  - Consider cultural and social influences on pain and pain management  - Notify physician/advanced practitioner if interventions unsuccessful or patient reports new pain  Outcome: Progressing     Problem: INFECTION - ADULT  Goal: Absence or prevention of progression during hospitalization  Description: INTERVENTIONS:  - Assess and monitor for signs and symptoms of infection  - Monitor lab/diagnostic results  - Monitor all insertion sites, i.e. indwelling lines, tubes, and drains  - Monitor endotracheal if appropriate and nasal secretions for changes in amount and color  - [place] appropriate cooling/warming therapies per order  - Administer medications as ordered  - Instruct and encourage patient and family to use good hand hygiene technique  - Identify and instruct in appropriate isolation precautions for identified infection/condition  Outcome: Progressing  Goal: Absence of fever/infection during neutropenic period  Description: INTERVENTIONS:  - Monitor WBC    Outcome: Progressing     Problem: SAFETY ADULT  Goal: Patient will remain free of falls  Description: INTERVENTIONS:  - Educate patient/family on patient safety including physical limitations  - Instruct patient to call for assistance with activity   - Consult OT/PT to assist with strengthening/mobility   - Keep Call bell within reach  - Keep bed low and locked with side rails adjusted as appropriate  - Keep care items and personal belongings within reach  - Initiate and maintain comfort rounds  - Make Fall Risk Sign visible to staff  - Apply yellow socks and bracelet  for high fall risk patients  - Consider moving patient to room near nurses station  Outcome: Progressing  Goal: Maintain or return to baseline ADL function  Description: INTERVENTIONS:  -  Assess patient's ability to carry out ADLs; assess patient's baseline for ADL function and identify physical deficits which impact ability to perform ADLs (bathing, care of mouth/teeth, toileting, grooming, dressing, etc.)  - Assess/evaluate cause of self-care deficits   - Assess range of motion  - Assess patient's mobility; develop plan if impaired  - Assess patient's need for assistive devices and provide as appropriate  - Encourage maximum independence but intervene and supervise when necessary  - Involve family in performance of ADLs  - Assess for home care needs following discharge   - Consider OT consult to assist with ADL evaluation and planning for discharge  - Provide patient education as appropriate  Outcome: Progressing  Goal: Maintains/Returns to pre admission functional level  Description: INTERVENTIONS:  - Perform AM-PAC 6 Click Basic Mobility/ Daily Activity assessment daily.  - Set and communicate daily mobility goal to care team and patient/family/caregiver.   - Collaborate with rehabilitation services on mobility goals if consulted  - Out of bed for toileting  - Record patient progress and toleration of activity level   Outcome: Progressing     Problem: DISCHARGE PLANNING  Goal: Discharge to home or other facility with appropriate resources  Description: INTERVENTIONS:  - Identify barriers to discharge w/patient and caregiver  - Arrange for needed discharge resources and transportation as appropriate  - Identify discharge learning needs (meds, wound care, etc.)  - Arrange for interpretive services to assist at discharge as needed  - Refer to Case Management Department for coordinating discharge planning if the patient needs post-hospital services based on physician/advanced practitioner order or complex needs  related to functional status, cognitive ability, or social support system  Outcome: Progressing     Problem: Knowledge Deficit  Goal: Patient/family/caregiver demonstrates understanding of disease process, treatment plan, medications, and discharge instructions  Description: Complete learning assessment and assess knowledge base.  Interventions:  - Provide teaching at level of understanding  - Provide teaching via preferred learning methods  Outcome: Progressing     Problem: Prexisting or High Potential for Compromised Skin Integrity  Goal: Skin integrity is maintained or improved  Description: INTERVENTIONS:  - Identify patients at risk for skin breakdown  - Assess and monitor skin integrity  - Assess and monitor nutrition and hydration status  - Monitor labs   - Assess for incontinence   - Turn and reposition patient  - Assist with mobility/ambulation  - Relieve pressure over bony prominences  - Avoid friction and shearing  - Provide appropriate hygiene as needed including keeping skin clean and dry  - Evaluate need for skin moisturizer/barrier cream  - Collaborate with interdisciplinary team   - Patient/family teaching  - Consider wound care consult   Outcome: Progressing     Problem: Nutrition/Hydration-ADULT  Goal: Nutrient/Hydration intake appropriate for improving, restoring or maintaining nutritional needs  Description: Monitor and assess patient's nutrition/hydration status for malnutrition. Collaborate with interdisciplinary team and initiate plan and interventions as ordered.  Monitor patient's weight and dietary intake as ordered or per policy. Utilize nutrition screening tool and intervene as necessary. Determine patient's food preferences and provide high-protein, high-caloric foods as appropriate.     INTERVENTIONS:  - Monitor oral intake, urinary output, labs, and treatment plans  - Assess nutrition and hydration status and recommend course of action  - Evaluate amount of meals eaten  - Assist  patient with eating if necessary   - Allow adequate time for meals  - Recommend/ encourage appropriate diets, oral nutritional supplements, and vitamin/mineral supplements  - Order, calculate, and assess calorie counts as needed  - Recommend, monitor, and adjust tube feedings and TPN/PPN based on assessed needs  - Assess need for intravenous fluids  - Provide specific nutrition/hydration education as appropriate  - Include patient/family/caregiver in decisions related to nutrition  Outcome: Progressing

## 2024-09-11 NOTE — RESTORATIVE TECHNICIAN NOTE
Restorative Technician Note      Patient Name: Brigid Brown     Restorative Tech Visit Date: 09/11/24  Note Type: Mobility  Patient Position Upon Consult: Bedside chair  Activity Performed: Ambulated  Assistive Device: Standard walker; Other (Comment) (progressed to no AD)  Education Provided: Yes  Patient Position at End of Consult: Bed supine; All needs within reach; Bed/Chair alarm activated    Kritsan Alston  DPT, Restorative Technician

## 2024-09-11 NOTE — DISCHARGE SUMMARY
Discharge Summary - Oncology-Surgical   Name: Brigid Brown 79 y.o. female I MRN: [id number]  [facility] I Date of Admission: 9/6/2024   Date of Service: 9/11/2024 I Hospital Day: 5     Admission Date: 9/6/2024  Discharge Date: 09/11/24  Admitting Diagnosis: Mesenteric mass [K63.89]    HPI:   Brigid Brown is a 79 y.o. female with a mesenteric mass, that is encasing her SMA and possible involving her celiac as well.   Presenting for exploratory laparotomy with biopsy of the mass, lymphoma protocol.     Procedures Performed: exploratory laparotomy and biopsy of mesenteric mass      Hospital Course:    Patient presented to the hospital on 8/16 for procedure as noted above. On 9/6 patient underwent uncomplicated exploratory laparotomy and biopsy of the mesenteric mass. The patient was slow to have return of bowel function following surgery and noted to have a postoperative ileus that resolved on 9/10. Patient's diet was slowly advanced as tolerated until reaching a normal diet. Patient noted to have low sodium during stay and nephrology was consulted for further management. Labs showed serum osmolality was low with normal urine sodium and osmolality. Patient was started on 2g sodium tablet three times daily and placed on a fluid restriction of 1.5L daily with improvement of sodium to acceptable level. Once bowel function returned, patient noted to have loose stools with a couple episodes of fecal incontinence, from discussion it appears diarrhea/loose stools are her baseline. Today patient is tolerating her diet without nausea or vomiting, pain is well controlled, loose stools (baseline). Patient deemed stable and appropriate for discharge. Discussed using adult diapers for periotic incontinence and eating a health fiber rich diet, patient has outpatient GI appointment upcoming. Discussed discharge instructions with patient and  as well as daughter over the phone, they are all agreeable. All questions  and concerns addressed.       Significant Findings, Care, Treatment and Services Provided: mesenteric mass biopsy pathology pending, frozen section intra-operatively revealed carcinoma    Lab Results: CBC:   Lab Results   Component Value Date    WBC 6.85 09/11/2024    HGB 10.7 (L) 09/11/2024    HCT 32.4 (L) 09/11/2024    MCV 89 09/11/2024     09/11/2024    RBC 3.64 (L) 09/11/2024    MCH 29.4 09/11/2024    MCHC 33.0 09/11/2024    RDW 14.2 09/11/2024    MPV 9.0 09/11/2024    NRBC 0 09/11/2024   , CMP:   Lab Results   Component Value Date    SODIUM 133 (L) 09/11/2024    K 4.1 09/11/2024     09/11/2024    CO2 23 09/11/2024    BUN 20 09/11/2024    CREATININE 0.80 09/11/2024    CALCIUM 8.0 (L) 09/11/2024    EGFR 69 09/11/2024       Complications: post-operative Ileus, resolved    Discharge Diagnosis: Mesenteric mass s/p biopsy    Medical Problems       Resolved Problems  Date Reviewed: 9/10/2024   None         Condition at Discharge: stable         Discharge instructions/Information to patient and family:   See after visit summary for information provided to patient and family.      Provisions for Follow-Up Care:  See after visit summary for information related to follow-up care and any pertinent home health orders.      Disposition: Home      Planned Readmission: No    Discharge Statement:  I have spent a total time of 20 minutes in caring for this patient on the day of the visit/encounter. >30 minutes of time was spent on: Prognosis, Patient and family education, Counseling / Coordination of care, and Documenting in the medical record.    Discharge Medications:  See after visit summary for reconciled discharge medications provided to patient and family.      Javier Mauro MD  General Surgery Resident

## 2024-09-11 NOTE — ASSESSMENT & PLAN NOTE
Lab Results   Component Value Date    EGFR 69 09/11/2024    EGFR 74 09/10/2024    EGFR 73 09/09/2024    CREATININE 0.80 09/11/2024    CREATININE 0.75 09/10/2024    CREATININE 0.76 09/09/2024

## 2024-09-11 NOTE — PROGRESS NOTES
NEPHROLOGY PROGRESS NOTE   Brigid Brown 79 y.o. female MRN: [id number]  Unit/Bed#: [facility] 832-01 Encounter: [id number]    ASSESSMENT & PLAN:  79-year-old female presented to [facility] for elective surgical procedure for biopsy of abdominal mass which was done on 9/6 by surgical team.  Fluids are currently consulted for hyponatremia.  Hyponatremia  -Admission Sodium: Was at normal range at 136 on 9/6.  -Baseline Sodium: Did have sodium 131-135 in April to June  -Work Up: Urine Na: 12 , Urine Osmolality 389 , Serum Osmolality 279 , TSH-2.4  -Etiology: Hyponatremia due to poor solute intake and possibility of fluid overload as she had lower extremity edema possibly from third spacing with low serum albumin.  Urine studies do not suggest SIADH  -Plan:   Sodium level improved to 133 meq/L, renal function stable creatinine 0.80 mg/dL continue salt tablet 2 g 3 times daily and fluid restriction 1.5 L/day.  Will recheck BMP next week on Tuesday, okay for discharge from nephrology side.  Office informed to arrange for follow-up     Chronic kidney disease stage IIIa  -Likely due to hypertensive nephrosclerosis and age-related nephron loss  -Baseline creatinine 0.8 to 1.1 mg/dL  -Admission creatinine was 0.93 mg/dL  -UA from 8/22/2024 did not show any RBC.  Showed only 4-10 WBC but no UTI symptoms.  No proteinuria  -Renal function is stable, last blood work showed creatinine 0.80 mg/dL    Hypokalemia resolved, potassium is 4.1.  No need for any potassium supplementation on discharge    Hypomagnesemia, magnesium level 2.1 today resolved status post magnesium replacement yesterday    Hypocalcemia: Status post replacement on 9/9.  Possibility of blood work on 9/9 1 PM to be an error as calcium level has improved with 2 g of IV calcium gluconate.  Corrected calcium will be at normal range, continue to monitor    Anemia, unspecified hemoglobin was trending down to 10.7 g/dL  -Continue to monitor hemoglobin per primary  team     Mesenteric mass   -status post exploratory laparotomy and biopsy of mesenteric mass on 9/6  -Continue management per surgical team    Discussed with primary team sodium level improving, okay to continue current dose of salt tablet and repeat BMP next week and agreed with the plan      SUBJECTIVE:  Nausea and vomiting resolved, patient eating breakfast.  No shortness of breath    OBJECTIVE:  Current Weight: Weight - Scale: 34 kg (75 lb)  Vitals:    09/11/24 0757   BP: 128/77   Pulse: (!) 110   Resp: 16   Temp: 98.7 °F (37.1 °C)   SpO2: 95%       Intake/Output Summary (Last 24 hours) at 9/11/2024 1227  Last data filed at 9/11/2024 0951  Gross per 24 hour   Intake 1195 ml   Output 250 ml   Net 945 ml       Physical Exam  General:  Ill looking, awake.  Eyes: Conjunctivae pink,  Sclera anicteric  ENT: lips and mucous membranes moist  Neck: supple   Chest: Clear to Auscultation both lungs,  no crackles, ronchus or wheezing.  CVS: S1 & S2 present, normal rate, regular rhythm, no murmur.  Abdomen: soft, non-tender, mild abdominal distention, Bowel sounds normoactive  Extremities: trace edema of  legs  Skin: no rash  Neuro: awake, alert, oriented x 3   Psych: Mood and affect appropriate     Medications:    Current Facility-Administered Medications:     heparin (porcine) subcutaneous injection 5,000 Units, 5,000 Units, Subcutaneous, Q8H IDALMIS, 5,000 Units at 09/11/24 0537 **AND** [COMPLETED] Platelet count, , , Once, Brady Pack MD    HYDROmorphone (DILAUDID) injection 0.2 mg, 0.2 mg, Intravenous, Q2H PRN, Brady Pack MD    ondansetron (ZOFRAN) injection 4 mg, 4 mg, Intravenous, Q6H PRN, Brady Pack MD, 4 mg at 09/10/24 1147    oxyCODONE (ROXICODONE) split tablet 2.5 mg, 2.5 mg, Oral, Q4H PRN **OR** oxyCODONE (ROXICODONE) IR tablet 5 mg, 5 mg, Oral, Q4H PRN, Brady Pcak MD, 5 mg at 09/06/24 1726    sodium chloride tablet 2 g, 2 g, Oral, TID With Meals, Silvina Khan PA-C, 2 g at  "09/11/24 1139    Invasive Devices:        Lab Results:   Results from last 7 days   Lab Units 09/11/24  0413 09/10/24  0643 09/09/24  2049 09/09/24  1336 09/09/24  0635 09/09/24  0020 09/08/24  1822 09/06/24  1606 09/06/24  1436   WBC Thousand/uL 6.85  --   --   --  6.34  --  8.92   < >  --    HEMOGLOBIN g/dL 10.7*  --   --   --  11.1*  --  11.2*   < >  --    I STAT HEMOGLOBIN g/dl  --   --   --   --   --   --   --   --  10.5*   HEMATOCRIT % 32.4*  --   --   --  33.5*  --  33.0*   < >  --    HEMATOCRIT, ISTAT %  --   --   --   --   --   --   --   --  31*   PLATELETS Thousands/uL 334  --   --   --  267  --  289   < >  --    POTASSIUM mmol/L 4.1 4.2 4.0   < > 4.6   < > 4.0   < >  --    CHLORIDE mmol/L 103 102 100   < > 101   < > 99   < >  --    CO2 mmol/L 23 24 25   < > 24   < > 23   < >  --    CO2, I-STAT mmol/L  --   --   --   --   --   --   --   --  25   BUN mg/dL 20 19 19   < > 19   < > 22   < >  --    CREATININE mg/dL 0.80 0.75 0.76   < > 0.82   < > 0.98   < >  --    CALCIUM mg/dL 8.0* 8.0* 8.6   < > 7.7*   < > 7.9*   < >  --    MAGNESIUM mg/dL 2.1 1.8*  --   --   --   --  1.8*   < >  --    PHOSPHORUS mg/dL  --  3.0  --   --   --   --  2.9  --   --    ALK PHOS U/L  --   --  55  --   --   --   --   --   --    ALT U/L  --   --  13  --   --   --   --   --   --    AST U/L  --   --  22  --   --   --   --   --   --    GLUCOSE, ISTAT mg/dl  --   --   --   --   --   --   --   --  114    < > = values in this interval not displayed.       Previous work up:         Portions of the record may have been created with voice recognition software. Occasional wrong word or \"sound a like\" substitutions may have occurred due to the inherent limitations of voice recognition software. Read the chart carefully and recognize, using context, where substitutions have occurred.If you have any questions, please contact the dictating provider.    "

## 2024-09-11 NOTE — ASSESSMENT & PLAN NOTE
- Patient now s/p 9/6 ex lap, biopsy of intra-abdominal mass  - Tolerating a diet  - Meeting goals for discharge today  - Encourage PO intake  - Encourage out of bed and ambulation  - PRN pain meds  - PRN anti nausea meds  - DVT prophylaxis

## 2024-09-11 NOTE — TELEPHONE ENCOUNTER
----- Message from Lucas Tafoya MD sent at 9/11/2024 12:33 PM EDT -----  Patient was seen for hyponatremia and chronic kidney disease stage III A and was started on salt tablet.  BMP has been ordered by surgical team for 9/16.  Please remind patient to do the BMP test.  Please order for follow-up with me or an AP or any other provider within the next 2 to 6 weeks.

## 2024-09-12 ENCOUNTER — NURSE TRIAGE (OUTPATIENT)
Age: 79
End: 2024-09-12

## 2024-09-12 ENCOUNTER — HOME CARE VISIT (OUTPATIENT)
Dept: HOME HEALTH SERVICES | Facility: HOME HEALTHCARE | Age: 79
End: 2024-09-12
Payer: MEDICARE

## 2024-09-12 ENCOUNTER — TRANSITIONAL CARE MANAGEMENT (OUTPATIENT)
Dept: FAMILY MEDICINE CLINIC | Facility: MEDICAL CENTER | Age: 79
End: 2024-09-12

## 2024-09-12 ENCOUNTER — OFFICE VISIT (OUTPATIENT)
Dept: FAMILY MEDICINE CLINIC | Facility: MEDICAL CENTER | Age: 79
End: 2024-09-12
Payer: MEDICARE

## 2024-09-12 VITALS
HEART RATE: 97 BPM | DIASTOLIC BLOOD PRESSURE: 70 MMHG | HEIGHT: 62 IN | OXYGEN SATURATION: 96 % | WEIGHT: 75 LBS | TEMPERATURE: 97.4 F | BODY MASS INDEX: 13.8 KG/M2 | SYSTOLIC BLOOD PRESSURE: 140 MMHG

## 2024-09-12 VITALS
SYSTOLIC BLOOD PRESSURE: 120 MMHG | HEART RATE: 72 BPM | WEIGHT: 96.6 LBS | OXYGEN SATURATION: 95 % | DIASTOLIC BLOOD PRESSURE: 80 MMHG | RESPIRATION RATE: 18 BRPM | TEMPERATURE: 97.6 F | HEIGHT: 62 IN | BODY MASS INDEX: 17.78 KG/M2

## 2024-09-12 DIAGNOSIS — Z76.89 ENCOUNTER FOR SUPPORT AND COORDINATION OF TRANSITION OF CARE: Primary | ICD-10-CM

## 2024-09-12 DIAGNOSIS — L22 CANDIDAL DIAPER RASH: ICD-10-CM

## 2024-09-12 DIAGNOSIS — R19.7 DIARRHEA, UNSPECIFIED TYPE: ICD-10-CM

## 2024-09-12 DIAGNOSIS — B37.2 CANDIDAL DIAPER RASH: ICD-10-CM

## 2024-09-12 PROBLEM — I10 ESSENTIAL HYPERTENSION: Status: RESOLVED | Noted: 2018-02-26 | Resolved: 2024-09-12

## 2024-09-12 PROCEDURE — 400013 VN SOC

## 2024-09-12 PROCEDURE — 10330081 VN NO-PAY CLAIM PROCEDURE

## 2024-09-12 PROCEDURE — G0151 HHCP-SERV OF PT,EA 15 MIN: HCPCS

## 2024-09-12 PROCEDURE — 99215 OFFICE O/P EST HI 40 MIN: CPT | Performed by: STUDENT IN AN ORGANIZED HEALTH CARE EDUCATION/TRAINING PROGRAM

## 2024-09-12 RX ORDER — NYSTATIN 100000 U/G
CREAM TOPICAL 2 TIMES DAILY
Qty: 30 G | Refills: 0 | Status: SHIPPED | OUTPATIENT
Start: 2024-09-12

## 2024-09-12 RX ORDER — ZINC OXIDE 20 %
OINTMENT (GRAM) TOPICAL AS NEEDED
Qty: 425 G | Refills: 0 | Status: SHIPPED | OUTPATIENT
Start: 2024-09-12

## 2024-09-12 NOTE — TELEPHONE ENCOUNTER
Called and spoke with patient and scheduled hospital follow up in [place] with Dr. Ferrara on 10/11 1:30 pm.

## 2024-09-12 NOTE — PROGRESS NOTES
[facility] - Clinic Note  Delicia Cintron DO, 24     Brigid Brown MRN: [id number] : 1945 Age: 79 y.o.     Assessment/Plan     1. Encounter for support and coordination of transition of care    -TCM today after recent hospitalization, underwent exploratory laparotomy and biopsy of mesenteric mass, malignancy  -Has upcoming appointment with surgical oncology  -She has been taking salt tabs as prescribed and following fluid restriction, follow-up repeat [facility] 2024  -Has follow-up with nephrology scheduled as well    2. Candidal diaper rash    - nystatin (MYCOSTATIN) cream; Apply topically 2 (two) times a day  Dispense: 30 g; Refill: 0  - zinc oxide 20 % ointment; Apply topically as needed for irritation (Apply after washing for barrier)  Dispense: 425 g; Refill: 0    3. Diarrhea, unspecified type    -Patient advised to start WelChol as prescribed by GI  -Upcoming appointment with GI      Brigid Brown acknowledged understanding of treatment plan, all questions answered.    Subjective      Brigid Brown is a 79 y.o. female who presents for transition of care appointment.  Patient was hospitalized at [facility] from 2024 to 2024.  She underwent exploratory laparotomy and biopsy of mesenteric mass.  Patient was noted to have hyponatremia, nephrology was consulted.  Patient was started on salt tablet 2 g 3 times daily and placed on fluid restriction 1.5 L daily.  Patient presents today with her daughter.  Patient experiencing diarrhea.  Patient complains of discomfort buttocks.      Hospital course per discharge summary:    Patient presented to the hospital on  for procedure as noted above. On  patient underwent uncomplicated exploratory laparotomy and biopsy of the mesenteric mass. The patient was slow to have return of bowel function following surgery and noted to have a postoperative ileus that resolved on 9/10.  Patient's diet was slowly advanced as tolerated until reaching a normal diet. Patient noted to have low sodium during stay and nephrology was consulted for further management. Labs showed serum osmolality was low with normal urine sodium and osmolality. Patient was started on 2g sodium tablet three times daily and placed on a fluid restriction of 1.5L daily with improvement of sodium to acceptable level. Once bowel function returned, patient noted to have loose stools with a couple episodes of fecal incontinence, from discussion it appears diarrhea/loose stools are her baseline. Today patient is tolerating her diet without nausea or vomiting, pain is well controlled, loose stools (baseline). Patient deemed stable and appropriate for discharge. Discussed using adult diapers for periotic incontinence and eating a health fiber rich diet, patient has outpatient GI appointment upcoming. Discussed discharge instructions with patient and  as well as daughter over the phone, they are all agreeable. All questions and concerns addressed.       TCM Call       Date and time call was made  9/12/2024  9:47 AM    Hospital care reviewed  Records reviewed    Patient was hospitialized at  [facility]    Date of Admission  09/06/24    Date of discharge  09/11/24    Diagnosis  Mesenteric mass    Disposition  Home    Current Symptoms  --  watery diarrhea overnight, getting back now          TCM Call       Post hospital issues  None    Should patient be enrolled in anticoag monitoring?  No    Scheduled for follow up?  Yes    Patients specialists  Endocrinologist; Neurologist    Did you obtain your prescribed medications  Yes    Do you need help managing your prescriptions or medications  No    Is transportation to your appointment needed  No    I have advised the patient to call PCP with any new or worsening symptoms  CAESAR Jade    Living Arrangements  Spouse or Significiant other    Support System  Spouse;  Children    The type of support provided  Emotional; Financial; Physical    Do you have social support  Yes, as much as I need    Are you recieving any outpatient services  No    Are you recieving home care services  No    Are you using any community resources  No    Current waiver services  No    Have you fallen in the last 12 months  No    Interperter language line needed  No    Comments  TCM appt today 9/12/24.            The following portions of the patient's history were reviewed and updated as appropriate: allergies, current medications, past family history, past medical history, past social history, past surgical history and problem list.     Past Medical History:   Diagnosis Date    Acne     Basal cell carcinoma 06/08/2020    right lower forehead    BCC (basal cell carcinoma of skin) 03/09/2020    mid forehead    Benign neoplasm of skin     Disease of thyroid gland 2006    GERD (gastroesophageal reflux disease)     Hypertension     Inflamed seborrheic keratosis     Irritable bowel syndrome     Malignant neoplasm of skin of face     Migraines     Nonmelanoma skin cancer     Last Assessed:6/27/17    Squamous cell skin cancer 06/08/2020    In situ, left lower forehead    Tachycardia     Telogen effluvium     Temporomandibular joint disorder     Vertigo        Allergies   Allergen Reactions    Cortisone Hypertension, Other (See Comments), Shortness Of Breath and Tachycardia    Acebutolol     Acetaminophen GI Intolerance     diarrhea    Amlodipine     Atenolol     Azithromycin     Barium Sulfate Diarrhea     Patient reports watery stool and stomach ache post fluoro upper GI on 6/6/24. Per Radiologist MD Lozada, reaction should not absolutely preclude patient from having barium in the future if necessary. CG RN 6/7/24      Bisphosphonates      Annotation - [id number]: iriitis    Candesartan     Clarithromycin     Erythromycin     Fosinopril     Irbesartan     Levofloxacin     Losartan     Methylprednisolone  Hypertension and Tachycardia    Metoprolol     Nisoldipine     Olmesartan     Propranolol     Sulfamethoxazole-Trimethoprim Nausea Only    Timolol     Lidocaine Palpitations and Tachycardia       Past Surgical History:   Procedure Laterality Date    ADENOIDECTOMY      APPENDECTOMY      CHOLECYSTECTOMY      COLONOSCOPY  05/03/2019    COMPLEX WOUND CLOSURE TO EXTREMITY N/A 7/28/2020    Procedure: COMPLEX CLOSURE MID FOREHEAD;  Surgeon: Randy Frank MD;  Location: [facility];  Service: Plastics    ESOPHAGOGASTRODUODENOSCOPY  2009    FLAP LOCAL HEAD / NECK N/A 7/28/2020    Procedure: FLAP MID FOREHEAD;  Surgeon: Randy Frank MD;  Location: [facility];  Service: Plastics    HYSTERECTOMY  1987    LYMPH NODE DISSECTION N/A 9/6/2024    Procedure: BIOPSY ABDOMINAL MASS, LYMPHOMA PROTOCOL;  Surgeon: Angus Drew MD;  Location: [facility];  Service: Surgical Oncology    MALIGNANT SKIN LESION EXCISION      Excision of Lesion Face Malignant-9/14/2004 BCC Forehead    MOHS RECONSTRUCTION N/A 7/28/2020    Procedure: RECONSTRUCTION MOHS DEFECT MID FOREHEAD;  Surgeon: Randy Frank MD;  Location: [facility];  Service: Plastics    MOHS SURGERY  07/27/2020    Right &left lower forehead, mid forehead    OOPHORECTOMY Bilateral 1987    ROTATOR CUFF REPAIR Right     SKIN BIOPSY      TONSILLECTOMY      TUBAL LIGATION  1976?       Family History   Problem Relation Age of Onset    Hypertension Mother         Mother    Osteoporosis Mother     Skin cancer Mother     Migraines Mother     Dementia Mother     Hypertension Father         Father    Skin cancer Father     Dementia Father     Skin cancer Sister 73    Migraines Sister     No Known Problems Daughter     Uterine cancer Maternal Grandmother 29    Diabetes type II Maternal Grandfather     Cancer Paternal Grandmother     Uterine cancer Paternal Grandmother 65    No Known Problems Maternal Aunt     Cancer Paternal Aunt         Breast    Uterine cancer Paternal Aunt  55    No Known Problems Paternal Aunt     No Known Problems Paternal Aunt        Social History     Socioeconomic History    Marital status: /Civil Union     Spouse name: None    Number of children: None    Years of education: None    Highest education level: None   Occupational History    None   Tobacco Use    Smoking status: Never    Smokeless tobacco: Never   Vaping Use    Vaping status: Never Used   Substance and Sexual Activity    Alcohol use: Not Currently    Drug use: Never    Sexual activity: Not Currently     Partners: Male     Birth control/protection: Post-menopausal   Other Topics Concern    None   Social History Narrative    None     Social Determinants of Health     Financial Resource Strain: Not on file   Food Insecurity: No Food Insecurity (9/8/2024)    Hunger Vital Sign     Worried About Running Out of Food in the Last Year: Never true     Ran Out of Food in the Last Year: Never true   Transportation Needs: No Transportation Needs (9/8/2024)    PRAPARE - Transportation     Lack of Transportation (Medical): No     Lack of Transportation (Non-Medical): No   Physical Activity: Not on file   Stress: Not on file   Social Connections: Not on file   Intimate Partner Violence: Not on file   Housing Stability: Low Risk  (9/8/2024)    Housing Stability Vital Sign     Unable to Pay for Housing in the Last Year: No     Number of Times Moved in the Last Year: 0     Homeless in the Last Year: No       Current Outpatient Medications   Medication Sig Dispense Refill    ascorbic acid (VITAMIN C) 500 mg tablet Take 500 mg by mouth daily 1 tablet daily      cyproheptadine (PERIACTIN) 4 mg tablet take 0.5 tablet by mouth four times a day 120 tablet 0    Digestive Enzyme CAPS Take by mouth if needed (.) beano taking 1 tablet      escitalopram (LEXAPRO) 5 mg tablet Take 1 tablet (5 mg total) by mouth daily 90 tablet 0    lactase (LACTAID) 3,000 units tablet Take 3,000 Units by mouth as needed (.) 2 tablets as  "needed      Multiple Vitamins-Minerals (CENTRUM SILVER ULTRA WOMENS PO) Take by mouth daily 1 tablet daily      polyethylene glycol-propylene glycol (SYSTANE) 0.4-0.3 % 1 drop if needed (dry eye) as needed B eyes      sodium chloride 1 g tablet Take 2 tablets (2 g total) by mouth 3 (three) times a day with meals 180 tablet 0    bisacodyl (DULCOLAX) 10 mg suppository Insert 1 suppository (10 mg total) into the rectum daily as needed for constipation for up to 7 days (Patient not taking: Reported on 9/12/2024) 7 suppository 0    colesevelam (WELCHOL) 625 mg tablet take 3 tablets by mouth twice a day with meals (Patient not taking: Reported on 9/12/2024) 540 tablet 1    oxyCODONE (ROXICODONE) 5 immediate release tablet Take 0.5 tablets (2.5 mg total) by mouth every 6 (six) hours as needed for moderate pain for up to 4 days For continuation of therapy status post exploratory laparotomy, biopsy of mass by Dr. Drew Max Daily Amount: 10 mg (Patient not taking: Reported on 9/12/2024) 8 tablet 0    senna-docusate sodium (SENOKOT S) 8.6-50 mg per tablet Take 1 tablet by mouth daily at bedtime for 7 days (Patient not taking: Reported on 9/12/2024) 7 tablet 0     No current facility-administered medications for this visit.       Review of Systems     As noted in HPI    Objective      /80 (BP Location: Left arm, Patient Position: Sitting, Cuff Size: Standard)   Pulse 72   Temp 97.6 °F (36.4 °C) (Temporal)   Resp 18   Ht 5' 2\" (1.575 m)   Wt 43.8 kg (96 lb 9.6 oz)   LMP  (LMP Unknown)   SpO2 95%   BMI 17.67 kg/m²     Physical Exam  Vitals reviewed.   Constitutional:       General: She is not in acute distress.     Appearance: She is not toxic-appearing.      Comments: Frail appearing     HENT:      Head: Normocephalic.   Eyes:      Conjunctiva/sclera: Conjunctivae normal.   Cardiovascular:      Rate and Rhythm: Normal rate and regular rhythm.      Pulses: Normal pulses.      Heart sounds: Normal heart sounds. " "  Pulmonary:      Effort: Pulmonary effort is normal.      Breath sounds: Normal breath sounds.   Abdominal:      General: Bowel sounds are normal.      Palpations: Abdomen is soft.      Tenderness: There is abdominal tenderness in the right lower quadrant. There is no guarding or rebound.      Comments: Surgical site clean, no discharge, no surrounding erythema   Genitourinary:         Comments: Erythematous rash  Musculoskeletal:      Right lower leg: Edema present.      Left lower leg: Edema present.   Skin:     General: Skin is warm and dry.      Capillary Refill: Capillary refill takes less than 2 seconds.   Neurological:      Mental Status: She is alert and oriented to person, place, and time.   Psychiatric:         Thought Content: Thought content normal.             Some portions of this record may have been generated with voice recognition software. There may be translation, syntax, or grammatical errors. Occasional wrong word or \"sound-a-like\" substitutions may have occurred due to the inherent limitations of the voice recognition software. Read the chart carefully and recognize, using context, where substations may have occurred. If you have any questions, please contact the dictating provider for clarification or correction, as needed.  "

## 2024-09-12 NOTE — TELEPHONE ENCOUNTER
"Pt called to report that she was recently discharged yesterday for exploratory laparotomy and biopsy of mesenteric mass. Pt reported that she had supple last night, drank water, and took benefiber. Pt reported than that during the night and today in the morning she has had about 5-6 episodes of diarrhea. She reported that the last episode there was about a thumb size formed stool along with that. Pt denies d/d of dehydration, no abdominal pain, no V/M, no bloody or tarry stool. At baseline pt reports BM is loose but not water diarrhea as she's been having. Pt made aware of s/s that require immediate medical attention. Please advise.     Reason for Disposition   [1] SEVERE diarrhea AND [2] age > 60 years    Answer Assessment - Initial Assessment Questions  1. DIARRHEA SEVERITY: \"How bad is the diarrhea?\" \"How many extra stools have you had in the past 24 hours than normal?\"       5-6 times . Last stool pt had was formed but only big as thumb   2. ONSET: \"When did the diarrhea begin?\"       During the night 09/11-09-12  3. STOOL DESCRIPTION:  \"How loose or watery is the diarrhea?\" \"What is the stool color?\" \"Is there any blood or mucous in the stool?\"      Pt reported at baseline her stools are loose but not watery diarrhea as it has been overnight and today   4. VOMITING: \"Are you also vomiting?\" If Yes, ask: \"How many times in the past 24 hours?\"       Denies   5. ABDOMEN PAIN: \"Are you having any abdomen pain?\" If Yes, ask: \"What does it feel like?\" (e.g., crampy, dull, intermittent, constant)       Denies   6. ABDOMEN PAIN SEVERITY: If present, ask: \"How bad is the pain?\"  (e.g., Scale 1-10; mild, moderate, or severe)      Denies   7. ORAL INTAKE: If vomiting, \"Have you been able to drink liquids?\" \"How much fluids have you had in the past 24 hours?\"      Pt reports that she hasn't been hydrating because anytime she put anything in her, it comes right out. Pt did have supper last night and drank water   8. " "HYDRATION: \"Any signs of dehydration?\" (e.g., dry mouth [not just dry lips], too weak to stand, dizziness, new weight loss) \"When did you last urinate?\"      Denies   9. EXPOSURE: \"Have you traveled to a foreign country recently?\" \"Have you been exposed to anyone with diarrhea?\" \"Could you have eaten any food that was spoiled?\"      Pt was d/c from being inpatient yesterday    10. CANCER: \"What type of cancer do you have?\"        Mesenteric mass  11. CANCER - TREATMENT: \"What cancer treatments have you received?\" \"When did you last take or receive them?\" (e.g., chemotherapy, immunotherapy, radiation, or recent surgery). Note: Triager with access to patient's medical record should review treatments and administration dates.        Pt was discharge yesterday had exploratory laparotomy and biopsy of mesenteric mass  12. CANCER - NEUTROPENIA RISK: \"Have you received chemotherapy recently? If Yes, ask: \"When was it and what was your last WBC and ANC (absolute neutrophil count)?\" \"Were you told that your white cell count was low?\" Note: Triager with access to patient's medical record should review most recent labs. An ANC less than 1,000 - 1,500 means that the neutrophils are low and the immune system is weak.        09/11 ANC 4.99  13. C DIFF RSK: \"Have you ever had c-difficile (C-Diff) diarrhea?\"         Denies   14. DIARRHEA MEDICINES: \"Are you taking any medicines right now to make the diarrhea better?\" If Yes, ask: \"What drugs are you taking?\" (e.g., Imodium, Lomotil)        Pt took benefiber today as directed when discharged  15. OTHER SYMPTOMS: \"Do you have any other symptoms?\" (e.g., fever)        Did take temp but pt reports that she doesn't feel feverish    Protocols used: Cancer - Diarrhea-Adult-AH    "

## 2024-09-12 NOTE — CASE COMMUNICATION
"Medication discrepancies or Major drug interactions  no major interactions.  pt currently not taking the following medication:  senokot, oxycodone, dulcolax    Abnormal clinical findingsinc HR at 97 bpm at rest, edema observed BLE, pt reports \"many bouts of diarrhea\" that lasted for 1 hour late last night/early this morning.    This report is informational only, no response is needed  [facility] has Admitted your patient to Home [facility] lt service with the following disciplines: PT and OT    Patient stated goals of care  to be able to get back to how I was, get stronger, be able to cook and clean, get back to my life, drive again, not to use the walker    Potential barriers to goal achievement slow mobility and marcia, dec activity tolerance, pain  Primary focus of home health care:Other GI  Anticipated visit pattern 1wk1 and starting week of 09/15/24 2wk3  Thank you  for allowing us to participate in the care of your patient.      Elsy RODRIGUEZT, COS-C      "

## 2024-09-13 ENCOUNTER — NURSE TRIAGE (OUTPATIENT)
Dept: PALLIATIVE MEDICINE | Facility: CLINIC | Age: 79
End: 2024-09-13

## 2024-09-13 ENCOUNTER — HOME CARE VISIT (OUTPATIENT)
Dept: HOME HEALTH SERVICES | Facility: HOME HEALTHCARE | Age: 79
End: 2024-09-13
Payer: MEDICARE

## 2024-09-13 PROCEDURE — 88342 IMHCHEM/IMCYTCHM 1ST ANTB: CPT | Performed by: PATHOLOGY

## 2024-09-13 PROCEDURE — G0321 AUDIO-ONLY HHS: HCPCS

## 2024-09-13 PROCEDURE — 88305 TISSUE EXAM BY PATHOLOGIST: CPT | Performed by: PATHOLOGY

## 2024-09-13 PROCEDURE — 88341 IMHCHEM/IMCYTCHM EA ADD ANTB: CPT | Performed by: PATHOLOGY

## 2024-09-13 NOTE — TELEPHONE ENCOUNTER
Was able to reach patient after 2 attempts yesterday. She stated her diarrhea is getting better and she didn't have any accidents last night. Explained that because she had the post op ileus, it will take some time for her bowels to go back to normal. She has no other complaints at this time. Will see her next week for her post op visit.

## 2024-09-13 NOTE — TELEPHONE ENCOUNTER
Spoke with patient Brigid , she would not like to schedule at this time is feeling very overwhelmed with phone calls after discharge from hospital she will like a call in a week to discuss further Hospital Follow Up appointment with Palliative Care . X1

## 2024-09-16 ENCOUNTER — TELEPHONE (OUTPATIENT)
Dept: NEPHROLOGY | Facility: CLINIC | Age: 79
End: 2024-09-16

## 2024-09-16 ENCOUNTER — HOME CARE VISIT (OUTPATIENT)
Dept: HOME HEALTH SERVICES | Facility: HOME HEALTHCARE | Age: 79
End: 2024-09-16
Payer: MEDICARE

## 2024-09-16 ENCOUNTER — LAB (OUTPATIENT)
Dept: LAB | Facility: MEDICAL CENTER | Age: 79
End: 2024-09-16
Payer: MEDICARE

## 2024-09-16 ENCOUNTER — NURSE TRIAGE (OUTPATIENT)
Age: 79
End: 2024-09-16

## 2024-09-16 ENCOUNTER — TELEPHONE (OUTPATIENT)
Age: 79
End: 2024-09-16

## 2024-09-16 VITALS — HEART RATE: 98 BPM | SYSTOLIC BLOOD PRESSURE: 130 MMHG | OXYGEN SATURATION: 97 % | DIASTOLIC BLOOD PRESSURE: 70 MMHG

## 2024-09-16 DIAGNOSIS — E87.1 HYPONATREMIA: ICD-10-CM

## 2024-09-16 DIAGNOSIS — E87.6 HYPOKALEMIA: ICD-10-CM

## 2024-09-16 DIAGNOSIS — N18.30 STAGE 3 CHRONIC KIDNEY DISEASE, UNSPECIFIED WHETHER STAGE 3A OR 3B CKD (HCC): ICD-10-CM

## 2024-09-16 DIAGNOSIS — E87.1 HYPONATREMIA: Primary | ICD-10-CM

## 2024-09-16 LAB
ANION GAP SERPL CALCULATED.3IONS-SCNC: 11 MMOL/L (ref 4–13)
BUN SERPL-MCNC: 9 MG/DL (ref 5–25)
CALCIUM SERPL-MCNC: 8.5 MG/DL (ref 8.4–10.2)
CHLORIDE SERPL-SCNC: 99 MMOL/L (ref 96–108)
CO2 SERPL-SCNC: 26 MMOL/L (ref 21–32)
CREAT SERPL-MCNC: 0.69 MG/DL (ref 0.6–1.3)
GFR SERPL CREATININE-BSD FRML MDRD: 81 ML/MIN/1.73SQ M
GLUCOSE P FAST SERPL-MCNC: 87 MG/DL (ref 65–99)
POTASSIUM SERPL-SCNC: 3 MMOL/L (ref 3.5–5.3)
SODIUM SERPL-SCNC: 136 MMOL/L (ref 135–147)

## 2024-09-16 PROCEDURE — G0152 HHCP-SERV OF OT,EA 15 MIN: HCPCS

## 2024-09-16 PROCEDURE — 36415 COLL VENOUS BLD VENIPUNCTURE: CPT

## 2024-09-16 PROCEDURE — 80048 BASIC METABOLIC PNL TOTAL CA: CPT

## 2024-09-16 RX ORDER — POTASSIUM CHLORIDE 1500 MG/1
20 TABLET, EXTENDED RELEASE ORAL DAILY
Qty: 90 TABLET | Refills: 3 | Status: SHIPPED | OUTPATIENT
Start: 2024-09-16

## 2024-09-16 NOTE — TELEPHONE ENCOUNTER
----- Message from Lucas Tafoya MD sent at 9/16/2024  2:19 PM EDT -----  Please inform patient that renal function is stable at creatinine 0.69 mg/dL, potassium is low at 3.0 meq/L.  Started on potassium chloride 20 mEq daily.  Repeat BMP in 1 week and 2 weeks to monitor potassium level.  BMP already ordered in epic

## 2024-09-16 NOTE — RESULT ENCOUNTER NOTE
Please inform patient that renal function is stable at creatinine 0.69 mg/dL, potassium is low at 3.0 meq/L.  Started on potassium chloride 20 mEq daily.  Repeat BMP in 1 week and 2 weeks to monitor potassium level.  BMP already ordered in epic

## 2024-09-16 NOTE — TELEPHONE ENCOUNTER
"LOV 06/27/24, F/U  09/24/24    Merari reports pt diarrhea and episodic \"accidents\" have begun to slow down. Pt began WelChol 09/12 and did not have diarrhea the following day. Pt is hydrating with Pedialyte. Pt placed on fluid restriction @ 1500 ml daily and started on sodium supplement, @  2 gm TID, post abdominal mass biopsy. Due/to decreasing episodes of diarrhea, Merari is inquiring if WelChol should be titrated down or if this is a medication she should remain on. Currently taking 3 tablets BID    Due to pt's confusion, Merari requests callback to be made to her.     [phone]      Reason for Disposition   MILD-MODERATE diarrhea (e.g., 1-6 times / day more than normal)    Answer Assessment - Initial Assessment Questions  1. DIARRHEA SEVERITY: \"How bad is the diarrhea?\" \"How many extra stools have you had in the past 24 hours than normal?\"     - NO DIARRHEA (SCALE 0)    - MILD (SCALE 1-3): Few loose or mushy BMs; increase of 1-3 stools over normal daily number of stools; mild increase in ostomy output.    -  MODERATE (SCALE 4-7): Increase of 4-6 stools daily over normal; moderate increase in ostomy output.  * SEVERE (SCALE 8-10; OR 'WORST POSSIBLE'): Increase of 7 or more stools daily over normal; moderate increase in ostomy output; incontinence.      Less than 5   2. ONSET: \"When did the diarrhea begin?\"       improving  3. BM CONSISTENCY: \"How loose or watery is the diarrhea?\"       -  4. VOMITING: \"Are you also vomiting?\" If Yes, ask: \"How many times in the past 24 hours?\"       denies  5. ABDOMINAL PAIN: \"Are you having any abdominal pain?\" If Yes, ask: \"What does it feel like?\" (e.g., crampy, dull, intermittent, constant)       denies  6. ABDOMINAL PAIN SEVERITY: If present, ask: \"How bad is the pain?\"  (e.g., Scale 1-10; mild, moderate, or severe)    - MILD (1-3): doesn't interfere with normal activities, abdomen soft and not tender to touch     - MODERATE (4-7): interferes with normal activities or awakens from " "sleep, tender to touch     - SEVERE (8-10): excruciating pain, doubled over, unable to do any normal activities        N/a  7. ORAL INTAKE: If vomiting, \"Have you been able to drink liquids?\" \"How much fluids have you had in the past 24 hours?\"      improving  8. HYDRATION: \"Any signs of dehydration?\" (e.g., dry mouth [not just dry lips], too weak to stand, dizziness, new weight loss) \"When did you last urinate?\"      improving  9. EXPOSURE: \"Have you traveled to a foreign country recently?\" \"Have you been exposed to anyone with diarrhea?\" \"Could you have eaten any food that was spoiled?\"      denies  10. ANTIBIOTIC USE: \"Are you taking antibiotics now or have you taken antibiotics in the past 2 months?\"        yes  11. OTHER SYMPTOMS: \"Do you have any other symptoms?\" (e.g., fever, blood in stool)        denies  12. PREGNANCY: \"Is there any chance you are pregnant?\" \"When was your last menstrual period?\"        N/a    Protocols used: Diarrhea-ADULT-[place]    "

## 2024-09-17 PROCEDURE — G0180 MD CERTIFICATION HHA PATIENT: HCPCS | Performed by: SURGERY

## 2024-09-17 NOTE — TELEPHONE ENCOUNTER
I spoke to bettie she is aware of her blood work results at this time no questions or concerns at this time. She understood she was started on potassium chloride 20 mEq daily . BMP die in 1 and 2 weeks apart no fasting needed.

## 2024-09-18 ENCOUNTER — TELEPHONE (OUTPATIENT)
Age: 79
End: 2024-09-18

## 2024-09-18 ENCOUNTER — HOME CARE VISIT (OUTPATIENT)
Dept: HOME HEALTH SERVICES | Facility: HOME HEALTHCARE | Age: 79
End: 2024-09-18
Payer: MEDICARE

## 2024-09-18 VITALS — DIASTOLIC BLOOD PRESSURE: 70 MMHG | HEART RATE: 103 BPM | OXYGEN SATURATION: 93 % | SYSTOLIC BLOOD PRESSURE: 115 MMHG

## 2024-09-18 DIAGNOSIS — C48.1: Primary | ICD-10-CM

## 2024-09-18 PROCEDURE — G0152 HHCP-SERV OF OT,EA 15 MIN: HCPCS

## 2024-09-18 PROCEDURE — G0151 HHCP-SERV OF PT,EA 15 MIN: HCPCS

## 2024-09-18 NOTE — TELEPHONE ENCOUNTER
FYI:  Daughter Merari called(confirmed on med comm consent) wanting to speak to Dr Drew about the bx results from 9/6. She has seen them in the pts Mychart and would like to discuss. Pt does have F/U appt sched 9/20  [facility].  Pls advise

## 2024-09-19 ENCOUNTER — HOME CARE VISIT (OUTPATIENT)
Dept: HOME HEALTH SERVICES | Facility: HOME HEALTHCARE | Age: 79
End: 2024-09-19
Payer: MEDICARE

## 2024-09-19 VITALS — OXYGEN SATURATION: 98 % | SYSTOLIC BLOOD PRESSURE: 150 MMHG | HEART RATE: 80 BPM | DIASTOLIC BLOOD PRESSURE: 80 MMHG

## 2024-09-19 VITALS — HEART RATE: 100 BPM | SYSTOLIC BLOOD PRESSURE: 126 MMHG | OXYGEN SATURATION: 97 % | DIASTOLIC BLOOD PRESSURE: 78 MMHG

## 2024-09-19 PROBLEM — C48.1: Status: ACTIVE | Noted: 2024-09-19

## 2024-09-19 PROCEDURE — G0151 HHCP-SERV OF PT,EA 15 MIN: HCPCS

## 2024-09-20 ENCOUNTER — OFFICE VISIT (OUTPATIENT)
Dept: SURGICAL ONCOLOGY | Facility: CLINIC | Age: 79
End: 2024-09-20

## 2024-09-20 VITALS
OXYGEN SATURATION: 99 % | TEMPERATURE: 97.5 F | DIASTOLIC BLOOD PRESSURE: 86 MMHG | BODY MASS INDEX: 17.67 KG/M2 | SYSTOLIC BLOOD PRESSURE: 102 MMHG | HEART RATE: 103 BPM | HEIGHT: 62 IN | RESPIRATION RATE: 18 BRPM

## 2024-09-20 DIAGNOSIS — C48.1: Primary | ICD-10-CM

## 2024-09-20 PROCEDURE — 99024 POSTOP FOLLOW-UP VISIT: CPT | Performed by: SURGERY

## 2024-09-20 NOTE — ASSESSMENT & PLAN NOTE
79-year-old female status post laparotomy and biopsy of a mesenteric mass.  This revealed carcinoma.  This is likely pancreatic based on its location.  Her performance status is only fair.  I have recommended that she see medical oncology, but she is not sure she wishes to have any chemotherapy and would rather be focused on her quality of life.  I have reached out to palliative care to expedite her follow-up appointment since she is established with them.  I have told her to continue her salt tablets since her sodium has now normalized.  She does have a follow-up with nephrology.  From my standpoint she is doing as well as can be expected.  I will see her again in the future should the need arise.  She and her family are agreeable to this plan.  All their questions were answered.

## 2024-09-20 NOTE — PROGRESS NOTES
Surgical Oncology Follow Up       [facility] [place]  [address] [facility] [address]  [phone]    Brigid Brown  1945  [id number]  [facility] [place]  [address] [facility] [address]  [phone]    1. Cancer of mesentery (HCC)  Assessment & Plan:  79-year-old female status post laparotomy and biopsy of a mesenteric mass.  This revealed carcinoma.  This is likely pancreatic based on its location.  Her performance status is only fair.  I have recommended that she see medical oncology, but she is not sure she wishes to have any chemotherapy and would rather be focused on her quality of life.  I have reached out to palliative care to expedite her follow-up appointment since she is established with them.  I have told her to continue her salt tablets since her sodium has now normalized.  She does have a follow-up with nephrology.  From my standpoint she is doing as well as can be expected.  I will see her again in the future should the need arise.  She and her family are agreeable to this plan.  All their questions were answered.         Chief Complaint   Patient presents with    Post-op       No follow-ups on file.      Oncology History   Cancer of mesentery (HCC)   9/6/2024 Surgery    Biopsy abdominal mass  Soft tissue, mesenteric mass, biopsy:  - Metastatic carcinoma      9/19/2024 Initial Diagnosis    Cancer of mesentery (HCC)             History of Present Illness: Patient returns after her biopsy of her mesenteric mass.  This did reveal carcinoma.  This was likely coming from her pancreas, although immunostains were nonspecific.  She is feeling about the same as she did preop.  She still has some difficulty eating.  She feels fatigued.  She is gaining weight from her salt tablets.  No shortness of breath.    Review of Systems  Complete ROS Surg Onc:    Complete ROS Surg Onc:   Constitutional: The patient has change in appetite and fatigue.  Eyes: No complaints of visual problems, no scleral icterus.   ENT: no complaints of ear pain, no hoarseness, no difficulty swallowing,  no tinnitus and no new masses in head, oral cavity, or neck.   Cardiovascular: No complaints of chest pain, no palpitations, no ankle edema.   Respiratory: No complaints of shortness of breath, no cough.   Gastrointestinal: No complaints of jaundice, no bloody stools, no pale stools.   Genitourinary: No complaints of dysuria, no hematuria, no nocturia, no frequent urination, no urethral discharge.   Musculoskeletal: No complaints of weakness, paralysis, joint stiffness or arthralgias.  Integumentary: No complaints of rash, no new lesions.   Neurological: No complaints of convulsions, no seizures, no dizziness.   Hematologic/Lymphatic: No complaints of easy bruising.   Endocrine:  No hot or cold intolerance.  No polydipsia, polyphagia, or polyuria.  Allergy/immunology:  No environmental allergies.  No food allergies.  Not immunocompromised.  Skin:  No pallor or rash.  No wound.        Patient Active Problem List   Diagnosis    Seborrheic keratosis    Changing skin lesion    Screening for blood or protein in urine    History of skin cancer    Near syncope    Basal cell carcinoma of right temple region    Episodic confusion    Allergic rhinitis    Hypothyroidism    Hashimoto's disease    History of vitamin D deficiency    Subclinical hypothyroidism    Sensorineural hearing loss (SNHL) of both ears    Sprain of anterior talofibular ligament of left ankle    Tinnitus of both ears    Symptoms of cerebrovascular accident (CVA)    IBS (irritable bowel syndrome)    Grief    Transient alteration of awareness    Gastroesophageal reflux disease    Dysphonia    Pain in right lumbar region of back    Abdominal pain    Urinary symptom or sign    Right hip pain    Fatigue    TMJ dysfunction    Myofascial  pain    Tongue pain    Reflux laryngitis    Dizziness    Vaginal atrophy    Lactose intolerance    Severe protein-calorie malnutrition (HCC)    Pigmented purpura (HCC)    Chronic kidney disease (CKD) stage G3a/A2, moderately decreased glomerular filtration rate (GFR) between 45-59 mL/min/1.73 square meter and albuminuria creatinine ratio between  mg/g (HCC)    Age-related osteoporosis without current pathological fracture    Sprain of medial collateral ligament of left knee, initial encounter    Mitral regurgitation    Weight loss    Gastritis    SOB (shortness of breath)    Mesenteric mass    Electrolyte abnormality    Encounter for geriatric assessment    Hyponatremia    Frailty syndrome in geriatric patient    Ambulatory dysfunction    Acute post-operative pain    Anxiety and depression    At risk for delirium    No impairment of memory    Palliative care by specialist    Goals of care, counseling/discussion    Nausea    Abdominal distension    Cancer of mesentery (HCC)     Past Medical History:   Diagnosis Date    Acne     Basal cell carcinoma 06/08/2020    right lower forehead    BCC (basal cell carcinoma of skin) 03/09/2020    mid forehead    Benign neoplasm of skin     Disease of thyroid gland 2006    Essential hypertension 02/26/2018    GERD (gastroesophageal reflux disease)     Hypertension     Inflamed seborrheic keratosis     Irritable bowel syndrome     Malignant neoplasm of skin of face     Migraines     Nonmelanoma skin cancer     Last Assessed:6/27/17    Squamous cell skin cancer 06/08/2020    In situ, left lower forehead    Tachycardia     Telogen effluvium     Temporomandibular joint disorder     Vertigo      Past Surgical History:   Procedure Laterality Date    ADENOIDECTOMY      APPENDECTOMY      CHOLECYSTECTOMY      COLONOSCOPY  05/03/2019    COMPLEX WOUND CLOSURE TO EXTREMITY N/A 7/28/2020    Procedure: COMPLEX CLOSURE MID FOREHEAD;  Surgeon: Randy Frank MD;  [facility]  OR;  Service: Plastics    ESOPHAGOGASTRODUODENOSCOPY  2009    FLAP LOCAL HEAD / NECK N/A 7/28/2020    Procedure: FLAP MID FOREHEAD;  Surgeon: Randy Frank MD;  [facility];  Service: Plastics    HYSTERECTOMY  1987    LYMPH NODE DISSECTION N/A 9/6/2024    Procedure: BIOPSY ABDOMINAL MASS, LYMPHOMA PROTOCOL;  Surgeon: Angus Drew MD;  Location: [facility];  Service: Surgical Oncology    MALIGNANT SKIN LESION EXCISION      Excision of Lesion Face Malignant-9/14/2004 BCC Forehead    MOHS RECONSTRUCTION N/A 7/28/2020    Procedure: RECONSTRUCTION MOHS DEFECT MID FOREHEAD;  Surgeon: Randy Frank MD;  [facility];  Service: Plastics    MOHS SURGERY  07/27/2020    Right &left lower forehead, mid forehead    OOPHORECTOMY Bilateral 1987    ROTATOR CUFF REPAIR Right     SKIN BIOPSY      TONSILLECTOMY      TUBAL LIGATION  1976?     Family History   Problem Relation Age of Onset    Hypertension Mother         Mother    Osteoporosis Mother     Skin cancer Mother     Migraines Mother     Dementia Mother     Hypertension Father         Father    Skin cancer Father     Dementia Father     Skin cancer Sister 73    Migraines Sister     No Known Problems Daughter     Uterine cancer Maternal Grandmother 29    Diabetes type II Maternal Grandfather     Cancer Paternal Grandmother     Uterine cancer Paternal Grandmother 65    No Known Problems Maternal Aunt     Cancer Paternal Aunt         Breast    Uterine cancer Paternal Aunt 55    No Known Problems Paternal Aunt     No Known Problems Paternal Aunt      Social History     Socioeconomic History    Marital status: /Civil Union     Spouse name: Not on file    Number of children: Not on file    Years of education: Not on file    Highest education level: Not on file   Occupational History    Not on file   Tobacco Use    Smoking status: Never    Smokeless tobacco: Never   Vaping Use    Vaping status: Never Used   Substance and Sexual Activity     Alcohol use: Not Currently    Drug use: Never    Sexual activity: Not Currently     Partners: Male     Birth control/protection: Post-menopausal   Other Topics Concern    Not on file   Social History Narrative    Not on file     Social Determinants of Health     Financial Resource Strain: Not on file   Food Insecurity: No Food Insecurity (9/8/2024)    Hunger Vital Sign     Worried About Running Out of Food in the Last Year: Never true     Ran Out of Food in the Last Year: Never true   Transportation Needs: No Transportation Needs (9/8/2024)    PRAPARE - Transportation     Lack of Transportation (Medical): No     Lack of Transportation (Non-Medical): No   Physical Activity: Not on file   Stress: Not on file   Social Connections: Not on file   Intimate Partner Violence: Not on file   Housing Stability: Low Risk  (9/8/2024)    Housing Stability Vital Sign     Unable to Pay for Housing in the Last Year: No     Number of Times Moved in the Last Year: 0     Homeless in the Last Year: No       Current Outpatient Medications:     ascorbic acid (VITAMIN C) 500 mg tablet, Take 500 mg by mouth daily 1 tablet daily, Disp: , Rfl:     cyproheptadine (PERIACTIN) 4 mg tablet, take 0.5 tablet by mouth four times a day, Disp: 120 tablet, Rfl: 0    Digestive Enzyme CAPS, Take by mouth if needed (gas relief) beano taking 1 tablet, Disp: , Rfl:     escitalopram (LEXAPRO) 5 mg tablet, Take 1 tablet (5 mg total) by mouth daily, Disp: 90 tablet, Rfl: 0    lactase (LACTAID) 3,000 units tablet, Take 3,000 Units by mouth as needed (.) 2 tablets as needed, Disp: , Rfl:     Multiple Vitamins-Minerals (CENTRUM SILVER ULTRA WOMENS PO), Take by mouth daily 1 tablet daily, Disp: , Rfl:     nystatin (MYCOSTATIN) cream, Apply topically 2 (two) times a day, Disp: 30 g, Rfl: 0    polyethylene glycol-propylene glycol (SYSTANE) 0.4-0.3 %, 1 drop if needed (dry eye) as needed daily B eyes, Disp: , Rfl:     potassium chloride (Klor-Con M20) 20 mEq tablet,  Take 1 tablet (20 mEq total) by mouth daily, Disp: 90 tablet, Rfl: 3    sodium chloride 1 g tablet, Take 2 tablets (2 g total) by mouth 3 (three) times a day with meals, Disp: 180 tablet, Rfl: 0    zinc oxide 20 % ointment, Apply topically as needed for irritation (Apply after washing for barrier), Disp: 425 g, Rfl: 0    bisacodyl (DULCOLAX) 10 mg suppository, Insert 1 suppository (10 mg total) into the rectum daily as needed for constipation for up to 7 days (Patient not taking: Reported on 9/12/2024), Disp: 7 suppository, Rfl: 0    colesevelam (WELCHOL) 625 mg tablet, take 3 tablets by mouth twice a day with meals (Patient not taking: Reported on 9/12/2024), Disp: 540 tablet, Rfl: 1    senna-docusate sodium (SENOKOT S) 8.6-50 mg per tablet, Take 1 tablet by mouth daily at bedtime for 7 days (Patient not taking: Reported on 9/12/2024), Disp: 7 tablet, Rfl: 0  Allergies   Allergen Reactions    Cortisone Hypertension, Other (See Comments), Shortness Of Breath and Tachycardia    Acebutolol     Acetaminophen GI Intolerance     diarrhea    Amlodipine     Atenolol     Azithromycin     Barium Sulfate Diarrhea     Patient reports watery stool and stomach ache post fluoro upper GI on 6/6/24. Per Radiologist MD Lozada, reaction should not absolutely preclude patient from having barium in the future if necessary. CG RN 6/7/24      Bisphosphonates      Annotation - [id number]: iriitis    Candesartan     Clarithromycin     Erythromycin     Fosinopril     Irbesartan     Levofloxacin     Losartan     Methylprednisolone Hypertension and Tachycardia    Metoprolol     Nisoldipine     Olmesartan     Propranolol     Sulfamethoxazole-Trimethoprim Nausea Only    Timolol     Lidocaine Palpitations and Tachycardia     Vitals:    09/20/24 1058   BP: 102/86   Pulse: 103   Resp: 18   Temp: 97.5 °F (36.4 °C)   SpO2: 99%       Physical Exam  Constitutional: General appearance: The Patient is cachectic who appears the stated age in no acute  distress. Patient is pleasant and talkative.     HEENT:  Normocephalic.  Sclerae are anicteric. Mucous membranes are moist. Neck is supple without adenopathy. No JVD.     Chest: The lungs are clear to auscultation.     Cardiac: Heart is regular rate.     Abdomen: Abdomen is soft, non-tender, non-distended and without masses.  Incision is C/D/I.     Extremities: There is no clubbing or cyanosis. There is 2+edema.  Symmetric.  Neuro: Grossly nonfocal. Gait is normal.       Skin: Warm, anicteric.    Psych:  Patient is pleasant and talkative.  Breasts:        Pathology:  Final Diagnosis   A.  Soft tissue, mesenteric mass, biopsy:  - Metastatic carcinoma (see note).            Comments:   This is an appended report. These results have been appended to a previously preliminary verified report.      Electronically signed by Randy Vega MD on 9/13/2024 at  8:29 AM   Note  [facility]   The sample shows predominantly fibrovascular and adipose tissue with chronic inflammation does not correspond.  Patchy areas of pancreatic type tissue are present.  Areas of highly atypical infiltrating epithelial malignancy with glandular formation and features of mucin are present with in desmoplastic fibrotic tissue. Immunohistochemical stains performed with appropriate controls show the tumor to be positive for keratin A1/3, CK7, and weak partial CDX2 and negative for CK20, PAX8, p53, ER, WT1, TTF-1, Napsin, and GATA3; SMAD4/DPC4 shows loss of nuclear staining.       The findings are not definitively specific and may indicate carcinoma metastasizing from the pancreas, biliary tract, gallbladder, upper GI tract, or (less likely) appendix.  Correlation with clinical impression and other laboratory findings is recommended to assess for other sites of disease or site of origin as clinically indicated.      The findings were communicated by epic secure chat message to Dr. Mcginnis on 9/13/2024 at 0829.       Labs:      Imaging  XR abdomen 1  view kub    Result Date: 9/9/2024  Narrative: XR ABDOMEN 1 VIEW KUB INDICATION: abdominal distension, r/o ileus. Mesenteric mass biopsy COMPARISON: PET/CT dated July 8, 2024 FINDINGS: There are prominent air-filled loops of large bowel throughout the abdomen with bowel gas seen as far distally as the sigmoid colon. Bowel loops are mildly distended measuring up to 7 cm in diameter. No evidence of pneumoperitoneum on this supine study. Upright or left lateral decubitus imaging is more sensitive to detect subtle free air in the appropriate setting. No pathologic calcification or soft tissue mass. Cholecystectomy clips are noted. Left basilar opacity is noted with adjacent effusion. Bones are unremarkable for the patient's age.     Impression: 1. Prominent air-filled loops of large bowel throughout the abdomen in a pattern suggesting ileus. Distal obstruction could potentially appear similar but is considered less likely. Consider attention on follow-up. 2. Left pleural effusion with adjacent atelectasis versus infiltrate. Workstation performed: [id number]     XR chest 1 view portable    Result Date: 8/22/2024  Narrative: XR CHEST PORTABLE INDICATION: hypotension. COMPARISON: PET/CT 7/8/2024, CXR 5/9/2024. FINDINGS: Clear lungs. No pneumothorax or pleural effusion. Nodule projecting over the right base is due to the nipple. Normal cardiomediastinal silhouette. Bones are unremarkable for age. Normal upper abdomen.     Impression: No acute cardiopulmonary disease. Workstation performed: [id number]     I personally reviewed and interpreted the above laboratory and imaging data.

## 2024-09-20 NOTE — LETTER
September 20, 2024     Delicia Cintron DO  [address] [place] [address]  [address] [place] [id number]    Patient: Brigid Brown   YOB: 1945   Date of Visit: 9/20/2024       Dear Dr. Cintron:    Thank you for referring Brigid Brown to me for evaluation. Below are my notes for this consultation.    If you have questions, please do not hesitate to call me. I look forward to following your patient along with you.         Sincerely,        Angus Drew MD        CC: MD Dion Caraballo MD Darius Desai, MD  9/20/2024 11:31 AM  Sign when Signing Visit               Surgical Oncology Follow Up       [facility] [place]  [address] [facility] [address]  [phone]    Brigid Brown  1945  [id number]  [facility]  [address] [facility] [address]  [phone]    1. Cancer of mesentery (HCC)  Assessment & Plan:  79-year-old female status post laparotomy and biopsy of a mesenteric mass.  This revealed carcinoma.  This is likely pancreatic based on its location.  Her performance status is only fair.  I have recommended that she see medical oncology, but she is not sure she wishes to have any chemotherapy and would rather be focused on her quality of life.  I have reached out to palliative care to expedite her follow-up appointment since she is established with them.  I have told her to continue her salt tablets since her sodium has now normalized.  She does have a follow-up with nephrology.  From my standpoint she is doing as well as can be expected.  I will see her again in the future should the need arise.  She and her family are agreeable to this plan.  All their questions were answered.         Chief Complaint   Patient presents with   • Post-op       No follow-ups on file.      Oncology History   Cancer of mesentery (HCC)   9/6/2024 Surgery     Biopsy abdominal mass  Soft tissue, mesenteric mass, biopsy:  - Metastatic carcinoma      9/19/2024 Initial Diagnosis    Cancer of mesentery (HCC)             History of Present Illness: Patient returns after her biopsy of her mesenteric mass.  This did reveal carcinoma.  This was likely coming from her pancreas, although immunostains were nonspecific.  She is feeling about the same as she did preop.  She still has some difficulty eating.  She feels fatigued.  She is gaining weight from her salt tablets.  No shortness of breath.    Review of Systems  Complete ROS Surg Onc:   Complete ROS Surg Onc:   Constitutional: The patient has change in appetite and fatigue.  Eyes: No complaints of visual problems, no scleral icterus.   ENT: no complaints of ear pain, no hoarseness, no difficulty swallowing,  no tinnitus and no new masses in head, oral cavity, or neck.   Cardiovascular: No complaints of chest pain, no palpitations, no ankle edema.   Respiratory: No complaints of shortness of breath, no cough.   Gastrointestinal: No complaints of jaundice, no bloody stools, no pale stools.   Genitourinary: No complaints of dysuria, no hematuria, no nocturia, no frequent urination, no urethral discharge.   Musculoskeletal: No complaints of weakness, paralysis, joint stiffness or arthralgias.  Integumentary: No complaints of rash, no new lesions.   Neurological: No complaints of convulsions, no seizures, no dizziness.   Hematologic/Lymphatic: No complaints of easy bruising.   Endocrine:  No hot or cold intolerance.  No polydipsia, polyphagia, or polyuria.  Allergy/immunology:  No environmental allergies.  No food allergies.  Not immunocompromised.  Skin:  No pallor or rash.  No wound.        Patient Active Problem List   Diagnosis   • Seborrheic keratosis   • Changing skin lesion   • Screening for blood or protein in urine   • History of skin cancer   • Near syncope   • Basal cell carcinoma of right temple region   • Episodic  confusion   • Allergic rhinitis   • Hypothyroidism   • Hashimoto's disease   • History of vitamin D deficiency   • Subclinical hypothyroidism   • Sensorineural hearing loss (SNHL) of both ears   • Sprain of anterior talofibular ligament of left ankle   • Tinnitus of both ears   • Symptoms of cerebrovascular accident (CVA)   • IBS (irritable bowel syndrome)   • Grief   • Transient alteration of awareness   • Gastroesophageal reflux disease   • Dysphonia   • Pain in right lumbar region of back   • Abdominal pain   • Urinary symptom or sign   • Right hip pain   • Fatigue   • TMJ dysfunction   • Myofascial pain   • Tongue pain   • Reflux laryngitis   • Dizziness   • Vaginal atrophy   • Lactose intolerance   • Severe protein-calorie malnutrition (HCC)   • Pigmented purpura (HCC)   • Chronic kidney disease (CKD) stage G3a/A2, moderately decreased glomerular filtration rate (GFR) between 45-59 mL/min/1.73 square meter and albuminuria creatinine ratio between  mg/g (HCC)   • Age-related osteoporosis without current pathological fracture   • Sprain of medial collateral ligament of left knee, initial encounter   • Mitral regurgitation   • Weight loss   • Gastritis   • SOB (shortness of breath)   • Mesenteric mass   • Electrolyte abnormality   • Encounter for geriatric assessment   • Hyponatremia   • Frailty syndrome in geriatric patient   • Ambulatory dysfunction   • Acute post-operative pain   • Anxiety and depression   • At risk for delirium   • No impairment of memory   • Palliative care by specialist   • Goals of care, counseling/discussion   • Nausea   • Abdominal distension   • Cancer of mesentery (HCC)     Past Medical History:   Diagnosis Date   • Acne    • Basal cell carcinoma 06/08/2020    right lower forehead   • BCC (basal cell carcinoma of skin) 03/09/2020    mid forehead   • Benign neoplasm of skin    • Disease of thyroid gland 2006   • Essential hypertension 02/26/2018   • GERD (gastroesophageal reflux  disease)    • Hypertension    • Inflamed seborrheic keratosis    • Irritable bowel syndrome    • Malignant neoplasm of skin of face    • Migraines    • Nonmelanoma skin cancer     Last Assessed:6/27/17   • Squamous cell skin cancer 06/08/2020    In situ, left lower forehead   • Tachycardia    • Telogen effluvium    • Temporomandibular joint disorder    • Vertigo      Past Surgical History:   Procedure Laterality Date   • ADENOIDECTOMY     • APPENDECTOMY     • CHOLECYSTECTOMY     • COLONOSCOPY  05/03/2019   • COMPLEX WOUND CLOSURE TO EXTREMITY N/A 7/28/2020    Procedure: COMPLEX CLOSURE MID FOREHEAD;  Surgeon: Randy Frank MD;  Location: [facility];  Service: Plastics   • ESOPHAGOGASTRODUODENOSCOPY  2009   • FLAP LOCAL HEAD / NECK N/A 7/28/2020    Procedure: FLAP MID FOREHEAD;  Surgeon: Randy Frank MD;  Location: [facility];  Service: Plastics   • HYSTERECTOMY  1987   • LYMPH NODE DISSECTION N/A 9/6/2024    Procedure: BIOPSY ABDOMINAL MASS, LYMPHOMA PROTOCOL;  Surgeon: Angus Drew MD;  Location: [facility];  Service: Surgical Oncology   • MALIGNANT SKIN LESION EXCISION      Excision of Lesion Face Malignant-9/14/2004 BCC Forehead   • MOHS RECONSTRUCTION N/A 7/28/2020    Procedure: RECONSTRUCTION MOHS DEFECT MID FOREHEAD;  Surgeon: Randy Frank MD;  Location: [facility];  Service: Plastics   • MOHS SURGERY  07/27/2020    Right &left lower forehead, mid forehead   • OOPHORECTOMY Bilateral 1987   • ROTATOR CUFF REPAIR Right    • SKIN BIOPSY     • TONSILLECTOMY     • TUBAL LIGATION  1976?     Family History   Problem Relation Age of Onset   • Hypertension Mother         Mother   • Osteoporosis Mother    • Skin cancer Mother    • Migraines Mother    • Dementia Mother    • Hypertension Father         Father   • Skin cancer Father    • Dementia Father    • Skin cancer Sister 73   • Migraines Sister    • No Known Problems Daughter    • Uterine cancer Maternal Grandmother 29   • Diabetes type II  Maternal Grandfather    • Cancer Paternal Grandmother    • Uterine cancer Paternal Grandmother 65   • No Known Problems Maternal Aunt    • Cancer Paternal Aunt         Breast   • Uterine cancer Paternal Aunt 55   • No Known Problems Paternal Aunt    • No Known Problems Paternal Aunt      Social History     Socioeconomic History   • Marital status: /Civil Union     Spouse name: Not on file   • Number of children: Not on file   • Years of education: Not on file   • Highest education level: Not on file   Occupational History   • Not on file   Tobacco Use   • Smoking status: Never   • Smokeless tobacco: Never   Vaping Use   • Vaping status: Never Used   Substance and Sexual Activity   • Alcohol use: Not Currently   • Drug use: Never   • Sexual activity: Not Currently     Partners: Male     Birth control/protection: Post-menopausal   Other Topics Concern   • Not on file   Social History Narrative   • Not on file     Social Determinants of Health     Financial Resource Strain: Not on file   Food Insecurity: No Food Insecurity (9/8/2024)    Hunger Vital Sign    • Worried About Running Out of Food in the Last Year: Never true    • Ran Out of Food in the Last Year: Never true   Transportation Needs: No Transportation Needs (9/8/2024)    PRAPARE - Transportation    • Lack of Transportation (Medical): No    • Lack of Transportation (Non-Medical): No   Physical Activity: Not on file   Stress: Not on file   Social Connections: Not on file   Intimate Partner Violence: Not on file   Housing Stability: Low Risk  (9/8/2024)    Housing Stability Vital Sign    • Unable to Pay for Housing in the Last Year: No    • Number of Times Moved in the Last Year: 0    • Homeless in the Last Year: No       Current Outpatient Medications:   •  ascorbic acid (VITAMIN C) 500 mg tablet, Take 500 mg by mouth daily 1 tablet daily, Disp: , Rfl:   •  cyproheptadine (PERIACTIN) 4 mg tablet, take 0.5 tablet by mouth four times a day, Disp: 120  tablet, Rfl: 0  •  Digestive Enzyme CAPS, Take by mouth if needed (gas relief) beano taking 1 tablet, Disp: , Rfl:   •  escitalopram (LEXAPRO) 5 mg tablet, Take 1 tablet (5 mg total) by mouth daily, Disp: 90 tablet, Rfl: 0  •  lactase (LACTAID) 3,000 units tablet, Take 3,000 Units by mouth as needed (.) 2 tablets as needed, Disp: , Rfl:   •  Multiple Vitamins-Minerals (CENTRUM SILVER ULTRA WOMENS PO), Take by mouth daily 1 tablet daily, Disp: , Rfl:   •  nystatin (MYCOSTATIN) cream, Apply topically 2 (two) times a day, Disp: 30 g, Rfl: 0  •  polyethylene glycol-propylene glycol (SYSTANE) 0.4-0.3 %, 1 drop if needed (dry eye) as needed daily B eyes, Disp: , Rfl:   •  potassium chloride (Klor-Con M20) 20 mEq tablet, Take 1 tablet (20 mEq total) by mouth daily, Disp: 90 tablet, Rfl: 3  •  sodium chloride 1 g tablet, Take 2 tablets (2 g total) by mouth 3 (three) times a day with meals, Disp: 180 tablet, Rfl: 0  •  zinc oxide 20 % ointment, Apply topically as needed for irritation (Apply after washing for barrier), Disp: 425 g, Rfl: 0  •  bisacodyl (DULCOLAX) 10 mg suppository, Insert 1 suppository (10 mg total) into the rectum daily as needed for constipation for up to 7 days (Patient not taking: Reported on 9/12/2024), Disp: 7 suppository, Rfl: 0  •  colesevelam (WELCHOL) 625 mg tablet, take 3 tablets by mouth twice a day with meals (Patient not taking: Reported on 9/12/2024), Disp: 540 tablet, Rfl: 1  •  senna-docusate sodium (SENOKOT S) 8.6-50 mg per tablet, Take 1 tablet by mouth daily at bedtime for 7 days (Patient not taking: Reported on 9/12/2024), Disp: 7 tablet, Rfl: 0  Allergies   Allergen Reactions   • Cortisone Hypertension, Other (See Comments), Shortness Of Breath and Tachycardia   • Acebutolol    • Acetaminophen GI Intolerance     diarrhea   • Amlodipine    • Atenolol    • Azithromycin    • Barium Sulfate Diarrhea     Patient reports watery stool and stomach ache post fluoro upper GI on 6/6/24. Per  Radiologist MD Lozada, reaction should not absolutely preclude patient from having barium in the future if necessary. CG RN 6/7/24     • Bisphosphonates      Annotation - [id number]: iriitis   • Candesartan    • Clarithromycin    • Erythromycin    • Fosinopril    • Irbesartan    • Levofloxacin    • Losartan    • Methylprednisolone Hypertension and Tachycardia   • Metoprolol    • Nisoldipine    • Olmesartan    • Propranolol    • Sulfamethoxazole-Trimethoprim Nausea Only   • Timolol    • Lidocaine Palpitations and Tachycardia     Vitals:    09/20/24 1058   BP: 102/86   Pulse: 103   Resp: 18   Temp: 97.5 °F (36.4 °C)   SpO2: 99%       Physical Exam  Constitutional: General appearance: The Patient is cachectic who appears the stated age in no acute distress. Patient is pleasant and talkative.     HEENT:  Normocephalic.  Sclerae are anicteric. Mucous membranes are moist. Neck is supple without adenopathy. No JVD.     Chest: The lungs are clear to auscultation.     Cardiac: Heart is regular rate.     Abdomen: Abdomen is soft, non-tender, non-distended and without masses.  Incision is C/D/I.     Extremities: There is no clubbing or cyanosis. There is 2+edema.  Symmetric.  Neuro: Grossly nonfocal. Gait is normal.       Skin: Warm, anicteric.    Psych:  Patient is pleasant and talkative.  Breasts:        Pathology:  Final Diagnosis   A.  Soft tissue, mesenteric mass, biopsy:  - Metastatic carcinoma (see note).            Comments:   This is an appended report. These results have been appended to a previously preliminary verified report.      Electronically signed by Randy Vega MD on 9/13/2024 at  8:29 AM   Note  [facility]   The sample shows predominantly fibrovascular and adipose tissue with chronic inflammation does not correspond.  Patchy areas of pancreatic type tissue are present.  Areas of highly atypical infiltrating epithelial malignancy with glandular formation and features of mucin are present with in  desmoplastic fibrotic tissue. Immunohistochemical stains performed with appropriate controls show the tumor to be positive for keratin A1/3, CK7, and weak partial CDX2 and negative for CK20, PAX8, p53, ER, WT1, TTF-1, Napsin, and GATA3; SMAD4/DPC4 shows loss of nuclear staining.       The findings are not definitively specific and may indicate carcinoma metastasizing from the pancreas, biliary tract, gallbladder, upper GI tract, or (less likely) appendix.  Correlation with clinical impression and other laboratory findings is recommended to assess for other sites of disease or site of origin as clinically indicated.      The findings were communicated by epic secure chat message to Dr. Mcginnis on 9/13/2024 at 0829.       Labs:      Imaging  XR abdomen 1 view kub    Result Date: 9/9/2024  Narrative: XR ABDOMEN 1 VIEW KUB INDICATION: abdominal distension, r/o ileus. Mesenteric mass biopsy COMPARISON: PET/CT dated July 8, 2024 FINDINGS: There are prominent air-filled loops of large bowel throughout the abdomen with bowel gas seen as far distally as the sigmoid colon. Bowel loops are mildly distended measuring up to 7 cm in diameter. No evidence of pneumoperitoneum on this supine study. Upright or left lateral decubitus imaging is more sensitive to detect subtle free air in the appropriate setting. No pathologic calcification or soft tissue mass. Cholecystectomy clips are noted. Left basilar opacity is noted with adjacent effusion. Bones are unremarkable for the patient's age.     Impression: 1. Prominent air-filled loops of large bowel throughout the abdomen in a pattern suggesting ileus. Distal obstruction could potentially appear similar but is considered less likely. Consider attention on follow-up. 2. Left pleural effusion with adjacent atelectasis versus infiltrate. Workstation performed: [id number]     XR chest 1 view portable    Result Date: 8/22/2024  Narrative: XR CHEST PORTABLE INDICATION: hypotension. COMPARISON:  PET/CT 7/8/2024, CXR 5/9/2024. FINDINGS: Clear lungs. No pneumothorax or pleural effusion. Nodule projecting over the right base is due to the nipple. Normal cardiomediastinal silhouette. Bones are unremarkable for age. Normal upper abdomen.     Impression: No acute cardiopulmonary disease. Workstation performed: [id number]     I personally reviewed and interpreted the above laboratory and imaging data.

## 2024-09-23 ENCOUNTER — TELEPHONE (OUTPATIENT)
Dept: NEPHROLOGY | Facility: CLINIC | Age: 79
End: 2024-09-23

## 2024-09-23 ENCOUNTER — HOME CARE VISIT (OUTPATIENT)
Dept: HOME HEALTH SERVICES | Facility: HOME HEALTHCARE | Age: 79
End: 2024-09-23
Payer: MEDICARE

## 2024-09-23 ENCOUNTER — LAB (OUTPATIENT)
Dept: LAB | Facility: MEDICAL CENTER | Age: 79
End: 2024-09-23
Payer: MEDICARE

## 2024-09-23 VITALS — SYSTOLIC BLOOD PRESSURE: 138 MMHG | HEART RATE: 120 BPM | OXYGEN SATURATION: 96 % | DIASTOLIC BLOOD PRESSURE: 80 MMHG

## 2024-09-23 DIAGNOSIS — E87.6 HYPOKALEMIA: ICD-10-CM

## 2024-09-23 DIAGNOSIS — E87.1 HYPONATREMIA: ICD-10-CM

## 2024-09-23 DIAGNOSIS — R11.2 NAUSEA AND VOMITING, UNSPECIFIED VOMITING TYPE: Primary | ICD-10-CM

## 2024-09-23 LAB
ANION GAP SERPL CALCULATED.3IONS-SCNC: 8 MMOL/L (ref 4–13)
BUN SERPL-MCNC: 11 MG/DL (ref 5–25)
CALCIUM SERPL-MCNC: 8.5 MG/DL (ref 8.4–10.2)
CHLORIDE SERPL-SCNC: 99 MMOL/L (ref 96–108)
CO2 SERPL-SCNC: 26 MMOL/L (ref 21–32)
CREAT SERPL-MCNC: 0.68 MG/DL (ref 0.6–1.3)
GFR SERPL CREATININE-BSD FRML MDRD: 82 ML/MIN/1.73SQ M
GLUCOSE P FAST SERPL-MCNC: 91 MG/DL (ref 65–99)
POTASSIUM SERPL-SCNC: 4 MMOL/L (ref 3.5–5.3)
SODIUM SERPL-SCNC: 133 MMOL/L (ref 135–147)

## 2024-09-23 PROCEDURE — 36415 COLL VENOUS BLD VENIPUNCTURE: CPT

## 2024-09-23 PROCEDURE — 80048 BASIC METABOLIC PNL TOTAL CA: CPT

## 2024-09-23 PROCEDURE — G0151 HHCP-SERV OF PT,EA 15 MIN: HCPCS

## 2024-09-23 RX ORDER — ONDANSETRON 4 MG/1
4 TABLET, FILM COATED ORAL EVERY 8 HOURS PRN
Qty: 20 TABLET | Refills: 0 | Status: SHIPPED | OUTPATIENT
Start: 2024-09-23 | End: 2024-10-24

## 2024-09-23 NOTE — RESULT ENCOUNTER NOTE
Please inform patient that sodium level is acceptable at 133 meq/L, potassium level improved to normal range at 4.0, continue same treatment

## 2024-09-23 NOTE — TELEPHONE ENCOUNTER
----- Message from Lucas Tafoya MD sent at 9/23/2024  1:05 PM EDT -----  Please inform patient that sodium level is acceptable at 133 meq/L, potassium level improved to normal range at 4.0, continue same treatment

## 2024-09-24 ENCOUNTER — OFFICE VISIT (OUTPATIENT)
Dept: GASTROENTEROLOGY | Facility: CLINIC | Age: 79
End: 2024-09-24
Payer: MEDICARE

## 2024-09-24 VITALS
WEIGHT: 102 LBS | SYSTOLIC BLOOD PRESSURE: 117 MMHG | HEIGHT: 63 IN | DIASTOLIC BLOOD PRESSURE: 82 MMHG | BODY MASS INDEX: 18.07 KG/M2 | HEART RATE: 110 BPM

## 2024-09-24 DIAGNOSIS — D64.9 ANEMIA, UNSPECIFIED TYPE: ICD-10-CM

## 2024-09-24 DIAGNOSIS — D52.8 OTHER FOLATE DEFICIENCY ANEMIAS: ICD-10-CM

## 2024-09-24 DIAGNOSIS — R10.84 GENERALIZED POSTPRANDIAL ABDOMINAL PAIN: ICD-10-CM

## 2024-09-24 DIAGNOSIS — C18.9 COLON CARCINOMA METASTATIC TO MESENTERIC REGION (HCC): Primary | ICD-10-CM

## 2024-09-24 DIAGNOSIS — R19.7 DIARRHEA, UNSPECIFIED TYPE: ICD-10-CM

## 2024-09-24 DIAGNOSIS — C77.2 COLON CARCINOMA METASTATIC TO MESENTERIC REGION (HCC): Primary | ICD-10-CM

## 2024-09-24 PROCEDURE — 99214 OFFICE O/P EST MOD 30 MIN: CPT | Performed by: INTERNAL MEDICINE

## 2024-09-24 NOTE — PROGRESS NOTES
[facility] - Outpatient Follow-up Note  Brigid Brown 79 y.o. female MRN: [id number]  Encounter: [id number]          ASSESSMENT AND PLAN:      1. 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  Following with palliative care with the goal of maximizing quality of life    2. Diarrhea, unspecified type  Longstanding IBS-D which had previously been well-controlled but symptoms worsened recently.  Symptoms now seem to be improved to some degree with colesevelam.  Will continue this indefinitely.  Will slowly reintroduce fiber supplement.  Discussed low FODMAP diet    3. Generalized postprandial abdominal pain  Following with palliative care    4. Anemia, unspecified type  Reports fatigue which may be related to new normocytic anemia.  Has not noted any bruising or bleeding.  Will check labs as below and consider iron supplementation    - Iron Panel (Includes Ferritin, Iron Sat%, Iron, and TIBC); Future  - Vitamin B12; Future  - Folate; Future    5. Other folate deficiency anemias    - Vitamin B12; Future  - Folate; Future    ______________________________________________________________________    SUBJECTIVE: Returns in follow-up of diarrhea, abdominal pain, weight loss.  Noted to have haziness of the mesenteric root on CT scan.  EUS unremarkable.  Fecal elastase within normal limits.  Upper GI small bowel follow-through unrevealing.  Mesenteric Doppler ultrasound unrevealing.  Underwent ex lap with biopsy revealing mesenteric carcinoma involving the SMA.  Developed postop ileus but then once again experienced recurrence of frequent loose stools.  Has been taking colesevelam 1875 mg p.o. twice daily which does seem to be working fairly well to control her diarrhea.      REVIEW OF SYSTEMS:    ROS       Historical Information   Past Medical History:   Diagnosis Date    Acne     Basal cell carcinoma 06/08/2020    right lower forehead    BCC (basal cell  carcinoma of skin) 03/09/2020    mid forehead    Benign neoplasm of skin     Cancer (HCC)     mesenteric cancer    Disease of thyroid gland 2006    Essential hypertension 02/26/2018    GERD (gastroesophageal reflux disease)     Hypertension     Inflamed seborrheic keratosis     Irritable bowel syndrome     Malignant neoplasm of skin of face     Migraines     Nonmelanoma skin cancer     Last Assessed:6/27/17    Squamous cell skin cancer 06/08/2020    In situ, left lower forehead    Tachycardia     Telogen effluvium     Temporomandibular joint disorder     Vertigo      Past Surgical History:   Procedure Laterality Date    ADENOIDECTOMY      APPENDECTOMY      CHOLECYSTECTOMY      COLONOSCOPY  05/03/2019    COMPLEX WOUND CLOSURE TO EXTREMITY N/A 7/28/2020    Procedure: COMPLEX CLOSURE MID FOREHEAD;  Surgeon: Randy Frank MD;  Location: [facility];  Service: Plastics    ESOPHAGOGASTRODUODENOSCOPY  2009    FLAP LOCAL HEAD / NECK N/A 7/28/2020    Procedure: FLAP MID FOREHEAD;  Surgeon: Randy Frank MD;  Location: [facility];  Service: Plastics    HYSTERECTOMY  1987    LYMPH NODE DISSECTION N/A 9/6/2024    Procedure: BIOPSY ABDOMINAL MASS, LYMPHOMA PROTOCOL;  Surgeon: Angus Drew MD;  Location: [facility];  Service: Surgical Oncology    MALIGNANT SKIN LESION EXCISION      Excision of Lesion Face Malignant-9/14/2004 BCC Forehead    MOHS RECONSTRUCTION N/A 7/28/2020    Procedure: RECONSTRUCTION MOHS DEFECT MID FOREHEAD;  Surgeon: Randy Frank MD;  Location: [facility];  Service: Plastics    MOHS SURGERY  07/27/2020    Right &left lower forehead, mid forehead    OOPHORECTOMY Bilateral 1987    ROTATOR CUFF REPAIR Right     SKIN BIOPSY      TONSILLECTOMY      TUBAL LIGATION  1976?     Social History   Social History     Substance and Sexual Activity   Alcohol Use Not Currently     Social History     Substance and Sexual Activity   Drug Use Never     Social History     Tobacco Use   Smoking Status  Never   Smokeless Tobacco Never     Family History   Problem Relation Age of Onset    Hypertension Mother         Mother    Osteoporosis Mother     Skin cancer Mother     Migraines Mother     Dementia Mother     Hypertension Father         Father    Skin cancer Father     Dementia Father     Skin cancer Sister 73    Migraines Sister     No Known Problems Daughter     Uterine cancer Maternal Grandmother 29    Diabetes type II Maternal Grandfather     Cancer Paternal Grandmother     Uterine cancer Paternal Grandmother 65    No Known Problems Maternal Aunt     Cancer Paternal Aunt         Breast    Uterine cancer Paternal Aunt 55    No Known Problems Paternal Aunt     No Known Problems Paternal Aunt        Meds/Allergies       Current Outpatient Medications:     ascorbic acid (VITAMIN C) 500 mg tablet    colesevelam (WELCHOL) 625 mg tablet    cyproheptadine (PERIACTIN) 4 mg tablet    Digestive Enzyme CAPS    escitalopram (LEXAPRO) 5 mg tablet    lactase (LACTAID) 3,000 units tablet    Multiple Vitamins-Minerals (CENTRUM SILVER ULTRA WOMENS PO)    nystatin (MYCOSTATIN) cream    ondansetron (ZOFRAN) 4 mg tablet    polyethylene glycol-propylene glycol (SYSTANE) 0.4-0.3 %    potassium chloride (Klor-Con M20) 20 mEq tablet    sodium chloride 1 g tablet    zinc oxide 20 % ointment    bisacodyl (DULCOLAX) 10 mg suppository    senna-docusate sodium (SENOKOT S) 8.6-50 mg per tablet    Allergies   Allergen Reactions    Cortisone Hypertension, Other (See Comments), Shortness Of Breath and Tachycardia    Acebutolol     Acetaminophen GI Intolerance     diarrhea    Amlodipine     Atenolol     Azithromycin     Barium Sulfate Diarrhea     Patient reports watery stool and stomach ache post fluoro upper GI on 6/6/24. Per Radiologist MD Lozada, reaction should not absolutely preclude patient from having barium in the future if necessary. CG RN 6/7/24      Bisphosphonates      [facility] - [id number]: iriitis    Candesartan      "Clarithromycin     Erythromycin     Fosinopril     Irbesartan     Levofloxacin     Losartan     Methylprednisolone Hypertension and Tachycardia    Metoprolol     Nisoldipine     Olmesartan     Propranolol     Sulfamethoxazole-Trimethoprim Nausea Only    Timolol     Lidocaine Palpitations and Tachycardia           Objective     Blood pressure 117/82, pulse (!) 110, height 5' 2.5\" (1.588 m), weight 46.3 kg (102 lb), not currently breastfeeding. Body mass index is 18.36 kg/m².      PHYSICAL EXAM:      Physical Exam     Lab Results:   No visits with results within 1 Day(s) from this visit.   Latest known visit with results is:   Lab on 09/23/2024   Component Date Value    Sodium 09/23/2024 133 (L)     Potassium 09/23/2024 4.0     Chloride 09/23/2024 99     CO2 09/23/2024 26     ANION GAP 09/23/2024 8     BUN 09/23/2024 11     Creatinine 09/23/2024 0.68     Glucose, Fasting 09/23/2024 91     Calcium 09/23/2024 8.5     eGFR 09/23/2024 82          Radiology Results:   XR abdomen 1 view kub    Result Date: 9/9/2024  Narrative: XR ABDOMEN 1 VIEW KUB INDICATION: abdominal distension, r/o ileus. Mesenteric mass biopsy COMPARISON: PET/CT dated July 8, 2024 FINDINGS: There are prominent air-filled loops of large bowel throughout the abdomen with bowel gas seen as far distally as the sigmoid colon. Bowel loops are mildly distended measuring up to 7 cm in diameter. No evidence of pneumoperitoneum on this supine study. Upright or left lateral decubitus imaging is more sensitive to detect subtle free air in the appropriate setting. No pathologic calcification or soft tissue mass. Cholecystectomy clips are noted. Left basilar opacity is noted with adjacent effusion. Bones are unremarkable for the patient's age.     Impression: 1. Prominent air-filled loops of large bowel throughout the abdomen in a pattern suggesting ileus. Distal obstruction could potentially appear similar but is considered less likely. Consider attention on " follow-up. 2. Left pleural effusion with adjacent atelectasis versus infiltrate. Workstation performed: [id number]     40-minute visit more than 50% of which was spent counseling

## 2024-09-25 ENCOUNTER — HOME CARE VISIT (OUTPATIENT)
Dept: HOME HEALTH SERVICES | Facility: HOME HEALTHCARE | Age: 79
End: 2024-09-25
Payer: MEDICARE

## 2024-09-25 PROCEDURE — G0152 HHCP-SERV OF OT,EA 15 MIN: HCPCS

## 2024-09-26 ENCOUNTER — PATIENT OUTREACH (OUTPATIENT)
Dept: HEMATOLOGY ONCOLOGY | Facility: CLINIC | Age: 79
End: 2024-09-26

## 2024-09-26 ENCOUNTER — HOME CARE VISIT (OUTPATIENT)
Dept: HOME HEALTH SERVICES | Facility: HOME HEALTHCARE | Age: 79
End: 2024-09-26
Payer: MEDICARE

## 2024-09-26 VITALS — DIASTOLIC BLOOD PRESSURE: 68 MMHG | OXYGEN SATURATION: 97 % | HEART RATE: 105 BPM | SYSTOLIC BLOOD PRESSURE: 102 MMHG

## 2024-09-26 VITALS — HEART RATE: 96 BPM | SYSTOLIC BLOOD PRESSURE: 125 MMHG | OXYGEN SATURATION: 95 % | DIASTOLIC BLOOD PRESSURE: 80 MMHG

## 2024-09-26 PROCEDURE — G0151 HHCP-SERV OF PT,EA 15 MIN: HCPCS

## 2024-09-26 NOTE — PROGRESS NOTES
Phone outreach to the patient, I spoke with Brigid, I introduced myself and explained my role. I informed her that I am calling in response to a referral placed to medical oncology by Dr. Drew and that a  would be calling within 1-2 business days to schedule any appointment within 7 days.  She reports is not interested in chemotherapy but would like to speak to a physician in consult.  Patient has a palliative care appointment scheduled for 10/1/24 and she is aware.  She thanked me.

## 2024-09-27 ENCOUNTER — HOME CARE VISIT (OUTPATIENT)
Dept: HOME HEALTH SERVICES | Facility: HOME HEALTHCARE | Age: 79
End: 2024-09-27
Payer: MEDICARE

## 2024-09-27 DIAGNOSIS — M79.89 LEG SWELLING: Primary | ICD-10-CM

## 2024-09-27 LAB

## 2024-09-27 RX ORDER — COLESEVELAM 180 1/1
TABLET ORAL
COMMUNITY
Start: 2024-09-27

## 2024-09-30 ENCOUNTER — LAB (OUTPATIENT)
Dept: LAB | Facility: MEDICAL CENTER | Age: 79
DRG: 186 | End: 2024-09-30
Payer: MEDICARE

## 2024-09-30 DIAGNOSIS — D52.8 OTHER FOLATE DEFICIENCY ANEMIAS: ICD-10-CM

## 2024-09-30 DIAGNOSIS — E87.1 HYPONATREMIA: ICD-10-CM

## 2024-09-30 DIAGNOSIS — D64.9 ANEMIA, UNSPECIFIED TYPE: ICD-10-CM

## 2024-09-30 DIAGNOSIS — N18.30 STAGE 3 CHRONIC KIDNEY DISEASE, UNSPECIFIED WHETHER STAGE 3A OR 3B CKD (HCC): ICD-10-CM

## 2024-09-30 DIAGNOSIS — E87.6 HYPOKALEMIA: ICD-10-CM

## 2024-09-30 LAB
ANION GAP SERPL CALCULATED.3IONS-SCNC: 5 MMOL/L (ref 4–13)
BUN SERPL-MCNC: 11 MG/DL (ref 5–25)
CALCIUM SERPL-MCNC: 8.3 MG/DL (ref 8.4–10.2)
CHLORIDE SERPL-SCNC: 100 MMOL/L (ref 96–108)
CO2 SERPL-SCNC: 27 MMOL/L (ref 21–32)
CREAT SERPL-MCNC: 0.8 MG/DL (ref 0.6–1.3)
FERRITIN SERPL-MCNC: 194 NG/ML (ref 11–307)
FOLATE SERPL-MCNC: >22.3 NG/ML
GFR SERPL CREATININE-BSD FRML MDRD: 69 ML/MIN/1.73SQ M
GLUCOSE SERPL-MCNC: 62 MG/DL (ref 65–140)
IRON SATN MFR SERPL: 19 % (ref 15–50)
IRON SERPL-MCNC: 45 UG/DL (ref 50–212)
POTASSIUM SERPL-SCNC: 3.6 MMOL/L (ref 3.5–5.3)
SODIUM SERPL-SCNC: 132 MMOL/L (ref 135–147)
TIBC SERPL-MCNC: 242 UG/DL (ref 250–450)
UIBC SERPL-MCNC: 197 UG/DL (ref 155–355)
VIT B12 SERPL-MCNC: 439 PG/ML (ref 180–914)

## 2024-09-30 PROCEDURE — 82746 ASSAY OF FOLIC ACID SERUM: CPT

## 2024-09-30 PROCEDURE — 83550 IRON BINDING TEST: CPT

## 2024-09-30 PROCEDURE — 82607 VITAMIN B-12: CPT

## 2024-09-30 PROCEDURE — 82728 ASSAY OF FERRITIN: CPT

## 2024-09-30 PROCEDURE — 36415 COLL VENOUS BLD VENIPUNCTURE: CPT

## 2024-09-30 PROCEDURE — 83540 ASSAY OF IRON: CPT

## 2024-09-30 PROCEDURE — 80048 BASIC METABOLIC PNL TOTAL CA: CPT

## 2024-10-01 ENCOUNTER — OFFICE VISIT (OUTPATIENT)
Dept: PALLIATIVE MEDICINE | Facility: CLINIC | Age: 79
End: 2024-10-01
Payer: MEDICARE

## 2024-10-01 ENCOUNTER — APPOINTMENT (EMERGENCY)
Dept: CT IMAGING | Facility: HOSPITAL | Age: 79
DRG: 186 | End: 2024-10-01
Payer: MEDICARE

## 2024-10-01 ENCOUNTER — HOME CARE VISIT (OUTPATIENT)
Dept: HOME HEALTH SERVICES | Facility: HOME HEALTHCARE | Age: 79
End: 2024-10-01
Payer: MEDICARE

## 2024-10-01 ENCOUNTER — HOSPITAL ENCOUNTER (INPATIENT)
Facility: HOSPITAL | Age: 79
LOS: 2 days | Discharge: HOME HEALTH | DRG: 186 | End: 2024-10-03
Attending: EMERGENCY MEDICINE | Admitting: INTERNAL MEDICINE
Payer: MEDICARE

## 2024-10-01 ENCOUNTER — APPOINTMENT (EMERGENCY)
Dept: RADIOLOGY | Facility: HOSPITAL | Age: 79
DRG: 186 | End: 2024-10-01
Payer: MEDICARE

## 2024-10-01 ENCOUNTER — TELEPHONE (OUTPATIENT)
Dept: NEPHROLOGY | Facility: CLINIC | Age: 79
End: 2024-10-01

## 2024-10-01 VITALS
BODY MASS INDEX: 19.51 KG/M2 | DIASTOLIC BLOOD PRESSURE: 72 MMHG | SYSTOLIC BLOOD PRESSURE: 96 MMHG | TEMPERATURE: 97.8 F | OXYGEN SATURATION: 96 % | HEIGHT: 62 IN | WEIGHT: 106 LBS | HEART RATE: 130 BPM

## 2024-10-01 DIAGNOSIS — R00.0 TACHYCARDIA WITH HEART RATE 121-140 BEATS PER MINUTE: Primary | ICD-10-CM

## 2024-10-01 DIAGNOSIS — C48.1: ICD-10-CM

## 2024-10-01 DIAGNOSIS — Z51.5 PALLIATIVE CARE BY SPECIALIST: ICD-10-CM

## 2024-10-01 DIAGNOSIS — R60.0 BILATERAL LOWER EXTREMITY EDEMA: ICD-10-CM

## 2024-10-01 DIAGNOSIS — E87.70 FLUID OVERLOAD: ICD-10-CM

## 2024-10-01 DIAGNOSIS — M79.89 LEG SWELLING: ICD-10-CM

## 2024-10-01 DIAGNOSIS — E88.09 HYPOALBUMINEMIA: ICD-10-CM

## 2024-10-01 DIAGNOSIS — E87.1 HYPONATREMIA: Primary | ICD-10-CM

## 2024-10-01 DIAGNOSIS — E87.1 HYPONATREMIA: ICD-10-CM

## 2024-10-01 DIAGNOSIS — Z71.89 GOALS OF CARE, COUNSELING/DISCUSSION: ICD-10-CM

## 2024-10-01 DIAGNOSIS — R14.0 ABDOMINAL DISTENSION: ICD-10-CM

## 2024-10-01 DIAGNOSIS — J90 PLEURAL EFFUSION: Primary | ICD-10-CM

## 2024-10-01 DIAGNOSIS — R43.2 DYSGEUSIA: ICD-10-CM

## 2024-10-01 LAB
2HR DELTA HS TROPONIN: -3 NG/L
4HR DELTA HS TROPONIN: -3 NG/L
ALBUMIN SERPL BCG-MCNC: 2.9 G/DL (ref 3.5–5)
ALP SERPL-CCNC: 57 U/L (ref 34–104)
ALT SERPL W P-5'-P-CCNC: 13 U/L (ref 7–52)
ANION GAP SERPL CALCULATED.3IONS-SCNC: 5 MMOL/L (ref 4–13)
AST SERPL W P-5'-P-CCNC: 22 U/L (ref 13–39)
BASOPHILS # BLD AUTO: 0.06 THOUSANDS/ÂΜL (ref 0–0.1)
BASOPHILS NFR BLD AUTO: 1 % (ref 0–1)
BILIRUB SERPL-MCNC: 0.36 MG/DL (ref 0.2–1)
BNP SERPL-MCNC: 84 PG/ML (ref 0–100)
BUN SERPL-MCNC: 12 MG/DL (ref 5–25)
CALCIUM ALBUM COR SERPL-MCNC: 9.3 MG/DL (ref 8.3–10.1)
CALCIUM SERPL-MCNC: 8.4 MG/DL (ref 8.4–10.2)
CARDIAC TROPONIN I PNL SERPL HS: 10 NG/L
CARDIAC TROPONIN I PNL SERPL HS: 7 NG/L
CARDIAC TROPONIN I PNL SERPL HS: 7 NG/L
CHLORIDE SERPL-SCNC: 100 MMOL/L (ref 96–108)
CO2 SERPL-SCNC: 24 MMOL/L (ref 21–32)
CREAT SERPL-MCNC: 0.81 MG/DL (ref 0.6–1.3)
EOSINOPHIL # BLD AUTO: 0.14 THOUSAND/ÂΜL (ref 0–0.61)
EOSINOPHIL NFR BLD AUTO: 2 % (ref 0–6)
ERYTHROCYTE [DISTWIDTH] IN BLOOD BY AUTOMATED COUNT: 14.7 % (ref 11.6–15.1)
GFR SERPL CREATININE-BSD FRML MDRD: 68 ML/MIN/1.73SQ M
GLUCOSE SERPL-MCNC: 96 MG/DL (ref 65–140)
HCT VFR BLD AUTO: 37 % (ref 34.8–46.1)
HGB BLD-MCNC: 12 G/DL (ref 11.5–15.4)
IMM GRANULOCYTES # BLD AUTO: 0.02 THOUSAND/UL (ref 0–0.2)
IMM GRANULOCYTES NFR BLD AUTO: 0 % (ref 0–2)
LYMPHOCYTES # BLD AUTO: 0.85 THOUSANDS/ÂΜL (ref 0.6–4.47)
LYMPHOCYTES NFR BLD AUTO: 12 % (ref 14–44)
MCH RBC QN AUTO: 29.6 PG (ref 26.8–34.3)
MCHC RBC AUTO-ENTMCNC: 32.4 G/DL (ref 31.4–37.4)
MCV RBC AUTO: 91 FL (ref 82–98)
MONOCYTES # BLD AUTO: 0.45 THOUSAND/ÂΜL (ref 0.17–1.22)
MONOCYTES NFR BLD AUTO: 6 % (ref 4–12)
NEUTROPHILS # BLD AUTO: 5.53 THOUSANDS/ÂΜL (ref 1.85–7.62)
NEUTS SEG NFR BLD AUTO: 79 % (ref 43–75)
NRBC BLD AUTO-RTO: 0 /100 WBCS
PLATELET # BLD AUTO: 319 THOUSANDS/UL (ref 149–390)
PMV BLD AUTO: 9.5 FL (ref 8.9–12.7)
POTASSIUM SERPL-SCNC: 4.3 MMOL/L (ref 3.5–5.3)
PROT SERPL-MCNC: 5.3 G/DL (ref 6.4–8.4)
RBC # BLD AUTO: 4.05 MILLION/UL (ref 3.81–5.12)
SODIUM SERPL-SCNC: 129 MMOL/L (ref 135–147)
WBC # BLD AUTO: 7.05 THOUSAND/UL (ref 4.31–10.16)

## 2024-10-01 PROCEDURE — 99285 EMERGENCY DEPT VISIT HI MDM: CPT | Performed by: EMERGENCY MEDICINE

## 2024-10-01 PROCEDURE — G2211 COMPLEX E/M VISIT ADD ON: HCPCS | Performed by: STUDENT IN AN ORGANIZED HEALTH CARE EDUCATION/TRAINING PROGRAM

## 2024-10-01 PROCEDURE — 71045 X-RAY EXAM CHEST 1 VIEW: CPT

## 2024-10-01 PROCEDURE — 99215 OFFICE O/P EST HI 40 MIN: CPT | Performed by: STUDENT IN AN ORGANIZED HEALTH CARE EDUCATION/TRAINING PROGRAM

## 2024-10-01 PROCEDURE — 83880 ASSAY OF NATRIURETIC PEPTIDE: CPT | Performed by: EMERGENCY MEDICINE

## 2024-10-01 PROCEDURE — 99285 EMERGENCY DEPT VISIT HI MDM: CPT

## 2024-10-01 PROCEDURE — 85025 COMPLETE CBC W/AUTO DIFF WBC: CPT | Performed by: EMERGENCY MEDICINE

## 2024-10-01 PROCEDURE — 96374 THER/PROPH/DIAG INJ IV PUSH: CPT

## 2024-10-01 PROCEDURE — 80053 COMPREHEN METABOLIC PANEL: CPT | Performed by: EMERGENCY MEDICINE

## 2024-10-01 PROCEDURE — 84484 ASSAY OF TROPONIN QUANT: CPT | Performed by: EMERGENCY MEDICINE

## 2024-10-01 PROCEDURE — 36415 COLL VENOUS BLD VENIPUNCTURE: CPT

## 2024-10-01 PROCEDURE — 71275 CT ANGIOGRAPHY CHEST: CPT

## 2024-10-01 PROCEDURE — 93005 ELECTROCARDIOGRAM TRACING: CPT

## 2024-10-01 RX ORDER — FUROSEMIDE 10 MG/ML
20 INJECTION INTRAMUSCULAR; INTRAVENOUS ONCE
Status: COMPLETED | OUTPATIENT
Start: 2024-10-02 | End: 2024-10-02

## 2024-10-01 RX ORDER — CALCIUM CARB/VITAMIN D3/VIT K1 500-500-40
TABLET,CHEWABLE ORAL
Status: CANCELLED | OUTPATIENT
Start: 2024-10-01

## 2024-10-01 RX ORDER — NYSTATIN 100000 U/G
CREAM TOPICAL 2 TIMES DAILY
Status: DISCONTINUED | OUTPATIENT
Start: 2024-10-02 | End: 2024-10-03 | Stop reason: HOSPADM

## 2024-10-01 RX ORDER — ESCITALOPRAM OXALATE 10 MG/1
5 TABLET ORAL DAILY
Status: DISCONTINUED | OUTPATIENT
Start: 2024-10-02 | End: 2024-10-03 | Stop reason: HOSPADM

## 2024-10-01 RX ORDER — CYPROHEPTADINE HYDROCHLORIDE 4 MG/1
2 TABLET ORAL 4 TIMES DAILY
Status: DISCONTINUED | OUTPATIENT
Start: 2024-10-02 | End: 2024-10-03 | Stop reason: HOSPADM

## 2024-10-01 RX ORDER — FUROSEMIDE 10 MG/ML
20 INJECTION INTRAMUSCULAR; INTRAVENOUS ONCE
Status: COMPLETED | OUTPATIENT
Start: 2024-10-01 | End: 2024-10-01

## 2024-10-01 RX ORDER — POTASSIUM CHLORIDE 1500 MG/1
20 TABLET, EXTENDED RELEASE ORAL DAILY
Status: DISCONTINUED | OUTPATIENT
Start: 2024-10-02 | End: 2024-10-03 | Stop reason: HOSPADM

## 2024-10-01 RX ORDER — ONDANSETRON 4 MG/1
4 TABLET, ORALLY DISINTEGRATING ORAL EVERY 6 HOURS SCHEDULED
Status: DISCONTINUED | OUTPATIENT
Start: 2024-10-02 | End: 2024-10-03 | Stop reason: HOSPADM

## 2024-10-01 RX ORDER — ASCORBIC ACID 500 MG
500 TABLET ORAL DAILY
Status: DISCONTINUED | OUTPATIENT
Start: 2024-10-02 | End: 2024-10-03 | Stop reason: HOSPADM

## 2024-10-01 RX ORDER — HEPARIN SODIUM 5000 [USP'U]/ML
5000 INJECTION, SOLUTION INTRAVENOUS; SUBCUTANEOUS EVERY 8 HOURS SCHEDULED
Status: DISCONTINUED | OUTPATIENT
Start: 2024-10-02 | End: 2024-10-03 | Stop reason: HOSPADM

## 2024-10-01 RX ORDER — SODIUM CHLORIDE 1 G/1
2 TABLET ORAL
Status: DISCONTINUED | OUTPATIENT
Start: 2024-10-02 | End: 2024-10-03 | Stop reason: HOSPADM

## 2024-10-01 RX ORDER — COLESEVELAM 180 1/1
1875 TABLET ORAL 2 TIMES DAILY WITH MEALS
Status: DISCONTINUED | OUTPATIENT
Start: 2024-10-02 | End: 2024-10-03 | Stop reason: HOSPADM

## 2024-10-01 RX ADMIN — FUROSEMIDE 20 MG: 10 INJECTION, SOLUTION INTRAMUSCULAR; INTRAVENOUS at 17:50

## 2024-10-01 RX ADMIN — IOHEXOL 50 ML: 350 INJECTION, SOLUTION INTRAVENOUS at 18:17

## 2024-10-01 NOTE — TELEPHONE ENCOUNTER
----- Message from Lucas Tafoya MD sent at 10/1/2024  7:45 AM EDT -----  Please inform patient that sodium level is slightly low at 132 meq/L but acceptable the potassium is at normal range.  Continue current treatment.  Please order for another BMP to be done in 1 week for hyponatremia.  Recommend fluid restriction around 45 ounces per day

## 2024-10-01 NOTE — ASSESSMENT & PLAN NOTE
Psychosocial   Supportive listening provided  Normalized experience of patient/family  Provided anxiety containment     Referrals Placed / Medical Equipment Ordered  -[id number] placed for Emergency Room evaluation at [facility]    Follow-Up Recommendations  -Follow-up with PCP and current medical specialists  -Follow-up with palliative care: 4 weeks or soner as needed    Orders:    Transfer to other facility

## 2024-10-01 NOTE — ASSESSMENT & PLAN NOTE
Care team:  Dr. Drew of Surgical Oncology  Dr. Milligan of GI  Heme/Onc - upcoming appointment  Orders:    Transfer to other facility

## 2024-10-01 NOTE — TELEPHONE ENCOUNTER
I spoke to Brigid she is aware that renal function is stable. Sodium level is slightly low but stable at this time. She understood BMP due again in 1 week and fluid restriction is recommended 45 per day.     She requested I send her the message via [employer] which was sent .  BMP in epic.

## 2024-10-01 NOTE — PROGRESS NOTES
Ambulatory Visit - Follow-up  Name: Brigid Brown      : 1945      MRN: [id number]  Encounter Provider: Kimo Quezada DO  Encounter Date: 10/1/2024   Encounter department: [facility] PALLIATIVE [facility]    Assessment & Plan  Tachycardia with heart rate 121-140 beats per minute  Patient with initial reading of 130 bpm on Pulse Ox.  Manual palpation of the bilateral radial pulses with notable tachycardia (regular rhythm) with at least 120 bpm.   Patient with subjective complaints of increasing shortness of breath at rest over the past week.  -also endorses increased swelling to the bilateral lower extremities     Orders:    Transfer to other facility    Cancer of mesentery (HCC)  Care team:  Dr. Drew of Surgical Oncology  Dr. Milligan of GI  Heme/Onc - upcoming appointment  Orders:    Transfer to other facility    Palliative care by specialist  Psychosocial   Supportive listening provided  Normalized experience of patient/family  Provided anxiety containment     Referrals Placed / Medical Equipment Ordered  -[id number] placed for Emergency Room evaluation at [facility]    Follow-Up Recommendations  -Follow-up with PCP and current medical specialists  -Follow-up with palliative care: 4 weeks or soner as needed    Orders:    Transfer to other facility    Abdominal distension    Orders:    Transfer to other facility    Bilateral lower extremity edema    Orders:    Transfer to other facility    Dysgeusia  We talked about options to help with dry mouth, discussed trial of home remedies as below:  1) can trial mints, lozenges, gums to help maintain salivary production  2) discussed the options of nonalcohol containing mouthwash/rinse, including Biotene spray/mouthwash    This is also a potential component that contributing to her lack of appetite as well.    Discussed options for appetite stimulation including steroids, however, patient endorses adverse effects with steroids in the past with recorded  hypertension/tachycardia.         Hypoalbuminemia  Albumin 2.9 from labs reviewed from 9/9/2024  A potential contributing factor to patient's third spacing / edema to lower extremities and abdomen.       Goals of care, counseling/discussion  Goals - Ongoing discussion. Pending discussion with Oncology regarding potential options.    Options reviewed today including discussion regarding Comfort Care/ Hospice if this is something patient would like to consider.         Hyponatremia         Decisional apparatus: Patient is competent on my exam today. If competence is lost, patient's substitute decision maker would default to spouse as primary (Hunter Brown) by [place] Act 169.   Advance Directive / Living Will / POLST: On file     PDMP Review: I have reviewed the patient's controlled substance dispensing history in the Prescription Drug Monitoring Program in compliance with the [facility] regulations before prescribing any controlled substances.    History of Present Illness     Brigid Brown is a 79 y.o. female who presents for follow-up.  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 Drew of Surgical Oncology  Dr. Milligan of GI  Heme/Onc - upcoming appointment    Patient is here with her spouse and 2 children.  She is sitting in a transport chair. Alert, oriented, and pleasantly conversant.  NAD on exam, however, notable distended abdomen (though soft on palpation) with 2+ pitting edema to the bilateral lower extremities and edema noted to the fingers/hands. Patient not tachypneic, but tachycardia with initial heart rate of 130 on presentation to office with repeat vital check by this provider with HR of at least >121 bpm. Patient has had subjective worsening of dyspnea at rest (though no history of clot to her knowledge).   Patient with hyponatremia with scheduled appointment with Nephrology in the coming week for evaluation and monitoring and has been on salt tablets.    Extensive review of her blood  work, imaging along with physical exam findings today regarding consideration of ER evaluation given my concerns for ascites, pitting edema of the BLE, potential PE rule out given tachycardia and subjective dyspnea all in the setting of her cancer diagnosis. Patient also has RUQ abdominal pain, however, does not feel it is intolerable and does not feel she requires escalation of analgesic therapy. Has extensive allergy list including Tylenol and Lidocaine.    We did review Palliative care services during this visit and that GOC will be continued once she has had a chance to speak with Oncology regarding options. At this time, patient and family are leaning against pursuing chemotherapy. We discussed options including comfort focused care / hospice care. Detailed discussion regarding Hospice Philosophy, services, care, medications and levels of care.    Patient and family agreeable to pursue ER evaluation in light of the above.         History obtained from : patient, patient's Significant Other, and daughter and son  Review of Systems   Constitutional:  Positive for activity change, appetite change and fatigue. Negative for chills and fever.   HENT:  Negative for trouble swallowing.    Eyes:  Negative for visual disturbance.   Respiratory:  Positive for shortness of breath.    Cardiovascular:  Positive for leg swelling. Negative for chest pain and palpitations.   Gastrointestinal:  Positive for abdominal distention and abdominal pain. Negative for constipation, diarrhea, nausea and vomiting.   Genitourinary:  Negative for difficulty urinating and dysuria.   Musculoskeletal:  Negative for arthralgias and myalgias.   Skin:  Negative for wound.   Neurological:  Positive for weakness.   Psychiatric/Behavioral:  Positive for sleep disturbance. Negative for confusion.    All other systems reviewed and are negative.    Pertinent Medical History       Medical History Reviewed by provider this encounter:  Tobacco  Allergies   Meds  Problems  Med Hx  Surg Hx  Fam Hx       Past Medical History   Past Medical History:   Diagnosis Date    Acne     Basal cell carcinoma 06/08/2020    right lower forehead    BCC (basal cell carcinoma of skin) 03/09/2020    mid forehead    Benign neoplasm of skin     Cancer (HCC)     mesenteric cancer    Disease of thyroid gland 2006    Essential hypertension 02/26/2018    GERD (gastroesophageal reflux disease)     Hypertension     Inflamed seborrheic keratosis     Irritable bowel syndrome     Malignant neoplasm of skin of face     Migraines     Nonmelanoma skin cancer     Last Assessed:6/27/17    Squamous cell skin cancer 06/08/2020    In situ, left lower forehead    Tachycardia     Telogen effluvium     Temporomandibular joint disorder     Vertigo      Past Surgical History:   Procedure Laterality Date    ADENOIDECTOMY      APPENDECTOMY      CHOLECYSTECTOMY      COLONOSCOPY  05/03/2019    COMPLEX WOUND CLOSURE TO EXTREMITY N/A 7/28/2020    Procedure: COMPLEX CLOSURE MID FOREHEAD;  Surgeon: Randy Frank MD;  Location: [facility];  Service: Plastics    ESOPHAGOGASTRODUODENOSCOPY  2009    FLAP LOCAL HEAD / NECK N/A 7/28/2020    Procedure: FLAP MID FOREHEAD;  Surgeon: Randy Frank MD;  Location: [facility];  Service: Plastics    HYSTERECTOMY  1987    LYMPH NODE DISSECTION N/A 9/6/2024    Procedure: BIOPSY ABDOMINAL MASS, LYMPHOMA PROTOCOL;  Surgeon: Angus Drew MD;  Location: [facility];  Service: Surgical Oncology    MALIGNANT SKIN LESION EXCISION      Excision of Lesion Face Malignant-9/14/2004 BCC Forehead    MOHS RECONSTRUCTION N/A 7/28/2020    Procedure: RECONSTRUCTION MOHS DEFECT MID FOREHEAD;  Surgeon: Randy Frank MD;  Location: [facility];  Service: Plastics    MOHS SURGERY  07/27/2020    Right &left lower forehead, mid forehead    OOPHORECTOMY Bilateral 1987    ROTATOR CUFF REPAIR Right     SKIN BIOPSY      TONSILLECTOMY      TUBAL LIGATION  1976?     Family History    Problem Relation Age of Onset    Hypertension Mother         Mother    Osteoporosis Mother     Skin cancer Mother     Migraines Mother     Dementia Mother     Hypertension Father         Father    Skin cancer Father     Dementia Father     Skin cancer Sister 73    Migraines Sister     No Known Problems Daughter     Uterine cancer Maternal Grandmother 29    Diabetes type II Maternal Grandfather     Cancer Paternal Grandmother     Uterine cancer Paternal Grandmother 65    No Known Problems Maternal Aunt     Cancer Paternal Aunt         Breast    Uterine cancer Paternal Aunt 55    No Known Problems Paternal Aunt     No Known Problems Paternal Aunt      No current facility-administered medications on file prior to visit.     Current Outpatient Medications on File Prior to Visit   Medication Sig Dispense Refill    ascorbic acid (VITAMIN C) 500 mg tablet Take 500 mg by mouth daily 1 tablet daily      colesevelam (WELCHOL) 625 mg tablet take 3 tablets by mouth twice a day with meals 540 tablet 1    cyproheptadine (PERIACTIN) 4 mg tablet take 0.5 tablet by mouth four times a day 120 tablet 0    Digestive Enzyme CAPS Take by mouth if needed (gas relief) beano taking 1 tablet      escitalopram (LEXAPRO) 5 mg tablet Take 1 tablet (5 mg total) by mouth daily 90 tablet 0    lactase (LACTAID) 3,000 units tablet Take 3,000 Units by mouth as needed (.) 2 tablets as needed      Multiple Vitamins-Minerals (CENTRUM SILVER ULTRA WOMENS PO) Take by mouth daily 1 tablet daily      nystatin (MYCOSTATIN) cream Apply topically 2 (two) times a day 30 g 0    ondansetron (ZOFRAN) 4 mg tablet Take 1 tablet (4 mg total) by mouth every 8 (eight) hours as needed for nausea or vomiting 20 tablet 0    polyethylene glycol-propylene glycol (SYSTANE) 0.4-0.3 % 1 drop if needed (dry eye) as needed daily B eyes      potassium chloride (Klor-Con M20) 20 mEq tablet Take 1 tablet (20 mEq total) by mouth daily 90 tablet 3    sodium chloride 1 g tablet Take  2 tablets (2 g total) by mouth 3 (three) times a day with meals 180 tablet 0    bisacodyl (DULCOLAX) 10 mg suppository Insert 1 suppository (10 mg total) into the rectum daily as needed for constipation for up to 7 days (Patient not taking: Reported on 9/12/2024) 7 suppository 0    CICLOPIROX EX  (Patient not taking: Reported on 10/1/2024)      colesevelam (WELCHOL) 625 mg tablet  (Patient not taking: Reported on 10/1/2024)      senna-docusate sodium (SENOKOT S) 8.6-50 mg per tablet Take 1 tablet by mouth daily at bedtime for 7 days (Patient not taking: Reported on 9/12/2024) 7 tablet 0    zinc oxide 20 % ointment Apply topically as needed for irritation (Apply after washing for barrier) (Patient not taking: Reported on 10/1/2024) 425 g 0     Allergies   Allergen Reactions    Cortisone Hypertension, Other (See Comments), Shortness Of Breath and Tachycardia    Acebutolol     Acetaminophen GI Intolerance     diarrhea    Amlodipine     Atenolol     Azithromycin     Barium Sulfate Diarrhea     Patient reports watery stool and stomach ache post fluoro upper GI on 6/6/24. Per Radiologist MD Lozada, reaction should not absolutely preclude patient from having barium in the future if necessary. CG RN 6/7/24      Bisphosphonates      Annotation - [id number]: iriitis    Candesartan     Clarithromycin     Erythromycin     Fosinopril     Irbesartan     Levofloxacin     Losartan     Methylprednisolone Hypertension and Tachycardia    Metoprolol     Nisoldipine     Olmesartan     Propranolol     Sulfamethoxazole-Trimethoprim Nausea Only    Timolol     Lidocaine Palpitations and Tachycardia      No current facility-administered medications on file prior to visit.     Current Outpatient Medications on File Prior to Visit   Medication Sig Dispense Refill    ascorbic acid (VITAMIN C) 500 mg tablet Take 500 mg by mouth daily 1 tablet daily      colesevelam (WELCHOL) 625 mg tablet take 3 tablets by mouth twice a day with meals 540 tablet 1     cyproheptadine (PERIACTIN) 4 mg tablet take 0.5 tablet by mouth four times a day 120 tablet 0    Digestive Enzyme CAPS Take by mouth if needed (gas relief) beano taking 1 tablet      escitalopram (LEXAPRO) 5 mg tablet Take 1 tablet (5 mg total) by mouth daily 90 tablet 0    lactase (LACTAID) 3,000 units tablet Take 3,000 Units by mouth as needed (.) 2 tablets as needed      Multiple Vitamins-Minerals (CENTRUM SILVER ULTRA WOMENS PO) Take by mouth daily 1 tablet daily      nystatin (MYCOSTATIN) cream Apply topically 2 (two) times a day 30 g 0    ondansetron (ZOFRAN) 4 mg tablet Take 1 tablet (4 mg total) by mouth every 8 (eight) hours as needed for nausea or vomiting 20 tablet 0    polyethylene glycol-propylene glycol (SYSTANE) 0.4-0.3 % 1 drop if needed (dry eye) as needed daily B eyes      potassium chloride (Klor-Con M20) 20 mEq tablet Take 1 tablet (20 mEq total) by mouth daily 90 tablet 3    sodium chloride 1 g tablet Take 2 tablets (2 g total) by mouth 3 (three) times a day with meals 180 tablet 0    bisacodyl (DULCOLAX) 10 mg suppository Insert 1 suppository (10 mg total) into the rectum daily as needed for constipation for up to 7 days (Patient not taking: Reported on 9/12/2024) 7 suppository 0    CICLOPIROX EX  (Patient not taking: Reported on 10/1/2024)      colesevelam (WELCHOL) 625 mg tablet  (Patient not taking: Reported on 10/1/2024)      senna-docusate sodium (SENOKOT S) 8.6-50 mg per tablet Take 1 tablet by mouth daily at bedtime for 7 days (Patient not taking: Reported on 9/12/2024) 7 tablet 0    zinc oxide 20 % ointment Apply topically as needed for irritation (Apply after washing for barrier) (Patient not taking: Reported on 10/1/2024) 425 g 0      Social History     Tobacco Use    Smoking status: Never    Smokeless tobacco: Never   Vaping Use    Vaping status: Never Used   Substance and Sexual Activity    Alcohol use: Not Currently    Drug use: Never    Sexual activity: Not Currently      "Partners: Male     Birth control/protection: Post-menopausal             Objective     BP 96/72 (BP Location: Left arm, Patient Position: Sitting, Cuff Size: Child)   Pulse (!) 130   Temp 97.8 °F (36.6 °C) (Temporal)   Ht 5' 2\" (1.575 m)   Wt 48.1 kg (106 lb)   LMP  (LMP Unknown)   SpO2 96%   BMI 19.39 kg/m²     Physical Exam  Vitals reviewed.   Constitutional:       General: She is not in acute distress.     Appearance: She is well-developed. She is ill-appearing. She is not toxic-appearing or diaphoretic.   HENT:      Head: Normocephalic and atraumatic.      Nose: Nose normal.      Mouth/Throat:      Mouth: Mucous membranes are dry.   Eyes:      General:         Right eye: No discharge.         Left eye: No discharge.   Cardiovascular:      Rate and Rhythm: Regular rhythm. Tachycardia present.   Pulmonary:      Effort: Pulmonary effort is normal. No respiratory distress.   Abdominal:      General: There is distension.      Palpations: Abdomen is soft.      Tenderness: There is no abdominal tenderness. There is no guarding or rebound.   Musculoskeletal:         General: Swelling present. No tenderness or signs of injury.      Right lower leg: Edema present.      Left lower leg: Edema present.   Skin:     General: Skin is warm and dry.      Coloration: Skin is pale. Skin is not jaundiced.   Neurological:      General: No focal deficit present.      Mental Status: She is alert.   Psychiatric:         Mood and Affect: Mood normal.         Behavior: Behavior normal.         Thought Content: Thought content normal.         Judgment: Judgment normal.         Recent labs:  Lab Results   Component Value Date/Time    SODIUM 129 (L) 10/01/2024 02:33 PM    SODIUM 139 08/20/2019 06:45 AM    K 4.3 10/01/2024 02:33 PM    K 3.8 08/20/2019 06:45 AM    BUN 12 10/01/2024 02:33 PM    BUN 25 11/22/2019 11:30 AM    CREATININE 0.81 10/01/2024 02:33 PM    CREATININE 1.01 11/22/2019 11:30 AM    GLUC 96 10/01/2024 02:33 PM    GLUC " 81 08/20/2019 06:45 AM    CALCIUM 8.4 10/01/2024 02:33 PM    CALCIUM 8.9 08/20/2019 06:45 AM    AST 22 10/01/2024 02:33 PM    AST 16 08/20/2019 06:45 AM    ALT 13 10/01/2024 02:33 PM    ALT 26 08/20/2019 06:45 AM    ALB 2.9 (L) 10/01/2024 02:33 PM    ALB 4.0 08/20/2019 06:45 AM    TP 5.3 (L) 10/01/2024 02:33 PM    TP 6.8 08/20/2019 06:45 AM    EGFR 68 10/01/2024 02:33 PM    EGFR 54 (L) 11/22/2019 11:30 AM     Lab Results   Component Value Date/Time    HGB 12.0 10/01/2024 02:33 PM    WBC 7.05 10/01/2024 02:33 PM     10/01/2024 02:33 PM    INR 1.05 08/20/2024 07:10 AM    PTT 36 (H) 08/20/2024 07:10 AM     Lab Results   Component Value Date/Time    YPM0WOXICWEH 2.421 08/22/2024 04:34 PM       Recent Imaging:  Procedure: CTA ED chest PE Study    Result Date: 10/1/2024  Narrative: CTA - CHEST WITH IV CONTRAST - PULMONARY ANGIOGRAM INDICATION: r/o PE in the setting of active cancer with tachycardia and tachypnea. COMPARISON: CT chest/abdomen/pelvis 6/10/2024. PET/CT 7/8/2024. TECHNIQUE: CTA examination of the chest was performed using angiographic technique according to a protocol specifically tailored to evaluate for pulmonary embolism. Multiplanar 2D reformatted images were created from the source data. In addition, coronal  3D MIP postprocessing was performed on the acquisition scanner. This examination was performed utilizing dual energy CT technique. Radiation dose length product (DLP) for this visit: 149 mGy-cm . This examination, like all CT scans performed in the [facility], was performed utilizing techniques to minimize radiation dose exposure, including the use of iterative reconstruction and automated exposure control. IV Contrast: 50 mL of iohexol (OMNIPAQUE) FINDINGS: PULMONARY ARTERIAL TREE:  No pulmonary embolus. LUNGS: The central airways are patent. Subtotal atelectasis of the bilateral lower lobes in the setting of pleural effusions. PLEURA: Large bilateral pleural effusions. No  pneumothorax. HEART/GREAT VESSELS: Stable mild cardiomegaly. Coronary artery calcifications. No thoracic aortic aneurysm. MEDIASTINUM AND LAMAR: Unremarkable. CHEST WALL AND LOWER NECK: Diffuse chest wall edema. VISUALIZED STRUCTURES IN THE UPPER ABDOMEN: Partially imaged abdominal ascites OSSEOUS STRUCTURES: No acute fracture or destructive osseous lesion.     Impression: 1.  No pulmonary embolus. 2.  Large bilateral pleural effusions. 3.  Partially imaged abdominal ascites. Workstation performed: [id number]     Procedure: XR chest 1 view portable    Result Date: 10/1/2024  Narrative: XR CHEST PORTABLE INDICATION: abd and leg swelling. COMPARISON: August 22, 2024 FINDINGS: Bibasal effusions and adjacent consolidation. No pneumothorax. Normal cardiomediastinal silhouette. Bones are unremarkable for age. Normal upper abdomen.     Impression: Bibasal effusions and adjacent consolidation. Workstation performed: [id number]     Procedure: XR abdomen 1 view kub    Result Date: 9/9/2024  Narrative: XR ABDOMEN 1 VIEW KUB INDICATION: abdominal distension, r/o ileus. Mesenteric mass biopsy COMPARISON: PET/CT dated July 8, 2024 FINDINGS: There are prominent air-filled loops of large bowel throughout the abdomen with bowel gas seen as far distally as the sigmoid colon. Bowel loops are mildly distended measuring up to 7 cm in diameter. No evidence of pneumoperitoneum on this supine study. Upright or left lateral decubitus imaging is more sensitive to detect subtle free air in the appropriate setting. No pathologic calcification or soft tissue mass. Cholecystectomy clips are noted. Left basilar opacity is noted with adjacent effusion. Bones are unremarkable for the patient's age.     Impression: 1. Prominent air-filled loops of large bowel throughout the abdomen in a pattern suggesting ileus. Distal obstruction could potentially appear similar but is considered less likely. Consider attention on follow-up. 2. Left pleural effusion  "with adjacent atelectasis versus infiltrate. Workstation performed: [id number]     Procedure: Holter monitor    Result Date: 8/27/2024  Narrative: PATIENT NAME:  Brigid Brown AGE:  79 y.o.       Sex:  female MRN:  [id number] PROCEDURE: Holter monitor - 48 hour INDICATIONS: Palpitations DESCRIPTION OF FINDINGS: The patient was monitored for a total of 48 hours.  The patient was predominantly in Sinus throughout the study.  The average heart rate was 82 beats per minute.  The heart rate ranged from a low of 57 beats per minute to a maximum of 126 beats per minute. Ventricular ectopic activity consisted of 1227 beats (0.5% of total beats), of which 1138 were single PVCs, 0 were ventricular couplets, 0 were in bigeminy and 0 were in trigeminy. There was no sustained or nonsustained ventricular tachycardia. Supraventricular ectopic activity consisted of 1083 beats (0.5% of total beats), of which 582 were single PACs, 3 were late beats, 20 were atrial couplets, 393 were in bigeminy and 33 were in trigeminy. There were 8 atrial runs, longest of which was 32 beats. There was no supraventricular tachycardia identified.  There was no evidence of atrial fibrillation or atrial flutter. There were no significant pauses. The longest R-R interval was 1.3 seconds.  There was no evidence of advanced degree heart block. The accompanying patient diary notes symptoms of pain in stomach . She was not feeling well and did not write down everything\". Correlation with the tracings at these times reveals SR.     Impression: Predominantly Sinus, with an average heart rate of 82 beats per minute 1031 premature atrial contractions, of note technologist's review noted PACs were greatly underestimated. If clinically indicated can consider alternative event monitor 1138 premature ventricular contractions No significant pauses or advanced degree heart block Bebo Parikh MD     Procedure: XR chest 1 view portable    Result Date: " 8/22/2024  Narrative: XR CHEST PORTABLE INDICATION: hypotension. COMPARISON: PET/CT 7/8/2024, CXR 5/9/2024. FINDINGS: Clear lungs. No pneumothorax or pleural effusion. Nodule projecting over the right base is due to the nipple. Normal cardiomediastinal silhouette. Bones are unremarkable for age. Normal upper abdomen.     Impression: No acute cardiopulmonary disease. Workstation performed: [id number]     Procedure: Endoscopic ultrasonography, GI (Upper)    Result Date: 8/7/2024  Narrative: Table formatting from the original result was not included. [facility] [address] [facility] [place] [address] [phone] DATE OF SERVICE: 8/07/24 PHYSICIAN(S): Attending: Erika Marsh MD Fellow: Daryl Ortiz MD INDICATION: Unintentional weight loss, Generalized postprandial abdominal pain POST-OP DIAGNOSIS: See the impression below. PREPROCEDURE: Informed consent was obtained for the procedure, including sedation.  Risks of perforation, hemorrhage, adverse drug reaction and aspiration were discussed. The patient was placed in the left lateral decubitus position. Patient was explained about the risks and benefits of the procedure. Risks including but not limited to bleeding, infection, and perforation were explained in detail. Also explained about less than 100% sensitivity with the exam and other alternatives. PROCEDURE: EUS UPPER DETAILS OF PROCEDURE: Patient was taken to the procedure room where a time out was performed to confirm correct patient and correct procedure. The patient underwent monitored anesthesia care, which was administered by an anesthesia professional. The patient's blood pressure, heart rate, oxygen, respirations, level of consciousness, ECG and ETCO2 were monitored throughout the procedure. The endoscope and linear scope were introduced through the mouth and advanced to the duodenum. The patient experienced no blood loss. The procedure was not difficult. The patient tolerated the  procedure well. There were no apparent adverse events. ANESTHESIA INFORMATION: ASA: III Anesthesia Type: IV Sedation with Anesthesia MEDICATIONS: No administrations occurring from 1508 to 1538 on 24 FINDINGS: The esophagus appeared normal. 1 cm hiatal hernia - GE junction 42 cm from the incisors, diaphragmatic impression 43 cm from the incisors Mild, localized erythematous mucosa in the stomach The duodenum appeared normal. Normal celiac takeoff.  No evidence of pancreatic ductal dilation throughout the examination.  Bile duct was normal caliber with no filling defects noted.  Ampulla appeared normal.  No masses or lymphadenopathy appreciated.  No abnormality noted to correlate with PET findings. SPECIMENS: ID Type Source Tests Collected by Time Destination 1 :  Tissue Duodenum TISSUE EXAM Daryl Ortiz MD 2024  3:13 PM  2 :  Tissue Stomach TISSUE EXAM Daryl Ortiz MD 2024  3:15 PM       Impression: The esophagus appeared normal. 1 cm hiatal hernia Mild erythematous mucosa in the stomach The duodenum appeared normal. Normal celiac takeoff.  No evidence of pancreatic ductal dilation throughout the examination.  Bile duct was normal caliber with no filling defects noted.  Ampulla appeared normal.  No masses or lymphadenopathy appreciated.  No abnormality noted to correlate with PET findings. RECOMMENDATION: Follow up in the office as scheduled  Erika Marsh MD     Procedure: Complete PFT with post bronchodilator    Result Date: 2024  Narrative: Pulmonary Function Test Report Brigid Brown 79 y.o. female MRN: [id number] Date of Testin Date of Interpretation: 2024 Requesting Physician: Dr. Lewis Reason for Testing: Shortness of breath Reference set for interpretation: GLI GLOBAL  Procedure: The patient was taken to pulmonary function testing laboratory.  The patient demonstrated good effort and cooperation.  The results of this test did not meet ATS standards for acceptability  and repeatability.  Data set appears appropriate for interpretation.  Results: Spirometry: FEV1/FVC Ratio: 80 %   Z-score +0.23 FEV1: 83% predicted      Z-score -0.85 Forced Vital Capacity: 80% predicted Z-score -1.04 After administration of bronchodilator: Not administered Lung volumes: Total Lung Capacity 93% predicted  Z-score -0.55 Residual volume 123% predicted  Z-score +1.40 DLCO corrected for patients hemoglobin level: 100% predicted Flow volume loop: Normal Interpretation: Normal spirometry, lung volumes and diffusion capacity Ladonna Lewis D.O., [facility]    Procedure: NM PET CT skull base to mid thigh    Result Date: 7/8/2024  Narrative: PET/CT SCAN INDICATION: Soft tissue prominence of the mesenteric root. 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 Multiplanar attenuation corrected and non attenuation corrected PET images are available for interpretation, and contiguous, low dose, axial CT sections were obtained from the skull base through the femurs. Intravenous contrast material was not utilized. This examination, like all CT scans performed in the [facility], was performed utilizing techniques to minimize radiation dose exposure, including the use of iterative reconstruction and automated exposure control. Fasting serum glucose: 98 mg/dl FINDINGS: VISUALIZED BRAIN: No acute abnormalities are seen. HEAD/NECK: There is a physiologic distribution of FDG. Mild diffuse uptake in the thyroid, a SUV max 2.0, which can be seen with thyroiditis. No FDG avid cervical adenopathy is seen. CT images: Unremarkable. CHEST: No FDG avid soft tissue lesions are seen. CT images: Mild to moderate coronary  artery calcification. Scattered lung cysts. ABDOMEN: Mild variable radiotracer uptake suggested in the soft tissue thickening at the level of the mesenteric root. The activity varies with SUV max of 2.1 at the proximal aspect to 3.1 more inferiorly.  See for example image 155 series 1200 where activity appears to be more focal along the inferior aspect of the soft tissue thickening. Overall soft tissue thickening better seen on prior contrast CT. Variable bowel activity likely physiologic. CT images: Status post cholecystectomy. Less well seen hepatic hemangioma in the right lobe inferiorly. PELVIS: No FDG avid soft tissue lesions are seen. CT images: Status post hysterectomy. OSSEOUS STRUCTURES: No FDG avid lesions are seen. CT images: No significant findings. For reference: SUV max of the mediastinal blood pool: 1.8 SUV max of the liver: 2.3     Impression: 1. Mild variable FDG uptake suggested along the mesenteric root soft tissue thickening. This is nonspecific, could be related to a nonspecific inflammatory process with malignancy not excluded; sclerosing mesenteritis, amyloidosis, desmoid tumor, and lymphoproliferative processes such as lymphoma are considerations. Consider continued imaging follow-up if tissue sampling is not performed. Workstation performed: [id number]     Procedure: CT chest abdomen pelvis w contrast    Result Date: 6/10/2024  Narrative: CT CHEST, ABDOMEN AND PELVIS WITH IV CONTRAST INDICATION: weight loss  right mid abdominal pain. COMPARISON: CT abdomen pelvis dated April 9, 2024. TECHNIQUE: CT examination of the chest, abdomen and pelvis was performed. Multiplanar 2D reformatted images were created from the source data. This examination, like all CT scans performed in the [facility], was performed utilizing techniques to minimize radiation dose exposure, including the use of iterative reconstruction and automated exposure control. Radiation dose length product (DLP) for  this visit: 356 mGy-cm IV Contrast: 100 mL of iohexol (OMNIPAQUE) Enteric Contrast: Administered. FINDINGS: CHEST LUNGS: There is no infiltrate or pleural effusion. There is a linear focus of scarring versus atelectasis at the lateral left lung base. No suspicious pulmonary nodules. No tracheal or endobronchial lesion. PLEURA: There is a stable 4 mm right perifissural nodule (series 302 image 216) most compatible with an intrapulmonary lymph node. HEART/GREAT VESSELS: Heart is unremarkable for patient's age. No thoracic aortic aneurysm. MEDIASTINUM AND LAMAR: Unremarkable. CHEST WALL AND LOWER NECK: Unremarkable. ABDOMEN LIVER/BILIARY TREE: Stable size of an enhancing right inferior hepatic lobe lesion measuring 18 mm, previously described as a hemangioma on MRI abdomen dated December 9, 2019. Stable additional subcentimeter low-attenuation hepatic lesions, too small to accurately characterize. GALLBLADDER: No calcified gallstones. No pericholecystic inflammatory change. SPLEEN: Unremarkable. PANCREAS: Unremarkable. ADRENAL GLANDS: Unremarkable. KIDNEYS/URETERS: Unremarkable. No hydronephrosis. STOMACH AND BOWEL: Unremarkable. APPENDIX: No findings to suggest appendicitis. ABDOMINOPELVIC CAVITY: Small amount of pelvic free fluid. No pneumoperitoneum. No lymphadenopathy. There is soft tissue prominence of the mesenteric root, particular surrounding the superior mesenteric artery which has increased from the prior exam. Mild  diffuse mesenteric edema is also mildly increased from the prior exam. VESSELS: Unremarkable for patient's age. PELVIS REPRODUCTIVE ORGANS: Post hysterectomy. URINARY BLADDER: Unremarkable. ABDOMINAL WALL/INGUINAL REGIONS: Unremarkable. BONES: No acute fracture or suspicious osseous lesion.     Impression: No acute intra-abdominal abnormality. Trace pelvic free fluid. Soft tissue prominence of the mesenteric root, especially surrounding the superior mesenteric artery, increased when compared to a  CT abdomen/pelvis dated April 9, 2024. These findings are nonspecific with differential considerations including sclerosing  mesenteritis, malignancy/metastatic disease, inflammatory process, lymphoma, among other etiologies. A PET/CT scan is recommended for further evaluation. A short-term follow-up CT abdomen/pelvis in 3 months is also recommended. Clear lungs. Stable right hepatic lobe hemangioma. Workstation performed: [id number]     Procedure: FL upper GI / small bowel with air    Result Date: 6/6/2024  Narrative: UPPER GI SERIES AND SMALL BOWEL FOLLOW THROUGH - DOUBLE CONTRAST INDICATION: R63.4: Abnormal weight loss R10.84: Generalized abdominal pain. COMPARISON: CT abdomen pelvis 4/9/2024 TECHNIQUE:  view of the abdomen was obtained and is unremarkable. An upper GI series was then performed using effervescent granules and barium. Finally, a small bowel follow through was performed including spot images of the terminal ileum. IMAGES: 130 FLUOROSCOPY TIME: 2.2 minutes FINDINGS: The esophagus is normal in caliber. Esophageal motility is normal and emptying of contrast from the esophagus is prompt with the patient is upright. Intermittent tertiary contractions and mild esophagoesophageal reflux is noted when the patient is recumbent.. There is no evidence of discrete mucosal mass, ulceration or fold thickening. The stomach is unremarkable in size. The gastric mucosa is normal. No evidence of discrete penetrating ulcer or mass. Tiny gastric diverticulum is noted along the fundus. Contrast empties promptly into the duodenum. The duodenum is normal in caliber. The duodenojejunal junction is in its normal left upper quadrant location. Gastroesophageal reflux was not observed. There is no hiatal hernia. Normal caliber small bowel loops. There is a normal fold pattern and thickness. Dense contrast obscures fine mucosal detail. No evidence of discrete intraluminal filling defects, bowel kinking or fistula. Contrast  is seen reaching the colon within 30 minutes. Visualized portions of the terminal ileum are unremarkable.     Impression: 1. Upper GI exam demonstrates very mild esophagoesophageal reflux and intermittent tertiary contractions when the patient is recumbent. No gastroesophageal reflux was directly observed during the exam. 2. Unremarkable small bowel follow-through. Contrast is identified within the ascending colon within 30 minutes. Workstation performed: [id number]      Administrative Statements   I have spent a total time of 55 minutes in caring for this patient on the day of the visit/encounter including Risks and benefits of tx options, Instructions for management, Patient and family education, Importance of tx compliance, Risk factor reductions, Impressions, Counseling / Coordination of care, Documenting in the medical record, Reviewing / ordering tests, medicine, procedures  , Obtaining or reviewing history  , and Communicating with other healthcare professionals . Topics discussed with the patient / family include symptom assessment and management, medication review, psychosocial support, goals of care, hospice services, supportive listening, and anticipatory guidance.

## 2024-10-01 NOTE — ED PROVIDER NOTES
Final diagnoses:   Pleural effusion   Bilateral lower extremity edema   Fluid overload   Hyponatremia     ED Disposition       ED Disposition   Admit    Condition   Stable    Date/Time   Tue Oct 1, 2024  7:29 PM    Comment   Case was discussed with Dr Murphy and the patient's admission status was agreed to be Admission Status: inpatient status to the service of Dr. Murphy .               Assessment & Plan       Medical Decision Making  This is an 79-year-old female with active mesenteric cancer who presents for evaluation due to tachycardia.  On initial evaluation patient was noted to have 4+ pitting edema bilateral lower extremity extended to mid thigh and with diminished breath sounds.  X-ray concerning for bilateral pleural effusions.  These were also seen on CTA.  There is no indication of active PE.  Lab evaluation demonstrated hyponatremia which is likely secondary to to fluid overload.  Patient treated with 20 mg of IV Lasix with some improvement in urine output.  Will admit patient to the hospital service for further evaluation and management of bilateral pleural effusions and fluid overload.  Plan of care was discussed with the admitting team who is in agreement    Amount and/or Complexity of Data Reviewed  Labs: ordered.  Radiology: ordered.    Risk  Prescription drug management.  Decision regarding hospitalization.             Medications   furosemide (LASIX) injection 20 mg (20 mg Intravenous Given 10/1/24 1750)   iohexol (OMNIPAQUE) 350 MG/ML injection (SINGLE-DOSE) 50 mL (50 mL Intravenous Given 10/1/24 1817)       ED Risk Strat Scores                                               History of Present Illness       Chief Complaint   Patient presents with    Medical Problem     Pt is on salt tablets for low sodium and it is causing swelling. Pt also reports high heart rate so provider wanted her evaluated in er.        Past Medical History:   Diagnosis Date    Acne     Basal cell carcinoma  06/08/2020    right lower forehead    BCC (basal cell carcinoma of skin) 03/09/2020    mid forehead    Benign neoplasm of skin     Cancer (HCC)     mesenteric cancer    Disease of thyroid gland 2006    Essential hypertension 02/26/2018    GERD (gastroesophageal reflux disease)     Hypertension     Inflamed seborrheic keratosis     Irritable bowel syndrome     Malignant neoplasm of skin of face     Migraines     Nonmelanoma skin cancer     Last Assessed:6/27/17    Squamous cell skin cancer 06/08/2020    In situ, left lower forehead    Tachycardia     Telogen effluvium     Temporomandibular joint disorder     Vertigo       Past Surgical History:   Procedure Laterality Date    ADENOIDECTOMY      APPENDECTOMY      CHOLECYSTECTOMY      COLONOSCOPY  05/03/2019    COMPLEX WOUND CLOSURE TO EXTREMITY N/A 7/28/2020    Procedure: COMPLEX CLOSURE MID FOREHEAD;  Surgeon: Randy Frank MD;  Location: [facility];  Service: Plastics    ESOPHAGOGASTRODUODENOSCOPY  2009    FLAP LOCAL HEAD / NECK N/A 7/28/2020    Procedure: FLAP MID FOREHEAD;  Surgeon: Randy Frank MD;  Location: [facility];  Service: Plastics    HYSTERECTOMY  1987    IR THORACENTESIS  10/2/2024    LYMPH NODE DISSECTION N/A 9/6/2024    Procedure: BIOPSY ABDOMINAL MASS, LYMPHOMA PROTOCOL;  Surgeon: Angus Drew MD;  Location: [facility];  Service: Surgical Oncology    MALIGNANT SKIN LESION EXCISION      Excision of Lesion Face Malignant-9/14/2004 BCC Forehead    MOHS RECONSTRUCTION N/A 7/28/2020    Procedure: RECONSTRUCTION MOHS DEFECT MID FOREHEAD;  Surgeon: Randy Frank MD;  Location: [facility];  Service: Plastics    MOHS SURGERY  07/27/2020    Right &left lower forehead, mid forehead    OOPHORECTOMY Bilateral 1987    ROTATOR CUFF REPAIR Right     SKIN BIOPSY      TONSILLECTOMY      TUBAL LIGATION  1976?      Family History   Problem Relation Age of Onset    Hypertension Mother         Mother    Osteoporosis Mother     Skin cancer Mother      Migraines Mother     Dementia Mother     Hypertension Father         Father    Skin cancer Father     Dementia Father     Skin cancer Sister 73    Migraines Sister     No Known Problems Daughter     Uterine cancer Maternal Grandmother 29    Diabetes type II Maternal Grandfather     Cancer Paternal Grandmother     Uterine cancer Paternal Grandmother 65    No Known Problems Maternal Aunt     Cancer Paternal Aunt         Breast    Uterine cancer Paternal Aunt 55    No Known Problems Paternal Aunt     No Known Problems Paternal Aunt       Social History     Tobacco Use    Smoking status: Never    Smokeless tobacco: Never   Vaping Use    Vaping status: Never Used   Substance Use Topics    Alcohol use: Not Currently    Drug use: Never      E-Cigarette/Vaping    E-Cigarette Use Never User       E-Cigarette/Vaping Substances    Nicotine No     THC No     CBD No     Flavoring No     Other No     Unknown No       I have reviewed and agree with the history as documented.     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  Patient has noted increased edema in her bilateral lower extremities since her last admission when she was diagnosed with hyponatremia and was started on salt tabs.  Patient did not notice that she was having any worsening shortness of breath or tachycardia until her physician pointed out.  On presentation to the emergency department patient denies any current chest pain, nausea, vomiting or diaphoresis.  Patient does want evaluation and treatment for current symptoms despite pursuing hospice care for her active cancer.           Review of Systems   Constitutional:  Negative for chills and fever.   HENT:  Negative for ear pain and sore throat.    Eyes:  Negative for pain and visual disturbance.    Respiratory:  Positive for shortness of breath. Negative for cough.    Cardiovascular:  Positive for leg swelling. Negative for chest pain and palpitations.   Gastrointestinal:  Negative for abdominal pain and vomiting.   Genitourinary:  Negative for dysuria and hematuria.   Musculoskeletal:  Negative for arthralgias and back pain.   Skin:  Negative for color change and rash.   Neurological:  Negative for seizures and syncope.   All other systems reviewed and are negative.          Objective       ED Triage Vitals   Temperature Pulse Blood Pressure Respirations SpO2 Patient Position - Orthostatic VS   10/01/24 1843 10/01/24 1428 10/01/24 1428 10/01/24 1428 10/01/24 1428 10/01/24 1428   (!) 95.9 °F (35.5 °C) (!) 114 127/68 16 98 % Sitting      Temp Source Heart Rate Source BP Location FiO2 (%) Pain Score    10/01/24 1843 10/01/24 1428 10/01/24 1428 -- 10/01/24 2119    Rectal Monitor Right arm  No Pain      Vitals      Date and Time Temp Pulse SpO2 Resp BP Pain Score FACES Pain Rating User   10/03/24 1216 -- -- 97 % -- -- 7 -- RG   10/03/24 1127 -- -- -- -- -- 7 -- RR   10/03/24 0900 -- -- -- -- -- No Pain -- BW   10/03/24 0801 97.8 °F (36.6 °C) 88 95 % -- 125/78 -- -- DII   10/02/24 2142 -- -- -- 16 -- -- -- SC   10/02/24 2142 97.5 °F (36.4 °C) 89 96 % -- 123/78 -- -- DII   10/02/24 2030 -- -- 96 % -- -- No Pain -- SK   10/02/24 1610 -- -- -- -- 121/78 -- -- DII   10/02/24 1607 -- -- -- -- 121/78 -- -- DII   10/02/24 1601 -- 107 97 % 18 119/81 -- -- CAROL ANN   10/02/24 1551 -- 106 98 % 18 122/62 -- -- CAROL ANN   10/02/24 1535 -- 106 94 % 18 140/64 -- -- CAROL ANN   10/02/24 1041 -- -- 93 % -- -- No Pain -- AC   10/02/24 1015 -- 89 -- -- 121/77 -- -- KA   10/02/24 0812 97.6 °F (36.4 °C) 89 93 % 20 121/77 -- -- DII   10/02/24 0553 -- -- -- -- -- 7 -- SK   10/02/24 0544 97.8 °F (36.6 °C) 108 94 % -- 112/74 -- -- DII   10/02/24 0241 98.2 °F (36.8 °C) 100 93 % 17 114/75 -- -- DII   10/02/24 0138 -- -- -- 18 -- -- -- SK   10/02/24 0138  97.8 °F (36.6 °C) 102 93 % -- 118/74 -- -- DII   10/02/24 0036 -- -- -- 18 -- -- -- SK   10/02/24 0036 97.5 °F (36.4 °C) 96 93 % -- 122/76 -- -- DII   10/01/24 2324 97.2 °F (36.2 °C) -- -- -- 123/76 -- -- DII   10/01/24 2229 97.3 °F (36.3 °C) 90 95 % -- -- -- -- DII   10/01/24 2137 97.3 °F (36.3 °C) 89 96 % -- -- -- -- DII   10/01/24 2131 -- -- 96 % -- -- No Pain -- SK   10/01/24 2119 -- -- -- 18 -- -- -- SK   10/01/24 2119 97.2 °F (36.2 °C) 86 92 % -- 128/75 -- -- DII   10/01/24 2119 -- -- -- -- -- No Pain -- [facility]   10/01/24 2029 96.2 °F (35.7 °C) 91 97 % 16 128/89 -- -- KD   10/01/24 2008 96.1 °F (35.6 °C) -- -- -- -- -- -- DW   10/01/24 1843 95.9 °F (35.5 °C) -- -- -- -- -- -- DW   10/01/24 1749 -- -- -- -- 177/85 -- -- KB   10/01/24 1715 -- 91 96 % -- -- -- -- KB   10/01/24 1428 -- 114 98 % 16 127/68 -- -- AK            Physical Exam  Vitals and nursing note reviewed.   Constitutional:       General: She is not in acute distress.     Appearance: She is well-developed. She is ill-appearing.   HENT:      Head: Normocephalic and atraumatic.      Mouth/Throat:      Mouth: Mucous membranes are moist.      Pharynx: Oropharynx is clear.   Eyes:      Conjunctiva/sclera: Conjunctivae normal.   Cardiovascular:      Rate and Rhythm: Regular rhythm. Tachycardia present.      Heart sounds: No murmur heard.  Pulmonary:      Effort: Respiratory distress present.      Breath sounds: Normal breath sounds.   Abdominal:      General: Abdomen is flat. There is distension.      Palpations: Abdomen is soft.      Tenderness: There is no abdominal tenderness.   Musculoskeletal:         General: Swelling present.      Cervical back: Neck supple.      Right lower leg: Edema present.      Left lower leg: Edema present.      Comments: 4+ pitting edema extending to mid thigh    Skin:     General: Skin is warm and dry.      Capillary Refill: Capillary refill takes less than 2 seconds.   Neurological:      Mental Status: She is alert.    Psychiatric:         Mood and Affect: Mood normal.         Results Reviewed       Procedure Component Value Units Date/Time    HS Troponin I 4hr [[id number]]  (Normal) Collected: 10/01/24 1933    Lab Status: Final result Specimen: Blood from Arm, Left Updated: 10/01/24 2009     hs TnI 4hr 7 ng/L      Delta 4hr hsTnI -3 ng/L     HS Troponin I 2hr [[id number]]  (Normal) Collected: 10/01/24 1751    Lab Status: Final result Specimen: Blood from Arm, Left Updated: 10/01/24 1837     hs TnI 2hr 7 ng/L      Delta 2hr hsTnI -3 ng/L     B-Type Natriuretic Peptide(BNP) [[id number]]  (Normal) Collected: 10/01/24 1433    Lab Status: Final result Specimen: Blood from Arm, Left Updated: 10/01/24 1649     BNP 84 pg/mL     HS Troponin 0hr (reflex protocol) [[id number]]  (Normal) Collected: 10/01/24 1433    Lab Status: Final result Specimen: Blood from Arm, Left Updated: 10/01/24 1511     hs TnI 0hr 10 ng/L     Comprehensive metabolic panel [[id number]]  (Abnormal) Collected: 10/01/24 1433    Lab Status: Final result Specimen: Blood from Arm, Left Updated: 10/01/24 1506     Sodium 129 mmol/L      Potassium 4.3 mmol/L      Chloride 100 mmol/L      CO2 24 mmol/L      ANION GAP 5 mmol/L      BUN 12 mg/dL      Creatinine 0.81 mg/dL      Glucose 96 mg/dL      Calcium 8.4 mg/dL      Corrected Calcium 9.3 mg/dL      AST 22 U/L      ALT 13 U/L      Alkaline Phosphatase 57 U/L      Total Protein 5.3 g/dL      Albumin 2.9 g/dL      Total Bilirubin 0.36 mg/dL      eGFR 68 ml/min/1.73sq m     Narrative:      National Kid[employer] guidelines for Chronic Kidney Disease (CKD):     Stage 1 with normal or high GFR (GFR > 90 mL/min/1.73 square meters)    Stage 2 Mild CKD (GFR = 60-89 mL/min/1.73 square meters)    Stage 3A Moderate CKD (GFR = 45-59 mL/min/1.73 square meters)    Stage 3B Moderate CKD (GFR = 30-44 mL/min/1.73 square meters)    Stage 4 Severe CKD (GFR = 15-29 mL/min/1.73 square meters)    Stage 5 End Stage CKD (GFR <15  mL/min/1.73 square meters)  Note: GFR calculation is accurate only with a steady state creatinine    CBC and differential [[id number]]  (Abnormal) Collected: 10/01/24 1433    Lab Status: Final result Specimen: Blood from Arm, Left Updated: 10/01/24 1447     WBC 7.05 Thousand/uL      RBC 4.05 Million/uL      Hemoglobin 12.0 g/dL      Hematocrit 37.0 %      MCV 91 fL      MCH 29.6 pg      MCHC 32.4 g/dL      RDW 14.7 %      MPV 9.5 fL      Platelets 319 Thousands/uL      nRBC 0 /100 WBCs      Segmented % 79 %      Immature Grans % 0 %      Lymphocytes % 12 %      Monocytes % 6 %      Eosinophils Relative 2 %      Basophils Relative 1 %      Absolute Neutrophils 5.53 Thousands/µL      Absolute Immature Grans 0.02 Thousand/uL      Absolute Lymphocytes 0.85 Thousands/µL      Absolute Monocytes 0.45 Thousand/µL      Eosinophils Absolute 0.14 Thousand/µL      Basophils Absolute 0.06 Thousands/µL             IR IN-Patient Thoracentesis   Final Interpretation by Glenna Infante MD (10/02 1651)   Impression:   1. Successful ultrasound-guided right thoracentesis yielding 450 mL clear yellow pleural fluid.   2. Successful ultrasound-guided left thoracentesis yielding 600 mL of dark yellow pleural fluid.               Workstation performed: [id number]         CTA ED chest PE Study   Final Interpretation by Mynor Donovan MD (10/01 1922)      1.  No pulmonary embolus.   2.  Large bilateral pleural effusions.   3.  Partially imaged abdominal ascites.                  Workstation performed: [id number]         XR chest 1 view portable   Final Interpretation by Mickey Vera MD (10/01 1625)      Bibasal effusions and adjacent consolidation.            Workstation performed: [id number]             Procedures    ED Medication and Procedure Management   Prior to Admission Medications   Prescriptions Last Dose Informant Patient Reported? Taking?   CICLOPIROX EX   Yes No   Patient not taking: Reported on 10/1/2024   Digestive Enzyme CAPS   Self Yes No   Sig: Take 1 capsule by mouth if needed (gas relief) beano taking 1 tablet as needed gas relief   Multiple Vitamins-Minerals (CENTRUM SILVER ULTRA WOMENS PO)  Self Yes No   Sig: Take by mouth daily 1 tablet daily   ascorbic acid (VITAMIN C) 500 mg tablet  Self Yes No   Sig: Take 500 mg by mouth daily 1 tablet daily   bisacodyl (DULCOLAX) 10 mg suppository   No No   Sig: Insert 1 suppository (10 mg total) into the rectum daily as needed for constipation for up to 7 days   Patient not taking: Reported on 9/12/2024   colesevelam (WELCHOL) 625 mg tablet  Self No No   Sig: take 3 tablets by mouth twice a day with meals   colesevelam (WELCHOL) 625 mg tablet   Yes No   Patient not taking: Reported on 10/1/2024   cyproheptadine (PERIACTIN) 4 mg tablet  Self No No   Sig: take 0.5 tablet by mouth four times a day   escitalopram (LEXAPRO) 5 mg tablet  Self No No   Sig: Take 1 tablet (5 mg total) by mouth daily   lactase (LACTAID) 3,000 units tablet  Self Yes No   Sig: Take 3,000 Units by mouth as needed (.) 2 tablets as needed if eating dairy   nystatin (MYCOSTATIN) cream  Self No No   Sig: Apply topically 2 (two) times a day   Patient taking differently: Apply 1 Application topically 2 (two) times a day. apply to skin fold areas as needed for rash   ondansetron (ZOFRAN) 4 mg tablet  Self No No   Sig: Take 1 tablet (4 mg total) by mouth every 8 (eight) hours as needed for nausea or vomiting   polyethylene glycol-propylene glycol (SYSTANE) 0.4-0.3 %  Self Yes No   Sig: Apply 1 drop to eye if needed (dry eye) as needed daily Both eyes. dry eyes   potassium chloride (Klor-Con M20) 20 mEq tablet  Self No No   Sig: Take 1 tablet (20 mEq total) by mouth daily   senna-docusate sodium (SENOKOT S) 8.6-50 mg per tablet   No No   Sig: Take 1 tablet by mouth daily at bedtime for 7 days   Patient not taking: Reported on 9/12/2024   sodium chloride 1 g tablet  Self No No   Sig: Take 2 tablets (2 g total) by mouth 3 (three)  times a day with meals   zinc oxide 20 % ointment  Self No No   Sig: Apply topically as needed for irritation (Apply after washing for barrier)   Patient not taking: Reported on 10/1/2024      Facility-Administered Medications: None     Discharge Medication List as of 10/3/2024  2:04 PM        START taking these medications    Details   furosemide (LASIX) 20 mg tablet Take 1 tablet (20 mg total) by mouth daily, Starting Thu 10/3/2024, Until Sat 11/2/2024, Normal           CONTINUE these medications which have NOT CHANGED    Details   ascorbic acid (VITAMIN C) 500 mg tablet Take 500 mg by mouth daily 1 tablet daily, Historical Med      bisacodyl (DULCOLAX) 10 mg suppository Insert 1 suppository (10 mg total) into the rectum daily as needed for constipation for up to 7 days, Starting Tue 9/10/2024, Until Tue 9/17/2024 at 2359, Normal      CICLOPIROX EX Historical Med      !! colesevelam (WELCHOL) 625 mg tablet take 3 tablets by mouth twice a day with meals, Normal      !! colesevelam (WELCHOL) 625 mg tablet Historical Med      cyproheptadine (PERIACTIN) 4 mg tablet take 0.5 tablet by mouth four times a day, Normal      Digestive Enzyme CAPS Take by mouth if needed (gas relief) beano taking 1 tablet, Historical Med      escitalopram (LEXAPRO) 5 mg tablet Take 1 tablet (5 mg total) by mouth daily, Starting Wed 8/21/2024, Normal      lactase (LACTAID) 3,000 units tablet Take 3,000 Units by mouth as needed (.) 2 tablets as needed, Historical Med      Multiple Vitamins-Minerals (CENTRUM SILVER ULTRA WOMENS PO) Take by mouth daily 1 tablet daily, Historical Med      nystatin (MYCOSTATIN) cream Apply topically 2 (two) times a day, Starting Thu 9/12/2024, Normal      ondansetron (ZOFRAN) 4 mg tablet Take 1 tablet (4 mg total) by mouth every 8 (eight) hours as needed for nausea or vomiting, Starting Mon 9/23/2024, Normal      polyethylene glycol-propylene glycol (SYSTANE) 0.4-0.3 % 1 drop if needed (dry eye) as needed daily B  eyes, Historical Med      potassium chloride (Klor-Con M20) 20 mEq tablet Take 1 tablet (20 mEq total) by mouth daily, Starting Mon 9/16/2024, Normal      senna-docusate sodium (SENOKOT S) 8.6-50 mg per tablet Take 1 tablet by mouth daily at bedtime for 7 days, Starting Tue 9/10/2024, Until Tue 9/17/2024, Normal      sodium chloride 1 g tablet Take 2 tablets (2 g total) by mouth 3 (three) times a day with meals, Starting Tue 9/10/2024, Until Thu 10/10/2024, Normal      zinc oxide 20 % ointment Apply topically as needed for irritation (Apply after washing for barrier), Starting Thu 9/12/2024, Normal       !! - Potential duplicate medications found. Please discuss with provider.          ED SEPSIS DOCUMENTATION   Time reflects when diagnosis was documented in both MDM as applicable and the Disposition within this note       Time User Action Codes Description Comment    10/1/2024  7:29 PM Anamaria Moreno Add [J90] Pleural effusion     10/1/2024  7:29 PM Danii Morenoa Add [R60.0] Bilateral lower extremity edema     10/1/2024  7:30 PM Anamaria Moreno Add [E87.70] Fluid overload     10/1/2024  7:30 PM Danii Morenoa Add [E87.1] Hyponatremia     10/1/2024  7:30 PM Anamaria Moreno Add [C18.9] Colon cancer (HCC)     10/3/2024  1:55 PM Eduar Curtis Add [C48.1] Cancer of mesentery (HCC)     10/3/2024  1:55 PM Eduar Curtis Add [M79.89] Leg swelling     10/4/2024 10:52 PM Anamaria Moreno Remove [C18.9] Colon cancer (HCC)                  Anamaria Moreno MD  10/04/24 9400

## 2024-10-01 NOTE — RESULT ENCOUNTER NOTE
Please inform patient that sodium level is slightly low at 132 meq/L but acceptable the potassium is at normal range.  Continue current treatment.  Please order for another BMP to be done in 1 week for hyponatremia.  Recommend fluid restriction around 45 ounces per day

## 2024-10-01 NOTE — RESULT ENCOUNTER NOTE
Please call the patient regarding results.  Iron level is low but B12 and folate are acceptable.  Recommend over-the-counter oral ferrous gluconate 325 mg daily

## 2024-10-02 ENCOUNTER — APPOINTMENT (INPATIENT)
Dept: RADIOLOGY | Facility: HOSPITAL | Age: 79
DRG: 186 | End: 2024-10-02
Attending: INTERNAL MEDICINE
Payer: MEDICARE

## 2024-10-02 ENCOUNTER — APPOINTMENT (INPATIENT)
Dept: NON INVASIVE DIAGNOSTICS | Facility: HOSPITAL | Age: 79
DRG: 186 | End: 2024-10-02
Payer: MEDICARE

## 2024-10-02 LAB
ANION GAP SERPL CALCULATED.3IONS-SCNC: 6 MMOL/L (ref 4–13)
AORTIC ROOT: 2.9 CM
AORTIC VALVE MEAN VELOCITY: 9.1 M/S
APICAL FOUR CHAMBER EJECTION FRACTION: 66 %
APPEARANCE FLD: ABNORMAL
APPEARANCE FLD: CLEAR
ASCENDING AORTA: 3.1 CM
AV AREA BY CONTINUOUS VTI: 2.2 CM2
AV AREA PEAK VELOCITY: 2.1 CM2
AV LVOT MEAN GRADIENT: 1 MMHG
AV LVOT PEAK GRADIENT: 3 MMHG
AV MEAN GRADIENT: 4 MMHG
AV PEAK GRADIENT: 7 MMHG
AV REGURGITATION PRESSURE HALF TIME: 608 MS
AV VALVE AREA: 2.15 CM2
AV VELOCITY RATIO: 0.67
BASOPHILS # BLD AUTO: 0.06 THOUSANDS/ÂΜL (ref 0–0.1)
BASOPHILS NFR BLD AUTO: 1 % (ref 0–1)
BSA FOR ECHO PROCEDURE: 1.46 M2
BUN SERPL-MCNC: 12 MG/DL (ref 5–25)
CALCIUM SERPL-MCNC: 8.3 MG/DL (ref 8.4–10.2)
CHLORIDE SERPL-SCNC: 98 MMOL/L (ref 96–108)
CO2 SERPL-SCNC: 26 MMOL/L (ref 21–32)
COLOR FLD: ABNORMAL
COLOR FLD: NORMAL
CREAT SERPL-MCNC: 0.91 MG/DL (ref 0.6–1.3)
DOP CALC AO PEAK VEL: 1.27 M/S
DOP CALC AO VTI: 21.32 CM
DOP CALC LVOT AREA: 3.14 CM2
DOP CALC LVOT CARDIAC INDEX: 2.86 L/MIN/M2
DOP CALC LVOT CARDIAC OUTPUT: 4.18 L/MIN
DOP CALC LVOT DIAMETER: 2 CM
DOP CALC LVOT PEAK VEL VTI: 14.63 CM
DOP CALC LVOT PEAK VEL: 0.85 M/S
DOP CALC LVOT STROKE INDEX: 32.2 ML/M2
DOP CALC LVOT STROKE VOLUME: 45.94
DOP CALC MV VTI: 20.02 CM
E WAVE DECELERATION TIME: 118 MS
E/A RATIO: 0.68
EOSINOPHIL # BLD AUTO: 0.13 THOUSAND/ÂΜL (ref 0–0.61)
EOSINOPHIL NFR BLD AUTO: 3 % (ref 0–6)
ERYTHROCYTE [DISTWIDTH] IN BLOOD BY AUTOMATED COUNT: 14.8 % (ref 11.6–15.1)
FRACTIONAL SHORTENING: 27 (ref 28–44)
GFR SERPL CREATININE-BSD FRML MDRD: 59 ML/MIN/1.73SQ M
GLUCOSE FLD-MCNC: 86 MG/DL
GLUCOSE SERPL-MCNC: 80 MG/DL (ref 65–140)
HCT VFR BLD AUTO: 34.7 % (ref 34.8–46.1)
HGB BLD-MCNC: 11.1 G/DL (ref 11.5–15.4)
HISTIOCYTES NFR FLD: 5 %
IMM GRANULOCYTES # BLD AUTO: 0.02 THOUSAND/UL (ref 0–0.2)
IMM GRANULOCYTES NFR BLD AUTO: 0 % (ref 0–2)
INTERVENTRICULAR SEPTUM IN DIASTOLE (PARASTERNAL SHORT AXIS VIEW): 1.1 CM
INTERVENTRICULAR SEPTUM: 1.1 CM (ref 0.6–1.1)
LAAS-AP2: 21.8 CM2
LAAS-AP4: 13.8 CM2
LDH FLD L TO P-CCNC: 73 U/L
LDH SERPL-CCNC: 209 U/L (ref 140–271)
LEFT ATRIUM SIZE: 2.8 CM
LEFT ATRIUM VOLUME (MOD BIPLANE): 46 ML
LEFT ATRIUM VOLUME INDEX (MOD BIPLANE): 31.5 ML/M2
LEFT INTERNAL DIMENSION IN SYSTOLE: 2.4 CM (ref 2.1–4)
LEFT VENTRICULAR INTERNAL DIMENSION IN DIASTOLE: 3.3 CM (ref 3.5–6)
LEFT VENTRICULAR POSTERIOR WALL IN END DIASTOLE: 1.1 CM
LEFT VENTRICULAR STROKE VOLUME: 25 ML
LVSV (TEICH): 25 ML
LYMPHOCYTES # BLD AUTO: 0.78 THOUSANDS/ÂΜL (ref 0.6–4.47)
LYMPHOCYTES NFR BLD AUTO: 16 % (ref 14–44)
LYMPHOCYTES NFR BLD AUTO: 86 %
MCH RBC QN AUTO: 29.1 PG (ref 26.8–34.3)
MCHC RBC AUTO-ENTMCNC: 32 G/DL (ref 31.4–37.4)
MCV RBC AUTO: 91 FL (ref 82–98)
MITRAL REGURGITATION PEAK VELOCITY: 5.87 M/S
MITRAL VALVE MEAN INFLOW VELOCITY: 4.86 M/S
MITRAL VALVE REGURGITANT PEAK GRADIENT: 138 MMHG
MONOCYTES # BLD AUTO: 0.36 THOUSAND/ÂΜL (ref 0.17–1.22)
MONOCYTES NFR BLD AUTO: 7 % (ref 4–12)
MV E'TISSUE VEL-LAT: 9 CM/S
MV E'TISSUE VEL-SEP: 6 CM/S
MV MEAN GRADIENT: 1 MMHG
MV PEAK A VEL: 0.99 M/S
MV PEAK E VEL: 67 CM/S
MV PEAK GRADIENT: 4 MMHG
MV STENOSIS PRESSURE HALF TIME: 34 MS
MV VALVE AREA BY CONTINUITY EQUATION: 2.29 CM2
MV VALVE AREA P 1/2 METHOD: 6.47
NEUTROPHILS # BLD AUTO: 3.6 THOUSANDS/ÂΜL (ref 1.85–7.62)
NEUTS SEG NFR BLD AUTO: 73 % (ref 43–75)
NEUTS SEG NFR BLD AUTO: 9 %
NRBC BLD AUTO-RTO: 0 /100 WBCS
PH BODY FLUID: 7.8
PLATELET # BLD AUTO: 344 THOUSANDS/UL (ref 149–390)
PMV BLD AUTO: 8.9 FL (ref 8.9–12.7)
POTASSIUM SERPL-SCNC: 4 MMOL/L (ref 3.5–5.3)
PROT FLD-MCNC: <3 G/DL
RA PRESSURE ESTIMATED: 8 MMHG
RBC # BLD AUTO: 3.81 MILLION/UL (ref 3.81–5.12)
RIGHT ATRIAL 2D VOLUME: 34 ML
RIGHT ATRIUM AREA SYSTOLE A4C: 12.9 CM2
RIGHT VENTRICLE ID DIMENSION: 3.3 CM
RV PSP: 35 MMHG
SINOTUBULAR JUNCTION: 2.8 CM
SITE: ABNORMAL
SITE: NORMAL
SL CV AV DECELERATION TIME RETROGRADE: 2097 MS
SL CV AV PEAK GRADIENT RETROGRADE: 96 MMHG
SL CV DOP CALC MV VTI RETROGRADE: 111 CM
SL CV LEFT ATRIUM LENGTH A2C: 5.7 CM
SL CV LV EF: 55
SL CV MV MEAN GRADIENT RETROGRADE: 102 MMHG
SL CV PED ECHO LEFT VENTRICLE DIASTOLIC VOLUME (MOD BIPLANE) 2D: 44 ML
SL CV PED ECHO LEFT VENTRICLE SYSTOLIC VOLUME (MOD BIPLANE) 2D: 20 ML
SL CV SINUS OF VALSALVA 2D: 2.5 CM
SODIUM SERPL-SCNC: 130 MMOL/L (ref 135–147)
STJ: 2.8 CM
TOTAL CELLS COUNTED SPEC: 100
TOTAL PROTEIN FLUID: 2.3 G/DL
TR MAX PG: 27 MMHG
TR PEAK VELOCITY: 2.6 M/S
TRICUSPID ANNULAR PLANE SYSTOLIC EXCURSION: 2.8 CM
TRICUSPID VALVE PEAK REGURGITATION VELOCITY: 2.61 M/S
TSH SERPL DL<=0.05 MIU/L-ACNC: 4.08 UIU/ML (ref 0.45–4.5)
WBC # BLD AUTO: 4.95 THOUSAND/UL (ref 4.31–10.16)
WBC # FLD MANUAL: 466 /UL
WBC # FLD MANUAL: 772 /UL

## 2024-10-02 PROCEDURE — 32555 ASPIRATE PLEURA W/ IMAGING: CPT

## 2024-10-02 PROCEDURE — 76937 US GUIDE VASCULAR ACCESS: CPT

## 2024-10-02 PROCEDURE — 87205 SMEAR GRAM STAIN: CPT

## 2024-10-02 PROCEDURE — 0W993ZZ DRAINAGE OF RIGHT PLEURAL CAVITY, PERCUTANEOUS APPROACH: ICD-10-PCS | Performed by: RADIOLOGY

## 2024-10-02 PROCEDURE — 80048 BASIC METABOLIC PNL TOTAL CA: CPT

## 2024-10-02 PROCEDURE — 83986 ASSAY PH BODY FLUID NOS: CPT

## 2024-10-02 PROCEDURE — 89051 BODY FLUID CELL COUNT: CPT

## 2024-10-02 PROCEDURE — 0W9B3ZZ DRAINAGE OF LEFT PLEURAL CAVITY, PERCUTANEOUS APPROACH: ICD-10-PCS | Performed by: RADIOLOGY

## 2024-10-02 PROCEDURE — 99223 1ST HOSP IP/OBS HIGH 75: CPT | Performed by: INTERNAL MEDICINE

## 2024-10-02 PROCEDURE — 88305 TISSUE EXAM BY PATHOLOGIST: CPT | Performed by: STUDENT IN AN ORGANIZED HEALTH CARE EDUCATION/TRAINING PROGRAM

## 2024-10-02 PROCEDURE — 87070 CULTURE OTHR SPECIMN AEROBIC: CPT

## 2024-10-02 PROCEDURE — 88342 IMHCHEM/IMCYTCHM 1ST ANTB: CPT | Performed by: STUDENT IN AN ORGANIZED HEALTH CARE EDUCATION/TRAINING PROGRAM

## 2024-10-02 PROCEDURE — 93005 ELECTROCARDIOGRAM TRACING: CPT

## 2024-10-02 PROCEDURE — 88112 CYTOPATH CELL ENHANCE TECH: CPT | Performed by: STUDENT IN AN ORGANIZED HEALTH CARE EDUCATION/TRAINING PROGRAM

## 2024-10-02 PROCEDURE — 88341 IMHCHEM/IMCYTCHM EA ADD ANTB: CPT | Performed by: STUDENT IN AN ORGANIZED HEALTH CARE EDUCATION/TRAINING PROGRAM

## 2024-10-02 PROCEDURE — 84443 ASSAY THYROID STIM HORMONE: CPT

## 2024-10-02 PROCEDURE — 82945 GLUCOSE OTHER FLUID: CPT

## 2024-10-02 PROCEDURE — 84157 ASSAY OF PROTEIN OTHER: CPT

## 2024-10-02 PROCEDURE — 83615 LACTATE (LD) (LDH) ENZYME: CPT

## 2024-10-02 PROCEDURE — 85025 COMPLETE CBC W/AUTO DIFF WBC: CPT

## 2024-10-02 PROCEDURE — 88312 SPECIAL STAINS GROUP 1: CPT | Performed by: STUDENT IN AN ORGANIZED HEALTH CARE EDUCATION/TRAINING PROGRAM

## 2024-10-02 PROCEDURE — 32555 ASPIRATE PLEURA W/ IMAGING: CPT | Performed by: RADIOLOGY

## 2024-10-02 PROCEDURE — 93306 TTE W/DOPPLER COMPLETE: CPT

## 2024-10-02 PROCEDURE — 93306 TTE W/DOPPLER COMPLETE: CPT | Performed by: INTERNAL MEDICINE

## 2024-10-02 RX ORDER — FUROSEMIDE 10 MG/ML
20 INJECTION INTRAMUSCULAR; INTRAVENOUS DAILY
Status: DISCONTINUED | OUTPATIENT
Start: 2024-10-03 | End: 2024-10-03 | Stop reason: HOSPADM

## 2024-10-02 RX ORDER — CHLOROPROCAINE HYDROCHLORIDE 30 MG/ML
INJECTION, SOLUTION EPIDURAL; INFILTRATION; INTRACAUDAL; PERINEURAL AS NEEDED
Status: COMPLETED | OUTPATIENT
Start: 2024-10-02 | End: 2024-10-02

## 2024-10-02 RX ORDER — LOPERAMIDE HCL 2 MG
2 CAPSULE ORAL ONCE
Status: COMPLETED | OUTPATIENT
Start: 2024-10-02 | End: 2024-10-02

## 2024-10-02 RX ORDER — FUROSEMIDE 10 MG/ML
20 INJECTION INTRAMUSCULAR; INTRAVENOUS DAILY
Status: DISCONTINUED | OUTPATIENT
Start: 2024-10-02 | End: 2024-10-02

## 2024-10-02 RX ORDER — IBUPROFEN 200 MG
200 TABLET ORAL EVERY 6 HOURS PRN
Status: DISCONTINUED | OUTPATIENT
Start: 2024-10-02 | End: 2024-10-02

## 2024-10-02 RX ADMIN — FUROSEMIDE 20 MG: 10 INJECTION, SOLUTION INTRAMUSCULAR; INTRAVENOUS at 00:37

## 2024-10-02 RX ADMIN — ONDANSETRON 4 MG: 4 TABLET, ORALLY DISINTEGRATING ORAL at 05:37

## 2024-10-02 RX ADMIN — NYSTATIN CREAM: 100000 CREAM TOPICAL at 17:18

## 2024-10-02 RX ADMIN — ESCITALOPRAM OXALATE 5 MG: 10 TABLET ORAL at 08:51

## 2024-10-02 RX ADMIN — SODIUM CHLORIDE 2 G: 1 TABLET ORAL at 17:17

## 2024-10-02 RX ADMIN — FUROSEMIDE 20 MG: 10 INJECTION, SOLUTION INTRAMUSCULAR; INTRAVENOUS at 08:51

## 2024-10-02 RX ADMIN — ONDANSETRON 4 MG: 4 TABLET, ORALLY DISINTEGRATING ORAL at 17:17

## 2024-10-02 RX ADMIN — CYPROHEPTADINE HYDROCHLORIDE 2 MG: 4 TABLET ORAL at 22:40

## 2024-10-02 RX ADMIN — POTASSIUM CHLORIDE 20 MEQ: 1500 TABLET, EXTENDED RELEASE ORAL at 08:50

## 2024-10-02 RX ADMIN — HEPARIN SODIUM 5000 UNITS: 5000 INJECTION INTRAVENOUS; SUBCUTANEOUS at 22:40

## 2024-10-02 RX ADMIN — SODIUM CHLORIDE 2 G: 1 TABLET ORAL at 12:20

## 2024-10-02 RX ADMIN — CHLOROPROCAINE HYDROCHLORIDE 10 ML: 30 INJECTION, SOLUTION EPIDURAL; INFILTRATION; INTRACAUDAL; PERINEURAL at 15:43

## 2024-10-02 RX ADMIN — CYPROHEPTADINE HYDROCHLORIDE 2 MG: 4 TABLET ORAL at 12:21

## 2024-10-02 RX ADMIN — ONDANSETRON 4 MG: 4 TABLET, ORALLY DISINTEGRATING ORAL at 22:40

## 2024-10-02 RX ADMIN — ONDANSETRON 4 MG: 4 TABLET, ORALLY DISINTEGRATING ORAL at 12:21

## 2024-10-02 RX ADMIN — LOPERAMIDE HYDROCHLORIDE 2 MG: 2 CAPSULE ORAL at 18:33

## 2024-10-02 RX ADMIN — CYPROHEPTADINE HYDROCHLORIDE 2 MG: 4 TABLET ORAL at 17:18

## 2024-10-02 RX ADMIN — NYSTATIN CREAM: 100000 CREAM TOPICAL at 10:56

## 2024-10-02 RX ADMIN — SODIUM CHLORIDE 2 G: 1 TABLET ORAL at 08:50

## 2024-10-02 RX ADMIN — IBUPROFEN 200 MG: 200 TABLET, FILM COATED ORAL at 05:53

## 2024-10-02 RX ADMIN — HEPARIN SODIUM 5000 UNITS: 5000 INJECTION INTRAVENOUS; SUBCUTANEOUS at 13:40

## 2024-10-02 RX ADMIN — CYPROHEPTADINE HYDROCHLORIDE 2 MG: 4 TABLET ORAL at 08:52

## 2024-10-02 RX ADMIN — OXYCODONE HYDROCHLORIDE AND ACETAMINOPHEN 500 MG: 500 TABLET ORAL at 08:51

## 2024-10-02 RX ADMIN — CHLOROPROCAINE HYDROCHLORIDE 10 ML: 30 INJECTION, SOLUTION EPIDURAL; INFILTRATION; INTRACAUDAL; PERINEURAL at 15:42

## 2024-10-02 RX ADMIN — ONDANSETRON 4 MG: 4 TABLET, ORALLY DISINTEGRATING ORAL at 00:37

## 2024-10-02 NOTE — PROGRESS NOTES
Patient:    MRN:  [id number]    Crystal Request ID:  [id number]    Level of care reserved:  Home Health Agency    Partner Reserved:  [facility], [place] [address] [phone]    Clinical needs requested:    Geography searched:  [phone]    Start of Service:    Request sent:  11:51am EDT on 10/2/2024 by Shaista Driscoll    Partner reserved:  1:38pm EDT on 10/2/2024 by Shaista Driscoll    Choice list shared:  1:38pm EDT on 10/2/2024 by Shaista Driscoll

## 2024-10-02 NOTE — ASSESSMENT & PLAN NOTE
Patient was tachycardic at palliative care appointment this morning as per the daughter ranging in the 140s  On admission her heart rate was 114  She denies any symptoms such as palpitations, chest pain, lightheadedness, or dizziness  Could be due to pleural effusion    Plan:  Resolved since admission with current heart rate at 90  Continue to monitor for changes

## 2024-10-02 NOTE — CASE MANAGEMENT
Case Management Assessment & Discharge Planning Note    Patient name Brigid Brown  Location S /S -01 MRN [id number]  : 1945 Date 10/2/2024       Current Admission Date: 10/1/2024  Current Admission Diagnosis:Pleural effusion   Patient Active Problem List    Diagnosis Date Noted Date Diagnosed    Dysgeusia 10/01/2024     Hypoalbuminemia 10/01/2024     Pleural effusion 10/01/2024     Tachycardia 10/01/2024     Lower extremity edema 10/01/2024     Leg swelling 2024     Cancer of mesentery (HCC) 2024     Frailty syndrome in geriatric patient 09/10/2024     Ambulatory dysfunction 09/10/2024     Acute post-operative pain 09/10/2024     Anxiety and depression 09/10/2024     At risk for delirium 09/10/2024     No impairment of memory 09/10/2024     Palliative care by specialist 09/10/2024     Goals of care, counseling/discussion 09/10/2024     Nausea 09/10/2024     Abdominal distension 09/10/2024     Hyponatremia 2024     Encounter for geriatric assessment 2024     Electrolyte abnormality 2024     Mesenteric mass 2024     SOB (shortness of breath) 2024     Gastritis 2024     Weight loss 05/15/2024     Mitral regurgitation 2024     Sprain of medial collateral ligament of left knee, initial encounter 2024     Age-related osteoporosis without current pathological fracture 2024     Vaginal atrophy 2022     Lactose intolerance 2022     Severe protein-calorie malnutrition (HCC) 2022     Pigmented purpura (HCC) 2022     Chronic kidney disease (CKD) stage G3a/A2, moderately decreased glomerular filtration rate (GFR) between 45-59 mL/min/1.73 square meter and albuminuria creatinine ratio between  mg/g (HCC) 2022     Dizziness 2022     Reflux laryngitis 2021     TMJ dysfunction 2021     Myofascial pain 2021     Tongue pain 2021     Pain in right lumbar region of back 2021      Abdominal pain 06/08/2021     Urinary symptom or sign 06/08/2021     Right hip pain 06/08/2021     Fatigue 06/08/2021     Gastroesophageal reflux disease 02/26/2021     Dysphonia 02/26/2021     Transient alteration of awareness 01/17/2021     Symptoms of cerebrovascular accident (CVA) 01/16/2021     IBS (irritable bowel syndrome) 01/16/2021     Grief 01/16/2021     Tinnitus of both ears 06/05/2020     Sprain of anterior talofibular ligament of left ankle 09/03/2019     Sensorineural hearing loss (SNHL) of both ears 06/24/2019     Hashimoto's disease 10/25/2018     History of vitamin D deficiency 10/25/2018     Episodic confusion 03/12/2018     Basal cell carcinoma of right temple region 03/05/2018     Near syncope 02/26/2018     Screening for blood or protein in urine 02/23/2018     History of skin cancer 02/23/2018     Subclinical hypothyroidism 11/28/2017     Changing skin lesion 06/27/2017     Seborrheic keratosis 04/28/2014     Hypothyroidism 05/23/2012     Allergic rhinitis 05/17/2012       LOS (days): 1  Geometric Mean LOS (GMLOS) (days):   Days to GMLOS:     OBJECTIVE:  PATIENT READMITTED TO HOSPITAL  Risk of Unplanned Readmission Score: 21.15         Current admission status: Inpatient       Preferred Pharmacy:   [id number] [place] [address] [address]  Phone: [phone] Fax: [phone]    Primary Care Provider: Delicia Cintron DO    Primary Insurance: MEDICARE  Secondary Insurance: AARP    ASSESSMENT:  Active Health Care Proxies       Hunter Brown Health Care Representative - Spouse   Primary Phone: [phone] (Mobile)  Home Phone: [phone]                           Readmission Root Cause  30 Day Readmission: Yes  During your hospital stay, did someone (provider, nurse, ) explain your care to you in a way you could understand?: Yes  Did you feel medically stable to leave the hospital?: Yes  Were you able to pay for your  medication at the pharmacy?: Yes  Did you have reliable transportation to take you to your appointments?: Yes  During previous admission, was a post-acute recommendation made?: Yes  What post-acute resources were offered?: [facility]  Patient was readmitted due to: PE  Action Plan: IV lasix    Patient Information  Admitted from:: Home  Mental Status: Alert  During Assessment patient was accompanied by: Spouse  Assessment information provided by:: Patient, Spouse  Primary Caregiver: Self  Support Systems: Self, Spouse/significant other, Children  County of Residence: [place]  What city do you live in?: [place]  Home entry access options. Select all that apply.: Stairs  Number of steps to enter home.: 1  Type of Current Residence: [facility]  Living Arrangements: Lives w/ Spouse/significant other    Activities of Daily Living Prior to Admission  Functional Status: Independent  Completes ADLs independently?: Yes  Ambulates independently?: Yes  Does patient use assisted devices?: Yes  Assisted Devices (DME) used: Straight Cane, Shower Chair, Walker  Does patient have a history of HHC?: Yes  Does patient currently have HHC?: Yes    Current Home Health Care  Type of Current Home Care Services: Home PT, Home OT, Nurse visit  Home Health Agency Name:: [facility]  Current Home Health Follow-Up Provider:: PCP    Patient Information Continued  Income Source: Pension/senior care  Does patient have prescription coverage?: Yes  Does patient receive dialysis treatments?: No  Does patient have a history of substance abuse?: No  Does patient have a history of Mental Health Diagnosis?: Yes (Anxiety, depression)  Is patient receiving treatment for mental health?: Yes  Has patient received inpatient treatment related to mental health in the last 2 years?: No         Means of Transportation  Means of Transport to [facility] [place]:: Family transport      Social Determinants of Health (SDOH)      Flowsheet Row Most Recent Value   Housing Stability    In  the last 12 months, was there a time when you were not able to pay the mortgage or rent on time? N   In the past 12 months, how many times have you moved where you were living? 0   At any time in the past 12 months, were you homeless or living in a shelter (including now)? N   Transportation Needs    In the past 12 months, has lack of transportation kept you from medical appointments or from getting medications? no   In the past 12 months, has lack of transportation kept you from meetings, work, or from getting things needed for daily living? No   Food Insecurity    Within the past 12 months, you worried that your food would run out before you got the money to buy more. Never true   Within the past 12 months, the food you bought just didn't last and you didn't have money to get more. Never true   Utilities    In the past 12 months has the electric, gas, oil, or water company threatened to shut off services in your home? No            DISCHARGE DETAILS:    Discharge planning discussed with:: Patient, spouse  Freedom of Choice: Yes  Comments - Freedom of Choice: Pt reported preference of returning home with [facility]  CM contacted family/caregiver?: Yes (Spouse present at bedside)  Were Treatment Team discharge recommendations reviewed with patient/caregiver?: Yes  Did patient/caregiver verbalize understanding of patient care needs?: Yes  Were patient/caregiver advised of the risks associated with not following Treatment Team discharge recommendations?: Yes    Contacts  Patient Contacts: Patient, spouse  Contact Method: In Person  Reason/Outcome: Continuity of Care, Emergency Contact, Discharge Planning, Referral    Requested Home Health Care         Is the patient interested in HHC at discharge?: Yes  Home Health Discipline requested:: Nursing, Occupational Therapy, Physical Therapy  Home Health Agency Name:: [facility]  Home Health Follow-Up Provider:: PCP  Home Health Services Needed:: Evaluate Functional  Status and Safety, Gait/ADL Training, Strengthening/Theraputic Exercises to Improve Function  Homebound Criteria Met:: Requires the Assistance of Another Person for Safe Ambulation or to Leave the Home, Uses an Assist Device (i.e. cane, walker, etc)  Supporting Clincal Findings:: Limited Endurance, Fatigues Easliy in Short Distances         Other Referral/Resources/Interventions Provided:  Interventions: Other (Specify), [facility]  Referral Comments: Pt noted to be a 30 day readmission. CM spoke with pt and spouse at bedside, introduced self and role with discharge planning. Pt reported she lvies with spouse in a ranch style home with 1 [address]. Pt reported being independent with ADLs and functional mobility. Pt reported she has a cane, RW, and shower chair. Pt reported preference is to return home with [facility]. CM sent referral via [employer]. Pt reported preferred pharmacy is [facility]. Pt reported spouse assists with transportation.    Would you like to participate in our [facility] service program?  : No - Declined       Discharge Destination Plan:: Home with Home Health Care

## 2024-10-02 NOTE — PLAN OF CARE
Problem: PAIN - ADULT  Goal: Verbalizes/displays adequate comfort level or baseline comfort level  Description: Interventions:  - Encourage patient to monitor pain and request assistance  - Assess pain using appropriate pain scale  - Administer analgesics based on type and severity of pain and evaluate response  - Implement non-pharmacological measures as appropriate and evaluate response  - Consider cultural and social influences on pain and pain management  - Notify physician/advanced practitioner if interventions unsuccessful or patient reports new pain  Outcome: Progressing     Problem: INFECTION - ADULT  Goal: Absence or prevention of progression during hospitalization  Description: INTERVENTIONS:  - Assess and monitor for signs and symptoms of infection  - Monitor lab/diagnostic results  - Monitor all insertion sites, i.e. indwelling lines, tubes, and drains  - Monitor endotracheal if appropriate and nasal secretions for changes in amount and color  - Union appropriate cooling/warming therapies per order  - Administer medications as ordered  - Instruct and encourage patient and family to use good hand hygiene technique  - Identify and instruct in appropriate isolation precautions for identified infection/condition  Outcome: Progressing  Goal: Absence of fever/infection during neutropenic period  Description: INTERVENTIONS:  - Monitor WBC    Outcome: Progressing     Problem: SAFETY ADULT  Goal: Patient will remain free of falls  Description: INTERVENTIONS:  - Educate patient/family on patient safety including physical limitations  - Instruct patient to call for assistance with activity   - Consult OT/PT to assist with strengthening/mobility   - Keep Call bell within reach  - Keep bed low and locked with side rails adjusted as appropriate  - Keep care items and personal belongings within reach  - Initiate and maintain comfort rounds  - Make Fall Risk Sign visible to staff  - Obtain necessary fall risk  management equipment  - Apply yellow socks and bracelet for high fall risk patients  - Consider moving patient to room near nurses station  Outcome: Progressing  Goal: Maintain or return to baseline ADL function  Description: INTERVENTIONS:  -  Assess patient's ability to carry out ADLs; assess patient's baseline for ADL function and identify physical deficits which impact ability to perform ADLs (bathing, care of mouth/teeth, toileting, grooming, dressing, etc.)  - Assess/evaluate cause of self-care deficits   - Assess range of motion  - Assess patient's mobility; develop plan if impaired  - Assess patient's need for assistive devices and provide as appropriate  - Encourage maximum independence but intervene and supervise when necessary  - Involve family in performance of ADLs  - Assess for home care needs following discharge   - Consider OT consult to assist with ADL evaluation and planning for discharge  - Provide patient education as appropriate  Outcome: Progressing  Goal: Maintains/Returns to pre admission functional level  Description: INTERVENTIONS:  - Perform AM-PAC 6 Click Basic Mobility/ Daily Activity assessment daily.  - Set and communicate daily mobility goal to care team and patient/family/caregiver.   - Collaborate with rehabilitation services on mobility goals if consulted  - Out of bed for toileting  - Record patient progress and toleration of activity level   Outcome: Progressing     Problem: DISCHARGE PLANNING  Goal: Discharge to home or other facility with appropriate resources  Description: INTERVENTIONS:  - Identify barriers to discharge w/patient and caregiver  - Arrange for needed discharge resources and transportation as appropriate  - Identify discharge learning needs (meds, wound care, etc.)  - Arrange for interpretive services to assist at discharge as needed  - Refer to Case Management Department for coordinating discharge planning if the patient needs post-hospital services based on  physician/advanced practitioner order or complex needs related to functional status, cognitive ability, or social support system  Outcome: Progressing     Problem: Knowledge Deficit  Goal: Patient/family/caregiver demonstrates understanding of disease process, treatment plan, medications, and discharge instructions  Description: Complete learning assessment and assess knowledge base.  Interventions:  - Provide teaching at level of understanding  - Provide teaching via preferred learning methods  Outcome: Progressing     Problem: Potential for Falls  Goal: Patient will remain free of falls  Description: INTERVENTIONS:  - Educate patient/family on patient safety including physical limitations  - Instruct patient to call for assistance with activity   - Consult OT/PT to assist with strengthening/mobility   - Keep Call bell within reach  - Keep bed low and locked with side rails adjusted as appropriate  - Keep care items and personal belongings within reach  - Initiate and maintain comfort rounds  - Make Fall Risk Sign visible to staff  - Obtain necessary fall risk management equipment  - Apply yellow socks and bracelet for high fall risk patients  - Consider moving patient to room near nurses station  Outcome: Progressing

## 2024-10-02 NOTE — QUICK NOTE
Assessment and plan   Bilateral pleural effusions: continue IV lasix 20, pt agreeable to diagnostic and therapeutic thoracentesis, f/u echo   Hyponatremia: 2g salt tablets TID, 1.8L fluid restriction   Bilateral lower extremity edema: Continue IV lasix 20  Low albumin: f/u UA, urine protein/cr ratio    Family contact:  updated bedside    Subjective  Patient examined at bedside and reports feeling okay.  He is currently satting at 94% on room air and denies shortness of breath or difficulty breathing.  She reports no other symptoms.    PE:  Constitutional: Appears cachectic.  CV: Rate and rhythm regular.  Heart sounds and pulses normal.  Pulmonary: No respiratory distress, decreased lung sounds at bases.  Abdomen: No distention or tenderness.  MSK: Bilateral lower extremity 2+ pitting edema to mid shins.

## 2024-10-02 NOTE — ASSESSMENT & PLAN NOTE
Albumin 2.9 from labs reviewed from 9/9/2024  A potential contributing factor to patient's third spacing / edema to lower extremities and abdomen.

## 2024-10-02 NOTE — SEDATION DOCUMENTATION
Bilateral thoracentesis completed by Dr. Infante. 550 ml clear yellow fluid removed from the right, 600 ml estrada clear fluid from the left. Specimens sent to lab. Band-aids to sites. Patient tolerated well.

## 2024-10-02 NOTE — ASSESSMENT & PLAN NOTE
Presented with increased lower extremity edema, was referred to hospital for further management by palliative care physician during appointment today  Currently takes salt tablets at home    Plan:  Continue with IV Lasix

## 2024-10-02 NOTE — H&P
H&P - Hospitalist   Name: Brigid Brown 79 y.o. female I MRN: [id number]  Unit/Bed#: S -01 I Date of Admission: 10/1/2024   Date of Service: 10/1/2024 I Hospital Day: 0     Assessment & Plan  Pleural effusion  Imaging on admission revealed evidence of large bilateral pleural effusions  Patient denied any symptoms of shortness of breath, cough, or chest pain  Currently saturating well on room air  She has no history of lung disease such as COPD or asthma  Unknown how long pleural effusions present for    Plan:  Given 2 doses IV Lasix 20 mg since admission  Continue IV Lasix 20 mg once daily  Monitor for respiratory decline  Hyponatremia  Patient has chronic hyponatremia  She follows with outpatient nephrology  Treated with fluid restriction and salt tablets at home    Plan:  1800 mL fluid restriction  Continue home salt tablet regimen     - two 1 g salt tablets 3 times daily  Monitor daily BMP  Tachycardia  Patient was tachycardic at palliative care appointment this morning as per the daughter ranging in the 140s  On admission her heart rate was 114  She denies any symptoms such as palpitations, chest pain, lightheadedness, or dizziness  Could be due to pleural effusion    Plan:  Resolved since admission with current heart rate at 90  Continue to monitor for changes  Lower extremity edema  Presented with increased lower extremity edema, was referred to hospital for further management by palliative care physician during appointment today  Currently takes salt tablets at home    Plan:  Continue with IV Lasix      VTE Pharmacologic Prophylaxis: VTE Score: 6 High Risk (Score >/= 5) - Pharmacological DVT Prophylaxis Ordered: heparin. Sequential Compression Devices Ordered.  Code Status: Level 1 - Full Code   Discussion with family: Updated  (daughter) at bedside.    Anticipated Length of Stay: Patient will be admitted on an inpatient basis with an anticipated length of stay of greater than 2 midnights  secondary to pleural effusion.    History of Present Illness   Chief Complaint: Leg swelling      Brigid Brown is a 79 y.o. female with a PMH of mesenteric cancer, hyponatremia, IBS who presents with increased leg swelling.  Patient was at their scheduled palliative care appointment this morning where he was found that she was tachycardic in the 140s, and exhibited bilateral lower extremity edema.  Patient denied experiencing any symptoms including shortness of breath, chest pain, lightheadedness, or dizziness however she just indicated that she was feeling little more fatigued lately.  The leg swelling has been progressive for the last few weeks starting around September 6th when she had her exploratory laparotomy with biopsy of her mesenteric mass. She has also noticed a mild increase in abdominal swelling.  She denies any history of heart failure.    She denies any chest pains or palpitations.  She denies any abdominal pain, nausea, vomiting, she has diarrhea however this appears to be chronic for her due to her IBS.  Both the patient and the daughter do not indicate any change in the patient's mentation.  She denies any dysuria, or change in urinary frequency.    Review of Systems   Constitutional:  Positive for fatigue. Negative for chills and fever.   HENT:  Negative for congestion, rhinorrhea and sinus pressure.    Eyes:  Negative for photophobia and visual disturbance.   Respiratory:  Negative for chest tightness and shortness of breath.    Cardiovascular:  Positive for leg swelling. Negative for chest pain and palpitations.   Gastrointestinal:  Positive for diarrhea. Negative for abdominal pain, nausea and vomiting.   Endocrine: Negative for polyphagia and polyuria.   Genitourinary:  Negative for dysuria and urgency.   Musculoskeletal:  Negative for arthralgias and myalgias.   Neurological:  Negative for dizziness, weakness, numbness and headaches.   Psychiatric/Behavioral:  Negative for behavioral  problems and confusion.        Historical Information   Past Medical History:   Diagnosis Date    Acne     Basal cell carcinoma 06/08/2020    right lower forehead    BCC (basal cell carcinoma of skin) 03/09/2020    mid forehead    Benign neoplasm of skin     Cancer (HCC)     mesenteric cancer    Disease of thyroid gland 2006    Essential hypertension 02/26/2018    GERD (gastroesophageal reflux disease)     Hypertension     Inflamed seborrheic keratosis     Irritable bowel syndrome     Malignant neoplasm of skin of face     Migraines     Nonmelanoma skin cancer     Last Assessed:6/27/17    Squamous cell skin cancer 06/08/2020    In situ, left lower forehead    Tachycardia     Telogen effluvium     Temporomandibular joint disorder     Vertigo      Past Surgical History:   Procedure Laterality Date    ADENOIDECTOMY      APPENDECTOMY      CHOLECYSTECTOMY      COLONOSCOPY  05/03/2019    COMPLEX WOUND CLOSURE TO EXTREMITY N/A 7/28/2020    Procedure: COMPLEX CLOSURE MID FOREHEAD;  Surgeon: Randy Frank MD;  Location: [facility];  Service: Plastics    ESOPHAGOGASTRODUODENOSCOPY  2009    FLAP LOCAL HEAD / NECK N/A 7/28/2020    Procedure: FLAP MID FOREHEAD;  Surgeon: Randy Frank MD;  Location: [facility];  Service: Plastics    HYSTERECTOMY  1987    LYMPH NODE DISSECTION N/A 9/6/2024    Procedure: BIOPSY ABDOMINAL MASS, LYMPHOMA PROTOCOL;  Surgeon: Angus Drew MD;  Location: [facility];  Service: Surgical Oncology    MALIGNANT SKIN LESION EXCISION      Excision of Lesion Face Malignant-9/14/2004 BCC Forehead    MOHS RECONSTRUCTION N/A 7/28/2020    Procedure: RECONSTRUCTION MOHS DEFECT MID FOREHEAD;  Surgeon: Randy Frank MD;  Location: [facility];  Service: Plastics    MOHS SURGERY  07/27/2020    Right &left lower forehead, mid forehead    OOPHORECTOMY Bilateral 1987    ROTATOR CUFF REPAIR Right     SKIN BIOPSY      TONSILLECTOMY      TUBAL LIGATION  1976?     Social History     Tobacco Use     Smoking status: Never    Smokeless tobacco: Never   Vaping Use    Vaping status: Never Used   Substance and Sexual Activity    Alcohol use: Not Currently    Drug use: Never    Sexual activity: Not Currently     Partners: Male     Birth control/protection: Post-menopausal     E-Cigarette/Vaping    E-Cigarette Use Never User      E-Cigarette/Vaping Substances    Nicotine No     THC No     CBD No     Flavoring No     Other No     Unknown No      Family history non-contributory  Social History:  Marital Status: /Civil Union   Occupation: Retired  Patient Pre-hospital Living Situation: Home  Patient Pre-hospital Level of Mobility: walks with walker  Patient Pre-hospital Diet Restrictions: Fluid restriction    Objective :  Temp:  [95.9 °F (35.5 °C)-97.8 °F (36.6 °C)] 97.2 °F (36.2 °C)  HR:  [] 90  BP: ()/(68-89) 123/76  Resp:  [16-18] 18  SpO2:  [92 %-98 %] 95 %  O2 Device: None (Room air)    Physical Exam  Constitutional:       General: She is not in acute distress.     Appearance: Normal appearance.   HENT:      Mouth/Throat:      Mouth: Mucous membranes are moist.      Pharynx: Oropharynx is clear.   Cardiovascular:      Rate and Rhythm: Normal rate and regular rhythm.      Pulses: Normal pulses.      Heart sounds: Normal heart sounds. No murmur heard.  Pulmonary:      Effort: Pulmonary effort is normal. No respiratory distress.      Breath sounds: Decreased breath sounds present. No wheezing.   Abdominal:      General: Bowel sounds are normal. There is distension.      Palpations: Abdomen is soft. There is no mass.      Tenderness: There is no abdominal tenderness. There is no guarding.   Musculoskeletal:      Right lower leg: Edema present.      Left lower leg: Edema present.      Comments: +2 pitting edema bilaterally   Skin:     General: Skin is warm and dry.      Capillary Refill: Capillary refill takes less than 2 seconds.   Neurological:      Mental Status: She is oriented to person, place,  and time.      Sensory: No sensory deficit.      Motor: No weakness.   Psychiatric:         Mood and Affect: Mood normal.         Behavior: Behavior normal.         Lines/Drains:            Lab Results: I have reviewed the following results:  Results from last 7 days   Lab Units 10/01/24  1433   WBC Thousand/uL 7.05   HEMOGLOBIN g/dL 12.0   HEMATOCRIT % 37.0   PLATELETS Thousands/uL 319   SEGS PCT % 79*   LYMPHO PCT % 12*   MONO PCT % 6   EOS PCT % 2     Results from last 7 days   Lab Units 10/01/24  1433   SODIUM mmol/L 129*   POTASSIUM mmol/L 4.3   CHLORIDE mmol/L 100   CO2 mmol/L 24   BUN mg/dL 12   CREATININE mg/dL 0.81   ANION GAP mmol/L 5   CALCIUM mg/dL 8.4   ALBUMIN g/dL 2.9*   TOTAL BILIRUBIN mg/dL 0.36   ALK PHOS U/L 57   ALT U/L 13   AST U/L 22   GLUCOSE RANDOM mg/dL 96             Lab Results   Component Value Date    HGBA1C 5.6 05/20/2024    HGBA1C 5.3 01/17/2021           Imaging Results Review: I reviewed radiology reports from this admission including: Chest x-ray and CT chest.  Other Study Results Review: No additional pertinent studies reviewed.    Administrative Statements   I have spent a total time of 45 minutes in caring for this patient on the day of the visit/encounter including Diagnostic results, Prognosis, Risks and benefits of tx options, Instructions for management, Patient and family education, Importance of tx compliance, Risk factor reductions, Impressions, Counseling / Coordination of care, Documenting in the medical record, Reviewing / ordering tests, medicine, procedures  , Obtaining or reviewing history  , and Communicating with other healthcare professionals .    ** Please Note: This note has been constructed using a voice recognition system. **

## 2024-10-02 NOTE — ASSESSMENT & PLAN NOTE
We talked about options to help with dry mouth, discussed trial of home remedies as below:  1) can trial mints, lozenges, gums to help maintain salivary production  2) discussed the options of nonalcohol containing mouthwash/rinse, including Biotene spray/mouthwash    This is also a potential component that contributing to her lack of appetite as well.    Discussed options for appetite stimulation including steroids, however, patient endorses adverse effects with steroids in the past with recorded hypertension/tachycardia.

## 2024-10-02 NOTE — BRIEF OP NOTE (RAD/CATH)
INTERVENTIONAL RADIOLOGY PROCEDURE NOTE    Date: 10/2/2024    Procedure: IR THORACENTESIS     Preoperative diagnosis:   1. Pleural effusion    2. Bilateral lower extremity edema    3. Fluid overload    4. Hyponatremia    5. Colon cancer (HCC)         Postoperative diagnosis: Same.    Surgeon: Glenna Infante MD     Assistant: None. No qualified resident was available.    Blood loss: Trace    Specimens: Sent to the lab as requested    Findings: Bilateral ultrasound-guided thoracentesis performed.    On the right, 450 clear light yellow fluid was removed.  On the left, 600 darker yellow fluid was removed.    Complications: None immediate.    Anesthesia: local

## 2024-10-02 NOTE — ASSESSMENT & PLAN NOTE
Patient has chronic hyponatremia  She follows with outpatient nephrology  Treated with fluid restriction and salt tablets at home    Plan:  1800 mL fluid restriction  Continue home salt tablet regimen     - two 1 g salt tablets 3 times daily  Monitor daily BMP

## 2024-10-02 NOTE — MALNUTRITION/BMI
This medical record reflects one or more clinical indicators suggestive of malnutrition and/or morbid obesity.    Malnutrition Findings:   Adult Malnutrition type: Chronic illness  Adult Degree of Malnutrition: Other severe protein calorie malnutrition  Malnutrition Characteristics: Muscle loss, Inadequate energy, Fluid accumulation              360 Statement: Protein calorie malnutrition r/t catabolic illness as evidenced by <75% energy intake compared to estimated energy needs > 1 month, severe muscle wasting present to clavicle/temples, and severe fluid accumulation; currently treated with high kcal diet education. Pt declines oral nutrition supplements.    BMI Findings:           Body mass index is 19.4 kg/m².     See Nutrition note dated 10/2/2024  for additional details.  Completed nutrition assessment is viewable in the nutrition documentation.

## 2024-10-02 NOTE — ASSESSMENT & PLAN NOTE
Goals - Ongoing discussion. Pending discussion with Oncology regarding potential options.    Options reviewed today including discussion regarding Comfort Care/ Hospice if this is something patient would like to consider.

## 2024-10-02 NOTE — PATIENT INSTRUCTIONS
It was a pleasure speaking with you today.    As discussed:  -Continue your medications as prescribed.  -Continue with a bowel regimen to avoid constipation.    Follow-up in: 4 weeks or sooner     Please do not hesitate to call me sooner should you have any questions/concerns or needs.    Medication safety issues - Do not drive under the influence of narcotics (including opioids), watch for adverse effects including confusion / altered mental status / respiratory depression (slowed breathing), keep medications stored in a safe/locked environment, do not use alcohol while opioids or other narcotics are in your system. Do not travel with more than the minimum number of tablets or capsules required for the trip.    ER Precautions  Watch for red flag symptoms including, but not limited to fevers, chills, chest pain, shortness of breath, intractable nausea/vomiting/diarrhea, or acute intractable pain (especially if pain is new or has changed).

## 2024-10-02 NOTE — ASSESSMENT & PLAN NOTE
Imaging on admission revealed evidence of large bilateral pleural effusions  Patient denied any symptoms of shortness of breath, cough, or chest pain  Currently saturating well on room air  She has no history of lung disease such as COPD or asthma  Unknown how long pleural effusions present for    Plan:  Given 2 doses IV Lasix 20 mg since admission  Continue IV Lasix 20 mg once daily  Monitor for respiratory decline

## 2024-10-02 NOTE — PROGRESS NOTES
Progress Note - Hospitalist   Name: Brigid Brown 79 y.o. female I MRN: [id number]  Unit/Bed#: S -01 I Date of Admission: 10/1/2024   Date of Service: 10/2/2024 I Hospital Day: 1  { ?Quick Links I Problem List I PORCH I Billing Tip:86878}  Assessment & Plan  Pleural effusion  Imaging on admission revealed evidence of large bilateral pleural effusions  Patient denied any symptoms of shortness of breath, cough, or chest pain  Currently saturating well on room air  She has no history of lung disease such as COPD or asthma  Unknown how long pleural effusions present for    Plan:  Given 2 doses IV Lasix 20 mg since admission  Continue IV Lasix 20 mg once daily  Monitor for respiratory decline  Hyponatremia  Patient has chronic hyponatremia  She follows with outpatient nephrology  Treated with fluid restriction and salt tablets at home    Plan:  1800 mL fluid restriction  Continue home salt tablet regimen     - two 1 g salt tablets 3 times daily  Monitor daily BMP  Tachycardia  Patient was tachycardic at palliative care appointment this morning as per the daughter ranging in the 140s  On admission her heart rate was 114  She denies any symptoms such as palpitations, chest pain, lightheadedness, or dizziness  Could be due to pleural effusion    Plan:  Resolved since admission with current heart rate at 90  Continue to monitor for changes  Lower extremity edema  Presented with increased lower extremity edema, was referred to hospital for further management by palliative care physician during appointment today  Currently takes salt tablets at home    Plan:  Continue with IV Lasix    VTE Pharmacologic Prophylaxis: VTE Score: 6 High Risk (Score >/= 5) - Pharmacological DVT Prophylaxis Ordered: heparin. Sequential Compression Devices Ordered.    Mobility:      JH-HLM Goal NOT achieved. Continue with multidisciplinary rounding and encourage appropriate mobility to improve upon JH-HLM goals.    Patient Centered Rounds: I  performed bedside rounds with nursing staff today.   Discussions with Specialists or Other Care Team Provider: Yes, palliative care and nephrology    Education and Discussions with Family / Patient: {Family Communication:45857}    Current Length of Stay: 1 day(s)  Current Patient Status: Inpatient   Certification Statement: The patient will continue to require additional inpatient hospital stay due to bilateral pleural effusions and lower extremity edema  Discharge Plan: Anticipate discharge in 48-72 hrs to discharge location to be determined pending rehab evaluations.    Code Status: Level 1 - Full Code    Subjective   Ms. Brown is observed resting in bed on RA comfortably. No acute overnight events were reported. Patient appears cachetic with palpable ribs and visible pulsation of heart at PMI. Patient frequently falling asleep throughout examination requiring multiple awakenings to answer questions. Patient states she has been urinating more often with associated dysuria. Patient cannot remember when her last bowel movement was but states it has been a few days. Patient denies fever, chills, SOB, chest pain, n/v/c/d.     Objective :{?Quick Links I ICU Summary I Vitals I I/Os I LDAs I Mobility (PT/OT) I Code Status / ACP   ?Quick Links I Active Meds I Pain Meds I Antibiotics I Anticoagulants:79619}  Temp:  [95.9 °F (35.5 °C)-98.2 °F (36.8 °C)] 97.6 °F (36.4 °C)  HR:  [] 89  BP: ()/(68-89) 121/77  Resp:  [16-20] 20  SpO2:  [92 %-98 %] 93 %  O2 Device: None (Room air)    There is no height or weight on file to calculate BMI.     Input and Output Summary (last 24 hours):     Intake/Output Summary (Last 24 hours) at 10/2/2024 0850  Last data filed at 10/2/2024 0401  Gross per 24 hour   Intake 240 ml   Output 1670 ml   Net -1430 ml       Physical Exam  Vitals reviewed.   Constitutional:       Appearance: She is ill-appearing.      Comments: Cachetic appearing   Eyes:      Extraocular Movements: Extraocular  movements intact.      Conjunctiva/sclera: Conjunctivae normal.   Cardiovascular:      Rate and Rhythm: Normal rate and regular rhythm.      Pulses: Normal pulses.      Heart sounds: Normal heart sounds. No murmur heard.     No gallop.   Pulmonary:      Effort: Pulmonary effort is normal. No respiratory distress.      Comments: Decreased breath sounds bilaterally at base of lungs  Abdominal:      General: Bowel sounds are normal. There is no distension.      Palpations: Abdomen is soft.      Tenderness: There is no abdominal tenderness. There is no guarding.   Musculoskeletal:         General: Normal range of motion.      Cervical back: Normal range of motion.      Right lower leg: Edema (2+ pitting) present.      Left lower leg: Edema (2+ pitting) present.   Neurological:      Mental Status: She is oriented to person, place, and time. Mental status is at baseline.           {?Quick Links I Lab Review I Micro Results I Radiology I Cardiology:63475}  Lab Results: I have reviewed the following results:   Results from last 7 days   Lab Units 10/02/24  0537   WBC Thousand/uL 4.95   HEMOGLOBIN g/dL 11.1*   HEMATOCRIT % 34.7*   PLATELETS Thousands/uL 344   SEGS PCT % 73   LYMPHO PCT % 16   MONO PCT % 7   EOS PCT % 3     Results from last 7 days   Lab Units 10/02/24  0537 10/01/24  1433   SODIUM mmol/L 130* 129*   POTASSIUM mmol/L 4.0 4.3   CHLORIDE mmol/L 98 100   CO2 mmol/L 26 24   BUN mg/dL 12 12   CREATININE mg/dL 0.91 0.81   ANION GAP mmol/L 6 5   CALCIUM mg/dL 8.3* 8.4   ALBUMIN g/dL  --  2.9*   TOTAL BILIRUBIN mg/dL  --  0.36   ALK PHOS U/L  --  57   ALT U/L  --  13   AST U/L  --  22   GLUCOSE RANDOM mg/dL 80 96                       Recent Cultures (last 7 days):         Imaging Results Review: I reviewed radiology reports from this admission including: chest xray and CT A ed chest pe study.  Other Study Results Review: EKG was reviewed.     Last 24 Hours Medication List:     Current Facility-Administered  Medications:     Artificial Tears Op Soln 1 drop, Q6H PRN    ascorbic acid (VITAMIN C) tablet 500 mg, Daily    colesevelam (WELCHOL) tablet 1,875 mg, BID With Meals    cyproheptadine (PERIACTIN) tablet 2 mg, 4x Daily    escitalopram (LEXAPRO) tablet 5 mg, Daily    furosemide (LASIX) injection 20 mg, Daily    heparin (porcine) subcutaneous injection 5,000 Units, Q8H IDALMIS **AND** Platelet count, Once    ibuprofen (MOTRIN) tablet 200 mg, Q6H PRN    nystatin (MYCOSTATIN) cream, BID    ondansetron (ZOFRAN-ODT) dispersible tablet 4 mg, Q6H IDALMIS    potassium chloride (Klor-Con M20) CR tablet 20 mEq, Daily    sodium chloride tablet 2 g, TID With Meals    Administrative Statements   Today, Patient Was Seen By: Julio Lynch  {Time Spent (Optional):15345}    **Please Note: This note may have been constructed using a voice recognition system.**

## 2024-10-03 VITALS
RESPIRATION RATE: 16 BRPM | SYSTOLIC BLOOD PRESSURE: 125 MMHG | OXYGEN SATURATION: 97 % | BODY MASS INDEX: 17.65 KG/M2 | DIASTOLIC BLOOD PRESSURE: 78 MMHG | HEART RATE: 88 BPM | WEIGHT: 95.9 LBS | HEIGHT: 62 IN | TEMPERATURE: 97.8 F

## 2024-10-03 LAB
ANION GAP SERPL CALCULATED.3IONS-SCNC: 4 MMOL/L (ref 4–13)
BASOPHILS # BLD AUTO: 0.04 THOUSANDS/ÂΜL (ref 0–0.1)
BASOPHILS NFR BLD AUTO: 1 % (ref 0–1)
BUN SERPL-MCNC: 16 MG/DL (ref 5–25)
CALCIUM SERPL-MCNC: 7.7 MG/DL (ref 8.4–10.2)
CHLORIDE SERPL-SCNC: 100 MMOL/L (ref 96–108)
CO2 SERPL-SCNC: 28 MMOL/L (ref 21–32)
CREAT SERPL-MCNC: 0.88 MG/DL (ref 0.6–1.3)
EOSINOPHIL # BLD AUTO: 0.25 THOUSAND/ÂΜL (ref 0–0.61)
EOSINOPHIL NFR BLD AUTO: 5 % (ref 0–6)
ERYTHROCYTE [DISTWIDTH] IN BLOOD BY AUTOMATED COUNT: 14.8 % (ref 11.6–15.1)
GFR SERPL CREATININE-BSD FRML MDRD: 61 ML/MIN/1.73SQ M
GLUCOSE SERPL-MCNC: 75 MG/DL (ref 65–140)
HCT VFR BLD AUTO: 31.5 % (ref 34.8–46.1)
HGB BLD-MCNC: 10.3 G/DL (ref 11.5–15.4)
IMM GRANULOCYTES # BLD AUTO: 0.02 THOUSAND/UL (ref 0–0.2)
IMM GRANULOCYTES NFR BLD AUTO: 0 % (ref 0–2)
LDH SERPL-CCNC: 135 U/L (ref 140–271)
LYMPHOCYTES # BLD AUTO: 0.97 THOUSANDS/ÂΜL (ref 0.6–4.47)
LYMPHOCYTES NFR BLD AUTO: 20 % (ref 14–44)
MCH RBC QN AUTO: 29.4 PG (ref 26.8–34.3)
MCHC RBC AUTO-ENTMCNC: 32.7 G/DL (ref 31.4–37.4)
MCV RBC AUTO: 90 FL (ref 82–98)
MONOCYTES # BLD AUTO: 0.43 THOUSAND/ÂΜL (ref 0.17–1.22)
MONOCYTES NFR BLD AUTO: 9 % (ref 4–12)
NEUTROPHILS # BLD AUTO: 3.26 THOUSANDS/ÂΜL (ref 1.85–7.62)
NEUTS SEG NFR BLD AUTO: 65 % (ref 43–75)
NRBC BLD AUTO-RTO: 0 /100 WBCS
PLATELET # BLD AUTO: 302 THOUSANDS/UL (ref 149–390)
PMV BLD AUTO: 9.3 FL (ref 8.9–12.7)
POTASSIUM SERPL-SCNC: 3.6 MMOL/L (ref 3.5–5.3)
RBC # BLD AUTO: 3.5 MILLION/UL (ref 3.81–5.12)
SODIUM SERPL-SCNC: 132 MMOL/L (ref 135–147)
WBC # BLD AUTO: 4.97 THOUSAND/UL (ref 4.31–10.16)

## 2024-10-03 PROCEDURE — 99239 HOSP IP/OBS DSCHRG MGMT >30: CPT | Performed by: INTERNAL MEDICINE

## 2024-10-03 PROCEDURE — 85025 COMPLETE CBC W/AUTO DIFF WBC: CPT

## 2024-10-03 PROCEDURE — 83615 LACTATE (LD) (LDH) ENZYME: CPT

## 2024-10-03 PROCEDURE — 97163 PT EVAL HIGH COMPLEX 45 MIN: CPT

## 2024-10-03 PROCEDURE — 97116 GAIT TRAINING THERAPY: CPT

## 2024-10-03 PROCEDURE — 80048 BASIC METABOLIC PNL TOTAL CA: CPT

## 2024-10-03 RX ORDER — ONDANSETRON 2 MG/ML
4 INJECTION INTRAMUSCULAR; INTRAVENOUS EVERY 6 HOURS PRN
Status: DISCONTINUED | OUTPATIENT
Start: 2024-10-03 | End: 2024-10-03 | Stop reason: SDUPTHER

## 2024-10-03 RX ORDER — FUROSEMIDE 20 MG
20 TABLET ORAL DAILY
Qty: 30 TABLET | Refills: 0 | Status: SHIPPED | OUTPATIENT
Start: 2024-10-03 | End: 2024-10-11 | Stop reason: SDUPTHER

## 2024-10-03 RX ADMIN — CYPROHEPTADINE HYDROCHLORIDE 2 MG: 4 TABLET ORAL at 08:48

## 2024-10-03 RX ADMIN — OXYCODONE HYDROCHLORIDE AND ACETAMINOPHEN 500 MG: 500 TABLET ORAL at 08:50

## 2024-10-03 RX ADMIN — CYPROHEPTADINE HYDROCHLORIDE 2 MG: 4 TABLET ORAL at 12:09

## 2024-10-03 RX ADMIN — ONDANSETRON 4 MG: 4 TABLET, ORALLY DISINTEGRATING ORAL at 04:57

## 2024-10-03 RX ADMIN — SODIUM CHLORIDE 2 G: 1 TABLET ORAL at 12:09

## 2024-10-03 RX ADMIN — NYSTATIN CREAM 1 APPLICATION: 100000 CREAM TOPICAL at 08:52

## 2024-10-03 RX ADMIN — FUROSEMIDE 20 MG: 10 INJECTION, SOLUTION INTRAMUSCULAR; INTRAVENOUS at 08:51

## 2024-10-03 RX ADMIN — SODIUM CHLORIDE 2 G: 1 TABLET ORAL at 08:49

## 2024-10-03 RX ADMIN — HEPARIN SODIUM 5000 UNITS: 5000 INJECTION INTRAVENOUS; SUBCUTANEOUS at 04:57

## 2024-10-03 RX ADMIN — ESCITALOPRAM OXALATE 5 MG: 10 TABLET ORAL at 08:50

## 2024-10-03 RX ADMIN — POTASSIUM CHLORIDE 20 MEQ: 1500 TABLET, EXTENDED RELEASE ORAL at 08:50

## 2024-10-03 RX ADMIN — ONDANSETRON 4 MG: 4 TABLET, ORALLY DISINTEGRATING ORAL at 12:11

## 2024-10-03 NOTE — ASSESSMENT & PLAN NOTE
Patient was tachycardic at palliative care appointment this morning as per the daughter ranging in the 140s  On admission her heart rate was 114  She denies any symptoms such as palpitations, chest pain, lightheadedness, or dizziness  Could be due to pleural effusion    Plan:  Resolved

## 2024-10-03 NOTE — CASE MANAGEMENT
Case Management Discharge Planning Note    Patient name Brigid Brown  Location S /S -01 MRN [id number]  : 1945 Date 10/3/2024       Current Admission Date: 10/1/2024  Current Admission Diagnosis:Pleural effusion   Patient Active Problem List    Diagnosis Date Noted Date Diagnosed    Dysgeusia 10/01/2024     Hypoalbuminemia 10/01/2024     Pleural effusion 10/01/2024     Tachycardia 10/01/2024     Lower extremity edema 10/01/2024     Leg swelling 2024     Cancer of mesentery (HCC) 2024     Frailty syndrome in geriatric patient 09/10/2024     Ambulatory dysfunction 09/10/2024     Acute post-operative pain 09/10/2024     Anxiety and depression 09/10/2024     At risk for delirium 09/10/2024     No impairment of memory 09/10/2024     Palliative care by specialist 09/10/2024     Goals of care, counseling/discussion 09/10/2024     Nausea 09/10/2024     Abdominal distension 09/10/2024     Hyponatremia 2024     Encounter for geriatric assessment 2024     Electrolyte abnormality 2024     Mesenteric mass 2024     SOB (shortness of breath) 2024     Gastritis 2024     Weight loss 05/15/2024     Mitral regurgitation 2024     Sprain of medial collateral ligament of left knee, initial encounter 2024     Age-related osteoporosis without current pathological fracture 2024     Vaginal atrophy 2022     Lactose intolerance 2022     Severe protein-calorie malnutrition (HCC) 2022     Pigmented purpura (HCC) 2022     Chronic kidney disease (CKD) stage G3a/A2, moderately decreased glomerular filtration rate (GFR) between 45-59 mL/min/1.73 square meter and albuminuria creatinine ratio between  mg/g (HCC) 2022     Dizziness 2022     Reflux laryngitis 2021     TMJ dysfunction 2021     Myofascial pain 2021     Tongue pain 2021     Pain in right lumbar region of back 2021     Abdominal  pain 06/08/2021     Urinary symptom or sign 06/08/2021     Right hip pain 06/08/2021     Fatigue 06/08/2021     Gastroesophageal reflux disease 02/26/2021     Dysphonia 02/26/2021     Transient alteration of awareness 01/17/2021     Symptoms of cerebrovascular accident (CVA) 01/16/2021     IBS (irritable bowel syndrome) 01/16/2021     Grief 01/16/2021     Tinnitus of both ears 06/05/2020     Sprain of anterior talofibular ligament of left ankle 09/03/2019     Sensorineural hearing loss (SNHL) of both ears 06/24/2019     Hashimoto's disease 10/25/2018     History of vitamin D deficiency 10/25/2018     Episodic confusion 03/12/2018     Basal cell carcinoma of right temple region 03/05/2018     Near syncope 02/26/2018     Screening for blood or protein in urine 02/23/2018     History of skin cancer 02/23/2018     Subclinical hypothyroidism 11/28/2017     Changing skin lesion 06/27/2017     Seborrheic keratosis 04/28/2014     Hypothyroidism 05/23/2012     Allergic rhinitis 05/17/2012       LOS (days): 2  Geometric Mean LOS (GMLOS) (days): 3  Days to GMLOS:1.3     OBJECTIVE:  Risk of Unplanned Readmission Score: 27.54         Current admission status: Inpatient   Preferred Pharmacy:   [id number] [place] [address]  [facility] [address]  Phone: [phone] Fax: [phone]    Primary Care Provider: Delicia Cintron DO    Primary Insurance: MEDICARE  Secondary Insurance: [facility]    DISCHARGE DETAILS:    Discharge planning discussed with:: Patient, spouse  Freedom of Choice: Yes  Comments - Freedom of Choice: Home with [facility]  CM contacted family/caregiver?: Yes (Spouse present at bedside)     Did patient/caregiver verbalize understanding of patient care needs?: Yes  Were patient/caregiver advised of the risks associated with not following Treatment Team discharge recommendations?: Yes    Contacts  Patient Contacts: Patient, spouse  Contact Method: In Person  Reason/Outcome:  Continuity of Care, Emergency Contact, Referral, Discharge Planning    Requested Home Health Care         Is the patient interested in [facility] at discharge?: Yes  Home Health Discipline requested:: Nursing, Occupational Therapy, Physical Therapy  Home Health Agency Name:: [facility]  Home Health Follow-Up Provider:: PCP  Home Health Services Needed:: Evaluate Functional Status and Safety, Gait/ADL Training, Strengthening/Theraputic Exercises to Improve Function  Homebound Criteria Met:: Requires the Assistance of Another Person for Safe Ambulation or to Leave the Home, Uses an Assist Device (i.e. cane, walker, etc)  Supporting Clincal Findings:: Fatigues Easliy in Short Distances, Limited Endurance    Other Referral/Resources/Interventions Provided:  Interventions: Other (Specify), HHC  Referral Comments: CM reviewed PT rec level 2 with pt and spouse at bedside. Pt reported preference of returning home with [facility]. Spouse confirmed pt has family support and can assist with care as needed. Pt/spouse aware [facility] is able to accept pt for NOY.    Treatment Team Recommendation: Short Term Rehab, Home with Home Health Care  Discharge Destination Plan:: Home with Home Health Care  Transport at Discharge : Family

## 2024-10-03 NOTE — DISCHARGE SUMMARY
Discharge Summary - Hospitalist   Name: Brigid Brown 79 y.o. female I MRN: [id number]  Unit/Bed#: S -01 I Date of Admission: 10/1/2024   Date of Service: 10/3/2024 I Hospital Day: 2     Assessment & Plan  Pleural effusion  Imaging on admission revealed evidence of large bilateral pleural effusions  Patient denied any symptoms of shortness of breath, cough, or chest pain  Currently saturating well on room air  She has no history of lung disease such as COPD or asthma  Unknown how long pleural effusions present for    Plan:  Lasix 20 mg daily    Hyponatremia  Patient has chronic hyponatremia  She follows with outpatient nephrology  Treated with fluid restriction and salt tablets at home    Plan:  1800 mL fluid restriction  Continue home salt tablet regimen     - Two 1 g salt tablets 3 times daily    Tachycardia  Patient was tachycardic at palliative care appointment this morning as per the daughter ranging in the 140s  On admission her heart rate was 114  She denies any symptoms such as palpitations, chest pain, lightheadedness, or dizziness  Could be due to pleural effusion    Plan:  Resolved     Lower extremity edema  Presented with increased lower extremity edema, was referred to hospital for further management by palliative care physician during appointment today  Currently takes salt tablets at home    Plan:  Lasix 20 mg daily     Medical Problems       Resolved Problems  Date Reviewed: 10/1/2024   None       Discharging Physician / Practitioner: Julio Lynch  PCP: Delicia Cintron DO  Admission Date:   Admission Orders (From admission, onward)       Ordered        10/01/24 1931  INPATIENT ADMISSION  Once                          Discharge Date: 10/03/24    Consultations During Hospital Stay:  Interventional Radiology    Procedures Performed:   IR Bilateral Thoracentesis drainage    Significant Findings / Test Results:   IR IN-Patient Thoracentesis   Final Result by Glenna Infante MD (10/02 1651)    Impression:   1. Successful ultrasound-guided right thoracentesis yielding 450 mL clear yellow pleural fluid.   2. Successful ultrasound-guided left thoracentesis yielding 600 mL of dark yellow pleural fluid.               Workstation performed: [id number]         CTA ED chest PE Study   Final Result by Mynor Donovan MD (10/01 1922)      1.  No pulmonary embolus.   2.  Large bilateral pleural effusions.   3.  Partially imaged abdominal ascites.                  Workstation performed: [id number]         XR chest 1 view portable   Final Result by Mickey Vera MD (10/01 1625)      Bibasal effusions and adjacent consolidation.            Workstation performed: [id number]             Incidental Findings:   None     Test Results Pending at Discharge (will require follow up):   Pleural fluid culture  Pleural fluid cytology  Urine protein/creatinine ratio  UA     Outpatient Tests Requested:  None    Complications:  None    Reason for Admission: Bilateral pleural effusions and lower extremity edema    Hospital Course:   Brigid Brown is a 79 y.o. 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 The leg swelling progressed over the last few weeks starting around September 6th when she had her exploratory laparotomy with biopsy of her mesenteric mass. She denied shortness of breath, chest pain, lightheadedness, or dizziness at time of presentation. In the ED patient was initially tachycardic over 120s however this resolved without intervention. CBC and CMP were significant for hyponatremia. Troponins and BNP were WNL. CXR demonstrated bibasal effusions with adjacent consolidation. CTA PE showed no pulmonary emboli, moderate ascites and redemonstrated the  "bibasal pleural effusions. Patient was given 2 doses of Lasix 20 mg in the ED.    Patient was transferred to [facility] service and IR was consulted for bilateral thoracenteses. IR drained 450 mL from the left lung and 600 mL from the right lung. Pleural fluid cytology and culture with gram stain were sent and are pending at time of discharge. Pleural fluid protein and LDH were consistent with a transudative effusion. UA and urine protein/creatinine ratio were ordered to rule out nephrotic syndrome. Patient's breathing improved following fluid removal and the lower extremity edema decreased following continued lasix therapy. Patient's hyponatremia was chronic primary to arrival and remained stable on her preexisting salt tablets and fluid restriction. Patient has follow up appointment scheduled with her palliative care physician and with her hematologist/oncologist for potential cancer treatment. All of the patient's questions were addressed and she was educated on potential symptoms that should prompt representation to the emergency department. Patient was medically stable at the time of discharge.       Please see above list of diagnoses and related plan for additional information.     Condition at Discharge: stable    Discharge Day Visit / Exam:   Subjective:  Ms. Brown is observed resting comfortably in bed on RA. No overnight events reported. Patient reports she had an episode of diarrhea last night at around 7 pm which was relieved by prn imodium. Patient feels her breathing has improved since the thoracentesis and she is no longer feeling SOB when exerting. No conversational dyspnea appreciated on exam. Patient denies fever, chills, nausea, vomiting, constipation, or dysuria.  Vitals: Blood Pressure: 125/78 (10/03/24 0801)  Pulse: 88 (10/03/24 0801)  Temperature: 97.8 °F (36.6 °C) (10/03/24 0801)  Temp Source: Oral (10/02/24 2142)  Respirations: 16 (10/02/24 2142)  Height: 5' 2\" (157.5 cm) (10/02/24 1015)  Weight " - Scale: 43.5 kg (95 lb 14.4 oz) (10/03/24 0600)  SpO2: 95 % (10/03/24 0801)  Physical Exam  Vitals reviewed.   Constitutional:       Appearance: Normal appearance.   Eyes:      Extraocular Movements: Extraocular movements intact.      Conjunctiva/sclera: Conjunctivae normal.   Cardiovascular:      Rate and Rhythm: Normal rate and regular rhythm.      Pulses: Normal pulses.      Heart sounds: Normal heart sounds. No murmur heard.     No gallop.   Pulmonary:      Effort: Pulmonary effort is normal. No respiratory distress.      Breath sounds: Normal breath sounds. No wheezing or rales.   Abdominal:      General: Bowel sounds are normal. There is no distension.      Palpations: Abdomen is soft.      Tenderness: There is no abdominal tenderness.   Musculoskeletal:         General: Normal range of motion.      Cervical back: Normal range of motion.      Right lower leg: Edema (1+) present.      Left lower leg: Edema (1+) present.   Neurological:      Mental Status: She is alert and oriented to person, place, and time. Mental status is at baseline.   Psychiatric:         Mood and Affect: Mood normal.         Behavior: Behavior normal.         Discussion with Family: Updated  () at bedside.    Discharge instructions/Information to patient and family:   See after visit summary for information provided to patient and family.      Provisions for Follow-Up Care:  See after visit summary for information related to follow-up care and any pertinent home health orders.      Mobility at time of Discharge:   Basic Mobility Inpatient Raw Score: 15  JH-HLM Goal: 4: Move to chair/commode  JH-HLM Achieved: 4: Move to chair/commode  HLM Goal achieved. Continue to encourage appropriate mobility.     Disposition:   Home with VNA    Planned Readmission: None    Discharge Medications:  See after visit summary for reconciled discharge medications provided to patient and/or family.      Administrative Statements    Discharge Statement:  I have spent a total time of 30 minutes in caring for this patient on the day of the visit/encounter. >30 minutes of time was spent on: Diagnostic results, Patient and family education, and Counseling / Coordination of care.    **Please Note: This note may have been constructed using a voice recognition system**

## 2024-10-03 NOTE — ASSESSMENT & PLAN NOTE
Presented with increased lower extremity edema, was referred to hospital for further management by palliative care physician during appointment today  Currently takes salt tablets at home    Plan:  Lasix 20 mg daily

## 2024-10-03 NOTE — PLAN OF CARE
Problem: PHYSICAL THERAPY ADULT  Goal: Performs mobility at highest level of function for planned discharge setting.  See evaluation for individualized goals.  Description: Treatment/Interventions: Functional transfer training, LE strengthening/ROM, Elevations, Therapeutic exercise, Endurance training, Compensatory technique education, Gait training, Bed mobility, Equipment eval/education, Patient/family training          See flowsheet documentation for full assessment, interventions and recommendations.  Note:    Problem List: Decreased strength, Decreased endurance, Impaired balance, Decreased mobility, Decreased safety awareness, Pain  Assessment: Pt presents with increased leg swelling. Dx: pleural effusion, hyponatremia, tachycardia and LE edema. order placed for PT eval and tx, w/ activity order of up and out of bed as tolerated. pt presents w/ comorbidities of HTN, tachycardia, vertigo, right rotator cuff repair, and mesenteric cancer and personal factors of advanced age, stair(s) to enter home, anxiety, and depression. pt presents w/ pain, weakness, decreased endurance, impaired balance, gait deviations, decreased safety awareness, and fall risk. these impairments are evident in findings from physical examination (weakness), mobility assessment (need for min to mod assist w/ all phases of mobility when usually mobilizing independently, tolerance to only 4 feet of ambulation, and need for cueing for mobility technique), and Barthel Index: 55/100. pt needed input for mobility and breathing technique. pt is at risk for falls due to physical and safety awareness deficits. pt's clinical presentation is unstable/unpredictable (evident in tachycardia, need for assist w/ all phases of mobility when usually mobilizing independently, tolerance to only 4 feet of ambulation, pain impacting overall mobility status, and need for input for mobility technique/safety). pt needs inpatient PT tx to improve mobility deficits  and progress mobility training as appropriate.        Rehab Resource Intensity Level, PT: II (Moderate Resource Intensity)    See flowsheet documentation for full assessment.

## 2024-10-03 NOTE — DISCHARGE INSTR - AVS FIRST PAGE
Dear Brigid Brown,     It was our pleasure to care for you here at [facility].  It is our hope that we were always able to exceed the expected standards for your care during your stay.  You were hospitalized due to increased leg swelling.  You were cared for on the third floor by Eduar Curtis MD under the service of Jonathan Mancera MD with the [facility] who covers for your primary care physician (PCP), Delicia Cintron DO, while you were hospitalized.  If you have any questions or concerns related to this hospitalization, you may contact us at .  For follow up as well as any medication refills, we recommend that you follow up with your primary care physician.  A registered nurse will reach out to you by phone within a few days after your discharge to answer any additional questions that you may have after going home.  However, at this time we provide for you here, the most important instructions / recommendations at discharge:     Notable Medication Adjustments -   Please start taking furosemide (lasix) 20 mg tablet by mouth daily, starting tomorrow.   Please continue taking all other medications as prescribed.   Testing Required after Discharge -   None  Important follow up information -   Please follow up with your primary care physician in one week.  Please follow up with your oncologist.  Other Instructions -   We hope you feel better soon. If your symptoms worsen, please call 911 immediately or go to the nearest Emergency Department.   Please review this entire after visit summary as additional general instructions including medication list, appointments, activity, diet, any pertinent wound care, and other additional recommendations from your care team that may be provided for you.      Sincerely,     Eduar Curtis MD

## 2024-10-03 NOTE — PHYSICAL THERAPY NOTE
PHYSICAL THERAPY EVALUATION NOTE    Patient Name: Brigid Brown  Today's Date: 10/3/2024  AGE:   79 y.o.  Mrn:   [id number]  ADMIT DX:  Hyponatremia [E87.1]  Colon cancer (HCC) [C18.9]  Pleural effusion [J90]  Bilateral lower extremity edema [R60.0]  Fluid overload [E87.70]    Past Medical History:   Diagnosis Date    Acne     Basal cell carcinoma 06/08/2020    right lower forehead    BCC (basal cell carcinoma of skin) 03/09/2020    mid forehead    Benign neoplasm of skin     Cancer (HCC)     mesenteric cancer    Disease of thyroid gland 2006    Essential hypertension 02/26/2018    GERD (gastroesophageal reflux disease)     Hypertension     Inflamed seborrheic keratosis     Irritable bowel syndrome     Malignant neoplasm of skin of face     Migraines     Nonmelanoma skin cancer     Last Assessed:6/27/17    Squamous cell skin cancer 06/08/2020    In situ, left lower forehead    Tachycardia     Telogen effluvium     Temporomandibular joint disorder     Vertigo      Length Of Stay: 2  PHYSICAL THERAPY EVALUATION :    10/03/24 1127   PT Last Visit   PT Visit Date 10/03/24   Pain Assessment   Pain Assessment Tool 0-10   Pain Score 7   Pain Location/Orientation Location: Abdomen   Hospital Pain Intervention(s) Repositioned;Ambulation/increased activity   Restrictions/Precautions   Other Precautions Chair Alarm;Bed Alarm;Fall Risk;Pain  (Masimo)   Home Living   Type of Home House   Home Layout One level;Laundry in basement;Other (Comment)  (1 JENNA)   Additional Comments lives w/ spouse. ambulates w/o device. no DME. independent w/ ADLs. shares IADLs. owns cane, walker and shower chair. no falls in last 6 months.   General   Additional Pertinent History 10/2/24 at 16:01 heart rate was 107 BPM.   Family/Caregiver Present Yes   Cognition   Arousal/Participation Alert   Orientation Level Oriented to person;Other (Comment)  (pt was identified w/  full name, birth date)   Following Commands Follows one step commands with increased time or repetition   Comments room air resting pulse ox 98% and 95 BPM, active 94% and 119 BPM.   Subjective   Subjective pt seen supine in bed w/ spouse present. agreed to PT eval. states having abdominal pain.   RUE Assessment   RUE Assessment WFL   LUE Assessment   LUE Assessment WFL   RLE Assessment   RLE Assessment WFL  (3+ to 4-/5)   LLE Assessment   LLE Assessment WFL  (3+ to 4-/5)   Light Touch   RLE Light Touch Grossly intact   LLE Light Touch Grossly intact   Bed Mobility   Supine to Sit 4  Minimal assistance   Additional items Assist x 1;HOB elevated;Bedrails;Increased time required;Verbal cues;LE management  (for bedrail use, trunk/LE positioning)   Transfers   Sit to Stand 3  Moderate assistance   Additional items Assist x 1;Increased time required;Verbal cues  (for LE positioning)   Stand to Sit 4  Minimal assistance   Additional items Assist x 1;Increased time required;Verbal cues  (for body positoining)   Ambulation/Elevation   Gait pattern Foward flexed;Shuffling;Excessively slow   Gait Assistance 3  Moderate assist   Additional items Assist x 1;Verbal cues  (for full step length, breathing technique)   Assistive Device None   Distance 4 feet  (additional ambulation not possible due to fatigue, dyspnea)   Stair Management Assistance Not tested  (due to limited ambulation tolerance, safety concern)   Balance   Static Sitting Fair +   Static Standing Poor +   Ambulatory Poor   Activity Tolerance   Activity Tolerance Patient limited by fatigue   Nurse Made Aware spoke to Judah MENDOZAG, Lakeisha OT, Nabila CM   Assessment   Problem List Decreased strength;Decreased endurance;Impaired balance;Decreased mobility;Decreased safety awareness;Pain   Assessment Pt presents with increased leg swelling. Dx: pleural effusion, hyponatremia, tachycardia and LE edema. order placed for PT eval and tx, w/ activity order of up and  out of bed as tolerated. pt presents w/ comorbidities of HTN, tachycardia, vertigo, right rotator cuff repair, and mesenteric cancer and personal factors of advanced age, stair(s) to enter home, anxiety, and depression. pt presents w/ pain, weakness, decreased endurance, impaired balance, gait deviations, decreased safety awareness, and fall risk. these impairments are evident in findings from physical examination (weakness), mobility assessment (need for min to mod assist w/ all phases of mobility when usually mobilizing independently, tolerance to only 4 feet of ambulation, and need for cueing for mobility technique), and Barthel Index: 55/100. pt needed input for mobility and breathing technique. pt is at risk for falls due to physical and safety awareness deficits. pt's clinical presentation is unstable/unpredictable (evident in tachycardia, need for assist w/ all phases of mobility when usually mobilizing independently, tolerance to only 4 feet of ambulation, pain impacting overall mobility status, and need for input for mobility technique/safety). pt needs inpatient PT tx to improve mobility deficits and progress mobility training as appropriate.   Goals   Patient Goals i want to go home   STG Expiration Date 10/17/24   Short Term Goal #1 pt will: Increase bilateral LE strength 1/2 grade to facilitate independent mobility, Perform bed mobility independently to increase level of independence, Perform all transfers independently to improve independence, Ambulate 250 ft. with least restrictive assistive device modified independent w/o LOB to improve functional independence, Navigate 1 stair(s) w/ supervision with unilateral handrail to facilitate return to previous living environment, Increase ambulatory balance 1 grade to decrease risk for falls, Complete exercise program independently to increase strength and endurance, Tolerate 3 hr OOB to faciliate upright tolerance, Improve Barthel Index score to 75 or  greater to facilitate independence, and Complete Timed Up and Go or Comfortable Gait Speed to further assess mobility and monitor progress   PT Treatment Day 1   Plan   Treatment/Interventions Functional transfer training;LE strengthening/ROM;Elevations;Therapeutic exercise;Endurance training;Compensatory technique education;Gait training;Bed mobility;Equipment eval/education;Patient/family training   PT Frequency 3-5x/wk   Discharge Recommendation   Rehab Resource Intensity Level, PT II (Moderate Resource Intensity)   AM-PAC Basic Mobility Inpatient   Turning in Flat Bed Without Bedrails 3   Lying on Back to Sitting on Edge of Flat Bed Without Bedrails 2   Moving Bed to Chair 3   Standing Up From Chair Using Arms 3   Walk in Room 3   Climb 3-5 Stairs With Railing 1   Basic Mobility Inpatient Raw Score 15   Basic Mobility Standardized Score 36.97   [facility] Highest Level Of Mobility   -[facility] Goal 4: Move to chair/commode   -[facility] Achieved 7: Walk 25 feet or more   Barthel Index   Feeding 10   Bathing 0   Grooming Score 5   Dressing Score 5   Bladder Score 10   Bowels Score 10   Toilet Use Score 5   Transfers (Bed/Chair) Score 10   Mobility (Level Surface) Score 0   Stairs Score 0   Barthel Index Score 55   Additional Treatment Session   Start Time 1143   End Time 1153   Treatment Assessment Pt agreed to participate in PT intervention. Therapist introduced roller walker use w/ ambulation to improve gait stability.  Sit <---> stand transfer w/ minx1. ambulated 15 feet and then 25 feet w/ roller walker w/ minx1. Standing rest break was required. Additional ambulation was not possible due to fatigue, dyspnea, and pain. Pt was noted to have improvement w/ use of roller walker w/ increased ambulation distance and decreased level of assist to maintain safety. continued inpatient PT tx is indicated to reduce fall risk.   Equipment Use roller walker   Additional Treatment Day 1   End of Consult   Patient Position at End  of Consult Bedside chair;Bed/Chair alarm activated;All needs within reach     The patient's AM-PAC Basic Mobility Inpatient Short Form Raw Score is 15. A Raw score of less than or equal to 16 suggests the patient may benefit from discharge to post-acute rehabilitation services. Please also refer to the recommendation of the Physical Therapist for safe discharge planning.    Skilled PT recommended while in hospital and upon DC to progress pt toward treatment goals.     Gavino Rudd, PT

## 2024-10-03 NOTE — PLAN OF CARE
Problem: PAIN - ADULT  Goal: Verbalizes/displays adequate comfort level or baseline comfort level  Description: Interventions:  - Encourage patient to monitor pain and request assistance  - Assess pain using appropriate pain scale  - Administer analgesics based on type and severity of pain and evaluate response  - Implement non-pharmacological measures as appropriate and evaluate response  - Consider cultural and social influences on pain and pain management  - Notify physician/advanced practitioner if interventions unsuccessful or patient reports new pain  Outcome: Progressing     Problem: INFECTION - ADULT  Goal: Absence or prevention of progression during hospitalization  Description: INTERVENTIONS:  - Assess and monitor for signs and symptoms of infection  - Monitor lab/diagnostic results  - Monitor all insertion sites, i.e. indwelling lines, tubes, and drains  - Monitor endotracheal if appropriate and nasal secretions for changes in amount and color  - Douglas appropriate cooling/warming therapies per order  - Administer medications as ordered  - Instruct and encourage patient and family to use good hand hygiene technique  - Identify and instruct in appropriate isolation precautions for identified infection/condition  Outcome: Progressing  Goal: Absence of fever/infection during neutropenic period  Description: INTERVENTIONS:  - Monitor WBC    Outcome: Progressing     Problem: SAFETY ADULT  Goal: Patient will remain free of falls  Description: INTERVENTIONS:  - Educate patient/family on patient safety including physical limitations  - Instruct patient to call for assistance with activity   - Consult OT/PT to assist with strengthening/mobility   - Keep Call bell within reach  - Keep bed low and locked with side rails adjusted as appropriate  - Keep care items and personal belongings within reach  - Initiate and maintain comfort rounds  - Make Fall Risk Sign visible to staff  - Offer Toileting every 2` Hours,  in advance of need  - Initiate/Maintain BED alarm  - Obtain necessary fall risk management equipment: bed alarm  - Apply yellow socks and bracelet for high fall risk patients  - Consider moving patient to room near nurses station  Outcome: Progressing  Goal: Maintain or return to baseline ADL function  Description: INTERVENTIONS:  -  Assess patient's ability to carry out ADLs; assess patient's baseline for ADL function and identify physical deficits which impact ability to perform ADLs (bathing, care of mouth/teeth, toileting, grooming, dressing, etc.)  - Assess/evaluate cause of self-care deficits   - Assess range of motion  - Assess patient's mobility; develop plan if impaired  - Assess patient's need for assistive devices and provide as appropriate  - Encourage maximum independence but intervene and supervise when necessary  - Involve family in performance of ADLs  - Assess for home care needs following discharge   - Consider OT consult to assist with ADL evaluation and planning for discharge  - Provide patient education as appropriate  Outcome: Progressing  Goal: Maintains/Returns to pre admission functional level  Description: INTERVENTIONS:  - Perform AM-PAC 6 Click Basic Mobility/ Daily Activity assessment daily.  - Set and communicate daily mobility goal to care team and patient/family/caregiver.   - Collaborate with rehabilitation services on mobility goals if consulted  - Perform Range of Motion 3 times a day.  - Reposition patient every 2 hours.  - Dangle patient 3 times a day  - Stand patient 3 times a day  - Ambulate patient 3 times a day  - Out of bed to chair 3 times a day   - Out of bed for meals 3 times a day  - Out of bed for toileting  - Record patient progress and toleration of activity level   Outcome: Progressing     Problem: DISCHARGE PLANNING  Goal: Discharge to home or other facility with appropriate resources  Description: INTERVENTIONS:  - Identify barriers to discharge w/patient and  caregiver  - Arrange for needed discharge resources and transportation as appropriate  - Identify discharge learning needs (meds, wound care, etc.)  - Arrange for interpretive services to assist at discharge as needed  - Refer to Case Management Department for coordinating discharge planning if the patient needs post-hospital services based on physician/advanced practitioner order or complex needs related to functional status, cognitive ability, or social support system  Outcome: Progressing     Problem: Knowledge Deficit  Goal: Patient/family/caregiver demonstrates understanding of disease process, treatment plan, medications, and discharge instructions  Description: Complete learning assessment and assess knowledge base.  Interventions:  - Provide teaching at level of understanding  - Provide teaching via preferred learning methods  Outcome: Progressing     Problem: Potential for Falls  Goal: Patient will remain free of falls  Description: INTERVENTIONS:  - Educate patient/family on patient safety including physical limitations  - Instruct patient to call for assistance with activity   - Consult OT/PT to assist with strengthening/mobility   - Keep Call bell within reach  - Keep bed low and locked with side rails adjusted as appropriate  - Keep care items and personal belongings within reach  - Initiate and maintain comfort rounds  - Make Fall Risk Sign visible to staff  - Offer Toileting every 2 Hours, in advance of need  - Initiate/Maintain bed alarm  - Obtain necessary fall risk management equipment: bed alarm  - Apply yellow socks and bracelet for high fall risk patients  - Consider moving patient to room near nurses station  Outcome: Progressing     Problem: Nutrition/Hydration-ADULT  Goal: Nutrient/Hydration intake appropriate for improving, restoring or maintaining nutritional needs  Description: Monitor and assess patient's nutrition/hydration status for malnutrition. Collaborate with interdisciplinary team  and initiate plan and interventions as ordered.  Monitor patient's weight and dietary intake as ordered or per policy. Utilize nutrition screening tool and intervene as necessary. Determine patient's food preferences and provide high-protein, high-caloric foods as appropriate.     INTERVENTIONS:  - Monitor oral intake, urinary output, labs, and treatment plans  - Assess nutrition and hydration status and recommend course of action  - Evaluate amount of meals eaten  - Assist patient with eating if necessary   - Allow adequate time for meals  - Recommend/ encourage appropriate diets, oral nutritional supplements, and vitamin/mineral supplements  - Order, calculate, and assess calorie counts as needed  - Recommend, monitor, and adjust tube feedings and TPN/PPN based on assessed needs  - Assess need for intravenous fluids  - Provide specific nutrition/hydration education as appropriate  - Include patient/family/caregiver in decisions related to nutrition  Outcome: Progressing

## 2024-10-03 NOTE — ASSESSMENT & PLAN NOTE
Imaging on admission revealed evidence of large bilateral pleural effusions  Patient denied any symptoms of shortness of breath, cough, or chest pain  Currently saturating well on room air  She has no history of lung disease such as COPD or asthma  Unknown how long pleural effusions present for    Plan:  Lasix 20 mg daily

## 2024-10-03 NOTE — ASSESSMENT & PLAN NOTE
Patient has chronic hyponatremia  She follows with outpatient nephrology  Treated with fluid restriction and salt tablets at home    Plan:  1800 mL fluid restriction  Continue home salt tablet regimen     - Two 1 g salt tablets 3 times daily

## 2024-10-04 ENCOUNTER — HOME CARE VISIT (OUTPATIENT)
Dept: HOME HEALTH SERVICES | Facility: HOME HEALTHCARE | Age: 79
End: 2024-10-04
Payer: MEDICARE

## 2024-10-04 ENCOUNTER — TRANSITIONAL CARE MANAGEMENT (OUTPATIENT)
Dept: FAMILY MEDICINE CLINIC | Facility: MEDICAL CENTER | Age: 79
End: 2024-10-04

## 2024-10-04 VITALS
OXYGEN SATURATION: 98 % | TEMPERATURE: 97.5 F | SYSTOLIC BLOOD PRESSURE: 120 MMHG | DIASTOLIC BLOOD PRESSURE: 60 MMHG | RESPIRATION RATE: 20 BRPM | HEART RATE: 96 BPM

## 2024-10-04 LAB
ATRIAL RATE: 89 BPM
P AXIS: 75 DEGREES
PR INTERVAL: 134 MS
QRS AXIS: -63 DEGREES
QRSD INTERVAL: 84 MS
QT INTERVAL: 388 MS
QTC INTERVAL: 472 MS
T WAVE AXIS: 84 DEGREES
VENTRICULAR RATE: 89 BPM

## 2024-10-04 PROCEDURE — 93010 ELECTROCARDIOGRAM REPORT: CPT | Performed by: INTERNAL MEDICINE

## 2024-10-04 PROCEDURE — G0299 HHS/HOSPICE OF RN EA 15 MIN: HCPCS

## 2024-10-05 LAB
BACTERIA SPEC BFLD CULT: NO GROWTH
BACTERIA SPEC BFLD CULT: NO GROWTH
EOSINOPHIL NFR FLD MANUAL: 1 %
GRAM STN SPEC: NORMAL
HISTIOCYTES NFR FLD: 8 %
LYMPHOCYTES NFR BLD AUTO: 79 %
MONO+MESO NFR FLD MANUAL: 3 %
NEUTS SEG NFR BLD AUTO: 9 %
TOTAL CELLS COUNTED SPEC: 100

## 2024-10-05 PROCEDURE — 88341 IMHCHEM/IMCYTCHM EA ADD ANTB: CPT | Performed by: STUDENT IN AN ORGANIZED HEALTH CARE EDUCATION/TRAINING PROGRAM

## 2024-10-05 PROCEDURE — 88342 IMHCHEM/IMCYTCHM 1ST ANTB: CPT | Performed by: STUDENT IN AN ORGANIZED HEALTH CARE EDUCATION/TRAINING PROGRAM

## 2024-10-05 PROCEDURE — 88312 SPECIAL STAINS GROUP 1: CPT | Performed by: STUDENT IN AN ORGANIZED HEALTH CARE EDUCATION/TRAINING PROGRAM

## 2024-10-05 PROCEDURE — 88305 TISSUE EXAM BY PATHOLOGIST: CPT | Performed by: STUDENT IN AN ORGANIZED HEALTH CARE EDUCATION/TRAINING PROGRAM

## 2024-10-05 PROCEDURE — 88112 CYTOPATH CELL ENHANCE TECH: CPT | Performed by: STUDENT IN AN ORGANIZED HEALTH CARE EDUCATION/TRAINING PROGRAM

## 2024-10-06 LAB
ATRIAL RATE: 110 BPM
P AXIS: 57 DEGREES
PR INTERVAL: 138 MS
QRS AXIS: -74 DEGREES
QRSD INTERVAL: 82 MS
QT INTERVAL: 354 MS
QTC INTERVAL: 479 MS
T WAVE AXIS: 87 DEGREES
VENTRICULAR RATE: 110 BPM

## 2024-10-06 PROCEDURE — 93010 ELECTROCARDIOGRAM REPORT: CPT | Performed by: INTERNAL MEDICINE

## 2024-10-07 ENCOUNTER — APPOINTMENT (OUTPATIENT)
Dept: LAB | Facility: MEDICAL CENTER | Age: 79
End: 2024-10-07
Payer: MEDICARE

## 2024-10-07 ENCOUNTER — HOME CARE VISIT (OUTPATIENT)
Dept: HOME HEALTH SERVICES | Facility: HOME HEALTHCARE | Age: 79
End: 2024-10-07
Payer: MEDICARE

## 2024-10-07 VITALS
TEMPERATURE: 98.4 F | OXYGEN SATURATION: 94 % | RESPIRATION RATE: 18 BRPM | DIASTOLIC BLOOD PRESSURE: 70 MMHG | SYSTOLIC BLOOD PRESSURE: 132 MMHG | HEART RATE: 84 BPM

## 2024-10-07 DIAGNOSIS — E87.1 HYPONATREMIA: ICD-10-CM

## 2024-10-07 LAB
ANION GAP SERPL CALCULATED.3IONS-SCNC: 6 MMOL/L (ref 4–13)
BUN SERPL-MCNC: 12 MG/DL (ref 5–25)
CALCIUM SERPL-MCNC: 8.1 MG/DL (ref 8.4–10.2)
CHLORIDE SERPL-SCNC: 100 MMOL/L (ref 96–108)
CO2 SERPL-SCNC: 26 MMOL/L (ref 21–32)
CREAT SERPL-MCNC: 0.76 MG/DL (ref 0.6–1.3)
GFR SERPL CREATININE-BSD FRML MDRD: 73 ML/MIN/1.73SQ M
GLUCOSE P FAST SERPL-MCNC: 85 MG/DL (ref 65–99)
POTASSIUM SERPL-SCNC: 4.6 MMOL/L (ref 3.5–5.3)
SODIUM SERPL-SCNC: 132 MMOL/L (ref 135–147)

## 2024-10-07 PROCEDURE — G0300 HHS/HOSPICE OF LPN EA 15 MIN: HCPCS

## 2024-10-07 PROCEDURE — 36415 COLL VENOUS BLD VENIPUNCTURE: CPT

## 2024-10-07 PROCEDURE — 80048 BASIC METABOLIC PNL TOTAL CA: CPT

## 2024-10-09 ENCOUNTER — TELEPHONE (OUTPATIENT)
Age: 79
End: 2024-10-09

## 2024-10-09 ENCOUNTER — TELEPHONE (OUTPATIENT)
Dept: GASTROENTEROLOGY | Facility: CLINIC | Age: 79
End: 2024-10-09

## 2024-10-09 ENCOUNTER — HOME CARE VISIT (OUTPATIENT)
Dept: HOME HEALTH SERVICES | Facility: HOME HEALTHCARE | Age: 79
End: 2024-10-09
Payer: MEDICARE

## 2024-10-09 VITALS
RESPIRATION RATE: 18 BRPM | BODY MASS INDEX: 17.43 KG/M2 | WEIGHT: 95.31 LBS | HEART RATE: 98 BPM | DIASTOLIC BLOOD PRESSURE: 70 MMHG | OXYGEN SATURATION: 98 % | SYSTOLIC BLOOD PRESSURE: 104 MMHG

## 2024-10-09 PROCEDURE — G0151 HHCP-SERV OF PT,EA 15 MIN: HCPCS

## 2024-10-09 NOTE — TELEPHONE ENCOUNTER
Spoke with pt with recs/results. Phone cut out just before saying goodbye, tried to call pt back, unable to connect. We were experiencing difficulties during phone call.

## 2024-10-09 NOTE — TELEPHONE ENCOUNTER
Call received from [facility]'s visiting nurse stating that pt is still having swelling from b/l thighs down to ankles. States that vital signs stable, denies SOB/chest pain. She is having pt weigh herself everyday. Would like to know if pt should be wearing compression socks and if so what strength. Please advise.

## 2024-10-10 ENCOUNTER — HOME CARE VISIT (OUTPATIENT)
Dept: HOME HEALTH SERVICES | Facility: HOME HEALTHCARE | Age: 79
End: 2024-10-10
Payer: MEDICARE

## 2024-10-10 PROCEDURE — G0152 HHCP-SERV OF OT,EA 15 MIN: HCPCS

## 2024-10-10 NOTE — CASE COMMUNICATION
Patient declined nursing visit today. Not feeling well,  states pt has been having episodes of diarrhea. Instructed to give gatorade of pedilyte to prevent dehydration.

## 2024-10-11 ENCOUNTER — OFFICE VISIT (OUTPATIENT)
Dept: NEPHROLOGY | Facility: CLINIC | Age: 79
End: 2024-10-11
Payer: MEDICARE

## 2024-10-11 VITALS — HEIGHT: 62 IN | BODY MASS INDEX: 17.43 KG/M2 | SYSTOLIC BLOOD PRESSURE: 110 MMHG | DIASTOLIC BLOOD PRESSURE: 62 MMHG

## 2024-10-11 VITALS — OXYGEN SATURATION: 99 % | SYSTOLIC BLOOD PRESSURE: 115 MMHG | DIASTOLIC BLOOD PRESSURE: 80 MMHG | HEART RATE: 94 BPM

## 2024-10-11 DIAGNOSIS — M79.89 LEG SWELLING: ICD-10-CM

## 2024-10-11 DIAGNOSIS — E87.1 HYPONATREMIA: Primary | ICD-10-CM

## 2024-10-11 PROCEDURE — 99214 OFFICE O/P EST MOD 30 MIN: CPT | Performed by: INTERNAL MEDICINE

## 2024-10-11 RX ORDER — SODIUM CHLORIDE 1 G/1
1 TABLET ORAL
Start: 2024-10-11 | End: 2024-11-10

## 2024-10-11 RX ORDER — FUROSEMIDE 20 MG
40 TABLET ORAL DAILY
Qty: 60 TABLET | Refills: 5 | Status: SHIPPED | OUTPATIENT
Start: 2024-10-11 | End: 2025-04-09

## 2024-10-11 NOTE — PROGRESS NOTES
NEPHROLOGY OUTPATIENT PROGRESS NOTE   Brigid Brown 79 y.o. female MRN: [id number]  DATE: 10/11/2024  Reason for visit:   Chief Complaint   Patient presents with    Hospital Follow-up    Hyponatremia     ASSESSMENT and PLAN:  Hyponatremia  -Most recent serum sodium relatively stable 132 on 10/7/2024  -Suspect secondary to poor solute intake, component of hypervolemia with significant third spacing  -Workup includes urine sodium 12, urine osmolality 389, serum osmolality 279  -Patient was initially started on salt tablet, was recently hospitalized with pleural effusion, worsening leg edema and was also added Lasix.  -Strict fluid restriction 1.2 L/day recommended.  -Will reduce salt tablet from 2 g to 1 g p.o. 3 times daily.  Increase Lasix from 20 mg to 40 mg daily.  -Check BMP, urine sodium, urine osmolality in 2 weeks  -Encourage nutritional supplement intake but this remains challenging due to diarrhea issues.  She has overall poor p.o. intake    Significant leg edema in the setting of hypoalbuminemia  -Echo in October 2024 shows EF 55%, grade 1 diastolic dysfunction, moderate MR, moderate TR, normal IVC  -Recently was hospitalized with pleural effusion requiring thoracentesis.  -Her weight seems to be stable since discharge from the hospital around 95 to 98 pounds.  -Increase Lasix from 20 mg to 40 mg daily along with continuing potassium chloride 20 meq daily.  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    Diagnoses and all orders for this visit:    Hyponatremia  -     sodium chloride 1 g  "tablet; Take 1 tablet (1 g total) by mouth 3 (three) times a day with meals  -     furosemide (LASIX) 20 mg tablet; Take 2 tablets (40 mg total) by mouth daily  -     Basic metabolic panel; Future  -     Osmolality, urine  -     Sodium, urine, random  -     Osmolality-If this is regarding a toxic alcohol, STOP. Test is not routinely indicated. Please consult medical  on call for further guidance.; Future  -     Uric acid; Future  -     Albumin / creatinine urine ratio; Future  -     Basic metabolic panel; Future    Leg swelling  -     furosemide (LASIX) 20 mg tablet; Take 2 tablets (40 mg total) by mouth daily          SUBJECTIVE / HPI:  Patient is 79-year-old female with significant medical issues of prior history of hypertension for several years, recent diagnosis of mesenteric mass, status post biopsy showing metastatic carcinoma concerning for pancreatic based, found to have hyponatremia comes for posthospitalization follow-up of hyponatremia issues.    She was earlier hospitalized with hyponatremia when she was started on salt tablet and suspected to be secondary to hypervolemia and poor solute intake.  She was again hospitalized due to worsening leg edema, pleural effusion requiring thoracentesis and was also added Lasix.    Since discharge from the hospital her leg edema seems to be somewhat worsened but weight seems to be relatively stable as mentioned above.  Denies any significant pain issues.  No recent NSAID use.  Her p.o. intake seems to be poor.  Unable to tolerate nutritional supplements due to diarrhea issues.    REVIEW OF SYSTEMS:  More than 10 point review of systems were obtained and discussed in length with the patient. Complete review of systems were negative / unremarkable except mentioned above.     PHYSICAL EXAM:  Vitals:    10/11/24 1320   BP: 110/62   BP Location: Left arm   Patient Position: Sitting   Cuff Size: Child   Height: 5' 2\" (1.575 m)     Body mass index is 17.43 " kg/m².    Physical Exam  Vitals reviewed.   Constitutional:       Appearance: She is well-developed. She is ill-appearing.   HENT:      Head: Normocephalic and atraumatic.      Right Ear: External ear normal.      Left Ear: External ear normal.   Eyes:      Conjunctiva/sclera: Conjunctivae normal.   Cardiovascular:      Comments: 2-3+ edema in legs  Pulmonary:      Effort: Pulmonary effort is normal.      Breath sounds: Normal breath sounds. No wheezing or rales.   Abdominal:      General: Bowel sounds are normal. There is no distension.      Palpations: Abdomen is soft.      Tenderness: There is no abdominal tenderness.   Musculoskeletal:         General: No deformity.   Lymphadenopathy:      Cervical: No cervical adenopathy.   Skin:     Findings: No rash.   Neurological:      Mental Status: She is alert and oriented to person, place, and time.   Psychiatric:         Behavior: Behavior normal.         PAST MEDICAL HISTORY:  Past Medical History:   Diagnosis Date    Acne     Basal cell carcinoma 06/08/2020    right lower forehead    BCC (basal cell carcinoma of skin) 03/09/2020    mid forehead    Benign neoplasm of skin     Cancer (HCC)     mesenteric cancer    Disease of thyroid gland 2006    Essential hypertension 02/26/2018    GERD (gastroesophageal reflux disease)     Hypertension     Inflamed seborrheic keratosis     Irritable bowel syndrome     Malignant neoplasm of skin of face     Migraines     Nonmelanoma skin cancer     Last Assessed:6/27/17    Squamous cell skin cancer 06/08/2020    In situ, left lower forehead    Tachycardia     Telogen effluvium     Temporomandibular joint disorder     Vertigo        PAST SURGICAL HISTORY:  Past Surgical History:   Procedure Laterality Date    ADENOIDECTOMY      APPENDECTOMY      CHOLECYSTECTOMY      COLONOSCOPY  05/03/2019    COMPLEX WOUND CLOSURE TO EXTREMITY N/A 7/28/2020    Procedure: COMPLEX CLOSURE MID FOREHEAD;  Surgeon: Randy Frank MD;  Location: [facility];  Service: Plastics    ESOPHAGOGASTRODUODENOSCOPY  2009    FLAP LOCAL HEAD / NECK N/A 7/28/2020    Procedure: FLAP MID FOREHEAD;  Surgeon: Randy Frank MD;  Location: [facility];  Service: Plastics    HYSTERECTOMY  1987    IR THORACENTESIS  10/2/2024    LYMPH NODE DISSECTION N/A 9/6/2024    Procedure: BIOPSY ABDOMINAL MASS, LYMPHOMA PROTOCOL;  Surgeon: Angus Drew MD;  Location: [facility];  Service: Surgical Oncology    MALIGNANT SKIN LESION EXCISION      Excision of Lesion Face Malignant-9/14/2004 BCC Forehead    MOHS RECONSTRUCTION N/A 7/28/2020    Procedure: RECONSTRUCTION MOHS DEFECT MID FOREHEAD;  Surgeon: Randy Frank MD;  Location: [facility];  Service: Plastics    MOHS SURGERY  07/27/2020    Right &left lower forehead, mid forehead    OOPHORECTOMY Bilateral 1987    ROTATOR CUFF REPAIR Right     SKIN BIOPSY      TONSILLECTOMY      TUBAL LIGATION  1976?       SOCIAL HISTORY:  Social History     Substance and Sexual Activity   Alcohol Use Not Currently     Social History     Substance and Sexual Activity   Drug Use Never     Social History     Tobacco Use   Smoking Status Never   Smokeless Tobacco Never       FAMILY HISTORY:  Family History   Problem Relation Age of Onset    Hypertension Mother         Mother    Osteoporosis Mother     Skin cancer Mother     Migraines Mother     Dementia Mother     Hypertension Father         Father    Skin cancer Father     Dementia Father     Skin cancer Sister 73    Migraines Sister     No Known Problems Daughter     Uterine cancer Maternal Grandmother 29    Diabetes type II Maternal Grandfather     Cancer Paternal Grandmother     Uterine cancer Paternal Grandmother 65    No Known Problems Maternal Aunt     Cancer Paternal Aunt         Breast    Uterine cancer Paternal Aunt 55    No Known Problems Paternal Aunt     No Known Problems Paternal Aunt        MEDICATIONS:    Current Outpatient Medications:     ascorbic acid (VITAMIN C) 500 mg tablet,  Take 500 mg by mouth daily 1 tablet daily, Disp: , Rfl:     colesevelam (WELCHOL) 625 mg tablet, take 3 tablets by mouth twice a day with meals, Disp: 540 tablet, Rfl: 1    cyproheptadine (PERIACTIN) 4 mg tablet, take 0.5 tablet by mouth four times a day, Disp: 120 tablet, Rfl: 0    Digestive Enzyme CAPS, Take 1 capsule by mouth if needed (gas relief) beano taking 1 tablet as needed gas relief, Disp: , Rfl:     escitalopram (LEXAPRO) 5 mg tablet, Take 1 tablet (5 mg total) by mouth daily, Disp: 90 tablet, Rfl: 0    furosemide (LASIX) 20 mg tablet, Take 2 tablets (40 mg total) by mouth daily, Disp: 60 tablet, Rfl: 5    lactase (LACTAID) 3,000 units tablet, Take 3,000 Units by mouth as needed (.) 2 tablets as needed if eating dairy, Disp: , Rfl:     Multiple Vitamins-Minerals (CENTRUM SILVER ULTRA WOMENS PO), Take by mouth daily 1 tablet daily, Disp: , Rfl:     nystatin (MYCOSTATIN) cream, Apply topically 2 (two) times a day (Patient taking differently: Apply 1 Application topically 2 (two) times a day apply to skin fold areas as needed for rash), Disp: 30 g, Rfl: 0    ondansetron (ZOFRAN) 4 mg tablet, Take 1 tablet (4 mg total) by mouth every 8 (eight) hours as needed for nausea or vomiting, Disp: 20 tablet, Rfl: 0    polyethylene glycol-propylene glycol (SYSTANE) 0.4-0.3 %, Apply 1 drop to eye if needed (dry eye) as needed daily Both eyes. dry eyes, Disp: , Rfl:     potassium chloride (Klor-Con M20) 20 mEq tablet, Take 1 tablet (20 mEq total) by mouth daily, Disp: 90 tablet, Rfl: 3    sodium chloride 1 g tablet, Take 1 tablet (1 g total) by mouth 3 (three) times a day with meals, Disp: , Rfl:     bisacodyl (DULCOLAX) 10 mg suppository, Insert 1 suppository (10 mg total) into the rectum daily as needed for constipation for up to 7 days (Patient not taking: Reported on 9/12/2024), Disp: 7 suppository, Rfl: 0    CICLOPIROX EX, , Disp: , Rfl:     colesevelam (WELCHOL) 625 mg tablet, , Disp: , Rfl:     senna-docusate  sodium (SENOKOT S) 8.6-50 mg per tablet, Take 1 tablet by mouth daily at bedtime for 7 days (Patient not taking: Reported on 9/12/2024), Disp: 7 tablet, Rfl: 0    zinc oxide 20 % ointment, Apply topically as needed for irritation (Apply after washing for barrier) (Patient not taking: Reported on 10/1/2024), Disp: 425 g, Rfl: 0    Lab Results:   Results for orders placed or performed in visit on 10/07/24   Basic metabolic panel    Collection Time: 10/07/24  7:08 AM   Result Value Ref Range    Sodium 132 (L) 135 - 147 mmol/L    Potassium 4.6 3.5 - 5.3 mmol/L    Chloride 100 96 - 108 mmol/L    CO2 26 21 - 32 mmol/L    ANION GAP 6 4 - 13 mmol/L    BUN 12 5 - 25 mg/dL    Creatinine 0.76 0.60 - 1.30 mg/dL    Glucose, Fasting 85 65 - 99 mg/dL    Calcium 8.1 (L) 8.4 - 10.2 mg/dL    eGFR 73 ml/min/1.73sq m

## 2024-10-14 ENCOUNTER — HOME CARE VISIT (OUTPATIENT)
Dept: HOME HEALTH SERVICES | Facility: HOME HEALTHCARE | Age: 79
End: 2024-10-14
Payer: MEDICARE

## 2024-10-15 ENCOUNTER — HOME CARE VISIT (OUTPATIENT)
Dept: HOME HEALTH SERVICES | Facility: HOME HEALTHCARE | Age: 79
End: 2024-10-15
Payer: MEDICARE

## 2024-10-15 ENCOUNTER — OFFICE VISIT (OUTPATIENT)
Dept: FAMILY MEDICINE CLINIC | Facility: MEDICAL CENTER | Age: 79
End: 2024-10-15
Payer: MEDICARE

## 2024-10-15 VITALS
DIASTOLIC BLOOD PRESSURE: 68 MMHG | HEART RATE: 90 BPM | OXYGEN SATURATION: 100 % | TEMPERATURE: 98.2 F | BODY MASS INDEX: 18.62 KG/M2 | WEIGHT: 101.2 LBS | SYSTOLIC BLOOD PRESSURE: 120 MMHG | HEIGHT: 62 IN

## 2024-10-15 DIAGNOSIS — Z76.89 ENCOUNTER FOR SUPPORT AND COORDINATION OF TRANSITION OF CARE: Primary | ICD-10-CM

## 2024-10-15 LAB

## 2024-10-15 PROCEDURE — G0157 HHC PT ASSISTANT EA 15: HCPCS

## 2024-10-15 PROCEDURE — 99495 TRANSJ CARE MGMT MOD F2F 14D: CPT | Performed by: STUDENT IN AN ORGANIZED HEALTH CARE EDUCATION/TRAINING PROGRAM

## 2024-10-15 NOTE — PROGRESS NOTES
"  [facility] - Clinic Note  Delicia Cintron DO, 10/15/24     Brigid Brown MRN: [id number] : 1945 Age: 79 y.o.     Assessment/Plan     1. Encounter for support and coordination of transition of care    -Patient presents transition of care appointment after recent hospitalization was found to be tachycardic and bilateral pleural effusions  -Underwent bilateral thoracocentesis  Final Diagnosis   A-B. Pleural Fluid, Right, Thoracentesis (ThinPrep and cell block preparations):    - Cellular fluid with reactive mesothelial cells, abundant lymphocytes and scattered inflammatory cells; see note.     - Negative for metastatic carcinoma.     -Hyponatremia noted during hospital stay as well, she is since had follow-up with her nephrologist and Lasix dose was adjusted and salt tablet dosing adjusted  -Appointment with oncology tomorrow  -Patient advised to contact if any symptoms such as shortness of breath      Depression Screening and Follow-up Plan: Patient was screened for depression during today's encounter. They screened negative with a PHQ-9 score of 3.      Brigid Brown acknowledged understanding of treatment plan, all questions answered.    Subjective      Brigid Brown is a 79 y.o. female who presents for transition of care appointment.  Patient was hospitalized at [facility] from 2024 to October 3, 2024.  Patient presents today with her daughter.  Patient and daughter note \"little\" improvement with leg swelling and appetite.  Patient states she had a turkey sandwich for lunch.  Patient mentions increased feeling of gassiness.  Last bowel movement several days ago.      Hospital Course:   Brigid Brown is a 79 y.o. 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 The leg swelling progressed over the last few weeks starting around September 6th when she had her exploratory laparotomy with biopsy of her mesenteric mass. She denied shortness of breath, chest pain, lightheadedness, or dizziness at time of presentation. In the ED patient was initially tachycardic over 120s however this resolved without intervention. CBC and CMP were significant for hyponatremia. Troponins and BNP were WNL. CXR demonstrated bibasal effusions with adjacent consolidation. CTA PE showed no pulmonary emboli, moderate ascites and redemonstrated the bibasal pleural effusions. Patient was given 2 doses of Lasix 20 mg in the ED.     Patient was transferred to [facility] service and IR was consulted for bilateral thoracenteses. IR drained 450 mL from the left lung and 600 mL from the right lung. Pleural fluid cytology and culture with gram stain were sent and are pending at time of discharge. Pleural fluid protein and LDH were consistent with a transudative effusion. UA and urine protein/creatinine ratio were ordered to rule out nephrotic syndrome. Patient's breathing improved following fluid removal and the lower extremity edema decreased following continued lasix therapy. Patient's hyponatremia was chronic primary to arrival and remained stable on her preexisting salt tablets and fluid restriction. Patient has follow up appointment scheduled with her palliative care physician and with her hematologist/oncologist for potential cancer treatment. All of the patient's questions were addressed and she was educated on potential symptoms that should prompt representation to the emergency department. Patient was medically stable at the time of discharge.     [facility] Call       Date and time call was made  10/4/2024  1:27 PM    Hospital care reviewed  Records reviewed    Patient was hospitialized at  [facility]    Date of Admission  10/01/24     Date of discharge  10/03/24    Diagnosis  Pleural effusion    Disposition  Home    Current Symptoms  Upper abdominal pain; Lower abdominal pain    Lower abdominal pain severity  Moderate    Lower abdominal pain onset  Gradual    Upper abdominal pain severity  Moderate    Upper abdominal pain onset  Gradual          TCM Call       Post hospital issues  None    Should patient be enrolled in anticoag monitoring?  No    Scheduled for follow up?  Yes    Patients specialists  Endocrinologist; Neurologist    Did you obtain your prescribed medications  Yes    Do you need help managing your prescriptions or medications  No    Is transportation to your appointment needed  No    I have advised the patient to call PCP with any new or worsening symptoms  CAESAR Jade    Living Arrangements  Spouse or Significiant other    Support System  Spouse; Children    The type of support provided  Emotional; Financial; Physical    Do you have social support  Yes, as much as I need    Are you recieving any outpatient services  No    Are you recieving home care services  No    Are you using any community resources  No    Current waiver services  No    Have you fallen in the last 12 months  No    Interperter language line needed  No    Comments  do not reschedule after 10/17              The following portions of the patient's history were reviewed and updated as appropriate: allergies, current medications, past family history, past medical history, past social history, past surgical history and problem list.     Past Medical History:   Diagnosis Date    Acne     Basal cell carcinoma 06/08/2020    right lower forehead    BCC (basal cell carcinoma of skin) 03/09/2020    mid forehead    Benign neoplasm of skin     Cancer (HCC)     mesenteric cancer    Disease of thyroid gland 2006    Essential hypertension 02/26/2018    GERD (gastroesophageal reflux disease)     Hypertension     Inflamed seborrheic keratosis     Irritable bowel  syndrome     Malignant neoplasm of skin of face     Migraines     Nonmelanoma skin cancer     Last Assessed:6/27/17    Squamous cell skin cancer 06/08/2020    In situ, left lower forehead    Tachycardia     Telogen effluvium     Temporomandibular joint disorder     Vertigo        Allergies   Allergen Reactions    Cortisone Hypertension, Other (See Comments), Shortness Of Breath and Tachycardia    Acebutolol     Acetaminophen GI Intolerance     diarrhea    Amlodipine     Atenolol     Azithromycin     Barium Sulfate Diarrhea     Patient reports watery stool and stomach ache post fluoro upper GI on 6/6/24. Per Radiologist MD Lozada, reaction should not absolutely preclude patient from having barium in the future if necessary. CG RN 6/7/24      Bisphosphonates      Annotation - [id number]: iriitis    Candesartan     Clarithromycin     Erythromycin     Fosinopril     Irbesartan     Levofloxacin     Losartan     Methylprednisolone Hypertension and Tachycardia    Metoprolol     Nisoldipine     Olmesartan     Propranolol     Sulfamethoxazole-Trimethoprim Nausea Only    Timolol     Lidocaine Palpitations and Tachycardia       Past Surgical History:   Procedure Laterality Date    ADENOIDECTOMY      APPENDECTOMY      CHOLECYSTECTOMY      COLONOSCOPY  05/03/2019    COMPLEX WOUND CLOSURE TO EXTREMITY N/A 7/28/2020    Procedure: COMPLEX CLOSURE MID FOREHEAD;  Surgeon: Randy Frank MD;  Location: [facility];  Service: Plastics    ESOPHAGOGASTRODUODENOSCOPY  2009    FLAP LOCAL HEAD / NECK N/A 7/28/2020    Procedure: FLAP MID FOREHEAD;  Surgeon: Randy Frank MD;  Location: [facility];  Service: Plastics    HYSTERECTOMY  1987    IR THORACENTESIS  10/2/2024    LYMPH NODE DISSECTION N/A 9/6/2024    Procedure: BIOPSY ABDOMINAL MASS, LYMPHOMA PROTOCOL;  Surgeon: Angus Drew MD;  Location: [facility];  Service: Surgical Oncology    MALIGNANT SKIN LESION EXCISION      Excision of Lesion Face Malignant-9/14/2004 BCC  Forehead    MOHS RECONSTRUCTION N/A 7/28/2020    Procedure: RECONSTRUCTION MOHS DEFECT MID FOREHEAD;  Surgeon: Randy Frank MD;  Location: [facility];  Service: Plastics    MOHS SURGERY  07/27/2020    Right &left lower forehead, mid forehead    OOPHORECTOMY Bilateral 1987    ROTATOR CUFF REPAIR Right     SKIN BIOPSY      TONSILLECTOMY      TUBAL LIGATION  1976?       Family History   Problem Relation Age of Onset    Hypertension Mother         Mother    Osteoporosis Mother     Skin cancer Mother     Migraines Mother     Dementia Mother     Hypertension Father         Father    Skin cancer Father     Dementia Father     Skin cancer Sister 73    Migraines Sister     No Known Problems Daughter     Uterine cancer Maternal Grandmother 29    Diabetes type II Maternal Grandfather     Cancer Paternal Grandmother     Uterine cancer Paternal Grandmother 65    No Known Problems Maternal Aunt     Cancer Paternal Aunt         Breast    Uterine cancer Paternal Aunt 55    No Known Problems Paternal Aunt     No Known Problems Paternal Aunt        Social History     Socioeconomic History    Marital status: /Civil Union     Spouse name: None    Number of children: None    Years of education: None    Highest education level: None   Occupational History    None   Tobacco Use    Smoking status: Never    Smokeless tobacco: Never   Vaping Use    Vaping status: Never Used   Substance and Sexual Activity    Alcohol use: Not Currently    Drug use: Never    Sexual activity: Not Currently     Partners: Male     Birth control/protection: Post-menopausal   Other Topics Concern    None   Social History Narrative    None     Social Determinants of Health     Financial Resource Strain: Not on file   Food Insecurity: No Food Insecurity (10/2/2024)    Hunger Vital Sign     Worried About Running Out of Food in the Last Year: Never true     Ran Out of Food in the Last Year: Never true   Transportation Needs: No Transportation Needs  (10/2/2024)    PRAPARE - Transportation     Lack of Transportation (Medical): No     Lack of Transportation (Non-Medical): No   Physical Activity: Not on file   Stress: Not on file   Social Connections: Not on file   Intimate Partner Violence: Not on file   Housing Stability: Low Risk  (10/2/2024)    Housing Stability Vital Sign     Unable to Pay for Housing in the Last Year: No     Number of Times Moved in the Last Year: 0     Homeless in the Last Year: No       Current Outpatient Medications   Medication Sig Dispense Refill    ascorbic acid (VITAMIN C) 500 mg tablet Take 500 mg by mouth daily 1 tablet daily      CICLOPIROX EX       colesevelam (WELCHOL) 625 mg tablet take 3 tablets by mouth twice a day with meals 540 tablet 1    colesevelam (WELCHOL) 625 mg tablet       cyproheptadine (PERIACTIN) 4 mg tablet take 0.5 tablet by mouth four times a day 120 tablet 0    Digestive Enzyme CAPS Take 1 capsule by mouth if needed (gas relief) beano taking 1 tablet as needed gas relief      escitalopram (LEXAPRO) 5 mg tablet Take 1 tablet (5 mg total) by mouth daily 90 tablet 0    furosemide (LASIX) 20 mg tablet Take 2 tablets (40 mg total) by mouth daily 60 tablet 5    lactase (LACTAID) 3,000 units tablet Take 3,000 Units by mouth as needed (.) 2 tablets as needed if eating dairy      Multiple Vitamins-Minerals (CENTRUM SILVER ULTRA WOMENS PO) Take by mouth daily 1 tablet daily      nystatin (MYCOSTATIN) cream Apply topically 2 (two) times a day (Patient taking differently: Apply 1 Application topically 2 (two) times a day apply to skin fold areas as needed for rash) 30 g 0    ondansetron (ZOFRAN) 4 mg tablet Take 1 tablet (4 mg total) by mouth every 8 (eight) hours as needed for nausea or vomiting 20 tablet 0    polyethylene glycol-propylene glycol (SYSTANE) 0.4-0.3 % Apply 1 drop to eye if needed (dry eye) as needed daily Both eyes. dry eyes      potassium chloride (Klor-Con M20) 20 mEq tablet Take 1 tablet (20 mEq total)  "by mouth daily 90 tablet 3    sodium chloride 1 g tablet Take 1 tablet (1 g total) by mouth 3 (three) times a day with meals      zinc oxide 20 % ointment Apply topically as needed for irritation (Apply after washing for barrier) 425 g 0    bisacodyl (DULCOLAX) 10 mg suppository Insert 1 suppository (10 mg total) into the rectum daily as needed for constipation for up to 7 days (Patient not taking: Reported on 9/12/2024) 7 suppository 0    senna-docusate sodium (SENOKOT S) 8.6-50 mg per tablet Take 1 tablet by mouth daily at bedtime for 7 days (Patient not taking: Reported on 9/12/2024) 7 tablet 0     No current facility-administered medications for this visit.       Review of Systems     As noted in HPI    Objective      /68 (BP Location: Left arm, Patient Position: Sitting, Cuff Size: Standard)   Pulse 90   Temp 98.2 °F (36.8 °C) (Temporal)   Ht 5' 2\" (1.575 m)   Wt 45.9 kg (101 lb 3.2 oz)   LMP  (LMP Unknown)   SpO2 100%   BMI 18.51 kg/m²     Physical Exam  Vitals reviewed.   Constitutional:       General: She is not in acute distress.     Appearance: She is not toxic-appearing.      Comments: frail   HENT:      Head: Normocephalic and atraumatic.      Mouth/Throat:      Mouth: Mucous membranes are moist.      Pharynx: Oropharynx is clear.   Eyes:      Conjunctiva/sclera: Conjunctivae normal.   Cardiovascular:      Rate and Rhythm: Normal rate and regular rhythm.      Pulses: Normal pulses.      Heart sounds: Normal heart sounds.   Pulmonary:      Effort: Pulmonary effort is normal. No respiratory distress.      Breath sounds: Examination of the right-lower field reveals decreased breath sounds. Examination of the left-lower field reveals decreased breath sounds. Decreased breath sounds present. No wheezing or rales.   Abdominal:      General: A surgical scar is present. There is distension.      Tenderness: There is no abdominal tenderness. There is no guarding or rebound.   Musculoskeletal:      " "Right lower leg: Edema present.      Left lower leg: Edema present.   Skin:     General: Skin is warm and dry.   Neurological:      Mental Status: She is alert and oriented to person, place, and time.   Psychiatric:         Mood and Affect: Mood normal.         Behavior: Behavior normal.         Thought Content: Thought content normal.             Some portions of this record may have been generated with voice recognition software. There may be translation, syntax, or grammatical errors. Occasional wrong word or \"sound-a-like\" substitutions may have occurred due to the inherent limitations of the voice recognition software. Read the chart carefully and recognize, using context, where substations may have occurred. If you have any questions, please contact the dictating provider for clarification or correction, as needed.  "

## 2024-10-16 ENCOUNTER — CONSULT (OUTPATIENT)
Dept: HEMATOLOGY ONCOLOGY | Facility: CLINIC | Age: 79
End: 2024-10-16
Payer: MEDICARE

## 2024-10-16 VITALS
OXYGEN SATURATION: 100 % | BODY MASS INDEX: 19.14 KG/M2 | DIASTOLIC BLOOD PRESSURE: 70 MMHG | WEIGHT: 104 LBS | RESPIRATION RATE: 18 BRPM | TEMPERATURE: 97.3 F | HEIGHT: 62 IN | SYSTOLIC BLOOD PRESSURE: 118 MMHG | HEART RATE: 87 BPM

## 2024-10-16 VITALS — SYSTOLIC BLOOD PRESSURE: 110 MMHG | HEART RATE: 74 BPM | DIASTOLIC BLOOD PRESSURE: 64 MMHG | OXYGEN SATURATION: 98 %

## 2024-10-16 DIAGNOSIS — J90 PLEURAL EFFUSION: ICD-10-CM

## 2024-10-16 DIAGNOSIS — E87.1 HYPONATREMIA: ICD-10-CM

## 2024-10-16 DIAGNOSIS — R14.0 ABDOMINAL DISTENSION: ICD-10-CM

## 2024-10-16 DIAGNOSIS — M79.89 LEG SWELLING: ICD-10-CM

## 2024-10-16 DIAGNOSIS — E03.9 ACQUIRED HYPOTHYROIDISM: ICD-10-CM

## 2024-10-16 DIAGNOSIS — E43 SEVERE PROTEIN-CALORIE MALNUTRITION (HCC): ICD-10-CM

## 2024-10-16 DIAGNOSIS — N18.31 CHRONIC KIDNEY DISEASE (CKD) STAGE G3A/A2, MODERATELY DECREASED GLOMERULAR FILTRATION RATE (GFR) BETWEEN 45-59 ML/MIN/1.73 SQUARE METER AND ALBUMINURIA CREATININE RATIO BETWEEN 30-299 MG/G (HCC): ICD-10-CM

## 2024-10-16 DIAGNOSIS — R00.0 TACHYCARDIA: ICD-10-CM

## 2024-10-16 DIAGNOSIS — H90.3 SENSORINEURAL HEARING LOSS (SNHL) OF BOTH EARS: ICD-10-CM

## 2024-10-16 DIAGNOSIS — E88.09 HYPOALBUMINEMIA: ICD-10-CM

## 2024-10-16 DIAGNOSIS — C48.1: Primary | ICD-10-CM

## 2024-10-16 DIAGNOSIS — K58.9 IRRITABLE BOWEL SYNDROME, UNSPECIFIED TYPE: ICD-10-CM

## 2024-10-16 PROCEDURE — 99205 OFFICE O/P NEW HI 60 MIN: CPT | Performed by: INTERNAL MEDICINE

## 2024-10-16 NOTE — PROGRESS NOTES
Hematology/Oncology Outpatient Office Note    Date of Service: 10/16/2024    [facility]      Reason for Consultation: No chief complaint on file.      Cancer Stage: ***    Referral Physician: Angus Drew MD    Primary Care Physician:  Delicia Cintron DO     Nickname: ***    Spouse:***    Original ECOG: ***    Today's ECOG:***    Goals and Barriers:  Current Goal: Minimize effects of disease burden, extend life.   Barriers to accomplishing this: None***    Patient's Capacity to Self Care:  Patient is able to self care***        ASSESSMENT & PLAN     No diagnosis found.      This is a 79 y.o. c PMHx notable for ***, being seen in consultation for ***       Discussion of decision making  Oncology history updated, accordingly, during this visit  Goals of care/patient communication  I discussed with the patient the clinical course leading up to their cancer diagnosis. I reviewed relevant office notes, imaging reports and pathology result as well.  I told the patient that this is a case of curable/incurable*** disease and what this means. We discussed that the goal of anti-cancer therapy is to provide best quality of life, extend overall survival, and progression free survival as shown in clinical trials. We also discussed that there might be a point when the cancer will no longer respond to this anti-neoplastic therapy. As a result, we also discussed the role of the palliative care team being introduced early in the treatment course. We will be making this referral***  I explained the risks/benefits of the proposed cancer therapy: *** and after discussion including understanding risks of possible life-threatening complications and therapy-related malignancy development, informed consent has been signed and obtained***.  TNM/Staging At Diagnosis  TNM***  Cancer Staging   No matching staging information was found for the patient.     Disease  Features/Tumor Markers/Genetics  Tumor Marker: ***  Notable Path Features: ***  Treatment  Other Supportive care: Neulasta***  Treatment Team Members***  Surgeon  Rad Onc  Palliative  Labs  Diagnostics        Discussion of decision making    I personally reviewed the following lab results, the image studies, pathology, other specialty/physicians consult notes and recommendations, and outside medical records. I had a lengthy discussion with the patient and shared the work-up findings. We discussed the diagnosis and management plan as below. I spent *** minutes reviewing the records (labs, clinician notes, outside records, medical history, ordering medicine/tests/procedures, monitoring of anti-neoplastic toxicities, interpreting the imaging/labs previously done) and coordination of care as well as direct time with the patient today, of which greater than 50% of the time was spent in counseling and coordination of care with the patient/family.  New patient consults: 40-54 minutes minimum needed for 03141 billing. 55 and above minutes minimum needed for 23731 billing***  Established patients: 20-29 minutes minimum needed for 37202 billing. 30-39 minutes minimum needed for 53441 billing. 40-54 minutes minimum needed for 72247 billing***  Prolonged visits: Use code  (medicare) or 47718 (commercial) when spending 55 minutes minimum when paired with underlying 97244 billing (spending 15 minutes additional minimum). 69 minutes minimum needed when paired with underlying 51028 billing***  Smoking cessation counselin***    Plan/Labs  ***Zofran 8mg q8 prn nausea/vomiting to be sent home        Follow Up    All questions were answered to the patient's satisfaction during this encounter. The patient knows the contact information for our office and knows to reach out for any relevant concerns related to this encounter. They are to call for any temperature 100.4 or higher, new symptoms including but not restricted to  shaking chills, decreased appetite, nausea, vomiting, diarrhea, increased fatigue, shortness of breath or chest pain, confusion, and not feeling the strength to come to the clinic. For all other listed problems and medical diagnosis in their chart - they are managed by PCP and/or other specialists, which the patient acknowledges. Thank you very much for your consultation and making us a part of this patient's care. We are continuing to follow closely with you. Please do not hesitate to reach out to me with any additional questions or concerns.    Mona Pritchard MD  Hematology & Medical Oncology Staff Physician             Disclaimer: This document was prepared using Dragon [employer] One. If a word or phrase is confusing, or does not make sense, this is likely due to recognition error which was not discovered during this clinician's review. If you believe an error has occurred, please contact me through [facility] service line for michelle?cation.      ONCOLOGY HISTORY OF PRESENT ILLNESS        Oncology History   Cancer of mesentery (HCC)   9/6/2024 Surgery    Biopsy abdominal mass  Soft tissue, mesenteric mass, biopsy:  - Metastatic carcinoma      9/19/2024 Initial Diagnosis    Cancer of mesentery (HCC)           SUBJECTIVE  (INTERVAL HISTORY)    ***  Clotting History    Bleeding History    Cancer History    Family Cancer History    H/O Blood/Plt Transfusion    Tobacco/etoh/drug abuse    Hx COVID19 Infection and Vaccine Status    Cancer Screening history    Occupation    New medications in the last month:  Pain:        I have reviewed the relevant past medical, surgical, social and family history. I have also reviewed allergies and medications for this patient.    Review of Systems  Review of Systems      A 10-point review of system was performed, pertinent positive and negative were detailed as above. Otherwise, the 10-point review of system was negative.      Past Medical History:   Diagnosis Date    Acne     Basal cell  carcinoma 06/08/2020    right lower forehead    BCC (basal cell carcinoma of skin) 03/09/2020    mid forehead    Benign neoplasm of skin     Cancer (HCC)     mesenteric cancer    Disease of thyroid gland 2006    Essential hypertension 02/26/2018    GERD (gastroesophageal reflux disease)     Hypertension     Inflamed seborrheic keratosis     Irritable bowel syndrome     Malignant neoplasm of skin of face     Migraines     Nonmelanoma skin cancer     Last Assessed:6/27/17    Squamous cell skin cancer 06/08/2020    In situ, left lower forehead    Tachycardia     Telogen effluvium     Temporomandibular joint disorder     Vertigo        Past Surgical History:   Procedure Laterality Date    ADENOIDECTOMY      APPENDECTOMY      CHOLECYSTECTOMY      COLONOSCOPY  05/03/2019    COMPLEX WOUND CLOSURE TO EXTREMITY N/A 7/28/2020    Procedure: COMPLEX CLOSURE MID FOREHEAD;  Surgeon: Randy Frank MD;  Location: [facility];  Service: Plastics    ESOPHAGOGASTRODUODENOSCOPY  2009    FLAP LOCAL HEAD / NECK N/A 7/28/2020    Procedure: FLAP MID FOREHEAD;  Surgeon: Randy Frank MD;  Location: [facility];  Service: Plastics    HYSTERECTOMY  1987    IR THORACENTESIS  10/2/2024    LYMPH NODE DISSECTION N/A 9/6/2024    Procedure: BIOPSY ABDOMINAL MASS, LYMPHOMA PROTOCOL;  Surgeon: Angus Drew MD;  Location: [facility];  Service: Surgical Oncology    MALIGNANT SKIN LESION EXCISION      Excision of Lesion Face Malignant-9/14/2004 BCC Forehead    MOHS RECONSTRUCTION N/A 7/28/2020    Procedure: RECONSTRUCTION MOHS DEFECT MID FOREHEAD;  Surgeon: Randy Frank MD;  Location: [facility];  Service: Plastics    MOHS SURGERY  07/27/2020    Right &left lower forehead, mid forehead    OOPHORECTOMY Bilateral 1987    ROTATOR CUFF REPAIR Right     SKIN BIOPSY      TONSILLECTOMY      TUBAL LIGATION  1976?       Family History   Problem Relation Age of Onset    Hypertension Mother         Mother    Osteoporosis Mother     Skin  cancer Mother     Migraines Mother     Dementia Mother     Hypertension Father         Father    Skin cancer Father     Dementia Father     Skin cancer Sister 73    Migraines Sister     No Known Problems Daughter     Uterine cancer Maternal Grandmother 29    Diabetes type II Maternal Grandfather     Cancer Paternal Grandmother     Uterine cancer Paternal Grandmother 65    No Known Problems Maternal Aunt     Cancer Paternal Aunt         Breast    Uterine cancer Paternal Aunt 55    No Known Problems Paternal Aunt     No Known Problems Paternal Aunt        Social History     Socioeconomic History    Marital status: /Civil Union     Spouse name: Not on file    Number of children: Not on file    Years of education: Not on file    Highest education level: Not on file   Occupational History    Not on file   Tobacco Use    Smoking status: Never    Smokeless tobacco: Never   Vaping Use    Vaping status: Never Used   Substance and Sexual Activity    Alcohol use: Not Currently    Drug use: Never    Sexual activity: Not Currently     Partners: Male     Birth control/protection: Post-menopausal   Other Topics Concern    Not on file   Social History Narrative    Not on file     Social Determinants of Health     Financial Resource Strain: Not on file   Food Insecurity: No Food Insecurity (10/2/2024)    Hunger Vital Sign     Worried About Running Out of Food in the Last Year: Never true     Ran Out of Food in the Last Year: Never true   Transportation Needs: No Transportation Needs (10/2/2024)    PRAPARE - Transportation     Lack of Transportation (Medical): No     Lack of Transportation (Non-Medical): No   Physical Activity: Not on file   Stress: Not on file   Social Connections: Not on file   Intimate Partner Violence: Not on file   Housing Stability: Low Risk  (10/2/2024)    Housing Stability Vital Sign     Unable to Pay for Housing in the Last Year: No     Number of Times Moved in the Last Year: 0     Homeless in the  Last Year: No       Allergies   Allergen Reactions    Cortisone Hypertension, Other (See Comments), Shortness Of Breath and Tachycardia    Acebutolol     Acetaminophen GI Intolerance     diarrhea    Amlodipine     Atenolol     Azithromycin     Barium Sulfate Diarrhea     Patient reports watery stool and stomach ache post fluoro upper GI on 6/6/24. Per Radiologist MD Lozada, reaction should not absolutely preclude patient from having barium in the future if necessary. CG RN 6/7/24      Bisphosphonates      Annotation - [id number]: iriitis    Candesartan     Clarithromycin     Erythromycin     Fosinopril     Irbesartan     Levofloxacin     Losartan     Methylprednisolone Hypertension and Tachycardia    Metoprolol     Nisoldipine     Olmesartan     Propranolol     Sulfamethoxazole-Trimethoprim Nausea Only    Timolol     Lidocaine Palpitations and Tachycardia       Current Outpatient Medications   Medication Sig Dispense Refill    ascorbic acid (VITAMIN C) 500 mg tablet Take 500 mg by mouth daily 1 tablet daily      bisacodyl (DULCOLAX) 10 mg suppository Insert 1 suppository (10 mg total) into the rectum daily as needed for constipation for up to 7 days (Patient not taking: Reported on 9/12/2024) 7 suppository 0    CICLOPIROX EX       colesevelam (WELCHOL) 625 mg tablet take 3 tablets by mouth twice a day with meals 540 tablet 1    colesevelam (WELCHOL) 625 mg tablet       cyproheptadine (PERIACTIN) 4 mg tablet take 0.5 tablet by mouth four times a day 120 tablet 0    Digestive Enzyme CAPS Take 1 capsule by mouth if needed (gas relief) beano taking 1 tablet as needed gas relief      escitalopram (LEXAPRO) 5 mg tablet Take 1 tablet (5 mg total) by mouth daily 90 tablet 0    furosemide (LASIX) 20 mg tablet Take 2 tablets (40 mg total) by mouth daily 60 tablet 5    lactase (LACTAID) 3,000 units tablet Take 3,000 Units by mouth as needed (.) 2 tablets as needed if eating dairy      Multiple Vitamins-Minerals (CENTRUM SILVER  ULTRA WOMENS PO) Take by mouth daily 1 tablet daily      nystatin (MYCOSTATIN) cream Apply topically 2 (two) times a day (Patient taking differently: Apply 1 Application topically 2 (two) times a day apply to skin fold areas as needed for rash) 30 g 0    ondansetron (ZOFRAN) 4 mg tablet Take 1 tablet (4 mg total) by mouth every 8 (eight) hours as needed for nausea or vomiting 20 tablet 0    polyethylene glycol-propylene glycol (SYSTANE) 0.4-0.3 % Apply 1 drop to eye if needed (dry eye) as needed daily Both eyes. dry eyes      potassium chloride (Klor-Con M20) 20 mEq tablet Take 1 tablet (20 mEq total) by mouth daily 90 tablet 3    senna-docusate sodium (SENOKOT S) 8.6-50 mg per tablet Take 1 tablet by mouth daily at bedtime for 7 days (Patient not taking: Reported on 9/12/2024) 7 tablet 0    sodium chloride 1 g tablet Take 1 tablet (1 g total) by mouth 3 (three) times a day with meals      zinc oxide 20 % ointment Apply topically as needed for irritation (Apply after washing for barrier) 425 g 0     No current facility-administered medications for this visit.       (Not in a hospital admission)        Objective:     24 Hour Vitals Assessment:     There were no vitals filed for this visit.    PHYSICIAN EXAM ***:    Physical Exam        DATA REVIEW:    Pathology Result:    Final Diagnosis   Date Value Ref Range Status   10/02/2024   Final    A-B. Pleural Fluid, Right, Thoracentesis (ThinPrep and cell block preparations):    - Cellular fluid with reactive mesothelial cells, abundant lymphocytes and scattered inflammatory cells; see note.     - Negative for metastatic carcinoma.         09/06/2024   Final    A.  Soft tissue, mesenteric mass, biopsy:  - Metastatic carcinoma (see note).         Comment:     This is an appended report. These results have been appended to a previously preliminary verified report.   08/07/2024   Final    A. Duodenum, :  Duodenal mucosa with preserved villous architecture  No histomorphologic  "features of celiac disease identified  No dysplasia, malignancy or parasites identified     B. Stomach, :  Mild chronic inactive gastritis and reactive gastropathy  No H. pylori identified on H&E stained slide         05/13/2024   Final    A. Colon, \"Random colon biopsies rule out microscopic colitis,\" Biopsy:  - Benign colonic mucosa with intact glandular architecture  - Negative for lymphocytic, collagenous, or active colitis  - Negative for acute colitis  - Negative for granulomas, dysplasia, or carcinoma       09/14/2020   Final    A. Skin, left upper back, shave biopsy:    BASAL CELL CARCINOMA, SUPERFICIAL TYPE.         B. Skin, left shin, shave biopsy:    BASAL CELL CARCINOMA, SUPERFICIAL TYPE.     06/08/2020   Final    A. Skin, right lower forehead, shave biopsy:    BASAL CELL CARCINOMA, SUPERFICIAL TYPE; extending to the tissue edges.      B. Skin, left lower forehead, shave biopsy:    SQUAMOUS CELL CARCINOMA IN SITU; extending to the tissue edges.            03/09/2020   Final    A. Skin, mid forehead, shave biopsy:    BASAL CELL CARCINOMA, SUPERFICIAL AND NODULAR TYPES, with focal squamous differentiation; extending to the tissue edges.                 Image Results:   Image result are reviewed and documented in Hematology/Oncology history    IR IN-Patient Thoracentesis  Narrative: Ultrasound-guided bilateral thoracentesis    Clinical History: Bilateral pleural effusions    Technique: The patient was brought to the interventional radiology area and placed in the sitting position on the side of a stretcher. The right chest was then examined with ultrasound and the skin was marked.    The skin was then prepped, and draped in usual sterile fashion. Chloroprocaine was administered to the skin and a small skin incision was made. Under direct ultrasound guidance, a 5 Bengali Yueh needle was advanced percutaneously into the pleural space.   450 mL clear yellow pleural fluid was aspirated. Specimens were sent to " the lab as requested    The left chest was then examined with ultrasound and the skin was marked. The skin was then prepped, and draped in usual sterile fashion. Chloroprocaine was administered to the skin and a small skin incision was made. Under direct ultrasound guidance, a   5 Armenian Yueh needle was advanced percutaneously into the pleural space. 600 mL of dark yellow pleural fluid was aspirated. Specimens were sent to the lab as requested    The patient tolerated the procedure well and suffered no complications.  Impression: Impression:  1. Successful ultrasound-guided right thoracentesis yielding 450 mL clear yellow pleural fluid.  2. Successful ultrasound-guided left thoracentesis yielding 600 mL of dark yellow pleural fluid.    Workstation performed: [id number]  Echo complete w/ contrast if indicated    Left Ventricle: Left ventricular cavity size is normal. Wall thickness   is mildly increased. The left ventricular ejection fraction is 55%.   Systolic function is normal. Wall motion is normal. Diastolic function is   mildly abnormal, consistent with grade I (abnormal) relaxation.    Left Atrium: The atrium is mildly dilated.    Aortic Valve: There is mild to moderate regurgitation with a centrally   directed jet.    Mitral Valve: There is moderate regurgitation.    Tricuspid Valve: There is moderate regurgitation. The right ventricular   systolic pressure is mildly elevated. The estimated right ventricular   systolic pressure is 35.00 mmHg.    Pericardium: There is a small pericardial effusion circumferential to   the heart. The fluid exhibits no internal echoes. There is a moderately   sized left pleural effusion.      LABS:  Lab data are reviewed and documented in HemOn history.     ***  Lab Results   Component Value Date    HGB 10.3 (L) 10/03/2024    HCT 31.5 (L) 10/03/2024    MCV 90 10/03/2024     10/03/2024    WBC 4.97 10/03/2024    NRBC 0 10/03/2024     Lab Results   Component Value Date    K  "4.6 10/07/2024     10/07/2024    CO2 26 10/07/2024    BUN 12 10/07/2024    CREATININE 0.76 10/07/2024    GLUCOSE 114 09/06/2024    GLUF 85 10/07/2024    CALCIUM 8.1 (L) 10/07/2024    CORRECTEDCA 9.3 10/01/2024    AST 22 10/01/2024    ALT 13 10/01/2024    ALKPHOS 57 10/01/2024    EGFR 73 10/07/2024       Lab Results   Component Value Date    IRON 45 (L) 09/30/2024    TIBC 242 (L) 09/30/2024    FERRITIN 194 09/30/2024       Lab Results   Component Value Date    IUVOQETI60 439 09/30/2024    ZLLZELNZ15 449 03/22/2018       No results for input(s): \"WBC\", \"CREAT\", \"PLT\" in the last 72 hours.     By:  Mona Pritchard MD, 10/16/2024, 12:28 PM                                  "

## 2024-10-16 NOTE — PROGRESS NOTES
Hematology/Oncology Outpatient Follow- up Note  Brigid Brown 79 y.o. female MRN: @ Encounter: [id number]        Date:  10/16/2024        Assessment / Plan:      1. Cancer of mesentery (HCC)  -     Ambulatory Referral to Hematology / Oncology  2. Acquired hypothyroidism  3. Sensorineural hearing loss (SNHL) of both ears  4. Chronic kidney disease (CKD) stage G3a/A2, moderately decreased glomerular filtration rate (GFR) between 45-59 mL/min/1.73 square meter and albuminuria creatinine ratio between  mg/g (HCC)  5. Pleural effusion  6. Irritable bowel syndrome, unspecified type  7. Severe protein-calorie malnutrition (HCC)  8. Hyponatremia  9. Abdominal distension  10. Tachycardia  11. Hypoalbuminemia  12. 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  Discussed with Dr. Drew.  Likely pancreatic cancer.  Patient with a poor performance status at this point.  Would not tolerate any cancer directed treatment.  We discussed that at this point patient should be working on improving her nutrition and working to get her physical strength.  In the meantime I will send the tumor for Caris testing.  Patient has an appointment with palliative team on 10/29/2024.  At this point the focus was more on improving quality of life.  Patient with low albumin and anasarca.  Pleural fluid was negative for malignant cells.  Discussed improving nutrition to help with fatigue, hyponatremia/electrolyte imbalance and third spacing of the fluid.  Will reach out to the patient/family once I have the Caris testing results.      HPI:    80 y/o with abdominal pain and weight loss. This has been ongoing issue and she initially underwent EGD through the [facility] system with report of gastritis though records of this are not currently available through epic. Took a course of oral sucralfate which she reports was initially helpful but then symptoms worsened again. Was started on pantoprazole which has had no  effect. Reports an approximate 20 pound weight loss over this time. Reported discomfort in the bilateral lower quadrants primarily but on exam is quite tender at the epigastrium. 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 No blood in her stool, no nausea or vomiting, fever or chills, jaundice or rash, changes in bowel habit. She does report early satiety. Has undergone prior evaluation including CT scan with IV but not oral contrast which was unrevealing, colonoscopy to the cecum with normal random biopsies. Mesenteric Doppler ultrasound with no evidence of occlusion of the celiac or superior mesenteric arteries. CBC, LFTs, lipase reviewed and unremarkable.   Upper GI exam was ordered at last OV which demonstrates very mild esophagoesophageal reflux and intermittent tertiary contractions when the patient is recumbent. No gastroesophageal reflux was directly observed during the exam. Unremarkable small bowel follow-through. Contrast is identified within the ascending colon within 30 minutes.  CAT scan was recently repeated of chest abdomen and pelvis which showed soft tissue prominence of the mesenteric root, especially surrounding the superior mesenteric artery, increased when compared to a CT abdomen/pelvis dated April 9, 2024. These findings are nonspecific with differential considerations including sclerosing mesenteritis, malignancy/metastatic disease, inflammatory process, lymphoma, among other etiologies. A PET/CT scan is recommended for further evaluation. A short-term follow-up CT abdomen/pelvis in 3 months is also recommended. Fecal calprotectin mildly elevated at 149 which is nonspecific.   She reports that she gets burning on her side but not in her actual epigastric area and initially the Carafate helped with his burning.  She was placed on Remeron but then she stopped taking it because she was making herself eat.  She reports that she is eating which is has no desire to eat and she  does have early satiety.  She reports her diarrhea has gotten worse after she got the barium but then around that time it looks like she also was treated with antibiotics.  She reports that sometimes stool will be oily and float.  Did have fecal calprotectin back in 2020 which was normal.  Patient reports he generally can eat and does not have significant early satiety but just no actual desire.  She reports she had been losing weight but over the past few years she was up to 130 pounds but then had progressive weight loss which she attributes to her gastritis in February Interval History:    Patient presents today with her daughter Merari and son Alexander.  She is in a wheelchair.  Established care with palliative.  Currently working on getting her strength and trying to gain some weight.  Has been following with nephrology for hyponatremia.  Continues to have shortness of breath.  Reports lack of energy and profound fatigue.  Stays in bed or recliner most of the day.  Has gained some weight however also has ascites and pleural effusions.  Denies chest pains.  Endorses diarrhea for which she has been taking antidiarrheal medications.      Cancer Staging:  Cancer Staging   No matching staging information was found for the patient.      Previous Hematologic/ Oncologic History:    Oncology History Overview Note   As per GI note:  78 y/o with abdominal pain and weight loss. This has been ongoing issue and she initially underwent EGD through the [facility] system with report of gastritis though records of this are not currently available through epic. Took a course of oral sucralfate which she reports was initially helpful but then symptoms worsened again. Was started on pantoprazole which has had no effect. Reports an approximate 20 pound weight loss over this time. Reported discomfort in the bilateral lower quadrants primarily but on exam is quite tender at the epigastrium. Reported a longstanding history of diarrhea  predominant IBS. No blood in her stool, no nausea or vomiting, fever or chills, jaundice or rash, changes in bowel habit. She does report early satiety. Has undergone prior evaluation including CT scan with IV but not oral contrast which was unrevealing, colonoscopy to the cecum with normal random biopsies. Mesenteric Doppler ultrasound with no evidence of occlusion of the celiac or superior mesenteric arteries. CBC, LFTs, lipase reviewed and unremarkable.   Upper GI exam was ordered at last OV which demonstrates very mild esophagoesophageal reflux and intermittent tertiary contractions when the patient is recumbent. No gastroesophageal reflux was directly observed during the exam. Unremarkable small bowel follow-through. Contrast is identified within the ascending colon within 30 minutes.  CAT scan was recently repeated of chest abdomen and pelvis which showed soft tissue prominence of the mesenteric root, especially surrounding the superior mesenteric artery, increased when compared to a CT abdomen/pelvis dated April 9, 2024. These findings are nonspecific with differential considerations including sclerosing mesenteritis, malignancy/metastatic disease, inflammatory process, lymphoma, among other etiologies. A PET/CT scan is recommended for further evaluation. A short-term follow-up CT abdomen/pelvis in 3 months is also recommended. Fecal calprotectin mildly elevated at 149 which is nonspecific.   She reports that she gets burning on her side but not in her actual epigastric area and initially the Carafate helped with his burning.  She was placed on Remeron but then she stopped taking it because she was making herself eat.  She reports that she is eating which is has no desire to eat and she does have early satiety.  She reports her diarrhea has gotten worse after she got the barium but then around that time it looks like she also was treated with antibiotics.  She reports that sometimes stool will be oily and  float.  Did have fecal calprotectin back in 2020 which was normal.  Patient reports he generally can eat and does not have significant early satiety but just no actual desire.  She reports she had been losing weight but over the past few years she was up to 130 pounds but then had progressive weight loss which she attributes to her gastritis in February.    CT C/A/P:  Narrative & Impression   CT CHEST, ABDOMEN AND PELVIS WITH IV CONTRAST     INDICATION: weight loss  right mid abdominal pain.     COMPARISON: CT abdomen pelvis dated April 9, 2024.     TECHNIQUE: CT examination of the chest, abdomen and pelvis was performed. Multiplanar 2D reformatted images were created from the source data.     This examination, like all CT scans performed in the [facility], was performed utilizing techniques to minimize radiation dose exposure, including the use of iterative reconstruction and automated exposure control. Radiation dose length   product (DLP) for this visit: 356 mGy-cm     IV Contrast: 100 mL of iohexol (OMNIPAQUE)  Enteric Contrast: Administered.     FINDINGS:     CHEST     LUNGS: There is no infiltrate or pleural effusion. There is a linear focus of scarring versus atelectasis at the lateral left lung base. No suspicious pulmonary nodules. No tracheal or endobronchial lesion.     PLEURA: There is a stable 4 mm right perifissural nodule (series 302 image 216) most compatible with an intrapulmonary lymph node.     HEART/GREAT VESSELS: Heart is unremarkable for patient's age. No thoracic aortic aneurysm.     MEDIASTINUM AND LAAMR: Unremarkable.     CHEST WALL AND LOWER NECK: Unremarkable.     ABDOMEN     LIVER/BILIARY TREE: Stable size of an enhancing right inferior hepatic lobe lesion measuring 18 mm, previously described as a hemangioma on MRI abdomen dated December 9, 2019. Stable additional subcentimeter low-attenuation hepatic lesions, too small to   accurately characterize.     GALLBLADDER: No  calcified gallstones. No pericholecystic inflammatory change.     SPLEEN: Unremarkable.     PANCREAS: Unremarkable.     ADRENAL GLANDS: Unremarkable.     KIDNEYS/URETERS: Unremarkable. No hydronephrosis.     STOMACH AND BOWEL: Unremarkable.     APPENDIX: No findings to suggest appendicitis.     ABDOMINOPELVIC CAVITY: Small amount of pelvic free fluid. No pneumoperitoneum. No lymphadenopathy. There is soft tissue prominence of the mesenteric root, particular surrounding the superior mesenteric artery which has increased from the prior exam. Mild   diffuse mesenteric edema is also mildly increased from the prior exam.     VESSELS: Unremarkable for patient's age.     PELVIS     REPRODUCTIVE ORGANS: Post hysterectomy.     URINARY BLADDER: Unremarkable.     ABDOMINAL WALL/INGUINAL REGIONS: Unremarkable.     BONES: No acute fracture or suspicious osseous lesion.     IMPRESSION:     No acute intra-abdominal abnormality. Trace pelvic free fluid.     Soft tissue prominence of the mesenteric root, especially surrounding the superior mesenteric artery, increased when compared to a CT abdomen/pelvis dated April 9, 2024. These findings are nonspecific with differential considerations including sclerosing   mesenteritis, malignancy/metastatic disease, inflammatory process, lymphoma, among other etiologies. A PET/CT scan is recommended for further evaluation. A short-term follow-up CT abdomen/pelvis in 3 months is also recommended.       PET CT:  IMPRESSION:     1. Mild variable FDG uptake suggested along the mesenteric root soft tissue thickening. This is nonspecific, could be related to a nonspecific inflammatory process with malignancy not excluded; sclerosing mesenteritis, amyloidosis, desmoid tumor, and   lymphoproliferative processes such as lymphoma are considerations. Consider continued imaging follow-up if tissue sampling is not performed.              EUS 07/18/2024  FINDINGS:  The esophagus appeared normal.  1 cm hiatal  "hernia - GE junction 42 cm from the incisors, diaphragmatic impression 43 cm from the incisors  Mild, localized erythematous mucosa in the stomach  The duodenum appeared normal.  Normal celiac takeoff.  No evidence of pancreatic ductal dilation throughout the examination.  Bile duct was normal caliber with no filling defects noted.  Ampulla appeared normal.  No masses or lymphadenopathy appreciated.  No abnormality noted to correlate with PET findings.     Cancer of mesentery (HCC)   9/6/2024 Surgery    Biopsy abdominal mass  Soft tissue, mesenteric mass, biopsy:  - Metastatic carcinoma      9/6/2024 Biopsy    Final Diagnosis   A.  Soft tissue, mesenteric mass, biopsy:  - Metastatic carcinoma (see note).   The sample shows predominantly fibrovascular and adipose tissue with chronic inflammation does not correspond.  Patchy areas of pancreatic type tissue are present.  Areas of highly atypical infiltrating epithelial malignancy with glandular formation and features of mucin are present with in desmoplastic fibrotic tissue. Immunohistochemical stains performed with appropriate controls show the tumor to be positive for keratin A1/3, CK7, and weak partial CDX2 and negative for CK20, PAX8, p53, ER, WT1, TTF-1, Napsin, and GATA3; SMAD4/DPC4 shows loss of nuclear staining.       The findings are not definitively specific and may indicate carcinoma metastasizing from the pancreas, biliary tract, gallbladder, upper GI tract, or (less likely) appendix.  Correlation with clinical impression and other laboratory findings is recommended to assess for other sites of disease or site of origin as clinically indicated.   Intraoperative Consultation  [facility]   AF1:  A. Mesentery, \"mesenteric mass,\" Biopsy:  - Carcinoma        9/19/2024 Initial Diagnosis    Cancer of mesentery (HCC)       Test Results:    Imaging: IR IN-Patient Thoracentesis    Result Date: 10/2/2024  Narrative: Ultrasound-guided bilateral thoracentesis Clinical " History: Bilateral pleural effusions Technique: The patient was brought to the interventional radiology area and placed in the sitting position on the side of a stretcher. The right chest was then examined with ultrasound and the skin was marked. The skin was then prepped, and draped in usual sterile fashion. Chloroprocaine was administered to the skin and a small skin incision was made. Under direct ultrasound guidance, a 5 Greek Yueh needle was advanced percutaneously into the pleural space. 450 mL clear yellow pleural fluid was aspirated. Specimens were sent to the lab as requested The left chest was then examined with ultrasound and the skin was marked. The skin was then prepped, and draped in usual sterile fashion. Chloroprocaine was administered to the skin and a small skin incision was made. Under direct ultrasound guidance, a 5 Greek Yueh needle was advanced percutaneously into the pleural space. 600 mL of dark yellow pleural fluid was aspirated. Specimens were sent to the lab as requested The patient tolerated the procedure well and suffered no complications.     Impression: Impression: 1. Successful ultrasound-guided right thoracentesis yielding 450 mL clear yellow pleural fluid. 2. Successful ultrasound-guided left thoracentesis yielding 600 mL of dark yellow pleural fluid. Workstation performed: [id number]     Echo complete w/ contrast if indicated    Result Date: 10/2/2024  Narrative:   Left Ventricle: Left ventricular cavity size is normal. Wall thickness is mildly increased. The left ventricular ejection fraction is 55%. Systolic function is normal. Wall motion is normal. Diastolic function is mildly abnormal, consistent with grade I (abnormal) relaxation.   Left Atrium: The atrium is mildly dilated.   Aortic Valve: There is mild to moderate regurgitation with a centrally directed jet.   Mitral Valve: There is moderate regurgitation.   Tricuspid Valve: There is moderate regurgitation. The right  ventricular systolic pressure is mildly elevated. The estimated right ventricular systolic pressure is 35.00 mmHg.   Pericardium: There is a small pericardial effusion circumferential to the heart. The fluid exhibits no internal echoes. There is a moderately sized left pleural effusion.     CTA ED chest PE Study    Result Date: 10/1/2024  Narrative: CTA - CHEST WITH IV CONTRAST - PULMONARY ANGIOGRAM INDICATION: r/o PE in the setting of active cancer with tachycardia and tachypnea. COMPARISON: CT chest/abdomen/pelvis 6/10/2024. PET/CT 7/8/2024. TECHNIQUE: CTA examination of the chest was performed using angiographic technique according to a protocol specifically tailored to evaluate for pulmonary embolism. Multiplanar 2D reformatted images were created from the source data. In addition, coronal  3D MIP postprocessing was performed on the acquisition scanner. This examination was performed utilizing dual energy CT technique. Radiation dose length product (DLP) for this visit: 149 mGy-cm . This examination, like all CT scans performed in the [facility], was performed utilizing techniques to minimize radiation dose exposure, including the use of iterative reconstruction and automated exposure control. IV Contrast: 50 mL of iohexol (OMNIPAQUE) FINDINGS: PULMONARY ARTERIAL TREE:  No pulmonary embolus. LUNGS: The central airways are patent. Subtotal atelectasis of the bilateral lower lobes in the setting of pleural effusions. PLEURA: Large bilateral pleural effusions. No pneumothorax. HEART/GREAT VESSELS: Stable mild cardiomegaly. Coronary artery calcifications. No thoracic aortic aneurysm. MEDIASTINUM AND LAMAR: Unremarkable. CHEST WALL AND LOWER NECK: Diffuse chest wall edema. VISUALIZED STRUCTURES IN THE UPPER ABDOMEN: Partially imaged abdominal ascites OSSEOUS STRUCTURES: No acute fracture or destructive osseous lesion.     Impression: 1.  No pulmonary embolus. 2.  Large bilateral pleural effusions. 3.   "Partially imaged abdominal ascites. Workstation performed: [id number]     XR chest 1 view portable    Result Date: 10/1/2024  Narrative: XR CHEST PORTABLE INDICATION: abd and leg swelling. COMPARISON: August 22, 2024 FINDINGS: Bibasal effusions and adjacent consolidation. No pneumothorax. Normal cardiomediastinal silhouette. Bones are unremarkable for age. Normal upper abdomen.     Impression: Bibasal effusions and adjacent consolidation. Workstation performed: [id number]             Labs:   Lab Results   Component Value Date    WBC 4.97 10/03/2024    HGB 10.3 (L) 10/03/2024    HCT 31.5 (L) 10/03/2024    MCV 90 10/03/2024     10/03/2024     Lab Results   Component Value Date    K 4.6 10/07/2024     10/07/2024    CO2 26 10/07/2024    BUN 12 10/07/2024    CREATININE 0.76 10/07/2024    GLUCOSE 114 09/06/2024    GLUF 85 10/07/2024    CALCIUM 8.1 (L) 10/07/2024    CORRECTEDCA 9.3 10/01/2024    AST 22 10/01/2024    ALT 13 10/01/2024    ALKPHOS 57 10/01/2024    EGFR 73 10/07/2024         No results found for: \"SPEP\", \"UPEP\"    No results found for: \"PSA\"    No results found for: \"CEA\"    No results found for: \"\"    No results found for: \"AFP\"    Lab Results   Component Value Date    IRON 45 (L) 09/30/2024    TIBC 242 (L) 09/30/2024    FERRITIN 194 09/30/2024       Lab Results   Component Value Date    ECKDBMAM45 439 09/30/2024         ROS: Review of Systems   Constitutional:  Positive for activity change, appetite change and fatigue. Negative for fever.   HENT:  Negative for mouth sores, nosebleeds and sore throat.    Eyes:  Negative for pain and visual disturbance.   Respiratory:  Positive for shortness of breath. Negative for chest tightness and wheezing.    Cardiovascular:  Negative for chest pain and palpitations.   Gastrointestinal:  Positive for abdominal pain and diarrhea. Negative for vomiting.   Genitourinary:  Negative for dysuria, frequency and hematuria.   Musculoskeletal:  Positive for " arthralgias.   Skin:  Negative for rash and wound.   Neurological:  Positive for dizziness, weakness (generalized) and light-headedness. Negative for seizures, speech difficulty and headaches.   Hematological:  Negative for adenopathy. Does not bruise/bleed easily.   Psychiatric/Behavioral:  Negative for confusion and hallucinations.          Current Medications: Reviewed  Allergies: Reviewed  PMH/FH/SH:  Reviewed      Physical Exam:    Body surface area is 1.45 meters squared.    Wt Readings from Last 3 Encounters:   10/16/24 47.2 kg (104 lb)   10/15/24 45.9 kg (101 lb 3.2 oz)   10/09/24 43.2 kg (95 lb 5 oz)        Temp Readings from Last 3 Encounters:   10/16/24 (!) 97.3 °F (36.3 °C) (Temporal)   10/15/24 98.2 °F (36.8 °C) (Temporal)   10/07/24 98.4 °F (36.9 °C) (Temporal)        BP Readings from Last 3 Encounters:   10/16/24 118/70   10/15/24 120/68   10/11/24 110/62         Pulse Readings from Last 3 Encounters:   10/16/24 87   10/15/24 74   10/15/24 90         Physical Exam  Constitutional:       General: She is not in acute distress.     Appearance: She is ill-appearing.   HENT:      Head: Atraumatic.      Mouth/Throat:      Mouth: Mucous membranes are moist.      Pharynx: Oropharynx is clear.   Eyes:      General: No scleral icterus.     Comments: Conjunctival chemosis   Cardiovascular:      Rate and Rhythm: Regular rhythm. Tachycardia present.      Heart sounds: Murmur heard.   Pulmonary:      Effort: Pulmonary effort is normal.      Breath sounds: No wheezing.      Comments: Diminished breath sounds at bilateral bases.   Abdominal:      General: There is distension.      Palpations: Abdomen is soft. There is no mass.   Musculoskeletal:      Cervical back: No tenderness.      Right lower leg: Edema present.      Left lower leg: Edema present.      Comments: Edema of both hands.    Lymphadenopathy:      Cervical: No cervical adenopathy.   Skin:     Findings: No bruising, erythema or rash.   Neurological:       General: No focal deficit present.      Mental Status: She is alert and oriented to person, place, and time.   Psychiatric:         Behavior: Behavior normal.         Thought Content: Thought content normal.         Judgment: Judgment normal.           Goals and Barriers:  Current Goal: Prolong Survival from Cancer.   Barriers: None.      Patient's Capacity to Self Care:  Patient is able to self care.    Disclaimer: This document was prepared using [employer] technology. If a word or phrase is confusing, or does not make sense, this is likely due to recognition error which was not discovered during this clinician's review. If you believe an error has occurred, please contact me through [facility] service line for michelle?cation.      Thank you very much for your consultation and making us a part of this patient's care. We are continuing to follow closely with you. Please do not hesitate to reach out to me with any additional questions or concerns.    Follow Up    All questions were answered to the patient's satisfaction during this encounter. The patient knows the contact information for our office and knows to reach out for any relevant concerns related to this encounter. They are to call for any temperature 100.4 or higher, new symptoms including but not restricted to shaking chills, decreased appetite, nausea, vomiting, diarrhea, increased fatigue, shortness of breath or chest pain, confusion, and not feeling the strength to come to the clinic. For all other listed problems and medical diagnosis in their chart - they are managed by PCP and/or other specialists, which the patient acknowledges.     I spent 68 minutes reviewing the records (labs, clinician notes, outside records, medical history, ordering medicine/tests/procedures, monitoring of anti-neoplastic toxicities, interpreting the imaging/labs previously done) and coordination of care as well as direct time with the patient today, of which greater than  50% of the time was spent in counseling and coordination of care with the patient/family.

## 2024-10-16 NOTE — PROGRESS NOTES
Received Paperwork   What type of form FMLA   Scanned blank form into patient's Epic chart Yes   Method received form  In Person drop off   Provider responsible for form    Informed patient our office turn around time for completing patient forms is 5-7 business days. Yes, informed patient of turn around time     Comments

## 2024-10-17 ENCOUNTER — HOME CARE VISIT (OUTPATIENT)
Dept: HOME HEALTH SERVICES | Facility: HOME HEALTHCARE | Age: 79
End: 2024-10-17
Payer: MEDICARE

## 2024-10-17 ENCOUNTER — TELEPHONE (OUTPATIENT)
Age: 79
End: 2024-10-17

## 2024-10-17 ENCOUNTER — NURSE TRIAGE (OUTPATIENT)
Age: 79
End: 2024-10-17

## 2024-10-17 VITALS
DIASTOLIC BLOOD PRESSURE: 60 MMHG | SYSTOLIC BLOOD PRESSURE: 118 MMHG | OXYGEN SATURATION: 96 % | TEMPERATURE: 98.6 F | RESPIRATION RATE: 18 BRPM | HEART RATE: 92 BPM

## 2024-10-17 VITALS — SYSTOLIC BLOOD PRESSURE: 112 MMHG | DIASTOLIC BLOOD PRESSURE: 60 MMHG | HEART RATE: 99 BPM | OXYGEN SATURATION: 96 %

## 2024-10-17 VITALS — DIASTOLIC BLOOD PRESSURE: 65 MMHG | OXYGEN SATURATION: 98 % | HEART RATE: 100 BPM | SYSTOLIC BLOOD PRESSURE: 100 MMHG

## 2024-10-17 DIAGNOSIS — E87.1 HYPONATREMIA: ICD-10-CM

## 2024-10-17 PROCEDURE — G0300 HHS/HOSPICE OF LPN EA 15 MIN: HCPCS

## 2024-10-17 PROCEDURE — G0157 HHC PT ASSISTANT EA 15: HCPCS

## 2024-10-17 PROCEDURE — G0152 HHCP-SERV OF OT,EA 15 MIN: HCPCS

## 2024-10-17 NOTE — TELEPHONE ENCOUNTER
Lissa from [facility], states that the patient has had a 10 lb weight gain in a week.  She is fatigued.  She also has bilateral leg edema up to mid thigh.      I will call patient for triage.

## 2024-10-17 NOTE — TELEPHONE ENCOUNTER
"Triaged patient due to call from Tiffanie with  [facility].  She states that the patient has had a 10 lb weight gain in a week.  She also feels that the patients bilateral lower extremity edema is worse.  The edema is from her thighs to her feet.  Patient states that this is her normal.  She states her SOB is mild.  Denies fever.  Does endorse fatigue.  Patient does not want to go to the ED for evaluation.      Patient is on Lasix.  Could an extra dose be given today?    Please advise.       Reason for Disposition   MILD swelling of both ankles (i.e., pedal edema) AND new-onset or worsening    Answer Assessment - Initial Assessment Questions  1. ONSET: \"When did the swelling start?\" (e.g., minutes, hours, days)      Chronic but worse today  2. LOCATION: \"What part of the leg is swollen?\"  \"Are both legs swollen or just one leg?\"      Bilateral legs from thigh to feet  3. SEVERITY: \"How bad is the swelling?\" (e.g., localized; mild, moderate, severe)   - Localized - small area of swelling localized to one leg   - MILD pedal edema - swelling limited to foot and ankle, pitting edema < 1/4 inch (6 mm) deep, rest and elevation eliminate most or all swelling   - MODERATE edema - swelling of lower leg to knee, pitting edema > 1/4 inch (6 mm) deep, rest and elevation only partially reduce swelling   - SEVERE edema - swelling extends above knee, facial or hand swelling present       Up to the thigh, pitting  4. REDNESS: \"Does the swelling look red or infected?\"      Denies   5. PAIN: \"Is the swelling painful to touch?\" If Yes, ask: \"How painful is it?\"   (Scale 1-10; mild, moderate or severe)      Denies  6. FEVER: \"Do you have a fever?\" If Yes, ask: \"What is it, how was it measured, and when did it start?\"       Denies  7. CAUSE: \"What do you think is causing the leg swelling?\"      Med history   8. MEDICAL HISTORY: \"Do you have a history of heart failure, kidney disease, liver failure, or cancer?\"      On lasix, fluid taken " "out of her lung at the hospital   9. RECURRENT SYMPTOM: \"Have you had leg swelling before?\" If Yes, ask: \"When was the last time?\" \"What happened that time?\"      Chronic  10. OTHER SYMPTOMS: \"Do you have any other symptoms?\" (e.g., chest pain, difficulty breathing)        SOB at times,   11. PREGNANCY: \"Is there any chance you are pregnant?\" \"When was your last menstrual period?\"        N/A    Protocols used: Leg Swelling and Edema-ADULT-[place]    "

## 2024-10-17 NOTE — TELEPHONE ENCOUNTER
Regarding: Worsening Edema  ----- Message from Yahaira GIBBS sent at 10/17/2024  3:45 PM EDT -----  Lissa from [facility], states that the patient has had a 10 lb weight gain in a week.  She is fatigued.  She also has bilateral leg edema up to mid thigh.

## 2024-10-18 ENCOUNTER — APPOINTMENT (OUTPATIENT)
Dept: LAB | Facility: MEDICAL CENTER | Age: 79
End: 2024-10-18
Payer: MEDICARE

## 2024-10-18 DIAGNOSIS — B37.2 CANDIDAL DIAPER RASH: ICD-10-CM

## 2024-10-18 DIAGNOSIS — L22 CANDIDAL DIAPER RASH: ICD-10-CM

## 2024-10-18 DIAGNOSIS — E87.1 HYPONATREMIA: ICD-10-CM

## 2024-10-18 LAB
ANION GAP SERPL CALCULATED.3IONS-SCNC: 6 MMOL/L (ref 4–13)
BUN SERPL-MCNC: 16 MG/DL (ref 5–25)
CALCIUM SERPL-MCNC: 7.7 MG/DL (ref 8.4–10.2)
CHLORIDE SERPL-SCNC: 99 MMOL/L (ref 96–108)
CO2 SERPL-SCNC: 27 MMOL/L (ref 21–32)
CREAT SERPL-MCNC: 0.81 MG/DL (ref 0.6–1.3)
GFR SERPL CREATININE-BSD FRML MDRD: 68 ML/MIN/1.73SQ M
GLUCOSE SERPL-MCNC: 97 MG/DL (ref 65–140)
OSMOLALITY UR/SERPL-RTO: 280 MMOL/KG (ref 282–298)
OSMOLALITY UR: 419 MMOL/KG
POTASSIUM SERPL-SCNC: 3.8 MMOL/L (ref 3.5–5.3)
SODIUM 24H UR-SCNC: 85 MOL/L
SODIUM SERPL-SCNC: 132 MMOL/L (ref 135–147)
URATE SERPL-MCNC: 3.3 MG/DL (ref 2–7.5)

## 2024-10-18 PROCEDURE — 83930 ASSAY OF BLOOD OSMOLALITY: CPT

## 2024-10-18 PROCEDURE — 80048 BASIC METABOLIC PNL TOTAL CA: CPT

## 2024-10-18 PROCEDURE — 36415 COLL VENOUS BLD VENIPUNCTURE: CPT

## 2024-10-18 PROCEDURE — 84550 ASSAY OF BLOOD/URIC ACID: CPT

## 2024-10-18 NOTE — TELEPHONE ENCOUNTER
Patient was recently evaluated in office for transition of care appointment.  Will appreciate input from her nephrologist about diuretic adjustment if needed.  If patient experiences any shortness of breath should repeat chest x-ray as she was recently in hospital for pleural effusion.

## 2024-10-21 ENCOUNTER — TELEPHONE (OUTPATIENT)
Dept: OTHER | Facility: HOSPITAL | Age: 79
End: 2024-10-21

## 2024-10-21 RX ORDER — NYSTATIN 100000 U/G
1 CREAM TOPICAL 2 TIMES DAILY
Qty: 30 G | Refills: 1 | Status: SHIPPED | OUTPATIENT
Start: 2024-10-21

## 2024-10-21 RX ORDER — SODIUM CHLORIDE 1 G/1
1 TABLET ORAL 2 TIMES DAILY
Start: 2024-10-21 | End: 2024-11-20

## 2024-10-21 NOTE — TELEPHONE ENCOUNTER
Pt's son Alexander called back and seemed like pt may have been confused after speaking to Dr. Ferrara. I have clarified with pt's son the following message per Dr. Ferrara     Will reduce salt tablet from 1 g 3 times daily to twice daily dosing.  Increase furosemide from 40 mg daily to 40 mg twice daily for next 3 days and then continue 40 mg daily afterwards.  Increase potassium supplement from 20 meq daily to twice daily dosing for next 3 days and then continue 20 meq daily afterwards.     She is to call us back in 1 week with daily weight and if no improvement in leg swelling.       Pt's son verbalized understanding.

## 2024-10-21 NOTE — TELEPHONE ENCOUNTER
Spoke to patient at length regarding her concern of leg swelling.  Her recent serum sodium remained stable 132 without any change.    Her weight relatively stable around 98 pounds most recent since her recent office visit.  Her prior weight during office visit was around 95 to 98 pounds.  Her leg swelling is still present but not significantly worse as per patient.    Will reduce salt tablet from 1 g 3 times daily to twice daily dosing.  Increase furosemide from 40 mg daily to 40 mg twice daily for next 3 days and then continue 40 mg daily afterwards.  Increase potassium supplement from 20 meq daily to twice daily dosing for next 3 days and then continue 20 meq daily afterwards.    She is to call us back in 1 week with daily weight and if no improvement in leg swelling.  She verbalized understanding of my discussion with her.  All her questions were answered.

## 2024-10-21 NOTE — TELEPHONE ENCOUNTER
Please refer to detailed telephone call note conversation from nurse triage encounter on 10/17/2024

## 2024-10-22 ENCOUNTER — HOME CARE VISIT (OUTPATIENT)
Dept: HOME HEALTH SERVICES | Facility: HOME HEALTHCARE | Age: 79
End: 2024-10-22
Payer: MEDICARE

## 2024-10-22 VITALS — HEART RATE: 88 BPM | DIASTOLIC BLOOD PRESSURE: 60 MMHG | SYSTOLIC BLOOD PRESSURE: 100 MMHG | OXYGEN SATURATION: 95 %

## 2024-10-22 PROCEDURE — G0157 HHC PT ASSISTANT EA 15: HCPCS

## 2024-10-23 ENCOUNTER — HOME CARE VISIT (OUTPATIENT)
Dept: HOME HEALTH SERVICES | Facility: HOME HEALTHCARE | Age: 79
End: 2024-10-23
Payer: MEDICARE

## 2024-10-23 VITALS — BODY MASS INDEX: 18.47 KG/M2 | WEIGHT: 101 LBS

## 2024-10-23 PROCEDURE — G0151 HHCP-SERV OF PT,EA 15 MIN: HCPCS

## 2024-10-24 ENCOUNTER — HOME CARE VISIT (OUTPATIENT)
Dept: HOME HEALTH SERVICES | Facility: HOME HEALTHCARE | Age: 79
End: 2024-10-24
Payer: MEDICARE

## 2024-10-24 ENCOUNTER — TELEPHONE (OUTPATIENT)
Dept: HEMATOLOGY ONCOLOGY | Facility: CLINIC | Age: 79
End: 2024-10-24

## 2024-10-24 VITALS
OXYGEN SATURATION: 98 % | HEART RATE: 92 BPM | BODY MASS INDEX: 18.02 KG/M2 | DIASTOLIC BLOOD PRESSURE: 80 MMHG | WEIGHT: 98.5 LBS | SYSTOLIC BLOOD PRESSURE: 125 MMHG

## 2024-10-24 VITALS
DIASTOLIC BLOOD PRESSURE: 62 MMHG | RESPIRATION RATE: 16 BRPM | HEART RATE: 76 BPM | TEMPERATURE: 97.6 F | OXYGEN SATURATION: 95 % | SYSTOLIC BLOOD PRESSURE: 124 MMHG

## 2024-10-24 PROCEDURE — G0299 HHS/HOSPICE OF RN EA 15 MIN: HCPCS

## 2024-10-24 PROCEDURE — G0152 HHCP-SERV OF OT,EA 15 MIN: HCPCS

## 2024-10-24 NOTE — TELEPHONE ENCOUNTER
Alexander called to check status on [facility] paperwork. Per Alexander he dropped paperwork off on 10/16. Please call Alexander at [phone]. Per Alexander he does understand it takes 5-7 business days and he still has yet to heard from office in regards to [facility] paperwork.           Thanks!

## 2024-10-28 ENCOUNTER — TELEPHONE (OUTPATIENT)
Dept: HEMATOLOGY ONCOLOGY | Facility: CLINIC | Age: 79
End: 2024-10-28

## 2024-10-28 ENCOUNTER — HOME CARE VISIT (OUTPATIENT)
Dept: HOME HEALTH SERVICES | Facility: HOME HEALTHCARE | Age: 79
End: 2024-10-28
Payer: MEDICARE

## 2024-10-28 ENCOUNTER — TELEPHONE (OUTPATIENT)
Age: 79
End: 2024-10-28

## 2024-10-28 VITALS
OXYGEN SATURATION: 100 % | HEART RATE: 87 BPM | BODY MASS INDEX: 18.2 KG/M2 | SYSTOLIC BLOOD PRESSURE: 120 MMHG | DIASTOLIC BLOOD PRESSURE: 75 MMHG | WEIGHT: 99.5 LBS

## 2024-10-28 DIAGNOSIS — E44.1 MILD PROTEIN-CALORIE MALNUTRITION (HCC): ICD-10-CM

## 2024-10-28 PROCEDURE — G0152 HHCP-SERV OF OT,EA 15 MIN: HCPCS

## 2024-10-28 RX ORDER — CYPROHEPTADINE HYDROCHLORIDE 4 MG/1
TABLET ORAL
Qty: 120 TABLET | Refills: 0 | Status: SHIPPED | OUTPATIENT
Start: 2024-10-28

## 2024-10-28 NOTE — TELEPHONE ENCOUNTER
Reason for call:   [x] Refill   [] Prior Auth  [] Other:     Office:   [x] PCP/Provider - FP vale Cintron DO(daughter verified provider)  [] Specialty/Provider -     Medication:   cyproheptadine (PERIACTIN) 4 mg tablet - 0.5 tablet by mouth four times a day      Pharmacy: [id number] - [place] - [address] [place] [address]    Does the patient have enough for 3 days?   [] Yes   [x] No - Send as HP to POD

## 2024-10-28 NOTE — TELEPHONE ENCOUNTER
Called and spoke with daughter Merari and made her aware that the appt requested stemmed out of me going over chart and not seeing a follow up in place. Dr Pritchard mentioned that she was still waiting for [facility] testing to return and expected to have more information by that appt. She is agreeable to this appt in follow up.  Daughter then asks about FMLA papers that brother needs. It is explained to her that we don't have a plan in place for her just yet so I don't have any dates that I can provide until we have results and Dr Pritchard decides on treatment plan that will follow. She states that brother wants FMLA to take her to different appt and not necessarily our specialty appts. Made her aware I would discuss with SW and provider and follow up with them.

## 2024-10-28 NOTE — TELEPHONE ENCOUNTER
Please let patient and son know that weight seems to be relatively stable.  Her electrolytes and renal function do not explain her significant fatigue.  Patient also discuss further with PCP for any other etiologies for patient not feeling well.

## 2024-10-28 NOTE — TELEPHONE ENCOUNTER
Patient's son Alexander calling with weights over the past week. He states the swelling is a little bit better but she is still very fatigued.  Please advise  10/22- 101  10/23- 101  10/24- 98.5  10/25- 98.5  10/26- 100.5  10/27- 98.5  10/28- 99.5

## 2024-10-29 ENCOUNTER — OFFICE VISIT (OUTPATIENT)
Dept: PALLIATIVE MEDICINE | Facility: CLINIC | Age: 79
End: 2024-10-29
Payer: MEDICARE

## 2024-10-29 ENCOUNTER — HOME CARE VISIT (OUTPATIENT)
Dept: HOME HEALTH SERVICES | Facility: HOME HEALTHCARE | Age: 79
End: 2024-10-29
Payer: MEDICARE

## 2024-10-29 VITALS
DIASTOLIC BLOOD PRESSURE: 72 MMHG | TEMPERATURE: 97.3 F | SYSTOLIC BLOOD PRESSURE: 128 MMHG | HEIGHT: 62 IN | BODY MASS INDEX: 17.94 KG/M2 | OXYGEN SATURATION: 95 % | HEART RATE: 90 BPM | WEIGHT: 97.5 LBS

## 2024-10-29 VITALS — DIASTOLIC BLOOD PRESSURE: 80 MMHG | SYSTOLIC BLOOD PRESSURE: 128 MMHG | HEART RATE: 91 BPM | OXYGEN SATURATION: 99 %

## 2024-10-29 DIAGNOSIS — C48.1: Primary | ICD-10-CM

## 2024-10-29 DIAGNOSIS — R43.2 DYSGEUSIA: ICD-10-CM

## 2024-10-29 DIAGNOSIS — E43 SEVERE PROTEIN-CALORIE MALNUTRITION (HCC): ICD-10-CM

## 2024-10-29 DIAGNOSIS — Z71.89 GOALS OF CARE, COUNSELING/DISCUSSION: ICD-10-CM

## 2024-10-29 DIAGNOSIS — R63.0 POOR APPETITE: ICD-10-CM

## 2024-10-29 DIAGNOSIS — Z51.5 PALLIATIVE CARE BY SPECIALIST: ICD-10-CM

## 2024-10-29 PROCEDURE — G0157 HHC PT ASSISTANT EA 15: HCPCS

## 2024-10-29 PROCEDURE — 99214 OFFICE O/P EST MOD 30 MIN: CPT | Performed by: STUDENT IN AN ORGANIZED HEALTH CARE EDUCATION/TRAINING PROGRAM

## 2024-10-29 NOTE — PROGRESS NOTES
Ambulatory Visit - Progress Note  Name: Brigid Brown      : 1945      MRN: [id number]  Encounter Provider: Kimo Quezada DO  Encounter Date: 10/29/2024   Encounter department: [facility] PALLIATIVE [facility]    Assessment & Plan  Cancer of mesentery (HCC)  Following Medical Oncology - Dr. Pritchard.  Pending CARIS testing.  Indicated poor performance status and therefore patient would not tolerate any cancer directed treatments.       Dysgeusia  Options to help with dry mouth, discussed trial of home remedies as below:  1) can trial mints, lozenges, gums to help maintain salivary production  2) discussed the options of nonalcohol containing mouthwash/rinse, including Biotene spray/mouthwash         Goals of care, counseling/discussion  Goals - Patient not ready for comfort care/hospice. She would want to return to the hospital if she required medical attention. She is open to treatment options once additional CARIS testing completed from Medical Oncology. Patient awaiting to hear about possible options and if offered, she would want to proceed with disease directed treatments at this time.         Palliative care by specialist  Psychosocial   Supportive listening provided  Normalized experience of patient/family  Provided anxiety containment     Referrals Placed / Medical Equipment Ordered  -None    Follow-Up Recommendations  -Follow-up with PCP and current medical specialists  -Follow-up with palliative care: 2 weeks after patient has had a chance to follow-up with Oncology to discuss CARIS testing results and options.       Severe protein-calorie malnutrition (HCC)  Malnutrition Findings:       BMI Findings:      Body mass index is 17.83 kg/m².        Poor appetite  Discussed potential options today.  MMJ reviewed in terms of its potential benefits to help with appetite stimulation.    Mirtazapine was reviewed, however, patient's recent EKG from 10/2/2024 demonstrated a prolonged QTC of 472, therefore, will  hold off on Qtc prolonging agents for safety at this time.    Encouraged protein supplementation and tips for managing dry mouth/dysgeusia.         Decisional apparatus: Patient is competent on my exam today. If competence is lost, patient's substitute decision maker would default to family by [place].   Advance Directive / Living Will / POLST: on file     PDMP Review: I have reviewed the patient's controlled substance dispensing history in the Prescription Drug Monitoring Program in compliance with the [facility] regulations before prescribing any controlled substances.    History of Present Illness     Brigid Brown is a 79 y.o. female who presents follow-up.    Palliative Diagnosis - Mesentary cancer, potentially pancreatic.    No pain today.  Patient with no appetite and family inquired about options relating to appetite stimulation.  Options reviewed and patient has been intolerant to steroids in the past. Mirtazapine not a good option at this time as she has Qtc prolongation of 472ms from EKG on 10/2/2024.  Discussed MMJ with as this could help with appetite stimulation along with sleep, anxiety, nausea, and/or pain.    They are agreeable to try MMJ. We will have our office set the patient up for MMJ certification so that she can hopefully start this as soon as possible.    All questions and concerns answered today.    History obtained from : patient and children (daughter and son, Merari/Alexander)  Review of Systems   Constitutional:  Positive for appetite change (poor appetite). Negative for chills, fatigue and fever.   HENT:  Negative for trouble swallowing.    Eyes:  Negative for visual disturbance.   Respiratory:  Negative for shortness of breath.    Cardiovascular:  Positive for leg swelling (improved). Negative for chest pain.   Gastrointestinal:  Negative for abdominal pain, constipation, diarrhea, nausea and vomiting.   Genitourinary:  Negative for difficulty urinating.   Skin:  Negative for wound.    Neurological:  Positive for weakness.   Psychiatric/Behavioral:  Negative for confusion.    All other systems reviewed and are negative.    Medical History Reviewed by provider this encounter:  Tobacco  Allergies  Meds  Problems  Med Hx  Surg Hx  Fam Hx       Past Medical History   Past Medical History:   Diagnosis Date    Acne     Basal cell carcinoma 06/08/2020    right lower forehead    BCC (basal cell carcinoma of skin) 03/09/2020    mid forehead    Benign neoplasm of skin     Cancer (HCC)     mesenteric cancer    Disease of thyroid gland 2006    Essential hypertension 02/26/2018    GERD (gastroesophageal reflux disease)     Hypertension     Inflamed seborrheic keratosis     Irritable bowel syndrome     Malignant neoplasm of skin of face     Migraines     Nonmelanoma skin cancer     Last Assessed:6/27/17    Squamous cell skin cancer 06/08/2020    In situ, left lower forehead    Tachycardia     Telogen effluvium     Temporomandibular joint disorder     Vertigo      Past Surgical History:   Procedure Laterality Date    ADENOIDECTOMY      APPENDECTOMY      CHOLECYSTECTOMY      COLONOSCOPY  05/03/2019    COMPLEX WOUND CLOSURE TO EXTREMITY N/A 7/28/2020    Procedure: COMPLEX CLOSURE MID FOREHEAD;  Surgeon: Randy Frank MD;  [facility]: [facility];  Service: Plastics    ESOPHAGOGASTRODUODENOSCOPY  2009    FLAP LOCAL HEAD / NECK N/A 7/28/2020    Procedure: FLAP MID FOREHEAD;  Surgeon: Randy Frank MD;  Location: [facility];  Service: Plastics    HYSTERECTOMY  1987    IR THORACENTESIS  10/2/2024    LYMPH NODE DISSECTION N/A 9/6/2024    Procedure: BIOPSY ABDOMINAL MASS, LYMPHOMA PROTOCOL;  Surgeon: Angus Drew MD;  Location: [facility];  Service: Surgical Oncology    MALIGNANT SKIN LESION EXCISION      Excision of Lesion Face Malignant-9/14/2004 BCC Forehead    MOHS RECONSTRUCTION N/A 7/28/2020    Procedure: RECONSTRUCTION MOHS DEFECT MID FOREHEAD;  Surgeon: Randy Frank MD;  [facility];  Service: Plastics    MOHS SURGERY  07/27/2020    Right &left lower forehead, mid forehead    OOPHORECTOMY Bilateral 1987    ROTATOR CUFF REPAIR Right     SKIN BIOPSY      TONSILLECTOMY      TUBAL LIGATION  1976?     Family History   Problem Relation Age of Onset    Hypertension Mother         Mother    Osteoporosis Mother     Skin cancer Mother     Migraines Mother     Dementia Mother     Hypertension Father         Father    Skin cancer Father     Dementia Father     Skin cancer Sister 73    Migraines Sister     No Known Problems Daughter     Uterine cancer Maternal Grandmother 29    Diabetes type II Maternal Grandfather     Cancer Paternal Grandmother     Uterine cancer Paternal Grandmother 65    No Known Problems Maternal Aunt     Cancer Paternal Aunt         Breast    Uterine cancer Paternal Aunt 55    No Known Problems Paternal Aunt     No Known Problems Paternal Aunt      Current Outpatient Medications on File Prior to Visit   Medication Sig Dispense Refill    ascorbic acid (VITAMIN C) 500 mg tablet Take 500 mg by mouth daily 1 tablet daily      CICLOPIROX EX       colesevelam (WELCHOL) 625 mg tablet take 3 tablets by mouth twice a day with meals 540 tablet 1    cyproheptadine (PERIACTIN) 4 mg tablet take 0.5 tablet by mouth four times a day 120 tablet 0    Digestive Enzyme CAPS Take 1 capsule by mouth if needed (gas relief) beano taking 1 tablet as needed gas relief      escitalopram (LEXAPRO) 5 mg tablet Take 1 tablet (5 mg total) by mouth daily 90 tablet 0    furosemide (LASIX) 20 mg tablet Take 2 tablets (40 mg total) by mouth daily 60 tablet 5    lactase (LACTAID) 3,000 units tablet Take 3,000 Units by mouth as needed (.) 2 tablets as needed if eating dairy      Multiple Vitamins-Minerals (CENTRUM SILVER ULTRA WOMENS PO) Take by mouth daily 1 tablet daily      nystatin (MYCOSTATIN) cream Apply 2 g (1 Application total) topically 2 (two) times a day apply to skin fold areas as needed for rash  30 g 1    polyethylene glycol-propylene glycol (SYSTANE) 0.4-0.3 % Apply 1 drop to eye if needed (dry eye) as needed daily Both eyes. dry eyes      potassium chloride (Klor-Con M20) 20 mEq tablet Take 1 tablet (20 mEq total) by mouth daily 90 tablet 3    sodium chloride 1 g tablet Take 1 tablet (1 g total) by mouth 2 (two) times a day      zinc oxide 20 % ointment Apply topically as needed for irritation (Apply after washing for barrier) 425 g 0    bisacodyl (DULCOLAX) 10 mg suppository Insert 1 suppository (10 mg total) into the rectum daily as needed for constipation for up to 7 days (Patient not taking: Reported on 9/12/2024) 7 suppository 0    colesevelam (WELCHOL) 625 mg tablet  (Patient not taking: Reported on 10/29/2024)      senna-docusate sodium (SENOKOT S) 8.6-50 mg per tablet Take 1 tablet by mouth daily at bedtime for 7 days (Patient not taking: Reported on 9/12/2024) 7 tablet 0     No current facility-administered medications on file prior to visit.     Allergies   Allergen Reactions    Cortisone Hypertension, Other (See Comments), Shortness Of Breath and Tachycardia    Acebutolol     Acetaminophen GI Intolerance     diarrhea    Amlodipine     Atenolol     Azithromycin     Barium Sulfate Diarrhea     Patient reports watery stool and stomach ache post fluoro upper GI on 6/6/24. Per Radiologist MD Lozada, reaction should not absolutely preclude patient from having barium in the future if necessary. KEZIA RN 6/7/24      Bisphosphonates      Annotation - [id number]: iriitis    Candesartan     Clarithromycin     Erythromycin     Fosinopril     Irbesartan     Levofloxacin     Losartan     Methylprednisolone Hypertension and Tachycardia    Metoprolol     Nisoldipine     Olmesartan     Propranolol     Sulfamethoxazole-Trimethoprim Nausea Only    Timolol     Lidocaine Palpitations and Tachycardia      Current Outpatient Medications on File Prior to Visit   Medication Sig Dispense Refill    ascorbic acid (VITAMIN C)  500 mg tablet Take 500 mg by mouth daily 1 tablet daily      CICLOPIROX EX       colesevelam (WELCHOL) 625 mg tablet take 3 tablets by mouth twice a day with meals 540 tablet 1    cyproheptadine (PERIACTIN) 4 mg tablet take 0.5 tablet by mouth four times a day 120 tablet 0    Digestive Enzyme CAPS Take 1 capsule by mouth if needed (gas relief) beano taking 1 tablet as needed gas relief      escitalopram (LEXAPRO) 5 mg tablet Take 1 tablet (5 mg total) by mouth daily 90 tablet 0    furosemide (LASIX) 20 mg tablet Take 2 tablets (40 mg total) by mouth daily 60 tablet 5    lactase (LACTAID) 3,000 units tablet Take 3,000 Units by mouth as needed (.) 2 tablets as needed if eating dairy      Multiple Vitamins-Minerals (CENTRUM SILVER ULTRA WOMENS PO) Take by mouth daily 1 tablet daily      nystatin (MYCOSTATIN) cream Apply 2 g (1 Application total) topically 2 (two) times a day apply to skin fold areas as needed for rash 30 g 1    polyethylene glycol-propylene glycol (SYSTANE) 0.4-0.3 % Apply 1 drop to eye if needed (dry eye) as needed daily Both eyes. dry eyes      potassium chloride (Klor-Con M20) 20 mEq tablet Take 1 tablet (20 mEq total) by mouth daily 90 tablet 3    sodium chloride 1 g tablet Take 1 tablet (1 g total) by mouth 2 (two) times a day      zinc oxide 20 % ointment Apply topically as needed for irritation (Apply after washing for barrier) 425 g 0    bisacodyl (DULCOLAX) 10 mg suppository Insert 1 suppository (10 mg total) into the rectum daily as needed for constipation for up to 7 days (Patient not taking: Reported on 9/12/2024) 7 suppository 0    colesevelam (WELCHOL) 625 mg tablet  (Patient not taking: Reported on 10/29/2024)      senna-docusate sodium (SENOKOT S) 8.6-50 mg per tablet Take 1 tablet by mouth daily at bedtime for 7 days (Patient not taking: Reported on 9/12/2024) 7 tablet 0     No current facility-administered medications on file prior to visit.      Social History     Tobacco Use     Smoking status: Never    Smokeless tobacco: Never   Vaping Use    Vaping status: Never Used   Substance and Sexual Activity    Alcohol use: Not Currently    Drug use: Never    Sexual activity: Not Currently     Partners: Male     Birth control/protection: Post-menopausal       Objective     LMP  (LMP Unknown)     Physical Exam  Vitals reviewed.   Constitutional:       General: She is not in acute distress.     Appearance: She is ill-appearing (chronically). She is not toxic-appearing or diaphoretic.      Comments: Thin body habitus.     HENT:      Head: Normocephalic and atraumatic.      Nose: Nose normal.      Mouth/Throat:      Mouth: Mucous membranes are moist.   Eyes:      General:         Right eye: No discharge.         Left eye: No discharge.   Cardiovascular:      Rate and Rhythm: Normal rate.   Pulmonary:      Effort: Pulmonary effort is normal. No respiratory distress.   Abdominal:      General: Abdomen is flat. There is no distension.   Musculoskeletal:         General: No swelling.   Skin:     General: Skin is warm and dry.      Coloration: Skin is not jaundiced.   Neurological:      General: No focal deficit present.      Mental Status: She is alert. Mental status is at baseline.   Psychiatric:         Mood and Affect: Mood normal.         Behavior: Behavior normal.         Thought Content: Thought content normal.         Judgment: Judgment normal.       Recent labs:  Lab Results   Component Value Date/Time    SODIUM 132 (L) 10/18/2024 12:03 PM    SODIUM 139 08/20/2019 06:45 AM    K 3.8 10/18/2024 12:03 PM    K 3.8 08/20/2019 06:45 AM    BUN 16 10/18/2024 12:03 PM    BUN 25 11/22/2019 11:30 AM    CREATININE 0.81 10/18/2024 12:03 PM    CREATININE 1.01 11/22/2019 11:30 AM    GLUC 97 10/18/2024 12:03 PM    GLUC 81 08/20/2019 06:45 AM    CALCIUM 7.7 (L) 10/18/2024 12:03 PM    CALCIUM 8.9 08/20/2019 06:45 AM    AST 22 10/01/2024 02:33 PM    AST 16 08/20/2019 06:45 AM    ALT 13 10/01/2024 02:33 PM    ALT 26  08/20/2019 06:45 AM    ALB 2.9 (L) 10/01/2024 02:33 PM    ALB 4.0 08/20/2019 06:45 AM    TP 5.3 (L) 10/01/2024 02:33 PM    TP 6.8 08/20/2019 06:45 AM    EGFR 68 10/18/2024 12:03 PM    EGFR 54 (L) 11/22/2019 11:30 AM     Lab Results   Component Value Date/Time    HGB 10.3 (L) 10/03/2024 04:41 AM    WBC 4.97 10/03/2024 04:41 AM     10/03/2024 04:41 AM    INR 1.05 08/20/2024 07:10 AM    PTT 36 (H) 08/20/2024 07:10 AM     Lab Results   Component Value Date/Time    MYY8RGKELVMY 4.082 10/02/2024 05:37 AM       Recent Imaging:  Procedure: IR IN-Patient Thoracentesis    Result Date: 10/2/2024  Narrative: Ultrasound-guided bilateral thoracentesis Clinical History: Bilateral pleural effusions Technique: The patient was brought to the interventional radiology area and placed in the sitting position on the side of a stretcher. The right chest was then examined with ultrasound and the skin was marked. The skin was then prepped, and draped in usual sterile fashion. Chloroprocaine was administered to the skin and a small skin incision was made. Under direct ultrasound guidance, a 5 [place] Yueh needle was advanced percutaneously into the pleural space. 450 mL clear yellow pleural fluid was aspirated. Specimens were sent to the lab as requested The left chest was then examined with ultrasound and the skin was marked. The skin was then prepped, and draped in usual sterile fashion. Chloroprocaine was administered to the skin and a small skin incision was made. Under direct ultrasound guidance, a 5 [place] Yueh needle was advanced percutaneously into the pleural space. 600 mL of dark yellow pleural fluid was aspirated. Specimens were sent to the lab as requested The patient tolerated the procedure well and suffered no complications.     Impression: Impression: 1. Successful ultrasound-guided right thoracentesis yielding 450 mL clear yellow pleural fluid. 2. Successful ultrasound-guided left thoracentesis yielding 600 mL of dark  yellow pleural fluid. Workstation performed: [id number]     Procedure: Echo complete w/ contrast if indicated    Result Date: 10/2/2024  Narrative:   Left Ventricle: Left ventricular cavity size is normal. Wall thickness is mildly increased. The left ventricular ejection fraction is 55%. Systolic function is normal. Wall motion is normal. Diastolic function is mildly abnormal, consistent with grade I (abnormal) relaxation.   Left Atrium: The atrium is mildly dilated.   Aortic Valve: There is mild to moderate regurgitation with a centrally directed jet.   Mitral Valve: There is moderate regurgitation.   Tricuspid Valve: There is moderate regurgitation. The right ventricular systolic pressure is mildly elevated. The estimated right ventricular systolic pressure is 35.00 mmHg.   Pericardium: There is a small pericardial effusion circumferential to the heart. The fluid exhibits no internal echoes. There is a moderately sized left pleural effusion.     Procedure: CTA ED chest PE Study    Result Date: 10/1/2024  Narrative: CTA - CHEST WITH IV CONTRAST - PULMONARY ANGIOGRAM INDICATION: r/o PE in the setting of active cancer with tachycardia and tachypnea. COMPARISON: CT chest/abdomen/pelvis 6/10/2024. PET/CT 7/8/2024. TECHNIQUE: CTA examination of the chest was performed using angiographic technique according to a protocol specifically tailored to evaluate for pulmonary embolism. Multiplanar 2D reformatted images were created from the source data. In addition, coronal  3D MIP postprocessing was performed on the acquisition scanner. This examination was performed utilizing dual energy CT technique. Radiation dose length product (DLP) for this visit: 149 mGy-cm . This examination, like all CT scans performed in the [facility], was performed utilizing techniques to minimize radiation dose exposure, including the use of iterative reconstruction and automated exposure control. IV Contrast: 50 mL of iohexol  (OMNIPAQUE) FINDINGS: PULMONARY ARTERIAL TREE:  No pulmonary embolus. LUNGS: The central airways are patent. Subtotal atelectasis of the bilateral lower lobes in the setting of pleural effusions. PLEURA: Large bilateral pleural effusions. No pneumothorax. HEART/GREAT VESSELS: Stable mild cardiomegaly. Coronary artery calcifications. No thoracic aortic aneurysm. MEDIASTINUM AND LAMAR: Unremarkable. CHEST WALL AND LOWER NECK: Diffuse chest wall edema. VISUALIZED STRUCTURES IN THE UPPER ABDOMEN: Partially imaged abdominal ascites OSSEOUS STRUCTURES: No acute fracture or destructive osseous lesion.     Impression: 1.  No pulmonary embolus. 2.  Large bilateral pleural effusions. 3.  Partially imaged abdominal ascites. Workstation performed: [id number]     Procedure: XR chest 1 view portable    Result Date: 10/1/2024  Narrative: XR CHEST PORTABLE INDICATION: abd and leg swelling. COMPARISON: August 22, 2024 FINDINGS: Bibasal effusions and adjacent consolidation. No pneumothorax. Normal cardiomediastinal silhouette. Bones are unremarkable for age. Normal upper abdomen.     Impression: Bibasal effusions and adjacent consolidation. Workstation performed: [id number]     Procedure: XR abdomen 1 view kub    Result Date: 9/9/2024  Narrative: XR ABDOMEN 1 VIEW KUB INDICATION: abdominal distension, r/o ileus. Mesenteric mass biopsy COMPARISON: PET/CT dated July 8, 2024 FINDINGS: There are prominent air-filled loops of large bowel throughout the abdomen with bowel gas seen as far distally as the sigmoid colon. Bowel loops are mildly distended measuring up to 7 cm in diameter. No evidence of pneumoperitoneum on this supine study. Upright or left lateral decubitus imaging is more sensitive to detect subtle free air in the appropriate setting. No pathologic calcification or soft tissue mass. Cholecystectomy clips are noted. Left basilar opacity is noted with adjacent effusion. Bones are unremarkable for the patient's age.     Impression:  "1. Prominent air-filled loops of large bowel throughout the abdomen in a pattern suggesting ileus. Distal obstruction could potentially appear similar but is considered less likely. Consider attention on follow-up. 2. Left pleural effusion with adjacent atelectasis versus infiltrate. Workstation performed: [id number]     Procedure: Holter monitor    Result Date: 8/27/2024  Narrative: PATIENT NAME:  Brigid Brown AGE:  79 y.o.       Sex:  female MRN:  [id number] PROCEDURE: Holter monitor - 48 hour INDICATIONS: Palpitations DESCRIPTION OF FINDINGS: The patient was monitored for a total of 48 hours.  The patient was predominantly in Sinus throughout the study.  The average heart rate was 82 beats per minute.  The heart rate ranged from a low of 57 beats per minute to a maximum of 126 beats per minute. Ventricular ectopic activity consisted of 1227 beats (0.5% of total beats), of which 1138 were single PVCs, 0 were ventricular couplets, 0 were in bigeminy and 0 were in trigeminy. There was no sustained or nonsustained ventricular tachycardia. Supraventricular ectopic activity consisted of 1083 beats (0.5% of total beats), of which 582 were single PACs, 3 were late beats, 20 were atrial couplets, 393 were in bigeminy and 33 were in trigeminy. There were 8 atrial runs, longest of which was 32 beats. There was no supraventricular tachycardia identified.  There was no evidence of atrial fibrillation or atrial flutter. There were no significant pauses. The longest R-R interval was 1.3 seconds.  There was no evidence of advanced degree heart block. The accompanying patient diary notes symptoms of pain in stomach . She was not feeling well and did not write down everything\". Correlation with the tracings at these times reveals SR.     Impression: Predominantly Sinus, with an average heart rate of 82 beats per minute 1031 premature atrial contractions, of note technologist's review noted PACs were greatly underestimated. If " clinically indicated can consider alternative event monitor 1138 premature ventricular contractions No significant pauses or advanced degree heart block Bebo Parikh MD     Procedure: XR chest 1 view portable    Result Date: 8/22/2024  Narrative: XR CHEST PORTABLE INDICATION: hypotension. COMPARISON: PET/CT 7/8/2024, CXR 5/9/2024. FINDINGS: Clear lungs. No pneumothorax or pleural effusion. Nodule projecting over the right base is due to the nipple. Normal cardiomediastinal silhouette. Bones are unremarkable for age. Normal upper abdomen.     Impression: No acute cardiopulmonary disease. Workstation performed: [id number]     Procedure: Endoscopic ultrasonography, GI (Upper)    Result Date: 8/7/2024  Narrative: Table formatting from the original result was not included. [facility] [address] [facility] [address] [phone] DATE OF SERVICE: 8/07/24 PHYSICIAN(S): Attending: Erika Marsh MD Fellow: Daryl Ortiz MD INDICATION: Unintentional weight loss, Generalized postprandial abdominal pain POST-OP DIAGNOSIS: See the impression below. PREPROCEDURE: Informed consent was obtained for the procedure, including sedation.  Risks of perforation, hemorrhage, adverse drug reaction and aspiration were discussed. The patient was placed in the left lateral decubitus position. Patient was explained about the risks and benefits of the procedure. Risks including but not limited to bleeding, infection, and perforation were explained in detail. Also explained about less than 100% sensitivity with the exam and other alternatives. PROCEDURE: EUS UPPER DETAILS OF PROCEDURE: Patient was taken to the procedure room where a time out was performed to confirm correct patient and correct procedure. The patient underwent monitored anesthesia care, which was administered by an anesthesia professional. The patient's blood pressure, heart rate, oxygen, respirations, level of consciousness, ECG and ETCO2 were  monitored throughout the procedure. The endoscope and linear scope were introduced through the mouth and advanced to the duodenum. The patient experienced no blood loss. The procedure was not difficult. The patient tolerated the procedure well. There were no apparent adverse events. ANESTHESIA INFORMATION: ASA: III Anesthesia Type: IV Sedation with Anesthesia MEDICATIONS: No administrations occurring from 1508 to 1538 on 24 FINDINGS: The esophagus appeared normal. 1 cm hiatal hernia - GE junction 42 cm from the incisors, diaphragmatic impression 43 cm from the incisors Mild, localized erythematous mucosa in the stomach The duodenum appeared normal. Normal celiac takeoff.  No evidence of pancreatic ductal dilation throughout the examination.  Bile duct was normal caliber with no filling defects noted.  Ampulla appeared normal.  No masses or lymphadenopathy appreciated.  No abnormality noted to correlate with PET findings. SPECIMENS: ID Type Source Tests Collected by Time Destination 1 :  Tissue Duodenum TISSUE EXAM Daryl Ortiz MD 2024  3:13 PM  2 :  Tissue Stomach TISSUE EXAM Daryl Ortiz MD 2024  3:15 PM       Impression: The esophagus appeared normal. 1 cm hiatal hernia Mild erythematous mucosa in the stomach The duodenum appeared normal. Normal celiac takeoff.  No evidence of pancreatic ductal dilation throughout the examination.  Bile duct was normal caliber with no filling defects noted.  Ampulla appeared normal.  No masses or lymphadenopathy appreciated.  No abnormality noted to correlate with PET findings. RECOMMENDATION: Follow up in the office as scheduled  Erika Marsh MD     Procedure: Complete PFT with post bronchodilator    Result Date: 2024  Narrative: Pulmonary Function Test Report Brigid Brown 79 y.o. female MRN: [id number] Date of Testin Date of Interpretation: 2024 Requesting Physician: Dr. Lewis Reason for Testing: Shortness of breath Reference set for  interpretation: [facility] GLOBAL 2022 Procedure: The patient was taken to pulmonary function testing laboratory.  The patient demonstrated good effort and cooperation.  The results of this test did not meet ATS standards for acceptability and repeatability.  Data set appears appropriate for interpretation.  Results: Spirometry: FEV1/FVC Ratio: 80 %   Z-score +0.23 FEV1: 83% predicted      Z-score -0.85 Forced Vital Capacity: 80% predicted Z-score -1.04 After administration of bronchodilator: Not administered Lung volumes: Total Lung Capacity 93% predicted  Z-score -0.55 Residual volume 123% predicted  Z-score +1.40 DLCO corrected for patients hemoglobin level: 100% predicted Flow volume loop: Normal Interpretation: Normal spirometry, lung volumes and diffusion capacity Ladonna Lewis D.O., [facility]    Procedure: NM PET CT skull base to mid thigh    Result Date: 7/8/2024  Narrative: PET/CT SCAN INDICATION: Soft tissue prominence of the mesenteric root. 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 Multiplanar attenuation corrected and non attenuation corrected PET images are available for interpretation, and contiguous, low dose, axial CT sections were obtained from the skull base through the femurs. Intravenous contrast material was not utilized. This examination, like all CT scans performed in the [facility], was performed utilizing techniques to minimize radiation dose exposure, including the use of iterative reconstruction and automated exposure control. Fasting serum glucose: 98 mg/dl FINDINGS: VISUALIZED BRAIN: No acute abnormalities are seen. HEAD/NECK: There is a physiologic distribution of FDG. Mild  diffuse uptake in the thyroid, a SUV max 2.0, which can be seen with thyroiditis. No FDG avid cervical adenopathy is seen. CT images: Unremarkable. CHEST: No FDG avid soft tissue lesions are seen. CT images: Mild to moderate coronary artery calcification. Scattered lung cysts. ABDOMEN: Mild variable radiotracer uptake suggested in the soft tissue thickening at the level of the mesenteric root. The activity varies with SUV max of 2.1 at the proximal aspect to 3.1 more inferiorly.  See for example image 155 series 1200 where activity appears to be more focal along the inferior aspect of the soft tissue thickening. Overall soft tissue thickening better seen on prior contrast CT. Variable bowel activity likely physiologic. CT images: Status post cholecystectomy. Less well seen hepatic hemangioma in the right lobe inferiorly. PELVIS: No FDG avid soft tissue lesions are seen. CT images: Status post hysterectomy. OSSEOUS STRUCTURES: No FDG avid lesions are seen. CT images: No significant findings. For reference: SUV max of the mediastinal blood pool: 1.8 SUV max of the liver: 2.3     Impression: 1. Mild variable FDG uptake suggested along the mesenteric root soft tissue thickening. This is nonspecific, could be related to a nonspecific inflammatory process with malignancy not excluded; sclerosing mesenteritis, amyloidosis, desmoid tumor, and lymphoproliferative processes such as lymphoma are considerations. Consider continued imaging follow-up if tissue sampling is not performed. Workstation performed: [id number]      Administrative Statements   I have spent a total time of 33 minutes in caring for this patient on the day of the visit/encounter including Risks and benefits of tx options, Instructions for management, Patient and family education, Importance of tx compliance, Risk factor reductions, Impressions, Counseling / Coordination of care, Documenting in the medical record, Reviewing / ordering tests, medicine,  procedures  , and Obtaining or reviewing history  . Topics discussed with the patient / family include symptom assessment and management, medication review, medication adjustment, psychosocial support, goals of care, medical marijuana, supportive listening, and anticipatory guidance.

## 2024-10-29 NOTE — TELEPHONE ENCOUNTER
LVM for pt's son regarding the following message per Dr. Ferrara     Please let patient and son know that weight seems to be relatively stable.  Her electrolytes and renal function do not explain her significant fatigue.  Patient also discuss further with PCP for any other etiologies for patient not feeling well.      Asked pt's son to give us a call back with any questions or concerns.

## 2024-10-30 ENCOUNTER — TELEPHONE (OUTPATIENT)
Age: 79
End: 2024-10-30

## 2024-10-30 NOTE — TELEPHONE ENCOUNTER
PA for cyproheptadine 4mg- SUBMITTED     via    [x]CMM-KEY: [id number]      Office notes sent, clinical questions answered. Awaiting determination    Turnaround time for your insurance to make a decision on your Prior Authorization can take 7-21 business days.

## 2024-10-31 ENCOUNTER — HOME CARE VISIT (OUTPATIENT)
Dept: HOME HEALTH SERVICES | Facility: HOME HEALTHCARE | Age: 79
End: 2024-10-31
Payer: MEDICARE

## 2024-10-31 VITALS
HEART RATE: 89 BPM | TEMPERATURE: 96.8 F | SYSTOLIC BLOOD PRESSURE: 118 MMHG | DIASTOLIC BLOOD PRESSURE: 80 MMHG | RESPIRATION RATE: 17 BRPM | OXYGEN SATURATION: 100 %

## 2024-10-31 PROBLEM — R63.0 POOR APPETITE: Status: ACTIVE | Noted: 2024-10-31

## 2024-10-31 LAB
CARIS BRAF V600E: NEGATIVE
CARIS MISMATCH REPAIR STATUS: NORMAL
CARIS MLH1: NORMAL
CARIS MSH2: NORMAL
CARIS MSH6: NORMAL
CARIS PMS2: NORMAL
CARIS TRKA/B/C: NEGATIVE

## 2024-10-31 PROCEDURE — G0299 HHS/HOSPICE OF RN EA 15 MIN: HCPCS

## 2024-10-31 NOTE — ASSESSMENT & PLAN NOTE
Options to help with dry mouth, discussed trial of home remedies as below:  1) can trial mints, lozenges, gums to help maintain salivary production  2) discussed the options of nonalcohol containing mouthwash/rinse, including Biotene spray/mouthwash

## 2024-10-31 NOTE — ASSESSMENT & PLAN NOTE
Discussed potential options today.  MMJ reviewed in terms of its potential benefits to help with appetite stimulation.    Mirtazapine was reviewed, however, patient's recent EKG from 10/2/2024 demonstrated a prolonged QTC of 472, therefore, will hold off on Qtc prolonging agents for safety at this time.    Encouraged protein supplementation and tips for managing dry mouth/dysgeusia.

## 2024-10-31 NOTE — ASSESSMENT & PLAN NOTE
Following Medical Oncology - Dr. Pritchard.  Pending CARIS testing.  Indicated poor performance status and therefore patient would not tolerate any cancer directed treatments.

## 2024-10-31 NOTE — PATIENT INSTRUCTIONS
It was a pleasure speaking with you today.    As discussed:  -Continue your medications as prescribed.  -Continue with a bowel regimen to avoid constipation.    Follow-up in: 2 weeks or sooner as needed    Please do not hesitate to call me sooner should you have any questions/concerns or needs.    Medication safety issues - Do not drive under the influence of narcotics (including opioids), watch for adverse effects including confusion / altered mental status / respiratory depression (slowed breathing), keep medications stored in a safe/locked environment, do not use alcohol while opioids or other narcotics are in your system. Do not travel with more than the minimum number of tablets or capsules required for the trip.    ER Precautions  Watch for red flag symptoms including, but not limited to fevers, chills, chest pain, shortness of breath, intractable nausea/vomiting/diarrhea, or acute intractable pain (especially if pain is new or has changed).

## 2024-10-31 NOTE — ASSESSMENT & PLAN NOTE
Goals - Patient not ready for comfort care/hospice. She would want to return to the hospital if she required medical attention. She is open to treatment options once additional CARIS testing completed from Medical Oncology. Patient awaiting to hear about possible options and if offered, she would want to proceed with disease directed treatments at this time.

## 2024-10-31 NOTE — ASSESSMENT & PLAN NOTE
Psychosocial   Supportive listening provided  Normalized experience of patient/family  Provided anxiety containment     Referrals Placed / Medical Equipment Ordered  -None    Follow-Up Recommendations  -Follow-up with PCP and current medical specialists  -Follow-up with palliative care: 2 weeks after patient has had a chance to follow-up with Oncology to discuss CARIS testing results and options.

## 2024-11-02 ENCOUNTER — APPOINTMENT (EMERGENCY)
Dept: CT IMAGING | Facility: HOSPITAL | Age: 79
End: 2024-11-02
Payer: MEDICARE

## 2024-11-02 ENCOUNTER — HOSPITAL ENCOUNTER (EMERGENCY)
Facility: HOSPITAL | Age: 79
Discharge: HOME | End: 2024-11-02
Attending: EMERGENCY MEDICINE | Admitting: EMERGENCY MEDICINE
Payer: MEDICARE

## 2024-11-02 VITALS
WEIGHT: 105.38 LBS | BODY MASS INDEX: 19.27 KG/M2 | RESPIRATION RATE: 16 BRPM | SYSTOLIC BLOOD PRESSURE: 167 MMHG | DIASTOLIC BLOOD PRESSURE: 80 MMHG | HEART RATE: 74 BPM | TEMPERATURE: 97.2 F | OXYGEN SATURATION: 96 %

## 2024-11-02 DIAGNOSIS — R10.9 ABDOMINAL PAIN: Primary | ICD-10-CM

## 2024-11-02 DIAGNOSIS — R53.83 FATIGUE: ICD-10-CM

## 2024-11-02 DIAGNOSIS — H53.8 BLURRY VISION: ICD-10-CM

## 2024-11-02 LAB
2HR DELTA HS TROPONIN: 9 NG/L
ALBUMIN SERPL BCG-MCNC: 3 G/DL (ref 3.5–5)
ALP SERPL-CCNC: 44 U/L (ref 34–104)
ALT SERPL W P-5'-P-CCNC: 38 U/L (ref 7–52)
ANION GAP SERPL CALCULATED.3IONS-SCNC: 4 MMOL/L (ref 4–13)
AST SERPL W P-5'-P-CCNC: 51 U/L (ref 13–39)
ATRIAL RATE: 76 BPM
BASOPHILS # BLD AUTO: 0.02 THOUSANDS/ΜL (ref 0–0.1)
BASOPHILS NFR BLD AUTO: 0 % (ref 0–1)
BILIRUB SERPL-MCNC: 0.48 MG/DL (ref 0.2–1)
BUN SERPL-MCNC: 24 MG/DL (ref 5–25)
CALCIUM ALBUM COR SERPL-MCNC: 9 MG/DL (ref 8.3–10.1)
CALCIUM SERPL-MCNC: 8.2 MG/DL (ref 8.4–10.2)
CARDIAC TROPONIN I PNL SERPL HS: 11 NG/L
CARDIAC TROPONIN I PNL SERPL HS: 20 NG/L
CHLORIDE SERPL-SCNC: 102 MMOL/L (ref 96–108)
CO2 SERPL-SCNC: 24 MMOL/L (ref 21–32)
CREAT SERPL-MCNC: 0.76 MG/DL (ref 0.6–1.3)
EOSINOPHIL # BLD AUTO: 0.04 THOUSAND/ΜL (ref 0–0.61)
EOSINOPHIL NFR BLD AUTO: 1 % (ref 0–6)
ERYTHROCYTE [DISTWIDTH] IN BLOOD BY AUTOMATED COUNT: 14.8 % (ref 11.6–15.1)
GFR SERPL CREATININE-BSD FRML MDRD: 73 ML/MIN/1.73SQ M
GLUCOSE SERPL-MCNC: 82 MG/DL (ref 65–140)
HCT VFR BLD AUTO: 38.2 % (ref 34.8–46.1)
HGB BLD-MCNC: 12.4 G/DL (ref 11.5–15.4)
IMM GRANULOCYTES # BLD AUTO: 0.01 THOUSAND/UL (ref 0–0.2)
IMM GRANULOCYTES NFR BLD AUTO: 0 % (ref 0–2)
LIPASE SERPL-CCNC: 52 U/L (ref 11–82)
LYMPHOCYTES # BLD AUTO: 0.95 THOUSANDS/ΜL (ref 0.6–4.47)
LYMPHOCYTES NFR BLD AUTO: 20 % (ref 14–44)
MAGNESIUM SERPL-MCNC: 1.9 MG/DL (ref 1.9–2.7)
MCH RBC QN AUTO: 29.3 PG (ref 26.8–34.3)
MCHC RBC AUTO-ENTMCNC: 32.5 G/DL (ref 31.4–37.4)
MCV RBC AUTO: 90 FL (ref 82–98)
MONOCYTES # BLD AUTO: 0.29 THOUSAND/ΜL (ref 0.17–1.22)
MONOCYTES NFR BLD AUTO: 6 % (ref 4–12)
NEUTROPHILS # BLD AUTO: 3.41 THOUSANDS/ΜL (ref 1.85–7.62)
NEUTS SEG NFR BLD AUTO: 73 % (ref 43–75)
NRBC BLD AUTO-RTO: 0 /100 WBCS
P AXIS: 50 DEGREES
PLATELET # BLD AUTO: 233 THOUSANDS/UL (ref 149–390)
PMV BLD AUTO: 9 FL (ref 8.9–12.7)
POTASSIUM SERPL-SCNC: 4.4 MMOL/L (ref 3.5–5.3)
PR INTERVAL: 118 MS
PROT SERPL-MCNC: 5.4 G/DL (ref 6.4–8.4)
QRS AXIS: -57 DEGREES
QRSD INTERVAL: 92 MS
QT INTERVAL: 412 MS
QTC INTERVAL: 463 MS
RBC # BLD AUTO: 4.23 MILLION/UL (ref 3.81–5.12)
SODIUM SERPL-SCNC: 130 MMOL/L (ref 135–147)
T WAVE AXIS: 65 DEGREES
VENTRICULAR RATE: 76 BPM
WBC # BLD AUTO: 4.72 THOUSAND/UL (ref 4.31–10.16)

## 2024-11-02 PROCEDURE — 74177 CT ABD & PELVIS W/CONTRAST: CPT

## 2024-11-02 PROCEDURE — 84484 ASSAY OF TROPONIN QUANT: CPT

## 2024-11-02 PROCEDURE — 71260 CT THORAX DX C+: CPT

## 2024-11-02 PROCEDURE — 70496 CT ANGIOGRAPHY HEAD: CPT

## 2024-11-02 PROCEDURE — 85025 COMPLETE CBC W/AUTO DIFF WBC: CPT

## 2024-11-02 PROCEDURE — 83690 ASSAY OF LIPASE: CPT

## 2024-11-02 PROCEDURE — 99284 EMERGENCY DEPT VISIT MOD MDM: CPT

## 2024-11-02 PROCEDURE — 70498 CT ANGIOGRAPHY NECK: CPT

## 2024-11-02 PROCEDURE — 93005 ELECTROCARDIOGRAM TRACING: CPT

## 2024-11-02 PROCEDURE — 36415 COLL VENOUS BLD VENIPUNCTURE: CPT

## 2024-11-02 PROCEDURE — 96365 THER/PROPH/DIAG IV INF INIT: CPT

## 2024-11-02 PROCEDURE — 93010 ELECTROCARDIOGRAM REPORT: CPT | Performed by: STUDENT IN AN ORGANIZED HEALTH CARE EDUCATION/TRAINING PROGRAM

## 2024-11-02 PROCEDURE — 80053 COMPREHEN METABOLIC PANEL: CPT

## 2024-11-02 PROCEDURE — 99285 EMERGENCY DEPT VISIT HI MDM: CPT | Performed by: EMERGENCY MEDICINE

## 2024-11-02 PROCEDURE — 83735 ASSAY OF MAGNESIUM: CPT

## 2024-11-02 RX ORDER — SODIUM CHLORIDE, SODIUM GLUCONATE, SODIUM ACETATE, POTASSIUM CHLORIDE, MAGNESIUM CHLORIDE, SODIUM PHOSPHATE, DIBASIC, AND POTASSIUM PHOSPHATE .53; .5; .37; .037; .03; .012; .00082 G/100ML; G/100ML; G/100ML; G/100ML; G/100ML; G/100ML; G/100ML
1000 INJECTION, SOLUTION INTRAVENOUS ONCE
Status: COMPLETED | OUTPATIENT
Start: 2024-11-02 | End: 2024-11-02

## 2024-11-02 RX ADMIN — SODIUM CHLORIDE, SODIUM GLUCONATE, SODIUM ACETATE, POTASSIUM CHLORIDE, MAGNESIUM CHLORIDE, SODIUM PHOSPHATE, DIBASIC, AND POTASSIUM PHOSPHATE 1000 ML: .53; .5; .37; .037; .03; .012; .00082 INJECTION, SOLUTION INTRAVENOUS at 12:26

## 2024-11-02 RX ADMIN — IOHEXOL 70 ML: 350 INJECTION, SOLUTION INTRAVENOUS at 15:46

## 2024-11-02 NOTE — ED ATTENDING ATTESTATION
11/2/2024  IGuera MD, saw and evaluated the patient. I have discussed the patient with the resident/non-physician practitioner and agree with the resident's/non-physician practitioner's findings, Plan of Care, and MDM as documented in the resident's/non-physician practitioner's note, except where noted. All available labs and Radiology studies were reviewed.  I was present for key portions of any procedure(s) performed by the resident/non-physician practitioner and I was immediately available to provide assistance.       At this point I agree with the current assessment done in the Emergency Department.  I have conducted an independent evaluation of this patient a history and physical is as follows:    79-year-old female with a history of pancreatic cancer recently admitted for bilateral pleural effusions presenting with family for evaluation of blurry vision and dizziness.  Patient states her symptoms started this morning.  They are present upon awakening.  States she felt well last night.  Patient is unable to describe her dizziness but denies room spinning sensation.  No syncope at home.  Also notes blurry vision worse in her right eye than left.  Denies double vision.  Notes decreased p.o. intake at home.  Has been on diuresis for her bilateral pleural effusions and lower extremity edema, which is unchanged.  Denies fever, infectious symptoms, chest pain, shortness of breath, nausea, vomiting, abdominal pain.  Notes diarrhea that is chronic.  Denies falls or injuries.  Does not currently on anticoagulation.    Please see resident documentation for histories and review of systems.    Exam: Vital signs and nursing notes reviewed  General: Awake, alert, ill-appearing  HEENT: Normocephalic, atraumatic, mucous membranes dry.  PERRL, EOMI, no nystagmus  Neck: Supple  Heart: Regular rate and rhythm with murmur present  Lungs: Decreased at the bases with muffled lung sounds  Abdomen: Soft,  nondistended, no tenderness to palpation  Extremities: Bilateral lower extremity pitting edema  Skin: Warm, dry, pale  Neuro: Objective blurriness in the right eye when counting fingers when compared to left.  No visual field deficits.  Cranial nerves III, IV, V, VI, VII, level, 12 intact.  No dysmetria on finger-to-nose.  No pronator or leg drift.  Strength is grossly intact bilaterally and symmetric in the upper and lower extremities with flexion and extension at the shoulder, elbow, wrist, hip, knee, ankle.  Sensation intact and equal bilateral upper and lower extremities  Psych: Cooperative    EKG: Reviewed by myself and the resident physician: Normal sinus rhythm with a left anterior fascicular block but no evidence of ischemia or malignant dysrhythmia  ED Course  ED Course as of 24 1619   Sat 2024   1236 CBC and differential  Grossly normal   1316 hs TnI 0hr: 11   1316 MAGNESIUM: 1.9   1316 LIPASE: 52   1316 Sodium(!): 130   1316 Comprehensive metabolic panel(!)   1316 AST(!): 51     ED course/Medical Decision Makin-year-old female presenting for evaluation of dizziness and blurry vision.  Differential diagnosis includes stroke, intracranial hemorrhage, mass lesion, electrolyte derangement, dehydration, hypoglycemia, malignant dysrhythmia, anemia, vertigo.  EKG shows normal sinus rhythm with a left anterior fascicular block but no evidence of ischemia or malignant dysrhythmia.  CBC is grossly normal.  Troponin is 11; low suspicion for ACS.  Magnesium is 1.9, and lipase is grossly normal.  Patient has mild hyponatremia mildly elevated AST on CMP with otherwise grossly normal studies.  CT of the head and CTA of the head neck were performed to evaluate for intracranial hemorrhage and large vessel occlusion, though my suspicion for large vessel occlusion is low.  It is possible patient's dizziness and blurry vision is secondary to stroke, though.  Patient is outside of the window of  thrombolytics given unknown last known well and wake-up symptoms.  CT of the abdomen/pelvis is also obtained to evaluate for worsening disease process as potentially contribute to her symptoms.  On my review of these images, patient has bilateral pleural effusions and ascites but is pending formal radiography interpretation.  Regardless, anticipate patient will be admitted to medicine for further management.  Patient signed out to my colleague pending review of imaging and ultimate disposition.    Diagnosis: Dizziness, vision changes, bilateral pleural effusions, pancreatic adenocarcinoma  Disposition: Anticipate admission

## 2024-11-02 NOTE — DISCHARGE INSTRUCTIONS
Please come back to the ER if you are having new or worsening symptoms including worsening abdominal pain, shortness of breath, worsening fatigue, chest pain, feeling like you are going to pass out.    Please follow-up with your regular doctors for the following findings:  2) Ill-defined soft tissue at the root of the mesentery compatible with known neoplasm the bulk of which measures at least 8.5 x 5.8 x 2.1 cm, increased from approximately 8.1 x 2.7 x 2.1 cm in June 2024., - Encasement of the SMA, now also with encasement of the distal celiac axis, the common hepatic artery and proximal splenic artery, however without occlusion or luminal narrowing., - Soft tissue also extends cephalad into the right, abutting the right tawana of the diaphragm., 3) Several areas of increased soft tissue density in the left upper quadrant, 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

## 2024-11-02 NOTE — ED CARE HANDOFF
Emergency Department Sign Out Note        Sign out and transfer of care from Dr. Tiffanie Henry. See Separate Emergency Department note.     The patient, Brigid Brown, was evaluated by the previous provider for double vision, fatigue.    Workup Completed:  Per Guera Juarez's note:  79-year-old female with a history of pancreatic cancer recently admitted for bilateral pleural effusions presenting with family for evaluation of blurry vision and dizziness. Patient states her symptoms started this morning. They are present upon awakening. States she felt well last night. Patient is unable to describe her dizziness but denies room spinning sensation. No syncope at home. Also notes blurry vision worse in her right eye than left. Denies double vision. Notes decreased p.o. intake at home. Has been on diuresis for her bilateral pleural effusions and lower extremity edema, which is unchanged. Denies fever, infectious symptoms, chest pain, shortness of breath, nausea, vomiting, abdominal pain. Notes diarrhea that is chronic. Denies falls or injuries. Does not currently on anticoagulation.     1236 CBC and differential  Grossly normal   1316 hs TnI 0hr: 11   1316 MAGNESIUM: 1.9   1316 LIPASE: 52   1316 Sodium(!): 130   1316 Comprehensive metabolic panel(!)   1316 AST(!): 51     CTA head/neck negative for acute process.  Pending CT CAP    ED Course / Workup Pending (followup):  CT CAP with the following findings:    1) Evaluation limited by streak artifact from patient's arms, which could not be placed overhead and by diffuse edema of the subcutaneous and intrathoracic/abdominal/pelvic fat.     2) Ill-defined soft tissue at the root of the mesentery compatible with known neoplasm the bulk of which measures at least 8.5 x 5.8 x 2.1 cm, increased from approximately 8.1 x 2.7 x 2.1 cm in June 2024.  - Encasement of the SMA, now also with encasement of the distal celiac axis, the common hepatic artery and proximal splenic  artery, however without occlusion or luminal narrowing.  - Soft tissue also extends cephalad into the right, abutting the right tawana of the diaphragm.     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     4) New likely large abdominal and pelvic ascites.     5) Moderate to large bilateral pleural effusions with lower lobe compressive atelectasis similar to 10/1/2024.     6) Nonspecific 3 mm right upper lobe pulmonary nodules unchanged from June 2024.    Patient was counseled extensively on these findings.  Shared decision making conversation was had with the patient and admission was offered but ultimately patient decided to go home which my attending and I believe is reasonable given no acute findings on the CT that are concordant with any of the symptoms she is endorsing.  Notably, patient does not endorse any significant abdominal pain at this time, does not have any difference in her normal mild shortness of breath, states that the fatigue she has been having is not any different than normal.  Unlikely that an MRI would result in anything that would  for her.  All of the findings from the CT were written on patient's discharge paperwork and she was encouraged to follow up with her PCP in regards to all of the findings.  Regarding the pleural effusion, patient was told to come back to the ED if she is experiencing worsening dyspnea as that may be the culprit.  Patient was counseled extensively and discharged with explicit return precautions.                                       Procedures  Medical Decision Making  CT CAP with the following findings:    1) Evaluation limited by streak artifact from patient's arms, which could not be placed overhead and by diffuse edema of the subcutaneous and intrathoracic/abdominal/pelvic fat.     2) Ill-defined soft tissue at the root of the mesentery compatible with known neoplasm  the bulk of which measures at least 8.5 x 5.8 x 2.1 cm, increased from approximately 8.1 x 2.7 x 2.1 cm in June 2024.  - Encasement of the SMA, now also with encasement of the distal celiac axis, the common hepatic artery and proximal splenic artery, however without occlusion or luminal narrowing.  - Soft tissue also extends cephalad into the right, abutting the right tawana of the diaphragm.     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     4) New likely large abdominal and pelvic ascites.     5) Moderate to large bilateral pleural effusions with lower lobe compressive atelectasis similar to 10/1/2024.     6) Nonspecific 3 mm right upper lobe pulmonary nodules unchanged from June 2024.    Patient was counseled extensively on these findings.  Shared decision making conversation was had with the patient and admission was offered but ultimately patient decided to go home which my attending and I believe is reasonable given no acute findings on the CT that are concordant with any of the symptoms she is endorsing.  Notably, patient does not endorse any significant abdominal pain at this time, does not have any difference in her normal mild shortness of breath, states that the fatigue she has been having is not any different than normal.  Unlikely that an MRI would result in anything that would  for her.  All of the findings from the CT were written on patient's discharge paperwork and she was encouraged to follow up with her PCP in regards to all of the findings.  Regarding the pleural effusion, patient was told to come back to the ED if she is experiencing worsening dyspnea as that may be the culprit.  Patient was counseled extensively and discharged with explicit return precautions.      Amount and/or Complexity of Data Reviewed  Labs: ordered.  Radiology: ordered.    Risk  Prescription drug  management.            Disposition  Final diagnoses:   Abdominal pain   Blurry vision   Fatigue     Time reflects when diagnosis was documented in both MDM as applicable and the Disposition within this note       Time User Action Codes Description Comment    11/2/2024  6:26 PM Luciano Randy Connor [R10.9] Abdominal pain     11/2/2024  6:26 PM Luciano Randy Connor [H53.8] Blurry vision     11/2/2024  6:26 PM Luciano Randy Connor [R53.83] Fatigue           ED Disposition       ED Disposition   Discharge    Condition   Stable    Date/Time   Sat Nov 2, 2024  6:26 PM    Comment   Brigid Brown discharge to home/self care.                   Follow-up Information       Follow up With Specialties Details Why Contact Info    Delicia Cintron DO [address] [address] [phone]            Discharge Medication List as of 11/2/2024  6:26 PM        CONTINUE these medications which have NOT CHANGED    Details   ascorbic acid (VITAMIN C) 500 mg tablet Take 500 mg by mouth daily 1 tablet daily, Historical Med      bisacodyl (DULCOLAX) 10 mg suppository Insert 1 suppository (10 mg total) into the rectum daily as needed for constipation for up to 7 days, Starting Tue 9/10/2024, Until Tue 9/17/2024 at 2359, Normal      CICLOPIROX EX Historical Med      !! colesevelam (WELCHOL) 625 mg tablet take 3 tablets by mouth twice a day with meals, Normal      !! colesevelam (WELCHOL) 625 mg tablet Historical Med      cyproheptadine (PERIACTIN) 4 mg tablet take 0.5 tablet by mouth four times a day, Normal      Digestive Enzyme CAPS Take 1 capsule by mouth if needed (gas relief) beano taking 1 tablet as needed gas relief, Historical Med      escitalopram (LEXAPRO) 5 mg tablet Take 1 tablet (5 mg total) by mouth daily, Starting Wed 8/21/2024, Normal      furosemide (LASIX) 20 mg tablet Take 2 tablets (40 mg total) by mouth daily, Starting Fri 10/11/2024, Until Wed 4/9/2025, Normal      lactase  (LACTAID) 3,000 units tablet Take 3,000 Units by mouth as needed (.) 2 tablets as needed if eating dairy, Historical Med      Multiple Vitamins-Minerals (CENTRUM SILVER ULTRA WOMENS PO) Take by mouth daily 1 tablet daily, Historical Med      nystatin (MYCOSTATIN) cream Apply 2 g (1 Application total) topically 2 (two) times a day apply to skin fold areas as needed for rash, Starting Mon 10/21/2024, Normal      polyethylene glycol-propylene glycol (SYSTANE) 0.4-0.3 % Apply 1 drop to eye if needed (dry eye) as needed daily Both eyes. dry eyes, Historical Med      potassium chloride (Klor-Con M20) 20 mEq tablet Take 1 tablet (20 mEq total) by mouth daily, Starting Mon 9/16/2024, Normal      senna-docusate sodium (SENOKOT S) 8.6-50 mg per tablet Take 1 tablet by mouth daily at bedtime for 7 days, Starting Tue 9/10/2024, Until Tue 9/17/2024, Normal      sodium chloride 1 g tablet Take 1 tablet (1 g total) by mouth 2 (two) times a day, Starting Mon 10/21/2024, Until Wed 11/20/2024, No Print      zinc oxide 20 % ointment Apply topically as needed for irritation (Apply after washing for barrier), Starting u 9/12/2024, Normal       !! - Potential duplicate medications found. Please discuss with provider.        No discharge procedures on file.       ED Provider  Electronically Signed by     Randy Wolf MD  11/03/24 0025

## 2024-11-04 ENCOUNTER — TELEPHONE (OUTPATIENT)
Age: 79
End: 2024-11-04

## 2024-11-04 ENCOUNTER — HOME CARE VISIT (OUTPATIENT)
Dept: HOME HEALTH SERVICES | Facility: HOME HEALTHCARE | Age: 79
End: 2024-11-04
Payer: MEDICARE

## 2024-11-04 VITALS
OXYGEN SATURATION: 99 % | HEART RATE: 84 BPM | RESPIRATION RATE: 16 BRPM | SYSTOLIC BLOOD PRESSURE: 120 MMHG | BODY MASS INDEX: 18.47 KG/M2 | DIASTOLIC BLOOD PRESSURE: 70 MMHG | WEIGHT: 101 LBS

## 2024-11-04 DIAGNOSIS — E87.1 HYPONATREMIA: ICD-10-CM

## 2024-11-04 PROCEDURE — G0151 HHCP-SERV OF PT,EA 15 MIN: HCPCS

## 2024-11-04 RX ORDER — SODIUM CHLORIDE 1 G/1
1 TABLET ORAL 3 TIMES DAILY
Qty: 90 TABLET | Refills: 0 | Status: SHIPPED | OUTPATIENT
Start: 2024-11-04 | End: 2024-12-04

## 2024-11-04 NOTE — TELEPHONE ENCOUNTER
Patient daughter calling in, she would like a call back to discuss the results of the caris testing and of the testing that was done when the patient was in the hospital over the weekend. [facility] # [phone]

## 2024-11-04 NOTE — TELEPHONE ENCOUNTER
Tiffanie called from [facility]  visiting nurse (PT) and stated stated patient was prescribed sodium chloride 1 g BID,patient has been taking TID.Daughter stated that patient was seen in the ER on 11/2 for abdominal pain and had an abnormal sodium level result of 130,daughter wants to know should patient continue taking the sodium tablets TID.Please call spouse with any updates.

## 2024-11-04 NOTE — ED PROVIDER NOTES
Time reflects when diagnosis was documented in both MDM as applicable and the Disposition within this note       Time User Action Codes Description Comment    11/2/2024  6:26 PM Randy Wolf [R10.9] Abdominal pain     11/2/2024  6:26 PM Randy Wolf [H53.8] Blurry vision     11/2/2024  6:26 PM Randy Wolf [R53.83] Fatigue           ED Disposition       ED Disposition   Discharge    Condition   Stable    Date/Time   Sat Nov 2, 2024  6:26 PM    Comment   Brigid Brown discharge to home/self care.                   Assessment & Plan       Medical Decision Making  79-year-old female patient with history of pancreatic cancer presenting with worsening dizziness and generalized weakness.  On initial evaluation, she appeared tired, pale.  Given patient's history of taking diuretic for effusion, use of salt tabs, poor p.o. intake, initial differential included but was not limited to dehydration, electrolyte abnormality such as hyponatremia, other acute intra-abdominal or intracranial pathology.  She did have some lower abdominal tenderness on exam and given difficulty describing nature of the dizziness, both CT scan of the abdomen and pelvis as well as head was done.  CT head showed M1 aneurysm but otherwise no acute findings.  Other imaging was pending at time of signout to oncoming physician team.  Patient was ultimately signed out pending CT results and further discussion of disposition planning in stable condition.    Amount and/or Complexity of Data Reviewed  Labs: ordered.  Radiology: ordered.    Risk  Prescription drug management.             Medications   multi-electrolyte (ISOLYTE-S PH 7.4) bolus 1,000 mL (0 mL Intravenous Stopped 11/2/24 1326)   iohexol (OMNIPAQUE) 350 MG/ML injection (MULTI-DOSE) 70 mL (70 mL Intravenous Given 11/2/24 1546)       ED Risk Strat Scores                                               History of Present Illness       Chief Complaint   Patient presents with     Abdominal Pain     Patient comes from home with hx of pancreatic cancer. Pt coming in today with persistent nausea and dizziness. Pt usually has these symptoms in the morning but they generally self resolve.        Past Medical History:   Diagnosis Date    Acne     Basal cell carcinoma 06/08/2020    right lower forehead    BCC (basal cell carcinoma of skin) 03/09/2020    mid forehead    Benign neoplasm of skin     Cancer (HCC)     mesenteric cancer    Disease of thyroid gland 2006    Essential hypertension 02/26/2018    GERD (gastroesophageal reflux disease)     Hypertension     Inflamed seborrheic keratosis     Irritable bowel syndrome     Malignant neoplasm of skin of face     Migraines     Nonmelanoma skin cancer     Last Assessed:6/27/17    Squamous cell skin cancer 06/08/2020    In situ, left lower forehead    Tachycardia     Telogen effluvium     Temporomandibular joint disorder     Vertigo       Past Surgical History:   Procedure Laterality Date    ADENOIDECTOMY      APPENDECTOMY      CHOLECYSTECTOMY      COLONOSCOPY  05/03/2019    COMPLEX WOUND CLOSURE TO EXTREMITY N/A 7/28/2020    Procedure: COMPLEX CLOSURE MID FOREHEAD;  Surgeon: Randy Frank MD;  Location: [facility];  Service: Plastics    ESOPHAGOGASTRODUODENOSCOPY  2009    FLAP LOCAL HEAD / NECK N/A 7/28/2020    Procedure: FLAP MID FOREHEAD;  Surgeon: Randy Frank MD;  Location: [facility];  Service: Plastics    HYSTERECTOMY  1987    IR THORACENTESIS  10/2/2024    LYMPH NODE DISSECTION N/A 9/6/2024    Procedure: BIOPSY ABDOMINAL MASS, LYMPHOMA PROTOCOL;  Surgeon: Angus Drew MD;  Location: [facility];  Service: Surgical Oncology    MALIGNANT SKIN LESION EXCISION      Excision of Lesion Face Malignant-9/14/2004 BCC Forehead    MOHS RECONSTRUCTION N/A 7/28/2020    Procedure: RECONSTRUCTION MOHS DEFECT MID FOREHEAD;  Surgeon: Randy Frank MD;  Location: [facility];  Service: Plastics    MOHS SURGERY  07/27/2020    Right &left  lower forehead, mid forehead    OOPHORECTOMY Bilateral 1987    ROTATOR CUFF REPAIR Right     SKIN BIOPSY      TONSILLECTOMY      TUBAL LIGATION  1976?      Family History   Problem Relation Age of Onset    Hypertension Mother         Mother    Osteoporosis Mother     Skin cancer Mother     Migraines Mother     Dementia Mother     Hypertension Father         Father    Skin cancer Father     Dementia Father     Skin cancer Sister 73    Migraines Sister     No Known Problems Daughter     Uterine cancer Maternal Grandmother 29    Diabetes type II Maternal Grandfather     Cancer Paternal Grandmother     Uterine cancer Paternal Grandmother 65    No Known Problems Maternal Aunt     Cancer Paternal Aunt         Breast    Uterine cancer Paternal Aunt 55    No Known Problems Paternal Aunt     No Known Problems Paternal Aunt       Social History     Tobacco Use    Smoking status: Never    Smokeless tobacco: Never   Vaping Use    Vaping status: Never Used   Substance Use Topics    Alcohol use: Not Currently    Drug use: Never      E-Cigarette/Vaping    E-Cigarette Use Never User       E-Cigarette/Vaping Substances    Nicotine No     THC No     CBD No     Flavoring No     Other No     Unknown No       I have reviewed and agree with the history as documented.     HPI    79-year-old female patient with history of pancreatic cancer presents with dizziness, general weakness.  She was recently diagnosed is not currently undergoing any chemotherapy.  She states that she typically gets dizzy on standing in the mornings but says that the typical dizziness did not go away.  She is having a difficulty describing the nature of the dizziness, whether it seems vertiginous versus lightheadedness.  Admits some lower abdominal discomfort, general decreased p.o. intake.  Otherwise, has been taking medication as prescribed.  Denies any recent fevers or chills, nausea or vomiting, diarrhea, chest pain or worsening shortness of breath.  Was  recently hospitalized and had thoracentesis for large bilateral pleural effusions.    Review of Systems   Constitutional:  Positive for fatigue. Negative for chills and fever.   HENT:  Negative for ear pain and sore throat.    Eyes:  Positive for visual disturbance.        Blurriness   Respiratory:  Negative for cough and shortness of breath.    Cardiovascular:  Negative for chest pain and palpitations.   Gastrointestinal:  Negative for abdominal pain and vomiting.   Genitourinary:  Negative for dysuria and hematuria.   Musculoskeletal:  Negative for arthralgias and back pain.   Skin:  Negative for color change and rash.   Neurological:  Positive for dizziness. Negative for seizures and syncope.   All other systems reviewed and are negative.          Objective       ED Triage Vitals   Temperature Pulse Blood Pressure Respirations SpO2 Patient Position - Orthostatic VS   11/02/24 1150 11/02/24 1147 11/02/24 1147 11/02/24 1147 11/02/24 1147 11/02/24 1147   (!) 97.2 °F (36.2 °C) 79 154/88 16 98 % Sitting      Temp Source Heart Rate Source BP Location FiO2 (%) Pain Score    11/02/24 1150 11/02/24 1147 11/02/24 1147 -- --    Axillary Monitor Left arm        Vitals      Date and Time Temp Pulse SpO2 Resp BP Pain Score FACES Pain Rating User   11/02/24 1750 -- 74 96 % 16 167/80 -- -- EB   11/02/24 1500 -- 74 96 % 16 168/79 -- -- ND   11/02/24 1330 -- 69 96 % 16 162/74 -- -- RJK   11/02/24 1150 97.2 °F (36.2 °C) -- -- -- -- -- -- BM   11/02/24 1147 -- 79 98 % 16 154/88 -- -- BM            Physical Exam  Vitals and nursing note reviewed.   Constitutional:       Comments: Frail appearing   HENT:      Head: Normocephalic and atraumatic.   Eyes:      Extraocular Movements: Extraocular movements intact.      Conjunctiva/sclera: Conjunctivae normal.      Pupils: Pupils are equal, round, and reactive to light.   Cardiovascular:      Rate and Rhythm: Normal rate and regular rhythm.      Heart sounds: No murmur heard.  Pulmonary:       Effort: Pulmonary effort is normal. No respiratory distress.      Breath sounds: Normal breath sounds.   Abdominal:      Palpations: Abdomen is soft.      Tenderness: There is no abdominal tenderness.   Musculoskeletal:         General: No swelling.      Cervical back: Neck supple.   Skin:     General: Skin is warm and dry.      Coloration: Skin is pale.   Neurological:      General: No focal deficit present.      Mental Status: She is alert.      Cranial Nerves: No cranial nerve deficit.   Psychiatric:         Mood and Affect: Mood normal.         Results Reviewed       Procedure Component Value Units Date/Time    HS Troponin I 2hr [[id number]]  (Normal) Collected: 11/02/24 1647    Lab Status: Final result Specimen: Blood from Arm, Left Updated: 11/02/24 1724     hs TnI 2hr 20 ng/L      Delta 2hr hsTnI 9 ng/L     Comprehensive metabolic panel [[id number]]  (Abnormal) Collected: 11/02/24 1224    Lab Status: Final result Specimen: Blood from Arm, Left Updated: 11/02/24 1301     Sodium 130 mmol/L      Potassium 4.4 mmol/L      Chloride 102 mmol/L      CO2 24 mmol/L      ANION GAP 4 mmol/L      BUN 24 mg/dL      Creatinine 0.76 mg/dL      Glucose 82 mg/dL      Calcium 8.2 mg/dL      Corrected Calcium 9.0 mg/dL      AST 51 U/L      ALT 38 U/L      Alkaline Phosphatase 44 U/L      Total Protein 5.4 g/dL      Albumin 3.0 g/dL      Total Bilirubin 0.48 mg/dL      eGFR 73 ml/min/1.73sq m     Narrative:      National Kid[employer] Disease Foundation guidelines for Chronic Kidney Disease (CKD):     Stage 1 with normal or high GFR (GFR > 90 mL/min/1.73 square meters)    Stage 2 Mild CKD (GFR = 60-89 mL/min/1.73 square meters)    Stage 3A Moderate CKD (GFR = 45-59 mL/min/1.73 square meters)    Stage 3B Moderate CKD (GFR = 30-44 mL/min/1.73 square meters)    Stage 4 Severe CKD (GFR = 15-29 mL/min/1.73 square meters)    Stage 5 End Stage CKD (GFR <15 mL/min/1.73 square meters)  Note: GFR calculation is accurate only with a steady state  creatinine    Magnesium [[id number]]  (Normal) Collected: 11/02/24 1224    Lab Status: Final result Specimen: Blood from Arm, Left Updated: 11/02/24 1301     Magnesium 1.9 mg/dL     Lipase [[id number]]  (Normal) Collected: 11/02/24 1224    Lab Status: Final result Specimen: Blood from Arm, Left Updated: 11/02/24 1301     Lipase 52 u/L     HS Troponin 0hr (reflex protocol) [[id number]]  (Normal) Collected: 11/02/24 1224    Lab Status: Final result Specimen: Blood from Arm, Left Updated: 11/02/24 1300     hs TnI 0hr 11 ng/L     CBC and differential [[id number]] Collected: 11/02/24 1224    Lab Status: Final result Specimen: Blood from Arm, Left Updated: 11/02/24 1232     WBC 4.72 Thousand/uL      RBC 4.23 Million/uL      Hemoglobin 12.4 g/dL      Hematocrit 38.2 %      MCV 90 fL      MCH 29.3 pg      MCHC 32.5 g/dL      RDW 14.8 %      MPV 9.0 fL      Platelets 233 Thousands/uL      nRBC 0 /100 WBCs      Segmented % 73 %      Immature Grans % 0 %      Lymphocytes % 20 %      Monocytes % 6 %      Eosinophils Relative 1 %      Basophils Relative 0 %      Absolute Neutrophils 3.41 Thousands/µL      Absolute Immature Grans 0.01 Thousand/uL      Absolute Lymphocytes 0.95 Thousands/µL      Absolute Monocytes 0.29 Thousand/µL      Eosinophils Absolute 0.04 Thousand/µL      Basophils Absolute 0.02 Thousands/µL             CTA head and neck with and without contrast   Final Interpretation by Dion Roe MD (11/02 1658)      CT Brain:  No acute intracranial abnormality. Moderate chronic small vessel ischemia is noted.      CT Angiography:   1. No acute process on CTA neck and brain.   2. 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 Recommend neurovascular follow-up.   3. Large bilateral pleural effusion with adjacent atelectasis.      The study was marked in EPIC for immediate notification.            Workstation performed: [id number]         CT chest abdomen pelvis w contrast   Final Interpretation by Iqra  MD Chao (11/02 1744)      1) Evaluation limited by streak artifact from patient's arms, which could not be placed overhead and by diffuse edema of the subcutaneous and intrathoracic/abdominal/pelvic fat.      2) Ill-defined soft tissue at the root of the mesentery compatible with known neoplasm the bulk of which measures at least 8.5 x 5.8 x 2.1 cm, increased from approximately 8.1 x 2.7 x 2.1 cm in June 2024.   - Encasement of the SMA, now also with encasement of the distal celiac axis, the common hepatic artery and proximal splenic artery, however without occlusion or luminal narrowing.   - Soft tissue also extends cephalad into the right, abutting the right tawana of the diaphragm.      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      4) New likely large abdominal and pelvic ascites.      5) Moderate to large bilateral pleural effusions with lower lobe compressive atelectasis similar to 10/1/2024.      6) Nonspecific 3 mm right upper lobe pulmonary nodules unchanged from June 2024.      7) Additional findings as above.      The study was marked in EPIC for immediate notification.            Workstation performed: [id number]             Procedures    ED Medication and Procedure Management   Prior to Admission Medications   Prescriptions Last Dose Informant Patient Reported? Taking?   CICLOPIROX EX   Yes No   Digestive Enzyme CAPS  Self Yes No   Sig: Take 1 capsule by mouth if needed (gas relief) beano taking 1 tablet as needed gas relief   Multiple Vitamins-Minerals (CENTRUM SILVER ULTRA WOMENS PO)  Self Yes No   Sig: Take by mouth daily 1 tablet daily   ascorbic acid (VITAMIN C) 500 mg tablet  Self Yes No   Sig: Take 500 mg by mouth daily 1 tablet daily   bisacodyl (DULCOLAX) 10 mg suppository   No No   Sig: Insert 1 suppository (10 mg total) into the rectum daily as needed for constipation for up to 7 days   Patient not taking:  Reported on 9/12/2024   colesevelam (WELCHOL) 625 mg tablet  Self No No   Sig: take 3 tablets by mouth twice a day with meals   colesevelam (WELCHOL) 625 mg tablet   Yes No   Patient not taking: Reported on 10/29/2024   cyproheptadine (PERIACTIN) 4 mg tablet   No No   Sig: take 0.5 tablet by mouth four times a day   escitalopram (LEXAPRO) 5 mg tablet  Self No No   Sig: Take 1 tablet (5 mg total) by mouth daily   furosemide (LASIX) 20 mg tablet   No No   Sig: Take 2 tablets (40 mg total) by mouth daily   lactase (LACTAID) 3,000 units tablet  Self Yes No   Sig: Take 3,000 Units by mouth as needed (.) 2 tablets as needed if eating dairy   nystatin (MYCOSTATIN) cream   No No   Sig: Apply 2 g (1 Application total) topically 2 (two) times a day apply to skin fold areas as needed for rash   polyethylene glycol-propylene glycol (SYSTANE) 0.4-0.3 %  Self Yes No   Sig: Apply 1 drop to eye if needed (dry eye) as needed daily Both eyes. dry eyes   potassium chloride (Klor-Con M20) 20 mEq tablet  Self No No   Sig: Take 1 tablet (20 mEq total) by mouth daily   senna-docusate sodium (SENOKOT S) 8.6-50 mg per tablet   No No   Sig: Take 1 tablet by mouth daily at bedtime for 7 days   Patient not taking: Reported on 9/12/2024   sodium chloride 1 g tablet   No No   Sig: Take 1 tablet (1 g total) by mouth 2 (two) times a day   zinc oxide 20 % ointment  Self No No   Sig: Apply topically as needed for irritation (Apply after washing for barrier)      Facility-Administered Medications: None     Discharge Medication List as of 11/2/2024  6:26 PM        CONTINUE these medications which have NOT CHANGED    Details   ascorbic acid (VITAMIN C) 500 mg tablet Take 500 mg by mouth daily 1 tablet daily, Historical Med      bisacodyl (DULCOLAX) 10 mg suppository Insert 1 suppository (10 mg total) into the rectum daily as needed for constipation for up to 7 days, Starting Tue 9/10/2024, Until Tue 9/17/2024 at 2359, Normal      CICLOPIROX EX  Historical Med      !! colesevelam (WELCHOL) 625 mg tablet take 3 tablets by mouth twice a day with meals, Normal      !! colesevelam (WELCHOL) 625 mg tablet Historical Med      cyproheptadine (PERIACTIN) 4 mg tablet take 0.5 tablet by mouth four times a day, Normal      Digestive Enzyme CAPS Take 1 capsule by mouth if needed (gas relief) beano taking 1 tablet as needed gas relief, Historical Med      escitalopram (LEXAPRO) 5 mg tablet Take 1 tablet (5 mg total) by mouth daily, Starting Wed 8/21/2024, Normal      furosemide (LASIX) 20 mg tablet Take 2 tablets (40 mg total) by mouth daily, Starting Fri 10/11/2024, Until Wed 4/9/2025, Normal      lactase (LACTAID) 3,000 units tablet Take 3,000 Units by mouth as needed (.) 2 tablets as needed if eating dairy, Historical Med      Multiple Vitamins-Minerals (CENTRUM SILVER ULTRA WOMENS PO) Take by mouth daily 1 tablet daily, Historical Med      nystatin (MYCOSTATIN) cream Apply 2 g (1 Application total) topically 2 (two) times a day apply to skin fold areas as needed for rash, Starting Mon 10/21/2024, Normal      polyethylene glycol-propylene glycol (SYSTANE) 0.4-0.3 % Apply 1 drop to eye if needed (dry eye) as needed daily Both eyes. dry eyes, Historical Med      potassium chloride (Klor-Con M20) 20 mEq tablet Take 1 tablet (20 mEq total) by mouth daily, Starting Mon 9/16/2024, Normal      senna-docusate sodium (SENOKOT S) 8.6-50 mg per tablet Take 1 tablet by mouth daily at bedtime for 7 days, Starting Tue 9/10/2024, Until Tue 9/17/2024, Normal      sodium chloride 1 g tablet Take 1 tablet (1 g total) by mouth 2 (two) times a day, Starting Mon 10/21/2024, Until Wed 11/20/2024, No Print      zinc oxide 20 % ointment Apply topically as needed for irritation (Apply after washing for barrier), Starting u 9/12/2024, Normal       !! - Potential duplicate medications found. Please discuss with provider.        No discharge procedures on file.  ED SEPSIS DOCUMENTATION   Time  reflects when diagnosis was documented in both MDM as applicable and the Disposition within this note       Time User Action Codes Description Comment    11/2/2024  6:26 PM Randy Wolf [R10.9] Abdominal pain     11/2/2024  6:26 PM Randy Wolf [H53.8] Blurry vision     11/2/2024  6:26 PM Randy Wolf [R53.83] Fatigue                  Tiffanie Henry DO  11/03/24 2006

## 2024-11-04 NOTE — TELEPHONE ENCOUNTER
Please let patient know to increase salt tablet from 1 g 3 times daily to 2 g 3 times daily for next 2 days and then she can continue 1 g 3 times daily afterwards.  Continue Lasix 40 mg daily with potassium supplement.    She needs to follow strict fluid restriction less than 1.2 L/day.  Would like her to have repeat serum sodium level next week

## 2024-11-05 ENCOUNTER — TELEPHONE (OUTPATIENT)
Age: 79
End: 2024-11-05

## 2024-11-05 NOTE — TELEPHONE ENCOUNTER
Pt. Treating diarrhea with colesevelam (WELCHOL) 625 mg tablet  3 tabs in the am and with dinner, pt. Started medication in September and it was helping in the beginning,  pt. Is still having loose stools and diarrhea with fecal incontinence and feels symptoms got worse over time but pt. Daughter is not sure its related to medication and doesn't want to stop medication completely, pt. Daughter is asking if there is an alternate medication she can use to treat diarrhea, pt. Has only ever used otc meds for diarrhea prior to Welchol,  please advise

## 2024-11-05 NOTE — TELEPHONE ENCOUNTER
LVM for pt regarding the following message per Dr. Ferrara     Please let patient know to increase salt tablet from 1 g 3 times daily to 2 g 3 times daily for next 2 days and then she can continue 1 g 3 times daily afterwards.  Continue Lasix 40 mg daily with potassium supplement.    She needs to follow strict fluid restriction less than 1.2 L/day.  Would like her to have repeat serum sodium level next week     Asked pt to give us a call back with any questions or concerns.     Labs in epic.

## 2024-11-05 NOTE — ASSESSMENT & PLAN NOTE
Malnutrition Findings:       Secondary to possible malignancy/mass eccentric.  And anorexia.  Encourage increase oral intake, will order some protein supplements.    Follow-up nutritionist recommendations.                          BMI Findings:           Body mass index is 14.82 kg/m².      Attending Only

## 2024-11-06 ENCOUNTER — TELEPHONE (OUTPATIENT)
Age: 79
End: 2024-11-06

## 2024-11-06 NOTE — TELEPHONE ENCOUNTER
PA for cyproheptadine  DENIED    Reason:(Screenshot if applicable)        Message sent to office clinical pool Yes    Denial letter scanned into Media Yes    Appeal started No (Provider will need to decide if appeal is warranted and send clinical documentation to Prior Authorization Team for initiation.)    **Please follow up with your patient regarding denial and next steps**    While doing medication reconciliation, pt. reports name on new medication bottle is different than his name. Patient informed me had taken one Atenolol 50 mg tablet, he denies any shortness of breath.  Patient's call was transferred to triage.

## 2024-11-06 NOTE — TELEPHONE ENCOUNTER
Patient's daughter called in to see if Dr. Pritchard would be will to review the Caris results with her sooner than 11/11 appointment. She is also wondering if Dr. Pritchard can review the CT C/A/P w contrast  that was done in the ED on 11/2/24 because she saw the results and is wondering about the areas that discuss new concerns. She also said her mother was asking if the 11/11 visit could be virtual because she has so many doctors appointments but is willing to come in if Dr. Pritchard feels it is more appropriate to discuss things in person.

## 2024-11-06 NOTE — TELEPHONE ENCOUNTER
Ct chest abdomen and pelvis and CARIS will be reviewed at time of appointment on 11/11. And this appt should be in person so patient can be fully assessed. Thanks.

## 2024-11-06 NOTE — TELEPHONE ENCOUNTER
I called and spoke with Merari, she is aware of provider comments and recommendations. She will monitor and call us if she needs anything further. She appreciated the call. No further action needed at this time.

## 2024-11-11 ENCOUNTER — TELEMEDICINE (OUTPATIENT)
Dept: PALLIATIVE MEDICINE | Facility: CLINIC | Age: 79
End: 2024-11-11
Payer: MEDICARE

## 2024-11-11 ENCOUNTER — OFFICE VISIT (OUTPATIENT)
Dept: HEMATOLOGY ONCOLOGY | Facility: CLINIC | Age: 79
End: 2024-11-11
Payer: MEDICARE

## 2024-11-11 VITALS
WEIGHT: 104 LBS | HEART RATE: 95 BPM | OXYGEN SATURATION: 100 % | BODY MASS INDEX: 19.14 KG/M2 | DIASTOLIC BLOOD PRESSURE: 80 MMHG | SYSTOLIC BLOOD PRESSURE: 112 MMHG | TEMPERATURE: 98.3 F | RESPIRATION RATE: 16 BRPM | HEIGHT: 62 IN

## 2024-11-11 DIAGNOSIS — E43 SEVERE PROTEIN-CALORIE MALNUTRITION (HCC): ICD-10-CM

## 2024-11-11 DIAGNOSIS — H90.3 SENSORINEURAL HEARING LOSS (SNHL) OF BOTH EARS: ICD-10-CM

## 2024-11-11 DIAGNOSIS — E03.9 ACQUIRED HYPOTHYROIDISM: ICD-10-CM

## 2024-11-11 DIAGNOSIS — Z79.899 MEDICAL MARIJUANA USE: ICD-10-CM

## 2024-11-11 DIAGNOSIS — Z51.5 PALLIATIVE CARE BY SPECIALIST: ICD-10-CM

## 2024-11-11 DIAGNOSIS — J90 PLEURAL EFFUSION: ICD-10-CM

## 2024-11-11 DIAGNOSIS — R06.02 SOB (SHORTNESS OF BREATH): ICD-10-CM

## 2024-11-11 DIAGNOSIS — C48.1: Primary | ICD-10-CM

## 2024-11-11 DIAGNOSIS — R53.83 FATIGUE, UNSPECIFIED TYPE: ICD-10-CM

## 2024-11-11 DIAGNOSIS — K58.9 IRRITABLE BOWEL SYNDROME, UNSPECIFIED TYPE: ICD-10-CM

## 2024-11-11 DIAGNOSIS — K21.9 GASTROESOPHAGEAL REFLUX DISEASE, UNSPECIFIED WHETHER ESOPHAGITIS PRESENT: ICD-10-CM

## 2024-11-11 DIAGNOSIS — R00.0 TACHYCARDIA: ICD-10-CM

## 2024-11-11 DIAGNOSIS — I34.0 MITRAL VALVE INSUFFICIENCY, UNSPECIFIED ETIOLOGY: ICD-10-CM

## 2024-11-11 DIAGNOSIS — N18.31 CHRONIC KIDNEY DISEASE (CKD) STAGE G3A/A2, MODERATELY DECREASED GLOMERULAR FILTRATION RATE (GFR) BETWEEN 45-59 ML/MIN/1.73 SQUARE METER AND ALBUMINURIA CREATININE RATIO BETWEEN 30-299 MG/G (HCC): ICD-10-CM

## 2024-11-11 DIAGNOSIS — R63.0 POOR APPETITE: ICD-10-CM

## 2024-11-11 DIAGNOSIS — E87.8 ELECTROLYTE ABNORMALITY: ICD-10-CM

## 2024-11-11 PROCEDURE — 99215 OFFICE O/P EST HI 40 MIN: CPT | Performed by: INTERNAL MEDICINE

## 2024-11-11 PROCEDURE — G2211 COMPLEX E/M VISIT ADD ON: HCPCS | Performed by: INTERNAL MEDICINE

## 2024-11-11 PROCEDURE — 99213 OFFICE O/P EST LOW 20 MIN: CPT | Performed by: INTERNAL MEDICINE

## 2024-11-11 NOTE — PROGRESS NOTES
Hematology/Oncology Outpatient Follow- up Note  Brigid Brown 79 y.o. female MRN: @ Encounter: [id number]        Date:  11/11/2024        Assessment / Plan:    1. Cancer of mesentery (HCC)  2. Severe protein-calorie malnutrition (HCC)  3. Mitral valve insufficiency, unspecified etiology  4. Acquired hypothyroidism  5. Sensorineural hearing loss (SNHL) of both ears  6. Fatigue, unspecified type  7. SOB (shortness of breath)  8. Electrolyte abnormality  9. Tachycardia  10. Chronic kidney disease (CKD) stage G3a/A2, moderately decreased glomerular filtration rate (GFR) between 45-59 mL/min/1.73 square meter and albuminuria creatinine ratio between  mg/g (HCC)  11. Pleural effusion  12. Gastroesophageal reflux disease, unspecified whether esophagitis present    - ECOG 3.   - Patient not a candidate for systemic anti cancer therapy.   - Continue palliative care.   - Recommend hospice.  - Offered VIR referral for thoracentesis and paracentesis. Patient declined stating she is not very symptomatic at this time.         HPI:    80 y/o with abdominal pain and weight loss. This has been ongoing issue and she initially underwent EGD through the [facility] with report of gastritis though records of this are not currently available through epic. Took a course of oral sucralfate which she reports was initially helpful but then symptoms worsened again. Was started on pantoprazole which has had no effect. Reports an approximate 20 pound weight loss over this time. Reported discomfort in the bilateral lower quadrants primarily but on exam is quite tender at the epigastrium. 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 No blood in her stool, no nausea or vomiting, fever or chills, jaundice or rash, changes in bowel habit. She does report early satiety. Has undergone prior evaluation including CT scan with IV but not oral contrast which was unrevealing, colonoscopy to the cecum with normal random  biopsies. Mesenteric Doppler ultrasound with no evidence of occlusion of the celiac or superior mesenteric arteries. CBC, LFTs, lipase reviewed and unremarkable.   Upper GI exam was ordered at last OV which demonstrates very mild esophagoesophageal reflux and intermittent tertiary contractions when the patient is recumbent. No gastroesophageal reflux was directly observed during the exam. Unremarkable small bowel follow-through. Contrast is identified within the ascending colon within 30 minutes.  CAT scan was recently repeated of chest abdomen and pelvis which showed soft tissue prominence of the mesenteric root, especially surrounding the superior mesenteric artery, increased when compared to a CT abdomen/pelvis dated April 9, 2024. These findings are nonspecific with differential considerations including sclerosing mesenteritis, malignancy/metastatic disease, inflammatory process, lymphoma, among other etiologies. A PET/CT scan is recommended for further evaluation. A short-term follow-up CT abdomen/pelvis in 3 months is also recommended. Fecal calprotectin mildly elevated at 149 which is nonspecific.   She reports that she gets burning on her side but not in her actual epigastric area and initially the Carafate helped with his burning.  She was placed on Remeron but then she stopped taking it because she was making herself eat.  She reports that she is eating which is has no desire to eat and she does have early satiety.  She reports her diarrhea has gotten worse after she got the barium but then around that time it looks like she also was treated with antibiotics.  She reports that sometimes stool will be oily and float.  Did have fecal calprotectin back in 2020 which was normal.  Patient reports he generally can eat and does not have significant early satiety but just no actual desire.  She reports she had been losing weight but over the past few years she was up to 130 pounds but then had progressive weight  loss which she attributes to her gastritis in February.    Interval History:    Patient presents today with her daughter for follow-up and to discuss CARIS testing results.  Daughter reports patient has been trying to take more ensures to maintain her weight.  She is very fatigued and continues to spend most of the day in bed or couch.  Gets short winded with minimal exertion.  She presented to the ED on 11/02/2024 with worsening dizziness and generalized weakness.  She had a CT of the chest, abdomen and pelvis which showed progression of her disease with development of large abdominal and pelvic ascites.  We discussed that CARIS testing did not reveal any targetable mutations.      Cancer Staging:     Cancer Staging   Cancer of mesentery (HCC)  Staging form: Pancreas, AJCC 8th Edition  - Clinical stage from 11/11/2024: Stage IV (cM1) - Signed by Mona Pritchard MD on 11/11/2024       Previous Hematologic/ Oncologic History:    Oncology History Overview Note   As per GI note:  80 y/o with abdominal pain and weight loss. This has been ongoing issue and she initially underwent EGD through the [facility] with report of gastritis though records of this are not currently available through epic. Took a course of oral sucralfate which she reports was initially helpful but then symptoms worsened again. Was started on pantoprazole which has had no effect. Reports an approximate 20 pound weight loss over this time. Reported discomfort in the bilateral lower quadrants primarily but on exam is quite tender at the epigastrium. 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 No blood in her stool, no nausea or vomiting, fever or chills, jaundice or rash, changes in bowel habit. She does report early satiety. Has undergone prior evaluation including CT scan with IV but not oral contrast which was unrevealing, colonoscopy to the cecum with normal random biopsies. Mesenteric Doppler ultrasound with no  evidence of occlusion of the celiac or superior mesenteric arteries. CBC, LFTs, lipase reviewed and unremarkable.   Upper GI exam was ordered at last OV which demonstrates very mild esophagoesophageal reflux and intermittent tertiary contractions when the patient is recumbent. No gastroesophageal reflux was directly observed during the exam. Unremarkable small bowel follow-through. Contrast is identified within the ascending colon within 30 minutes.  CAT scan was recently repeated of chest abdomen and pelvis which showed soft tissue prominence of the mesenteric root, especially surrounding the superior mesenteric artery, increased when compared to a CT abdomen/pelvis dated April 9, 2024. These findings are nonspecific with differential considerations including sclerosing mesenteritis, malignancy/metastatic disease, inflammatory process, lymphoma, among other etiologies. A PET/CT scan is recommended for further evaluation. A short-term follow-up CT abdomen/pelvis in 3 months is also recommended. Fecal calprotectin mildly elevated at 149 which is nonspecific.   She reports that she gets burning on her side but not in her actual epigastric area and initially the Carafate helped with his burning.  She was placed on Remeron but then she stopped taking it because she was making herself eat.  She reports that she is eating which is has no desire to eat and she does have early satiety.  She reports her diarrhea has gotten worse after she got the barium but then around that time it looks like she also was treated with antibiotics.  She reports that sometimes stool will be oily and float.  Did have fecal calprotectin back in 2020 which was normal.  Patient reports he generally can eat and does not have significant early satiety but just no actual desire.  She reports she had been losing weight but over the past few years she was up to 130 pounds but then had progressive weight loss which she attributes to her gastritis in  February.    CT C/A/P:  Narrative & Impression   CT CHEST, ABDOMEN AND PELVIS WITH IV CONTRAST     INDICATION: weight loss  right mid abdominal pain.     COMPARISON: CT abdomen pelvis dated April 9, 2024.     TECHNIQUE: CT examination of the chest, abdomen and pelvis was performed. Multiplanar 2D reformatted images were created from the source data.     This examination, like all CT scans performed in the [facility], was performed utilizing techniques to minimize radiation dose exposure, including the use of iterative reconstruction and automated exposure control. Radiation dose length   product (DLP) for this visit: 356 mGy-cm     IV Contrast: 100 mL of iohexol (OMNIPAQUE)  Enteric Contrast: Administered.     FINDINGS:     CHEST     LUNGS: There is no infiltrate or pleural effusion. There is a linear focus of scarring versus atelectasis at the lateral left lung base. No suspicious pulmonary nodules. No tracheal or endobronchial lesion.     PLEURA: There is a stable 4 mm right perifissural nodule (series 302 image 216) most compatible with an intrapulmonary lymph node.     HEART/GREAT VESSELS: Heart is unremarkable for patient's age. No thoracic aortic aneurysm.     MEDIASTINUM AND LAMAR: Unremarkable.     CHEST WALL AND LOWER NECK: Unremarkable.     ABDOMEN     LIVER/BILIARY TREE: Stable size of an enhancing right inferior hepatic lobe lesion measuring 18 mm, previously described as a hemangioma on MRI abdomen dated December 9, 2019. Stable additional subcentimeter low-attenuation hepatic lesions, too small to   accurately characterize.     GALLBLADDER: No calcified gallstones. No pericholecystic inflammatory change.     SPLEEN: Unremarkable.     PANCREAS: Unremarkable.     ADRENAL GLANDS: Unremarkable.     KIDNEYS/URETERS: Unremarkable. No hydronephrosis.     STOMACH AND BOWEL: Unremarkable.     APPENDIX: No findings to suggest appendicitis.     ABDOMINOPELVIC CAVITY: Small amount of pelvic free  fluid. No pneumoperitoneum. No lymphadenopathy. There is soft tissue prominence of the mesenteric root, particular surrounding the superior mesenteric artery which has increased from the prior exam. Mild   diffuse mesenteric edema is also mildly increased from the prior exam.     VESSELS: Unremarkable for patient's age.     PELVIS     REPRODUCTIVE ORGANS: Post hysterectomy.     URINARY BLADDER: Unremarkable.     ABDOMINAL WALL/INGUINAL REGIONS: Unremarkable.     BONES: No acute fracture or suspicious osseous lesion.     IMPRESSION:     No acute intra-abdominal abnormality. Trace pelvic free fluid.     Soft tissue prominence of the mesenteric root, especially surrounding the superior mesenteric artery, increased when compared to a CT abdomen/pelvis dated April 9, 2024. These findings are nonspecific with differential considerations including sclerosing   mesenteritis, malignancy/metastatic disease, inflammatory process, lymphoma, among other etiologies. A PET/CT scan is recommended for further evaluation. A short-term follow-up CT abdomen/pelvis in 3 months is also recommended.       PET CT:  IMPRESSION:     1. Mild variable FDG uptake suggested along the mesenteric root soft tissue thickening. This is nonspecific, could be related to a nonspecific inflammatory process with malignancy not excluded; sclerosing mesenteritis, amyloidosis, desmoid tumor, and   lymphoproliferative processes such as lymphoma are considerations. Consider continued imaging follow-up if tissue sampling is not performed.              EUS 07/18/2024  FINDINGS:  The esophagus appeared normal.  1 cm hiatal hernia - GE junction 42 cm from the incisors, diaphragmatic impression 43 cm from the incisors  Mild, localized erythematous mucosa in the stomach  The duodenum appeared normal.  Normal celiac takeoff.  No evidence of pancreatic ductal dilation throughout the examination.  Bile duct was normal caliber with no filling defects noted.  Ampulla  "appeared normal.  No masses or lymphadenopathy appreciated.  No abnormality noted to correlate with PET findings.     Cancer of mesentery (HCC)   9/6/2024 Surgery    Biopsy abdominal mass  Soft tissue, mesenteric mass, biopsy:  - Metastatic carcinoma      9/6/2024 Biopsy    Final Diagnosis   A.  Soft tissue, mesenteric mass, biopsy:  - Metastatic carcinoma (see note).   The sample shows predominantly fibrovascular and adipose tissue with chronic inflammation does not correspond.  Patchy areas of pancreatic type tissue are present.  Areas of highly atypical infiltrating epithelial malignancy with glandular formation and features of mucin are present with in desmoplastic fibrotic tissue. Immunohistochemical stains performed with appropriate controls show the tumor to be positive for keratin A1/3, CK7, and weak partial CDX2 and negative for CK20, PAX8, p53, ER, WT1, TTF-1, Napsin, and GATA3; SMAD4/DPC4 shows loss of nuclear staining.       The findings are not definitively specific and may indicate carcinoma metastasizing from the pancreas, biliary tract, gallbladder, upper GI tract, or (less likely) appendix.  Correlation with clinical impression and other laboratory findings is recommended to assess for other sites of disease or site of origin as clinically indicated.   Intraoperative Consultation  [facility]   AF1:  A. Mesentery, \"mesenteric mass,\" Biopsy:  - Carcinoma        9/19/2024 Initial Diagnosis    Cancer of mesentery (HCC)         Test Results:    Imaging: CT chest abdomen pelvis w contrast    Result Date: 11/2/2024  Narrative: CT CHEST, ABDOMEN AND PELVIS WITH IV CONTRAST INDICATION: Abdominal discomfort, hx pancreatic cancer. Worsening nausea and pain. As per review of electronic medical record, patient is status post laparotomy and biopsy of mesenteric mass encasing the SMA with pathology revealing carcinoma, likely pancreatic in origin in September 2024. COMPARISON: CT of the chest from 10/1/2024, PET/CT " from 7/8/2024, CT of the chest, abdomen and pelvis from 6/10/2024, CT of the abdomen and pelvis from 4/9/2024, 2/22/2024, and 7/16/2019, and abdominal MRI from 12/9/2019. TECHNIQUE: CT examination of the chest, abdomen and pelvis was performed. Multiplanar 2D reformatted images were created from the source data. This examination, like all CT scans performed in the [facility], was performed utilizing techniques to minimize radiation dose exposure, including the use of iterative reconstruction and automated exposure control. Radiation dose length product (DLP) for this visit:  1075 mGy-cm IV Contrast: 70 mL of iohexol (OMNIPAQUE) Enteric Contrast: Enteric contrast was not administered. FINDINGS: Evaluation is limited by streak artifact from patient's arms, which could not be placed overhead. Evaluation is further limited by diffuse edema of the subcutaneous and intrathoracic/abdominal/pelvic fat. CHEST BRONCHOPULMONARY:  Clear central airways. Small air-filled right-sided tracheal diverticulum. Enhancing airspace consolidations are seen in both lower lobes, left greater than right likely areas of atelectasis. Mild dependent atelectasis in the upper lobes as well. There is a 3 mm right upper lobe pulmonary nodule (series 308 image 128), unchanged from June 2024. PLEURA: Moderate to large bilateral pleural effusions similar to 10/1/2024. No pneumothorax. HEART/GREAT VESSELS: The heart is normal in size. No pericardial effusion. Normal caliber thoracic aorta. MEDIASTINUM/LYMPH NODES:  No axillary, mediastinal or hilar lymphadenopathy by size criteria. CHEST WALL: Unremarkable. LOWER NECK: Please see separate report for the CT angiogram of the head and neck performed concurrently. ABDOMEN LIVER/BILIARY TREE:  Normal liver morphology. There is a hyperdense lesion in the right inferior hepatic lobe corresponding to hemangioma on the prior MRI. The simple exophytic cyst is seen in the liver more inferiorly.  There are 3 subcentimeter hypodense lesions in the liver (series 306 image 114, 117, and 161), too small to accurately characterize with CT technique, however statistically most likely are cysts as they are unchanged since February 2024. No evidence of new hepatic lesions. No biliary ductal dilation out of proportion for postcholecystectomy state. GALLBLADDER:  The patient is status post cholecystectomy. SPLEEN: Unremarkable.   A small soft tissue density nodule is seen adjacent to the spleen, most likely representing a splenule. PANCREAS: No discrete lesion in the pancreas itself, however the uncinate process is in close approximation to the soft tissue density around the SMA discussed below. No dilatation of the main pancreatic duct. ADRENAL GLANDS:  Unremarkable. KIDNEYS/URETERS: Symmetric renal enhancement.  No intrarenal or ureteral calculi. No hydronephrosis. There are subcentimeter hypodense lesions in both kidneys, too small to accurately characterize with CT technique, however statistically likely cysts. STOMACH AND BOWEL:  Evaluation of the gastrointestinal tract is somewhat limited by underdistention and lack of oral contrast. Evaluation for bowel inflammation further limited by the presence of ascites. No bowel obstruction. No convincing inflammatory changes. APPENDIX: The patient is status post appendectomy. ABDOMINOPELVIC CAVITY: The intra-abdominal/pelvic fat is diffusely edematous. There is also presence of likely large abdominal and pelvic ascites, which is new given the separation between the liver and the abdominal wall, and separation of loops of bowel from the abdominal wall especially in the pelvis compared to the prior studies. Ill-defined soft tissue density again seen at the root of the mesentery encasing the SMA without luminal narrowing or obstruction. The density also now encases the distal celiac axis, the common hepatic artery and the proximal splenic artery (series 306 image 127). The  majority of the soft tissue measures at least 8.5 x 5.8 x 2.1 cm, increased from 8.1 x 2.7 x 2.1 cm in June 2024. The density also extends cephalad to the right, and is inseparable from the right tawana of the diaphragm (series 306 image 122). There is also increased soft tissue density in the left upper quadrant adjacent to the inferior tip of the spleen (series 306 image 138) and just anterior to the splenic flexure of the colon (series 306 image 144), concerning for new peritoneal carcinomatosis. No pneumoperitoneum. LYMPH NODES: No abdominal or pelvic lymphadenopathy. VESSELS: Normal caliber aorta. Scattered atherosclerotic plaque in the abdominal aorta and its major branches. Soft tissue density around the SMA and distal celiac axis and its branches as discussed above. The involved vessels remain patent. The other major abdominal and pelvic arteries are also patent. Patent hepatic, portal, superior mesenteric and splenic veins. The timing of the contrast bolus precludes evaluation of the IVC and the iliac veins as they're not yet opacified. PELVIS REPRODUCTIVE ORGANS: The patient is status post hysterectomy. URINARY BLADDER:  Unremarkable. ABDOMINAL WALL/INGUINAL REGIONS: No ventral abdominal wall hernia. MUSCULOSKELETAL:  No focal aggressive osseous lesions. Degenerative changes of the spine.     Impression: 1) Evaluation limited by streak artifact from patient's arms, which could not be placed overhead and by diffuse edema of the subcutaneous and intrathoracic/abdominal/pelvic fat. 2) Ill-defined soft tissue at the root of the mesentery compatible with known neoplasm the bulk of which measures at least 8.5 x 5.8 x 2.1 cm, increased from approximately 8.1 x 2.7 x 2.1 cm in June 2024. - Encasement of the SMA, now also with encasement of the distal celiac axis, the common hepatic artery and proximal splenic artery, however without occlusion or luminal narrowing. - Soft tissue also extends cephalad into the right,  abutting the right tawana of the diaphragm. 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 4) New likely large abdominal and pelvic ascites. 5) Moderate to large bilateral pleural effusions with lower lobe compressive atelectasis similar to 10/1/2024. 6) Nonspecific 3 mm right upper lobe pulmonary nodules unchanged from June 2024. 7) Additional findings as above. The study was marked in EPIC for immediate notification. Workstation performed: [id number]     CTA head and neck with and without contrast    Result Date: 11/2/2024  Narrative: CTA NECK AND BRAIN WITH AND WITHOUT CONTRAST INDICATION: new onset dizziness and R eye vision changes COMPARISON:   1/16/2021. TECHNIQUE:  Routine CT imaging of the Brain without contrast.Post contrast imaging was performed after administration of iodinated contrast through the neck and brain. Post contrast axial 0.625 mm images timed to opacify the arterial system.  3D rendering was performed on an independent workstation.   MIP reconstructions performed. Coronal and sagittal reconstructions were performed of the non contrast portion of the brain. Radiation dose length product (DLP) for this visit:  1041 mGy-cm .  This examination, like all CT scans performed in the [facility], was performed utilizing techniques to minimize radiation dose exposure, including the use of iterative reconstruction and automated exposure control. IV Contrast:  70 mL of iohexol (OMNIPAQUE) IMAGE QUALITY:   Diagnostic FINDINGS: NONCONTRAST BRAIN PARENCHYMA: Decreased attenuation is noted in periventricular and subcortical white matter demonstrating an appearance that is statistically most likely to represent moderate microangiopathic change. No CT signs of acute infarction.  No intracranial mass, mass effect or midline shift.  No acute parenchymal hemorrhage. VENTRICLES AND EXTRA-AXIAL SPACES:Normal for the patient's  age. Left MCA calcified plaque is unchanged. VISUALIZED ORBITS: Normal. PARANASAL SINUSES: Normal. CTA NECK ARCH AND GREAT VESSELS: Visualized arch and great vessels are normal. VERTEBRAL ARTERIES: Patent extracranial segments. RIGHT CAROTID: Mild calcified plaque at the bifurcation. No stenosis.    No dissection. LEFT CAROTID: No stenosis.    No dissection. NASCET criteria was used to determine the degree of internal carotid artery diameter stenosis. CTA BRAIN: INTERNAL CAROTID ARTERIES: Mild cavernous segment calcified atherosclerotic plaque bilaterally no stenosis or occlusion. ANTERIOR CEREBRAL ARTERY CIRCULATION:  No stenosis or occlusion. MIDDLE CEREBRAL ARTERY CIRCULATION:  No stenosis or occlusion. There is a 1 to 2 mm superiorly directed aneurysm along the left M1 segment which is not clearly seen on prior exam. Please see key images. DISTAL VERTEBRAL ARTERIES:  No stenosis or occlusion. BASILAR ARTERY:  No stenosis or occlusion. POSTERIOR CEREBRAL ARTERIES: No stenosis or occlusion. VENOUS STRUCTURES:  Normal. NON VASCULAR ANATOMY BONY STRUCTURES:  No acute osseous abnormality. SOFT TISSUES OF THE NECK: Cachexia is noted. THORACIC INLET: There are large bilateral pleural effusions with adjacent atelectasis.     Impression: CT Brain:  No acute intracranial abnormality. Moderate chronic small vessel ischemia is noted. CT Angiography: 1. No acute process on CTA neck and brain. 2. 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 Recommend neurovascular follow-up. 3. Large bilateral pleural effusion with adjacent atelectasis. The study was marked in EPIC for immediate notification. Workstation performed: [id number]             Labs:   Lab Results   Component Value Date    WBC 4.72 11/02/2024    HGB 12.4 11/02/2024    HCT 38.2 11/02/2024    MCV 90 11/02/2024     11/02/2024     Lab Results   Component Value Date    K 4.4 11/02/2024     11/02/2024    CO2 24 11/02/2024    BUN 24 11/02/2024     "CREATININE 0.76 11/02/2024    GLUCOSE 114 09/06/2024    GLUF 85 10/07/2024    CALCIUM 8.2 (L) 11/02/2024    CORRECTEDCA 9.0 11/02/2024    AST 51 (H) 11/02/2024    ALT 38 11/02/2024    ALKPHOS 44 11/02/2024    EGFR 73 11/02/2024         No results found for: \"SPEP\", \"UPEP\"    No results found for: \"PSA\"    No results found for: \"CEA\"    No results found for: \"\"    No results found for: \"AFP\"    Lab Results   Component Value Date    IRON 45 (L) 09/30/2024    TIBC 242 (L) 09/30/2024    FERRITIN 194 09/30/2024       Lab Results   Component Value Date    [id number] 439 09/30/2024         ROS: as per HPI.     Current Medications: Reviewed  Allergies: Reviewed  PMH/FH/SH:  Reviewed      Physical Exam:    Body surface area is 1.45 meters squared.    Wt Readings from Last 3 Encounters:   11/11/24 47.2 kg (104 lb)   11/04/24 45.8 kg (101 lb)   11/02/24 47.8 kg (105 lb 6.1 oz)        Temp Readings from Last 3 Encounters:   11/11/24 98.3 °F (36.8 °C) (Temporal)   11/02/24 (!) 97.2 °F (36.2 °C) (Axillary)   10/31/24 (!) 96.8 °F (36 °C) (Temporal)        BP Readings from Last 3 Encounters:   11/11/24 112/80   11/04/24 120/70   11/02/24 167/80         Pulse Readings from Last 3 Encounters:   11/11/24 95   11/04/24 84   11/02/24 74         Physical Exam  Constitutional:       General: She is not in acute distress.     Appearance: She is ill-appearing.   HENT:      Head: Normocephalic.      Mouth/Throat:      Mouth: Mucous membranes are moist.      Pharynx: Oropharynx is clear.   Eyes:      General: No scleral icterus.  Cardiovascular:      Rate and Rhythm: Tachycardia present.   Pulmonary:      Effort: Pulmonary effort is normal.      Comments: Diminished at bilateral bases.   Abdominal:      General: There is distension.      Palpations: Abdomen is soft.      Tenderness: There is no abdominal tenderness.   Musculoskeletal:      Cervical back: Neck supple.      Right lower leg: Edema present.      Left lower leg: Edema " present.   Skin:     General: Skin is warm.      Findings: No bruising.   Neurological:      Mental Status: She is alert and oriented to person, place, and time.      Motor: Weakness (generalized) present.   Psychiatric:         Mood and Affect: Mood normal.           Goals and Barriers:  Current Goal: Prolong Survival from Cancer.   Barriers: None.      Patient's Capacity to Self Care:  Patient is able to self care.    Disclaimer: This document was prepared using GrayBug technology. If a word or phrase is confusing, or does not make sense, this is likely due to recognition error which was not discovered during this clinician's review. If you believe an error has occurred, please contact me through [facility] service line for michelle?cation.      Follow Up    All questions were answered to the patient's satisfaction during this encounter. The patient knows the contact information for our office and knows to reach out for any relevant concerns related to this encounter. They are to call for any temperature 100.4 or higher, new symptoms including but not restricted to shaking chills, decreased appetite, nausea, vomiting, diarrhea, increased fatigue, shortness of breath or chest pain, confusion, and not feeling the strength to come to the clinic. For all other listed problems and medical diagnosis in their chart - they are managed by PCP and/or other specialists, which the patient acknowledges.     I spent 51 minutes reviewing the records (labs, clinician notes, outside records, medical history, ordering medicine/tests/procedures, interpreting the imaging/labs previously done) and coordination of care as well as direct time with the patient today, of which greater than 50% of the time was spent in counseling and coordination of care with the patient/family.

## 2024-11-11 NOTE — PROGRESS NOTES
Outpatient Virtual Regular Visit - Follow-up with Palliative and Supportive Care  Brigid Brown 79 y.o. female [id number]    1. Cancer of mesentery (HCC)  2. Fatigue, unspecified type  3. Severe protein-calorie malnutrition (HCC)  4. Poor appetite  5. Palliative care by specialist  Assessment & Plan:  Time spent with patient and daughter-Merari providing supportive listening.   All questions and concerns were addressed to their satisfaction.    RTC: as scheduled with Dr. Quezada      6. Medical marijuana use  Assessment & Plan:  The patient qualifies for use of MMJ in the [facility] by having the following medical condition: cancer/.  From a palliative care standpoint the patient is suffering with pain, fatigue, poor appetite. These symptoms and side effects might be alleviated with use of MMJ products. The patient registered online (patient ID #[id number]). The patient read and I reviewed the informed consent document with the patient. I answered all questions related to it before the patient signed it. The patient's medical certification was completed on this date (certification #[id number]). The patient was given a copy of the medical certification.  The patient accepts extensive counseling today on MMJ risks and benefits, legal concerns, possible adverse effects, routes of administration, active ingredients such as THC+CBD, etc.  I issued a certification for MMJ use with palliative intent, to help alleviate cancer-related symptoms and cancer-treatment-related side effects. I do not endorse the belief that MMJ can treat cancer and strongly encouraged the patient to continue treatments and surveillance as recommend by cancer specialists.  I suggested the patient ask daughter- Merari to register as an MMJ caregiver so they can go with the patient to the dispensary, or in the patient's stead. Information provided on the MMJ caregiver program.      There are no discontinued medications.        Brigid Brown was seen today for  symptoms and planning cares related to above illnesses.  I have reviewed the patient's controlled substance dispensing history in the Prescription Drug Monitoring Program in compliance with the [facility] regulations before prescribing any controlled substances.    They are invited to continue to follow with us.  If there are questions or concerns, please contact us through our clinic/answering service 24 hours a day, seven days a week.    Jami Duron, DO  [facility]  [phone]      Visit Information    Accompanied By: Family member    Source of History: Self, Family member    History Limitations: None        Intake:    Reason for virtual visit is medical marijuana certification.  The patient is unable to visit the clinic safely due to frailty in an elder patient.    After connecting through [email or web address], the patient was identified by name and date of birth. Brigid Brown or their agent was informed that this was a telemedicine visit and that the visit is being conducted through the Epic Embedded platform. She agrees to proceed..  My office door was closed. No one else was in the room.  She acknowledged consent and understanding of privacy and security of the video platform. The patient or their agent has agreed to participate and understands they can discontinue the visit at any time.     Narrative and Interval History      Brigid Brown is a 79 y.o. female who presents in follow up of symptoms related to mesentery cancer.  Pertinent issues include: Medical marijuana certification    Patient seen today virtually with her daughter, Merari.  Patient is interested in medical marijuana products for help with symptoms related to her cancer.  Her main complaints are pain, poor appetite, and overall fatigue.  We discussed how medical marijuana could help alleviate some of the symptoms and provide better palliation.  We discussed certification process, reviewed consent form (which will be  mailed to her home for her to sign and return), and discussed legal considerations.  After review of all of this patient would like to proceed with medical marijuana certification and it was completed on this date.    Past Medical History:   Diagnosis Date    Acne     Basal cell carcinoma 06/08/2020    right lower forehead    BCC (basal cell carcinoma of skin) 03/09/2020    mid forehead    Benign neoplasm of skin     Cancer (HCC)     mesenteric cancer    Disease of thyroid gland 2006    Essential hypertension 02/26/2018    GERD (gastroesophageal reflux disease)     Hypertension     Inflamed seborrheic keratosis     Irritable bowel syndrome     Malignant neoplasm of skin of face     Migraines     Nonmelanoma skin cancer     Last Assessed:6/27/17    Squamous cell skin cancer 06/08/2020    In situ, left lower forehead    Tachycardia     Telogen effluvium     Temporomandibular joint disorder     Vertigo      Past Surgical History:   Procedure Laterality Date    ADENOIDECTOMY      APPENDECTOMY      CHOLECYSTECTOMY      COLONOSCOPY  05/03/2019    COMPLEX WOUND CLOSURE TO EXTREMITY N/A 7/28/2020    Procedure: COMPLEX CLOSURE MID FOREHEAD;  Surgeon: Randy Frank MD;  [facility]: [facility];  Service: Plastics    ESOPHAGOGASTRODUODENOSCOPY  2009    FLAP LOCAL HEAD / NECK N/A 7/28/2020    Procedure: FLAP MID FOREHEAD;  Surgeon: Randy Frank MD;  Location: [facility];  Service: Plastics    HYSTERECTOMY  1987    IR THORACENTESIS  10/2/2024    LYMPH NODE DISSECTION N/A 9/6/2024    Procedure: BIOPSY ABDOMINAL MASS, LYMPHOMA PROTOCOL;  Surgeon: Angus Drew MD;  Location: [facility];  Service: Surgical Oncology    MALIGNANT SKIN LESION EXCISION      Excision of Lesion Face Malignant-9/14/2004 BCC Forehead    MOHS RECONSTRUCTION N/A 7/28/2020    Procedure: RECONSTRUCTION MOHS DEFECT MID FOREHEAD;  Surgeon: Randy Frank MD;  Location: [facility];  Service: Plastics    MOHS SURGERY  07/27/2020    Right &left  lower forehead, mid forehead    OOPHORECTOMY Bilateral 1987    ROTATOR CUFF REPAIR Right     SKIN BIOPSY      TONSILLECTOMY      TUBAL LIGATION  1976?     Current Outpatient Medications   Medication Sig Dispense Refill    ascorbic acid (VITAMIN C) 500 mg tablet Take 500 mg by mouth daily 1 tablet daily      bisacodyl (DULCOLAX) 10 mg suppository Insert 1 suppository (10 mg total) into the rectum daily as needed for constipation for up to 7 days (Patient not taking: Reported on 9/12/2024) 7 suppository 0    CICLOPIROX EX       colesevelam (WELCHOL) 625 mg tablet take 3 tablets by mouth twice a day with meals 540 tablet 1    colesevelam (WELCHOL) 625 mg tablet  (Patient not taking: Reported on 10/29/2024)      cyproheptadine (PERIACTIN) 4 mg tablet take 0.5 tablet by mouth four times a day 120 tablet 0    Digestive Enzyme CAPS Take 1 capsule by mouth if needed (gas relief) beano taking 1 tablet as needed gas relief      escitalopram (LEXAPRO) 5 mg tablet Take 1 tablet (5 mg total) by mouth daily 90 tablet 0    furosemide (LASIX) 20 mg tablet Take 2 tablets (40 mg total) by mouth daily 60 tablet 5    lactase (LACTAID) 3,000 units tablet Take 3,000 Units by mouth as needed (.) 2 tablets as needed if eating dairy      Multiple Vitamins-Minerals (CENTRUM SILVER ULTRA WOMENS PO) Take by mouth daily 1 tablet daily      nystatin (MYCOSTATIN) cream Apply 2 g (1 Application total) topically 2 (two) times a day apply to skin fold areas as needed for rash 30 g 1    polyethylene glycol-propylene glycol (SYSTANE) 0.4-0.3 % Apply 1 drop to eye if needed (dry eye) as needed daily Both eyes. dry eyes      potassium chloride (Klor-Con M20) 20 mEq tablet Take 1 tablet (20 mEq total) by mouth daily 90 tablet 3    senna-docusate sodium (SENOKOT S) 8.6-50 mg per tablet Take 1 tablet by mouth daily at bedtime for 7 days (Patient not taking: Reported on 9/12/2024) 7 tablet 0    sodium chloride 1 g tablet Take 1 tablet (1 g total) by mouth 3  (three) times a day 90 tablet 0    zinc oxide 20 % ointment Apply topically as needed for irritation (Apply after washing for barrier) 425 g 0     No current facility-administered medications for this visit.      Allergies   Allergen Reactions    Cortisone Hypertension, Other (See Comments), Shortness Of Breath and Tachycardia    Acebutolol     Acetaminophen GI Intolerance     diarrhea    Amlodipine     Atenolol     Azithromycin     Barium Sulfate Diarrhea     Patient reports watery stool and stomach ache post fluoro upper GI on 6/6/24. Per Radiologist MD Lozada, reaction should not absolutely preclude patient from having barium in the future if necessary. CG RN 6/7/24      Bisphosphonates      Annotation - [id number]: iriitis    Candesartan     Clarithromycin     Erythromycin     Fosinopril     Irbesartan     Levofloxacin     Losartan     Methylprednisolone Hypertension and Tachycardia    Metoprolol     Nisoldipine     Olmesartan     Propranolol     Sulfamethoxazole-Trimethoprim Nausea Only    Timolol     Lidocaine Palpitations and Tachycardia         Video Exam  There were no vitals filed for this visit.  Physical Exam  Constitutional:       General: She is not in acute distress.     Appearance: She is ill-appearing.   HENT:      Head: Normocephalic and atraumatic.      Right Ear: External ear normal.      Left Ear: External ear normal.   Eyes:      General:         Right eye: No discharge.         Left eye: No discharge.   Pulmonary:      Effort: Pulmonary effort is normal. No respiratory distress.   Skin:     Coloration: Skin is pale.   Neurological:      General: No focal deficit present.      Mental Status: Mental status is at baseline.   Psychiatric:         Mood and Affect: Mood normal.         Behavior: Behavior normal.           I have spent a total time of 20 minutes on 11/11/24 in caring for this patient including Risks and benefits of tx options, Instructions for management, Patient and family education,  Importance of tx compliance, Risk factor reductions, Impressions, Counseling / Coordination of care, Documenting in the medical record, Reviewing / ordering tests, medicine, procedures  , and Obtaining or reviewing history  .         Visit was conducted by VIDEO TELECOMMUNICATIONS, face to face with Brigid Brown and her daughter.      Encounter provider Jami Duron DO, located at:  PALLIATIVE [facility] [place]  [address] [address] [phone]    Recent Visits  No visits were found meeting these conditions.  Showing recent visits within past 7 days and meeting all other requirements  Today's Visits  Date Type Provider Dept   11/11/24 Telemedicine Jami Duron DO  Palliative [facility]   Showing today's visits and meeting all other requirements  Future Appointments  No visits were found meeting these conditions.  Showing future appointments within next 150 days and meeting all other requirements       VIRTUAL VISIT DISCLAIMER    Brigid Brown or their agent has consented to an online visit or consultation.  They understands that the online visit is based solely on information provided by the patient or agent, and that, in the absence of a face-to-face physical evaluation by the physician, the diagnosis they receive is both limited and provisional in terms of accuracy and completeness.  This is not intended to replace a full medical face-to-face evaluation by the physician. Brigid Brown or their agent understands and accepts these terms.  The patient or their agent was informed this is a billable service.

## 2024-11-11 NOTE — ASSESSMENT & PLAN NOTE
The patient qualifies for use of MMJ in the [facility] by having the following medical condition: cancer/.  From a palliative care standpoint the patient is suffering with pain, fatigue, poor appetite. These symptoms and side effects might be alleviated with use of MMJ products. The patient registered online (patient ID #[id number]). The patient read and I reviewed the informed consent document with the patient. I answered all questions related to it before the patient signed it. The patient's medical certification was completed on this date (certification #[id number]). The patient was given a copy of the medical certification.  The patient accepts extensive counseling today on MMJ risks and benefits, legal concerns, possible adverse effects, routes of administration, active ingredients such as THC+CBD, etc.  I issued a certification for MMJ use with palliative intent, to help alleviate cancer-related symptoms and cancer-treatment-related side effects. I do not endorse the belief that MMJ can treat cancer and strongly encouraged the patient to continue treatments and surveillance as recommend by cancer specialists.  I suggested the patient ask daughter- Merari to register as an MMJ caregiver so they can go with the patient to the dispensary, or in the patient's stead. Information provided on the MMJ caregiver program.

## 2024-11-11 NOTE — ASSESSMENT & PLAN NOTE
Time spent with patient and daughter-Merari providing supportive listening.   All questions and concerns were addressed to their satisfaction.    RTC: as scheduled with Dr. Quezada

## 2024-11-12 ENCOUNTER — TELEPHONE (OUTPATIENT)
Dept: PALLIATIVE MEDICINE | Facility: CLINIC | Age: 79
End: 2024-11-12

## 2024-11-12 ENCOUNTER — TELEMEDICINE (OUTPATIENT)
Dept: PALLIATIVE MEDICINE | Facility: CLINIC | Age: 79
End: 2024-11-12

## 2024-11-12 DIAGNOSIS — R63.0 POOR APPETITE: ICD-10-CM

## 2024-11-12 DIAGNOSIS — C48.1: Primary | ICD-10-CM

## 2024-11-12 DIAGNOSIS — Z71.89 COUNSELING AND COORDINATION OF CARE: Primary | ICD-10-CM

## 2024-11-12 DIAGNOSIS — Z71.89 GOALS OF CARE, COUNSELING/DISCUSSION: ICD-10-CM

## 2024-11-12 DIAGNOSIS — R19.7 DIARRHEA: ICD-10-CM

## 2024-11-12 DIAGNOSIS — Z51.5 PALLIATIVE CARE BY SPECIALIST: ICD-10-CM

## 2024-11-12 PROCEDURE — NC001 PR NO CHARGE

## 2024-11-12 RX ORDER — ESCITALOPRAM OXALATE 5 MG/1
5 TABLET ORAL DAILY
Qty: 90 TABLET | Refills: 1 | Status: SHIPPED | OUTPATIENT
Start: 2024-11-12

## 2024-11-12 NOTE — TELEPHONE ENCOUNTER
Left message on voicemail to call back and schedule a 1 to 2 week virtual follow up with Dr. Quezada.

## 2024-11-12 NOTE — PROGRESS NOTES
Outpatient Virtual Visit - Palliative and Supportive Care  Brigid Brown 79 y.o. female [id number]    Intake and Identification:    Reason(s) for virtual visit: televideo, follow-up, symptom management, goals of care, and psychosocial support. The patient is unable to visit the clinic safely due to medical condition, frailty.    Encounter provider Kimo Quezada DO, located at:  [address] [facility] [place]  [address]  [place] [address]    Recent Visits  No visits were found meeting these conditions.  Showing recent visits within past 7 days and meeting all other requirements  Today's Visits  Date Type Provider Dept   11/12/24 Telemedicine Kimo Quezada DO  Palliative Care [place]   Showing today's visits and meeting all other requirements  Future Appointments  No visits were found meeting these conditions.  Showing future appointments within next 150 days and meeting all other requirements       Patient agrees to participate in a virtual check in via telephone or video visit instead of presenting to the office to address urgent/immediate medical needs, or to respect quarantine and public health guidelines. Patient is aware this is a billable service.     After connecting through televideo, the patient was identified by name and date of birth. Brigid Brown was informed that this was a telemedicine visit and that the visit is being conducted through the Epic Embedded platform. She agrees to proceed. My office door was closed. No one else was in the room. She acknowledged consent and understanding of privacy and security of the video platform. I informed the patient that I have reviewed her record in EPIC and presented the opportunity for her to ask any questions regarding the visit today. The patient has agreed to participate and understands they can discontinue the visit at any time.     ASSESSMENT & PLAN:    Video assessment of patient: Due to technical difficulties with the  camera during our televideo visit, I was unable to see the patient. We did try troubleshooting the issue with restarting our video program, however, it unfortunately did not work. However, patient was audible along with family who was accompanying patient (son - Alexander)      1. Cancer of mesentery (HCC)  2. Goals of care, counseling/discussion  3. Diarrhea  4. Palliative care by specialist    Refer to Home Palliative care Program given patient's functional status.  -freedom of choice offered, patient/family wishes to pursue referral to [facility] program for which I will work on a referral to their home palliative care program.    No medications changes at this time.    Patient does have diarrhea but responsive to Imodium as needed.    No new pain or other symptoms at this time.  Patient did inquire what would happen next.  We did go through the options of continuing with all cares versus comfort care/hospice.  Patient is not ready for hospice at this time.  She wishes to return to the hospital as needed as well as to continue her relationship with her other providers.  She understands that no systemic cancer related treatments are offered at this time given her functional status. We did discuss clinical signs of decline or potential expectations should there be decline over time. She and family state understanding.    We will continue to follow with the patient and place a referral to a home program so that patient can have continuity of care.      Psychosocial   Supportive listening provided  Normalized experience of patient/family  Provided anxiety containment     Referrals Placed / Medical Equipment Ordered  -Home Palliative Care Program    Follow-Up Recommendations  -Follow-up with PCP and current medical specialists  -Follow-up with palliative care: 1 week       Requested Prescriptions      No prescriptions requested or ordered in this encounter       There are no discontinued  medications.    Representatives have queried the patient's controlled substance dispensing history in the Prescription Drug Monitoring Program in compliance with regulations before I have prescribed any controlled substances. The prescription history is consistent with prescribed therapy and our practice policies.    16 minutes dedicated to goals of care conversations today.  Total of 35 minutes were spent in this video telecommunications visit, with greater than 50% of the time spent face-to-face with patient and family in counseling or coordination of care including discussions of obtaining/reviewing history, symptom assessment and management, medication review / adjustment, psychosocial support, reviewing / ordering tests, medicines, imaging, procedures, supportive listening, anticipatory guidance, patient/family education, instructions for management, importance of adhering to treatment plan, risks/benefits of treatment(s), risk factor reduction, and documenting in the medical record. All of the patient's questions were answered during this discussion.    She is invited to continue to follow with us. If there are questions or concerns, she knows to contact us through our clinic/answering service 24 hours a day, seven days a week.    SUBJECTIVE:  Chief Complaint   Patient presents with    Follow-up    Goals        HPI    Brigid Brown is a 79 y.o. female for follow-up.     Palliative Diagnosis: Cancer of Mesentery     Lengthy discussion held today with patient and family who are accompanying her.  Son/Alexander Brown including this conversation.    Patient denies any new issues at this time in regards to pain.  She does have diarrhea which has been responding to Imodium which she takes as needed.  She is otherwise at her baseline from when I last saw her prior conversation.    We did revisit goals of care discussions.  Patient states she saw her medical oncologist at which point no systemic cancer treatments are  offered at this time given her functional status.  We discussed patient's goals regards to what she would want moving forward.  We did revisit comfort care/hospice, however, patient states she is not ready for this.  She would like to continue with her cares with her PCP along with all other cares including returning to the hospital should she need medical attention.    Patient and family inquired about potential home palliative care programs given her decreased functional status and difficulty with transporting.  We discussed nearby agencies that have home palliative programs.  Treatment choice was offered and they wish to speak with [facility] as our [facility] group does not have the ability to service the patient's ZIP Code of residence at this time.    We will place a referral today to [facility].  We will follow to ensure that continue to care is maintained.  They know to call with questions or concerns.    PDMP reviewed and shows no concerns.     The following portions of the medical history were reviewed: past medical history, surgical history, problem list, medication list, family history, and social history.    Current Outpatient Medications:     ascorbic acid (VITAMIN C) 500 mg tablet, Take 500 mg by mouth daily 1 tablet daily, Disp: , Rfl:     bisacodyl (DULCOLAX) 10 mg suppository, Insert 1 suppository (10 mg total) into the rectum daily as needed for constipation for up to 7 days (Patient not taking: Reported on 9/12/2024), Disp: 7 suppository, Rfl: 0    CICLOPIROX EX, , Disp: , Rfl:     colesevelam (WELCHOL) 625 mg tablet, take 3 tablets by mouth twice a day with meals, Disp: 540 tablet, Rfl: 1    colesevelam (WELCHOL) 625 mg tablet, , Disp: , Rfl:     cyproheptadine (PERIACTIN) 4 mg tablet, take 0.5 tablet by mouth four times a day, Disp: 120 tablet, Rfl: 0    Digestive Enzyme CAPS, Take 1 capsule by mouth if needed (gas relief) beano taking 1 tablet as needed gas relief, Disp: , Rfl:     escitalopram  (LEXAPRO) 5 mg tablet, take 1 tablet by mouth once daily, Disp: 90 tablet, Rfl: 1    furosemide (LASIX) 20 mg tablet, Take 2 tablets (40 mg total) by mouth daily, Disp: 60 tablet, Rfl: 5    lactase (LACTAID) 3,000 units tablet, Take 3,000 Units by mouth as needed (.) 2 tablets as needed if eating dairy, Disp: , Rfl:     Multiple Vitamins-Minerals (CENTRUM SILVER ULTRA WOMENS PO), Take by mouth daily 1 tablet daily, Disp: , Rfl:     nystatin (MYCOSTATIN) cream, Apply 2 g (1 Application total) topically 2 (two) times a day apply to skin fold areas as needed for rash, Disp: 30 g, Rfl: 1    polyethylene glycol-propylene glycol (SYSTANE) 0.4-0.3 %, Apply 1 drop to eye if needed (dry eye) as needed daily Both eyes. dry eyes, Disp: , Rfl:     potassium chloride (Klor-Con M20) 20 mEq tablet, Take 1 tablet (20 mEq total) by mouth daily, Disp: 90 tablet, Rfl: 3    senna-docusate sodium (SENOKOT S) 8.6-50 mg per tablet, Take 1 tablet by mouth daily at bedtime for 7 days (Patient not taking: Reported on 9/12/2024), Disp: 7 tablet, Rfl: 0    sodium chloride 1 g tablet, Take 1 tablet (1 g total) by mouth 3 (three) times a day, Disp: 90 tablet, Rfl: 0    zinc oxide 20 % ointment, Apply topically as needed for irritation (Apply after washing for barrier), Disp: 425 g, Rfl: 0    Review of Systems   Constitutional:  Negative for activity change, appetite change and fatigue.   Respiratory:  Negative for shortness of breath.    Cardiovascular:  Negative for chest pain.   Gastrointestinal:  Positive for diarrhea. Negative for abdominal pain, constipation, nausea and vomiting.   Genitourinary:  Negative for difficulty urinating.   Musculoskeletal:  Negative for arthralgias, back pain and myalgias.   Skin:  Negative for wound.   All other systems reviewed and are negative.      Kimo Quezada DO  [facility]iative [facility] Supportive [facility]  [phone]    Portions of this document may have been created using dictation software and as such  "some \"sound alike\" terms may have been generated by the system. Do not hesitate to contact me with any questions or clarifications.    VIRTUAL VISIT DISCLAIMER    Brigid Brown acknowledges that she has consented to an online visit or consultation. She understands that the online visit is based solely on information provided by her, and that, in the absence of a face-to-face physical evaluation by the physician, the diagnosis she receives is both limited and provisional in terms of accuracy and completeness. This is not intended to replace a full medical face-to-face evaluation by the physician. Brigid CESAR Kevin understands and accepts these terms.    "

## 2024-11-12 NOTE — PROGRESS NOTES
"Palliative Supportive Care  met with patient/family to continue to provide emotional support and guidance.      Updated biopsychosocial information relevant to support: VV held with pt, pt's son Alexander, and PSC team for continued support/assistance. Pt/son reviewed that during conversation held with oncologist yesterday, pt is not a candidate for further CA directed tx. Pt expressed some concern about \"what's going to happen next.\" Further GOC conversation held regarding disease-focused care vs comfort-care. Pt/family are not yet ready for comfort-care/hospice services at this time. Pt was certified for [facility] yesterday, and dtr Merari was able to successfully register today to be MMJ caregiver. Pt's main symptoms include pain, lack of appetite, fatigue, and diarrhea. Pt's son Alexander inquired about in-home Palliative Care program for pt. [place] of choice of agency provided--pt/family requesting referral to [facility] in-home palliative program. All questions pt/family had during VV were addressed.    Identified areas of need include: In-home PSC program    Resources provided: Referral to [facility] for in-home palliative care per pt/family's request    Areas that need future follow-up include:    I have spent 25 minutes with Patient and family today in which greater than 50% of this time was spent in counseling/coordination of care     Palliative  will follow-up as requested by patient, family, and primary team.  Please contact with any specific requests     "

## 2024-11-13 ENCOUNTER — TELEPHONE (OUTPATIENT)
Age: 79
End: 2024-11-13

## 2024-11-13 ENCOUNTER — TELEPHONE (OUTPATIENT)
Dept: PALLIATIVE MEDICINE | Facility: CLINIC | Age: 79
End: 2024-11-13

## 2024-11-13 ENCOUNTER — APPOINTMENT (OUTPATIENT)
Dept: LAB | Facility: MEDICAL CENTER | Age: 79
End: 2024-11-13

## 2024-11-13 NOTE — TELEPHONE ENCOUNTER
Attempted to reach patient's son as it was requested yesterday during our clinic visit that I reach out to son Alexander Brown.    I left a HIPAA compliant voicemail on Alexander's provided number.    I was informed that [facility] is unable to service the patient's area of residence.    I wanted to speak to the patient and son regarding referrals to other programs/agencies for Home Palliative Care. I wanted to offer again freedom of choice or if they are agreeable for me to try another agency that has helped our services in the past.    Will try to reach out again to discuss with patient/family.    Kimo Quezada, DO  Palliative Care

## 2024-11-14 ENCOUNTER — OFFICE VISIT (OUTPATIENT)
Dept: FAMILY MEDICINE CLINIC | Facility: MEDICAL CENTER | Age: 79
End: 2024-11-14
Payer: MEDICARE

## 2024-11-14 ENCOUNTER — APPOINTMENT (OUTPATIENT)
Dept: LAB | Facility: MEDICAL CENTER | Age: 79
End: 2024-11-14
Payer: MEDICARE

## 2024-11-14 VITALS
DIASTOLIC BLOOD PRESSURE: 82 MMHG | RESPIRATION RATE: 18 BRPM | HEIGHT: 62 IN | HEART RATE: 90 BPM | TEMPERATURE: 97.5 F | OXYGEN SATURATION: 99 % | SYSTOLIC BLOOD PRESSURE: 120 MMHG | BODY MASS INDEX: 19.47 KG/M2 | WEIGHT: 105.8 LBS

## 2024-11-14 DIAGNOSIS — K58.9 IRRITABLE BOWEL SYNDROME, UNSPECIFIED TYPE: ICD-10-CM

## 2024-11-14 DIAGNOSIS — Z09 FOLLOW-UP EXAM: Primary | ICD-10-CM

## 2024-11-14 DIAGNOSIS — E87.1 HYPONATREMIA: ICD-10-CM

## 2024-11-14 DIAGNOSIS — Z23 ENCOUNTER FOR IMMUNIZATION: ICD-10-CM

## 2024-11-14 DIAGNOSIS — M79.89 LEG SWELLING: ICD-10-CM

## 2024-11-14 DIAGNOSIS — B37.2 CANDIDAL DIAPER RASH: ICD-10-CM

## 2024-11-14 DIAGNOSIS — C48.1: ICD-10-CM

## 2024-11-14 DIAGNOSIS — L22 CANDIDAL DIAPER RASH: ICD-10-CM

## 2024-11-14 LAB — SODIUM 24H UR-SCNC: 81 MOL/L

## 2024-11-14 PROCEDURE — G0008 ADMIN INFLUENZA VIRUS VAC: HCPCS | Performed by: STUDENT IN AN ORGANIZED HEALTH CARE EDUCATION/TRAINING PROGRAM

## 2024-11-14 PROCEDURE — 84300 ASSAY OF URINE SODIUM: CPT

## 2024-11-14 PROCEDURE — 90662 IIV NO PRSV INCREASED AG IM: CPT | Performed by: STUDENT IN AN ORGANIZED HEALTH CARE EDUCATION/TRAINING PROGRAM

## 2024-11-14 PROCEDURE — 99214 OFFICE O/P EST MOD 30 MIN: CPT | Performed by: STUDENT IN AN ORGANIZED HEALTH CARE EDUCATION/TRAINING PROGRAM

## 2024-11-14 RX ORDER — NYSTATIN 100000 U/G
1 CREAM TOPICAL 2 TIMES DAILY
Qty: 30 G | Refills: 1 | Status: SHIPPED | OUTPATIENT
Start: 2024-11-14

## 2024-11-14 NOTE — PROGRESS NOTES
[facility] - Clinic Note  Delicia Beequinton DO, 24     Brigid Brown MRN: [id number] : 1945 Age: 79 y.o.     Assessment/Plan     1. Follow-up exam (Primary)    -Continue follow-up with palliative care    2. Encounter for immunization    -Counseled about influenza vaccine, she would like to receive today  - influenza vaccine, high-dose, PF 0.5 mL (Fluzone High Dose)    3. Candidal diaper rash    - nystatin (MYCOSTATIN) cream; Apply 2 g (1 Application total) topically 2 (two) times a day apply to skin fold areas as needed for rash  Dispense: 30 g; Refill: 1    4. Cancer of mesentery (HCC)    -Continue palliative care follow-up    5. Irritable bowel syndrome, unspecified type    -Imodium as needed, WelChol, Lexapro    6. Hyponatremia    -Following with nephrology  Component      Latest Ref Rng 2024   Creatinine      0.60 - 1.30 mg/dL 0.76    GFR, Calculated      ml/min/1.73sq m 73    BUN      5 - 25 mg/dL 24    Sodium      135 - 147 mmol/L 130 (L)    Potassium      3.5 - 5.3 mmol/L 4.4    Chloride      96 - 108 mmol/L 102    Carbon Dioxide      21 - 32 mmol/L 24    ANION GAP      4 - 13 mmol/L 4    GLUCOSE      65 - 140 mg/dL 82    Calcium      8.4 - 10.2 mg/dL 8.2 (L)    AST      13 - 39 U/L 51 (H)    ALT      7 - 52 U/L 38    ALK PHOS      34 - 104 U/L 44    Total Protein      6.4 - 8.4 g/dL 5.4 (L)    Albumin      3.5 - 5.0 g/dL 3.0 (L)    Total Bilirubin      0.20 - 1.00 mg/dL 0.48    CORRECTED CALCIUM      8.3 - 10.1 mg/dL 9.0       Legend:  (L) Low  (H) High  -Urine sodium pending    7. Leg swelling    -Lasix 40 mg p.o. daily at this time      Brigid Brown acknowledged understanding of treatment plan, all questions answered.    Subjective      Brigid Brown is a 79 y.o. female who presents in follow-up.  She presents today with her daughter.  Patient following with palliative care, there was discussion about medical marijuana which they are pursuing.    The following portions of the  patient's history were reviewed and updated as appropriate: allergies, current medications, past family history, past medical history, past social history, past surgical history and problem list.     Past Medical History:   Diagnosis Date    Acne     Basal cell carcinoma 06/08/2020    right lower forehead    BCC (basal cell carcinoma of skin) 03/09/2020    mid forehead    Benign neoplasm of skin     Cancer (HCC)     mesenteric cancer    Disease of thyroid gland 2006    Essential hypertension 02/26/2018    GERD (gastroesophageal reflux disease)     Hypertension     Inflamed seborrheic keratosis     Irritable bowel syndrome     Malignant neoplasm of skin of face     Migraines     Nonmelanoma skin cancer     Last Assessed:6/27/17    Squamous cell skin cancer 06/08/2020    In situ, left lower forehead    Tachycardia     Telogen effluvium     Temporomandibular joint disorder     Vertigo        Allergies   Allergen Reactions    Cortisone Hypertension, Other (See Comments), Shortness Of Breath and Tachycardia    Acebutolol     Acetaminophen GI Intolerance     diarrhea    Amlodipine     Atenolol     Azithromycin     Barium Sulfate Diarrhea     Patient reports watery stool and stomach ache post fluoro upper GI on 6/6/24. Per Radiologist MD Lozada, reaction should not absolutely preclude patient from having barium in the future if necessary. CG RN 6/7/24      Bisphosphonates      Annotation - [id number]: iriitis    Candesartan     Clarithromycin     Erythromycin     Fosinopril     Irbesartan     Levofloxacin     Losartan     Methylprednisolone Hypertension and Tachycardia    Metoprolol     Nisoldipine     Olmesartan     Propranolol     Sulfamethoxazole-Trimethoprim Nausea Only    Timolol     Lidocaine Palpitations and Tachycardia       Past Surgical History:   Procedure Laterality Date    ADENOIDECTOMY      APPENDECTOMY      CHOLECYSTECTOMY      COLONOSCOPY  05/03/2019    COMPLEX WOUND CLOSURE TO EXTREMITY N/A 7/28/2020     Procedure: COMPLEX CLOSURE MID FOREHEAD;  Surgeon: Randy Frank MD;  Location: [facility];  Service: Plastics    ESOPHAGOGASTRODUODENOSCOPY  2009    FLAP LOCAL HEAD / NECK N/A 7/28/2020    Procedure: FLAP MID FOREHEAD;  Surgeon: Randy Frank MD;  Location: [facility];  Service: Plastics    HYSTERECTOMY  1987    IR THORACENTESIS  10/2/2024    LYMPH NODE DISSECTION N/A 9/6/2024    Procedure: BIOPSY ABDOMINAL MASS, LYMPHOMA PROTOCOL;  Surgeon: Angus Drew MD;  Location: [facility];  Service: Surgical Oncology    MALIGNANT SKIN LESION EXCISION      Excision of Lesion Face Malignant-9/14/2004 BCC Forehead    MOHS RECONSTRUCTION N/A 7/28/2020    Procedure: RECONSTRUCTION MOHS DEFECT MID FOREHEAD;  Surgeon: Randy Frank MD;  Location: [facility];  Service: Plastics    MOHS SURGERY  07/27/2020    Right &left lower forehead, mid forehead    OOPHORECTOMY Bilateral 1987    ROTATOR CUFF REPAIR Right     SKIN BIOPSY      TONSILLECTOMY      TUBAL LIGATION  1976?       Family History   Problem Relation Age of Onset    Hypertension Mother         Mother    Osteoporosis Mother     Skin cancer Mother     Migraines Mother     Dementia Mother     Hypertension Father         Father    Skin cancer Father     Dementia Father     Skin cancer Sister 73    Migraines Sister     No Known Problems Daughter     Uterine cancer Maternal Grandmother 29    Diabetes type II Maternal Grandfather     Cancer Paternal Grandmother     Uterine cancer Paternal Grandmother 65    No Known Problems Maternal Aunt     Cancer Paternal Aunt         Breast    Uterine cancer Paternal Aunt 55    No Known Problems Paternal Aunt     No Known Problems Paternal Aunt        Social History     Socioeconomic History    Marital status: /Civil Union     Spouse name: None    Number of children: None    Years of education: None    Highest education level: None   Occupational History    None   Tobacco Use    Smoking status: Never    Smokeless  tobacco: Never   Vaping Use    Vaping status: Never Used   Substance and Sexual Activity    Alcohol use: Not Currently    Drug use: Never    Sexual activity: Not Currently     Partners: Male     Birth control/protection: Post-menopausal   Other Topics Concern    None   Social History Narrative    None     Social Drivers of Health     Financial Resource Strain: Not on file   Food Insecurity: No Food Insecurity (10/2/2024)    Nursing - Inadequate Food Risk Classification     Worried About Running Out of Food in the Last Year: Never true     Ran Out of Food in the Last Year: Never true     Ran Out of Food in the Last Year: Not on file   Transportation Needs: No Transportation Needs (11/4/2024)    OASIS : Transportation     Lack of Transportation (Medical): No     Lack of Transportation (Non-Medical): No     Patient Unable or Declines to Respond: No   Physical Activity: Not on file   Stress: Not on file   Social Connections: Not on file   Intimate Partner Violence: Not on file   Housing Stability: Low Risk  (10/2/2024)    Housing Stability Vital Sign     Unable to Pay for Housing in the Last Year: No     Number of Times Moved in the Last Year: 0     Homeless in the Last Year: No       Current Outpatient Medications   Medication Sig Dispense Refill    ascorbic acid (VITAMIN C) 500 mg tablet Take 500 mg by mouth daily 1 tablet daily      colesevelam (WELCHOL) 625 mg tablet take 3 tablets by mouth twice a day with meals 540 tablet 1    escitalopram (LEXAPRO) 5 mg tablet take 1 tablet by mouth once daily 90 tablet 1    furosemide (LASIX) 20 mg tablet Take 2 tablets (40 mg total) by mouth daily 60 tablet 5    lactase (LACTAID) 3,000 units tablet Take 3,000 Units by mouth as needed (.) 2 tablets as needed if eating dairy      Multiple Vitamins-Minerals (CENTRUM SILVER ULTRA WOMENS PO) Take by mouth daily 1 tablet daily      polyethylene glycol-propylene glycol (SYSTANE) 0.4-0.3 % Apply 1 drop to eye if needed (dry eye)  "as needed daily Both eyes. dry eyes      potassium chloride (Klor-Con M20) 20 mEq tablet Take 1 tablet (20 mEq total) by mouth daily 90 tablet 3    sodium chloride 1 g tablet Take 1 tablet (1 g total) by mouth 3 (three) times a day 90 tablet 0    zinc oxide 20 % ointment Apply topically as needed for irritation (Apply after washing for barrier) 425 g 0    bisacodyl (DULCOLAX) 10 mg suppository Insert 1 suppository (10 mg total) into the rectum daily as needed for constipation for up to 7 days (Patient not taking: Reported on 11/14/2024) 7 suppository 0    CICLOPIROX EX       colesevelam (WELCHOL) 625 mg tablet  (Patient not taking: Reported on 11/14/2024)      cyproheptadine (PERIACTIN) 4 mg tablet take 0.5 tablet by mouth four times a day (Patient not taking: Reported on 11/14/2024) 120 tablet 0    Digestive Enzyme CAPS Take 1 capsule by mouth if needed (gas relief) beano taking 1 tablet as needed gas relief (Patient not taking: Reported on 11/14/2024)      nystatin (MYCOSTATIN) cream Apply 2 g (1 Application total) topically 2 (two) times a day apply to skin fold areas as needed for rash (Patient not taking: Reported on 11/14/2024) 30 g 1    senna-docusate sodium (SENOKOT S) 8.6-50 mg per tablet Take 1 tablet by mouth daily at bedtime for 7 days (Patient not taking: Reported on 11/14/2024) 7 tablet 0     No current facility-administered medications for this visit.       Review of Systems     As noted in HPI    Objective      /82 (BP Location: Left arm, Patient Position: Sitting, Cuff Size: Standard)   Pulse 90   Temp 97.5 °F (36.4 °C) (Temporal)   Resp 18   Ht 5' 2\" (1.575 m)   Wt 48 kg (105 lb 12.8 oz)   LMP  (LMP Unknown)   SpO2 99%   BMI 19.35 kg/m²     Physical Exam  Vitals reviewed.   Constitutional:       Appearance: She is not toxic-appearing.      Comments: frail   HENT:      Head: Normocephalic and atraumatic.   Eyes:      Conjunctiva/sclera: Conjunctivae normal.   Cardiovascular:      Rate " "and Rhythm: Normal rate and regular rhythm.      Pulses: Normal pulses.      Heart sounds: Normal heart sounds.   Pulmonary:      Effort: Pulmonary effort is normal. No respiratory distress.      Breath sounds: Examination of the right-lower field reveals decreased breath sounds. Examination of the left-lower field reveals decreased breath sounds. Decreased breath sounds present. No wheezing.   Abdominal:      General: Bowel sounds are normal. There is distension.      Tenderness: There is no abdominal tenderness. There is no guarding or rebound.   Musculoskeletal:      Right lower leg: Edema present.      Left lower leg: Edema present.   Skin:     General: Skin is warm and dry.   Neurological:      Mental Status: She is alert and oriented to person, place, and time.   Psychiatric:         Mood and Affect: Mood normal.         Behavior: Behavior normal.         Thought Content: Thought content normal.             Some portions of this record may have been generated with voice recognition software. There may be translation, syntax, or grammatical errors. Occasional wrong word or \"sound-a-like\" substitutions may have occurred due to the inherent limitations of the voice recognition software. Read the chart carefully and recognize, using context, where substations may have occurred. If you have any questions, please contact the dictating provider for clarification or correction, as needed.  "

## 2024-11-15 ENCOUNTER — TELEPHONE (OUTPATIENT)
Dept: PALLIATIVE MEDICINE | Facility: CLINIC | Age: 79
End: 2024-11-15

## 2024-11-15 NOTE — TELEPHONE ENCOUNTER
Attempted to call patient today and son, Alexander.    I wanted to inquire if they would be agreeable for me to send a referral to another Home Palliative Care Agency as [facility] could not service patient's area of residence.    Will try again to provide freedom of choice in regards to referral.    Kimo Quezada, DO  Palliative Care

## 2024-11-19 ENCOUNTER — TELEPHONE (OUTPATIENT)
Dept: PALLIATIVE MEDICINE | Facility: CLINIC | Age: 79
End: 2024-11-19

## 2024-11-19 DIAGNOSIS — E43 SEVERE PROTEIN-CALORIE MALNUTRITION (HCC): ICD-10-CM

## 2024-11-19 DIAGNOSIS — R53.83 FATIGUE, UNSPECIFIED TYPE: ICD-10-CM

## 2024-11-19 DIAGNOSIS — C48.1: Primary | ICD-10-CM

## 2024-11-19 NOTE — TELEPHONE ENCOUNTER
Phone call attempted to patient and son on file, however, unable to reach them.    I did speak with Merari, patient's daughter and updated her regarding [facility] is unable to take Brigid on as a patient due to her location of residence.    I offered to try another agency, [facility], in which they were open to getting this referral.    I will place the referral for this Home Palliative Program Agency today.    Kimo Quezada, DO  Palliative Care

## 2024-11-25 ENCOUNTER — OFFICE VISIT (OUTPATIENT)
Dept: FAMILY MEDICINE CLINIC | Facility: MEDICAL CENTER | Age: 79
End: 2024-11-25
Payer: MEDICARE

## 2024-11-25 ENCOUNTER — TELEPHONE (OUTPATIENT)
Age: 79
End: 2024-11-25

## 2024-11-25 VITALS
DIASTOLIC BLOOD PRESSURE: 62 MMHG | SYSTOLIC BLOOD PRESSURE: 100 MMHG | BODY MASS INDEX: 19.35 KG/M2 | HEART RATE: 88 BPM | TEMPERATURE: 98 F | RESPIRATION RATE: 18 BRPM | HEIGHT: 62 IN

## 2024-11-25 DIAGNOSIS — L03.116 CELLULITIS OF LEFT LOWER EXTREMITY: Primary | ICD-10-CM

## 2024-11-25 DIAGNOSIS — D69.2 PIGMENTED PURPURA (HCC): ICD-10-CM

## 2024-11-25 DIAGNOSIS — F33.41 RECURRENT MAJOR DEPRESSIVE DISORDER, IN PARTIAL REMISSION (HCC): ICD-10-CM

## 2024-11-25 PROCEDURE — G2211 COMPLEX E/M VISIT ADD ON: HCPCS | Performed by: INTERNAL MEDICINE

## 2024-11-25 PROCEDURE — 99213 OFFICE O/P EST LOW 20 MIN: CPT | Performed by: INTERNAL MEDICINE

## 2024-11-25 RX ORDER — CEPHALEXIN 500 MG/1
500 CAPSULE ORAL 2 TIMES DAILY
Qty: 14 CAPSULE | Refills: 0 | Status: SHIPPED | OUTPATIENT
Start: 2024-11-25 | End: 2024-12-01

## 2024-11-25 NOTE — TELEPHONE ENCOUNTER
Patient should keep appointment for evaluation to see if she requires any antibiotic.  Would be able to be directed if she needs to consult with specialist as well in regards to diuretics.

## 2024-11-25 NOTE — PROGRESS NOTES
Name: Brigid Brown      : 1945      MRN: [id number]  Encounter Provider: Juan Kingsley MD  Encounter Date: 2024   Encounter department: [facility] [place]  :  Assessment & Plan  Cellulitis of left lower extremity  Was advised to take the Keflex for 7 days.  She was also told to increase the dose of the furosemide to 20 mg 3 times a day for a couple of days to bring the swelling of the legs down.    Orders:    cephalexin (KEFLEX) 500 mg capsule; Take 1 capsule (500 mg total) by mouth 2 (two) times a day for 7 days    Recurrent major depressive disorder, in partial remission (HCC)  Depression Screening Follow-up Plan: Patient's depression screening was positive with a PHQ-9 score of 16. Patient assessed for underlying major depression. They have no active suicidal ideations. Brief counseling provided and recommend additional follow-up/re-evaluation next office visit.         Pigmented purpura (HCC)                History of Present Illness     HPI  Patient is here for swelling of both her lower legs which is getting worse.  She has also developed some red spots on the left leg due to which the daughter was concerned that she might have an infection.    Review of Systems   Constitutional:  Negative for chills, diaphoresis, fatigue and fever.   HENT:  Negative for congestion, ear discharge, ear pain, hearing loss, postnasal drip, rhinorrhea, sinus pressure, sinus pain, sneezing, sore throat and voice change.    Eyes:  Negative for pain, discharge, redness and visual disturbance.   Respiratory:  Negative for cough, chest tightness, shortness of breath and wheezing.    Cardiovascular:  Positive for leg swelling. Negative for chest pain and palpitations.   Gastrointestinal:  Negative for abdominal distention, abdominal pain, blood in stool, constipation, diarrhea, nausea and vomiting.   Endocrine: Negative for cold intolerance, heat intolerance, polydipsia, polyphagia and polyuria.  "  Genitourinary:  Negative for dysuria, flank pain, frequency, hematuria and urgency.   Musculoskeletal:  Negative for arthralgias, back pain, gait problem, joint swelling, myalgias, neck pain and neck stiffness.   Skin:  Positive for color change. Negative for rash.   Neurological:  Negative for dizziness, tremors, syncope, facial asymmetry, speech difficulty, weakness, light-headedness, numbness and headaches.   Hematological:  Does not bruise/bleed easily.   Psychiatric/Behavioral:  Negative for behavioral problems, confusion and sleep disturbance. The patient is not nervous/anxious.      Medical History Reviewed by provider this encounter:  Tobacco  Allergies  Meds  Problems  Med Hx  Surg Hx  Fam Hx     .     Objective   /62 (BP Location: Left arm, Patient Position: Sitting, Cuff Size: Standard)   Pulse 66   Temp 98 °F (36.7 °C) (Temporal)   Resp 18   Ht 5' 2\" (1.575 m)   LMP  (LMP Unknown)   BMI 19.35 kg/m²      Physical Exam  Constitutional:       General: She is not in acute distress.     Appearance: She is well-developed. She is not diaphoretic.   HENT:      Head: Normocephalic and atraumatic.      Right Ear: External ear normal.      Left Ear: External ear normal.      Nose: Nose normal.   Eyes:      General: No scleral icterus.        Right eye: No discharge.         Left eye: No discharge.      Conjunctiva/sclera: Conjunctivae normal.   Cardiovascular:      Rate and Rhythm: Normal rate and regular rhythm.      Heart sounds: Normal heart sounds. No murmur heard.     No friction rub. No gallop.   Pulmonary:      Effort: Pulmonary effort is normal. No respiratory distress.      Breath sounds: Normal breath sounds. No wheezing or rales.   Abdominal:      General: Bowel sounds are normal. There is no distension.      Palpations: Abdomen is soft.      Tenderness: There is no abdominal tenderness. There is no guarding or rebound.   Musculoskeletal:         General: No tenderness.      Right " lower leg: Edema present.      Left lower leg: Edema present.   Skin:     General: Skin is warm and dry.      Findings: No erythema or rash.      Comments: Looks like cellulitis on the left lower extremity   Neurological:      Mental Status: She is alert and oriented to person, place, and time.      Cranial Nerves: No cranial nerve deficit.      Sensory: No sensory deficit.      Motor: No abnormal muscle tone.   Psychiatric:         Behavior: Behavior normal.

## 2024-11-25 NOTE — ASSESSMENT & PLAN NOTE
Depression Screening Follow-up Plan: Patient's depression screening was positive with a PHQ-9 score of 16. Patient assessed for underlying major depression. They have no active suicidal ideations. Brief counseling provided and recommend additional follow-up/re-evaluation next office visit.

## 2024-11-25 NOTE — TELEPHONE ENCOUNTER
Pt's daughter called and schedule pt for appt today with Dr. Kingsley but wanted to get Dr. Cintron's input to see if pt should come in or see nephrology or something else instead. Pt has has that is red but not itchy on both legs. Legs are also swollen and shiny. She said she has a problem with leg swelling but this is a little worse than normal. Please, advise.

## 2024-11-26 ENCOUNTER — TELEMEDICINE (OUTPATIENT)
Dept: PALLIATIVE MEDICINE | Facility: CLINIC | Age: 79
End: 2024-11-26
Payer: MEDICARE

## 2024-11-26 DIAGNOSIS — Z51.5 PALLIATIVE CARE BY SPECIALIST: ICD-10-CM

## 2024-11-26 DIAGNOSIS — C48.1: Primary | ICD-10-CM

## 2024-11-26 DIAGNOSIS — Z79.899 MEDICAL MARIJUANA USE: ICD-10-CM

## 2024-11-26 DIAGNOSIS — G89.29 CHRONIC LOW BACK PAIN: ICD-10-CM

## 2024-11-26 DIAGNOSIS — M54.50 CHRONIC LOW BACK PAIN: ICD-10-CM

## 2024-11-26 PROCEDURE — 99213 OFFICE O/P EST LOW 20 MIN: CPT | Performed by: STUDENT IN AN ORGANIZED HEALTH CARE EDUCATION/TRAINING PROGRAM

## 2024-11-26 NOTE — PROGRESS NOTES
Outpatient Virtual Visit - Palliative and Supportive Care  Brigid Brown 79 y.o. female [id number]    Intake and Identification:    Reason(s) for virtual visit: televideo, follow-up, symptom management, and psychosocial support. The patient is unable to visit the clinic safely due to cancer related fatigue, deconditioning, difficulty with ambulation.    Encounter provider Kimo Quezada DO, located at:  [address] [facility] [place]  [address]  [place] [address]    Recent Visits  Date Type Provider Dept   11/19/24 Telephone Leanna Woodall MA [facility] [place]   11/19/24 Telephone Kimo Quezada DO [facility] [place]   11/19/24 Telephone Kimo Quezada DO [facility] [place]   Showing recent visits within past 7 days and meeting all other requirements  Today's Visits  Date Type Provider Dept   11/26/24 Telemedicine Kimo Quezada DO [facility] [place]   Showing today's visits and meeting all other requirements  Future Appointments  No visits were found meeting these conditions.  Showing future appointments within next 150 days and meeting all other requirements       Patient agrees to participate in a virtual check in via telephone or video visit instead of presenting to the office to address urgent/immediate medical needs, or to respect quarantine and public health guidelines. Patient is aware this is a billable service.     After connecting through [employer], the patient was identified by name and date of birth. Brigid Brown was informed that this was a telemedicine visit and that the visit is being conducted through the Epic Embedded platform. She agrees to proceed. My office door was closed. No one else was in the room. She acknowledged consent and understanding of privacy and security of the video platform. I informed the patient that I have reviewed her record in EPIC and presented the opportunity for her to ask any questions regarding the visit today.  The patient has agreed to participate and understands they can discontinue the visit at any time.     ASSESSMENT & PLAN:    Video assessment of patient: Brigid Brown in no acute physical or emotional distress; NCAT; EOMI; normal respiratory effort on room air, speaking comfortably in complete sentences; alert, oriented, answering questions appropriately; following commands; no focal deficits; normal mood, normal insight, normal judgment    1. Cancer of mesentery (HCC)  2. Chronic low back pain  3. Palliative care by specialist  Assessment & Plan:  Psychosocial   Supportive listening provided  Normalized experience of patient/family  Provided anxiety containment     Referrals Placed / Medical Equipment Ordered  -None today    Follow-Up Recommendations  -Follow-up with PCP and current medical specialists  -Follow-up with palliative care: as needed. Patient will have continuity of care with [facility] and she will have planned follow-up for this week. She will resume Palliative Cares with the [facility] home program.   4. Medical marijuana use  Assessment & Plan:  Started MMJ 3 days ago.   Felt this has helped somewhat overall since she started low dose.    Patient started MMJ 3 days ago which has helped with her overall symptoms.  Still having low back pain and has not tried anything yet. She does have multiple allergies prohibiting OTC medications.  Discussed options of topical menthol/Bengay to the back if no allergies.  --------------------  Psychosocial   Supportive listening provided  Normalized experience of patient/family  Provided anxiety containment     Referrals Placed / Medical Equipment Ordered  -None today    Follow-Up Recommendations  -Follow-up with PCP and current medical specialists  -Follow-up with palliative care: as needed. Patient will have continuity of care with [facility] and she will have planned follow-up for this week. She will resume Palliative Cares with the [facility] home program.   I did offer  for them to call me should they have any questions in the meantime and in any way I can assist for transition of care.      Requested Prescriptions      No prescriptions requested or ordered in this encounter       There are no discontinued medications.    Representatives have queried the patient's controlled substance dispensing history in the Prescription Drug Monitoring Program in compliance with regulations before I have prescribed any controlled substances. The prescription history is consistent with prescribed therapy and our practice policies.    21 minutes were spent in this video telecommunications visit, with greater than 50% of the time spent face-to-face with patient and family in counseling or coordination of care including discussions of obtaining/reviewing history, symptom assessment and management, medication review / adjustment, psychosocial support, reviewing / ordering tests, medicines, imaging, procedures, hospice services, medical marijuana, supportive listening, anticipatory guidance, patient/family education, instructions for management, importance of adhering to treatment plan, risks/benefits of treatment(s), and documenting in the medical record. All of the patient's questions were answered during this discussion.    She is invited to continue to follow with us. If there are questions or concerns, she knows to contact us through our clinic/answering service 24 hours a day, seven days a week.    SUBJECTIVE:  Chief Complaint   Patient presents with    Follow-up    Back Pain      HPI  Brigid Brown is a 79 y.o. female w/ cancer of mesentery.    Palliative Diagnosis - Cancer of mesentery     Follow-up today as a transition to [facility] program. Patient accompanied by her son and daughter during this visit.  They will have a planned follow-up this week with [facility] for follow-up at the home.    They did inquire about Hospice services and what this would entail. Reviewed Hospice  philosophy and cares associated with Hospice services.     All questions and concerns answered today.    PDMP reviewed and shows no concerns.     The following portions of the medical history were reviewed: past medical history, surgical history, problem list, medication list, family history, and social history.    Current Outpatient Medications:     ascorbic acid (VITAMIN C) 500 mg tablet, Take 500 mg by mouth daily 1 tablet daily, Disp: , Rfl:     Cannabinoids (THC FREE PO), Take by mouth in the morning Dr. Mackenzie's Restore 1:1 Tincture (Medical Marijuana; half THC, half CBD), Disp: , Rfl:     cephalexin (KEFLEX) 500 mg capsule, Take 1 capsule (500 mg total) by mouth 2 (two) times a day for 7 days, Disp: 14 capsule, Rfl: 0    CICLOPIROX EX, , Disp: , Rfl:     colesevelam (WELCHOL) 625 mg tablet, take 3 tablets by mouth twice a day with meals, Disp: 540 tablet, Rfl: 1    escitalopram (LEXAPRO) 5 mg tablet, take 1 tablet by mouth once daily, Disp: 90 tablet, Rfl: 1    furosemide (LASIX) 20 mg tablet, Take 2 tablets (40 mg total) by mouth daily, Disp: 60 tablet, Rfl: 5    lactase (LACTAID) 3,000 units tablet, Take 3,000 Units by mouth as needed (.) 2 tablets as needed if eating dairy, Disp: , Rfl:     Multiple Vitamins-Minerals (CENTRUM SILVER ULTRA WOMENS PO), Take by mouth daily 1 tablet daily, Disp: , Rfl:     nystatin (MYCOSTATIN) cream, Apply 2 g (1 Application total) topically 2 (two) times a day apply to skin fold areas as needed for rash, Disp: 30 g, Rfl: 1    polyethylene glycol-propylene glycol (SYSTANE) 0.4-0.3 %, Apply 1 drop to eye if needed (dry eye) as needed daily Both eyes. dry eyes, Disp: , Rfl:     potassium chloride (Klor-Con M20) 20 mEq tablet, Take 1 tablet (20 mEq total) by mouth daily, Disp: 90 tablet, Rfl: 3    sodium chloride 1 g tablet, Take 1 tablet (1 g total) by mouth 3 (three) times a day, Disp: 90 tablet, Rfl: 0    zinc oxide 20 % ointment, Apply topically as needed for irritation  "(Apply after washing for barrier), Disp: 425 g, Rfl: 0    Review of Systems   Constitutional:  Positive for fatigue.   Respiratory:  Negative for shortness of breath.    Cardiovascular:  Negative for chest pain.   Musculoskeletal:  Positive for back pain.   Psychiatric/Behavioral:  Positive for sleep disturbance.    All other systems reviewed and are negative.      Kimo Quezada DO  [facility]  [phone]    Portions of this document may have been created using dictation software and as such some \"sound alike\" terms may have been generated by the system. Do not hesitate to contact me with any questions or clarifications.    VIRTUAL VISIT DISCLAIMER    Brigid CESAR Kevni acknowledges that she has consented to an online visit or consultation. She understands that the online visit is based solely on information provided by her, and that, in the absence of a face-to-face physical evaluation by the physician, the diagnosis she receives is both limited and provisional in terms of accuracy and completeness. This is not intended to replace a full medical face-to-face evaluation by the physician. Brigid Brown understands and accepts these terms.    "

## 2024-11-26 NOTE — ASSESSMENT & PLAN NOTE
Psychosocial   Supportive listening provided  Normalized experience of patient/family  Provided anxiety containment     Referrals Placed / Medical Equipment Ordered  -None today    Follow-Up Recommendations  -Follow-up with PCP and current medical specialists  -Follow-up with palliative care: as needed. Patient will have continuity of care with [facility] and she will have planned follow-up for this week. She will resume Palliative Cares with the [facility] home program.

## 2024-11-26 NOTE — PATIENT INSTRUCTIONS
It was a pleasure speaking with you today.    As discussed:  -Continue your medications as prescribed.  -Continue with a bowel regimen to avoid constipation.  -Please arrange for a follow-up with [facility] for continuity of care.    Follow-up in: as needed.     Please do not hesitate to call me sooner should you have any questions/concerns or needs.    Medication safety issues - Do not drive under the influence of narcotics (including opioids), watch for adverse effects including confusion / altered mental status / respiratory depression (slowed breathing), keep medications stored in a safe/locked environment, do not use alcohol while opioids or other narcotics are in your system. Do not travel with more than the minimum number of tablets or capsules required for the trip.    ER Precautions  Watch for red flag symptoms including, but not limited to fevers, chills, chest pain, shortness of breath, intractable nausea/vomiting/diarrhea, or acute intractable pain (especially if pain is new or has changed).

## 2024-11-29 ENCOUNTER — TELEPHONE (OUTPATIENT)
Age: 79
End: 2024-11-29

## 2024-11-29 ENCOUNTER — HOME CARE VISIT (OUTPATIENT)
Dept: HOME HEALTH SERVICES | Facility: HOME HEALTHCARE | Age: 79
End: 2024-11-29
Payer: MEDICARE

## 2024-11-29 DIAGNOSIS — E43 SEVERE PROTEIN-CALORIE MALNUTRITION (HCC): ICD-10-CM

## 2024-11-29 DIAGNOSIS — M54.50 CHRONIC LOW BACK PAIN, UNSPECIFIED BACK PAIN LATERALITY, UNSPECIFIED WHETHER SCIATICA PRESENT: ICD-10-CM

## 2024-11-29 DIAGNOSIS — C48.1: Primary | ICD-10-CM

## 2024-11-29 DIAGNOSIS — G89.29 CHRONIC LOW BACK PAIN, UNSPECIFIED BACK PAIN LATERALITY, UNSPECIFIED WHETHER SCIATICA PRESENT: ICD-10-CM

## 2024-11-29 NOTE — TELEPHONE ENCOUNTER
Mreari called & stated that she decided not to go with the other hospice doctor & would like a call back to advise them what the next steps would be in order to get started with [facility] right away. Please call daughter, Merari, at [phone]

## 2024-11-29 NOTE — TELEPHONE ENCOUNTER
Returned call to patient/family.  I did speak with Merari Cohn, patient's daughter.  Patient was seen by [facility] palliative care program, however, daughter states they wish to pursue hospice at this time after their encounter with the [facility] nurse who came to admit the patient.    She states patient and family are ready for hospice and would like to pursue hospice with [facility] for evaluation (freedom of choice offered).    At their request, I will place the referral to our [facility] Hospice team for evaluation ASAP.    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    Patient last saw Medical Oncology on 11/11/2024 in which patient was deemed to Not be a candidate for systemic anti-cancer therapy. They recommended hospice during that visit.    Kimo Quezada DO  Palliative Care

## 2024-12-01 ENCOUNTER — HOME CARE VISIT (OUTPATIENT)
Dept: HOME HEALTH SERVICES | Facility: HOME HEALTHCARE | Age: 79
End: 2024-12-01
Payer: MEDICARE

## 2024-12-01 ENCOUNTER — HOME CARE VISIT (OUTPATIENT)
Dept: HOME HOSPICE | Facility: HOSPICE | Age: 79
End: 2024-12-01
Payer: MEDICARE

## 2024-12-01 PROCEDURE — G0299 HHS/HOSPICE OF RN EA 15 MIN: HCPCS

## 2024-12-01 NOTE — CASE COMMUNICATION
[facility]   [facility]     Tab Briefs    Large [id number]                      96/cs x1    Bedpan [id number] x1    Underpad, reusable 36x36  [id number] x3    Coccyx cushion   [id number] x1    Ear protectors O2 (1 pain) [id number] x1

## 2024-12-02 ENCOUNTER — HOME CARE VISIT (OUTPATIENT)
Dept: HOME HEALTH SERVICES | Facility: HOME HEALTHCARE | Age: 79
End: 2024-12-02
Payer: MEDICARE

## 2024-12-02 ENCOUNTER — HOME CARE VISIT (OUTPATIENT)
Dept: HOME HOSPICE | Facility: HOSPICE | Age: 79
End: 2024-12-02
Payer: MEDICARE

## 2024-12-02 VITALS
SYSTOLIC BLOOD PRESSURE: 100 MMHG | DIASTOLIC BLOOD PRESSURE: 72 MMHG | HEIGHT: 62 IN | RESPIRATION RATE: 16 BRPM | HEART RATE: 88 BPM | BODY MASS INDEX: 19.35 KG/M2

## 2024-12-02 PROCEDURE — G0156 HHCP-SVS OF AIDE,EA 15 MIN: HCPCS

## 2024-12-03 ENCOUNTER — HOME CARE VISIT (OUTPATIENT)
Dept: HOME HOSPICE | Facility: HOSPICE | Age: 79
End: 2024-12-03
Payer: MEDICARE

## 2024-12-03 ENCOUNTER — HOME CARE VISIT (OUTPATIENT)
Dept: HOME HEALTH SERVICES | Facility: HOME HEALTHCARE | Age: 79
End: 2024-12-03
Payer: MEDICARE

## 2024-12-03 PROCEDURE — G0299 HHS/HOSPICE OF RN EA 15 MIN: HCPCS

## 2024-12-03 PROCEDURE — G0155 HHCP-SVS OF CSW,EA 15 MIN: HCPCS

## 2024-12-04 ENCOUNTER — HOME CARE VISIT (OUTPATIENT)
Dept: HOME HEALTH SERVICES | Facility: HOME HEALTHCARE | Age: 79
End: 2024-12-04
Payer: MEDICARE

## 2024-12-04 PROCEDURE — G0156 HHCP-SVS OF AIDE,EA 15 MIN: HCPCS

## 2024-12-05 ENCOUNTER — HOME CARE VISIT (OUTPATIENT)
Dept: HOME HOSPICE | Facility: HOSPICE | Age: 79
End: 2024-12-05
Payer: MEDICARE

## 2024-12-06 ENCOUNTER — HOME CARE VISIT (OUTPATIENT)
Dept: HOME HEALTH SERVICES | Facility: HOME HEALTHCARE | Age: 79
End: 2024-12-06
Payer: MEDICARE

## 2024-12-06 PROCEDURE — G0156 HHCP-SVS OF AIDE,EA 15 MIN: HCPCS

## 2024-12-10 ENCOUNTER — TELEPHONE (OUTPATIENT)
Age: 79
End: 2024-12-10

## 2024-12-10 DIAGNOSIS — E87.1 HYPONATREMIA: Primary | ICD-10-CM

## 2024-12-10 NOTE — TELEPHONE ENCOUNTER
Pt's daughter called in stating that she tried to refill pt's Sodium chloride medication but was told that it could not be filled duet to the prescription being cancelled. Please call pt's daughter back and advise.    Merari [phone]

## 2024-12-11 ENCOUNTER — HOME CARE VISIT (OUTPATIENT)
Dept: HOME HEALTH SERVICES | Facility: HOME HEALTHCARE | Age: 79
End: 2024-12-11
Payer: MEDICARE

## 2024-12-11 VITALS — SYSTOLIC BLOOD PRESSURE: 90 MMHG | RESPIRATION RATE: 17 BRPM | HEART RATE: 78 BPM | DIASTOLIC BLOOD PRESSURE: 80 MMHG

## 2024-12-11 PROCEDURE — G0299 HHS/HOSPICE OF RN EA 15 MIN: HCPCS

## 2024-12-11 RX ORDER — SODIUM CHLORIDE 1 G/1
1 TABLET ORAL 2 TIMES DAILY
Qty: 60 TABLET | Refills: 4 | Status: SHIPPED | OUTPATIENT
Start: 2024-12-11

## 2024-12-11 NOTE — TELEPHONE ENCOUNTER
I have sent prescription refill for salt tablet 1 g twice daily to [facility] pharmacy.

## 2024-12-11 NOTE — TELEPHONE ENCOUNTER
Spoke to pt's daughter regarding the following message per Dr. Ferrara     I have sent prescription refill for salt tablet 1 g twice daily to [facility] pharmacy.     Pt's daughter verbalized understanding. Pt's daughter would like to clarify if pt should be on salt tablet twice daily or three times daily. Pt's daughter stated she believes she was on 1 g three times daily in the past. Please advise.

## 2024-12-11 NOTE — TELEPHONE ENCOUNTER
Spoke to pt's daughter regarding the following message per Dr. Ferrara     Salt tablet 1 g twice daily since we wanted to reduce this recent past     Pt's daughter verbalized understanding.

## 2024-12-13 ENCOUNTER — HOME CARE VISIT (OUTPATIENT)
Dept: HOME HEALTH SERVICES | Facility: HOME HEALTHCARE | Age: 79
End: 2024-12-13
Payer: MEDICARE

## 2024-12-13 PROCEDURE — G0156 HHCP-SVS OF AIDE,EA 15 MIN: HCPCS

## 2024-12-18 ENCOUNTER — HOME CARE VISIT (OUTPATIENT)
Dept: HOME HEALTH SERVICES | Facility: HOME HEALTHCARE | Age: 79
End: 2024-12-18
Payer: MEDICARE

## 2024-12-18 VITALS
SYSTOLIC BLOOD PRESSURE: 102 MMHG | TEMPERATURE: 97.9 F | RESPIRATION RATE: 15 BRPM | HEART RATE: 74 BPM | DIASTOLIC BLOOD PRESSURE: 60 MMHG

## 2024-12-18 PROCEDURE — G0299 HHS/HOSPICE OF RN EA 15 MIN: HCPCS

## 2024-12-19 ENCOUNTER — HOME CARE VISIT (OUTPATIENT)
Dept: HOME HEALTH SERVICES | Facility: HOME HEALTHCARE | Age: 79
End: 2024-12-19
Payer: MEDICARE

## 2024-12-19 PROCEDURE — G0156 HHCP-SVS OF AIDE,EA 15 MIN: HCPCS

## 2024-12-26 ENCOUNTER — HOME CARE VISIT (OUTPATIENT)
Dept: HOME HOSPICE | Facility: HOSPICE | Age: 79
End: 2024-12-26
Payer: MEDICARE

## 2024-12-26 ENCOUNTER — DOCUMENTATION (OUTPATIENT)
Dept: OTHER | Facility: HOSPICE | Age: 79
End: 2024-12-26

## 2024-12-26 ENCOUNTER — HOME CARE VISIT (OUTPATIENT)
Dept: HOME HEALTH SERVICES | Facility: HOME HEALTHCARE | Age: 79
End: 2024-12-26
Payer: MEDICARE

## 2024-12-26 VITALS — SYSTOLIC BLOOD PRESSURE: 78 MMHG | HEART RATE: 72 BPM | DIASTOLIC BLOOD PRESSURE: 48 MMHG | RESPIRATION RATE: 16 BRPM

## 2024-12-26 PROCEDURE — G0299 HHS/HOSPICE OF RN EA 15 MIN: HCPCS

## 2024-12-26 PROCEDURE — G0156 HHCP-SVS OF AIDE,EA 15 MIN: HCPCS

## 2024-12-28 ENCOUNTER — HOME CARE VISIT (OUTPATIENT)
Dept: HOME HOSPICE | Facility: HOSPICE | Age: 79
End: 2024-12-28
Payer: MEDICARE

## 2024-12-31 ENCOUNTER — HOME CARE VISIT (OUTPATIENT)
Dept: HOME HOSPICE | Facility: HOSPICE | Age: 79
End: 2024-12-31
Payer: MEDICARE

## 2024-12-31 VITALS — HEART RATE: 72 BPM | RESPIRATION RATE: 18 BRPM

## 2025-04-11 NOTE — PROGRESS NOTES
Purpose: This writer met with pt (in office) for a follow up. Session completed in Vietnamese. Time in session utilized to provide individual counseling support. Termination process continued to be discussed.     Intervention: Writer checked in with pt. Pt provided updates. Time in session utilized to review progress with . Writer provided support and reviewed [place]. Reviewed programs available and senior Help Line. Per request, link will be sent via e-mail. Briefly discussed  next steps and contacted DHS help line however, unable to connect with a representative. No other services needed at this time. Encouraged pt to reach care team with any questions or concerns. Confirmed availability for next appointment,      Plan: Follow up appointment scheduled 4/30/25 at 3 pm.    [facility]  CONSULT       Patient has the ability to take their vital signs at home YES  Allergies reviewed today   PMH reviewed today   Medications reviewed today     See Geriatric Assessment below...  Falls (last 6 months): no  Kris Total Score: 20  PHQ- 9 Depression Scale: 0  Nutrition Assessment Score: 8  METS:6.79  Health goals:  -What are your overall health goals? Stay healthy    -What brings you strength? , children    -What activities are important to you? Photos     As always we discussed having your BEST surgery, and BEST recovery.  Surgery goals reviewed today.      Breathing exercises   Patient was encouraged to begin lung exercises today.  This could be accomplished through deep breathing and cough exercises.    Eating/nutrition   Encouraged patient to increase oral protein intake prior to surgery.  This can be accomplished by consuming chicken, fish, tuna fish, cottage cheese, cheese, eggs, Greek yogurt, and protein shakes as needed.  I encouraged use of protein shakes such ENLIVE.  I also recommended making your own protein shakes with protein powder.     Sleep/Stress management  Patient was encouraged to rest their body prior to surgery.  Encouraged attempting to get 8 hours of sleep at night.  Avoid stress.  Avoid sick contacts.  Encouraged to find a relaxing hobby such as reading, meditation, listening to music.  Training exercises  Patient was encouraged to remain active as possible.  Today bilateral lower extremity generic exercises were taught for muscle strengthening and balance.  All exercises to be done sitting down.

## 2025-07-09 NOTE — TELEPHONE ENCOUNTER
Flory Mann 1945  CONFIDENTIALTY NOTICE: This fax transmission is intended only for the addressee  It contains information that is legally privileged,  confidential or otherwise protected from use or disclosure  If you are not the intended recipient, you are strictly prohibited from reviewing,  disclosing, copying using or disseminating any of this information or taking any action in reliance on or regarding this information  If you have  received this fax in error, please notify us immediately by telephone so that we can arrange for its return to us  Page: 1 of 2  Call Id: [id number]  Health Call  Standard Call Report  Health Call  Patient Name: Flory Raygoza  Gender: Female  : 1945  Age: 66 Y 6 M 25 D  Return Phone  Number: [phone] (Home)  Address: [address] [place] [address]  City/State/Zip: [address] [place] [address]  Practice Name: Daniel Huynh Charged:  Physician:  [facility] Name:  Relationship To  Patient: Self  Return Phone Number: [phone] (Home)  Presenting Problem: "I have not been able to have a bowel  movement for 3 days "  Service Type: Triage  Charged Service 1: N/A  Pharmacy Name and  Number:  Nurse Assessment  Nurse: Glenny Tatum RN, Ruddy Osman Date/Time: 2019 7:40:18 PM  Type of assessment required:  ---General (Adult or Child)  Duration of Current S/S  ---For the last 3 days  Location/Radiation  ---GI  Temperature (F) and route:  ---Denies fever  Symptom Specific Meds (Dose/Time):  ---Suppository LD: 1300  Other S/S  ---LBM was 3 days ago  No abdominal pain  Tried a suppository which did not help  No abdominal distention  Pain Scale on scale of 1-10, 10 being the worst:  ---No pain  Symptom progression:  ---same  Intake and Output  ---Drinking normally / WNL  Flory [facility] 1945  CONFIDENTIALTY NOTICE: This fax transmission is intended only for the addressee  It contains information that is legally privileged,  confidential or otherwise protected from use or disclosure   If you are not the intended recipient, you are strictly prohibited from reviewing,  disclosing, copying using or disseminating any of this information or taking any action in reliance on or regarding this information  If you have  received this fax in error, please notify us immediately by telephone so that we can arrange for its return to us  Page: 2 of 2  Call Id: [id number]  Nurse Assessment  Last Exam/Treatment:  ---July 2019 for well visit  Protocols  Protocol Title Nurse Date/Time  Constipation Skylar Kidd RN, Fitz Nolen 12/24/2019 7:41:04 PM  Question Caller Affirmed  Disp  Time Disposition Final User  12/24/2019 7:50:14 [id number]  Yaz Evangelist Rene RN, Fitz Nolen  12/24/2019 7:50:25 PM RN Triaged Yes Skylar Kidd RN, [facility] Advice Given Per Protocol  HOME CARE: You should be able to treat this at home  WHAT YOU SHOULD KNOW: * Trouble passing a stool, hard stools, and  infrequent stools are signs of constipation  * Healthy living habits can help treat and prevent constipation  Healthy habits include eating a  diet high in fiber and regular exercise  * You can treat mild constipation at home  * Here is some care advice that should help  GENERAL  CONSTIPATION INSTRUCTIONS: * Eat a high fiber diet  * Drink adequate liquids  * Exercise regularly (even a daily 15 minute  walk!) * Get into a rhythm - try to have a BM at the same time each day  * Don't ignore your body's signals regarding having a BM  *  Avoid enemas and stimulant laxatives  HIGH FIBER DIET: * A high fiber diet will help improve your intestinal function and soften your  BM's  The fiber works by holding more water in your stools  * Try to eat fruit and vegetables at each meal (peas, prunes, citrus, apples,  beans, corn)  * Eat more grain foods (bran flakes, bran muffins, romie crackers, oatmeal, brown rice, and whole wheat bread  LIQUIDS:  * Adequate liquid intake is important to keep your BM's soft  * Drink 6-8 glasses of water a day   (Caution: certain medical conditions  require fluid restriction) * Prune juice is a natural laxative  * Avoid alcohol  GET INTO A RHYTHM: * Try to have your BM's at the  same time every day  The best time is about 30-60 minutes after breakfast or other meal (Reason: natural increased intestinal activity)  *  Don't ignore your body's signals regarding having a BM ENEMAS: * Should be used RARELY and only after other measures have not  worked  * Do not use an enema if you have neutropenia (very low white cell count)  CALL BACK IF: * Constipation lasts more than 2  weeks after using Care Advice * Abdominal swelling, vomiting or fever occur * Constant or increasing abdominal pain * You think you  need to be seen * You become worse  CARE ADVICE given per Constipation (Adult) guideline    Caller Understands: Yes  Caller Disagree/Comply: Comply  PreDisposition: Unsure Walking

## (undated) DEVICE — GLOVE SRG BIOGEL 7

## (undated) DEVICE — INTENDED FOR TISSUE SEPARATION, AND OTHER PROCEDURES THAT REQUIRE A SHARP SURGICAL BLADE TO PUNCTURE OR CUT.: Brand: BARD-PARKER SAFETY BLADES SIZE 15, STERILE

## (undated) DEVICE — SUT PROLENE 2-0 SH 36 IN 8523H

## (undated) DEVICE — ANTIBACTERIAL UNDYED BRAIDED (POLYGLACTIN 910), SYNTHETIC ABSORBABLE SUTURE: Brand: COATED VICRYL

## (undated) DEVICE — GLOVE INDICATOR PI UNDERGLOVE SZ 8 BLUE

## (undated) DEVICE — IV CATH INTROCAN 18G X 1 1/4 SAFETY

## (undated) DEVICE — SUT SILK 2-0 SH CR/8 18 IN C012D

## (undated) DEVICE — GLOVE SRG BIOGEL ECLIPSE 8

## (undated) DEVICE — INTENDED FOR TISSUE SEPARATION, AND OTHER PROCEDURES THAT REQUIRE A SHARP SURGICAL BLADE TO PUNCTURE OR CUT.: Brand: BARD-PARKER ® CARBON RIB-BACK BLADES

## (undated) DEVICE — PAD GROUNDING ADULT

## (undated) DEVICE — SUT MONOCRYL 4-0 PS-2 27 IN Y426H

## (undated) DEVICE — CHLORAPREP HI-LITE 26ML ORANGE

## (undated) DEVICE — ASTOUND STANDARD SURGICAL GOWN, XL: Brand: CONVERTORS

## (undated) DEVICE — PENCIL ELECTROSURG E-Z CLEAN -0035H

## (undated) DEVICE — VESSEL LOOPS X-RAY DETECTABLE: Brand: DEROYAL

## (undated) DEVICE — NEEDLE 25G X 1 1/2

## (undated) DEVICE — TUBING SUCTION 5MM X 12 FT

## (undated) DEVICE — NEEDLE 27 G X 1 1/4

## (undated) DEVICE — 3M™ STERI-STRIP™ REINFORCED ADHESIVE SKIN CLOSURES, R1547, 1/2 IN X 4 IN (12 MM X 100 MM), 6 STRIPS/ENVELOPE: Brand: 3M™ STERI-STRIP™

## (undated) DEVICE — ELECTRODE BLADE MOD E-Z CLEAN 2.5IN 6.4CM -0012M

## (undated) DEVICE — MEDI-VAC YANK SUCT HNDL W/TPRD BULBOUS TIP: Brand: CARDINAL HEALTH

## (undated) DEVICE — POOLE SUCTION HANDLE: Brand: CARDINAL HEALTH

## (undated) DEVICE — ELECTRODE NEEDLE MEGAFINE 2IN E-Z CLEAN MEGADYNE -0118

## (undated) DEVICE — UNDYED MONOFILAMENT (POLYDIOXANONE), ABSORBABLE SYNTHETIC SURGICAL SUTURE: Brand: PDS

## (undated) DEVICE — TRU-CUT NEEDLE BIOPSY SOFT TISSUE 14G X 15CM: Brand: TRU-CUT

## (undated) DEVICE — INTENDED FOR TISSUE SEPARATION, AND OTHER PROCEDURES THAT REQUIRE A SHARP SURGICAL BLADE TO PUNCTURE OR CUT.: Brand: BARD-PARKER SAFETY BLADES SIZE 10, STERILE

## (undated) DEVICE — TRAY FOLEY 16FR URIMETER SURESTEP

## (undated) DEVICE — ELECTRODE BLADE MOD  E-Z CLEAN 6.5IN -0014M

## (undated) DEVICE — BETHLEHEM MAJOR GENERAL PACK: Brand: CARDINAL HEALTH

## (undated) DEVICE — SUT VICRYL 5-0 P-S 18 IN J493G

## (undated) DEVICE — BETHLEHEM UNIVERSAL OUTPATIENT: Brand: CARDINAL HEALTH

## (undated) DEVICE — SUT SILK 3-0 SH CR/8 18 IN C013D

## (undated) DEVICE — BLANKET HYPOTHERMIA ADULT GAYMAR

## (undated) DEVICE — PLUMEPEN PRO 10FT

## (undated) DEVICE — 10FR FRAZIER SUCTION HANDLE: Brand: CARDINAL HEALTH

## (undated) DEVICE — VIAL DECANTER

## (undated) DEVICE — SUT SILK 3-0 SH 30 IN K832H

## (undated) DEVICE — UNDYED MONOFILAMENT (POLYDIOXANONE), ABSORBABLE SURGICAL SUTURE: Brand: PDS

## (undated) DEVICE — SUT ETHILON 3-0 FSLX 30 IN 1673H

## (undated) DEVICE — SUT ETHILON 3-0 FS-1 18 IN 663G

## (undated) DEVICE — SPECIMEN CONTAINER STERILE PEEL PACK

## (undated) DEVICE — SUT PROLENE 5-0 P-3 18 IN 8698G

## (undated) DEVICE — OCCLUSIVE GAUZE STRIP,3% BISMUTH TRIBROMOPHENATE IN PETROLATUM BLEND: Brand: XEROFORM

## (undated) DEVICE — SURGICEL 4 X 8IN

## (undated) DEVICE — SUT SILK 2-0 18 IN A185H

## (undated) DEVICE — POV-IOD SWAB STICKS

## (undated) DEVICE — EXOFIN PRECISION PEN HIGH VISCOSITY TOPICAL SKIN ADHESIVE: Brand: EXOFIN PRECISION PEN, 1G

## (undated) DEVICE — SPONGE STICK WITH PVP-I: Brand: KENDALL

## (undated) DEVICE — SUT VICRYL 2-0 D-SPECIAL 27 IN D8114

## (undated) DEVICE — LIGHT HANDLE COVER SLEEVE DISP BLUE STELLAR

## (undated) DEVICE — 3M™ STERI-STRIP™ COMPOUND BENZOIN TINCTURE 40 BAGS/CARTON 4 CARTONS/CASE C1544: Brand: 3M™ STERI-STRIP™

## (undated) DEVICE — TIBURON SPLIT SHEET: Brand: CONVERTORS

## (undated) DEVICE — SUT VICRYL 3-0 SH 27 IN J416H

## (undated) DEVICE — LIGACLIP MCA MULTIPLE CLIP APPLIERS, 20 MEDIUM CLIPS: Brand: LIGACLIP

## (undated) DEVICE — SUT SILK 2-0 SH 30 IN K833H

## (undated) DEVICE — ENSEAL 20 CM SHAFT, LARGE JAW: Brand: ENSEAL X1

## (undated) DEVICE — SUT PROLENE 6-0 P-3 18 IN 8695G

## (undated) DEVICE — SUT PROLENE 3-0 SH 36 IN 8522H